# Patient Record
Sex: MALE | Race: WHITE | NOT HISPANIC OR LATINO | Employment: OTHER | URBAN - METROPOLITAN AREA
[De-identification: names, ages, dates, MRNs, and addresses within clinical notes are randomized per-mention and may not be internally consistent; named-entity substitution may affect disease eponyms.]

---

## 2017-01-12 ENCOUNTER — GENERIC CONVERSION - ENCOUNTER (OUTPATIENT)
Dept: OTHER | Facility: OTHER | Age: 82
End: 2017-01-12

## 2017-01-22 ENCOUNTER — GENERIC CONVERSION - ENCOUNTER (OUTPATIENT)
Dept: OTHER | Facility: OTHER | Age: 82
End: 2017-01-22

## 2017-01-24 ENCOUNTER — GENERIC CONVERSION - ENCOUNTER (OUTPATIENT)
Dept: OTHER | Facility: OTHER | Age: 82
End: 2017-01-24

## 2017-03-20 ENCOUNTER — ALLSCRIPTS OFFICE VISIT (OUTPATIENT)
Dept: OTHER | Facility: OTHER | Age: 82
End: 2017-03-20

## 2017-04-03 ENCOUNTER — APPOINTMENT (OUTPATIENT)
Dept: LAB | Facility: CLINIC | Age: 82
End: 2017-04-03
Payer: COMMERCIAL

## 2017-04-03 DIAGNOSIS — E11.9 TYPE 2 DIABETES MELLITUS WITHOUT COMPLICATIONS (HCC): ICD-10-CM

## 2017-04-03 DIAGNOSIS — E78.00 PURE HYPERCHOLESTEROLEMIA: ICD-10-CM

## 2017-04-03 LAB
ALBUMIN SERPL BCP-MCNC: 3.5 G/DL (ref 3.5–5)
ALP SERPL-CCNC: 91 U/L (ref 46–116)
ALT SERPL W P-5'-P-CCNC: 25 U/L (ref 12–78)
ANION GAP SERPL CALCULATED.3IONS-SCNC: 9 MMOL/L (ref 4–13)
AST SERPL W P-5'-P-CCNC: 15 U/L (ref 5–45)
BASOPHILS # BLD AUTO: 0.04 THOUSANDS/ΜL (ref 0–0.1)
BASOPHILS NFR BLD AUTO: 1 % (ref 0–1)
BILIRUB SERPL-MCNC: 0.93 MG/DL (ref 0.2–1)
BUN SERPL-MCNC: 19 MG/DL (ref 5–25)
CALCIUM SERPL-MCNC: 9.3 MG/DL (ref 8.3–10.1)
CHLORIDE SERPL-SCNC: 103 MMOL/L (ref 100–108)
CHOLEST SERPL-MCNC: 83 MG/DL (ref 50–200)
CK SERPL-CCNC: 47 U/L (ref 39–308)
CO2 SERPL-SCNC: 28 MMOL/L (ref 21–32)
CREAT SERPL-MCNC: 1.25 MG/DL (ref 0.6–1.3)
CREAT UR-MCNC: 275 MG/DL
EOSINOPHIL # BLD AUTO: 0.4 THOUSAND/ΜL (ref 0–0.61)
EOSINOPHIL NFR BLD AUTO: 5 % (ref 0–6)
ERYTHROCYTE [DISTWIDTH] IN BLOOD BY AUTOMATED COUNT: 13.7 % (ref 11.6–15.1)
EST. AVERAGE GLUCOSE BLD GHB EST-MCNC: 166 MG/DL
GFR SERPL CREATININE-BSD FRML MDRD: 54.8 ML/MIN/1.73SQ M
GLUCOSE P FAST SERPL-MCNC: 112 MG/DL (ref 65–99)
HBA1C MFR BLD: 7.4 % (ref 4.2–6.3)
HCT VFR BLD AUTO: 37.6 % (ref 36.5–49.3)
HDLC SERPL-MCNC: 37 MG/DL (ref 40–60)
HGB BLD-MCNC: 12.6 G/DL (ref 12–17)
LDLC SERPL CALC-MCNC: 10 MG/DL (ref 0–100)
LYMPHOCYTES # BLD AUTO: 1.94 THOUSANDS/ΜL (ref 0.6–4.47)
LYMPHOCYTES NFR BLD AUTO: 26 % (ref 14–44)
MCH RBC QN AUTO: 31.3 PG (ref 26.8–34.3)
MCHC RBC AUTO-ENTMCNC: 33.5 G/DL (ref 31.4–37.4)
MCV RBC AUTO: 94 FL (ref 82–98)
MICROALBUMIN UR-MCNC: 159 MG/L (ref 0–20)
MICROALBUMIN/CREAT 24H UR: 58 MG/G CREATININE (ref 0–30)
MONOCYTES # BLD AUTO: 0.51 THOUSAND/ΜL (ref 0.17–1.22)
MONOCYTES NFR BLD AUTO: 7 % (ref 4–12)
NEUTROPHILS # BLD AUTO: 4.49 THOUSANDS/ΜL (ref 1.85–7.62)
NEUTS SEG NFR BLD AUTO: 61 % (ref 43–75)
NRBC BLD AUTO-RTO: 0 /100 WBCS
PLATELET # BLD AUTO: 237 THOUSANDS/UL (ref 149–390)
PMV BLD AUTO: 10.8 FL (ref 8.9–12.7)
POTASSIUM SERPL-SCNC: 3.7 MMOL/L (ref 3.5–5.3)
PROT SERPL-MCNC: 6.9 G/DL (ref 6.4–8.2)
RBC # BLD AUTO: 4.02 MILLION/UL (ref 3.88–5.62)
SODIUM SERPL-SCNC: 140 MMOL/L (ref 136–145)
TRIGL SERPL-MCNC: 180 MG/DL
WBC # BLD AUTO: 7.42 THOUSAND/UL (ref 4.31–10.16)

## 2017-04-03 PROCEDURE — 83036 HEMOGLOBIN GLYCOSYLATED A1C: CPT

## 2017-04-03 PROCEDURE — 80053 COMPREHEN METABOLIC PANEL: CPT

## 2017-04-03 PROCEDURE — 36415 COLL VENOUS BLD VENIPUNCTURE: CPT

## 2017-04-03 PROCEDURE — 80061 LIPID PANEL: CPT

## 2017-04-03 PROCEDURE — 82550 ASSAY OF CK (CPK): CPT

## 2017-04-03 PROCEDURE — 82043 UR ALBUMIN QUANTITATIVE: CPT

## 2017-04-03 PROCEDURE — 85025 COMPLETE CBC W/AUTO DIFF WBC: CPT

## 2017-04-03 PROCEDURE — 82570 ASSAY OF URINE CREATININE: CPT

## 2017-04-04 ENCOUNTER — GENERIC CONVERSION - ENCOUNTER (OUTPATIENT)
Dept: OTHER | Facility: OTHER | Age: 82
End: 2017-04-04

## 2017-04-21 ENCOUNTER — GENERIC CONVERSION - ENCOUNTER (OUTPATIENT)
Dept: OTHER | Facility: OTHER | Age: 82
End: 2017-04-21

## 2017-07-10 ENCOUNTER — ALLSCRIPTS OFFICE VISIT (OUTPATIENT)
Dept: OTHER | Facility: OTHER | Age: 82
End: 2017-07-10

## 2017-07-20 ENCOUNTER — GENERIC CONVERSION - ENCOUNTER (OUTPATIENT)
Dept: OTHER | Facility: OTHER | Age: 82
End: 2017-07-20

## 2017-10-26 ENCOUNTER — GENERIC CONVERSION - ENCOUNTER (OUTPATIENT)
Dept: OTHER | Facility: OTHER | Age: 82
End: 2017-10-26

## 2017-10-26 ENCOUNTER — APPOINTMENT (OUTPATIENT)
Dept: LAB | Facility: CLINIC | Age: 82
End: 2017-10-26
Payer: COMMERCIAL

## 2017-10-26 DIAGNOSIS — E11.9 TYPE 2 DIABETES MELLITUS WITHOUT COMPLICATIONS (HCC): ICD-10-CM

## 2017-10-26 LAB
EST. AVERAGE GLUCOSE BLD GHB EST-MCNC: 160 MG/DL
HBA1C MFR BLD: 7.2 % (ref 4.2–6.3)

## 2017-10-26 PROCEDURE — 83036 HEMOGLOBIN GLYCOSYLATED A1C: CPT

## 2017-10-26 PROCEDURE — 36415 COLL VENOUS BLD VENIPUNCTURE: CPT

## 2017-10-29 DIAGNOSIS — E11.9 TYPE 2 DIABETES MELLITUS WITHOUT COMPLICATIONS (HCC): ICD-10-CM

## 2017-11-09 ENCOUNTER — ALLSCRIPTS OFFICE VISIT (OUTPATIENT)
Dept: OTHER | Facility: OTHER | Age: 82
End: 2017-11-09

## 2017-11-10 NOTE — PROGRESS NOTES
Assessment    1  Type 2 diabetes mellitus (250 00) (E11 9)   2  Hypertension (401 9) (I10)   3  Hypercholesterolemia (272 0) (E78 00)   4  Esophageal reflux (530 81) (K21 9)    Plan  Type 2 diabetes mellitus    · *VB - Foot Exam; Status:Complete - Retrospective By Protocol Authorization;   Done: [id number]    Discussion/Summary  Discussion Summary:   Diabetes: continue medications, watch for signs of hypoglycemia which can present as a feeling of weakness, sweating, and shakiness  Avoid sweets  Take care of your feet and check them daily for any calluses or wounds  Get your eyes checked yearly for any developing retinopathy  continue blood pressure medication(s)  continue med(s), avoid saturated fats  Moving towards a more plant-based diet will also improve your cholesterol  Patient doing well off proton pump inhibitor, can continue to watch diet for this  Chief Complaint  Chief Complaint Chronic Condition [facility]: Patient is here today for follow up of chronic conditions described in HPI  History of Present Illness  HPI: Diabetes: patient reports compliance with med(s) and no adverse reactions  No hypoglycemic symptoms  Hemoglobin A1c better this time at 7 2, previous was 7 4   patient reports compliance with med(s) and no adverse side effects  No lightheadedness, no chest pain, no shortness of breath, no dry cough  patient tolerating med and denies any significant myalgias  Patient gets occasional symptoms, he is not on a proton pump inhibitor  He was on proton pump inhibitor in the past       Review of Systems  Complete-Male:  Constitutional: no fever,-- no chills-- and-- not feeling tired  Cardiovascular: the heart rate was not slow,-- no chest pain,-- the heart rate was not fast,-- no palpitations-- and-- no extremity edema  Respiratory: no shortness of breath,-- no cough,-- no wheezing-- and-- no shortness of breath during exertion  Gastrointestinal: no constipation-- and-- no diarrhea  Genitourinary: no dysuria  Psychiatric: no anxiety-- and-- no depression  Preventive Quality 65 Older:  Preventive Quality 65 and Older: Falls Risk: The patient fell 0 times in the past 12 months  Active Problems  1  Arteriosclerotic coronary artery disease (414 00) (I25 10)   2  Benign prostatic hypertrophy (600 00) (N40 0)   3  Carotid artery stenosis (433 10) (I65 29)   4  Cerebral infarction, watershed distribution, bilateral, 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 91) (I63 8)   5  Chronic systolic congestive heart failure (428 22,428 0) (I50 22)   6  Esophageal reflux (530 81) (K21 9)   7  Hypercholesterolemia (272 0) (E78 00)   8  Hypertension (401 9) (I10)   9  Ischemic cardiomyopathy (414 8) (I25 5)   10  Need for influenza vaccination (V04 81) (Z23)   11  Need for vaccination with 13-polyvalent pneumococcal conjugate vaccine (V03 82) (Z23)   12  Postoperative state (V45 89) (Z98 890)   13  Screening for genitourinary condition (V81 6) (Z13 89)   14  Type 2 diabetes mellitus (250 00) (E11 9)    Past Medical History  1  History of Cardiac Cath Procedures Performed   2  History of angina pectoris (V12 59) (Z86 79)   3  History of Occluded coronary artery stent (996 72) (X72 694L)    Surgical History  1  History of Carotid Thromboendarterectomy    Family History  Father    1  Family history of     Social History     · Being A Social Drinker   · Daily caffeine consumption   · Former smoker (V15 82) (U76 904)   · Non drinker / no alcohol use   ·   Social History Reviewed: The social history was reviewed and updated today  Current Meds   1  Accu-Chek Charla STRP; Therapy: 06WFE9644 to (Last Rx:99Vdg4902)  Requested for: 80GSF4554 Ordered   2  Aspirin 81 MG TABS; TAKE 1 TABLET DAILY; Therapy: (Recorded:61Jls0466) to Recorded   3  Atorvastatin Calcium 40 MG Oral Tablet; TAKE 1 TABLET Bedtime; Last Rx:2017 Ordered   4  Bimatoprost 0 01 % SOLN; Therapy: (Recorded:59Xqq7413) to Recorded   5   Esomeprazole Magnesium 40 MG Oral Capsule Delayed Release; TAKE 1 CAPSULE DAILY; Last Rx:51Rcz0374 Ordered   6  Fluticasone Propionate 50 MCG/ACT Nasal Suspension; Therapy: (Recorded:88Gnf8221) to Recorded   7  Furosemide 40 MG Oral Tablet; TAKE 1 TABLET DAILY EXCEPT ONCE A WEEK TAKE 1         ADDITIONAL TABLET; Therapy: 57Toc3094 to (Evaluate:04Jun2018)  Requested for: 40ACQ2276; Last Rx:09Jun2017 Ordered   8  Glimepiride 2 MG Oral Tablet; TAKE 1 TABLET DAILY AS DIRECTED; Last Rx:52Sbs6280 Ordered   9  Guaifenesin 600/ TB12; Therapy: (Recorded:03Mow6676) to Recorded   10  Isosorbide Mononitrate ER 30 MG Oral Tablet Extended Release 24 Hour; TAKE 1 TABLET ONCE  DAILY  Requested for: 53TRI8800; Last IA:92ZWY9208 Ordered   11  Klor-Con 10 10 MEQ Oral Tablet Extended Release; TAKE 1 TABLET DAILY; Last Rx:65Cuq4240  Ordered   12  Losartan Potassium 25 MG Oral Tablet; TAKE 1 TABLET DAILY  Requested for: 59ESS6590; Last  Rx:53Jhr8380 Ordered   13  Lumigan 0 01 % Ophthalmic Solution; Therapy: 18FQL2296 to (Last Rx:67Bst2703)  Requested for: 83Lfh9609 Ordered   14  MetFORMIN HCl - 1000 MG Oral Tablet; TAKE 1 TABLET TWICE DAILY  Requested for: 76XYD2216;  Last Rx:09Jun2017 Ordered   15  Metoprolol Succinate ER 25 MG Oral Tablet Extended Release 24 Hour; TAKE 1 TABLET DAILY   Requested for: 65NUH3341; Last ZU:40GAW8727 Ordered   16  Nitrostat 0 4 MG Sublingual Tablet Sublingual; PLACE 1 TABLET UNDER THE TONGUE EVERY 5  MINUTES UP TO 3 DOSES AS NEEDED FOR CHEST PAIN;  Therapy: 33URU8833 to (Evaluate:12Bpt3565)  Requested for: 86Uxq7731; Last Rx:27Vwk2593  Ordered   17  OneTouch Delica Lancets 39Y Miscellaneous; USE AS DIRECTED  Requested for: 19BHY3094; Last  Rx:07Nov2016 Ordered   18  OneTouch Ultra Blue In Vitro Strip; TEST ONCE DAILY  Requested for: 38HXW5265; Last  Rx:20Mar2017 Ordered   19  Potassium Chloride 10 MEQ CPCR; Therapy: (Recorded:66Bty3366) to Recorded   20   Timolol Maleate 0 5 % Ophthalmic Solution; use daily as directed; Results/Data  *VB - Foot Exam 44OPJ0923 10:17AM Jocelyn Modi     Test Name Result Flag Reference   FOOT EXAM 12LVB6703       Falls Risk Assessment (Dx Z13 89 Screen for Neurologic Disorder) 56VUA1759 10:16AM User, Samantha     Test Name Result Flag Reference   Falls Risk      No falls in the past year         Signatures   Electronically signed by : NATALIE Coleman ; Nov 9 2017 10:26AM EST                       (Author)

## 2018-01-11 NOTE — MISCELLANEOUS
Message   Recorded as Task   Date: 04/21/2017 01:48 PM, Created By: Isabel Akbar   Task Name: Med Renewal Request   Assigned To: Bridgett Storm   Regarding Patient: Elsie Garrett, Status: Active   Comment:    Isabel Akbar - 21 Apr 2017 1:48 PM     TASK CREATED  Caller: Self; Renew Medication; [phone] (Home); [phone] (Work)  Pt said he needs a refill for his Januvia but it is expensive and he is wondering if you could prescribe something else instead  He said he discussed this with you previously  If he could talk to you personally, he would like to do that  Pt uses [employer] order  Pt cbr @ [phone]  Pt to stop Januvia, see how sugars are, if elevated will consider less expensive option like glimepiride        Signatures   Electronically signed by : NATALIE Armando ; Apr 21 2017  3:51PM EST                       (Author)

## 2018-01-13 VITALS
SYSTOLIC BLOOD PRESSURE: 124 MMHG | BODY MASS INDEX: 33.8 KG/M2 | HEIGHT: 68 IN | DIASTOLIC BLOOD PRESSURE: 70 MMHG | WEIGHT: 223.01 LBS | RESPIRATION RATE: 16 BRPM | OXYGEN SATURATION: 96 % | HEART RATE: 102 BPM

## 2018-01-13 VITALS
SYSTOLIC BLOOD PRESSURE: 124 MMHG | TEMPERATURE: 97.5 F | RESPIRATION RATE: 16 BRPM | DIASTOLIC BLOOD PRESSURE: 68 MMHG | OXYGEN SATURATION: 99 % | BODY MASS INDEX: 31.93 KG/M2 | WEIGHT: 210 LBS | HEART RATE: 75 BPM

## 2018-01-13 VITALS
WEIGHT: 217.03 LBS | RESPIRATION RATE: 16 BRPM | SYSTOLIC BLOOD PRESSURE: 118 MMHG | BODY MASS INDEX: 32.89 KG/M2 | OXYGEN SATURATION: 97 % | HEART RATE: 82 BPM | TEMPERATURE: 97.7 F | DIASTOLIC BLOOD PRESSURE: 62 MMHG | HEIGHT: 68 IN

## 2018-01-14 NOTE — RESULT NOTES
Message   I called, A1C a little above goal at 7 4, trigs a little up, otherwise labs are good  Verified Results  (1) COMPREHENSIVE METABOLIC PANEL 47GOT2547 33:82UJ Okro Alfonso Order Number: [id number]     Test Name Result Flag Reference   SODIUM 140 mmol/L  136-145   POTASSIUM 3 7 mmol/L  3 5-5 3   CHLORIDE 103 mmol/L  100-108   CARBON DIOXIDE 28 mmol/L  21-32   ANION GAP (CALC) 9 mmol/L  4-13   BLOOD UREA NITROGEN 19 mg/dL  5-25   CREATININE 1 25 mg/dL  0 60-1 30   Standardized to IDMS reference method   CALCIUM 9 3 mg/dL  8 3-10 1   BILI, TOTAL 0 93 mg/dL  0 20-1 00   ALK PHOSPHATAS 91 U/L     ALT (SGPT) 25 U/L  12-78   AST(SGOT) 15 U/L  5-45   ALBUMIN 3 5 g/dL  3 5-5 0   TOTAL PROTEIN 6 9 g/dL  6 4-8 2   eGFR Non-African American 54 8 ml/min/1 73sq m     - Patient Instructions: This is a fasting blood test  Water,black tea or black  coffee only after 9:00pm the night before test Drink 2 glasses of water the morning of test   National Kidney Disease Education Program recommendations are as follows:  GFR calculation is accurate only with a steady state creatinine  Chronic Kidney disease less than 60 ml/min/1 73 sq  meters  Kidney failure less than 15 ml/min/1 73 sq  meters  GLUCOSE FASTING 112 mg/dL H 65-99     (1) HEMOGLOBIN A1C 03Apr2017 09:49AM Brittni Kash Order Number: [id number]     Test Name Result Flag Reference   HEMOGLOBIN A1C 7 4 % H 4 2-6 3   EST  AVG   GLUCOSE 166 mg/dl       (1) MICROALBUMIN CREATININE RATIO, RANDOM URINE 44ODR6739 09:49AM Brittni Alfonso Order Number: [id number]     Test Name Result Flag Reference   MICROALBUMIN/ CREAT R 58 mg/g creatinine H 0-30   MICROALBUMIN,URINE 159 0 mg/L H 0 0-20 0   CREATININE URINE 275 0 mg/dL       (1) LIPID PANEL, FASTING 03Apr2017 09:49AM Brittni Alfonso Order Number: [id number]     Test Name Result Flag Reference   CHOLESTEROL 83 mg/dL     HDL,DIRECT 37 mg/dL L 40-60 Specimen collection should occur prior to Metamizole administration due to the potential for falsely depressed results  LDL CHOLESTEROL CALCULATED 10 mg/dL  0-100   - Patient Instructions: This is a fasting blood test  Water,black tea or black  coffee only after 9:00pm the night before test   Drink 2 glasses of water the morning of test     - Patient Instructions: This is a fasting blood test  Water,black tea or black  coffee only after 9:00pm the night before test Drink 2 glasses of water the morning of test   Triglyceride:         Normal              <150 mg/dl       Borderline High    150-199 mg/dl       High               200-499 mg/dl       Very High          >499 mg/dl  Cholesterol:         Desirable        <200 mg/dl      Borderline High  200-239 mg/dl      High             >239 mg/dl  HDL Cholesterol:        High    >59 mg/dL      Low     <41 mg/dL  LDL CALCULATED:    This screening LDL is a calculated result  It does not have the accuracy of the Direct Measured LDL in the monitoring of patients with hyperlipidemia and/or statin therapy  Direct Measure LDL (QBZ162) must be ordered separately in these patients  TRIGLYCERIDES 180 mg/dL H <=150   Specimen collection should occur prior to N-Acetylcysteine or Metamizole administration due to the potential for falsely depressed results  (1) CK (CPK) 03Apr2017 09:49AM Wes Fear Order Number: [id number]     Test Name Result Flag Reference   CK (CPK) 47 U/L     - Patient Instructions:  This is a fasting blood test  Water,black tea or black  coffee only after 9:00pm the night before test Drink 2 glasses of water the morning of test      (1) CBC/PLT/DIFF 03Apr2017 09:49AM eWs Fear Order Number: [id number]     Test Name Result Flag Reference   WBC COUNT 7 42 Thousand/uL  4 31-10 16   RBC COUNT 4 02 Million/uL  3 88-5 62   HEMOGLOBIN 12 6 g/dL  12 0-17 0   HEMATOCRIT 37 6 %  36 5-49 3   MCV 94 fL  82-98   MCH 31 3 pg  26 8-34 3   MCHC 33 5 g/dL  31 4-37 4   RDW 13 7 %  11 6-15 1   MPV 10 8 fL  8 9-12 7   PLATELET COUNT 334 Thousands/uL  149-390   nRBC AUTOMATED 0 /100 WBCs     NEUTROPHILS RELATIVE PERCENT 61 %  43-75   LYMPHOCYTES RELATIVE PERCENT 26 %  14-44   MONOCYTES RELATIVE PERCENT 7 %  4-12   EOSINOPHILS RELATIVE PERCENT 5 %  0-6   BASOPHILS RELATIVE PERCENT 1 %  0-1   NEUTROPHILS ABSOLUTE COUNT 4 49 Thousands/? ??L  1 85-7 62   LYMPHOCYTES ABSOLUTE COUNT 1 94 Thousands/? ??L  0 60-4 47   MONOCYTES ABSOLUTE COUNT 0 51 Thousand/? ??L  0 17-1 22   EOSINOPHILS ABSOLUTE COUNT 0 40 Thousand/? ??L  0 00-0 61   BASOPHILS ABSOLUTE COUNT 0 04 Thousands/? ??L  0 00-0 10   - Patient Instructions: This bloodwork is non-fasting  Please drink two glasses of water morning of bloodwork  - Patient Instructions: This bloodwork is non-fasting  Please drink two glasses of water morning of bloodwork

## 2018-01-16 NOTE — RESULT NOTES
Discussion/Summary   Hemoglobin A1c is a little better, will review upcoming appointment  Verified Results  (1) HEMOGLOBIN A1C 26Oct2017 09:44AM Wendy Ortega Order Number: [id number]     Test Name Result Flag Reference   HEMOGLOBIN A1C 7 2 % H 4 2-6 3   EST  AVG   GLUCOSE 160 mg/dl

## 2018-01-26 DIAGNOSIS — E87.6 HYPOKALEMIA: Primary | ICD-10-CM

## 2018-01-26 RX ORDER — POTASSIUM CHLORIDE 750 MG/1
TABLET, FILM COATED, EXTENDED RELEASE ORAL
Qty: 90 TABLET | Refills: 3 | Status: SHIPPED | OUTPATIENT
Start: 2018-01-26 | End: 2018-01-31 | Stop reason: SDUPTHER

## 2018-01-31 ENCOUNTER — TELEPHONE (OUTPATIENT)
Dept: INTERNAL MEDICINE CLINIC | Facility: CLINIC | Age: 83
End: 2018-01-31

## 2018-01-31 DIAGNOSIS — E87.6 HYPOKALEMIA: ICD-10-CM

## 2018-01-31 RX ORDER — POTASSIUM CHLORIDE 750 MG/1
10 TABLET, FILM COATED, EXTENDED RELEASE ORAL DAILY
Qty: 90 TABLET | Refills: 3 | Status: SHIPPED | OUTPATIENT
Start: 2018-01-31 | End: 2019-02-01 | Stop reason: SDUPTHER

## 2018-02-09 DIAGNOSIS — R60.0 EDEMA EXTREMITIES: Primary | ICD-10-CM

## 2018-02-09 RX ORDER — FUROSEMIDE 40 MG/1
40 TABLET ORAL DAILY
Qty: 90 TABLET | Refills: 3 | Status: SHIPPED | OUTPATIENT
Start: 2018-02-09 | End: 2018-07-24 | Stop reason: SDUPTHER

## 2018-02-09 RX ORDER — FUROSEMIDE 40 MG/1
TABLET ORAL
Qty: 103 TABLET | Refills: 3 | Status: SHIPPED | OUTPATIENT
Start: 2018-02-09 | End: 2018-02-21 | Stop reason: SDUPTHER

## 2018-02-21 ENCOUNTER — OFFICE VISIT (OUTPATIENT)
Dept: INTERNAL MEDICINE CLINIC | Facility: CLINIC | Age: 83
End: 2018-02-21
Payer: COMMERCIAL

## 2018-02-21 VITALS
DIASTOLIC BLOOD PRESSURE: 80 MMHG | HEART RATE: 89 BPM | WEIGHT: 217.8 LBS | TEMPERATURE: 98.1 F | OXYGEN SATURATION: 98 % | SYSTOLIC BLOOD PRESSURE: 122 MMHG | BODY MASS INDEX: 33.12 KG/M2 | RESPIRATION RATE: 16 BRPM

## 2018-02-21 DIAGNOSIS — L02.32 BOIL OF BUTTOCK: ICD-10-CM

## 2018-02-21 DIAGNOSIS — K21.9 GASTROESOPHAGEAL REFLUX DISEASE, ESOPHAGITIS PRESENCE NOT SPECIFIED: ICD-10-CM

## 2018-02-21 DIAGNOSIS — I10 HYPERTENSION, UNSPECIFIED TYPE: ICD-10-CM

## 2018-02-21 DIAGNOSIS — E78.00 HYPERCHOLESTEROLEMIA: ICD-10-CM

## 2018-02-21 DIAGNOSIS — E11.9 TYPE 2 DIABETES MELLITUS WITHOUT COMPLICATION, WITHOUT LONG-TERM CURRENT USE OF INSULIN (HCC): Primary | ICD-10-CM

## 2018-02-21 PROCEDURE — 99214 OFFICE O/P EST MOD 30 MIN: CPT | Performed by: INTERNAL MEDICINE

## 2018-02-21 RX ORDER — SULFAMETHOXAZOLE AND TRIMETHOPRIM 800; 160 MG/1; MG/1
1 TABLET ORAL EVERY 12 HOURS SCHEDULED
Qty: 14 TABLET | Refills: 0 | Status: SHIPPED | OUTPATIENT
Start: 2018-02-21 | End: 2018-02-28

## 2018-02-21 NOTE — PROGRESS NOTES
Assessment/Plan:    Type 2 diabetes mellitus (HCC)  Continue current meds, will check A1c before next visit    Hypercholesterolemia   Continue statin and will recheck lipid panel before next visit    Hypertension   Controlled, continue meds    Esophageal reflux   GERD: can do trial off the proton pump inhibitor to see if symptoms of heartburn return  Try behavioral changes to reduce GERD including watching diet and avoiding foods that cause symptoms  Common foods that cause symptoms are coffee, alcohol, chocolate, spicy foods, and fatty foods  Try to titrate down med slowly to avoid possible rebound hyperacidity  Boil of buttock    Patient referred to Colorectal for evaluation, will also treat with antibiotics       Diagnoses and all orders for this visit:    Type 2 diabetes mellitus without complication, without long-term current use of insulin ([facility] )  -     Hemoglobin A1c; Future  -     Microalbumin / creatinine urine ratio    Hypercholesterolemia  -     CBC and differential; Future  -     Comprehensive metabolic panel; Future  -     Lipid Panel with Direct LDL reflex; Future  -     TSH, 3rd generation with T4 reflex; Future    Hypertension, unspecified type    Gastroesophageal reflux disease, esophagitis presence not specified    Boil of buttock  -     sulfamethoxazole-trimethoprim (BACTRIM DS) 800-160 mg per tablet; Take 1 tablet by mouth every 12 (twelve) hours for 7 days  -     Ambulatory referral to Colorectal Surgery; Future          Subjective:      Patient ID: Vicki Tinoco is a 80 y o  male      HTN: pt reports compliance with BP meds    Hyperchol: pt on statin, no sig muscle aches    Diabetes Mellitus: pt not on Januvia due to cost, last A1C was a little above goal         The following portions of the patient's history were reviewed and updated as appropriate: allergies, current medications, past family history, past medical history, past social history, past surgical history and problem list     No family history on file  No past medical history on file  Social History     Social History    Marital status:      Spouse name: N/A    Number of children: N/A    Years of education: N/A     Occupational History    Not on file  Social History Main Topics    Smoking status: Never Smoker    Smokeless tobacco: Not on file    Alcohol use No    Drug use: No    Sexual activity: Not on file     Other Topics Concern    Not on file     Social History Narrative    No narrative on file       Current Outpatient Prescriptions:     aspirin 81 MG tablet, Take 81 mg by mouth daily  , Disp: , Rfl:     atorvastatin (LIPITOR) 40 mg tablet, Take 40 mg by mouth daily Indications: every evening , Disp: , Rfl:     esomeprazole (NexIUM) 40 MG capsule, Take 40 mg by mouth every evening , Disp: , Rfl:     furosemide (LASIX) 40 mg tablet, Take 1 tablet (40 mg total) by mouth daily, Disp: 90 tablet, Rfl: 3    glimepiride (AMARYL) 2 mg tablet, Take 2 mg by mouth every evening , Disp: , Rfl:     guaiFENesin (MUCINEX) 600 mg 12 hr tablet, Take 1,200 mg by mouth 2 (two) times a day , Disp: , Rfl:     isosorbide dinitrate (ISORDIL) 30 mg tablet, Take 30 mg by mouth daily  , Disp: , Rfl:     losartan (COZAAR) 25 mg tablet, Take 25 mg by mouth daily  , Disp: , Rfl:     metFORMIN (GLUCOPHAGE) 1000 MG tablet, Take 1,000 mg by mouth 2 (two) times a day with meals  , Disp: , Rfl:     metoprolol succinate (TOPROL-XL) 25 mg 24 hr tablet, Take 25 mg by mouth daily  , Disp: , Rfl:     potassium chloride (KLOR-CON 10) 10 mEq tablet, Take 1 tablet (10 mEq total) by mouth daily, Disp: 90 tablet, Rfl: 3    acetaminophen (TYLENOL) 325 mg tablet, Take 1 - 2 tabs PO Q4 PRN pain, Disp: 30 tablet, Rfl: 0    dicyclomine (BENTYL) 20 mg tablet, Take 1 tablet (20 mg total) by mouth every 6 (six) hours  , Disp: 20 tablet, Rfl: 0    sitaGLIPtin (JANUVIA) 100 mg tablet, Take 100 mg by mouth daily  , Disp: , Rfl:    sulfamethoxazole-trimethoprim (BACTRIM DS) 800-160 mg per tablet, Take 1 tablet by mouth every 12 (twelve) hours for 7 days, Disp: 14 tablet, Rfl: 0  No Known Allergies    Review of Systems   Constitutional: Negative for chills, fatigue and fever  HENT: Negative for congestion, nosebleeds, postnasal drip and sore throat  Eyes: Negative for pain and visual disturbance  Respiratory: Positive for cough (mild)  Negative for chest tightness, shortness of breath and wheezing  Cardiovascular: Negative for chest pain, palpitations and leg swelling  Gastrointestinal: Negative for abdominal pain, constipation, diarrhea, nausea and vomiting  Endocrine: Negative for polydipsia and polyuria  Genitourinary: Negative for dysuria, flank pain and hematuria  Musculoskeletal: Negative for arthralgias  Skin: Negative for rash  Neurological: Negative for dizziness, tremors and headaches  Hematological: Does not bruise/bleed easily  Psychiatric/Behavioral: Negative for confusion and dysphoric mood  The patient is not nervous/anxious  Objective:      /80   Pulse 89   Temp 98 1 °F (36 7 °C) (Oral)   Resp 16   Wt 98 8 kg (217 lb 12 8 oz)   SpO2 98%   BMI 33 12 kg/m²          Physical Exam   Constitutional: He is oriented to person, place, and time  He appears well-developed and well-nourished  No distress  HENT:   Head: Normocephalic and atraumatic  Right Ear: External ear normal    Left Ear: External ear normal    Eyes: Conjunctivae are normal  No scleral icterus  Neck: Normal range of motion  Neck supple  No tracheal deviation present  No thyromegaly present  Cardiovascular: Normal rate, regular rhythm and normal heart sounds  No murmur heard  Pulmonary/Chest: Effort normal and breath sounds normal  No respiratory distress  He has no wheezes  He has no rales  Abdominal: Soft  Bowel sounds are normal  There is no tenderness  There is no rebound and no guarding     Musculoskeletal: He exhibits no edema  Lymphadenopathy:     He has no cervical adenopathy  Neurological: He is alert and oriented to person, place, and time  Skin:   Flat in boil that looked like it drained right buttock no significant erythema, no purulent drainage   Psychiatric: He has a normal mood and affect  His behavior is normal  Judgment and thought content normal    Vitals reviewed

## 2018-04-09 DIAGNOSIS — E11.8 TYPE 2 DIABETES MELLITUS WITH COMPLICATION, WITH LONG-TERM CURRENT USE OF INSULIN (HCC): Primary | ICD-10-CM

## 2018-04-09 DIAGNOSIS — Z79.4 TYPE 2 DIABETES MELLITUS WITH COMPLICATION, WITH LONG-TERM CURRENT USE OF INSULIN (HCC): Primary | ICD-10-CM

## 2018-04-09 RX ORDER — LANCETS 33 GAUGE
EACH MISCELLANEOUS DAILY
Qty: 100 EACH | Refills: 3 | Status: SHIPPED | OUTPATIENT
Start: 2018-04-09

## 2018-04-10 DIAGNOSIS — E11.9 TYPE 2 DIABETES MELLITUS WITHOUT COMPLICATION, WITHOUT LONG-TERM CURRENT USE OF INSULIN (HCC): Primary | ICD-10-CM

## 2018-04-10 DIAGNOSIS — E11.9 TYPE 2 DIABETES MELLITUS WITHOUT COMPLICATION, WITHOUT LONG-TERM CURRENT USE OF INSULIN (HCC): ICD-10-CM

## 2018-05-25 DIAGNOSIS — E11.9 TYPE 2 DIABETES MELLITUS WITHOUT COMPLICATION, WITHOUT LONG-TERM CURRENT USE OF INSULIN (HCC): ICD-10-CM

## 2018-05-25 DIAGNOSIS — I10 ESSENTIAL HYPERTENSION: Primary | ICD-10-CM

## 2018-05-25 RX ORDER — METOPROLOL SUCCINATE 25 MG/1
25 TABLET, EXTENDED RELEASE ORAL DAILY
Qty: 90 TABLET | Refills: 1 | Status: SHIPPED | OUTPATIENT
Start: 2018-05-25 | End: 2018-05-29 | Stop reason: SDUPTHER

## 2018-05-29 DIAGNOSIS — I10 ESSENTIAL HYPERTENSION: ICD-10-CM

## 2018-05-29 DIAGNOSIS — E11.9 TYPE 2 DIABETES MELLITUS WITHOUT COMPLICATION, WITHOUT LONG-TERM CURRENT USE OF INSULIN (HCC): ICD-10-CM

## 2018-05-29 RX ORDER — METOPROLOL SUCCINATE 25 MG/1
TABLET, EXTENDED RELEASE ORAL
Qty: 90 TABLET | Refills: 3 | Status: SHIPPED | OUTPATIENT
Start: 2018-05-29 | End: 2019-03-20 | Stop reason: HOSPADM

## 2018-06-20 DIAGNOSIS — I10 ESSENTIAL HYPERTENSION: Primary | ICD-10-CM

## 2018-06-20 RX ORDER — ISOSORBIDE MONONITRATE 30 MG/1
TABLET, EXTENDED RELEASE ORAL
Qty: 90 TABLET | Refills: 3 | Status: SHIPPED | OUTPATIENT
Start: 2018-06-20 | End: 2018-07-09 | Stop reason: SDUPTHER

## 2018-07-09 ENCOUNTER — OFFICE VISIT (OUTPATIENT)
Dept: INTERNAL MEDICINE CLINIC | Facility: CLINIC | Age: 83
End: 2018-07-09
Payer: COMMERCIAL

## 2018-07-09 VITALS
OXYGEN SATURATION: 99 % | BODY MASS INDEX: 33.68 KG/M2 | DIASTOLIC BLOOD PRESSURE: 80 MMHG | HEART RATE: 83 BPM | WEIGHT: 209.6 LBS | TEMPERATURE: 98 F | SYSTOLIC BLOOD PRESSURE: 134 MMHG | HEIGHT: 66 IN

## 2018-07-09 DIAGNOSIS — K21.9 GASTROESOPHAGEAL REFLUX DISEASE, ESOPHAGITIS PRESENCE NOT SPECIFIED: ICD-10-CM

## 2018-07-09 DIAGNOSIS — E78.2 MIXED HYPERLIPIDEMIA: ICD-10-CM

## 2018-07-09 DIAGNOSIS — I10 ESSENTIAL HYPERTENSION: ICD-10-CM

## 2018-07-09 DIAGNOSIS — E11.9 TYPE 2 DIABETES MELLITUS WITHOUT COMPLICATION, WITHOUT LONG-TERM CURRENT USE OF INSULIN (HCC): ICD-10-CM

## 2018-07-09 DIAGNOSIS — R05.9 COUGH: Primary | ICD-10-CM

## 2018-07-09 PROBLEM — L02.32 BOIL OF BUTTOCK: Status: RESOLVED | Noted: 2018-02-21 | Resolved: 2018-07-09

## 2018-07-09 PROCEDURE — 99214 OFFICE O/P EST MOD 30 MIN: CPT | Performed by: INTERNAL MEDICINE

## 2018-07-09 PROCEDURE — 1101F PT FALLS ASSESS-DOCD LE1/YR: CPT | Performed by: INTERNAL MEDICINE

## 2018-07-09 RX ORDER — TIMOLOL MALEATE 6.8 MG/ML
SOLUTION/ DROPS OPHTHALMIC DAILY
COMMUNITY
End: 2018-10-09 | Stop reason: SDUPTHER

## 2018-07-09 RX ORDER — FLUTICASONE PROPIONATE 50 MCG
SPRAY, SUSPENSION (ML) NASAL AS NEEDED
COMMUNITY

## 2018-07-09 RX ORDER — GUAIFENESIN 600 MG
600 TABLET, EXTENDED RELEASE 12 HR ORAL 2 TIMES DAILY
Qty: 60 TABLET | Refills: 0
Start: 2018-07-09

## 2018-07-09 RX ORDER — ESOMEPRAZOLE MAGNESIUM 40 MG/1
1 CAPSULE, DELAYED RELEASE ORAL DAILY
COMMUNITY
End: 2018-07-09 | Stop reason: SDUPTHER

## 2018-07-09 RX ORDER — NITROGLYCERIN 0.4 MG/1
1 TABLET SUBLINGUAL
COMMUNITY
Start: 2011-11-16

## 2018-07-09 NOTE — PATIENT INSTRUCTIONS
Problem List Items Addressed This Visit     Essential hypertension       Controlled, continue meds along with healthy diet and regular exercise         Type 2 diabetes mellitus without complication, without long-term current use of insulin (HCC)     Lab Results   Component Value Date    HGBA1C 7 2 (H) 10/26/2017       No results for input(s): POCGLU in the last 72 hours  Blood Sugar Average: Last 72 hrs:    Patient reminded about labs that are in the system that he can get soon at his convenience  Continue current medications  Mixed hyperlipidemia      Continue with healthy diet, regular exercise, and statin         Esophageal reflux      GERD: can do trial off the proton pump inhibitor to see if symptoms of heartburn return  Try behavioral changes to reduce GERD including watching diet and avoiding foods that cause symptoms  Common foods that cause symptoms are coffee, alcohol, chocolate, spicy foods, and fatty foods  Try to titrate down med slowly to avoid possible rebound hyperacidity   Patient start by trying every other day of the Nexium           Other Visit Diagnoses     Cough    -  Primary    Relevant Medications    guaiFENesin (MUCINEX) 600 mg 12 hr tablet

## 2018-07-09 NOTE — ASSESSMENT & PLAN NOTE
Lab Results   Component Value Date    HGBA1C 7 2 (H) 10/26/2017       No results for input(s): POCGLU in the last 72 hours  Blood Sugar Average: Last 72 hrs:    Patient reminded about labs that are in the system that he can get soon at his convenience  Continue current medications

## 2018-07-09 NOTE — ASSESSMENT & PLAN NOTE
GERD: can do trial off the proton pump inhibitor to see if symptoms of heartburn return  Try behavioral changes to reduce GERD including watching diet and avoiding foods that cause symptoms  Common foods that cause symptoms are coffee, alcohol, chocolate, spicy foods, and fatty foods  Try to titrate down med slowly to avoid possible rebound hyperacidity   Patient start by trying every other day of the Nexium

## 2018-07-09 NOTE — PROGRESS NOTES
Assessment/Plan:    Type 2 diabetes mellitus without complication, without long-term current use of insulin (Formerly [facility])  Lab Results   Component Value Date    HGBA1C 7 2 (H) 10/26/2017       No results for input(s): POCGLU in the last 72 hours  Blood Sugar Average: Last 72 hrs:    Patient reminded about labs that are in the system that he can get soon at his convenience  Continue current medications  Essential hypertension    Controlled, continue meds along with healthy diet and regular exercise    Mixed hyperlipidemia   Continue with healthy diet, regular exercise, and statin    Esophageal reflux   GERD: can do trial off the proton pump inhibitor to see if symptoms of heartburn return  Try behavioral changes to reduce GERD including watching diet and avoiding foods that cause symptoms  Common foods that cause symptoms are coffee, alcohol, chocolate, spicy foods, and fatty foods  Try to titrate down med slowly to avoid possible rebound hyperacidity  Patient start by trying every other day of the Nexium       Diagnoses and all orders for this visit:    Cough  -     guaiFENesin (MUCINEX) 600 mg 12 hr tablet; Take 1 tablet (600 mg total) by mouth 2 (two) times a day    Type 2 diabetes mellitus without complication, without long-term current use of insulin ([facility])    Essential hypertension    Mixed hyperlipidemia    Gastroesophageal reflux disease, esophagitis presence not specified    Other orders  -     glucose blood (ACCU-CHEK CHANDRAKANT PLUS) test strip; by In Vitro route  -     bimatoprost (LUMIGAN) 0 01 % ophthalmic drops; Apply to eye  -     Discontinue: esomeprazole (NexIUM) 40 MG capsule;  Take 1 capsule by mouth daily  -     fluticasone (FLONASE) 50 mcg/act nasal spray; into each nostril  -     nitroglycerin (NITROSTAT) 0 4 mg SL tablet; Place 1 tablet under the tongue every 5 (five) minutes as needed  -     Timolol Maleate 0 5 % (DAILY) SOLN; Apply to eye daily          Subjective:      Patient ID: Mahi Ian Vanessa Espinoza is a 80 y o  male  Diabetes mellitus:  Patient in get labs, he reports compliance with diabetic meds  He stopped Januvia due to cost    Hypertension:  Patient reports compliance with blood pressure meds, no cardiopulmonary complaints  Hypercholesterolemia:  Patient tolerating statin without any significant muscle aches  The following portions of the patient's history were reviewed and updated as appropriate: allergies, current medications, past family history, past medical history, past social history, past surgical history and problem list     Review of Systems   Constitutional: Negative for chills, fatigue and fever  HENT: Negative for congestion, nosebleeds, postnasal drip, sore throat and trouble swallowing  Eyes: Negative for pain  Respiratory: Negative for cough, chest tightness, shortness of breath and wheezing  Cardiovascular: Negative for chest pain, palpitations and leg swelling  Gastrointestinal: Negative for abdominal pain, constipation, diarrhea, nausea and vomiting  Endocrine: Negative for polydipsia and polyuria  Genitourinary: Negative for dysuria, flank pain and hematuria  Musculoskeletal: Negative for arthralgias  Skin: Negative for rash  Neurological: Negative for dizziness, tremors and headaches  Hematological: Does not bruise/bleed easily  Psychiatric/Behavioral: Negative for confusion and dysphoric mood  The patient is not nervous/anxious  Objective:      /80   Pulse 83   Temp 98 °F (36 7 °C)   Ht 5' 6" (1 676 m)   Wt 95 1 kg (209 lb 9 6 oz)   SpO2 99%   BMI 33 83 kg/m²          Physical Exam   Constitutional: He is oriented to person, place, and time  He appears well-developed and well-nourished  No distress  HENT:   Head: Normocephalic and atraumatic  Right Ear: External ear normal    Left Ear: External ear normal    Eyes: Conjunctivae are normal  No scleral icterus  Neck: Normal range of motion  Neck supple   No tracheal deviation present  No thyromegaly present  Cardiovascular: Normal rate, regular rhythm and normal heart sounds  Pulses are no weak pulses  No murmur heard  Pulses:       Dorsalis pedis pulses are 2+ on the right side, and 2+ on the left side  Pulmonary/Chest: Effort normal and breath sounds normal  No respiratory distress  He has no wheezes  He has no rales  Abdominal: Soft  Bowel sounds are normal  There is no tenderness  There is no rebound and no guarding  Musculoskeletal: He exhibits no edema  Feet:   Right Foot:   Skin Integrity: Negative for ulcer, skin breakdown, erythema, warmth, callus or dry skin  Left Foot:   Skin Integrity: Negative for ulcer, skin breakdown, erythema, warmth, callus or dry skin  Lymphadenopathy:     He has no cervical adenopathy  Neurological: He is alert and oriented to person, place, and time  Psychiatric: He has a normal mood and affect  His behavior is normal  Judgment and thought content normal    Vitals reviewed  Right Foot/Ankle   Right Foot Inspection  Skin Exam: skin normal and skin intact no dry skin, no warmth, no callus, no erythema, no maceration, no abnormal color, no pre-ulcer, no ulcer and no callus                          Toe Exam: ROM and strength within normal limits  Sensory   Vibration: intact    Monofilament testing: diminished  Vascular    The right DP pulse is 2+  Left Foot/Ankle  Left Foot Inspection  Skin Exam: skin normal and skin intactno dry skin, no warmth, no erythema, no maceration, normal color, no pre-ulcer, no ulcer and no callus                         Toe Exam: ROM and strength within normal limits                   Sensory   Vibration: diminished    Monofilament: diminished  Vascular    The left DP pulse is 2+  Assign Risk Category:  No deformity present; Loss of protective sensation;  No weak pulses       Risk: 0

## 2018-07-13 DIAGNOSIS — E78.5 HYPERLIPIDEMIA, UNSPECIFIED HYPERLIPIDEMIA TYPE: Primary | ICD-10-CM

## 2018-07-13 RX ORDER — ATORVASTATIN CALCIUM 40 MG/1
40 TABLET, FILM COATED ORAL DAILY
Qty: 90 TABLET | Refills: 3 | Status: ON HOLD | OUTPATIENT
Start: 2018-07-13 | End: 2018-10-22

## 2018-07-17 ENCOUNTER — APPOINTMENT (OUTPATIENT)
Dept: LAB | Facility: CLINIC | Age: 83
End: 2018-07-17
Payer: COMMERCIAL

## 2018-07-17 DIAGNOSIS — E78.00 HYPERCHOLESTEROLEMIA: ICD-10-CM

## 2018-07-17 DIAGNOSIS — E11.9 TYPE 2 DIABETES MELLITUS WITHOUT COMPLICATION, WITHOUT LONG-TERM CURRENT USE OF INSULIN (HCC): ICD-10-CM

## 2018-07-17 LAB
ALBUMIN SERPL BCP-MCNC: 3.4 G/DL (ref 3.5–5)
ALP SERPL-CCNC: 84 U/L (ref 46–116)
ALT SERPL W P-5'-P-CCNC: 25 U/L (ref 12–78)
ANION GAP SERPL CALCULATED.3IONS-SCNC: 9 MMOL/L (ref 4–13)
AST SERPL W P-5'-P-CCNC: 18 U/L (ref 5–45)
BASOPHILS # BLD AUTO: 0.03 THOUSANDS/ΜL (ref 0–0.1)
BASOPHILS NFR BLD AUTO: 1 % (ref 0–1)
BILIRUB SERPL-MCNC: 0.63 MG/DL (ref 0.2–1)
BUN SERPL-MCNC: 29 MG/DL (ref 5–25)
CALCIUM SERPL-MCNC: 9.1 MG/DL (ref 8.3–10.1)
CHLORIDE SERPL-SCNC: 106 MMOL/L (ref 100–108)
CHOLEST SERPL-MCNC: 97 MG/DL (ref 50–200)
CO2 SERPL-SCNC: 26 MMOL/L (ref 21–32)
CREAT SERPL-MCNC: 2.22 MG/DL (ref 0.6–1.3)
CREAT UR-MCNC: 73.4 MG/DL
EOSINOPHIL # BLD AUTO: 0.28 THOUSAND/ΜL (ref 0–0.61)
EOSINOPHIL NFR BLD AUTO: 4 % (ref 0–6)
ERYTHROCYTE [DISTWIDTH] IN BLOOD BY AUTOMATED COUNT: 13.1 % (ref 11.6–15.1)
EST. AVERAGE GLUCOSE BLD GHB EST-MCNC: 151 MG/DL
GFR SERPL CREATININE-BSD FRML MDRD: 26 ML/MIN/1.73SQ M
GLUCOSE P FAST SERPL-MCNC: 77 MG/DL (ref 65–99)
HBA1C MFR BLD: 6.9 % (ref 4.2–6.3)
HCT VFR BLD AUTO: 32.5 % (ref 36.5–49.3)
HDLC SERPL-MCNC: 40 MG/DL (ref 40–60)
HGB BLD-MCNC: 10.8 G/DL (ref 12–17)
IMM GRANULOCYTES # BLD AUTO: 0.02 THOUSAND/UL (ref 0–0.2)
IMM GRANULOCYTES NFR BLD AUTO: 0 % (ref 0–2)
LDLC SERPL CALC-MCNC: 17 MG/DL (ref 0–100)
LYMPHOCYTES # BLD AUTO: 2.05 THOUSANDS/ΜL (ref 0.6–4.47)
LYMPHOCYTES NFR BLD AUTO: 31 % (ref 14–44)
MCH RBC QN AUTO: 32.6 PG (ref 26.8–34.3)
MCHC RBC AUTO-ENTMCNC: 33.2 G/DL (ref 31.4–37.4)
MCV RBC AUTO: 98 FL (ref 82–98)
MICROALBUMIN UR-MCNC: 66.6 MG/L (ref 0–20)
MICROALBUMIN/CREAT 24H UR: 91 MG/G CREATININE (ref 0–30)
MONOCYTES # BLD AUTO: 0.29 THOUSAND/ΜL (ref 0.17–1.22)
MONOCYTES NFR BLD AUTO: 4 % (ref 4–12)
NEUTROPHILS # BLD AUTO: 3.98 THOUSANDS/ΜL (ref 1.85–7.62)
NEUTS SEG NFR BLD AUTO: 60 % (ref 43–75)
NRBC BLD AUTO-RTO: 0 /100 WBCS
PLATELET # BLD AUTO: 225 THOUSANDS/UL (ref 149–390)
PMV BLD AUTO: 11.3 FL (ref 8.9–12.7)
POTASSIUM SERPL-SCNC: 4.2 MMOL/L (ref 3.5–5.3)
PROT SERPL-MCNC: 6.7 G/DL (ref 6.4–8.2)
RBC # BLD AUTO: 3.31 MILLION/UL (ref 3.88–5.62)
SODIUM SERPL-SCNC: 141 MMOL/L (ref 136–145)
T4 FREE SERPL-MCNC: 1.01 NG/DL (ref 0.76–1.46)
TRIGL SERPL-MCNC: 202 MG/DL
TSH SERPL DL<=0.05 MIU/L-ACNC: 3.77 UIU/ML (ref 0.36–3.74)
WBC # BLD AUTO: 6.65 THOUSAND/UL (ref 4.31–10.16)

## 2018-07-17 PROCEDURE — 36415 COLL VENOUS BLD VENIPUNCTURE: CPT

## 2018-07-17 PROCEDURE — 82043 UR ALBUMIN QUANTITATIVE: CPT | Performed by: INTERNAL MEDICINE

## 2018-07-17 PROCEDURE — 84439 ASSAY OF FREE THYROXINE: CPT

## 2018-07-17 PROCEDURE — 83036 HEMOGLOBIN GLYCOSYLATED A1C: CPT

## 2018-07-17 PROCEDURE — 85025 COMPLETE CBC W/AUTO DIFF WBC: CPT

## 2018-07-17 PROCEDURE — 80061 LIPID PANEL: CPT

## 2018-07-17 PROCEDURE — 80053 COMPREHEN METABOLIC PANEL: CPT

## 2018-07-17 PROCEDURE — 82570 ASSAY OF URINE CREATININE: CPT | Performed by: INTERNAL MEDICINE

## 2018-07-17 PROCEDURE — 84443 ASSAY THYROID STIM HORMONE: CPT

## 2018-07-23 ENCOUNTER — TELEPHONE (OUTPATIENT)
Dept: INTERNAL MEDICINE CLINIC | Facility: CLINIC | Age: 83
End: 2018-07-23

## 2018-07-23 ENCOUNTER — TELEPHONE (OUTPATIENT)
Dept: CARDIOLOGY CLINIC | Facility: CLINIC | Age: 83
End: 2018-07-23

## 2018-07-23 DIAGNOSIS — N18.4 STAGE 4 CHRONIC KIDNEY DISEASE (HCC): Primary | ICD-10-CM

## 2018-07-23 NOTE — TELEPHONE ENCOUNTER
Patient is asking to set up an appointment with you as soon as possible to discuss his medications  He said there is some confusion with them  You currently do not have any openings      Please advise

## 2018-07-23 NOTE — TELEPHONE ENCOUNTER
Spoke to patients son and advised  Appointment made with Dr Altagracia Piña tomorrow at 3 pm for review

## 2018-07-23 NOTE — TELEPHONE ENCOUNTER
Pt called, reporting occasional dizziness  Needs f/u appt with Dr Albert Guillen  Pt will be seeing PCP tomorrow  I advised him to discuss dizziness with PCP and  will call pt to make appt with Dr Albert Guillen  Pt verbalized understanding

## 2018-07-24 ENCOUNTER — OFFICE VISIT (OUTPATIENT)
Dept: INTERNAL MEDICINE CLINIC | Facility: CLINIC | Age: 83
End: 2018-07-24
Payer: COMMERCIAL

## 2018-07-24 ENCOUNTER — TELEPHONE (OUTPATIENT)
Dept: INTERNAL MEDICINE CLINIC | Facility: CLINIC | Age: 83
End: 2018-07-24

## 2018-07-24 VITALS
TEMPERATURE: 97.8 F | OXYGEN SATURATION: 98 % | BODY MASS INDEX: 30.72 KG/M2 | WEIGHT: 207.4 LBS | DIASTOLIC BLOOD PRESSURE: 84 MMHG | HEIGHT: 69 IN | HEART RATE: 84 BPM | SYSTOLIC BLOOD PRESSURE: 122 MMHG

## 2018-07-24 DIAGNOSIS — R60.0 EDEMA EXTREMITIES: ICD-10-CM

## 2018-07-24 DIAGNOSIS — E78.2 MIXED HYPERLIPIDEMIA: ICD-10-CM

## 2018-07-24 DIAGNOSIS — N18.4 STAGE 4 CHRONIC KIDNEY DISEASE (HCC): Primary | ICD-10-CM

## 2018-07-24 DIAGNOSIS — E11.9 TYPE 2 DIABETES MELLITUS WITHOUT COMPLICATION, WITHOUT LONG-TERM CURRENT USE OF INSULIN (HCC): ICD-10-CM

## 2018-07-24 DIAGNOSIS — I10 ESSENTIAL HYPERTENSION: ICD-10-CM

## 2018-07-24 PROCEDURE — 99214 OFFICE O/P EST MOD 30 MIN: CPT | Performed by: INTERNAL MEDICINE

## 2018-07-24 RX ORDER — FUROSEMIDE 40 MG/1
20 TABLET ORAL DAILY
Qty: 90 TABLET | Refills: 0
Start: 2018-07-24 | End: 2019-02-22 | Stop reason: SDUPTHER

## 2018-07-24 NOTE — ASSESSMENT & PLAN NOTE
Lab Results   Component Value Date    HGBA1C 6 9 (H) 07/17/2018       No results for input(s): POCGLU in the last 72 hours      Blood Sugar Average: Last 72 hrs:  Continue meds except metformin which was recently stopped

## 2018-07-24 NOTE — ASSESSMENT & PLAN NOTE
Unsure acute kidney injury or chronic kidney disease since renal function looked good 1 year ago  Will see if this improves with decreasing his dose of furosemide  He was also told to stop the losartan and the metformin for now    Pt referred to renal for eval

## 2018-07-24 NOTE — PROGRESS NOTES
Assessment/Plan:    Stage 4 chronic kidney disease ([facility] [address] )   Unsure acute kidney injury or chronic kidney disease since renal function looked good 1 year ago  Will see if this improves with decreasing his dose of furosemide  He was also told to stop the losartan and the metformin for now  Pt referred to renal for eval     Essential hypertension  Controlled, continue current meds (except losartan recently stopped)  Type 2 diabetes mellitus without complication, without long-term current use of insulin ([facility])  Lab Results   Component Value Date    HGBA1C 6 9 (H) 07/17/2018       No results for input(s): POCGLU in the last 72 hours  Blood Sugar Average: Last 72 hrs:  Continue meds except metformin which was recently stopped    Mixed hyperlipidemia   Continue with healthy diet, regular exercise and statin       Diagnoses and all orders for this visit:    Stage 4 chronic kidney disease (HCC)    Edema extremities  -     furosemide (LASIX) 40 mg tablet; Take 0 5 tablets (20 mg total) by mouth daily    Essential hypertension    Type 2 diabetes mellitus without complication, without long-term current use of insulin ([facility] )    Mixed hyperlipidemia          Subjective:      Patient ID: Bharti Colon is a 80 y o  male  Kidney dysfunction:  Unsure of the timing of the decreasing kidney function, whether this is acute kidney injury or rapid progression to chronic kidney disease over the past year  Patient denies excessive NSAID use  Patient does   Admit to periods worry feels lightheadedness which could be associated with hypotensive episodes, but no documented hypotension ever seen in the office  Patient has not had CT contrast dye  No infectious complaints, no fevers chills or sweats, no pain with urination  Patient is on furosemide 40 mg daily home with was recently cut in half due to recent kidney function results      Patient denies any problems with urine flow        The following portions of the patient's history were reviewed and updated as appropriate: allergies, current medications, past family history, past medical history, past social history, past surgical history and problem list     Review of Systems   Constitutional: Positive for fatigue  Negative for chills and fever  HENT: Negative for congestion, nosebleeds, postnasal drip, sore throat and trouble swallowing  Eyes: Negative for pain  Respiratory: Negative for cough, chest tightness, shortness of breath and wheezing  Cardiovascular: Negative for chest pain, palpitations and leg swelling  Gastrointestinal: Negative for abdominal pain, constipation, diarrhea, nausea and vomiting  Endocrine: Negative for polydipsia and polyuria  Genitourinary: Negative for dysuria, flank pain and hematuria  Musculoskeletal: Negative for arthralgias  Skin: Negative for rash  Neurological: Positive for light-headedness  Negative for dizziness, tremors and headaches  Hematological: Does not bruise/bleed easily  Psychiatric/Behavioral: Negative for dysphoric mood  The patient is not nervous/anxious  Objective:      /84   Pulse 84   Temp 97 8 °F (36 6 °C)   Ht 5' 9" (1 753 m)   Wt 94 1 kg (207 lb 6 4 oz)   SpO2 98%   BMI 30 63 kg/m²          Physical Exam   Constitutional: He is oriented to person, place, and time  He appears well-developed and well-nourished  No distress  HENT:   Head: Normocephalic and atraumatic  Right Ear: External ear normal    Left Ear: External ear normal    Mouth/Throat: Oropharynx is clear and moist    Eyes: Conjunctivae are normal  No scleral icterus  Neck: Normal range of motion  Neck supple  No tracheal deviation present  No thyromegaly present  Cardiovascular: Normal rate, regular rhythm and normal heart sounds  No murmur heard  Pulmonary/Chest: Effort normal and breath sounds normal  No respiratory distress  He has no wheezes  He has no rales  Abdominal: Soft   Bowel sounds are normal  There is no tenderness  There is no rebound and no guarding  Musculoskeletal: He exhibits no edema  Lymphadenopathy:     He has no cervical adenopathy  Neurological: He is alert and oriented to person, place, and time  Psychiatric: He has a normal mood and affect  His behavior is normal  Judgment and thought content normal    Vitals reviewed

## 2018-07-26 ENCOUNTER — TELEPHONE (OUTPATIENT)
Dept: INTERNAL MEDICINE CLINIC | Facility: CLINIC | Age: 83
End: 2018-07-26

## 2018-07-26 NOTE — TELEPHONE ENCOUNTER
Med list printed with handwritten directions (as per dr gusman)  Patient given instructions and aware list ready for  at   Patient states his nephew, Zian Cowan, will

## 2018-07-26 NOTE — TELEPHONE ENCOUNTER
Please print out a med list per patient, was unable to do this  As far as timing of medications the only ones that matter would be the furosemide in the morning so he is not getting up to urinate at night is much, and the atorvastatin the evening    You can hand right that information on the med list   Find me if there are other questions about the med list

## 2018-07-27 ENCOUNTER — TELEPHONE (OUTPATIENT)
Dept: INTERNAL MEDICINE CLINIC | Facility: CLINIC | Age: 83
End: 2018-07-27

## 2018-08-06 ENCOUNTER — APPOINTMENT (OUTPATIENT)
Dept: LAB | Facility: CLINIC | Age: 83
End: 2018-08-06
Payer: COMMERCIAL

## 2018-08-06 DIAGNOSIS — N18.4 STAGE 4 CHRONIC KIDNEY DISEASE (HCC): ICD-10-CM

## 2018-08-06 LAB
ANION GAP SERPL CALCULATED.3IONS-SCNC: 6 MMOL/L (ref 4–13)
BUN SERPL-MCNC: 25 MG/DL (ref 5–25)
CALCIUM SERPL-MCNC: 9 MG/DL (ref 8.3–10.1)
CHLORIDE SERPL-SCNC: 106 MMOL/L (ref 100–108)
CO2 SERPL-SCNC: 28 MMOL/L (ref 21–32)
CREAT SERPL-MCNC: 2.07 MG/DL (ref 0.6–1.3)
GFR SERPL CREATININE-BSD FRML MDRD: 28 ML/MIN/1.73SQ M
GLUCOSE P FAST SERPL-MCNC: 148 MG/DL (ref 65–99)
POTASSIUM SERPL-SCNC: 4.2 MMOL/L (ref 3.5–5.3)
SODIUM SERPL-SCNC: 140 MMOL/L (ref 136–145)

## 2018-08-06 PROCEDURE — 80048 BASIC METABOLIC PNL TOTAL CA: CPT

## 2018-08-06 PROCEDURE — 36415 COLL VENOUS BLD VENIPUNCTURE: CPT

## 2018-08-13 ENCOUNTER — OFFICE VISIT (OUTPATIENT)
Dept: INTERNAL MEDICINE CLINIC | Facility: CLINIC | Age: 83
End: 2018-08-13
Payer: COMMERCIAL

## 2018-08-13 ENCOUNTER — HOSPITAL ENCOUNTER (OUTPATIENT)
Dept: RADIOLOGY | Facility: HOSPITAL | Age: 83
Discharge: HOME | End: 2018-08-13
Payer: COMMERCIAL

## 2018-08-13 ENCOUNTER — TELEPHONE (OUTPATIENT)
Dept: INTERNAL MEDICINE CLINIC | Facility: CLINIC | Age: 83
End: 2018-08-13

## 2018-08-13 VITALS
OXYGEN SATURATION: 98 % | DIASTOLIC BLOOD PRESSURE: 80 MMHG | BODY MASS INDEX: 33.59 KG/M2 | SYSTOLIC BLOOD PRESSURE: 144 MMHG | HEIGHT: 66 IN | TEMPERATURE: 98.1 F | WEIGHT: 209 LBS | HEART RATE: 95 BPM

## 2018-08-13 DIAGNOSIS — M79.605 LEG PAIN, LEFT: ICD-10-CM

## 2018-08-13 DIAGNOSIS — N18.4 STAGE 4 CHRONIC KIDNEY DISEASE (HCC): Primary | ICD-10-CM

## 2018-08-13 PROCEDURE — 99213 OFFICE O/P EST LOW 20 MIN: CPT | Performed by: INTERNAL MEDICINE

## 2018-08-13 PROCEDURE — 73502 X-RAY EXAM HIP UNI 2-3 VIEWS: CPT

## 2018-08-13 RX ORDER — TIMOLOL MALEATE 5 MG/ML
SOLUTION/ DROPS OPHTHALMIC
COMMUNITY
Start: 2018-07-25

## 2018-08-13 NOTE — PROGRESS NOTES
Assessment/Plan:    Leg pain, left  With weakness in hip flexors, will refer for physical therapy  This is affecting his ability to walk  On exam hip joint looks ok, but will check for any bony abnormality  Stage 4 chronic kidney disease [facility])   Patient has appointment with renal tomorrow       Diagnoses and all orders for this visit:    Stage 4 chronic kidney disease ([facility] [address] )    Leg pain, left  -     Ambulatory referral to Physical Therapy; Future  -     XR hip/pelv 2-3 vws left if performed; Future    Other orders  -     timolol (TIMOPTIC) 0 5 % ophthalmic solution;           Subjective:      Patient ID: Chance Jaffe is a 80 y o  male  Pain in back of upper left thigh started last night  Pt having difficulty walking due to pain  No injury or strain that he remembers  No rash in the area  The following portions of the patient's history were reviewed and updated as appropriate: allergies, current medications, past family history, past medical history, past social history, past surgical history and problem list     Review of Systems   Constitutional: Negative for chills and fever  Gastrointestinal: Positive for diarrhea  Negative for abdominal pain and constipation  Musculoskeletal: Positive for arthralgias, back pain, gait problem and myalgias  Skin: Negative for rash  Objective:      /80   Pulse 95   Temp 98 1 °F (36 7 °C)   Ht 5' 6" (1 676 m)   Wt 94 8 kg (209 lb)   SpO2 98%   BMI 33 73 kg/m²          Physical Exam   Constitutional: He appears well-developed and well-nourished     Musculoskeletal:   Straight leg raise is negative, but patient has weakness with lifting the left leg off the exam table, no pain with rotation of hip in the hip joint, no tenderness over spine

## 2018-08-13 NOTE — TELEPHONE ENCOUNTER
The patient called he began having pain in his left leg between his knee and hip  Last night  He can walk 20 feet then must sit due to pain  There is no redness, heat or swelling  I reviewed with Dr Blair Benítez and he is seeing the pt today

## 2018-08-13 NOTE — ASSESSMENT & PLAN NOTE
With weakness in hip flexors, will refer for physical therapy  This is affecting his ability to walk  On exam hip joint looks ok, but will check for any bony abnormality

## 2018-08-13 NOTE — PATIENT INSTRUCTIONS
Problem List Items Addressed This Visit     Stage 4 chronic kidney disease ([facility] [address] ) - Primary      Patient has appointment with renal tomorrow         Leg pain, left     With weakness in hip flexors, will refer for physical therapy  This is affecting his ability to walk  On exam hip joint looks ok, but will check for any bony abnormality           Relevant Orders    Ambulatory referral to Physical Therapy    XR hip/pelv 2-3 vws left if performed

## 2018-08-13 NOTE — TELEPHONE ENCOUNTER
With his decreased kidney function, I recommend he avoid anti-inflammatories like Advil, ibuprofen, Aleve, Motrin, etc   He can take Tylenol, and if Tylenol does not help enough, we could try something stronger

## 2018-08-14 ENCOUNTER — OFFICE VISIT (OUTPATIENT)
Dept: NEPHROLOGY | Facility: CLINIC | Age: 83
End: 2018-08-14
Payer: COMMERCIAL

## 2018-08-14 ENCOUNTER — APPOINTMENT (OUTPATIENT)
Dept: LAB | Facility: CLINIC | Age: 83
End: 2018-08-14
Payer: COMMERCIAL

## 2018-08-14 ENCOUNTER — TELEPHONE (OUTPATIENT)
Dept: UROLOGY | Facility: AMBULATORY SURGERY CENTER | Age: 83
End: 2018-08-14

## 2018-08-14 VITALS
DIASTOLIC BLOOD PRESSURE: 70 MMHG | WEIGHT: 210 LBS | HEIGHT: 66 IN | SYSTOLIC BLOOD PRESSURE: 120 MMHG | BODY MASS INDEX: 33.75 KG/M2

## 2018-08-14 DIAGNOSIS — N18.4 ANEMIA IN STAGE 4 CHRONIC KIDNEY DISEASE (HCC): ICD-10-CM

## 2018-08-14 DIAGNOSIS — R80.8 OTHER PROTEINURIA: ICD-10-CM

## 2018-08-14 DIAGNOSIS — I10 ESSENTIAL HYPERTENSION: ICD-10-CM

## 2018-08-14 DIAGNOSIS — D63.1 ANEMIA IN STAGE 4 CHRONIC KIDNEY DISEASE (HCC): ICD-10-CM

## 2018-08-14 DIAGNOSIS — N18.4 STAGE 4 CHRONIC KIDNEY DISEASE (HCC): Primary | ICD-10-CM

## 2018-08-14 DIAGNOSIS — N18.4 STAGE 4 CHRONIC KIDNEY DISEASE (HCC): ICD-10-CM

## 2018-08-14 DIAGNOSIS — R31.29 MICROSCOPIC HEMATURIA: ICD-10-CM

## 2018-08-14 LAB
ANION GAP SERPL CALCULATED.3IONS-SCNC: 8 MMOL/L (ref 4–13)
BACTERIA UR QL AUTO: ABNORMAL /HPF
BASOPHILS # BLD AUTO: 0.03 THOUSANDS/ΜL (ref 0–0.1)
BASOPHILS NFR BLD AUTO: 0 % (ref 0–1)
BILIRUB UR QL STRIP: NEGATIVE
BUN SERPL-MCNC: 21 MG/DL (ref 5–25)
CALCIUM SERPL-MCNC: 8.9 MG/DL (ref 8.3–10.1)
CHLORIDE SERPL-SCNC: 102 MMOL/L (ref 100–108)
CLARITY UR: ABNORMAL
CO2 SERPL-SCNC: 26 MMOL/L (ref 21–32)
COLOR UR: YELLOW
CREAT SERPL-MCNC: 2.22 MG/DL (ref 0.6–1.3)
CREAT UR-MCNC: 66.8 MG/DL
EOSINOPHIL # BLD AUTO: 0.13 THOUSAND/ΜL (ref 0–0.61)
EOSINOPHIL NFR BLD AUTO: 2 % (ref 0–6)
ERYTHROCYTE [DISTWIDTH] IN BLOOD BY AUTOMATED COUNT: 14 % (ref 11.6–15.1)
FERRITIN SERPL-MCNC: 42 NG/ML (ref 8–388)
GFR SERPL CREATININE-BSD FRML MDRD: 26 ML/MIN/1.73SQ M
GLUCOSE SERPL-MCNC: 184 MG/DL (ref 65–140)
GLUCOSE UR STRIP-MCNC: ABNORMAL MG/DL
HCT VFR BLD AUTO: 29.3 % (ref 36.5–49.3)
HGB BLD-MCNC: 9.7 G/DL (ref 12–17)
HGB UR QL STRIP.AUTO: ABNORMAL
IMM GRANULOCYTES # BLD AUTO: 0.04 THOUSAND/UL (ref 0–0.2)
IMM GRANULOCYTES NFR BLD AUTO: 1 % (ref 0–2)
IRON SATN MFR SERPL: 9 %
IRON SERPL-MCNC: 27 UG/DL (ref 65–175)
KETONES UR STRIP-MCNC: NEGATIVE MG/DL
LEUKOCYTE ESTERASE UR QL STRIP: ABNORMAL
LYMPHOCYTES # BLD AUTO: 1.38 THOUSANDS/ΜL (ref 0.6–4.47)
LYMPHOCYTES NFR BLD AUTO: 19 % (ref 14–44)
MCH RBC QN AUTO: 32 PG (ref 26.8–34.3)
MCHC RBC AUTO-ENTMCNC: 33.1 G/DL (ref 31.4–37.4)
MCV RBC AUTO: 97 FL (ref 82–98)
MONOCYTES # BLD AUTO: 0.51 THOUSAND/ΜL (ref 0.17–1.22)
MONOCYTES NFR BLD AUTO: 7 % (ref 4–12)
NEUTROPHILS # BLD AUTO: 5.15 THOUSANDS/ΜL (ref 1.85–7.62)
NEUTS SEG NFR BLD AUTO: 71 % (ref 43–75)
NITRITE UR QL STRIP: NEGATIVE
NON-SQ EPI CELLS URNS QL MICRO: ABNORMAL /HPF
NRBC BLD AUTO-RTO: 0 /100 WBCS
PH UR STRIP.AUTO: 5.5 [PH] (ref 4.5–8)
PLATELET # BLD AUTO: 239 THOUSANDS/UL (ref 149–390)
PMV BLD AUTO: 10 FL (ref 8.9–12.7)
POTASSIUM SERPL-SCNC: 3.8 MMOL/L (ref 3.5–5.3)
PROT UR STRIP-MCNC: ABNORMAL MG/DL
PROT UR-MCNC: 49 MG/DL
PROT/CREAT UR: 0.73 MG/G{CREAT} (ref 0–0.1)
RBC # BLD AUTO: 3.03 MILLION/UL (ref 3.88–5.62)
RBC #/AREA URNS AUTO: ABNORMAL /HPF
SL AMB  POCT GLUCOSE, UA: ABNORMAL
SL AMB LEUKOCYTE ESTERASE,UA: ABNORMAL
SL AMB POCT BILIRUBIN,UA: ABNORMAL
SL AMB POCT BLOOD,UA: ABNORMAL
SL AMB POCT CLARITY,UA: ABNORMAL
SL AMB POCT COLOR,UA: YELLOW
SL AMB POCT KETONES,UA: ABNORMAL
SL AMB POCT NITRITE,UA: NEGATIVE
SL AMB POCT PH,UA: 5
SL AMB POCT SPECIFIC GRAVITY,UA: 1.02
SL AMB POCT URINE PROTEIN: 100
SL AMB POCT UROBILINOGEN: ABNORMAL
SODIUM SERPL-SCNC: 136 MMOL/L (ref 136–145)
SP GR UR STRIP.AUTO: 1.01 (ref 1–1.03)
TIBC SERPL-MCNC: 288 UG/DL (ref 250–450)
UROBILINOGEN UR QL STRIP.AUTO: 0.2 E.U./DL
WBC # BLD AUTO: 7.24 THOUSAND/UL (ref 4.31–10.16)
WBC #/AREA URNS AUTO: ABNORMAL /HPF

## 2018-08-14 PROCEDURE — 99204 OFFICE O/P NEW MOD 45 MIN: CPT | Performed by: INTERNAL MEDICINE

## 2018-08-14 PROCEDURE — 83540 ASSAY OF IRON: CPT

## 2018-08-14 PROCEDURE — 87205 SMEAR GRAM STAIN: CPT | Performed by: INTERNAL MEDICINE

## 2018-08-14 PROCEDURE — 80048 BASIC METABOLIC PNL TOTAL CA: CPT

## 2018-08-14 PROCEDURE — 84166 PROTEIN E-PHORESIS/URINE/CSF: CPT

## 2018-08-14 PROCEDURE — 84166 PROTEIN E-PHORESIS/URINE/CSF: CPT | Performed by: PATHOLOGY

## 2018-08-14 PROCEDURE — 84165 PROTEIN E-PHORESIS SERUM: CPT

## 2018-08-14 PROCEDURE — 84165 PROTEIN E-PHORESIS SERUM: CPT | Performed by: PATHOLOGY

## 2018-08-14 PROCEDURE — 36415 COLL VENOUS BLD VENIPUNCTURE: CPT

## 2018-08-14 PROCEDURE — 84156 ASSAY OF PROTEIN URINE: CPT

## 2018-08-14 PROCEDURE — 82728 ASSAY OF FERRITIN: CPT

## 2018-08-14 PROCEDURE — 81001 URINALYSIS AUTO W/SCOPE: CPT

## 2018-08-14 PROCEDURE — 85025 COMPLETE CBC W/AUTO DIFF WBC: CPT

## 2018-08-14 PROCEDURE — 81002 URINALYSIS NONAUTO W/O SCOPE: CPT | Performed by: INTERNAL MEDICINE

## 2018-08-14 PROCEDURE — 82570 ASSAY OF URINE CREATININE: CPT

## 2018-08-14 PROCEDURE — 86335 IMMUNFIX E-PHORSIS/URINE/CSF: CPT

## 2018-08-14 PROCEDURE — 83883 ASSAY NEPHELOMETRY NOT SPEC: CPT

## 2018-08-14 PROCEDURE — 83550 IRON BINDING TEST: CPT

## 2018-08-14 PROCEDURE — 86335 IMMUNFIX E-PHORSIS/URINE/CSF: CPT | Performed by: PATHOLOGY

## 2018-08-14 NOTE — PATIENT INSTRUCTIONS
1  Elevated sCr in setting of [place] vs progressive chronic kidney disease stage 4 d/t presumed diabetic nephropathy as well as systolic heart failure   -lasix dose has been decreased to 20mg daily  -agree with holding metformin and losartan  -b/l sCr as of late in low 2s  Latest sCr 2 07 as of 8/6/18  Acid/base stable  Electrolytes stable   -will refer to [facility] CKD education  -will check BMP, UA with microscopy and urine protein:Cr  -in office UA shows trace WBCs, 100 protein, small blood  I note isomorphic RBCs on urine sample in office  I recommend urology follow up   -will obtain renal u/s with [facility] as none previously on file  -avoid nonsteroidals (ibuprofen, aleve, advil, motrin, naproxen, toradol, indomethacin, celebrex)    2  Hypertension - BP well controlled on isordil 30mg daily, metoprolol 25mg daily     3  Anemia - Hgb 10 8 as per latest labs  Will repeat CBC, check iron panel, SPEP/UPEP/FLC to r/o myeloma    4  Proteinuria in the setting of presumed diabetic nephropathy- microalbumin:cr 91 as of July 17, 2018  5  GERD - on nexium every other day  Prolonged use of PPIs can lead to chronic interstitial nephritis  6  Ischemic cardiomyopathy/systolic CHF - on furosemide 20mg daily, monitor daily weight  Record weights daily and bring to cardiology office    7   DM2, well controlled - on glimepiride 2mg daily, last A1C 6 9 as of 7/17/18

## 2018-08-14 NOTE — LETTER
August 14, 2018     Orquidea Bonner MD  [address] [facility]  [id number]    Patient: Bharti Colon   YOB: 1930   Date of Visit: 8/14/2018       Dear Dr Alec Samuel: Thank you for referring Leonie Wong to me for evaluation  Below are my notes for this consultation  If you have questions, please do not hesitate to call me  I look forward to following your patient along with you  Sincerely,        Wilner Shankar DO        CC: No Recipients  Wilner Shankar DO  8/14/2018 10:57 AM  Sign at close encounter  Prince Weathers 80 y o  male MRN: [id number]  Date: 8/14/2018  Reason for consultation:   Chief Complaint   Patient presents with    Consult       Patient instructions:  Patient Instructions   1  Elevated sCr in setting of ELTON vs progressive chronic kidney disease stage 4 d/t presumed diabetic nephropathy as well as systolic heart failure   -lasix dose has been decreased to 20mg daily  -agree with holding metformin and losartan  -b/l sCr as of late in low 2s  Latest sCr 2 07 as of 8/6/18  Acid/base stable  Electrolytes stable   -will refer to [facility] Smart CKD education  -will check BMP, UA with microscopy and urine protein:Cr  -in office UA shows trace WBCs, 100 protein, small blood  I note isomorphic RBCs on urine sample in office  I recommend urology follow up   -will obtain renal u/s with PVR as none previously on file  -avoid nonsteroidals (ibuprofen, aleve, advil, motrin, naproxen, toradol, indomethacin, celebrex)    2  Hypertension - BP well controlled on isordil 30mg daily, metoprolol 25mg daily     3  Anemia - Hgb 10 8 as per latest labs  Will repeat CBC, check iron panel, SPEP/UPEP/FLC to r/o myeloma    4  Proteinuria in the setting of presumed diabetic nephropathy- microalbumin:cr 91 as of July 17, 2018  5  GERD - on nexium every other day  Prolonged use of PPIs can lead to chronic interstitial nephritis       6  Ischemic cardiomyopathy/systolic CHF - on furosemide 20mg daily, monitor daily weight  Record weights daily and bring to cardiology office    7  DM2, well controlled - on glimepiride 2mg daily, last A1C 6 9 as of 7/17/18          Reuben Moya was seen today for consult  Diagnoses and all orders for this visit:    Stage 4 chronic kidney disease ([facility] [address] )  -     Ambulatory referral to Nephrology  -     Basic metabolic panel; Future  -     Urinalysis with microscopic; Future  -     Protein / creatinine ratio, urine; Future  -     US retroperitoneal complete; Future  -     POCT urine dip    Essential hypertension  -     POCT urine dip    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)  -     CBC and differential; Future  -     Iron Panel; Future  -     Protein electrophoresis, serum; Future  -     Protein electrophoresis, urine; Future  -     Immunoglobulin free LT chains blood; Future  -     POCT urine dip    Other proteinuria  -     Urinalysis with microscopic; Future  -     Protein / creatinine ratio, urine; Future  -     POCT urine dip    Microscopic hematuria  -     Ambulatory referral to Urology; Future        ASSESSMENT and PLAN:  1  Elevated sCr in setting of ELTON vs progressive chronic kidney disease stage 4 d/t presumed diabetic nephropathy as well as systolic heart failure   -lasix dose has been decreased to 20mg daily  -agree with holding metformin and losartan  -b/l sCr as of late in low 2s  Latest sCr 2 07 as of 8/6/18  Acid/base stable  Electrolytes stable   -will refer to [facility]ney Smart CKD education  -will check BMP, UA with microscopy and urine protein:Cr  -in office UA shows trace WBCs, 100 protein, small blood  I note isomorphic RBCs on urine sample in office  I recommend urology follow up   -will obtain renal u/s with PVR as none previously on file  -avoid nonsteroidals (ibuprofen, aleve, advil, motrin, naproxen, toradol, indomethacin, celebrex)    2   Hypertension - BP well controlled on isordil 30mg daily, metoprolol 25mg daily     3  Anemia - Hgb 10 8 as per latest labs  Will repeat CBC, check iron panel, SPEP/UPEP/FLC to r/o myeloma    4  Proteinuria in the setting of presumed diabetic nephropathy- microalbumin:cr 91 as of July 17, 2018  5  GERD - on nexium every other day  Prolonged use of PPIs can lead to chronic interstitial nephritis  6  Ischemic cardiomyopathy/systolic CHF - on furosemide 20mg daily, monitor daily weight  Record weights daily and bring to cardiology office    7  DM2, well controlled - on glimepiride 2mg daily, last A1C 6 9 as of 7/17/18    HISTORY OF PRESENT ILLNESS:  Requesting Physician: Nas Reynolds MD    [facility] is a 80 y o  male with history of HTN, DM2, BPH, CVA, ICM, sCHF, GERD who presents in consultation for CKD stage 4  He has not seen a nephrologist in the past  Renal function has deteriorated in the past year  Did not sleep well last night due to muscle pain in his left leg  Denies history of recent NSAID use, herbal/OTC medications, history of UTIs or nephrolithiasis  Denies history of prior known kidney disease  Has not been on Abx recently  DM2 at least 10 years - has not required insulin  Morning glucose 190s today  Yesterday 132  No longer on metformin due to renal failure  Had pain in his left leg and BP was 144/80s  HTN for unclear amount of time - does not recall uncontrolled HTN in the past       CHF - sees cardiologist  Has lost some weight  Today, he weighed 203 lbs at home without shoes  He was taking lasix 40mg twice daily previously but is now taking lasix 20mg once daily       PAST MEDICAL HISTORY:  Past Medical History:   Diagnosis Date    [facility] [address] )     Chronic kidney disease     Coronary artery disease     [facility]     Glaucoma     Hypertension     Occluded coronary artery stent     Left       PAST SURGICAL HISTORY:  Past Surgical History:   Procedure Laterality Date    CARDIAC CATHETERIZATION 04/05/2004    KNEE SURGERY      THROMBOENDARTERECTOMY Right     Cartoid       ALLERGIES:  No Known Allergies    SOCIAL HISTORY:  History   Alcohol Use No     Comment: being a social drinker     History   Drug Use No     History   Smoking Status    Never Smoker   Smokeless Tobacco    Never Used     Comment: former smoker       FAMILY HISTORY:  Family History   Problem Relation Age of Onset    Heart attack Father     Heart disease Mother     Diabetes Mother     Heart disease Sister     COPD Family        MEDICATIONS:    Current Outpatient Prescriptions:     acetaminophen (TYLENOL) 325 mg tablet, Take 1 - 2 tabs PO Q4 PRN pain, Disp: 30 tablet, Rfl: 0    aspirin 81 MG tablet, Take 81 mg by mouth daily  , Disp: , Rfl:     atorvastatin (LIPITOR) 40 mg tablet, Take 1 tablet (40 mg total) by mouth daily, Disp: 90 tablet, Rfl: 3    bimatoprost (LUMIGAN) 0 01 % ophthalmic drops, Apply to eye, Disp: , Rfl:     dicyclomine (BENTYL) 20 mg tablet, Take 1 tablet (20 mg total) by mouth every 6 (six) hours  , Disp: 20 tablet, Rfl: 0    esomeprazole (NexIUM) 40 MG capsule, Take 40 mg by mouth every evening , Disp: , Rfl:     fluticasone (FLONASE) 50 mcg/act nasal spray, into each nostril, Disp: , Rfl:     furosemide (LASIX) 40 mg tablet, Take 0 5 tablets (20 mg total) by mouth daily, Disp: 90 tablet, Rfl: 0    glimepiride (AMARYL) 2 mg tablet, Take 2 mg by mouth every evening , Disp: , Rfl:     glucose blood (ACCU-CHEK CHANDRAKANT PLUS) test strip, by In Vitro route, Disp: , Rfl:     guaiFENesin (MUCINEX) 600 mg 12 hr tablet, Take 1 tablet (600 mg total) by mouth 2 (two) times a day, Disp: 60 tablet, Rfl: 0    isosorbide dinitrate (ISORDIL) 30 mg tablet, Take 30 mg by mouth daily  , Disp: , Rfl:     metoprolol succinate (TOPROL-XL) 25 mg 24 hr tablet, TAKE 1 TABLET DAILY, Disp: 90 tablet, Rfl: 3    nitroglycerin (NITROSTAT) 0 4 mg SL tablet, Place 1 tablet under the tongue every 5 (five) minutes as needed, Disp: , Rfl:   ONE TOUCH ULTRA TEST test strip, The patient test once daily  , Disp: 100 each, Rfl: 2    ONETOUCH DELICA LANCETS 08A MISC, by Does not apply route daily, Disp: 100 each, Rfl: 3    potassium chloride (KLOR-CON 10) 10 mEq tablet, Take 1 tablet (10 mEq total) by mouth daily, Disp: 90 tablet, Rfl: 3    timolol (TIMOPTIC) 0 5 % ophthalmic solution, , Disp: , Rfl:     Timolol Maleate 0 5 % (DAILY) SOLN, Apply to eye daily, Disp: , Rfl:     REVIEW OF SYSTEMS:  Review of Systems   Constitutional: Negative for chills and fever  HENT: Negative for sore throat  Eyes: Negative for visual disturbance  Respiratory: Positive for cough  Negative for shortness of breath  Clear mucus   Cardiovascular: Negative for chest pain and leg swelling  Gastrointestinal: Positive for abdominal pain (right lower abdominal pain this morning) and diarrhea (2 days ago)  Negative for constipation, nausea and vomiting  Endocrine: Negative for polyuria  Genitourinary: Positive for decreased urine volume and urgency  Negative for difficulty urinating, dysuria and hematuria  Musculoskeletal: Positive for back pain  Negative for myalgias  Left leg pain   Skin: Negative for rash  Neurological: Positive for dizziness (felt dizzy a few weeks ago)  Negative for light-headedness and numbness  Psychiatric/Behavioral: Negative for confusion  PHYSICAL EXAM:   Vitals:    08/14/18 1006   BP: 120/70   BP Location: Left arm   Patient Position: Sitting   Cuff Size: Standard   Weight: 95 3 kg (210 lb)   Height: 5' 6" (1 676 m)     Body mass index is 33 89 kg/m²  Physical Exam   Constitutional: He appears well-developed and well-nourished  No distress  In wheelchair   HENT:   Head: Normocephalic and atraumatic  Mouth/Throat: No oropharyngeal exudate  Eyes: Right eye exhibits no discharge  Left eye exhibits no discharge  No scleral icterus  Neck: Neck supple     Cardiovascular: Normal rate, regular rhythm and normal heart sounds  Distant heart sounds   Pulmonary/Chest: Effort normal and breath sounds normal  He has no wheezes  He has no rales  Abdominal: Soft  Bowel sounds are normal  He exhibits no distension  There is no tenderness  Musculoskeletal: Normal range of motion  He exhibits no edema  Neurological: He is alert  awake   Skin: Skin is warm and dry  No rash noted  He is not diaphoretic  Psychiatric: He has a normal mood and affect  His behavior is normal    Vitals reviewed  Laboratory results:   Lab Results   Component Value Date    GLUCOSE 90 10/05/2015    CALCIUM 9 0 08/06/2018     08/06/2018    K 4 2 08/06/2018    CO2 28 08/06/2018     08/06/2018    BUN 25 08/06/2018    CREATININE 2 07 (H) 08/06/2018        Imaging: none relevant on file    Portions of the record may have been created with voice recognition software   Occasional wrong word or "sound a like" substitutions may have occurred due to the inherent limitations of voice recognition software   Read the chart carefully and recognize, using context, where substitutions have occurred

## 2018-08-14 NOTE — PROGRESS NOTES
NEPHROLOGY OUTPATIENT CONSULTATION   James Cole 80 y o  male MRN: [id number]  Date: 8/14/2018  Reason for consultation:   Chief Complaint   Patient presents with    Consult       Patient instructions:  Patient Instructions   1  Elevated sCr in setting of ELTON vs progressive chronic kidney disease stage 4 d/t presumed diabetic nephropathy as well as systolic heart failure   -lasix dose has been decreased to 20mg daily  -agree with holding metformin and losartan  -b/l sCr as of late in low 2s  Latest sCr 2 07 as of 8/6/18  Acid/base stable  Electrolytes stable   -will refer to Kidney Smart CKD education  -will check BMP, UA with microscopy and urine protein:Cr  -in office UA shows trace WBCs, 100 protein, small blood  I note isomorphic RBCs on urine sample in office  I recommend urology follow up   -will obtain renal u/s with PVR as none previously on file  -avoid nonsteroidals (ibuprofen, aleve, advil, motrin, naproxen, toradol, indomethacin, celebrex)    2  Hypertension - BP well controlled on isordil 30mg daily, metoprolol 25mg daily     3  Anemia - Hgb 10 8 as per latest labs  Will repeat CBC, check iron panel, SPEP/UPEP/FLC to r/o myeloma    4  Proteinuria in the setting of presumed diabetic nephropathy- microalbumin:cr 91 as of July 17, 2018  5  GERD - on nexium every other day  Prolonged use of PPIs can lead to chronic interstitial nephritis  6  Ischemic cardiomyopathy/systolic CHF - on furosemide 20mg daily, monitor daily weight  Record weights daily and bring to cardiology office    7  DM2, well controlled - on glimepiride 2mg daily, last A1C 6 9 as of 7/17/18          Jake Zavaleta was seen today for consult  Diagnoses and all orders for this visit:    Stage 4 chronic kidney disease ([facility] [address] )  -     Ambulatory referral to Nephrology  -     Basic metabolic panel; Future  -     Urinalysis with microscopic; Future  -     Protein / creatinine ratio, urine;  Future  -     US retroperitoneal complete; Future  -     POCT urine dip    Essential hypertension  -     POCT urine dip    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)  -     CBC and differential; Future  -     Iron Panel; Future  -     Protein electrophoresis, serum; Future  -     Protein electrophoresis, urine; Future  -     Immunoglobulin free LT chains blood; Future  -     POCT urine dip    Other proteinuria  -     Urinalysis with microscopic; Future  -     Protein / creatinine ratio, urine; Future  -     POCT urine dip    Microscopic hematuria  -     Ambulatory referral to Urology; Future        ASSESSMENT and PLAN:  1  Elevated sCr in setting of ELTON vs progressive chronic kidney disease stage 4 d/t presumed diabetic nephropathy as well as systolic heart failure   -lasix dose has been decreased to 20mg daily  -agree with holding metformin and losartan  -b/l sCr as of late in low 2s  Latest sCr 2 07 as of 8/6/18  Acid/base stable  Electrolytes stable   -will refer to Kidney Smart CKD education  -will check BMP, UA with microscopy and urine protein:Cr  -in office UA shows trace WBCs, 100 protein, small blood  I note isomorphic RBCs on urine sample in office  I recommend urology follow up   -will obtain renal u/s with PVR as none previously on file  -avoid nonsteroidals (ibuprofen, aleve, advil, motrin, naproxen, toradol, indomethacin, celebrex)    2  Hypertension - BP well controlled on isordil 30mg daily, metoprolol 25mg daily     3  Anemia - Hgb 10 8 as per latest labs  Will repeat CBC, check iron panel, SPEP/UPEP/FLC to r/o myeloma    4  Proteinuria in the setting of presumed diabetic nephropathy- microalbumin:cr 91 as of July 17, 2018  5  GERD - on nexium every other day  Prolonged use of PPIs can lead to chronic interstitial nephritis  6  Ischemic cardiomyopathy/systolic CHF - on furosemide 20mg daily, monitor daily weight  Record weights daily and bring to cardiology office    7   DM2, well controlled - on glimepiride 2mg daily, last A1C 6 9 as of 7/17/18    HISTORY OF PRESENT ILLNESS:  Requesting Physician: Orquidea Bonner MD    Bharti Colon is a 80 y o  male with history of HTN, DM2, BPH, CVA, ICM, sCHF, GERD who presents in consultation for CKD stage 4  He has not seen a nephrologist in the past  Renal function has deteriorated in the past year  Did not sleep well last night due to muscle pain in his left leg  Denies history of recent NSAID use, herbal/OTC medications, history of UTIs or nephrolithiasis  Denies history of prior known kidney disease  Has not been on Abx recently  DM2 at least 10 years - has not required insulin  Morning glucose 190s today  Yesterday 132  No longer on metformin due to renal failure  Had pain in his left leg and BP was 144/80s  HTN for unclear amount of time - does not recall uncontrolled HTN in the past       CHF - sees cardiologist  Has lost some weight  Today, he weighed 203 lbs at home without shoes  He was taking lasix 40mg twice daily previously but is now taking lasix 20mg once daily       PAST MEDICAL HISTORY:  Past Medical History:   Diagnosis Date    [facility]ina pectoris ([facility] [address]     Chronic kidney disease     Coronary artery disease     Diabetes mellitus ([facility] [address]     Glaucoma     Hypertension     Occluded coronary artery stent     Left       PAST SURGICAL HISTORY:  Past Surgical History:   Procedure Laterality Date    CARDIAC CATHETERIZATION  04/05/2004    KNEE SURGERY      THROMBOENDARTERECTOMY Right     Cartoid       ALLERGIES:  No Known Allergies    SOCIAL HISTORY:  History   Alcohol Use No     Comment: being a social drinker     History   Drug Use No     History   Smoking Status    Never Smoker   Smokeless Tobacco    Never Used     Comment: former smoker       FAMILY HISTORY:  Family History   Problem Relation Age of Onset    Heart attack Father     Heart disease Mother     Diabetes Mother     Heart disease Sister     COPD Family        MEDICATIONS:    Current Outpatient Prescriptions:     acetaminophen (TYLENOL) 325 mg tablet, Take 1 - 2 tabs PO Q4 PRN pain, Disp: 30 tablet, Rfl: 0    aspirin 81 MG tablet, Take 81 mg by mouth daily  , Disp: , Rfl:     atorvastatin (LIPITOR) 40 mg tablet, Take 1 tablet (40 mg total) by mouth daily, Disp: 90 tablet, Rfl: 3    bimatoprost (LUMIGAN) 0 01 % ophthalmic drops, Apply to eye, Disp: , Rfl:     dicyclomine (BENTYL) 20 mg tablet, Take 1 tablet (20 mg total) by mouth every 6 (six) hours  , Disp: 20 tablet, Rfl: 0    esomeprazole (NexIUM) 40 MG capsule, Take 40 mg by mouth every evening , Disp: , Rfl:     fluticasone (FLONASE) 50 mcg/act nasal spray, into each nostril, Disp: , Rfl:     furosemide (LASIX) 40 mg tablet, Take 0 5 tablets (20 mg total) by mouth daily, Disp: 90 tablet, Rfl: 0    glimepiride (AMARYL) 2 mg tablet, Take 2 mg by mouth every evening , Disp: , Rfl:     glucose blood (ACCU-CHEK CHANDRAKANT PLUS) test strip, by In Vitro route, Disp: , Rfl:     guaiFENesin (MUCINEX) 600 mg 12 hr tablet, Take 1 tablet (600 mg total) by mouth 2 (two) times a day, Disp: 60 tablet, Rfl: 0    isosorbide dinitrate (ISORDIL) 30 mg tablet, Take 30 mg by mouth daily  , Disp: , Rfl:     metoprolol succinate (TOPROL-XL) 25 mg 24 hr tablet, TAKE 1 TABLET DAILY, Disp: 90 tablet, Rfl: 3    nitroglycerin (NITROSTAT) 0 4 mg SL tablet, Place 1 tablet under the tongue every 5 (five) minutes as needed, Disp: , Rfl:     ONE TOUCH ULTRA TEST test strip, The patient test once daily  , Disp: 100 each, Rfl: 2    ONETOUCH DELICA LANCETS E MISC, by Does not apply route daily, Disp: 100 each, Rfl: 3    potassium chloride (KLOR-CON 10) 10 mEq tablet, Take 1 tablet (10 mEq total) by mouth daily, Disp: 90 tablet, Rfl: 3    timolol (TIMOPTIC) 0 5 % ophthalmic solution, , Disp: , Rfl:     Timolol Maleate 0 5 % (DAILY) SOLN, Apply to eye daily, Disp: , Rfl:     REVIEW OF SYSTEMS:  Review of Systems   Constitutional: Negative for chills and fever     HENT: Negative for sore throat  Eyes: Negative for visual disturbance  Respiratory: Positive for cough  Negative for shortness of breath  Clear mucus   Cardiovascular: Negative for chest pain and leg swelling  Gastrointestinal: Positive for abdominal pain (right lower abdominal pain this morning) and diarrhea (2 days ago)  Negative for constipation, nausea and vomiting  Endocrine: Negative for polyuria  Genitourinary: Positive for decreased urine volume and urgency  Negative for difficulty urinating, dysuria and hematuria  Musculoskeletal: Positive for back pain  Negative for myalgias  Left leg pain   Skin: Negative for rash  Neurological: Positive for dizziness (felt dizzy a few weeks ago)  Negative for light-headedness and numbness  Psychiatric/Behavioral: Negative for confusion  PHYSICAL EXAM:   Vitals:    08/14/18 1006   BP: 120/70   BP Location: Left arm   Patient Position: Sitting   Cuff Size: Standard   Weight: 95 3 kg (210 lb)   Height: 5' 6" (1 676 m)     Body mass index is 33 89 kg/m²  Physical Exam   Constitutional: He appears well-developed and well-nourished  No distress  In wheelchair   HENT:   Head: Normocephalic and atraumatic  Mouth/Throat: No oropharyngeal exudate  Eyes: Right eye exhibits no discharge  Left eye exhibits no discharge  No scleral icterus  Neck: Neck supple  Cardiovascular: Normal rate, regular rhythm and normal heart sounds  Distant heart sounds   Pulmonary/Chest: Effort normal and breath sounds normal  He has no wheezes  He has no rales  Abdominal: Soft  Bowel sounds are normal  He exhibits no distension  There is no tenderness  Musculoskeletal: Normal range of motion  He exhibits no edema  Neurological: He is alert  awake   Skin: Skin is warm and dry  No rash noted  He is not diaphoretic  Psychiatric: He has a normal mood and affect  His behavior is normal    Vitals reviewed          Laboratory results:   Lab Results   Component Value Date    GLUCOSE 90 10/05/2015    CALCIUM 9 0 08/06/2018     08/06/2018    K 4 2 08/06/2018    CO2 28 08/06/2018     08/06/2018    BUN 25 08/06/2018    CREATININE 2 07 (H) 08/06/2018        Imaging: none relevant on file    Portions of the record may have been created with voice recognition software   Occasional wrong word or "sound a like" substitutions may have occurred due to the inherent limitations of voice recognition software   Read the chart carefully and recognize, using context, where substitutions have occurred

## 2018-08-14 NOTE — TELEPHONE ENCOUNTER
Reason for appointment/Complaint/Diagnosis : micro hematuria    Insurance: medicare    History of Cancer? no    Previous urologist?     no                  Records requested/where?  no    Outside testing/where? no    Location Preference for office visit?  [facility]

## 2018-08-15 LAB
EOSINOPHIL NFR URNS MANUAL: 0 %
KAPPA LC FREE SER-MCNC: 307.9 MG/L (ref 3.3–19.4)
KAPPA LC FREE/LAMBDA FREE SER: 14.66 {RATIO} (ref 0.26–1.65)
LAMBDA LC FREE SERPL-MCNC: 21 MG/L (ref 5.7–26.3)

## 2018-08-16 ENCOUNTER — TELEPHONE (OUTPATIENT)
Dept: NEPHROLOGY | Facility: HOSPITAL | Age: 83
End: 2018-08-16

## 2018-08-16 LAB
ALBUMIN SERPL ELPH-MCNC: 3.54 G/DL (ref 3.5–5)
ALBUMIN SERPL ELPH-MCNC: 54.5 % (ref 52–65)
ALPHA1 GLOB SERPL ELPH-MCNC: 0.42 G/DL (ref 0.1–0.4)
ALPHA1 GLOB SERPL ELPH-MCNC: 6.4 % (ref 2.5–5)
ALPHA2 GLOB SERPL ELPH-MCNC: 1.07 G/DL (ref 0.4–1.2)
ALPHA2 GLOB SERPL ELPH-MCNC: 16.4 % (ref 7–13)
BETA GLOB ABNORMAL SERPL ELPH-MCNC: 0.55 G/DL (ref 0.4–0.8)
BETA1 GLOB SERPL ELPH-MCNC: 8.5 % (ref 5–13)
BETA2 GLOB SERPL ELPH-MCNC: 5.5 % (ref 2–8)
BETA2+GAMMA GLOB SERPL ELPH-MCNC: 0.36 G/DL (ref 0.2–0.5)
GAMMA GLOB ABNORMAL SERPL ELPH-MCNC: 0.57 G/DL (ref 0.5–1.6)
GAMMA GLOB SERPL ELPH-MCNC: 8.7 % (ref 12–22)
IGG/ALB SER: 1.2 {RATIO} (ref 1.1–1.8)
PROT PATTERN SERPL ELPH-IMP: ABNORMAL
PROT SERPL-MCNC: 6.5 G/DL (ref 6.4–8.2)

## 2018-08-16 NOTE — TELEPHONE ENCOUNTER
I was unable to reach the patient's son (Easton Osorio) regarding the latest test results  The patient's sCr has risen a bit to 2 22  Hemoglobin down to 9 7  Urine protein:Cr 0 73 with trace protein noted on urinalysis  His elevated serum free light chain ratio is suggestive of possible underlying hematologic cause of renal failure ie possible myeloma  UPEP pending  SPEP shows no monoclonal bands  I would recommend referral to hematology if the patient and patient's son are willing to pursue further work up   I have not yet ordered this as I would want to speak to the son first

## 2018-08-16 NOTE — TELEPHONE ENCOUNTER
"Please reach the son regarding my note as documented  He should be referred to hematology if the patient and son agree  I can place the order if they agree to this  Also, please ensure the patient obtains renal ultrasound soon   Thanks " (Routing comment)

## 2018-08-17 ENCOUNTER — HOSPITAL ENCOUNTER (OUTPATIENT)
Dept: ULTRASOUND IMAGING | Facility: HOSPITAL | Age: 83
Discharge: HOME | End: 2018-08-17
Attending: INTERNAL MEDICINE
Payer: COMMERCIAL

## 2018-08-17 DIAGNOSIS — N18.4 STAGE 4 CHRONIC KIDNEY DISEASE (HCC): ICD-10-CM

## 2018-08-17 LAB
ALBUMIN UR ELPH-MCNC: 29.8 %
ALPHA1 GLOB MFR UR ELPH: 11.8 %
ALPHA2 GLOB MFR UR ELPH: 13.5 %
B-GLOBULIN MFR UR ELPH: 16.1 %
GAMMA GLOB MFR UR ELPH: 28.8 %
INTERPRETATION UR IFE-IMP: NORMAL
M PROTEIN MFR UR ELPH: 9.2 MG/DL
M PROTEIN UR-MCNC: 18.4 %
PROT PATTERN UR ELPH-IMP: ABNORMAL
PROT UR-MCNC: 50 MG/DL

## 2018-08-17 PROCEDURE — 51798 US URINE CAPACITY MEASURE: CPT

## 2018-08-21 ENCOUNTER — TELEPHONE (OUTPATIENT)
Dept: NEPHROLOGY | Facility: CLINIC | Age: 83
End: 2018-08-21

## 2018-08-21 DIAGNOSIS — R76.8 ELEVATED SERUM IMMUNOGLOBULIN FREE LIGHT CHAINS: Primary | ICD-10-CM

## 2018-08-21 DIAGNOSIS — R77.8 ABNORMAL SPEP: ICD-10-CM

## 2018-08-21 NOTE — TELEPHONE ENCOUNTER
All notifications have been made, son, nephew and patient  Patient is scheduled with heme/onc on 9/6/18 at 56  Father talked to son first then wanted me to call patient  I was asking you if you wanted to call or if I should however I called as I had seen he was scheduled with heme/onc and didn't want patient worried or confused  All notifications have been made and he is feeling fine and will move forward one appointment at a time  He asked if he should still see all other specialists I encouraged him to keep all appointments that he has at this time to include urology

## 2018-08-21 NOTE — TELEPHONE ENCOUNTER
Called and spoke to patient son  I did explain the below message as well as going into the importance of follow up with a hematologist in order to get a more accurate diagnosis for the patient  Son was asking multiple questions and I answered them to the best of my ability with strong encouragement to have patient follow up with hematology for more accurate answers to his questions as we do not know exactly what the cause is and that it could be multiple myeloma or another disease process but hematology would be the best place to start  I have placed the referral and was also asked to call the son's cousin Ardelle Peabody who assists in taking Mak Knapp (patient) to all the appointments  Son stated he would like to talk to his father before we call him back  Please advise if you would like to call patient or if you would like me to call  Thank you       ----- Message from Khloe Farrell DO sent at 8/21/2018 11:33 AM EDT -----  Have reviewed results  +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

## 2018-08-21 NOTE — TELEPHONE ENCOUNTER
I called and spoke to patient son Seamus Luis  He is going to talk to his father today 8/21/18 and proceed with the hematology referral     I already placed the referral order and they would like to go to Charli Tanner is the patients nephew who takes him to all of his medical appointments  His number is

## 2018-08-22 ENCOUNTER — EVALUATION (OUTPATIENT)
Dept: PHYSICAL THERAPY | Age: 83
End: 2018-08-22
Payer: COMMERCIAL

## 2018-08-22 DIAGNOSIS — M54.16 LUMBAR RADICULOPATHY: Primary | ICD-10-CM

## 2018-08-22 PROCEDURE — 97162 PT EVAL MOD COMPLEX 30 MIN: CPT | Performed by: PHYSICAL THERAPIST

## 2018-08-22 PROCEDURE — 97110 THERAPEUTIC EXERCISES: CPT | Performed by: PHYSICAL THERAPIST

## 2018-08-22 PROCEDURE — G8990 OTHER PT/OT CURRENT STATUS: HCPCS | Performed by: PHYSICAL THERAPIST

## 2018-08-22 PROCEDURE — G8991 OTHER PT/OT GOAL STATUS: HCPCS | Performed by: PHYSICAL THERAPIST

## 2018-08-23 NOTE — PROGRESS NOTES
PT Evaluation     Today's date: 2018  Patient name: Cameron Islas  : 1930  MRN: [id number]  Referring provider: Samuel Servin MD  Dx:   Encounter Diagnosis     ICD-10-CM    1  Lumbar radiculopathy M54 [id number]                   Assessment    Assessment details: Patient presents with resolved L LE radic - likely secondary to degenerative changes  HEP - DKC, PT, and supine hamstring stretch (90/90)  Understanding of Dx/Px/POC: good   Prognosis: good    Goals  Short Term goals - 4 weeks  1  Patient will be independent HEP  2   Patient will report a 50% decrease in pain complaints  3   Increase strength 1/2 grade  4   Increase ROM 5-10 degrees  Long Term goals - 8 weeks  1  Patient will report elimination of pain complaints  2   Patient will return to all work related activities without restriction  3   Patient will return to all recreational activities without restriction  4   ROM WFL  5   Strength 5/5  Plan  Planned therapy interventions: home exercise program  Frequency: 1x week  Duration in visits: 1        Subjective Evaluation    History of Present Illness  Mechanism of injury: Patient reports onset of L LE pain approximately 3 weeks ago - possible cause being sleeping in different bed  X-rays - arthritic changes  Currently patient has no sx's  Quality of life: good    Pain  Current pain ratin  At best pain ratin  At worst pain ratin    Patient Goals  Patient goal: HEP        Objective     Active Range of Motion     Lumbar   Flexion: 55 degrees   Extension: 30 degrees   Left lateral flexion: 35 degrees   Right lateral flexion: 35 degrees     Strength/Myotome Testing     Left Knee   Flexion: 4+  Extension: 4+    Left Ankle/Foot   Dorsiflexion: 4+  Plantar flexion: 4+    Additional Strength Details  Trunk flexion and extension - 3+/5    Tests       Thoracic   Negative slump  Lumbar   Negative repeated flexion, repeated extension and slump       Left   Negative crossed SLR, femoral stretch and passive SLR         Flowsheet Rows      Most Recent Value   PT/OT G-Codes   Assessment Type  Evaluation   G code set  Other PT/OT Primary   Other PT Primary Current Status ()  CJ   Other PT Primary Goal Status ()  CJ          Precautions: None    Daily Treatment Diary     Manual                                                                                   Exercise Diary                                                                                                                                                                                                                                                                                      Modalities

## 2018-08-24 ENCOUNTER — OFFICE VISIT (OUTPATIENT)
Dept: UROLOGY | Facility: AMBULATORY SURGERY CENTER | Age: 83
End: 2018-08-24
Payer: COMMERCIAL

## 2018-08-24 VITALS
BODY MASS INDEX: 33.59 KG/M2 | HEIGHT: 66 IN | HEART RATE: 103 BPM | WEIGHT: 209 LBS | DIASTOLIC BLOOD PRESSURE: 70 MMHG | SYSTOLIC BLOOD PRESSURE: 120 MMHG

## 2018-08-24 DIAGNOSIS — R31.29 MICROSCOPIC HEMATURIA: Primary | ICD-10-CM

## 2018-08-24 LAB
SL AMB  POCT GLUCOSE, UA: NORMAL
SL AMB LEUKOCYTE ESTERASE,UA: NORMAL
SL AMB POCT BLOOD,UA: NORMAL
SL AMB POCT CLARITY,UA: NORMAL
SL AMB POCT COLOR,UA: YELLOW
SL AMB POCT KETONES,UA: NORMAL
SL AMB POCT NITRITE,UA: NORMAL
SL AMB POCT PH,UA: 5
SL AMB POCT SPECIFIC GRAVITY,UA: 1
SL AMB POCT URINE PROTEIN: NORMAL

## 2018-08-24 PROCEDURE — 99203 OFFICE O/P NEW LOW 30 MIN: CPT | Performed by: NURSE PRACTITIONER

## 2018-08-24 PROCEDURE — 81002 URINALYSIS NONAUTO W/O SCOPE: CPT | Performed by: NURSE PRACTITIONER

## 2018-08-24 NOTE — PROGRESS NOTES
8/24/2018    Chance Jaffe  9/22/1930  [id number]      Assessment  -Microscopic hematuria    Discussion/Plan  Didier Trent is a 80 y o  male being managed by Dr Toby Nolan        -We discussed patient's recent urinalysis which identified 0-1 RBC  Urine dip in office today showed trace amount of blood  I reassured patient that based on AUA guidelines we would not proceed with hematuria workup including upper tract imaging and cystoscopy unless there is a presence of at least 3 RBC in UA  We also discussed patient's lower urinary tract symptoms, but at this time patient does not find bothersome and does not wish to start medication  He will continue to follow with Nephrology   -Patient will follow up in our office in 3 months with repeat urinalysis testing  If patient is noted to have over 3 RBC we will proceed with hematuria workup, otherwise follow up on an as-needed basis  -All questions answered, patient agrees with plan      History of Present Illness  80 y o  male who was seen in consultation for evaluation of microscopic hematuria  Patient had been referred by nephrologist, Dr Ventura Check  Patient has history of chronic kidney disease stage 4 in which he follows with nephrology  His recent urinalysis identified small presence of blood, RBC 0-1  Patient denies any episodes of gross hematuria  He states having lower urinary tract symptoms of a weak urinary stream and occasional hesitancy, but feels he empties bladder to completion  Patient reports being evaluated by a urologist numerous years ago but cannot recall name  He states he had seen urologist for routine prostate cancer screening  Last PSA from 05/05/2015 was stable at 1 1  Patient's recent kidney bladder ultrasound from 08/17/2018 showed no evidence of masses, lesions, calculi, or hydronephrosis  Bilateral ureteral jets were detected and postvoid residual volume of 70 cc    Patient denies any significant family history of prostate or bladder cancer  He reports a distant history of smoking, and quit at the age of 27 years  Review of Systems  Review of Systems   Constitutional: Negative  HENT: Negative  Respiratory: Negative  Cardiovascular: Negative  Gastrointestinal: Negative  Genitourinary: Negative for difficulty urinating, dysuria, flank pain, frequency, hematuria and urgency  Decreased urine volume: Weak urinary stream    Musculoskeletal: Negative  Skin: Negative  Neurological: Negative  Psychiatric/Behavioral: Negative  AUA Score: 5    Past Medical History  Past Medical History:   Diagnosis Date    Angina pectoris (HCC)     Chronic kidney disease     Coronary artery disease     Diabetes mellitus ([facility] [address] [facility]     Glaucoma     Hypertension     Occluded coronary artery stent     Left       Past Social History  Past Surgical History:   Procedure Laterality Date    CARDIAC CATHETERIZATION  04/05/2004    KNEE SURGERY      THROMBOENDARTERECTOMY Right     Cartoid       Past Family History  Family History   Problem Relation Age of Onset    Heart attack Father     Heart disease Mother     Diabetes Mother     Heart disease Sister     COPD Family        Past Social history  Social History     Social History    Marital status:      Spouse name: N/A    Number of children: N/A    Years of education: N/A     Occupational History    Not on file  Social History Main Topics    Smoking status: Never Smoker    Smokeless tobacco: Never Used      Comment: former smoker    Alcohol use No      Comment: being a social drinker    Drug use: No    Sexual activity: Not on file     Other Topics Concern    Not on file     Social History Narrative    Daily caffeine consumption           Current Medications  Current Outpatient Prescriptions   Medication Sig Dispense Refill    acetaminophen (TYLENOL) 325 mg tablet Take 1 - 2 tabs PO Q4 PRN pain 30 tablet 0    aspirin 81 MG tablet Take 81 mg by mouth daily        atorvastatin (LIPITOR) 40 mg tablet Take 1 tablet (40 mg total) by mouth daily 90 tablet 3    bimatoprost (LUMIGAN) 0 01 % ophthalmic drops Apply to eye      dicyclomine (BENTYL) 20 mg tablet Take 1 tablet (20 mg total) by mouth every 6 (six) hours  20 tablet 0    esomeprazole (NexIUM) 40 MG capsule Take 40 mg by mouth every evening   fluticasone (FLONASE) 50 mcg/act nasal spray into each nostril      furosemide (LASIX) 40 mg tablet Take 0 5 tablets (20 mg total) by mouth daily 90 tablet 0    glimepiride (AMARYL) 2 mg tablet Take 2 mg by mouth every evening   glucose blood (ACCU-CHEK CHANDRAKANT PLUS) test strip by In Vitro route      guaiFENesin (MUCINEX) 600 mg 12 hr tablet Take 1 tablet (600 mg total) by mouth 2 (two) times a day 60 tablet 0    isosorbide dinitrate (ISORDIL) 30 mg tablet Take 30 mg by mouth daily   metoprolol succinate (TOPROL-XL) 25 mg 24 hr tablet TAKE 1 TABLET DAILY 90 tablet 3    nitroglycerin (NITROSTAT) 0 4 mg SL tablet Place 1 tablet under the tongue every 5 (five) minutes as needed      ONE TOUCH ULTRA TEST test strip The patient test once daily  100 each 2    ONETOUCH DELICA LANCETS 11B MISC by Does not apply route daily 100 each 3    potassium chloride (KLOR-CON 10) 10 mEq tablet Take 1 tablet (10 mEq total) by mouth daily 90 tablet 3    timolol (TIMOPTIC) 0 5 % ophthalmic solution       Timolol Maleate 0 5 % (DAILY) SOLN Apply to eye daily       No current facility-administered medications for this visit  Allergies  No Known Allergies    Past Medical History, Social History, Family History, medications and allergies were reviewed  Vitals  There were no vitals filed for this visit  Physical Exam  Physical Exam   Constitutional: He is oriented to person, place, and time  He appears well-developed and well-nourished  Hard of hearing   HENT:   Head: Normocephalic  Eyes: Pupils are equal, round, and reactive to light  Neck: Normal range of motion  Cardiovascular: Normal rate and regular rhythm  Pulmonary/Chest: Effort normal    Abdominal: Soft  Normal appearance  There is no CVA tenderness  Musculoskeletal: Normal range of motion  Ambulates with cane   Neurological: He is alert and oriented to person, place, and time  He has normal reflexes  Skin: Skin is warm and dry  Psychiatric: He has a normal mood and affect  His behavior is normal  Judgment and thought content normal        Results    I have personally reviewed all pertinent lab results and reviewed with patient  Lab Results   Component Value Date    PSA 1 1 05/05/2015     Lab Results   Component Value Date    GLUCOSE 184 (H) 08/14/2018    CALCIUM 8 9 08/14/2018     08/14/2018    K 3 8 08/14/2018    CO2 26 08/14/2018     08/14/2018    BUN 21 08/14/2018    CREATININE 2 22 (H) 08/14/2018     Lab Results   Component Value Date    WBC 7 24 08/14/2018    HGB 9 7 (L) 08/14/2018    HCT 29 3 (L) 08/14/2018    MCV 97 08/14/2018     08/14/2018     No results found for this or any previous visit (from the past 1 hour(s))

## 2018-08-24 NOTE — LETTER
August 24, 2018     [place]  [address]    Patient: Vernell Malone   YOB: 1930   Date of Visit: 8/24/2018       Dear Dr Kaylene Hager:    Thank you for referring Kirk Barrett to me for evaluation  Below are my notes for this consultation  If you have questions, please do not hesitate to call me  I look forward to following your patient along with you  Sincerely,        DON Mcdonald        CC: No Recipients  DON Mcdonald  8/24/2018 11:51 AM  Sign at close encounter  8/24/2018    Vernell Malone  9/22/1930  [id number]      Assessment  -Microscopic hematuria    Discussion/Plan  Ilan Ng is a 80 y o  male being managed by Dr Loida Silverman        -We discussed patient's recent urinalysis which identified 0-1 RBC  Urine dip in office today showed trace amount of blood  I reassured patient that based on AUA guidelines we would not proceed with hematuria workup including upper tract imaging and cystoscopy unless there is a presence of at least 3 RBC in UA  We also discussed patient's lower urinary tract symptoms, but at this time patient does not find bothersome and does not wish to start medication  He will continue to follow with Nephrology   -Patient will follow up in our office in 3 months with repeat urinalysis testing  If patient is noted to have over 3 RBC we will proceed with hematuria workup, otherwise follow up on an as-needed basis  -All questions answered, patient agrees with plan      History of Present Illness  80 y o  male who was seen in consultation for evaluation of microscopic hematuria  Patient had been referred by nephrologist, Dr Kaylene Hager  Patient has history of chronic kidney disease stage 4 in which he follows with nephrology  His recent urinalysis identified small presence of blood, RBC 0-1  Patient denies any episodes of gross hematuria    He states having lower urinary tract symptoms of a weak urinary stream and occasional hesitancy, but feels he empties bladder to completion  Patient reports being evaluated by a urologist numerous years ago but cannot recall name  He states he had seen urologist for routine prostate cancer screening  Last PSA from 05/05/2015 was stable at 1 1  Patient's recent kidney bladder ultrasound from 08/17/2018 showed no evidence of masses, lesions, calculi, or hydronephrosis  Bilateral ureteral jets were detected and postvoid residual volume of 70 cc  Patient denies any significant family history of prostate or bladder cancer  He reports a distant history of smoking, and quit at the age of 27 years  Review of Systems  Review of Systems   Constitutional: Negative  HENT: Negative  Respiratory: Negative  Cardiovascular: Negative  Gastrointestinal: Negative  Genitourinary: Negative for difficulty urinating, dysuria, flank pain, frequency, hematuria and urgency  Decreased urine volume: Weak urinary stream    Musculoskeletal: Negative  Skin: Negative  Neurological: Negative  Psychiatric/Behavioral: Negative  AUA Score: 5    Past Medical History  Past Medical History:   Diagnosis Date    Angina pectoris (HCC)     Chronic kidney disease     Coronary artery disease     Diabetes mellitus ([facility] [address]     Glaucoma     Hypertension     Occluded coronary artery stent     Left       Past Social History  Past Surgical History:   Procedure Laterality Date    CARDIAC CATHETERIZATION  04/05/2004    KNEE SURGERY      THROMBOENDARTERECTOMY Right     Cartoid       Past Family History  Family History   Problem Relation Age of Onset    Heart attack Father     Heart disease Mother     Diabetes Mother     Heart disease Sister     COPD Family        Past Social history  Social History     Social History    Marital status:      Spouse name: N/A    Number of children: N/A    Years of education: N/A     Occupational History    Not on file       Social History Main Topics    Smoking status: Never Smoker    Smokeless tobacco: Never Used      Comment: former smoker    Alcohol use No      Comment: being a social drinker    Drug use: No    Sexual activity: Not on file     Other Topics Concern    Not on file     Social History Narrative    Daily caffeine consumption           Current Medications  Current Outpatient Prescriptions   Medication Sig Dispense Refill    acetaminophen (TYLENOL) 325 mg tablet Take 1 - 2 tabs PO Q4 PRN pain 30 tablet 0    aspirin 81 MG tablet Take 81 mg by mouth daily   atorvastatin (LIPITOR) 40 mg tablet Take 1 tablet (40 mg total) by mouth daily 90 tablet 3    bimatoprost (LUMIGAN) 0 01 % ophthalmic drops Apply to eye      dicyclomine (BENTYL) 20 mg tablet Take 1 tablet (20 mg total) by mouth every 6 (six) hours  20 tablet 0    esomeprazole (NexIUM) 40 MG capsule Take 40 mg by mouth every evening   fluticasone (FLONASE) 50 mcg/act nasal spray into each nostril      furosemide (LASIX) 40 mg tablet Take 0 5 tablets (20 mg total) by mouth daily 90 tablet 0    glimepiride (AMARYL) 2 mg tablet Take 2 mg by mouth every evening   glucose blood (ACCU-CHEK CHANDRAKANT PLUS) test strip by In Vitro route      guaiFENesin (MUCINEX) 600 mg 12 hr tablet Take 1 tablet (600 mg total) by mouth 2 (two) times a day 60 tablet 0    isosorbide dinitrate (ISORDIL) 30 mg tablet Take 30 mg by mouth daily   metoprolol succinate (TOPROL-XL) 25 mg 24 hr tablet TAKE 1 TABLET DAILY 90 tablet 3    nitroglycerin (NITROSTAT) 0 4 mg SL tablet Place 1 tablet under the tongue every 5 (five) minutes as needed      ONE TOUCH ULTRA TEST test strip The patient test once daily   100 each 2    ONETOUCH DELICA LANCETS 51M MISC by Does not apply route daily 100 each 3    potassium chloride (KLOR-CON 10) 10 mEq tablet Take 1 tablet (10 mEq total) by mouth daily 90 tablet 3    timolol (TIMOPTIC) 0 5 % ophthalmic solution       Timolol Maleate 0 5 % (DAILY) SOLN Apply to eye daily       No current facility-administered medications for this visit  Allergies  No Known Allergies    Past Medical History, Social History, Family History, medications and allergies were reviewed  Vitals  There were no vitals filed for this visit  Physical Exam  Physical Exam   Constitutional: He is oriented to person, place, and time  He appears well-developed and well-nourished  Hard of hearing   HENT:   Head: Normocephalic  Eyes: Pupils are equal, round, and reactive to light  Neck: Normal range of motion  Cardiovascular: Normal rate and regular rhythm  Pulmonary/Chest: Effort normal    Abdominal: Soft  Normal appearance  There is no CVA tenderness  Musculoskeletal: Normal range of motion  Ambulates with cane   Neurological: He is alert and oriented to person, place, and time  He has normal reflexes  Skin: Skin is warm and dry  Psychiatric: He has a normal mood and affect  His behavior is normal  Judgment and thought content normal        Results    I have personally reviewed all pertinent lab results and reviewed with patient  Lab Results   Component Value Date    PSA 1 1 05/05/2015     Lab Results   Component Value Date    GLUCOSE 184 (H) 08/14/2018    CALCIUM 8 9 08/14/2018     08/14/2018    K 3 8 08/14/2018    CO2 26 08/14/2018     08/14/2018    BUN 21 08/14/2018    CREATININE 2 22 (H) 08/14/2018     Lab Results   Component Value Date    WBC 7 24 08/14/2018    HGB 9 7 (L) 08/14/2018    HCT 29 3 (L) 08/14/2018    MCV 97 08/14/2018     08/14/2018     No results found for this or any previous visit (from the past 1 hour(s))

## 2018-08-28 ENCOUNTER — APPOINTMENT (OUTPATIENT)
Dept: LAB | Facility: CLINIC | Age: 83
End: 2018-08-28
Payer: COMMERCIAL

## 2018-08-28 ENCOUNTER — OFFICE VISIT (OUTPATIENT)
Dept: CARDIOLOGY CLINIC | Facility: CLINIC | Age: 83
End: 2018-08-28
Payer: COMMERCIAL

## 2018-08-28 ENCOUNTER — TRANSCRIBE ORDERS (OUTPATIENT)
Dept: LAB | Facility: CLINIC | Age: 83
End: 2018-08-28

## 2018-08-28 VITALS
BODY MASS INDEX: 33.09 KG/M2 | OXYGEN SATURATION: 98 % | DIASTOLIC BLOOD PRESSURE: 78 MMHG | HEIGHT: 66 IN | SYSTOLIC BLOOD PRESSURE: 132 MMHG | WEIGHT: 205.9 LBS | HEART RATE: 100 BPM

## 2018-08-28 DIAGNOSIS — N18.4 ANEMIA IN STAGE 4 CHRONIC KIDNEY DISEASE (HCC): ICD-10-CM

## 2018-08-28 DIAGNOSIS — I10 ESSENTIAL HYPERTENSION: Primary | ICD-10-CM

## 2018-08-28 DIAGNOSIS — R31.29 MICROSCOPIC HEMATURIA: ICD-10-CM

## 2018-08-28 DIAGNOSIS — R42 DIZZINESS: ICD-10-CM

## 2018-08-28 DIAGNOSIS — I25.5 ISCHEMIC CARDIOMYOPATHY: ICD-10-CM

## 2018-08-28 DIAGNOSIS — D63.1 ANEMIA IN STAGE 4 CHRONIC KIDNEY DISEASE (HCC): ICD-10-CM

## 2018-08-28 LAB
BACTERIA UR QL AUTO: ABNORMAL /HPF
BILIRUB UR QL STRIP: NEGATIVE
CLARITY UR: ABNORMAL
COLOR UR: YELLOW
GLUCOSE UR STRIP-MCNC: ABNORMAL MG/DL
HGB UR QL STRIP.AUTO: ABNORMAL
KETONES UR STRIP-MCNC: NEGATIVE MG/DL
LEUKOCYTE ESTERASE UR QL STRIP: ABNORMAL
NITRITE UR QL STRIP: NEGATIVE
NON-SQ EPI CELLS URNS QL MICRO: ABNORMAL /HPF
PH UR STRIP.AUTO: 5 [PH] (ref 4.5–8)
PROT UR STRIP-MCNC: ABNORMAL MG/DL
RBC #/AREA URNS AUTO: ABNORMAL /HPF
SP GR UR STRIP.AUTO: 1.02 (ref 1–1.03)
UROBILINOGEN UR QL STRIP.AUTO: 0.2 E.U./DL
WBC #/AREA URNS AUTO: ABNORMAL /HPF

## 2018-08-28 PROCEDURE — 99214 OFFICE O/P EST MOD 30 MIN: CPT | Performed by: INTERNAL MEDICINE

## 2018-08-28 PROCEDURE — 93000 ELECTROCARDIOGRAM COMPLETE: CPT | Performed by: INTERNAL MEDICINE

## 2018-08-28 PROCEDURE — 81001 URINALYSIS AUTO W/SCOPE: CPT

## 2018-08-28 NOTE — PROGRESS NOTES
Cardiology Follow Up    [facility] [place]  9/22/1930  [id number]  [facility], [place] [id number]    1  Essential hypertension  POCT ECG   2  Ischemic cardiomyopathy     3  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 ([facility] [address] )     4  Dizziness           Discussion/Summary:His cardiac status appears to be stable  I reviewed his prior cardiac testing  He had no anginal symptoms or signs of symptoms of heart failure  I will d/c his Isordil and see if his lightheadedness improves  He is to call if he gets CP or SOB off of the medications  RTO 3 months  He is planning on moving to [facility] near [place] and the end of this year  Interval History: I have not seen him in the past few year  He has diffuse diabetic CAD with EF of 50% by last ECHO  He has not had any recent cardiac problems including heart failure symptoms, angina  He is being followed closely for his CKD and possible multiple myeloma  He still lives alone  He denies CP, chest pressure, significant SOB  He uses an exercise bike at home  He is getting lightheaded when he stands       Patient Active Problem List   Diagnosis    Essential hypertension    Type 2 diabetes mellitus without complication, without long-term current use of insulin ([facility] [address] )    Mixed hyperlipidemia    Esophageal reflux    Cerebral infarction, watershed distribution, bilateral, acute ([facility] [address] )    Stage 4 chronic kidney disease (HCC)    Benign prostatic hyperplasia    Chronic systolic congestive heart failure (HCC)    Ischemic cardiomyopathy    Leg pain, left    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)    Other proteinuria    Dizziness     Past Medical History:   Diagnosis Date    Angina pectoris (HCC)     Chronic kidney disease     Coronary artery disease     Diabetes mellitus ([address] )     Glaucoma     Hypertension     Occluded coronary artery stent     Left     Social History     Social History    Marital status:      Spouse name: N/A    Number of children: N/A    Years of education: N/A     Occupational History    Not on file  Social History Main Topics    Smoking status: Never Smoker    Smokeless tobacco: Never Used      Comment: former smoker    Alcohol use Yes      Comment: social     Drug use: No    Sexual activity: Not on file     Other Topics Concern    Not on file     Social History Narrative    Daily caffeine consumption          Family History   Problem Relation Age of Onset    Heart attack Father     Heart disease Mother     Diabetes Mother     Heart disease Sister     COPD Family      Past Surgical History:   Procedure Laterality Date    CARDIAC CATHETERIZATION  04/05/2004    KNEE SURGERY      THROMBOENDARTERECTOMY Right     Cartoid       Current Outpatient Prescriptions:     aspirin 81 MG tablet, Take 81 mg by mouth daily  , Disp: , Rfl:     atorvastatin (LIPITOR) 40 mg tablet, Take 1 tablet (40 mg total) by mouth daily, Disp: 90 tablet, Rfl: 3    bimatoprost (LUMIGAN) 0 01 % ophthalmic drops, Apply to eye, Disp: , Rfl:     esomeprazole (NexIUM) 40 MG capsule, Take 40 mg by mouth every evening , Disp: , Rfl:     furosemide (LASIX) 40 mg tablet, Take 0 5 tablets (20 mg total) by mouth daily, Disp: 90 tablet, Rfl: 0    glimepiride (AMARYL) 2 mg tablet, Take 2 mg by mouth every evening , Disp: , Rfl:     glucose blood (ACCU-CHEK CHANDRAKANT PLUS) test strip, by In Vitro route, Disp: , Rfl:     guaiFENesin (MUCINEX) 600 mg 12 hr tablet, Take 1 tablet (600 mg total) by mouth 2 (two) times a day, Disp: 60 tablet, Rfl: 0    isosorbide dinitrate (ISORDIL) 30 mg tablet, Take 30 mg by mouth daily  , Disp: , Rfl:     metoprolol succinate (TOPROL-XL) 25 mg 24 hr tablet, TAKE 1 TABLET DAILY, Disp: 90 tablet, Rfl: 3    nitroglycerin (NITROSTAT) 0 4 mg SL tablet, Place 1 tablet under the tongue every 5 (five) minutes as needed, Disp: , Rfl:     ONE TOUCH ULTRA TEST test strip, The patient test once daily  , Disp: 100 each, Rfl: 2    ONETOUCH DELICA LANCETS 17X MISC, by Does not apply route daily, Disp: 100 each, Rfl: 3    potassium chloride (KLOR-CON 10) 10 mEq tablet, Take 1 tablet (10 mEq total) by mouth daily, Disp: 90 tablet, Rfl: 3    timolol (TIMOPTIC) 0 5 % ophthalmic solution, , Disp: , Rfl:     Timolol Maleate 0 5 % (DAILY) SOLN, Apply to eye daily, Disp: , Rfl:     acetaminophen (TYLENOL) 325 mg tablet, Take 1 - 2 tabs PO Q4 PRN pain (Patient not taking: Reported on 8/28/2018 ), Disp: 30 tablet, Rfl: 0    fluticasone (FLONASE) 50 mcg/act nasal spray, into each nostril, Disp: , Rfl:   No Known Allergies  Vitals:    08/28/18 1244   BP: 132/78   BP Location: Left arm   Patient Position: Sitting   Cuff Size: Adult   Pulse: 100   SpO2: 98%   Weight: 93 4 kg (205 lb 14 4 oz)   Height: 5' 6" (1 676 m)     Weight (last 2 days)     Date/Time   Weight    08/28/18 1244  93 4 (205 9)             Blood pressure 132/78, pulse 100, height 5' 6" (1 676 m), weight 93 4 kg (205 lb 14 4 oz), SpO2 98 %  , Body mass index is 33 23 kg/m²      Labs:  Office Visit on 08/24/2018   Component Date Value    LEUKOCYTE ESTERASE,UA 08/24/2018 ++      NITRITE,UA 08/24/2018 -     SL AMB POCT URINE PROTEIN 08/24/2018 -      PH,UA 08/24/2018 5 0      BLOOD,UA 08/24/2018 trace      SPECIFIC GRAVITY,UA 08/24/2018 1 005      KETONES,UA 08/24/2018 -     GLUCOSE, UA 08/24/2018 -      COLOR,UA 08/24/2018 yellow      CLARITY,UA 08/24/2018 cloudy    Appointment on 08/14/2018   Component Date Value    Sodium 08/14/2018 136     Potassium 08/14/2018 3 8     Chloride 08/14/2018 102     CO2 08/14/2018 26     ANION GAP 08/14/2018 8     BUN 08/14/2018 21     Creatinine 08/14/2018 2 22*    Glucose 08/14/2018 184*    Calcium 08/14/2018 8 9     eGFR 08/14/2018 26     Clarity, UA 08/14/2018 Cloudy     Color, UA 08/14/2018 Yellow     Specific Gravity, UA 08/14/2018 1 015     pH, UA 08/14/2018 5 5     Glucose, UA 08/14/2018 100 (1/10%)*    Ketones, UA 08/14/2018 Negative     Blood, UA 08/14/2018 Small*    Protein, UA 08/14/2018 Trace*    Nitrite, UA 08/14/2018 Negative     Bilirubin, UA 08/14/2018 Negative     Urobilinogen, UA 08/14/2018 0 2     Leukocytes, UA 08/14/2018 Moderate*    WBC, UA 08/14/2018 Innumerable*    RBC, UA 08/14/2018 0-1*    Bacteria, UA 08/14/2018 Moderate*    Epithelial Cells 08/14/2018 None Seen     Creatinine, Ur 08/14/2018 66 8     Protein Urine Random 08/14/2018 49     Prot/Creat Ratio, Ur 08/14/2018 0 73*    WBC 08/14/2018 7 24     RBC 08/14/2018 3 03*    Hemoglobin 08/14/2018 9 7*    Hematocrit 08/14/2018 29 3*    MCV 08/14/2018 97     MCH 08/14/2018 32 0     MCHC 08/14/2018 33 1     RDW 08/14/2018 14 0     MPV 08/14/2018 10 0     Platelets 25/41/7585 239     nRBC 08/14/2018 0     Neutrophils Relative 08/14/2018 71     Immat GRANS % 08/14/2018 1     Lymphocytes Relative 08/14/2018 19     Monocytes Relative 08/14/2018 7     Eosinophils Relative 08/14/2018 2     Basophils Relative 08/14/2018 0     Neutrophils Absolute 08/14/2018 5 15     Immature Grans Absolute 08/14/2018 0 04     Lymphocytes Absolute 08/14/2018 1 38     Monocytes Absolute 08/14/2018 0 51     Eosinophils Absolute 08/14/2018 0 13     Basophils Absolute 08/14/2018 0 03     A/G Ratio 08/14/2018 1 20     Albumin Electrophoresis 08/14/2018 54 5     Albumin CONC 08/14/2018 3 54     Alpha 1 08/14/2018 6 4*    ALPHA 1 CONC 08/14/2018 0 42*    Alpha 2 08/14/2018 16 4*    ALPHA 2 CONC 08/14/2018 1 07     Beta-1 08/14/2018 8 5     BETA 1 CONC 08/14/2018 0 55     Beta-2 08/14/2018 5 5     BETA 2 CONC 08/14/2018 0 36     Gamma Globulin 08/14/2018 8 7*    GAMMA CONC 08/14/2018 0 57     SPEP Interpretation 08/14/2018 The serum total protein and albumin are within normal limits  The serum protein electrophoresis shows an increased alpha-1 region  No monoclonal bands noted   Reviewed by: Acacia Jett MD  ([id number])  **Electronic Signature**     Total Protein 08/14/2018 6 5     Urine Protein 08/14/2018 50 0*    Albumin ELP, Urine 08/14/2018 29 8     Alpha 1, Urine 08/14/2018 11 8     Alpha 2, Urine 08/14/2018 13 5     Beta, Urine 08/14/2018 16 1     Gamma Globulin, Urine 08/14/2018 28 8     M Peak ID 1, Ur 08/14/2018 18 4     M-PEAK 1 PC, URINE 08/14/2018 9 20     UPEP Interp 08/14/2018 The urine total protein is increased  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  Immunofixation to be performed  Reviewed by: Jayce Azar MD ([id number])  **Electronic Signature**     Ig Winnie Free Light Chain 08/14/2018 307 9*    Ig Lambda Free Light Tiff* 08/14/2018 21 0     Kappa/Lambda FluidC Ratio 08/14/2018 14 66*    Iron Saturation 08/14/2018 9     TIBC 08/14/2018 288     Iron 08/14/2018 27*    Ferritin 08/14/2018 42     Immunofixation Interpret* 08/14/2018 Urine immunofixation shows a monoclonal gammopathy identified as free kappa light chains (9 10 mg/dL)   Reviewed by: Jayce Azar MD ([id number])  ** Electronic Signature**    Office Visit on 08/14/2018   Component Date Value    LEUKOCYTE ESTERASE,UA 08/14/2018 trace      NITRITE,UA 08/14/2018 negative     SL AMB POCT UROBILINOGEN 08/14/2018 trace     SL AMB POCT URINE PROTEIN 08/14/2018 100      PH,UA 08/14/2018 5      BLOOD,UA 08/14/2018 small      SPECIFIC GRAVITY,UA 08/14/2018 1 020      KETONES,UA 08/14/2018 neg      BILIRUBIN,UA 08/14/2018 neg     GLUCOSE, UA 08/14/2018 neg      COLOR,UA 08/14/2018 yellow      CLARITY,UA 08/14/2018 hazy     Eosinophil, Urine 08/14/2018 0    Appointment on 08/06/2018   Component Date Value    Sodium 08/06/2018 140     Potassium 08/06/2018 4 2     Chloride 08/06/2018 106     CO2 08/06/2018 28     ANION GAP 08/06/2018 6     BUN 08/06/2018 25     Creatinine 08/06/2018 2 07*    Glucose, Fasting 08/06/2018 148*    Calcium 08/06/2018 9 0     eGFR 08/06/2018 28 Appointment on 07/17/2018   Component Date Value    WBC 07/17/2018 6 65     RBC 07/17/2018 3 31*    Hemoglobin 07/17/2018 10 8*    Hematocrit 07/17/2018 32 5*    MCV 07/17/2018 98     MCH 07/17/2018 32 6     MCHC 07/17/2018 33 2     RDW 07/17/2018 13 1     MPV 07/17/2018 11 3     Platelets 65/88/0343 225     nRBC 07/17/2018 0     Neutrophils Relative 07/17/2018 60     Immat GRANS % 07/17/2018 0     Lymphocytes Relative 07/17/2018 31     Monocytes Relative 07/17/2018 4     Eosinophils Relative 07/17/2018 4     Basophils Relative 07/17/2018 1     Neutrophils Absolute 07/17/2018 3 98     Immature Grans Absolute 07/17/2018 0 02     Lymphocytes Absolute 07/17/2018 2 05     Monocytes Absolute 07/17/2018 0 29     Eosinophils Absolute 07/17/2018 0 28     Basophils Absolute 07/17/2018 0 03     Sodium 07/17/2018 141     Potassium 07/17/2018 4 2     Chloride 07/17/2018 106     CO2 07/17/2018 26     ANION GAP 07/17/2018 9     BUN 07/17/2018 29*    Creatinine 07/17/2018 2 22*    Glucose, Fasting 07/17/2018 77     Calcium 07/17/2018 9 1     AST 07/17/2018 18     ALT 07/17/2018 25     Alkaline Phosphatase 07/17/2018 84     Total Protein 07/17/2018 6 7     Albumin 07/17/2018 3 4*    Total Bilirubin 07/17/2018 0 63     eGFR 07/17/2018 26     Hemoglobin A1C 07/17/2018 6 9*    EAG 07/17/2018 151     Cholesterol 07/17/2018 97     Triglycerides 07/17/2018 202*    HDL, Direct 07/17/2018 40     LDL Calculated 07/17/2018 17     TSH 3RD GENERATON 07/17/2018 3 770*    Free T4 07/17/2018 1 01      Imaging: Xr Hip/pelv 2-3 Vws Left If Performed    Result Date: 8/16/2018  Narrative: LEFT HIP INDICATION: 80-year-old male, left hip pain COMPARISON:  None VIEWS:  XR HIP/PELV 2-3 VWS LEFT  W PELVIS IF PERFORMED Images: 3 FINDINGS: There is no acute fracture or dislocation  Mild degenerative arthritis involves both hips No lytic or blastic osseous lesions  Soft tissues are unremarkable   The visualized lumbar spine is unremarkable  Impression: Mild degenerative arthritis both hips No acute osseous abnormality  Workstation performed: [id number]     Us Kidney And Bladder With Pvr    Result Date: 8/19/2018  Narrative: RENAL ULTRASOUND INDICATION:   N18 4: Chronic kidney disease, stage 4 (severe)  COMPARISON: Retroperitoneal ultrasound dated August 16, 2013  TECHNIQUE:   Ultrasound of the retroperitoneum was performed with a curvilinear transducer utilizing volumetric sweeps and still imaging techniques  FINDINGS: KIDNEYS: Symmetric and normal size  Right kidney:  12 3 x 4 8 cm  Left kidney:  13 1 x 6 1 cm  Right kidney Normal echogenicity and contour  No suspicious masses detected  No hydronephrosis  No shadowing calculi  No perinephric fluid collections  Left kidney Normal echogenicity and contour  No suspicious masses detected  No hydronephrosis  No shadowing calculi  No perinephric fluid collections  URETERS: Nonvisualized  BLADDER: Normally distended  No focal thickening or mass lesions  Bilateral ureteral jets detected  The prevoid urinary bladder volume measured 241 mL  The post void residual urine volume measured 69 6 mL  Impression: No hydronephrosis  Unremarkable bilateral renal echotexture  Post void residual urine volume measuring approximately 70 mL  Workstation performed: [id number]       Review of Systems:  Review of Systems   Constitutional: Negative for diaphoresis, fatigue, fever and unexpected weight change  HENT: Negative  Respiratory: Negative for cough, shortness of breath and wheezing  Cardiovascular: Negative for chest pain, palpitations and leg swelling  Gastrointestinal: Negative for abdominal pain, diarrhea and nausea  Musculoskeletal: Negative for gait problem and myalgias  Skin: Negative for rash  Neurological: Positive for light-headedness  Negative for dizziness and numbness  Psychiatric/Behavioral: Negative          Physical Exam:  Physical Exam Constitutional: He is oriented to person, place, and time  He appears well-developed and well-nourished  HENT:   Head: Normocephalic and atraumatic  Eyes: Pupils are equal, round, and reactive to light  Neck: Normal range of motion  Neck supple  No JVD present  Cardiovascular: Regular rhythm, S1 normal, S2 normal and normal pulses  Pulses:       Carotid pulses are 2+ on the right side, and 2+ on the left side  Pulmonary/Chest: Effort normal and breath sounds normal  He has no wheezes  He has no rales  Abdominal: Soft  Bowel sounds are normal  There is no tenderness  Musculoskeletal: Normal range of motion  He exhibits no edema or tenderness  Neurological: He is alert and oriented to person, place, and time  He has normal reflexes  No cranial nerve deficit  Skin: Skin is warm  Psychiatric: He has a normal mood and affect

## 2018-09-06 ENCOUNTER — HOSPITAL ENCOUNTER (OUTPATIENT)
Dept: RADIOLOGY | Facility: HOSPITAL | Age: 83
Discharge: HOME | End: 2018-09-06
Payer: COMMERCIAL

## 2018-09-06 ENCOUNTER — OFFICE VISIT (OUTPATIENT)
Dept: HEMATOLOGY ONCOLOGY | Facility: CLINIC | Age: 83
End: 2018-09-06
Payer: COMMERCIAL

## 2018-09-06 ENCOUNTER — APPOINTMENT (OUTPATIENT)
Dept: LAB | Facility: HOSPITAL | Age: 83
End: 2018-09-06
Payer: COMMERCIAL

## 2018-09-06 VITALS
WEIGHT: 204.8 LBS | BODY MASS INDEX: 32.92 KG/M2 | HEART RATE: 99 BPM | SYSTOLIC BLOOD PRESSURE: 120 MMHG | OXYGEN SATURATION: 98 % | HEIGHT: 66 IN | DIASTOLIC BLOOD PRESSURE: 80 MMHG | RESPIRATION RATE: 16 BRPM | TEMPERATURE: 96.2 F

## 2018-09-06 DIAGNOSIS — D47.2 MONOCLONAL GAMMOPATHY: ICD-10-CM

## 2018-09-06 DIAGNOSIS — D47.2 MONOCLONAL GAMMOPATHY: Primary | ICD-10-CM

## 2018-09-06 LAB
ALBUMIN SERPL BCP-MCNC: 3.7 G/DL (ref 3.5–5)
ALP SERPL-CCNC: 118 U/L (ref 46–116)
ALT SERPL W P-5'-P-CCNC: 21 U/L (ref 12–78)
ANION GAP SERPL CALCULATED.3IONS-SCNC: 7 MMOL/L (ref 4–13)
AST SERPL W P-5'-P-CCNC: 17 U/L (ref 5–45)
BASOPHILS # BLD AUTO: 0.06 THOUSANDS/ΜL (ref 0–0.1)
BASOPHILS NFR BLD AUTO: 1 % (ref 0–1)
BILIRUB SERPL-MCNC: 0.93 MG/DL (ref 0.2–1)
BUN SERPL-MCNC: 22 MG/DL (ref 5–25)
CALCIUM SERPL-MCNC: 9.5 MG/DL (ref 8.3–10.1)
CHLORIDE SERPL-SCNC: 104 MMOL/L (ref 100–108)
CO2 SERPL-SCNC: 26 MMOL/L (ref 21–32)
CREAT SERPL-MCNC: 1.91 MG/DL (ref 0.6–1.3)
EOSINOPHIL # BLD AUTO: 0.27 THOUSAND/ΜL (ref 0–0.61)
EOSINOPHIL NFR BLD AUTO: 4 % (ref 0–6)
ERYTHROCYTE [DISTWIDTH] IN BLOOD BY AUTOMATED COUNT: 13.6 % (ref 11.6–15.1)
GFR SERPL CREATININE-BSD FRML MDRD: 31 ML/MIN/1.73SQ M
GLUCOSE SERPL-MCNC: 146 MG/DL (ref 65–140)
HCT VFR BLD AUTO: 33.7 % (ref 36.5–49.3)
HGB BLD-MCNC: 10.8 G/DL (ref 12–17)
IGA SERPL-MCNC: 266 MG/DL (ref 70–400)
IGG SERPL-MCNC: 755 MG/DL (ref 700–1600)
IGM SERPL-MCNC: 27 MG/DL (ref 40–230)
IMM GRANULOCYTES # BLD AUTO: 0.02 THOUSAND/UL (ref 0–0.2)
IMM GRANULOCYTES NFR BLD AUTO: 0 % (ref 0–2)
LDH SERPL-CCNC: 190 U/L (ref 81–234)
LYMPHOCYTES # BLD AUTO: 1.84 THOUSANDS/ΜL (ref 0.6–4.47)
LYMPHOCYTES NFR BLD AUTO: 26 % (ref 14–44)
MCH RBC QN AUTO: 31.2 PG (ref 26.8–34.3)
MCHC RBC AUTO-ENTMCNC: 32 G/DL (ref 31.4–37.4)
MCV RBC AUTO: 97 FL (ref 82–98)
MONOCYTES # BLD AUTO: 0.38 THOUSAND/ΜL (ref 0.17–1.22)
MONOCYTES NFR BLD AUTO: 6 % (ref 4–12)
NEUTROPHILS # BLD AUTO: 4.39 THOUSANDS/ΜL (ref 1.85–7.62)
NEUTS SEG NFR BLD AUTO: 63 % (ref 43–75)
NRBC BLD AUTO-RTO: 0 /100 WBCS
PLATELET # BLD AUTO: 231 THOUSANDS/UL (ref 149–390)
PMV BLD AUTO: 10.5 FL (ref 8.9–12.7)
POTASSIUM SERPL-SCNC: 3.9 MMOL/L (ref 3.5–5.3)
PROT SERPL-MCNC: 7.3 G/DL (ref 6.4–8.2)
RBC # BLD AUTO: 3.46 MILLION/UL (ref 3.88–5.62)
SODIUM SERPL-SCNC: 137 MMOL/L (ref 136–145)
URATE SERPL-MCNC: 4 MG/DL (ref 4.2–8)
WBC # BLD AUTO: 6.96 THOUSAND/UL (ref 4.31–10.16)

## 2018-09-06 PROCEDURE — 82784 ASSAY IGA/IGD/IGG/IGM EACH: CPT

## 2018-09-06 PROCEDURE — 36415 COLL VENOUS BLD VENIPUNCTURE: CPT

## 2018-09-06 PROCEDURE — 80053 COMPREHEN METABOLIC PANEL: CPT

## 2018-09-06 PROCEDURE — 83615 LACTATE (LD) (LDH) ENZYME: CPT

## 2018-09-06 PROCEDURE — 77075 RADEX OSSEOUS SURVEY COMPL: CPT

## 2018-09-06 PROCEDURE — 82232 ASSAY OF BETA-2 PROTEIN: CPT

## 2018-09-06 PROCEDURE — 85025 COMPLETE CBC W/AUTO DIFF WBC: CPT

## 2018-09-06 PROCEDURE — 84550 ASSAY OF BLOOD/URIC ACID: CPT

## 2018-09-06 PROCEDURE — 99204 OFFICE O/P NEW MOD 45 MIN: CPT | Performed by: PHYSICIAN ASSISTANT

## 2018-09-06 NOTE — LETTER
September 7, 2018     Daryl Chilel, [address] [facility] [address] [address]  [address] [place] [address]    Patient: Kecia Jett   YOB: 1930   Date of Visit: 9/6/2018       Dear Dr Georgi Randall:    Thank you for referring Antony Rascon to me for evaluation  Below are my notes for this consultation  If you have questions, please do not hesitate to call me  I look forward to following your patient along with you  Sincerely,        Jeronimo Mclean PA-C        CC: MD Jeronimo Johnson PA-C  9/7/2018  2:34 PM  Sign at close encounter  [address] [address] [phone]  Hematology Ambulatory Consult  Kecia Jett, 9/22/1930, [id number]  9/7/2018    Assessment/Plan:    1  Kappa light chain monoclonal gammopathy, 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 1mg/dL  Blood work evaluation completed in August 2018 demonstrates the above immunofixation, hemoglobin of 9 7 grams/deciliter, free light chain ratio of 14 6, calcium = 8 9 mg per dL and serum creatinine of 2 22 mg/dL  It appears from the above values at the patient likely has a plasma cell disorder  We discussed additional workup which would include a bone marrow biopsy, skeletal survey and additional blood work including quantitative immunoglobulins, uric acid and tumor marker evaluation (beta 2 microglobulin and LDH )      I discussed with the patient and the family friend, the presentation multiple myeloma, the projected disease process, the workup, and potential treatments  They understand that at this point further diagnostics are necessary to confirm the diagnosis but understand that the above picture clinically is very suspicious for multiple myeloma  Patient will return after bone marrow biopsy results are available  At that time definitive treatment will be discussed  Patient does not require any adjustments secondary to hypercalcemia    Patient continues to follow with Nephrology regarding serum creatinine elevation  Hemoglobin appears stable  However, additional blood work has been ordered to further evaluate this  We discussed signs and symptoms of progressive anemia  Patient should call the office if he experiences these  Labs and imaging for follow up:  Orders Placed This Encounter   Procedures    XR bone survey complete >12 mos     Standing Status:   Future     Number of Occurrences:   1     Standing Expiration Date:   9/6/2022     Scheduling Instructions:      Bring along any outside films relating to this procedure  Order Specific Question:   Reason for Exam:     Answer:   multiple myeloma, r/o lytic lesion    LD,Blood     Standing Status:   Future     Number of Occurrences:   1     Standing Expiration Date:   9/6/2019    Uric acid     Standing Status:   Future     Number of Occurrences:   1     Standing Expiration Date:   9/6/2019    Beta 2 microglobulin, serum     Standing Status:   Future     Number of Occurrences:   1     Standing Expiration Date:   9/6/2019    IgG, IgA, IgM     Standing Status:   Future     Number of Occurrences:   1     Standing Expiration Date:   9/6/2019    CBC and differential     This is a patient instruction: This test is non-fasting  Please drink two glasses of water morning of bloodwork  Standing Status:   Future     Number of Occurrences:   1     Standing Expiration Date:   9/6/2019    Comprehensive metabolic panel     This is a patient instruction: Non-fasting     Standing Status:   Future     Number of Occurrences:   1     Standing Expiration Date:   9/6/2019       The patient is scheduled for follow-up in approximately 3 weeks  with Dr Marcos Martinez  Patient and his friend voiced agreement and understanding to the above  Patient knows to call the Hematology/Oncology office with any questions and concerns regarding the above      Carefully review your medication list in verify the list is accurate and up-to-date  Please call the hematologic/oncology office if there medications missing from the less, medications on the list that your not currently taking or if there is a dosage or instruction that is different from higher taking medication  I have spent 45 minutes with Patient and family today in which greater than 50% of this time was spent in counseling/coordination of care regarding Diagnostic results, Prognosis, Intructions for management, Patient and family education and Impressions  Barrier(s) to care: hard of hearing  The patient is able to self care     -------------------------------------------------------------------------------------------------------    Chief complaint:   Chief Complaint   Patient presents with    Consult     New patient ref by Dr Dorothea Ford due abnornal kappa light chain  Records and labs in [facility]  Referring provider:  [phone] [facility] / [facility] [id number]    History of present illness: This is an 51-year-old male with past medical history of hypertension, type 2 diabetes, stage IV chronic kidney disorder, anemia of chronic kidney disease, BPH, CHF history of CVA who presents to the Hematology office for evaluation of abnormal kappa free light chain  Patient notes he followed up with his renal specialist who ordered testing  Testing came back with some abnormality  Patient understands that there was protein is involved but does not nor murmur or that  Patient denies recent fracture ink, bone pain, problems with calcium or issues with symptoms of anemia including shortness of breath on exertion, blood loss from the GI or  tract  Patient notes overall feeling well  He lives at home by himself  Patient notes that he used to work for [employer] and worked around [employer] but does not recall all of the agents  Patient denies problems with blood transfusions in his past     Patient lives close by to relatives    He has family that bring some dinner  Patient notes that he is able to do his laundry and some house chores on his own  Patient denies significant disability from CVA  Interval history:  Patient overall says that he feels okay  Patient states that it was not for the blood work abnormality that he would not of known that he had a kidney issue  Patient notes good appetite, performance status and activity level is the same as it has been  Patient denies weight loss, nausea, lower extremity swelling  Patient denies recent hospitalizations  Review of Systems   Constitutional: Negative for activity change, appetite change, fatigue and fever  HENT: Negative for nosebleeds  Respiratory: Negative for cough, choking and shortness of breath  Cardiovascular: Negative for chest pain, palpitations and leg swelling  Gastrointestinal: Negative for abdominal distention, abdominal pain, anal bleeding, blood in stool, constipation, diarrhea, nausea and vomiting  Endocrine: Negative for cold intolerance  Genitourinary: Negative for hematuria  Musculoskeletal: Negative for myalgias  Skin: Negative for color change, pallor and rash  Allergic/Immunologic: Negative for immunocompromised state  Neurological: Negative for headaches  Hematological: Negative for adenopathy  Does not bruise/bleed easily  All other systems reviewed and are negative        Patient Active Problem List   Diagnosis    Essential hypertension    Type 2 diabetes mellitus without complication, without long-term current use of insulin ([address] )    Mixed hyperlipidemia    Esophageal reflux    Cerebral infarction, watershed distribution, bilateral, acute ([address] )    Stage 4 chronic kidney disease (HCC)    Benign prostatic hyperplasia    Chronic systolic congestive heart failure (HCC)    Ischemic cardiomyopathy    Leg pain, left    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)    Other proteinuria    Dizziness     Past Medical History:   Diagnosis Date    [facility]ina p[facility]is [facility]     Chronic kidney disease     Coronary artery disease     Diabetes mellitus ([facility] )     Glaucoma     Hypertension     Occluded coronary artery stent     Left     Past Surgical History:   Procedure Laterality Date    CARDIAC CATHETERIZATION  04/05/2004    KNEE SURGERY      THROMBOENDARTERECTOMY Right     Cartoid     Family History   Problem Relation Age of Onset    Heart attack Father     Heart disease Mother     Diabetes Mother     Heart disease Sister     COPD Family      Social History     Social History    Marital status:      Spouse name: N/A    Number of children: N/A    Years of education: N/A     Social History Main Topics    Smoking status: Never Smoker    Smokeless tobacco: Never Used      Comment: former smoker    Alcohol use Yes      Comment: social     Drug use: No    Sexual activity: Not Asked     Other Topics Concern    None     Social History Narrative    Daily caffeine consumption             Current Outpatient Prescriptions:     acetaminophen (TYLENOL) 325 mg tablet, Take 1 - 2 tabs PO Q4 PRN pain, Disp: 30 tablet, Rfl: 0    aspirin 81 MG tablet, Take 81 mg by mouth daily  , Disp: , Rfl:     atorvastatin (LIPITOR) 40 mg tablet, Take 1 tablet (40 mg total) by mouth daily, Disp: 90 tablet, Rfl: 3    bimatoprost (LUMIGAN) 0 01 % ophthalmic drops, Apply to eye, Disp: , Rfl:     esomeprazole (NexIUM) 40 MG capsule, Take 40 mg by mouth every evening , Disp: , Rfl:     fluticasone (FLONASE) 50 mcg/act nasal spray, into each nostril, Disp: , Rfl:     furosemide (LASIX) 40 mg tablet, Take 0 5 tablets (20 mg total) by mouth daily, Disp: 90 tablet, Rfl: 0    glimepiride (AMARYL) 2 mg tablet, Take 2 mg by mouth every evening , Disp: , Rfl:     glucose blood (ACCU-CHEK CHANDRAKANT PLUS) test strip, by In Vitro route, Disp: , Rfl:     guaiFENesin (MUCINEX) 600 mg 12 hr tablet, Take 1 tablet (600 mg total) by mouth 2 (two) times a day, Disp: 60 tablet, Rfl: 0    isosorbide dinitrate (ISORDIL) 30 mg tablet, Take 30 mg by mouth daily  , Disp: , Rfl:     metoprolol succinate (TOPROL-XL) 25 mg 24 hr tablet, TAKE 1 TABLET DAILY, Disp: 90 tablet, Rfl: 3    nitroglycerin (NITROSTAT) 0 4 mg SL tablet, Place 1 tablet under the tongue every 5 (five) minutes as needed, Disp: , Rfl:     ONE TOUCH ULTRA TEST test strip, The patient test once daily  , Disp: 100 each, Rfl: 2    ONETOUCH DELICA LANCETS 83Y MISC, by Does not apply route daily, Disp: 100 each, Rfl: 3    potassium chloride (KLOR-CON 10) 10 mEq tablet, Take 1 tablet (10 mEq total) by mouth daily, Disp: 90 tablet, Rfl: 3    timolol (TIMOPTIC) 0 5 % ophthalmic solution, , Disp: , Rfl:     Timolol Maleate 0 5 % (DAILY) SOLN, Apply to eye daily, Disp: , Rfl:     No Known Allergies    Objective:  /80 (BP Location: Left arm, Cuff Size: Large)   Pulse 99   Temp (!) 96 2 °F (35 7 °C) (Tympanic)   Resp 16   Ht 5' 6" (1 676 m)   Wt 92 9 kg (204 lb 12 8 oz)   SpO2 98%   BMI 33 06 kg/m²    Physical Exam   Constitutional: He is oriented to person, place, and time  He appears well-developed and well-nourished  No distress  HENT:   Head: Normocephalic and atraumatic  Mouth/Throat: No oropharyngeal exudate  Eyes: Conjunctivae and EOM are normal  Pupils are equal, round, and reactive to light  No scleral icterus  Neck: Normal range of motion  Cardiovascular: Normal rate and regular rhythm  No murmur heard  Pulmonary/Chest: Effort normal and breath sounds normal  No respiratory distress  Abdominal: Soft  Bowel sounds are normal  He exhibits no distension  There is no hepatosplenomegaly  There is no tenderness  Musculoskeletal: He exhibits no edema or tenderness  Lymphadenopathy:     He has no cervical adenopathy  Neurological: He is alert and oriented to person, place, and time  No cranial nerve deficit  Skin: No rash noted  No erythema  No pallor         Result Review  Labs:  Appointment on 08/28/2018   Component Date Value Ref Range Status    Clarity, UA 08/28/2018 Cloudy   Final    Color, UA 08/28/2018 Yellow   Final    Specific Gravity, UA 08/28/2018 1 020  1 003 - 1 030 Final    pH, UA 08/28/2018 5 0  4 5 - 8 0 Final    Glucose, UA 08/28/2018 100 (1/10%)* Negative mg/dl Final    Ketones, UA 08/28/2018 Negative  Negative mg/dl Final    Blood, UA 08/28/2018 Small* Negative Final    Protein, UA 08/28/2018 Trace* Negative mg/dl Final    Nitrite, UA 08/28/2018 Negative  Negative Final    Bilirubin, UA 08/28/2018 Negative  Negative Final    Urobilinogen, UA 08/28/2018 0 2  0 2, 1 0 E U /dl E U /dl Final    Leukocytes, UA 08/28/2018 Large* Negative Final    WBC, UA 08/28/2018 Innumerable* None Seen, 0-5, 5-55, 5-65 /hpf Final    RBC, UA 08/28/2018 0-1* None Seen, 0-5 /hpf Final    Bacteria, UA 08/28/2018 Moderate* None Seen, Occasional /hpf Final    Epithelial Cells 08/28/2018 Occasional  None Seen, Occasional /hpf Final   Office Visit on 08/28/2018   Component Date Value Ref Range Status    INTERPRETATIONS 08/28/2018    Final    NSR  1st degree AVB  IRBBB  IVCD     Office Visit on 08/24/2018   Component Date Value Ref Range Status    LEUKOCYTE ESTERASE,UA 08/24/2018 ++   Final    NITRITE,UA 08/24/2018 -   Final    SL AMB POCT URINE PROTEIN 08/24/2018 -   Final     PH,UA 08/24/2018 5 0   Final     BLOOD,UA 08/24/2018 trace   Final    SPECIFIC GRAVITY,UA 08/24/2018 1 005   Final    KETONES,UA 08/24/2018 -   Final    GLUCOSE, UA 08/24/2018 -   Final     COLOR,UA 08/24/2018 yellow   Final     CLARITY,UA 08/24/2018 cloudy   Final   Appointment on 08/14/2018   Component Date Value Ref Range Status    Sodium 08/14/2018 136  136 - 145 mmol/L Final    Potassium 08/14/2018 3 8  3 5 - 5 3 mmol/L Final    Chloride 08/14/2018 102  100 - 108 mmol/L Final    CO2 08/14/2018 26  21 - 32 mmol/L Final    ANION GAP 08/14/2018 8  4 - 13 mmol/L Final    BUN 08/14/2018 21  5 - 25 mg/dL Final    Creatinine 08/14/2018 2 22* 0 60 - 1 30 mg/dL Final    Standardized to IDMS reference method    Glucose 08/14/2018 184* 65 - 140 mg/dL Final      If the patient is fasting, the ADA then defines impaired fasting glucose as > 100 mg/dL and diabetes as > or equal to 123 mg/dL  Specimen collection should occur prior to Sulfasalazine administration due to the potential for falsely depressed results  Specimen collection should occur prior to Sulfapyridine administration due to the potential for falsely elevated results      Calcium 08/14/2018 8 9  8 3 - 10 1 mg/dL Final    eGFR 08/14/2018 26  ml/min/1 73sq m Final    Clarity, UA 08/14/2018 Cloudy   Final    Color, UA 08/14/2018 Yellow   Final    Specific Gravity, UA 08/14/2018 1 015  1 003 - 1 030 Final    pH, UA 08/14/2018 5 5  4 5 - 8 0 Final    Glucose, UA 08/14/2018 100 (1/10%)* Negative mg/dl Final    Ketones, UA 08/14/2018 Negative  Negative mg/dl Final    Blood, UA 08/14/2018 Small* Negative Final    Protein, UA 08/14/2018 Trace* Negative mg/dl Final    Nitrite, UA 08/14/2018 Negative  Negative Final    Bilirubin, UA 08/14/2018 Negative  Negative Final    Urobilinogen, UA 08/14/2018 0 2  0 2, 1 0 E U /dl E U /dl Final    Leukocytes, UA 08/14/2018 Moderate* Negative Final    WBC, UA 08/14/2018 Innumerable* None Seen, 0-5, 5-55, 5-65 /hpf Final    RBC, UA 08/14/2018 0-1* None Seen, 0-5 /hpf Final    Bacteria, UA 08/14/2018 Moderate* None Seen, Occasional /hpf Final    Epithelial Cells 08/14/2018 None Seen  None Seen, Occasional /hpf Final    Creatinine, Ur 08/14/2018 66 8  mg/dL Final    Protein Urine Random 08/14/2018 49  mg/dL Final    Prot/Creat Ratio, Ur 08/14/2018 0 73* 0 00 - 0 10 Final    WBC 08/14/2018 7 24  4 31 - 10 16 Thousand/uL Final    RBC 08/14/2018 3 03* 3 88 - 5 62 Million/uL Final    Hemoglobin 08/14/2018 9 7* 12 0 - 17 0 g/dL Final    Hematocrit 08/14/2018 29 3* 36 5 - 49 3 % Final    MCV 08/14/2018 97  82 - 98 fL Final  MCH 08/14/2018 32 0  26 8 - 34 3 pg Final    MCHC 08/14/2018 33 1  31 4 - 37 4 g/dL Final    RDW 08/14/2018 14 0  11 6 - 15 1 % Final    MPV 08/14/2018 10 0  8 9 - 12 7 fL Final    Platelets 99/19/3988 239  149 - 390 Thousands/uL Final    nRBC 08/14/2018 0  /100 WBCs Final    Neutrophils Relative 08/14/2018 71  43 - 75 % Final    Immat GRANS % 08/14/2018 1  0 - 2 % Final    Lymphocytes Relative 08/14/2018 19  14 - 44 % Final    Monocytes Relative 08/14/2018 7  4 - 12 % Final    Eosinophils Relative 08/14/2018 2  0 - 6 % Final    Basophils Relative 08/14/2018 0  0 - 1 % Final    Neutrophils Absolute 08/14/2018 5 15  1 85 - 7 62 Thousands/µL Final    Immature Grans Absolute 08/14/2018 0 04  0 00 - 0 20 Thousand/uL Final    Lymphocytes Absolute 08/14/2018 1 38  0 60 - 4 47 Thousands/µL Final    Monocytes Absolute 08/14/2018 0 51  0 17 - 1 22 Thousand/µL Final    Eosinophils Absolute 08/14/2018 0 13  0 00 - 0 61 Thousand/µL Final    Basophils Absolute 08/14/2018 0 03  0 00 - 0 10 Thousands/µL Final    A/G Ratio 08/14/2018 1 20  1 10 - 1 80 Final    Albumin Electrophoresis 08/14/2018 54 5  52 0 - 65 0 % Final    Albumin CONC 08/14/2018 3 54  3 50 - 5 00 g/dl Final    Alpha 1 08/14/2018 6 4* 2 5 - 5 0 % Final    ALPHA 1 CONC 08/14/2018 0 42* 0 10 - 0 40 g/dL Final    Alpha 2 08/14/2018 16 4* 7 0 - 13 0 % Final    ALPHA 2 CONC 08/14/2018 1 07  0 40 - 1 20 g/dL Final    Beta-1 08/14/2018 8 5  5 0 - 13 0 % Final    BETA 1 CONC 08/14/2018 0 55  0 40 - 0 80 g/dL Final    Beta-2 08/14/2018 5 5  2 0 - 8 0 % Final    BETA 2 CONC 08/14/2018 0 36  0 20 - 0 50 g/dL Final    Gamma Globulin 08/14/2018 8 7* 12 0 - 22 0 % Final    GAMMA CONC 08/14/2018 0 57  0 50 - 1 60 g/dL Final    SPEP Interpretation 08/14/2018 The serum total protein and albumin are within normal limits  The serum protein electrophoresis shows an increased alpha-1 region  No monoclonal bands noted   Reviewed by: Yahaira Alicea MD ([id number])  **Electronic Signature**   Final    Total Protein 08/14/2018 6 5  6 4 - 8 2 g/dL Final    Urine Protein 08/14/2018 50 0* 2 0 - 17 5 mg/dL Final    Albumin ELP, Urine 08/14/2018 29 8  % Final    Alpha 1, Urine 08/14/2018 11 8  % Final    Alpha 2, Urine 08/14/2018 13 5  % Final    Beta, Urine 08/14/2018 16 1  % Final    Gamma Globulin, Urine 08/14/2018 28 8  % Final    M Peak ID 1, Ur 08/14/2018 18 4  % Final    M-PEAK 1 PC, URINE 08/14/2018 9 20  mg/dL Final    UPEP Interp 08/14/2018 The urine total protein is increased  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  Immunofixation to be performed  Reviewed by: Jorge Hale MD ([id number])  **Electronic Signature**   Final    Ig Angie Free Light Chain 08/14/2018 307 9* 3 3 - 19 4 mg/L Final    Ig Lambda Free Light Chain 08/14/2018 21 0  5 7 - 26 3 mg/L Final    Kappa/Lambda FluidC Ratio 08/14/2018 14 66* 0 26 - 1 65 Final    Iron Saturation 08/14/2018 9  % Final    TIBC 08/14/2018 288  250 - 450 ug/dL Final    Iron 08/14/2018 27* 65 - 175 ug/dL Final      Patients treated with metal-binding drugs (ie  Deferoxamine) may have depressed iron values   Ferritin 08/14/2018 42  8 - 388 ng/mL Final    Immunofixation Interpretation 08/14/2018 Urine immunofixation shows a monoclonal gammopathy identified as free kappa light chains (9 10 mg/dL)   Reviewed by: Jorge Hale MD ([id number])  ** Electronic Signature**   Final   Office Visit on 08/14/2018   Component Date Value Ref Range Status    LEUKOCYTE ESTERASE,UA 08/14/2018 trace   Final    NITRITE,UA 08/14/2018 negative   Final    SL AMB POCT UROBILINOGEN 08/14/2018 trace   Final    SL AMB POCT URINE PROTEIN 08/14/2018 100   Final     PH,UA 08/14/2018 5   Final     BLOOD,UA 08/14/2018 small   Final    SPECIFIC GRAVITY,UA 08/14/2018 1 020   Final    KETONES,UA 08/14/2018 neg   Final     BILIRUBIN,UA 08/14/2018 neg   Final    GLUCOSE, UA 08/14/2018 neg   Final     Rayma Oppenheim 08/14/2018 yellow   Final     CLARITY,UA 08/14/2018 hazy   Final    Eosinophil, Urine 08/14/2018 0  % Final       Imaging:     Please note: This report has been generated by a voice recognition software system  Therefore there may be syntax, spelling, and/or grammatical errors  Please call if you have any questions

## 2018-09-06 NOTE — PROGRESS NOTES
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 1mg/dL  Blood work evaluation completed in August 2018 demonstrates the above immunofixation, hemoglobin of 9 7 grams/deciliter, free light chain ratio of 14 6, calcium = 8 9 mg per dL and serum creatinine of 2 22 mg/dL  It appears from the above values at the patient likely has a plasma cell disorder  We discussed additional workup which would include a bone marrow biopsy, skeletal survey and additional blood work including quantitative immunoglobulins, uric acid and tumor marker evaluation (beta 2 microglobulin and LDH )      I discussed with the patient and the family friend, the presentation multiple myeloma, the projected disease process, the workup, and potential treatments  They understand that at this point further diagnostics are necessary to confirm the diagnosis but understand that the above picture clinically is very suspicious for multiple myeloma  Patient will return after bone marrow biopsy results are available  At that time definitive treatment will be discussed  Patient does not require any adjustments secondary to hypercalcemia  Patient continues to follow with Nephrology regarding serum creatinine elevation  Hemoglobin appears stable  However, additional blood work has been ordered to further evaluate this  We discussed signs and symptoms of progressive anemia  Patient should call the office if he experiences these      Labs and imaging for follow up:  Orders Placed This Encounter   Procedures    XR bone survey complete >12 mos     Standing Status:   Future     Number of Occurrences:   1     Standing Expiration Date:   9/6/2022     Scheduling Instructions:      Bring along any outside films relating to this procedure  Order Specific Question:   Reason for Exam:     Answer:   multiple myeloma, r/o lytic lesion    LD,Blood     Standing Status:   Future     Number of Occurrences:   1     Standing Expiration Date:   9/6/2019    Uric acid     Standing Status:   Future     Number of Occurrences:   1     Standing Expiration Date:   9/6/2019    Beta 2 microglobulin, serum     Standing Status:   Future     Number of Occurrences:   1     Standing Expiration Date:   9/6/2019    IgG, IgA, IgM     Standing Status:   Future     Number of Occurrences:   1     Standing Expiration Date:   9/6/2019    CBC and differential     This is a patient instruction: This test is non-fasting  Please drink two glasses of water morning of bloodwork  Standing Status:   Future     Number of Occurrences:   1     Standing Expiration Date:   9/6/2019    Comprehensive metabolic panel     This is a patient instruction: Non-fasting     Standing Status:   Future     Number of Occurrences:   1     Standing Expiration Date:   9/6/2019       The patient is scheduled for follow-up in approximately 3 weeks  with Dr Ria Richardson  Patient and his friend voiced agreement and understanding to the above  Patient knows to call the Hematology/Oncology office with any questions and concerns regarding the above  Carefully review your medication list in verify the list is accurate and up-to-date  Please call the hematologic/oncology office if there medications missing from the less, medications on the list that your not currently taking or if there is a dosage or instruction that is different from higher taking medication  I have spent 45 minutes with Patient and family today in which greater than 50% of this time was spent in counseling/coordination of care regarding Diagnostic results, Prognosis, Intructions for management, Patient and family education and Impressions  Barrier(s) to care: hard of hearing    The patient is able to self care     -------------------------------------------------------------------------------------------------------    Chief complaint:   Chief Complaint   Patient presents with    Consult     New patient ref by Dr Eric Snider due abnornal kappa light chain  Records and labs in [facility]  [facility] [id number] [facility] [id number]    History of present illness: This is an 66-year-old male with past medical history of hypertension, type 2 diabetes, stage IV chronic kidney disorder, anemia of chronic kidney disease, BPH, CHF history of CVA who presents to the Hematology office for evaluation of abnormal kappa free light chain  Patient notes he followed up with his renal specialist who ordered testing  Testing came back with some abnormality  Patient understands that there was protein is involved but does not nor murmur or that  Patient denies recent fracture ink, bone pain, problems with calcium or issues with symptoms of anemia including shortness of breath on exertion, blood loss from the GI or  tract  Patient notes overall feeling well  He lives at home by himself  Patient notes that he used to work for [employer] and worked around [employer] but does not recall all of the agents  Patient denies problems with blood transfusions in his past     Patient lives close by to relatives  He has family that bring some dinner  Patient notes that he is able to do his laundry and some house chores on his own  Patient denies significant disability from CVA  Interval history:  Patient overall says that he feels okay  Patient states that it was not for the blood work abnormality that he would not of known that he had a kidney issue  Patient notes good appetite, performance status and activity level is the same as it has been  Patient denies weight loss, nausea, lower extremity swelling  Patient denies recent hospitalizations      Review of Systems Constitutional: Negative for activity change, appetite change, fatigue and fever  HENT: Negative for nosebleeds  Respiratory: Negative for cough, choking and shortness of breath  Cardiovascular: Negative for chest pain, palpitations and leg swelling  Gastrointestinal: Negative for abdominal distention, abdominal pain, anal bleeding, blood in stool, constipation, diarrhea, nausea and vomiting  Endocrine: Negative for cold intolerance  Genitourinary: Negative for hematuria  Musculoskeletal: Negative for myalgias  Skin: Negative for color change, pallor and rash  Allergic/Immunologic: Negative for immunocompromised state  Neurological: Negative for headaches  Hematological: Negative for adenopathy  Does not bruise/bleed easily  All other systems reviewed and are negative        Patient Active Problem List   Diagnosis    Essential hypertension    Type 2 diabetes mellitus without complication, without long-term current use of insulin [facility]    Mixed hyperlipidemia    Esophageal reflux    Cerebral infarction, watershed distribution, bilateral, acute ([facility] )    Stage 4 chronic kidney disease (HCC)    Benign prostatic hyperplasia    Chronic systolic congestive heart failure (HCC)    Ischemic cardiomyopathy    Leg pain, left    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)    Other proteinuria    Dizziness     Past Medical History:   Diagnosis Date    Angina pectoris (HCC)     Chronic kidney disease     Coronary artery disease     Diabetes mellitus ([facility] )     Glaucoma     Hypertension     Occluded coronary artery stent     Left     Past Surgical History:   Procedure Laterality Date    CARDIAC CATHETERIZATION  04/05/2004    KNEE SURGERY      THROMBOENDARTERECTOMY Right     Cartoid     Family History   Problem Relation Age of Onset    Heart attack Father     Heart disease Mother     Diabetes Mother     Heart disease Sister     COPD Family      Social History     Social History    Marital status:      Spouse name: N/A    Number of children: N/A    Years of education: N/A     Social History Main Topics    Smoking status: Never Smoker    Smokeless tobacco: Never Used      Comment: former smoker    Alcohol use Yes      Comment: social     Drug use: No    Sexual activity: Not Asked     Other Topics Concern    None     Social History Narrative    Daily caffeine consumption             Current Outpatient Prescriptions:     acetaminophen (TYLENOL) 325 mg tablet, Take 1 - 2 tabs PO Q4 PRN pain, Disp: 30 tablet, Rfl: 0    aspirin 81 MG tablet, Take 81 mg by mouth daily  , Disp: , Rfl:     atorvastatin (LIPITOR) 40 mg tablet, Take 1 tablet (40 mg total) by mouth daily, Disp: 90 tablet, Rfl: 3    bimatoprost (LUMIGAN) 0 01 % ophthalmic drops, Apply to eye, Disp: , Rfl:     esomeprazole (NexIUM) 40 MG capsule, Take 40 mg by mouth every evening , Disp: , Rfl:     fluticasone (FLONASE) 50 mcg/act nasal spray, into each nostril, Disp: , Rfl:     furosemide (LASIX) 40 mg tablet, Take 0 5 tablets (20 mg total) by mouth daily, Disp: 90 tablet, Rfl: 0    glimepiride (AMARYL) 2 mg tablet, Take 2 mg by mouth every evening , Disp: , Rfl:     glucose blood (ACCU-CHEK CHANDRAKANT PLUS) test strip, by In Vitro route, Disp: , Rfl:     guaiFENesin (MUCINEX) 600 mg 12 hr tablet, Take 1 tablet (600 mg total) by mouth 2 (two) times a day, Disp: 60 tablet, Rfl: 0    isosorbide dinitrate (ISORDIL) 30 mg tablet, Take 30 mg by mouth daily  , Disp: , Rfl:     metoprolol succinate (TOPROL-XL) 25 mg 24 hr tablet, TAKE 1 TABLET DAILY, Disp: 90 tablet, Rfl: 3    nitroglycerin (NITROSTAT) 0 4 mg SL tablet, Place 1 tablet under the tongue every 5 (five) minutes as needed, Disp: , Rfl:     ONE TOUCH ULTRA TEST test strip, The patient test once daily  , Disp: 100 each, Rfl: 2    ONETOUCH DELICA LANCETS 95A MISC, by Does not apply route daily, Disp: 100 each, Rfl: 3    potassium chloride (KLOR-CON 10) 10 mEq tablet, Take 1 tablet (10 mEq total) by mouth daily, Disp: 90 tablet, Rfl: 3    timolol (TIMOPTIC) 0 5 % ophthalmic solution, , Disp: , Rfl:     Timolol Maleate 0 5 % (DAILY) SOLN, Apply to eye daily, Disp: , Rfl:     No Known Allergies    Objective:  /80 (BP Location: Left arm, Cuff Size: Large)   Pulse 99   Temp (!) 96 2 °F (35 7 °C) (Tympanic)   Resp 16   Ht 5' 6" (1 676 m)   Wt 92 9 kg (204 lb 12 8 oz)   SpO2 98%   BMI 33 06 kg/m²   Physical Exam   Constitutional: He is oriented to person, place, and time  He appears well-developed and well-nourished  No distress  HENT:   Head: Normocephalic and atraumatic  Mouth/Throat: No oropharyngeal exudate  Eyes: Conjunctivae and EOM are normal  Pupils are equal, round, and reactive to light  No scleral icterus  Neck: Normal range of motion  Cardiovascular: Normal rate and regular rhythm  No murmur heard  Pulmonary/Chest: Effort normal and breath sounds normal  No respiratory distress  Abdominal: Soft  Bowel sounds are normal  He exhibits no distension  There is no hepatosplenomegaly  There is no tenderness  Musculoskeletal: He exhibits no edema or tenderness  Lymphadenopathy:     He has no cervical adenopathy  Neurological: He is alert and oriented to person, place, and time  No cranial nerve deficit  Skin: No rash noted  No erythema  No pallor         Result Review  Labs:  Appointment on 08/28/2018   Component Date Value Ref Range Status    Clarity,  08/28/2018 Cloudy   Final    Color, UA 08/28/2018 Yellow   Final    Specific Gravity, UA 08/28/2018 1 020  1 003 - 1 030 Final    pH, UA 08/28/2018 5 0  4 5 - 8 0 Final    Glucose, UA 08/28/2018 100 (1/10%)* Negative mg/dl Final    Ketones, UA 08/28/2018 Negative  Negative mg/dl Final    Blood, UA 08/28/2018 Small* Negative Final    Protein, UA 08/28/2018 Trace* Negative mg/dl Final    Nitrite, UA 08/28/2018 Negative  Negative Final    Bilirubin, UA 08/28/2018 Negative Negative Final    Urobilinogen, UA 08/28/2018 0 2  0 2, 1 0 E U /dl E U /dl Final    Leukocytes, UA 08/28/2018 Large* Negative Final    WBC, UA 08/28/2018 Innumerable* None Seen, 0-5, 5-55, 5-65 /hpf Final    RBC, UA 08/28/2018 0-1* None Seen, 0-5 /hpf Final    Bacteria, UA 08/28/2018 Moderate* None Seen, Occasional /hpf Final    Epithelial Cells 08/28/2018 Occasional  None Seen, Occasional /hpf Final   Office Visit on 08/28/2018   Component Date Value Ref Range Status    INTERPRETATIONS 08/28/2018    Final    NSR  1st degree AVB  IRBBB  IVCD  Office Visit on 08/24/2018   Component Date Value Ref Range Status    LEUKOCYTE ESTERASE,UA 08/24/2018 ++   Final    NITRITE,UA 08/24/2018 -   Final    SL AMB POCT URINE PROTEIN 08/24/2018 -   Final     PH,UA 08/24/2018 5 0   Final     BLOOD,UA 08/24/2018 trace   Final    SPECIFIC GRAVITY,UA 08/24/2018 1 005   Final    KETONES,UA 08/24/2018 -   Final    GLUCOSE, UA 08/24/2018 -   Final     COLOR,UA 08/24/2018 yellow   Final     CLARITY,UA 08/24/2018 cloudy   Final   Appointment on 08/14/2018   Component Date Value Ref Range Status    Sodium 08/14/2018 136  136 - 145 mmol/L Final    Potassium 08/14/2018 3 8  3 5 - 5 3 mmol/L Final    Chloride 08/14/2018 102  100 - 108 mmol/L Final    CO2 08/14/2018 26  21 - 32 mmol/L Final    ANION GAP 08/14/2018 8  4 - 13 mmol/L Final    BUN 08/14/2018 21  5 - 25 mg/dL Final    Creatinine 08/14/2018 2 22* 0 60 - 1 30 mg/dL Final    Standardized to IDMS reference method    Glucose 08/14/2018 184* 65 - 140 mg/dL Final      If the patient is fasting, the ADA then defines impaired fasting glucose as > 100 mg/dL and diabetes as > or equal to 123 mg/dL  Specimen collection should occur prior to Sulfasalazine administration due to the potential for falsely depressed results  Specimen collection should occur prior to Sulfapyridine administration due to the potential for falsely elevated results      Calcium 08/14/2018 8 9 8 3 - 10 1 mg/dL Final    eGFR 08/14/2018 26  ml/min/1 73sq m Final    Clarity, UA 08/14/2018 Cloudy   Final    Color, UA 08/14/2018 Yellow   Final    Specific Gravity, UA 08/14/2018 1 015  1 003 - 1 030 Final    pH, UA 08/14/2018 5 5  4 5 - 8 0 Final    Glucose, UA 08/14/2018 100 (1/10%)* Negative mg/dl Final    Ketones, UA 08/14/2018 Negative  Negative mg/dl Final    Blood, UA 08/14/2018 Small* Negative Final    Protein, UA 08/14/2018 Trace* Negative mg/dl Final    Nitrite, UA 08/14/2018 Negative  Negative Final    Bilirubin, UA 08/14/2018 Negative  Negative Final    Urobilinogen, UA 08/14/2018 0 2  0 2, 1 0 E U /dl E U /dl Final    Leukocytes, UA 08/14/2018 Moderate* Negative Final    WBC, UA 08/14/2018 Innumerable* None Seen, 0-5, 5-55, 5-65 /hpf Final    RBC, UA 08/14/2018 0-1* None Seen, 0-5 /hpf Final    Bacteria, UA 08/14/2018 Moderate* None Seen, Occasional /hpf Final    Epithelial Cells 08/14/2018 None Seen  None Seen, Occasional /hpf Final    Creatinine, Ur 08/14/2018 66 8  mg/dL Final    Protein Urine Random 08/14/2018 49  mg/dL Final    Prot/Creat Ratio, Ur 08/14/2018 0 73* 0 00 - 0 10 Final    WBC 08/14/2018 7 24  4 31 - 10 16 Thousand/uL Final    RBC 08/14/2018 3 03* 3 88 - 5 62 Million/uL Final    Hemoglobin 08/14/2018 9 7* 12 0 - 17 0 g/dL Final    Hematocrit 08/14/2018 29 3* 36 5 - 49 3 % Final    MCV 08/14/2018 97  82 - 98 fL Final    MCH 08/14/2018 32 0  26 8 - 34 3 pg Final    MCHC 08/14/2018 33 1  31 4 - 37 4 g/dL Final    RDW 08/14/2018 14 0  11 6 - 15 1 % Final    MPV 08/14/2018 10 0  8 9 - 12 7 fL Final    Platelets 09/32/0737 239  149 - 390 Thousands/uL Final    nRBC 08/14/2018 0  /100 WBCs Final    Neutrophils Relative 08/14/2018 71  43 - 75 % Final    Immat GRANS % 08/14/2018 1  0 - 2 % Final    Lymphocytes Relative 08/14/2018 19  14 - 44 % Final    Monocytes Relative 08/14/2018 7  4 - 12 % Final    Eosinophils Relative 08/14/2018 2  0 - 6 % Final    Basophils Relative 08/14/2018 0  0 - 1 % Final    Neutrophils Absolute 08/14/2018 5 15  1 85 - 7 62 Thousands/µL Final    Immature Grans Absolute 08/14/2018 0 04  0 00 - 0 20 Thousand/uL Final    Lymphocytes Absolute 08/14/2018 1 38  0 60 - 4 47 Thousands/µL Final    Monocytes Absolute 08/14/2018 0 51  0 17 - 1 22 Thousand/µL Final    Eosinophils Absolute 08/14/2018 0 13  0 00 - 0 61 Thousand/µL Final    Basophils Absolute 08/14/2018 0 03  0 00 - 0 10 Thousands/µL Final    A/G Ratio 08/14/2018 1 20  1 10 - 1 80 Final    Albumin Electrophoresis 08/14/2018 54 5  52 0 - 65 0 % Final    Albumin CONC 08/14/2018 3 54  3 50 - 5 00 g/dl Final    Alpha 1 08/14/2018 6 4* 2 5 - 5 0 % Final    ALPHA 1 CONC 08/14/2018 0 42* 0 10 - 0 40 g/dL Final    Alpha 2 08/14/2018 16 4* 7 0 - 13 0 % Final    ALPHA 2 CONC 08/14/2018 1 07  0 40 - 1 20 g/dL Final    Beta-1 08/14/2018 8 5  5 0 - 13 0 % Final    BETA 1 CONC 08/14/2018 0 55  0 40 - 0 80 g/dL Final    Beta-2 08/14/2018 5 5  2 0 - 8 0 % Final    BETA 2 CONC 08/14/2018 0 36  0 20 - 0 50 g/dL Final    Gamma Globulin 08/14/2018 8 7* 12 0 - 22 0 % Final    GAMMA CONC 08/14/2018 0 57  0 50 - 1 60 g/dL Final    SPEP Interpretation 08/14/2018 The serum total protein and albumin are within normal limits  The serum protein electrophoresis shows an increased alpha-1 region  No monoclonal bands noted  Reviewed by: Mia Cobian MD  ([id number])  **Electronic Signature**   Final    Total Protein 08/14/2018 6 5  6 4 - 8 2 g/dL Final    Urine Protein 08/14/2018 50 0* 2 0 - 17 5 mg/dL Final    Albumin ELP, Urine 08/14/2018 29 8  % Final    Alpha 1, Urine 08/14/2018 11 8  % Final    Alpha 2, Urine 08/14/2018 13 5  % Final    Beta, Urine 08/14/2018 16 1  % Final    Gamma Globulin, Urine 08/14/2018 28 8  % Final    M Peak ID 1, Ur 08/14/2018 18 4  % Final    M-PEAK 1 PC, URINE 08/14/2018 9 20  mg/dL Final    UPEP Interp 08/14/2018 The urine total protein is increased   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  Immunofixation to be performed  Reviewed by: Neva Villatoro MD ([id number])  **Electronic Signature**   Final    Ig [place] Free Light Chain 08/14/2018 307 9* 3 3 - 19 4 mg/L Final    Ig Lambda Free Light Chain 08/14/2018 21 0  5 7 - 26 3 mg/L Final    Kappa/Lambda FluidC Ratio 08/14/2018 14 66* 0 26 - 1 65 Final    Iron Saturation 08/14/2018 9  % Final    TIBC 08/14/2018 288  250 - 450 ug/dL Final    Iron 08/14/2018 27* 65 - 175 ug/dL Final      Patients treated with metal-binding drugs (ie  Deferoxamine) may have depressed iron values   Ferritin 08/14/2018 42  8 - 388 ng/mL Final    Immunofixation Interpretation 08/14/2018 Urine immunofixation shows a monoclonal gammopathy identified as free kappa light chains (9 10 mg/dL)  Reviewed by: Neva Villatoro MD ([id number])  ** Electronic Signature**   Final   Office Visit on 08/14/2018   Component Date Value Ref Range Status    LEUKOCYTE ESTERASE,UA 08/14/2018 trace   Final    NITRITE,UA 08/14/2018 negative   Final    SL AMB POCT UROBILINOGEN 08/14/2018 trace   Final    SL AMB POCT URINE PROTEIN 08/14/2018 100   Final     PH,UA 08/14/2018 5   Final     BLOOD,UA 08/14/2018 small   Final    SPECIFIC GRAVITY,UA 08/14/2018 1 020   Final    KETONES,UA 08/14/2018 neg   Final     BILIRUBIN,UA 08/14/2018 neg   Final    GLUCOSE, UA 08/14/2018 neg   Final     COLOR,UA 08/14/2018 yellow   Final     CLARITY,UA 08/14/2018 hazy   Final    Eosinophil, Urine 08/14/2018 0  % Final       Imaging:     Please note: This report has been generated by a voice recognition software system  Therefore there may be syntax, spelling, and/or grammatical errors  Please call if you have any questions

## 2018-09-07 DIAGNOSIS — D47.2 MONOCLONAL GAMMOPATHY: Primary | ICD-10-CM

## 2018-09-07 LAB — B2 MICROGLOB SERPL-MCNC: 4.2 MG/L (ref 0.6–2.4)

## 2018-09-13 ENCOUNTER — TELEPHONE (OUTPATIENT)
Dept: HEMATOLOGY ONCOLOGY | Facility: CLINIC | Age: 83
End: 2018-09-13

## 2018-09-13 NOTE — TELEPHONE ENCOUNTER
Ami Needs from IR at [facility] called  Pt to have BM BX 9/24/18 and will need [employer] form  Please send

## 2018-09-18 ENCOUNTER — TELEPHONE (OUTPATIENT)
Dept: RADIOLOGY | Facility: HOSPITAL | Age: 83
End: 2018-09-18

## 2018-09-18 NOTE — NURSING NOTE
Reviewed procedure instructions with patients nephew Thana Link  NPO Status reinforced and information reviewed  All questions answered and phone number for [facility] IR given if any questions arise

## 2018-09-24 ENCOUNTER — HOSPITAL ENCOUNTER (OUTPATIENT)
Dept: CT IMAGING | Facility: HOSPITAL | Age: 83
Discharge: HOME | End: 2018-09-24
Admitting: RADIOLOGY
Payer: COMMERCIAL

## 2018-09-24 VITALS
DIASTOLIC BLOOD PRESSURE: 58 MMHG | HEART RATE: 85 BPM | WEIGHT: 204 LBS | SYSTOLIC BLOOD PRESSURE: 131 MMHG | TEMPERATURE: 97.2 F | HEIGHT: 66 IN | BODY MASS INDEX: 32.78 KG/M2 | RESPIRATION RATE: 16 BRPM | OXYGEN SATURATION: 98 %

## 2018-09-24 DIAGNOSIS — D47.2 MONOCLONAL GAMMOPATHY: ICD-10-CM

## 2018-09-24 PROCEDURE — 99153 MOD SED SAME PHYS/QHP EA: CPT

## 2018-09-24 PROCEDURE — 88341 IMHCHEM/IMCYTCHM EA ADD ANTB: CPT | Performed by: PATHOLOGY

## 2018-09-24 PROCEDURE — 88333 PATH CONSLTJ SURG CYTO XM 1: CPT | Performed by: PATHOLOGY

## 2018-09-24 PROCEDURE — 88365 INSITU HYBRIDIZATION (FISH): CPT | Performed by: PATHOLOGY

## 2018-09-24 PROCEDURE — 88342 IMHCHEM/IMCYTCHM 1ST ANTB: CPT | Performed by: PATHOLOGY

## 2018-09-24 PROCEDURE — 88367 INSITU HYBRIDIZATION AUTO: CPT | Performed by: PHYSICIAN ASSISTANT

## 2018-09-24 PROCEDURE — 85097 BONE MARROW INTERPRETATION: CPT | Performed by: PATHOLOGY

## 2018-09-24 PROCEDURE — 38222 DX BONE MARROW BX & ASPIR: CPT

## 2018-09-24 PROCEDURE — 88311 DECALCIFY TISSUE: CPT | Performed by: PATHOLOGY

## 2018-09-24 PROCEDURE — 88189 FLOWCYTOMETRY/READ 16 & >: CPT | Performed by: PATHOLOGY

## 2018-09-24 PROCEDURE — 99152 MOD SED SAME PHYS/QHP 5/>YRS: CPT

## 2018-09-24 PROCEDURE — 88185 FLOWCYTOMETRY/TC ADD-ON: CPT

## 2018-09-24 PROCEDURE — 88360 TUMOR IMMUNOHISTOCHEM/MANUAL: CPT | Performed by: PATHOLOGY

## 2018-09-24 PROCEDURE — 88313 SPECIAL STAINS GROUP 2: CPT | Performed by: PATHOLOGY

## 2018-09-24 PROCEDURE — 88374 M/PHMTRC ALYS ISHQUANT/SEMIQ: CPT

## 2018-09-24 PROCEDURE — 77012 CT SCAN FOR NEEDLE BIOPSY: CPT

## 2018-09-24 PROCEDURE — 88305 TISSUE EXAM BY PATHOLOGIST: CPT | Performed by: PATHOLOGY

## 2018-09-24 PROCEDURE — 88184 FLOWCYTOMETRY/ TC 1 MARKER: CPT | Performed by: PHYSICIAN ASSISTANT

## 2018-09-24 PROCEDURE — 88373 M/PHMTRC ALYS ISHQUANT/SEMIQ: CPT

## 2018-09-24 RX ORDER — SODIUM CHLORIDE 9 MG/ML
75 INJECTION, SOLUTION INTRAVENOUS CONTINUOUS
Status: DISCONTINUED | OUTPATIENT
Start: 2018-09-24 | End: 2018-09-25 | Stop reason: HOSPADM

## 2018-09-24 RX ORDER — FENTANYL CITRATE 50 UG/ML
INJECTION, SOLUTION INTRAMUSCULAR; INTRAVENOUS CODE/TRAUMA/SEDATION MEDICATION
Status: COMPLETED | OUTPATIENT
Start: 2018-09-24 | End: 2018-09-24

## 2018-09-24 RX ORDER — MIDAZOLAM HYDROCHLORIDE 1 MG/ML
INJECTION INTRAMUSCULAR; INTRAVENOUS CODE/TRAUMA/SEDATION MEDICATION
Status: COMPLETED | OUTPATIENT
Start: 2018-09-24 | End: 2018-09-24

## 2018-09-24 RX ADMIN — MIDAZOLAM HYDROCHLORIDE 0.5 MG: 1 INJECTION, SOLUTION INTRAMUSCULAR; INTRAVENOUS at 10:01

## 2018-09-24 RX ADMIN — FENTANYL CITRATE 25 MCG: 50 INJECTION INTRAMUSCULAR; INTRAVENOUS at 10:01

## 2018-09-24 RX ADMIN — MIDAZOLAM HYDROCHLORIDE 0.5 MG: 1 INJECTION, SOLUTION INTRAMUSCULAR; INTRAVENOUS at 09:50

## 2018-09-24 RX ADMIN — SODIUM CHLORIDE 75 ML/HR: 0.9 INJECTION, SOLUTION INTRAVENOUS at 08:38

## 2018-09-24 RX ADMIN — FENTANYL CITRATE 25 MCG: 50 INJECTION INTRAMUSCULAR; INTRAVENOUS at 09:51

## 2018-09-24 NOTE — H&P (VIEW-ONLY)
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 1mg/dL  Blood work evaluation completed in August 2018 demonstrates the above immunofixation, hemoglobin of 9 7 grams/deciliter, free light chain ratio of 14 6, calcium = 8 9 mg per dL and serum creatinine of 2 22 mg/dL  It appears from the above values at the patient likely has a plasma cell disorder  We discussed additional workup which would include a bone marrow biopsy, skeletal survey and additional blood work including quantitative immunoglobulins, uric acid and tumor marker evaluation (beta 2 microglobulin and LDH )      I discussed with the patient and the family friend, the presentation multiple myeloma, the projected disease process, the workup, and potential treatments  They understand that at this point further diagnostics are necessary to confirm the diagnosis but understand that the above picture clinically is very suspicious for multiple myeloma  Patient will return after bone marrow biopsy results are available  At that time definitive treatment will be discussed  Patient does not require any adjustments secondary to hypercalcemia  Patient continues to follow with Nephrology regarding serum creatinine elevation  Hemoglobin appears stable  However, additional blood work has been ordered to further evaluate this  We discussed signs and symptoms of progressive anemia  Patient should call the office if he experiences these      Labs and imaging for follow up:  Orders Placed This Encounter   Procedures    XR bone survey complete >12 mos     Standing Status:   Future     Number of Occurrences:   1     Standing Expiration Date:   9/6/2022     Scheduling Instructions:      Bring along any outside films relating to this procedure  Order Specific Question:   Reason for Exam:     Answer:   multiple myeloma, r/o lytic lesion    LD,Blood     Standing Status:   Future     Number of Occurrences:   1     Standing Expiration Date:   9/6/2019    Uric acid     Standing Status:   Future     Number of Occurrences:   1     Standing Expiration Date:   9/6/2019    Beta 2 microglobulin, serum     Standing Status:   Future     Number of Occurrences:   1     Standing Expiration Date:   9/6/2019    IgG, IgA, IgM     Standing Status:   Future     Number of Occurrences:   1     Standing Expiration Date:   9/6/2019    CBC and differential     This is a patient instruction: This test is non-fasting  Please drink two glasses of water morning of bloodwork  Standing Status:   Future     Number of Occurrences:   1     Standing Expiration Date:   9/6/2019    Comprehensive metabolic panel     This is a patient instruction: Non-fasting     Standing Status:   Future     Number of Occurrences:   1     Standing Expiration Date:   9/6/2019       The patient is scheduled for follow-up in approximately 3 weeks  with Dr Corbin Gonzalez  Patient and his friend voiced agreement and understanding to the above  Patient knows to call the Hematology/Oncology office with any questions and concerns regarding the above  Carefully review your medication list in verify the list is accurate and up-to-date  Please call the hematologic/oncology office if there medications missing from the less, medications on the list that your not currently taking or if there is a dosage or instruction that is different from higher taking medication  I have spent 45 minutes with Patient and family today in which greater than 50% of this time was spent in counseling/coordination of care regarding Diagnostic results, Prognosis, Intructions for management, Patient and family education and Impressions  Barrier(s) to care: hard of hearing    The patient is able to self care     -------------------------------------------------------------------------------------------------------    Chief complaint:   Chief Complaint   Patient presents with    Consult     New patient ref by Dr Nuzhat Patel due abnornal kappa light chain  Records and labs in [facility]  Referring provider[facility]  [facility] [phone] [facility] [place] [address]    History of present illness: This is an 49-year-old male with past medical history of hypertension, type 2 diabetes, stage IV chronic kidney disorder, anemia of chronic kidney disease, BPH, CHF history of CVA who presents to the Hematology office for evaluation of abnormal kappa free light chain  Patient notes he followed up with his renal specialist who ordered testing  Testing came back with some abnormality  Patient understands that there was protein is involved but does not nor murmur or that  Patient denies recent fracture ink, bone pain, problems with calcium or issues with symptoms of anemia including shortness of breath on exertion, blood loss from the GI or  tract  Patient notes overall feeling well  He lives at home by himself  Patient notes that he used to work for [employer] and worked around [employer] but does not recall all of the agents  Patient denies problems with blood transfusions in his past     Patient lives close by to relatives  He has family that bring some dinner  Patient notes that he is able to do his laundry and some house chores on his own  Patient denies significant disability from CVA  Interval history:  Patient overall says that he feels okay  Patient states that it was not for the blood work abnormality that he would not of known that he had a kidney issue  Patient notes good appetite, performance status and activity level is the same as it has been  Patient denies weight loss, nausea, lower extremity swelling  Patient denies recent hospitalizations      Review of Systems Constitutional: Negative for activity change, appetite change, fatigue and fever  HENT: Negative for nosebleeds  Respiratory: Negative for cough, choking and shortness of breath  Cardiovascular: Negative for chest pain, palpitations and leg swelling  Gastrointestinal: Negative for abdominal distention, abdominal pain, anal bleeding, blood in stool, constipation, diarrhea, nausea and vomiting  Endocrine: Negative for cold intolerance  Genitourinary: Negative for hematuria  Musculoskeletal: Negative for myalgias  Skin: Negative for color change, pallor and rash  Allergic/Immunologic: Negative for immunocompromised state  Neurological: Negative for headaches  Hematological: Negative for adenopathy  Does not bruise/bleed easily  All other systems reviewed and are negative        Patient Active Problem List   Diagnosis    Essential hypertension    Type 2 diabetes mellitus without complication, without long-term current use of insulin [facility]    Mixed hyperlipidemia    Esophageal reflux    Cerebral infarction, watershed distribution, bilateral, acute ([facility] )    Stage 4 chronic kidney disease (HCC)    Benign prostatic hyperplasia    Chronic systolic congestive heart failure (HCC)    Ischemic cardiomyopathy    Leg pain, left    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)    Other proteinuria    Dizziness     Past Medical History:   Diagnosis Date    Angina pectoris (HCC)     Chronic kidney disease     Coronary artery disease     Diabetes mellitus ([facility]     Glaucoma     Hypertension     Occluded coronary artery stent     Left     Past Surgical History:   Procedure Laterality Date    CARDIAC CATHETERIZATION  04/05/2004    KNEE SURGERY      THROMBOENDARTERECTOMY Right     Cartoid     Family History   Problem Relation Age of Onset    Heart attack Father     Heart disease Mother     Diabetes Mother     Heart disease Sister     COPD Family      Social History     Social History    Marital status:      Spouse name: N/A    Number of children: N/A    Years of education: N/A     Social History Main Topics    Smoking status: Never Smoker    Smokeless tobacco: Never Used      Comment: former smoker    Alcohol use Yes      Comment: social     Drug use: No    Sexual activity: Not Asked     Other Topics Concern    None     Social History Narrative    Daily caffeine consumption             Current Outpatient Prescriptions:     acetaminophen (TYLENOL) 325 mg tablet, Take 1 - 2 tabs PO Q4 PRN pain, Disp: 30 tablet, Rfl: 0    aspirin 81 MG tablet, Take 81 mg by mouth daily  , Disp: , Rfl:     atorvastatin (LIPITOR) 40 mg tablet, Take 1 tablet (40 mg total) by mouth daily, Disp: 90 tablet, Rfl: 3    bimatoprost (LUMIGAN) 0 01 % ophthalmic drops, Apply to eye, Disp: , Rfl:     esomeprazole (NexIUM) 40 MG capsule, Take 40 mg by mouth every evening , Disp: , Rfl:     fluticasone (FLONASE) 50 mcg/act nasal spray, into each nostril, Disp: , Rfl:     furosemide (LASIX) 40 mg tablet, Take 0 5 tablets (20 mg total) by mouth daily, Disp: 90 tablet, Rfl: 0    glimepiride (AMARYL) 2 mg tablet, Take 2 mg by mouth every evening , Disp: , Rfl:     glucose blood (ACCU-CHEK CHANDRAKANT PLUS) test strip, by In Vitro route, Disp: , Rfl:     guaiFENesin (MUCINEX) 600 mg 12 hr tablet, Take 1 tablet (600 mg total) by mouth 2 (two) times a day, Disp: 60 tablet, Rfl: 0    isosorbide dinitrate (ISORDIL) 30 mg tablet, Take 30 mg by mouth daily  , Disp: , Rfl:     metoprolol succinate (TOPROL-XL) 25 mg 24 hr tablet, TAKE 1 TABLET DAILY, Disp: 90 tablet, Rfl: 3    nitroglycerin (NITROSTAT) 0 4 mg SL tablet, Place 1 tablet under the tongue every 5 (five) minutes as needed, Disp: , Rfl:     ONE TOUCH ULTRA TEST test strip, The patient test once daily  , Disp: 100 each, Rfl: 2    ONETOUCH DELICA LANCETS 36P MISC, by Does not apply route daily, Disp: 100 each, Rfl: 3    potassium chloride (KLOR-CON 10) 10 mEq tablet, Take 1 tablet (10 mEq total) by mouth daily, Disp: 90 tablet, Rfl: 3    timolol (TIMOPTIC) 0 5 % ophthalmic solution, , Disp: , Rfl:     Timolol Maleate 0 5 % (DAILY) SOLN, Apply to eye daily, Disp: , Rfl:     No Known Allergies    Objective:  /80 (BP Location: Left arm, Cuff Size: Large)   Pulse 99   Temp (!) 96 2 °F (35 7 °C) (Tympanic)   Resp 16   Ht 5' 6" (1 676 m)   Wt 92 9 kg (204 lb 12 8 oz)   SpO2 98%   BMI 33 06 kg/m²   Physical Exam   Constitutional: He is oriented to person, place, and time  He appears well-developed and well-nourished  No distress  HENT:   Head: Normocephalic and atraumatic  Mouth/Throat: No oropharyngeal exudate  Eyes: Conjunctivae and EOM are normal  Pupils are equal, round, and reactive to light  No scleral icterus  Neck: Normal range of motion  Cardiovascular: Normal rate and regular rhythm  No murmur heard  Pulmonary/Chest: Effort normal and breath sounds normal  No respiratory distress  Abdominal: Soft  Bowel sounds are normal  He exhibits no distension  There is no hepatosplenomegaly  There is no tenderness  Musculoskeletal: He exhibits no edema or tenderness  Lymphadenopathy:     He has no cervical adenopathy  Neurological: He is alert and oriented to person, place, and time  No cranial nerve deficit  Skin: No rash noted  No erythema  No pallor         Result Review  Labs:  Appointment on 08/28/2018   Component Date Value Ref Range Status    Clarity,  08/28/2018 Cloudy   Final    Color, UA 08/28/2018 Yellow   Final    Specific Gravity, UA 08/28/2018 1 020  1 003 - 1 030 Final    pH, UA 08/28/2018 5 0  4 5 - 8 0 Final    Glucose, UA 08/28/2018 100 (1/10%)* Negative mg/dl Final    Ketones, UA 08/28/2018 Negative  Negative mg/dl Final    Blood, UA 08/28/2018 Small* Negative Final    Protein, UA 08/28/2018 Trace* Negative mg/dl Final    Nitrite, UA 08/28/2018 Negative  Negative Final    Bilirubin, UA 08/28/2018 Negative Negative Final    Urobilinogen, UA 08/28/2018 0 2  0 2, 1 0 E U /dl E U /dl Final    Leukocytes, UA 08/28/2018 Large* Negative Final    WBC, UA 08/28/2018 Innumerable* None Seen, 0-5, 5-55, 5-65 /hpf Final    RBC, UA 08/28/2018 0-1* None Seen, 0-5 /hpf Final    Bacteria, UA 08/28/2018 Moderate* None Seen, Occasional /hpf Final    Epithelial Cells 08/28/2018 Occasional  None Seen, Occasional /hpf Final   Office Visit on 08/28/2018   Component Date Value Ref Range Status    INTERPRETATIONS 08/28/2018    Final    NSR  1st degree AVB  IRBBB  IVCD  Office Visit on 08/24/2018   Component Date Value Ref Range Status    LEUKOCYTE ESTERASE,UA 08/24/2018 ++   Final    NITRITE,UA 08/24/2018 -   Final    SL AMB POCT URINE PROTEIN 08/24/2018 -   Final     PH,UA 08/24/2018 5 0   Final     BLOOD,UA 08/24/2018 trace   Final    SPECIFIC GRAVITY,UA 08/24/2018 1 005   Final    KETONES,UA 08/24/2018 -   Final    GLUCOSE, UA 08/24/2018 -   Final     COLOR,UA 08/24/2018 yellow   Final     CLARITY,UA 08/24/2018 cloudy   Final   Appointment on 08/14/2018   Component Date Value Ref Range Status    Sodium 08/14/2018 136  136 - 145 mmol/L Final    Potassium 08/14/2018 3 8  3 5 - 5 3 mmol/L Final    Chloride 08/14/2018 102  100 - 108 mmol/L Final    CO2 08/14/2018 26  21 - 32 mmol/L Final    ANION GAP 08/14/2018 8  4 - 13 mmol/L Final    BUN 08/14/2018 21  5 - 25 mg/dL Final    Creatinine 08/14/2018 2 22* 0 60 - 1 30 mg/dL Final    Standardized to IDMS reference method    Glucose 08/14/2018 184* 65 - 140 mg/dL Final      If the patient is fasting, the ADA then defines impaired fasting glucose as > 100 mg/dL and diabetes as > or equal to 123 mg/dL  Specimen collection should occur prior to Sulfasalazine administration due to the potential for falsely depressed results  Specimen collection should occur prior to Sulfapyridine administration due to the potential for falsely elevated results      Calcium 08/14/2018 8 9 8 3 - 10 1 mg/dL Final    eGFR 08/14/2018 26  ml/min/1 73sq m Final    Clarity, UA 08/14/2018 Cloudy   Final    Color, UA 08/14/2018 Yellow   Final    Specific Gravity, UA 08/14/2018 1 015  1 003 - 1 030 Final    pH, UA 08/14/2018 5 5  4 5 - 8 0 Final    Glucose, UA 08/14/2018 100 (1/10%)* Negative mg/dl Final    Ketones, UA 08/14/2018 Negative  Negative mg/dl Final    Blood, UA 08/14/2018 Small* Negative Final    Protein, UA 08/14/2018 Trace* Negative mg/dl Final    Nitrite, UA 08/14/2018 Negative  Negative Final    Bilirubin, UA 08/14/2018 Negative  Negative Final    Urobilinogen, UA 08/14/2018 0 2  0 2, 1 0 E U /dl E U /dl Final    Leukocytes, UA 08/14/2018 Moderate* Negative Final    WBC, UA 08/14/2018 Innumerable* None Seen, 0-5, 5-55, 5-65 /hpf Final    RBC, UA 08/14/2018 0-1* None Seen, 0-5 /hpf Final    Bacteria, UA 08/14/2018 Moderate* None Seen, Occasional /hpf Final    Epithelial Cells 08/14/2018 None Seen  None Seen, Occasional /hpf Final    Creatinine, Ur 08/14/2018 66 8  mg/dL Final    Protein Urine Random 08/14/2018 49  mg/dL Final    Prot/Creat Ratio, Ur 08/14/2018 0 73* 0 00 - 0 10 Final    WBC 08/14/2018 7 24  4 31 - 10 16 Thousand/uL Final    RBC 08/14/2018 3 03* 3 88 - 5 62 Million/uL Final    Hemoglobin 08/14/2018 9 7* 12 0 - 17 0 g/dL Final    Hematocrit 08/14/2018 29 3* 36 5 - 49 3 % Final    MCV 08/14/2018 97  82 - 98 fL Final    MCH 08/14/2018 32 0  26 8 - 34 3 pg Final    MCHC 08/14/2018 33 1  31 4 - 37 4 g/dL Final    RDW 08/14/2018 14 0  11 6 - 15 1 % Final    MPV 08/14/2018 10 0  8 9 - 12 7 fL Final    Platelets 87/23/1512 239  149 - 390 Thousands/uL Final    nRBC 08/14/2018 0  /100 WBCs Final    Neutrophils Relative 08/14/2018 71  43 - 75 % Final    Immat GRANS % 08/14/2018 1  0 - 2 % Final    Lymphocytes Relative 08/14/2018 19  14 - 44 % Final    Monocytes Relative 08/14/2018 7  4 - 12 % Final    Eosinophils Relative 08/14/2018 2  0 - 6 % Final    Basophils Relative 08/14/2018 0  0 - 1 % Final    Neutrophils Absolute 08/14/2018 5 15  1 85 - 7 62 Thousands/µL Final    Immature Grans Absolute 08/14/2018 0 04  0 00 - 0 20 Thousand/uL Final    Lymphocytes Absolute 08/14/2018 1 38  0 60 - 4 47 Thousands/µL Final    Monocytes Absolute 08/14/2018 0 51  0 17 - 1 22 Thousand/µL Final    Eosinophils Absolute 08/14/2018 0 13  0 00 - 0 61 Thousand/µL Final    Basophils Absolute 08/14/2018 0 03  0 00 - 0 10 Thousands/µL Final    A/G Ratio 08/14/2018 1 20  1 10 - 1 80 Final    Albumin Electrophoresis 08/14/2018 54 5  52 0 - 65 0 % Final    Albumin CONC 08/14/2018 3 54  3 50 - 5 00 g/dl Final    Alpha 1 08/14/2018 6 4* 2 5 - 5 0 % Final    ALPHA 1 CONC 08/14/2018 0 42* 0 10 - 0 40 g/dL Final    Alpha 2 08/14/2018 16 4* 7 0 - 13 0 % Final    ALPHA 2 CONC 08/14/2018 1 07  0 40 - 1 20 g/dL Final    Beta-1 08/14/2018 8 5  5 0 - 13 0 % Final    BETA 1 CONC 08/14/2018 0 55  0 40 - 0 80 g/dL Final    Beta-2 08/14/2018 5 5  2 0 - 8 0 % Final    BETA 2 CONC 08/14/2018 0 36  0 20 - 0 50 g/dL Final    Gamma Globulin 08/14/2018 8 7* 12 0 - 22 0 % Final    GAMMA CONC 08/14/2018 0 57  0 50 - 1 60 g/dL Final    SPEP Interpretation 08/14/2018 The serum total protein and albumin are within normal limits  The serum protein electrophoresis shows an increased alpha-1 region  No monoclonal bands noted  Reviewed by: Devyn Christian MD  ([id number])  **Electronic Signature**   Final    Total Protein 08/14/2018 6 5  6 4 - 8 2 g/dL Final    Urine Protein 08/14/2018 50 0* 2 0 - 17 5 mg/dL Final    Albumin ELP, Urine 08/14/2018 29 8  % Final    Alpha 1, Urine 08/14/2018 11 8  % Final    Alpha 2, Urine 08/14/2018 13 5  % Final    Beta, Urine 08/14/2018 16 1  % Final    Gamma Globulin, Urine 08/14/2018 28 8  % Final    M Peak ID 1, Ur 08/14/2018 18 4  % Final    M-PEAK 1 PC, URINE 08/14/2018 9 20  mg/dL Final    UPEP Interp 08/14/2018 The urine total protein is increased   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  Immunofixation to be performed  Reviewed by: Eros Box MD ([id number])  **Electronic Signature**   Final    Ig Sylvia Free Light Chain 08/14/2018 307 9* 3 3 - 19 4 mg/L Final    Ig Lambda Free Light Chain 08/14/2018 21 0  5 7 - 26 3 mg/L Final    Kappa/Lambda FluidC Ratio 08/14/2018 14 66* 0 26 - 1 65 Final    Iron Saturation 08/14/2018 9  % Final    TIBC 08/14/2018 288  250 - 450 ug/dL Final    Iron 08/14/2018 27* 65 - 175 ug/dL Final      Patients treated with metal-binding drugs (ie  Deferoxamine) may have depressed iron values   Ferritin 08/14/2018 42  8 - 388 ng/mL Final    Immunofixation Interpretation 08/14/2018 Urine immunofixation shows a monoclonal gammopathy identified as free kappa light chains (9 10 mg/dL)  Reviewed by: Eros Box MD ([id number])  ** Electronic Signature**   Final   Office Visit on 08/14/2018   Component Date Value Ref Range Status    LEUKOCYTE ESTERASE,UA 08/14/2018 trace   Final    NITRITE,UA 08/14/2018 negative   Final    SL AMB POCT UROBILINOGEN 08/14/2018 trace   Final    SL AMB POCT URINE PROTEIN 08/14/2018 100   Final     PH,UA 08/14/2018 5   Final     BLOOD,UA 08/14/2018 small   Final    SPECIFIC GRAVITY,UA 08/14/2018 1 020   Final    KETONES,UA 08/14/2018 neg   Final     BILIRUBIN,UA 08/14/2018 neg   Final    GLUCOSE, UA 08/14/2018 neg   Final     COLOR,UA 08/14/2018 yellow   Final     CLARITY,UA 08/14/2018 hazy   Final    Eosinophil, Urine 08/14/2018 0  % Final       Imaging:     Please note: This report has been generated by a voice recognition software system  Therefore there may be syntax, spelling, and/or grammatical errors  Please call if you have any questions

## 2018-09-24 NOTE — BRIEF OP NOTE (RAD/CATH)
CT BONE MARROW BIOPSY AND ASPIRATION  Procedure Note    PATIENT NAME: Angeline Son  : 1930  MRN: [id number]     Pre-op Diagnosis:   1  Monoclonal gammopathy      Post-op Diagnosis:   1   Monoclonal gammopathy        Surgeon:   Fern Ortega MD  Assistants:     No qualified resident was available, Resident is only observing    Estimated Blood Loss: minimal  Findings:  Needle in left iliac bone    Specimens: bone marrow aspirate and core    Complications:  None immediate    Anesthesia: Conscious sedation    Fern Ortega MD     Date: 2018  Time: 10:34 AM

## 2018-09-24 NOTE — INTERVAL H&P NOTE
Update:     Patient presents for bone marrow biopsy to evaluate for possible myeloma  Discussed risks of bleeding, bone damage, and benefit of guiding therapy  He wishes to proceed  He is NPO, MP2, ASA 3, NKDA  Patient re-evaluated   Accept as history and physical     Mace MD Adryan/September 24, 2018/9:47 AM

## 2018-09-24 NOTE — DISCHARGE INSTRUCTIONS
Bone Marrow Biopsy     WHAT YOU NEED TO KNOW:   A bone marrow biopsy is a procedure to remove a small amount of bone marrow from your bone  Bone marrow is the soft tissue inside your bone that helps to make blood cells  The sample is tested for disease or infection  DISCHARGE INSTRUCTIONS:     1  Limit your activities day of biopsy as directed by your doctor  2  Use medication as ordered  3  Return to your normal diet  Small sips of flat soda will help with nausea  4  Remove band-aid or dressing 24 hours after procedure  Contact Interventional Radiology at [phone] Markus PATIENTS: Contact Interventional Radiology at [phone]) Lluvia Parker PATIENTS: Contact Interventional Radiology at [phone]) if:    1  Difficulty breathing, nausea or vomiting  2  Chills or fever above 101 F     3  Pain at biopsy site not relieved by medication  4  Develop any redness, swelling, heat, unusual drainage, heavy bruising or bleeding from biopsy site

## 2018-09-26 LAB — SCAN RESULT: NORMAL

## 2018-09-28 LAB — MISCELLANEOUS LAB TEST RESULT: NORMAL

## 2018-10-08 ENCOUNTER — TELEPHONE (OUTPATIENT)
Dept: HEMATOLOGY ONCOLOGY | Facility: CLINIC | Age: 83
End: 2018-10-08

## 2018-10-08 ENCOUNTER — OFFICE VISIT (OUTPATIENT)
Dept: HEMATOLOGY ONCOLOGY | Facility: CLINIC | Age: 83
End: 2018-10-08
Payer: COMMERCIAL

## 2018-10-08 VITALS
HEART RATE: 95 BPM | TEMPERATURE: 95.8 F | DIASTOLIC BLOOD PRESSURE: 90 MMHG | SYSTOLIC BLOOD PRESSURE: 130 MMHG | OXYGEN SATURATION: 97 % | RESPIRATION RATE: 16 BRPM

## 2018-10-08 DIAGNOSIS — C90.00 KAPPA LIGHT CHAIN MYELOMA (HCC): Primary | ICD-10-CM

## 2018-10-08 PROCEDURE — 99215 OFFICE O/P EST HI 40 MIN: CPT | Performed by: PHYSICIAN ASSISTANT

## 2018-10-08 NOTE — PATIENT INSTRUCTIONS
Multiple Myeloma   WHAT YOU NEED TO KNOW:   What is multiple myeloma? Multiple myeloma is a cancer of plasma cells  Plasma cells are a type of white blood cell  Plasma cells make antibodies to help your body fight infection  You may have high amounts of plasma cells that do not work correctly  Your body may make so many plasma cells or antibodies that they damage your bones and other healthy tissue  What are the signs and symptoms of multiple myeloma? · Bone pain, most commonly in the lower back, pelvis, or ribs    · Frequent infections    · Nausea or vomiting     · Fatigue, weakness, or confusion    · Constipation or difficulty urinating    · Loss of appetite or weight loss    · Shortness of breath  How is multiple myeloma diagnosed? · Blood and urine tests  are used to find or measure antibodies, called M proteins  The tests may also be used to monitor your calcium levels and kidney function  · A bone marrow biopsy  is a sample from your bone to check for myeloma cells  · An x-ray, CT, PET, or MRI  may be done to find cancer in your body  You may be given contrast liquid to help the cancer show up better  Tell the healthcare provider if you have ever had an allergic reaction to contrast liquid  Do not enter the MRI room with anything metal  Metal can cause serious injury  Tell the healthcare provider if you have any metal in or on your body  How is multiple myeloma treated? Treatment will depend on your symptoms and the stage of cancer  The stage will depend on how much your bone and kidneys are involved, and the level of calcium and proteins in your blood  Your healthcare provider may recommend that you have frequent tests and regular follow-up visits to watch for changes  You may also need one or more of the following:  · Medicines  are given to stop myeloid cells from growing and to kill new cancer cells  Medicines may also help strengthen your immune system      · Radiation therapy  uses x-rays or gamma rays to treat cancer  Radiation kills cancer cells and may stop the cancer from spreading  It may be given alone or with chemotherapy  · A transplant  is a procedure to give bone marrow or stem cells through an IV  The stem cells go to your bone marrow and begin to make new, healthy blood cells  What can I do to manage my multiple myeloma? · Prevent infection  Wash your hands often, avoid people who are sick, and clean humidifiers daily  Ask your healthcare provider for more information on preventing infection  · Prevent bleeding and bruising  Be careful with sharp or pointed objects, such as knives and toothpicks  Do not play contact sports, such as football  Use a soft toothbrush  Do not floss your teeth while your platelet count is low  Thu Zavala your nose gently  Your nose may bleed if you pick it  Do not take NSAIDs or aspirin  NSAIDs and aspirin thin your blood and increase your risk for bleeding  · Do not smoke cigarettes or drink alcohol  Alcohol can thin your blood and make it easier to bleed  Nicotine can damage blood vessels and make it more difficult to manage your multiple myeloma  Smoking also increases your risk for new or returning cancer and delays healing after treatment  Do not use e-cigarettes or smokeless tobacco in place of cigarettes or to help you quit  They still contain nicotine  Ask your healthcare provider for information if you currently smoke or drink and need help quitting  · Drink liquids as directed  You may need to drink extra liquids to prevent dehydration, especially if you are vomiting or have diarrhea from cancer treatments  Ask how much liquid you need each day and which liquids are best for you  · Exercise as directed  Multiple myeloma or its treatment may make you feel tired  Exercise can help you have more energy  · Eat healthy foods  Healthy foods may help you feel better and have more energy   If you have trouble swallowing, you may need to have foods that are soft or in liquid form  Ask about any extra nutrition you may need, such as nutrition shakes or vitamins  Tell your healthcare provider if you have problems eating, or if you are nauseated  Call 911 for any of the following:   · You have sudden chest pain, pounding or racing heartbeat, or shortness of breath  When should I seek immediate care? · You had a bad fall and you may have broken a bone  · You feel dizzy or faint  · You cannot think clearly  · You feel weak or numb on one side of your body  When should I contact my healthcare provider? · You are vomiting repeatedly and cannot keep food down  · You have a fever  · You have chills, cough, or feel weak and achy  · Your pain is worse or does not go away after you take pain medicine  · You cannot control your urine or bowel movements  · You have questions or concerns about your condition or care  CARE AGREEMENT:   You have the right to help plan your care  Learn about your health condition and how it may be treated  Discuss treatment options with your caregivers to decide what care you want to receive  You always have the right to refuse treatment  The above information is an  only  It is not intended as medical advice for individual conditions or treatments  Talk to your doctor, nurse or pharmacist before following any medical regimen to see if it is safe and effective for you  © 2017 [employer] Information is for End User's use only and may not be sold, redistributed or otherwise used for commercial purposes  All illustrations and images included in CareNotes® are the copyrighted property of [employer]  or [employer]

## 2018-10-08 NOTE — PROGRESS NOTES
[address] [facility]  [address] [place] [id number] [phone]  Progress Note  Ralph Tang, 9/22/1930, [id number]  10/8/2018    Assessment/Plan:  1  Kappa light chain myeloma (HCC)  RISS: Stage II, M=9 1 mg/dL   Patient presents today to the office with his two sons to review bone marrow biopsy that was consistent with kappa light chain myeloma, flow cytometry demonstrates IgA restricted kappa light chain myeloma  I provided patient and his family with the staging above  Patient understands that multiple myeloma is a malignant condition of the bone marrow that is incurable  We discussed the common sequela of the disease include bone changes, anemia and reduce kidney function  Treatment of this condition would prevent the above sequela and would give the patient a better quality of life  The recommendation after discussing the patient's case with Dr Melinda Wharton was to proceed with doublet therapy  Dose reduced Revlimid and dexamethasone was discussed  Medication would be increased as the patient tolerates and disease process necessitates  Proposed regimen:  Revlimid 5 mg po daily days 1-14  Decadron 20 mg po daily days 1,8,15  Cycle length = 21 days     CBC and CMP will be completed weekly, with plans to check CBC once a cycle after tolerance is assessed and achieved  Uric acid is low, dose of revlimid is reduced therefore there is no need for tumor lysis syndrome prophylaxis  We will recheck myeloma parameters every 3 cycles/2 months:   UPEP, free light chain ratio, beta 2 microglobulin, CBC, CMP, quantitative immunoglobulins  At this time, the patient and his family would like to discuss his condition and potential treatment with the extended family  The patient will call our office in a few days to give us his review results    In the meantime, nursing staff spent time reviewing the above medications and potential side effects, which include but are not limited to cytopenias, fatigue, diarrhea, rash, itching, increased appetite, fluid retention, insomnia muscle weakness  Patient and family also met with Dr Tammie Faith to review the above plan  The patient is scheduled for follow-up in approximately 4 weeks  Patient voiced agreement and understanding to the above  Patient knows to call the Hematology/Oncology office with any questions and concerns regarding the above  Carefully review your medication list in verify the list is accurate and up-to-date  Please call the hematologic/Oncology office if there medications missing from the less, medications on the list that your not currently taking or if there is a dosage or instruction that is different from higher taking medication  I have spent 60 minutes with Patient and family today in which greater than 50% of this time was spent in counseling/coordination of care regarding Diagnostic results, Prognosis, Risks and benefits of tx options, Intructions for management, Patient and family education, Importance of tx compliance, Risk factor reductions and Impressions  The patient's current treatment goals are prolongation of life  Barrier(s) to care identified  None  The patient is able to self care     -------------------------------------------------------------------------------------------------------    Chief complaint:   Chief Complaint   Patient presents with    Follow-up     5-6 weeks follow up  Bmbx results  History of present illness/Cancer History: This is an 80-year-old male with past medical history of hypertension, type 2 diabetes, stage IV chronic kidney disorder, anemia of chronic kidney disease, BPH, CHF history of CVA who presents to the Hematology office for evaluation of abnormal kappa free light chain      Patient notes he followed up with his renal specialist who ordered testing  Testing came back with some abnormality    Patient understands that there was protein is involved but does not nor murmur or that  Patient denies recent fracture ink, bone pain, problems with calcium or issues with symptoms of anemia including shortness of breath on exertion, blood loss from the GI or  tract      Patient notes overall feeling well  He lives at home by himself  Patient notes that he used to work for [employer] and worked around [employer] but does not recall all of the agents  Patient denies problems with blood transfusions in his past      Patient lives close by to relatives  He has family that bring some dinner  Patient notes that he is able to do his laundry and some house chores on his own  Patient denies significant disability from CVA  At the conclusion of consultation, patient was advised to undergo additional workup with skeletal survey and bone marrow biopsy, along with additional blood work  Patient returns for the results  ECO - Symptomatic but completely ambulatory    Interval history:       Patient denies interval changes  No problems with bone marrow biopsy  Patient comments that his physician was very nice  Review of Systems   Constitutional: Negative for activity change, appetite change, fatigue, fever and unexpected weight change  HENT: Negative for nosebleeds  Respiratory: Negative for cough, choking and shortness of breath  Cardiovascular: Negative for chest pain, palpitations and leg swelling  Gastrointestinal: Negative for abdominal distention, abdominal pain, anal bleeding, blood in stool, constipation, diarrhea, nausea and vomiting  Endocrine: Negative for cold intolerance  Genitourinary: Negative for hematuria  Musculoskeletal: Negative for myalgias  Skin: Negative for color change, pallor and rash  Allergic/Immunologic: Negative for immunocompromised state  Neurological: Negative for headaches  Hematological: Negative for adenopathy  Does not bruise/bleed easily  All other systems reviewed and are negative  Current Outpatient Prescriptions:     acetaminophen (TYLENOL) 325 mg tablet, Take 1 - 2 tabs PO Q4 PRN pain, Disp: 30 tablet, Rfl: 0    aspirin 81 MG tablet, Take 81 mg by mouth daily  , Disp: , Rfl:     atorvastatin (LIPITOR) 40 mg tablet, Take 1 tablet (40 mg total) by mouth daily, Disp: 90 tablet, Rfl: 3    bimatoprost (LUMIGAN) 0 01 % ophthalmic drops, Apply to eye, Disp: , Rfl:     esomeprazole (NexIUM) 40 MG capsule, Take 40 mg by mouth every evening , Disp: , Rfl:     fluticasone (FLONASE) 50 mcg/act nasal spray, into each nostril, Disp: , Rfl:     furosemide (LASIX) 40 mg tablet, Take 0 5 tablets (20 mg total) by mouth daily, Disp: 90 tablet, Rfl: 0    glimepiride (AMARYL) 2 mg tablet, Take 2 mg by mouth every evening , Disp: , Rfl:     glucose blood (ACCU-CHEK CHANDRAKANT PLUS) test strip, by In Vitro route, Disp: , Rfl:     guaiFENesin (MUCINEX) 600 mg 12 hr tablet, Take 1 tablet (600 mg total) by mouth 2 (two) times a day, Disp: 60 tablet, Rfl: 0    isosorbide dinitrate (ISORDIL) 30 mg tablet, Take 30 mg by mouth daily  , Disp: , Rfl:     metoprolol succinate (TOPROL-XL) 25 mg 24 hr tablet, TAKE 1 TABLET DAILY, Disp: 90 tablet, Rfl: 3    nitroglycerin (NITROSTAT) 0 4 mg SL tablet, Place 1 tablet under the tongue every 5 (five) minutes as needed, Disp: , Rfl:     ONE TOUCH ULTRA TEST test strip, The patient test once daily  , Disp: 100 each, Rfl: 2    ONETOUCH DELICA LANCETS 23O MISC, by Does not apply route daily, Disp: 100 each, Rfl: 3    potassium chloride (KLOR-CON 10) 10 mEq tablet, Take 1 tablet (10 mEq total) by mouth daily, Disp: 90 tablet, Rfl: 3    timolol (TIMOPTIC) 0 5 % ophthalmic solution, , Disp: , Rfl:     Timolol Maleate 0 5 % (DAILY) SOLN, Apply to eye daily, Disp: , Rfl:       No current facility-administered medications for this visit        No Known Allergies    Objective:   /90 (BP Location: Left arm, Cuff Size: Standard)   Pulse 95   Temp (!) 95 8 °F (35 4 °C) (Tympanic)   Resp 16   SpO2 97%   Wt Readings from Last 3 Encounters:   09/24/18 92 5 kg (204 lb)   09/06/18 92 9 kg (204 lb 12 8 oz)   08/28/18 93 4 kg (205 lb 14 4 oz)     Physical Exam   Constitutional: He is oriented to person, place, and time  He appears well-developed and well-nourished  No distress  HENT:   Head: Normocephalic and atraumatic  Right Ear: External ear normal    Left Ear: External ear normal    Nose: Nose normal    Eyes: Conjunctivae are normal  No scleral icterus  Cardiovascular: Normal rate  Pulmonary/Chest: No respiratory distress  Abdominal: Soft  He exhibits no distension  Musculoskeletal: He exhibits no edema  Neurological: He is alert and oriented to person, place, and time  Skin: No rash (on exposed skin ) noted  Psychiatric: Thought content normal        Pertinent Laboratory Results and Imaging Review:  Hospital Outpatient Visit on 09/24/2018   Component Date Value Ref Range Status    Case Report 09/24/2018    Final                    Value:Surgical Pathology Report                         Case: [id number]                                   Authorizing Provider:  Antonia Valdez PA-C     Collected:           09/24/2018 1001              Ordering Location:     [facility]        Received:            09/24/2018 [facility]                                                            Pathologist:           Mamta Epps MD                                                        Specimens:   A) - Iliac Crest, Left, core                                                                        B) - Iliac Crest, Left, clot                                                                        C) - Iliac Crest, Left, slide x13                                                          Addendum 09/24/2018    Final                    Value: This result contains rich text formatting which cannot be displayed here   Final Diagnosis 09/24/2018    Final                    Value: This result contains rich text formatting which cannot be displayed here   Microscopic Description 09/24/2018    Final                    Value: This result contains rich text formatting which cannot be displayed here   Note 09/24/2018    Final                    Value: This result contains rich text formatting which cannot be displayed here   Additional Information 09/24/2018    Final                    Value: This result contains rich text formatting which cannot be displayed here   Intraoperative Consultation 09/24/2018    Final                    Value: This result contains rich text formatting which cannot be displayed here  Ozhra Oris Description 09/24/2018    Final                    Value: This result contains rich text formatting which cannot be displayed here   Scan Result 09/24/2018 SEE WRITTEN REPORT   Final    Miscellaneous Lab Test Result 09/24/2018 SEE WRITTEN REPORT   Final   Appointment on 09/06/2018   Component Date Value Ref Range Status    LD 09/06/2018 190  81 - 234 U/L Final    Uric Acid 09/06/2018 4 0* 4 2 - 8 0 mg/dL Final    Specimen collection should occur prior to Metamizole administration due to the potential for falsely depressed results      Beta-2 Microglobulin 09/06/2018 4 2* 0 6 - 2 4 mg/L Final    Siemens Immulite 2000 Immunochemiluminometric assay (ICMA)    IGA 09/06/2018 266 0  70 0 - 400 0 mg/dL Final    IGG 09/06/2018 755 0  700 0-1,600 0 mg/dL Final    IGM 09/06/2018 27 0* 40 0 - 230 0 mg/dL Final    WBC 09/06/2018 6 96  4 31 - 10 16 Thousand/uL Final    RBC 09/06/2018 3 46* 3 88 - 5 62 Million/uL Final    Hemoglobin 09/06/2018 10 8* 12 0 - 17 0 g/dL Final    Hematocrit 09/06/2018 33 7* 36 5 - 49 3 % Final    MCV 09/06/2018 97  82 - 98 fL Final    MCH 09/06/2018 31 2  26 8 - 34 3 pg Final    MCHC 09/06/2018 32 0  31 4 - 37 4 g/dL Final    RDW 09/06/2018 13 6  11 6 - 15 1 % Final    MPV 09/06/2018 10 5  8 9 - 12 7 fL Final    Platelets 76/85/6672 231  149 - 390 Thousands/uL Final    nRBC 09/06/2018 0  /100 WBCs Final    Neutrophils Relative 09/06/2018 63  43 - 75 % Final    Immat GRANS % 09/06/2018 0  0 - 2 % Final    Lymphocytes Relative 09/06/2018 26  14 - 44 % Final    Monocytes Relative 09/06/2018 6  4 - 12 % Final    Eosinophils Relative 09/06/2018 4  0 - 6 % Final    Basophils Relative 09/06/2018 1  0 - 1 % Final    Neutrophils Absolute 09/06/2018 4 39  1 85 - 7 62 Thousands/µL Final    Immature Grans Absolute 09/06/2018 0 02  0 00 - 0 20 Thousand/uL Final    Lymphocytes Absolute 09/06/2018 1 84  0 60 - 4 47 Thousands/µL Final    Monocytes Absolute 09/06/2018 0 38  0 17 - 1 22 Thousand/µL Final    Eosinophils Absolute 09/06/2018 0 27  0 00 - 0 61 Thousand/µL Final    Basophils Absolute 09/06/2018 0 06  0 00 - 0 10 Thousands/µL Final    Sodium 09/06/2018 137  136 - 145 mmol/L Final    Potassium 09/06/2018 3 9  3 5 - 5 3 mmol/L Final    Chloride 09/06/2018 104  100 - 108 mmol/L Final    CO2 09/06/2018 26  21 - 32 mmol/L Final    ANION GAP 09/06/2018 7  4 - 13 mmol/L Final    BUN 09/06/2018 22  5 - 25 mg/dL Final    Creatinine 09/06/2018 1 91* 0 60 - 1 30 mg/dL Final    Standardized to IDMS reference method    Glucose 09/06/2018 146* 65 - 140 mg/dL Final      If the patient is fasting, the ADA then defines impaired fasting glucose as > 100 mg/dL and diabetes as > or equal to 123 mg/dL  Specimen collection should occur prior to Sulfasalazine administration due to the potential for falsely depressed results  Specimen collection should occur prior to Sulfapyridine administration due to the potential for falsely elevated results      Calcium 09/06/2018 9 5  8 3 - 10 1 mg/dL Final    AST 09/06/2018 17  5 - 45 U/L Final      Specimen collection should occur prior to Sulfasalazine administration due to the potential for falsely depressed results   ALT 09/06/2018 21  12 - 78 U/L Final      Specimen collection should occur prior to Sulfasalazine and/or Sulfapyridine administration due to the potential for falsely depressed results   Alkaline Phosphatase 09/06/2018 118* 46 - 116 U/L Final    Total Protein 09/06/2018 7 3  6 4 - 8 2 g/dL Final    Albumin 09/06/2018 3 7  3 5 - 5 0 g/dL Final    Total Bilirubin 09/06/2018 0 93  0 20 - 1 00 mg/dL Final    eGFR 09/06/2018 31  ml/min/1 73sq m Final   Appointment on 08/28/2018   Component Date Value Ref Range Status    Clarity, UA 08/28/2018 Cloudy   Final    Color, UA 08/28/2018 Yellow   Final    Specific Gravity, UA 08/28/2018 1 020  1 003 - 1 030 Final    pH, UA 08/28/2018 5 0  4 5 - 8 0 Final    Glucose, UA 08/28/2018 100 (1/10%)* Negative mg/dl Final    Ketones, UA 08/28/2018 Negative  Negative mg/dl Final    Blood, UA 08/28/2018 Small* Negative Final    Protein, UA 08/28/2018 Trace* Negative mg/dl Final    Nitrite, UA 08/28/2018 Negative  Negative Final    Bilirubin, UA 08/28/2018 Negative  Negative Final    Urobilinogen, UA 08/28/2018 0 2  0 2, 1 0 E U /dl E U /dl Final    Leukocytes, UA 08/28/2018 Large* Negative Final    WBC, UA 08/28/2018 Innumerable* None Seen, 0-5, 5-55, 5-65 /hpf Final    RBC, UA 08/28/2018 0-1* None Seen, 0-5 /hpf Final    Bacteria, UA 08/28/2018 Moderate* None Seen, Occasional /hpf Final    Epithelial Cells 08/28/2018 Occasional  None Seen, Occasional /hpf Final   Office Visit on 08/28/2018   Component Date Value Ref Range Status    INTERPRETATIONS 08/28/2018    Final    NSR  1st degree AVB  IRBBB  IVCD     Office Visit on 08/24/2018   Component Date Value Ref Range Status    LEUKOCYTE ESTERASE,UA 08/24/2018 ++   Final    NITRITE,UA 08/24/2018 -   Final    SL AMB POCT URINE PROTEIN 08/24/2018 -   Final     PH,UA 08/24/2018 5 0   Final     BLOOD,UA 08/24/2018 trace   Final    SPECIFIC GRAVITY,UA 08/24/2018 1 005   [facility] Karen Thorpe 08/24/2018 -   Final    GLUCOSE, UA 08/24/2018 -   Final     COLOR,UA 08/24/2018 yellow   Final     CLARITY,UA 08/24/2018 cloudy   Final   Appointment on 08/14/2018   Component Date Value Ref Range Status    Sodium 08/14/2018 136  136 - 145 mmol/L Final    Potassium 08/14/2018 3 8  3 5 - 5 3 mmol/L Final    Chloride 08/14/2018 102  100 - 108 mmol/L Final    CO2 08/14/2018 26  21 - 32 mmol/L Final    ANION GAP 08/14/2018 8  4 - 13 mmol/L Final    BUN 08/14/2018 21  5 - 25 mg/dL Final    Creatinine 08/14/2018 2 22* 0 60 - 1 30 mg/dL Final    Standardized to IDMS reference method    Glucose 08/14/2018 184* 65 - 140 mg/dL Final      If the patient is fasting, the ADA then defines impaired fasting glucose as > 100 mg/dL and diabetes as > or equal to 123 mg/dL  Specimen collection should occur prior to Sulfasalazine administration due to the potential for falsely depressed results  Specimen collection should occur prior to Sulfapyridine administration due to the potential for falsely elevated results      Calcium 08/14/2018 8 9  8 3 - 10 1 mg/dL Final    eGFR 08/14/2018 26  ml/min/1 73sq m Final    Clarity, UA 08/14/2018 Cloudy   Final    Color, UA 08/14/2018 Yellow   Final    Specific Gravity, UA 08/14/2018 1 015  1 003 - 1 030 Final    pH, UA 08/14/2018 5 5  4 5 - 8 0 Final    Glucose, UA 08/14/2018 100 (1/10%)* Negative mg/dl Final    Ketones, UA 08/14/2018 Negative  Negative mg/dl Final    Blood, UA 08/14/2018 Small* Negative Final    Protein, UA 08/14/2018 Trace* Negative mg/dl Final    Nitrite, UA 08/14/2018 Negative  Negative Final    Bilirubin, UA 08/14/2018 Negative  Negative Final    Urobilinogen, UA 08/14/2018 0 2  0 2, 1 0 E U /dl E U /dl Final    Leukocytes, UA 08/14/2018 Moderate* Negative Final    WBC, UA 08/14/2018 Innumerable* None Seen, 0-5, 5-55, 5-65 /hpf Final    RBC, UA 08/14/2018 0-1* None Seen, 0-5 /hpf Final    Bacteria, UA 08/14/2018 Moderate* None Seen, Occasional /hpf Final    Epithelial Cells 08/14/2018 None Seen  None Seen, Occasional /hpf Final    Creatinine, Ur 08/14/2018 66 8  mg/dL Final    Protein Urine Random 08/14/2018 49  mg/dL Final    Prot/Creat Ratio, Ur 08/14/2018 0 73* 0 00 - 0 10 Final    WBC 08/14/2018 7 24  4 31 - 10 16 Thousand/uL Final    RBC 08/14/2018 3 03* 3 88 - 5 62 Million/uL Final    Hemoglobin 08/14/2018 9 7* 12 0 - 17 0 g/dL Final    Hematocrit 08/14/2018 29 3* 36 5 - 49 3 % Final    MCV 08/14/2018 97  82 - 98 fL Final    MCH 08/14/2018 32 0  26 8 - 34 3 pg Final    MCHC 08/14/2018 33 1  31 4 - 37 4 g/dL Final    RDW 08/14/2018 14 0  11 6 - 15 1 % Final    MPV 08/14/2018 10 0  8 9 - 12 7 fL Final    Platelets 66/41/4178 239  149 - 390 Thousands/uL Final    nRBC 08/14/2018 0  /100 WBCs Final    Neutrophils Relative 08/14/2018 71  43 - 75 % Final    Immat GRANS % 08/14/2018 1  0 - 2 % Final    Lymphocytes Relative 08/14/2018 19  14 - 44 % Final    Monocytes Relative 08/14/2018 7  4 - 12 % Final    Eosinophils Relative 08/14/2018 2  0 - 6 % Final    Basophils Relative 08/14/2018 0  0 - 1 % Final    Neutrophils Absolute 08/14/2018 5 15  1 85 - 7 62 Thousands/µL Final    Immature Grans Absolute 08/14/2018 0 04  0 00 - 0 20 Thousand/uL Final    Lymphocytes Absolute 08/14/2018 1 38  0 60 - 4 47 Thousands/µL Final    Monocytes Absolute 08/14/2018 0 51  0 17 - 1 22 Thousand/µL Final    Eosinophils Absolute 08/14/2018 0 13  0 00 - 0 61 Thousand/µL Final    Basophils Absolute 08/14/2018 0 03  0 00 - 0 10 Thousands/µL Final    A/G Ratio 08/14/2018 1 20  1 10 - 1 80 Final    Albumin Electrophoresis 08/14/2018 54 5  52 0 - 65 0 % Final    Albumin CONC 08/14/2018 3 54  3 50 - 5 00 g/dl Final    Alpha 1 08/14/2018 6 4* 2 5 - 5 0 % Final    ALPHA 1 CONC 08/14/2018 0 42* 0 10 - 0 40 g/dL Final    Alpha 2 08/14/2018 16 4* 7 0 - 13 0 % Final    ALPHA 2 CONC 08/14/2018 1 07  0 40 - 1 20 g/dL Final    Beta-1 08/14/2018 8 5  5 0 - 13 0 % Final    BETA 1 CONC 08/14/2018 0 55  0 40 - 0 80 g/dL Final    Beta-2 08/14/2018 5 5  2 0 - 8 0 % Final    BETA 2 CONC 08/14/2018 0 36  0 20 - 0 50 g/dL Final    Gamma Globulin 08/14/2018 8 7* 12 0 - 22 0 % Final    GAMMA CONC 08/14/2018 0 57  0 50 - 1 60 g/dL Final    SPEP Interpretation 08/14/2018 The serum total protein and albumin are within normal limits  The serum protein electrophoresis shows an increased alpha-1 region  No monoclonal bands noted  Reviewed by: Barbi Agudelo MD  ([id number])  **Electronic Signature**   Final    Total Protein 08/14/2018 6 5  6 4 - 8 2 g/dL Final    Urine Protein 08/14/2018 50 0* 2 0 - 17 5 mg/dL Final    Albumin ELP, Urine 08/14/2018 29 8  % Final    Alpha 1, Urine 08/14/2018 11 8  % Final    Alpha 2, Urine 08/14/2018 13 5  % Final    Beta, Urine 08/14/2018 16 1  % Final    Gamma Globulin, Urine 08/14/2018 28 8  % Final    M Peak ID 1, Ur 08/14/2018 18 4  % Final    M-PEAK 1 PC, URINE 08/14/2018 9 20  mg/dL Final    UPEP Interp 08/14/2018 The urine total protein is increased  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  Immunofixation to be performed  Reviewed by: Chas Huynh MD ([id number])  **Electronic Signature**   Final    Ig [place] Free Light Chain 08/14/2018 307 9* 3 3 - 19 4 mg/L Final    Ig Lambda Free Light Chain 08/14/2018 21 0  5 7 - 26 3 mg/L Final    Kappa/Lambda FluidC Ratio 08/14/2018 14 66* 0 26 - 1 65 Final    Iron Saturation 08/14/2018 9  % Final    TIBC 08/14/2018 288  250 - 450 ug/dL Final    Iron 08/14/2018 27* 65 - 175 ug/dL Final      Patients treated with metal-binding drugs (ie  Deferoxamine) may have depressed iron values   Ferritin 08/14/2018 42  8 - 388 ng/mL Final    Immunofixation Interpretation 08/14/2018 Urine immunofixation shows a monoclonal gammopathy identified as free kappa light chains (9 10 mg/dL)   Reviewed by: Chas Huynh MD ([id number])  ** Electronic Signature**   Final   Office Visit on 08/14/2018   Component Date Value Ref Range Status    LEUKOCYTE ESTERASE,UA 08/14/2018 trace   Final    NITRITE,UA 08/14/2018 negative   Final    SL AMB POCT UROBILINOGEN 08/14/2018 trace   Final    SL AMB POCT URINE PROTEIN 08/14/2018 100   Final     PH,UA 08/14/2018 5   Final     BLOOD,UA 08/14/2018 small   Final    SPECIFIC GRAVITY,UA 08/14/2018 1 020   Final    KETONES,UA 08/14/2018 neg   Final     BILIRUBIN,UA 08/14/2018 neg   Final    GLUCOSE, UA 08/14/2018 neg   Final     COLOR,UA 08/14/2018 yellow   Final     CLARITY,UA 08/14/2018 hazy   Final    Eosinophil, Urine 08/14/2018 0  % Final       The following historical data was reviewed  Past Medical History:   Diagnosis Date    [facility] p[facility]is ([facility] )     Chronic kidney disease     Coronary artery disease     Diabetes mellitus ([facility] )     Glaucoma     Hypertension     Occluded coronary artery stent     Left       Past Surgical History:   Procedure Laterality Date    BACK SURGERY      CARDIAC CATHETERIZATION  04/05/2004    CT BONE MARROW BIOPSY AND ASPIRATION  9/24/2018    KNEE SURGERY      THROMBOENDARTERECTOMY Right     Cartoid       Social History     Social History    Marital status:      Spouse name: N/A    Number of children: N/A    Years of education: N/A     Social History Main Topics    Smoking status: Never Smoker    Smokeless tobacco: Never Used      Comment: former smoker    Alcohol use Yes      Comment: social     Drug use: No    Sexual activity: Not Asked     Other Topics Concern    None     Social History Narrative    Daily caffeine consumption           Family History   Problem Relation Age of Onset    Heart attack Father     Heart disease Mother     Diabetes Mother     Heart disease Sister     COPD Family        Please note: This report has been generated by a voice recognition software system  Therefore there may be syntax, spelling, and/or grammatical errors   Please call if you have any questions

## 2018-10-08 NOTE — TELEPHONE ENCOUNTER
LM for pt on Son's phone stating his appt has been changed from 1:00 to 11:40 on 10/8/18 due to Dr Tammie Faith having a meeting

## 2018-10-08 NOTE — TELEPHONE ENCOUNTER
Returned call to patient and family members - questions asked and answered    Patient will get flu shot

## 2018-10-08 NOTE — TELEPHONE ENCOUNTER
Pt's nephew called with some follow up questions regarding chemo medications, flu shot, blood sugar levels, blood clotting factor, etc  Please call back and advise  Thank you

## 2018-10-08 NOTE — LETTER
October 8, 2018     Dilma Husain MD  [address] [facility]  [id number]    Patient: Khloe Linder   YOB: 1930   Date of Visit: 10/8/2018       Dear Dr Daniella Askew: Thank you for referring Manfred Annia to me for evaluation  Below are my notes for this consultation  If you have questions, please do not hesitate to call me  I look forward to following your patient along with you  Sincerely,        Shaunna Sahu PA-C        CC: No Recipients  Shaunna Sahu PA-C  10/8/2018  3:25 PM  Sign at close encounter  [address]  [address] [facility] [address]  [phone]  Progress Note  Khloe Linder, 9/22/1930, [id number]  10/8/2018    Assessment/Plan:  1  Kappa light chain myeloma (HCC)  RISS: Stage II, M=9 1 mg/dL   Patient presents today to the office with his two sons to review bone marrow biopsy that was consistent with kappa light chain myeloma, flow cytometry demonstrates IgA restricted kappa light chain myeloma  I provided patient and his family with the staging above  Patient understands that multiple myeloma is a malignant condition of the bone marrow that is incurable  We discussed the common sequela of the disease include bone changes, anemia and reduce kidney function  Treatment of this condition would prevent the above sequela and would give the patient a better quality of life  The recommendation after discussing the patient's case with Dr Jelena San was to proceed with doublet therapy  Dose reduced Revlimid and dexamethasone was discussed  Medication would be increased as the patient tolerates and disease process necessitates  Proposed regimen:  Revlimid 5 mg po daily days 1-14  Decadron 20 mg po daily days 1,8,15  Cycle length = 21 days     CBC and CMP will be completed weekly, with plans to check CBC once a cycle after tolerance is assessed and achieved    Uric acid is low, dose of revlimid is reduced therefore there is no need for tumor lysis syndrome prophylaxis  We will recheck myeloma parameters every 3 cycles/2 months:   UPEP, free light chain ratio, beta 2 microglobulin, CBC, CMP, quantitative immunoglobulins  At this time, the patient and his family would like to discuss his condition and potential treatment with the extended family  The patient will call our office in a few days to give us his review results  In the meantime, nursing staff spent time reviewing the above medications and potential side effects, which include but are not limited to cytopenias, fatigue, diarrhea, rash, itching, increased appetite, fluid retention, insomnia muscle weakness  Patient and family also met with Dr Giovanny Kendall to review the above plan  The patient is scheduled for follow-up in approximately 4 weeks  Patient voiced agreement and understanding to the above  Patient knows to call the Hematology/Oncology office with any questions and concerns regarding the above  Carefully review your medication list in verify the list is accurate and up-to-date  Please call the hematologic/Oncology office if there medications missing from the less, medications on the list that your not currently taking or if there is a dosage or instruction that is different from higher taking medication  I have spent 60 minutes with Patient and family today in which greater than 50% of this time was spent in counseling/coordination of care regarding Diagnostic results, Prognosis, Risks and benefits of tx options, Intructions for management, Patient and family education, Importance of tx compliance, Risk factor reductions and Impressions  The patient's current treatment goals are prolongation of life  Barrier(s) to care identified    None  The patient is able to self care     -------------------------------------------------------------------------------------------------------    Chief complaint: Chief Complaint   Patient presents with    Follow-up     5-6 weeks follow up  Bmbx results  History of present illness/Cancer History: This is an 80-year-old male with past medical history of hypertension, type 2 diabetes, stage IV chronic kidney disorder, anemia of chronic kidney disease, BPH, CHF history of CVA who presents to the Hematology office for evaluation of abnormal kappa free light chain      Patient notes he followed up with his renal specialist who ordered testing  Testing came back with some abnormality  Patient understands that there was protein is involved but does not nor murmur or that  Patient denies recent fracture ink, bone pain, problems with calcium or issues with symptoms of anemia including shortness of breath on exertion, blood loss from the GI or  tract      Patient notes overall feeling well  He lives at home by himself  Patient notes that he used to work for [employer] and worked around [employer] but does not recall all of the agents  Patient denies problems with blood transfusions in his past      Patient lives close by to relatives  He has family that bring some dinner  Patient notes that he is able to do his laundry and some house chores on his own  Patient denies significant disability from CVA  At the conclusion of consultation, patient was advised to undergo additional workup with skeletal survey and bone marrow biopsy, along with additional blood work  Patient returns for the results  ECO - Symptomatic but completely ambulatory    Interval history:       Patient denies interval changes  No problems with bone marrow biopsy  Patient comments that his physician was very nice  Review of Systems   Constitutional: Negative for activity change, appetite change, fatigue, fever and unexpected weight change  HENT: Negative for nosebleeds  Respiratory: Negative for cough, choking and shortness of breath      Cardiovascular: Negative for chest pain, palpitations and leg swelling  Gastrointestinal: Negative for abdominal distention, abdominal pain, anal bleeding, blood in stool, constipation, diarrhea, nausea and vomiting  Endocrine: Negative for cold intolerance  Genitourinary: Negative for hematuria  Musculoskeletal: Negative for myalgias  Skin: Negative for color change, pallor and rash  Allergic/Immunologic: Negative for immunocompromised state  Neurological: Negative for headaches  Hematological: Negative for adenopathy  Does not bruise/bleed easily  All other systems reviewed and are negative  Current Outpatient Prescriptions:     acetaminophen (TYLENOL) 325 mg tablet, Take 1 - 2 tabs PO Q4 PRN pain, Disp: 30 tablet, Rfl: 0    aspirin 81 MG tablet, Take 81 mg by mouth daily  , Disp: , Rfl:     atorvastatin (LIPITOR) 40 mg tablet, Take 1 tablet (40 mg total) by mouth daily, Disp: 90 tablet, Rfl: 3    bimatoprost (LUMIGAN) 0 01 % ophthalmic drops, Apply to eye, Disp: , Rfl:     esomeprazole (NexIUM) 40 MG capsule, Take 40 mg by mouth every evening , Disp: , Rfl:     fluticasone (FLONASE) 50 mcg/act nasal spray, into each nostril, Disp: , Rfl:     furosemide (LASIX) 40 mg tablet, Take 0 5 tablets (20 mg total) by mouth daily, Disp: 90 tablet, Rfl: 0    glimepiride (AMARYL) 2 mg tablet, Take 2 mg by mouth every evening , Disp: , Rfl:     glucose blood (ACCU-CHEK CHANDRAKANT PLUS) test strip, by In Vitro route, Disp: , Rfl:     guaiFENesin (MUCINEX) 600 mg 12 hr tablet, Take 1 tablet (600 mg total) by mouth 2 (two) times a day, Disp: 60 tablet, Rfl: 0    isosorbide dinitrate (ISORDIL) 30 mg tablet, Take 30 mg by mouth daily  , Disp: , Rfl:     metoprolol succinate (TOPROL-XL) 25 mg 24 hr tablet, TAKE 1 TABLET DAILY, Disp: 90 tablet, Rfl: 3    nitroglycerin (NITROSTAT) 0 4 mg SL tablet, Place 1 tablet under the tongue every 5 (five) minutes as needed, Disp: , Rfl:     ONE TOUCH ULTRA TEST test strip, The patient test once daily , Disp: 100 each, Rfl: 2    ONETOUCH DELICA LANCETS 56B MISC, by Does not apply route daily, Disp: 100 each, Rfl: 3    potassium chloride (KLOR-CON 10) 10 mEq tablet, Take 1 tablet (10 mEq total) by mouth daily, Disp: 90 tablet, Rfl: 3    timolol (TIMOPTIC) 0 5 % ophthalmic solution, , Disp: , Rfl:     Timolol Maleate 0 5 % (DAILY) SOLN, Apply to eye daily, Disp: , Rfl:       No current facility-administered medications for this visit  No Known Allergies    Objective:   /90 (BP Location: Left arm, Cuff Size: Standard)   Pulse 95   Temp (!) 95 8 °F (35 4 °C) (Tympanic)   Resp 16   SpO2 97%   Wt Readings from Last 3 Encounters:   09/24/18 92 5 kg (204 lb)   09/06/18 92 9 kg (204 lb 12 8 oz)   08/28/18 93 4 kg (205 lb 14 4 oz)     Physical Exam   Constitutional: He is oriented to person, place, and time  He appears well-developed and well-nourished  No distress  HENT:   Head: Normocephalic and atraumatic  Right Ear: External ear normal    Left Ear: External ear normal    Nose: Nose normal    Eyes: Conjunctivae are normal  No scleral icterus  Cardiovascular: Normal rate  Pulmonary/Chest: No respiratory distress  Abdominal: Soft  He exhibits no distension  Musculoskeletal: He exhibits no edema  Neurological: He is alert and oriented to person, place, and time  Skin: No rash (on exposed skin ) noted     Psychiatric: Thought content normal        Pertinent Laboratory Results and Imaging Review:  Hospital Outpatient Visit on 09/24/2018   Component Date Value Ref Range Status    Case Report 09/24/2018    Final                    Value:Surgical Pathology Report                         Case: [id number]                                   Authorizing Provider:  Xin Carcamo PA-C     Collected:           09/24/2018 1001              Ordering Location:     [facility]        Received:            09/24/2018 [facility] Pathologist:           Carolina Kelley MD                                                        Specimens:   A) - Iliac Crest, Left, core                                                                        B) - Iliac Crest, Left, clot                                                                        C) - Iliac Crest, Left, slide x13                                                          Addendum 09/24/2018    Final                    Value: This result contains rich text formatting which cannot be displayed here   Final Diagnosis 09/24/2018    Final                    Value: This result contains rich text formatting which cannot be displayed here   Microscopic Description 09/24/2018    Final                    Value: This result contains rich text formatting which cannot be displayed here   Note 09/24/2018    Final                    Value: This result contains rich text formatting which cannot be displayed here   Additional Information 09/24/2018    Final                    Value: This result contains rich text formatting which cannot be displayed here   Intraoperative Consultation 09/24/2018    Final                    Value: This result contains rich text formatting which cannot be displayed here  Nina Bears Description 09/24/2018    Final                    Value: This result contains rich text formatting which cannot be displayed here   Scan Result 09/24/2018 SEE WRITTEN REPORT   Final    Miscellaneous Lab Test Result 09/24/2018 SEE WRITTEN REPORT   Final   Appointment on 09/06/2018   Component Date Value Ref Range Status    LD 09/06/2018 190  81 - 234 U/L Final    Uric Acid 09/06/2018 4 0* 4 2 - 8 0 mg/dL Final    Specimen collection should occur prior to Metamizole administration due to the potential for falsely depressed results      Beta-2 Microglobulin 09/06/2018 4 2* 0 6 - 2 4 mg/L Final    Siemens Immulite 2000 Immunochemiluminometric assay (ICMA)    IGA 09/06/2018 266 0  70 0 - 400 0 mg/dL Final    IGG 09/06/2018 755 0  700 0-1,600 0 mg/dL Final    IGM 09/06/2018 27 0* 40 0 - 230 0 mg/dL Final    WBC 09/06/2018 6 96  4 31 - 10 16 Thousand/uL Final    RBC 09/06/2018 3 46* 3 88 - 5 62 Million/uL Final    Hemoglobin 09/06/2018 10 8* 12 0 - 17 0 g/dL Final    Hematocrit 09/06/2018 33 7* 36 5 - 49 3 % Final    MCV 09/06/2018 97  82 - 98 fL Final    MCH 09/06/2018 31 2  26 8 - 34 3 pg Final    MCHC 09/06/2018 32 0  31 4 - 37 4 g/dL Final    RDW 09/06/2018 13 6  11 6 - 15 1 % Final    MPV 09/06/2018 10 5  8 9 - 12 7 fL Final    Platelets 50/28/8654 231  149 - 390 Thousands/uL Final    nRBC 09/06/2018 0  /100 WBCs Final    Neutrophils Relative 09/06/2018 63  43 - 75 % Final    Immat GRANS % 09/06/2018 0  0 - 2 % Final    Lymphocytes Relative 09/06/2018 26  14 - 44 % Final    Monocytes Relative 09/06/2018 6  4 - 12 % Final    Eosinophils Relative 09/06/2018 4  0 - 6 % Final    Basophils Relative 09/06/2018 1  0 - 1 % Final    Neutrophils Absolute 09/06/2018 4 39  1 85 - 7 62 Thousands/µL Final    Immature Grans Absolute 09/06/2018 0 02  0 00 - 0 20 Thousand/uL Final    Lymphocytes Absolute 09/06/2018 1 84  0 60 - 4 47 Thousands/µL Final    Monocytes Absolute 09/06/2018 0 38  0 17 - 1 22 Thousand/µL Final    Eosinophils Absolute 09/06/2018 0 27  0 00 - 0 61 Thousand/µL Final    Basophils Absolute 09/06/2018 0 06  0 00 - 0 10 Thousands/µL Final    Sodium 09/06/2018 137  136 - 145 mmol/L Final    Potassium 09/06/2018 3 9  3 5 - 5 3 mmol/L Final    Chloride 09/06/2018 104  100 - 108 mmol/L Final    CO2 09/06/2018 26  21 - 32 mmol/L Final    ANION GAP 09/06/2018 7  4 - 13 mmol/L Final    BUN 09/06/2018 22  5 - 25 mg/dL Final    Creatinine 09/06/2018 1 91* 0 60 - 1 30 mg/dL Final    Standardized to IDMS reference method    Glucose 09/06/2018 146* 65 - 140 mg/dL Final      If the patient is fasting, the ADA then defines impaired fasting glucose as > 100 mg/dL and diabetes as > or equal to 123 mg/dL  Specimen collection should occur prior to Sulfasalazine administration due to the potential for falsely depressed results  Specimen collection should occur prior to Sulfapyridine administration due to the potential for falsely elevated results   Calcium 09/06/2018 9 5  8 3 - 10 1 mg/dL Final    AST 09/06/2018 17  5 - 45 U/L Final      Specimen collection should occur prior to Sulfasalazine administration due to the potential for falsely depressed results   ALT 09/06/2018 21  12 - 78 U/L Final      Specimen collection should occur prior to Sulfasalazine and/or Sulfapyridine administration due to the potential for falsely depressed results       Alkaline Phosphatase 09/06/2018 118* 46 - 116 U/L Final    Total Protein 09/06/2018 7 3  6 4 - 8 2 g/dL Final    Albumin 09/06/2018 3 7  3 5 - 5 0 g/dL Final    Total Bilirubin 09/06/2018 0 93  0 20 - 1 00 mg/dL Final    eGFR 09/06/2018 31  ml/min/1 73sq m Final   Appointment on 08/28/2018   Component Date Value Ref Range Status    Clarity, UA 08/28/2018 Cloudy   Final    Color, UA 08/28/2018 Yellow   Final    Specific Gravity, UA 08/28/2018 1 020  1 003 - 1 030 Final    pH, UA 08/28/2018 5 0  4 5 - 8 0 Final    Glucose, UA 08/28/2018 100 (1/10%)* Negative mg/dl Final    Ketones, UA 08/28/2018 Negative  Negative mg/dl Final    Blood, UA 08/28/2018 Small* Negative Final    Protein, UA 08/28/2018 Trace* Negative mg/dl Final    Nitrite, UA 08/28/2018 Negative  Negative Final    Bilirubin, UA 08/28/2018 Negative  Negative Final    Urobilinogen, UA 08/28/2018 0 2  0 2, 1 0 E U /dl E U /dl Final    Leukocytes, UA 08/28/2018 Large* Negative Final    WBC, UA 08/28/2018 Innumerable* None Seen, 0-5, 5-55, 5-65 /hpf Final    RBC, UA 08/28/2018 0-1* None Seen, 0-5 /hpf Final    Bacteria, UA 08/28/2018 Moderate* None Seen, Occasional /hpf Final    Epithelial Cells 08/28/2018 Occasional  None Seen, Occasional /hpf Final   Office Visit on 08/28/2018   Component Date Value Ref Range Status    INTERPRETATIONS 08/28/2018    Final    NSR  1st degree AVB  IRBBB  IVCD  Office Visit on 08/24/2018   Component Date Value Ref Range Status    LEUKOCYTE ESTERASE,UA 08/24/2018 ++   Final    NITRITE,UA 08/24/2018 -   Final    SL AMB POCT URINE PROTEIN 08/24/2018 -   Final     PH,UA 08/24/2018 5 0   Final     BLOOD,UA 08/24/2018 trace   Final    SPECIFIC GRAVITY,UA 08/24/2018 1 005   Final    KETONES,UA 08/24/2018 -   Final    GLUCOSE, UA 08/24/2018 -   Final     COLOR,UA 08/24/2018 yellow   Final     CLARITY,UA 08/24/2018 cloudy   Final   Appointment on 08/14/2018   Component Date Value Ref Range Status    Sodium 08/14/2018 136  136 - 145 mmol/L Final    Potassium 08/14/2018 3 8  3 5 - 5 3 mmol/L Final    Chloride 08/14/2018 102  100 - 108 mmol/L Final    CO2 08/14/2018 26  21 - 32 mmol/L Final    ANION GAP 08/14/2018 8  4 - 13 mmol/L Final    BUN 08/14/2018 21  5 - 25 mg/dL Final    Creatinine 08/14/2018 2 22* 0 60 - 1 30 mg/dL Final    Standardized to IDMS reference method    Glucose 08/14/2018 184* 65 - 140 mg/dL Final      If the patient is fasting, the ADA then defines impaired fasting glucose as > 100 mg/dL and diabetes as > or equal to 123 mg/dL  Specimen collection should occur prior to Sulfasalazine administration due to the potential for falsely depressed results  Specimen collection should occur prior to Sulfapyridine administration due to the potential for falsely elevated results      Calcium 08/14/2018 8 9  8 3 - 10 1 mg/dL Final    eGFR 08/14/2018 26  ml/min/1 73sq m Final    Clarity, UA 08/14/2018 Cloudy   Final    Color, UA 08/14/2018 Yellow   Final    Specific Gravity, UA 08/14/2018 1 015  1 003 - 1 030 Final    pH, UA 08/14/2018 5 5  4 5 - 8 0 Final    Glucose, UA 08/14/2018 100 (1/10%)* Negative mg/dl Final    Ketones, UA 08/14/2018 Negative Negative mg/dl Final    Blood, UA 08/14/2018 Small* Negative Final    Protein, UA 08/14/2018 Trace* Negative mg/dl Final    Nitrite, UA 08/14/2018 Negative  Negative Final    Bilirubin, UA 08/14/2018 Negative  Negative Final    Urobilinogen, UA 08/14/2018 0 2  0 2, 1 0 E U /dl E U /dl Final    Leukocytes, UA 08/14/2018 Moderate* Negative Final    WBC, UA 08/14/2018 Innumerable* None Seen, 0-5, 5-55, 5-65 /hpf Final    RBC, UA 08/14/2018 0-1* None Seen, 0-5 /hpf Final    Bacteria, UA 08/14/2018 Moderate* None Seen, Occasional /hpf Final    Epithelial Cells 08/14/2018 None Seen  None Seen, Occasional /hpf Final    Creatinine, Ur 08/14/2018 66 8  mg/dL Final    Protein Urine Random 08/14/2018 49  mg/dL Final    Prot/Creat Ratio, Ur 08/14/2018 0 73* 0 00 - 0 10 Final    WBC 08/14/2018 7 24  4 31 - 10 16 Thousand/uL Final    RBC 08/14/2018 3 03* 3 88 - 5 62 Million/uL Final    Hemoglobin 08/14/2018 9 7* 12 0 - 17 0 g/dL Final    Hematocrit 08/14/2018 29 3* 36 5 - 49 3 % Final    MCV 08/14/2018 97  82 - 98 fL Final    MCH 08/14/2018 32 0  26 8 - 34 3 pg Final    MCHC 08/14/2018 33 1  31 4 - 37 4 g/dL Final    RDW 08/14/2018 14 0  11 6 - 15 1 % Final    MPV 08/14/2018 10 0  8 9 - 12 7 fL Final    Platelets 41/65/0032 239  149 - 390 Thousands/uL Final    nRBC 08/14/2018 0  /100 WBCs Final    Neutrophils Relative 08/14/2018 71  43 - 75 % Final    Immat GRANS % 08/14/2018 1  0 - 2 % Final    Lymphocytes Relative 08/14/2018 19  14 - 44 % Final    Monocytes Relative 08/14/2018 7  4 - 12 % Final    Eosinophils Relative 08/14/2018 2  0 - 6 % Final    Basophils Relative 08/14/2018 0  0 - 1 % Final    Neutrophils Absolute 08/14/2018 5 15  1 85 - 7 62 Thousands/µL Final    Immature Grans Absolute 08/14/2018 0 04  0 00 - 0 20 Thousand/uL Final    Lymphocytes Absolute 08/14/2018 1 38  0 60 - 4 47 Thousands/µL Final    Monocytes Absolute 08/14/2018 0 51  0 17 - 1 22 Thousand/µL Final    Eosinophils Absolute 08/14/2018 0 13  0 00 - 0 61 Thousand/µL Final    Basophils Absolute 08/14/2018 0 03  0 00 - 0 10 Thousands/µL Final    A/G Ratio 08/14/2018 1 20  1 10 - 1 80 Final    Albumin Electrophoresis 08/14/2018 54 5  52 0 - 65 0 % Final    Albumin CONC 08/14/2018 3 54  3 50 - 5 00 g/dl Final    Alpha 1 08/14/2018 6 4* 2 5 - 5 0 % Final    ALPHA 1 CONC 08/14/2018 0 42* 0 10 - 0 40 g/dL Final    Alpha 2 08/14/2018 16 4* 7 0 - 13 0 % Final    ALPHA 2 CONC 08/14/2018 1 07  0 40 - 1 20 g/dL Final    Beta-1 08/14/2018 8 5  5 0 - 13 0 % Final    BETA 1 CONC 08/14/2018 0 55  0 40 - 0 80 g/dL Final    Beta-2 08/14/2018 5 5  2 0 - 8 0 % Final    BETA 2 CONC 08/14/2018 0 36  0 20 - 0 50 g/dL Final    Gamma Globulin 08/14/2018 8 7* 12 0 - 22 0 % Final    GAMMA CONC 08/14/2018 0 57  0 50 - 1 60 g/dL Final    SPEP Interpretation 08/14/2018 The serum total protein and albumin are within normal limits  The serum protein electrophoresis shows an increased alpha-1 region  No monoclonal bands noted  Reviewed by: Loida Peguero MD  ([id number])  **Electronic Signature**   Final    Total Protein 08/14/2018 6 5  6 4 - 8 2 g/dL Final    Urine Protein 08/14/2018 50 0* 2 0 - 17 5 mg/dL Final    Albumin ELP, Urine 08/14/2018 29 8  % Final    Alpha 1, Urine 08/14/2018 11 8  % Final    Alpha 2, Urine 08/14/2018 13 5  % Final    Beta, Urine 08/14/2018 16 1  % Final    Gamma Globulin, Urine 08/14/2018 28 8  % Final    M Peak ID 1, Ur 08/14/2018 18 4  % Final    M-PEAK 1 PC, URINE 08/14/2018 9 20  mg/dL Final    UPEP Interp 08/14/2018 The urine total protein is increased  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  Immunofixation to be performed   Reviewed by: Abby Bernal MD ([id number])  **Electronic Signature**   Final    Ig [place] Free Light Chain 08/14/2018 307 9* 3 3 - 19 4 mg/L Final    Ig Lambda Free Light Chain 08/14/2018 21 0  5 7 - 26 3 mg/L Final    Kappa/Lambda FluidC Ratio 08/14/2018 14 66* 0 26 - 1 65 Final    Iron Saturation 08/14/2018 9  % Final    TIBC 08/14/2018 288  250 - 450 ug/dL Final    Iron 08/14/2018 27* 65 - 175 ug/dL Final      Patients treated with metal-binding drugs (ie  Deferoxamine) may have depressed iron values   Ferritin 08/14/2018 42  8 - 388 ng/mL Final    Immunofixation Interpretation 08/14/2018 Urine immunofixation shows a monoclonal gammopathy identified as free kappa light chains (9 10 mg/dL)  Reviewed by: Zonia Tadeo MD ([id number])  ** Electronic Signature**   Final   Office Visit on 08/14/2018   Component Date Value Ref Range Status    LEUKOCYTE ESTERASE,UA 08/14/2018 trace   Final    NITRITE,UA 08/14/2018 negative   Final    SL AMB POCT UROBILINOGEN 08/14/2018 trace   Final    SL AMB POCT URINE PROTEIN 08/14/2018 100   Final     PH,UA 08/14/2018 5   Final     BLOOD,UA 08/14/2018 small   Final    SPECIFIC GRAVITY,UA 08/14/2018 1 020   Final    KETONES,UA 08/14/2018 neg   Final     BILIRUBIN,UA 08/14/2018 neg   Final    GLUCOSE, UA 08/14/2018 neg   Final     COLOR,UA 08/14/2018 yellow   Final     CLARITY,UA 08/14/2018 hazy   Final    Eosinophil, Urine 08/14/2018 0  % Final       The following historical data was reviewed  Past Medical History:   Diagnosis Date    Angina pectoris ([email or web address] )     Chronic kidney disease     Coronary artery disease     Diabetes mellitus ([facility] )     Glaucoma     Hypertension     Occluded coronary artery stent     Left       Past Surgical History:   Procedure Laterality Date    BACK SURGERY      CARDIAC CATHETERIZATION  04/05/2004    CT BONE MARROW BIOPSY AND ASPIRATION  9/24/2018    KNEE SURGERY      THROMBOENDARTERECTOMY Right     Cartoid       Social History     Social History    Marital status:       Spouse name: N/A    Number of children: N/A    Years of education: N/A     Social History Main Topics    Smoking status: Never Smoker    Smokeless tobacco: Never Used      Comment: former smoker    Alcohol use Yes      Comment: social     Drug use: No    Sexual activity: Not Asked     Other Topics Concern    None     Social History Narrative    Daily caffeine consumption           Family History   Problem Relation Age of Onset    Heart attack Father     Heart disease Mother     Diabetes Mother     Heart disease Sister     COPD Family        Please note: This report has been generated by a voice recognition software system  Therefore there may be syntax, spelling, and/or grammatical errors  Please call if you have any questions

## 2018-10-09 ENCOUNTER — OFFICE VISIT (OUTPATIENT)
Dept: NEPHROLOGY | Facility: CLINIC | Age: 83
End: 2018-10-09
Payer: COMMERCIAL

## 2018-10-09 VITALS
DIASTOLIC BLOOD PRESSURE: 72 MMHG | HEART RATE: 88 BPM | BODY MASS INDEX: 32.97 KG/M2 | WEIGHT: 205.13 LBS | RESPIRATION RATE: 16 BRPM | HEIGHT: 66 IN | SYSTOLIC BLOOD PRESSURE: 118 MMHG

## 2018-10-09 DIAGNOSIS — D63.1 ANEMIA IN STAGE 4 CHRONIC KIDNEY DISEASE (HCC): ICD-10-CM

## 2018-10-09 DIAGNOSIS — C90.00 KAPPA LIGHT CHAIN MYELOMA (HCC): ICD-10-CM

## 2018-10-09 DIAGNOSIS — C90.00 MULTIPLE MYELOMA NOT HAVING ACHIEVED REMISSION (HCC): Primary | ICD-10-CM

## 2018-10-09 DIAGNOSIS — N18.4 STAGE 4 CHRONIC KIDNEY DISEASE (HCC): Primary | ICD-10-CM

## 2018-10-09 DIAGNOSIS — R80.8 OTHER PROTEINURIA: ICD-10-CM

## 2018-10-09 DIAGNOSIS — I10 ESSENTIAL HYPERTENSION: ICD-10-CM

## 2018-10-09 DIAGNOSIS — N18.4 ANEMIA IN STAGE 4 CHRONIC KIDNEY DISEASE (HCC): ICD-10-CM

## 2018-10-09 PROCEDURE — 99214 OFFICE O/P EST MOD 30 MIN: CPT | Performed by: INTERNAL MEDICINE

## 2018-10-09 RX ORDER — LENALIDOMIDE 5 MG/1
CAPSULE ORAL
Qty: 14 CAPSULE | Refills: 0 | Status: SHIPPED | OUTPATIENT
Start: 2018-10-09 | End: 2018-11-06 | Stop reason: SDUPTHER

## 2018-10-09 NOTE — PROGRESS NOTES
NEPHROLOGY OUTPATIENT PROGRESS NOTE   Florin Self 80 y o  male MRN: [id number]  DATE: 10/9/2018  Reason for visit:   Chief Complaint   Patient presents with    Follow-up    Chronic Kidney Disease        Patient Instructions   1  Elevated sCr in setting of chronic kidney disease stage 4 d/t presumed diabetic nephropathy as well as systolic heart failure + newly diagnosed kappa light chain myeloma  -agree with holding metformin and losartan  -b/l sCr as of late high 1s to low 2s  Latest sCr 1 91 as of 9/6/18  Acid/base stable  Electrolytes stable   -has been referred to [facility] CKD education  -will recheck BMP in end of Nov 2018  -in office UA shows trace WBCs, 100 protein, small blood  I note isomorphic RBCs on urine sample in office  Has seen urology  -normal renal u/s with PVR as of August 2018  -avoid nonsteroidals (ibuprofen, aleve, advil, motrin, naproxen, toradol, indomethacin, celebrex)     2  Hypertension - BP well controlled on metoprolol 25mg daily, furosemide 20mg daily  Off isordil       3  Anemia in setting of kappa light chain myeloma - Hgb 10 8 as per latest labs  Follows with hematology     4  Proteinuria in the setting of presumed diabetic nephropathy- microalbumin:cr 91 as of July 17, 2018  Also with myeloma as above      5  GERD - on nexium every other day  Prolonged use of PPIs can lead to chronic interstitial nephritis       6  Ischemic cardiomyopathy/systolic CHF - on furosemide 20mg daily, monitor daily weight  Record weights daily and bring to cardiology office     7  DM2, well controlled - on glimepiride 2mg daily, last A1C 6 9 as of 7/17/18    8  Kappa light chain myeloma - to receive revlimid, decadron per heme/onc        Summer Dwyer was seen today for follow-up and chronic kidney disease      Diagnoses and all orders for this visit:    Stage 4 chronic kidney disease ([address] )    Essential hypertension    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 ([address] )    Other proteinuria    Kappa light chain myeloma ([facility] [address] )    Other orders  -     Basic metabolic panel; Future  -     Magnesium; Future  -     Phosphorus; Future  -     PTH, intact; Future        Assessment/Plan:  1  Elevated sCr in setting of chronic kidney disease stage 4 d/t presumed diabetic nephropathy as well as systolic heart failure + newly diagnosed kappa light chain myeloma  -agree with holding metformin and losartan  -b/l sCr as of late high 1s to low 2s  Latest sCr 1 91 as of 9/6/18  Acid/base stable  Electrolytes stable   -has been referred to Kidney Smart CKD education  -will recheck BMP in end of Nov 2018  -in office UA shows trace WBCs, 100 protein, small blood  I note isomorphic RBCs on urine sample in office  Has seen urology  -normal renal u/s with PVR as of August 2018  -avoid nonsteroidals (ibuprofen, aleve, advil, motrin, naproxen, toradol, indomethacin, celebrex)     2  Hypertension - BP well controlled on metoprolol 25mg daily, furosemide 20mg daily  Off isordil       3  Anemia in setting of kappa light chain myeloma - Hgb 10 8 as per latest labs  Follows with hematology     4  Proteinuria in the setting of presumed diabetic nephropathy- microalbumin:cr 91 as of July 17, 2018  Also with myeloma as above      5  GERD - on nexium every other day  Prolonged use of PPIs can lead to chronic interstitial nephritis       6  Ischemic cardiomyopathy/systolic CHF - on furosemide 20mg daily, monitor daily weight  Record weights daily and bring to cardiology office     7  DM2, well controlled - on glimepiride 2mg daily, last A1C 6 9 as of 7/17/18    8  Kappa light chain myeloma - to receive revlimid, decadron per heme/onc      SUBJECTIVE / INTERVAL HISTORY:  80 y o  male presents in follow up of CKD  Antonia Muniz denies any recent illness/hospitalizations/medication changes since last office visit  He is s/p bone marrow biopsy performed 9/24/18 which shows kappa light chain myeloma    He had some dizziness but that has since resolved  Denies NSAID use  DM2 - off metformin, his glucose levels are high at times  This morning the sugar was 135  BP well controlled now    Review of Systems   Constitutional: Negative for chills and fever  HENT: Negative for sore throat  Eyes: Negative for visual disturbance  Respiratory: Negative for cough and shortness of breath  Cardiovascular: Negative for chest pain and leg swelling  Gastrointestinal: Positive for constipation  Negative for abdominal pain, diarrhea, nausea and vomiting  Endocrine: Negative for polyuria  Genitourinary: Negative for decreased urine volume, difficulty urinating and dysuria  Musculoskeletal: Negative for back pain and myalgias  Skin: Negative for rash  Neurological: Negative for dizziness, light-headedness and numbness  Psychiatric/Behavioral: Negative for confusion  OBJECTIVE:  /72 (BP Location: Left arm, Patient Position: Sitting, Cuff Size: Standard)   Pulse 88   Resp 16   Ht 5' 6" (1 676 m)   Wt 93 kg (205 lb 2 oz)   BMI 33 11 kg/m²  Body mass index is 33 11 kg/m²  Physical exam:  Physical Exam   Constitutional: He appears well-developed and well-nourished  No distress  HENT:   Head: Normocephalic and atraumatic  Mouth/Throat: No oropharyngeal exudate  Eyes: Right eye exhibits no discharge  Left eye exhibits no discharge  No scleral icterus  Neck: Neck supple  Cardiovascular: Normal rate, regular rhythm and normal heart sounds  Pulmonary/Chest: Effort normal and breath sounds normal  He has no wheezes  He has no rales  Abdominal: Soft  Bowel sounds are normal  He exhibits no distension  There is no tenderness  Musculoskeletal: Normal range of motion  He exhibits no edema  Neurological: He is alert  awake   Skin: Skin is warm and dry  No rash noted  He is not diaphoretic  Psychiatric: He has a normal mood and affect  His behavior is normal    Vitals reviewed        Medications:    Current Outpatient Prescriptions:     acetaminophen (TYLENOL) 325 mg tablet, Take 1 - 2 tabs PO Q4 PRN pain, Disp: 30 tablet, Rfl: 0    aspirin 81 MG tablet, Take 81 mg by mouth daily  , Disp: , Rfl:     atorvastatin (LIPITOR) 40 mg tablet, Take 1 tablet (40 mg total) by mouth daily, Disp: 90 tablet, Rfl: 3    bimatoprost (LUMIGAN) 0 01 % ophthalmic drops, Apply to eye, Disp: , Rfl:     esomeprazole (NexIUM) 40 MG capsule, Take 40 mg by mouth every evening , Disp: , Rfl:     fluticasone (FLONASE) 50 mcg/act nasal spray, into each nostril, Disp: , Rfl:     furosemide (LASIX) 40 mg tablet, Take 0 5 tablets (20 mg total) by mouth daily, Disp: 90 tablet, Rfl: 0    glimepiride (AMARYL) 2 mg tablet, Take 2 mg by mouth every evening , Disp: , Rfl:     glucose blood (ACCU-CHEK CHANDRAKANT PLUS) test strip, by In Vitro route, Disp: , Rfl:     guaiFENesin (MUCINEX) 600 mg 12 hr tablet, Take 1 tablet (600 mg total) by mouth 2 (two) times a day, Disp: 60 tablet, Rfl: 0    isosorbide dinitrate (ISORDIL) 30 mg tablet, Take 30 mg by mouth daily  , Disp: , Rfl:     lenalidomide (REVLIMID) 5 MG CAPS, Take one cap by mouth on days 1-14 every 21 days, Disp: 14 capsule, Rfl: 0    metoprolol succinate (TOPROL-XL) 25 mg 24 hr tablet, TAKE 1 TABLET DAILY, Disp: 90 tablet, Rfl: 3    nitroglycerin (NITROSTAT) 0 4 mg SL tablet, Place 1 tablet under the tongue every 5 (five) minutes as needed, Disp: , Rfl:     ONE TOUCH ULTRA TEST test strip, The patient test once daily  , Disp: 100 each, Rfl: 2    ONETOUCH DELICA LANCETS 07O MISC, by Does not apply route daily, Disp: 100 each, Rfl: 3    potassium chloride (KLOR-CON 10) 10 mEq tablet, Take 1 tablet (10 mEq total) by mouth daily, Disp: 90 tablet, Rfl: 3    timolol (TIMOPTIC) 0 5 % ophthalmic solution, , Disp: , Rfl:     Allergies:   Allergies as of 10/09/2018    (No Known Allergies)       The following portions of the patient's history were reviewed and updated as appropriate: past family history, past surgical history and problem list     Laboratory Results:  Lab Results   Component Value Date    GLUCOSE 90 10/05/2015    CALCIUM 9 5 09/06/2018     09/06/2018    K 3 9 09/06/2018    CO2 26 09/06/2018     09/06/2018    BUN 22 09/06/2018    CREATININE 1 91 (H) 09/06/2018        Lab Results   Component Value Date    CALCIUM 9 5 09/06/2018       Portions of the record may have been created with voice recognition software   Occasional wrong word or "sound a like" substitutions may have occurred due to the inherent limitations of voice recognition software   Read the chart carefully and recognize, using context, where substitutions have occurred

## 2018-10-09 NOTE — PATIENT INSTRUCTIONS
1  Elevated sCr in setting of chronic kidney disease stage 4 d/t presumed diabetic nephropathy as well as systolic heart failure + newly diagnosed kappa light chain myeloma  -agree with holding metformin and losartan  -b/l sCr as of late high 1s to low 2s  Latest sCr 1 91 as of 9/6/18  Acid/base stable  Electrolytes stable   -has been referred to Kidney Smart CKD education  -will recheck BMP in end of Nov 2018  -in office UA shows trace WBCs, 100 protein, small blood  I note isomorphic RBCs on urine sample in office  Has seen urology  -normal renal u/s with PVR as of August 2018  -avoid nonsteroidals (ibuprofen, aleve, advil, motrin, naproxen, toradol, indomethacin, celebrex)     2  Hypertension - BP well controlled on metoprolol 25mg daily, furosemide 20mg daily  Off isordil       3  Anemia in setting of kappa light chain myeloma - Hgb 10 8 as per latest labs  Follows with hematology     4  Proteinuria in the setting of presumed diabetic nephropathy- microalbumin:cr 91 as of July 17, 2018  Also with myeloma as above      5  GERD - on nexium every other day  Prolonged use of PPIs can lead to chronic interstitial nephritis       6  Ischemic cardiomyopathy/systolic CHF - on furosemide 20mg daily, monitor daily weight  Record weights daily and bring to cardiology office     7  DM2, well controlled - on glimepiride 2mg daily, last A1C 6 9 as of 7/17/18    8   Kappa light chain myeloma - to receive revlimid, decadron per heme/onc    RTC in 3 months

## 2018-10-09 NOTE — LETTER
October 9, 2018     Baldev Gabriel MD  [facility] [id number]    Patient: Tiffanie Stovall   YOB: 1930   Date of Visit: 10/9/2018       Dear Dr Michael Kolb: Thank you for referring Frederic Arevalo to me for evaluation  Below are my notes for this consultation  If you have questions, please do not hesitate to call me  I look forward to following your patient along with you  Sincerely,        Marvel Solis DO        CC: Hue Landaverde, DO Marvel Solis DO  10/9/2018  2:06 PM  Sign at close encounter  [facility]   Tiffanie Stovall 80 y o  male MRN: [id number]  DATE: 10/9/2018  Reason for visit:   Chief Complaint   Patient presents with    Follow-up    Chronic Kidney Disease        Patient Instructions   1  Elevated sCr in setting of chronic kidney disease stage 4 d/t presumed diabetic nephropathy as well as systolic heart failure + newly diagnosed kappa light chain myeloma  -agree with holding metformin and losartan  -b/l sCr as of late high 1s to low 2s  Latest sCr 1 91 as of 9/6/18  Acid/base stable  Electrolytes stable   -has been referred to Kidney Smart CKD education  -will recheck BMP in end of Nov 2018  -in office UA shows trace WBCs, 100 protein, small blood  I note isomorphic RBCs on urine sample in office  Has seen urology  -normal renal u/s with PVR as of August 2018  -avoid nonsteroidals (ibuprofen, aleve, advil, motrin, naproxen, toradol, indomethacin, celebrex)     2  Hypertension - BP well controlled on metoprolol 25mg daily, furosemide 20mg daily  Off isordil       3  Anemia in setting of kappa light chain myeloma - Hgb 10 8 as per latest labs  Follows with hematology     4  Proteinuria in the setting of presumed diabetic nephropathy- microalbumin:cr 91 as of July 17, 2018  Also with myeloma as above      5  GERD - on nexium every other day   Prolonged use of PPIs can lead to chronic interstitial nephritis       6  Ischemic cardiomyopathy/systolic CHF - on furosemide 20mg daily, monitor daily weight  Record weights daily and bring to cardiology office     7  DM2, well controlled - on glimepiride 2mg daily, last A1C 6 9 as of 7/17/18    8  Kappa light chain myeloma - to receive revlimid, decadron per heme/onc        Robert Villagran was seen today for follow-up and chronic kidney disease  Diagnoses and all orders for this visit:    Stage 4 chronic kidney disease ([facility] [address] )    Essential hypertension    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 ([facility] [address] )    Other proteinuria    Kappa light chain myeloma ([facility] [address] )    Other orders  -     Basic metabolic panel; Future  -     Magnesium; Future  -     Phosphorus; Future  -     PTH, intact; Future        Assessment/Plan:  1  Elevated sCr in setting of chronic kidney disease stage 4 d/t presumed diabetic nephropathy as well as systolic heart failure + newly diagnosed kappa light chain myeloma  -agree with holding metformin and losartan  -b/l sCr as of late high 1s to low 2s  Latest sCr 1 91 as of 9/6/18  Acid/base stable  Electrolytes stable   -has been referred to Kidney Smart CKD education  -will recheck BMP in end of Nov 2018  -in office UA shows trace WBCs, 100 protein, small blood  I note isomorphic RBCs on urine sample in office  Has seen urology  -normal renal u/s with PVR as of August 2018  -avoid nonsteroidals (ibuprofen, aleve, advil, motrin, naproxen, toradol, indomethacin, celebrex)     2  Hypertension - BP well controlled on metoprolol 25mg daily, furosemide 20mg daily  Off isordil       3  Anemia in setting of kappa light chain myeloma - Hgb 10 8 as per latest labs  Follows with hematology     4  Proteinuria in the setting of presumed diabetic nephropathy- microalbumin:cr 91 as of July 17, 2018  Also with myeloma as above      5  GERD - on nexium every other day   Prolonged use of PPIs can lead to chronic interstitial nephritis       6  Ischemic cardiomyopathy/systolic CHF - on furosemide 20mg daily, monitor daily weight  Record weights daily and bring to cardiology office     7  DM2, well controlled - on glimepiride 2mg daily, last A1C 6 9 as of 7/17/18    8  Kappa light chain myeloma - to receive revlimid, decadron per heme/onc      SUBJECTIVE / INTERVAL HISTORY:  80 y o  male presents in follow up of CKD  Jesi Estimable denies any recent illness/hospitalizations/medication changes since last office visit  He is s/p bone marrow biopsy performed 9/24/18 which shows kappa light chain myeloma  He had some dizziness but that has since resolved  Denies NSAID use  DM2 - off metformin, his glucose levels are high at times  This morning the sugar was 135  BP well controlled now    Review of Systems   Constitutional: Negative for chills and fever  HENT: Negative for sore throat  Eyes: Negative for visual disturbance  Respiratory: Negative for cough and shortness of breath  Cardiovascular: Negative for chest pain and leg swelling  Gastrointestinal: Positive for constipation  Negative for abdominal pain, diarrhea, nausea and vomiting  Endocrine: Negative for polyuria  Genitourinary: Negative for decreased urine volume, difficulty urinating and dysuria  Musculoskeletal: Negative for back pain and myalgias  Skin: Negative for rash  Neurological: Negative for dizziness, light-headedness and numbness  Psychiatric/Behavioral: Negative for confusion  OBJECTIVE:  /72 (BP Location: Left arm, Patient Position: Sitting, Cuff Size: Standard)   Pulse 88   Resp 16   Ht 5' 6" (1 676 m)   Wt 93 kg (205 lb 2 oz)   BMI 33 11 kg/m²   Body mass index is 33 11 kg/m²  Physical exam:  Physical Exam   Constitutional: He appears well-developed and well-nourished  No distress  HENT:   Head: Normocephalic and atraumatic  Mouth/Throat: No oropharyngeal exudate  Eyes: Right eye exhibits no discharge  Left eye exhibits no discharge  No scleral icterus  Neck: Neck supple  Cardiovascular: Normal rate, regular rhythm and normal heart sounds  Pulmonary/Chest: Effort normal and breath sounds normal  He has no wheezes  He has no rales  Abdominal: Soft  Bowel sounds are normal  He exhibits no distension  There is no tenderness  Musculoskeletal: Normal range of motion  He exhibits no edema  Neurological: He is alert  awake   Skin: Skin is warm and dry  No rash noted  He is not diaphoretic  Psychiatric: He has a normal mood and affect  His behavior is normal    Vitals reviewed  Medications:    Current Outpatient Prescriptions:     acetaminophen (TYLENOL) 325 mg tablet, Take 1 - 2 tabs PO Q4 PRN pain, Disp: 30 tablet, Rfl: 0    aspirin 81 MG tablet, Take 81 mg by mouth daily  , Disp: , Rfl:     atorvastatin (LIPITOR) 40 mg tablet, Take 1 tablet (40 mg total) by mouth daily, Disp: 90 tablet, Rfl: 3    bimatoprost (LUMIGAN) 0 01 % ophthalmic drops, Apply to eye, Disp: , Rfl:     esomeprazole (NexIUM) 40 MG capsule, Take 40 mg by mouth every evening , Disp: , Rfl:     fluticasone (FLONASE) 50 mcg/act nasal spray, into each nostril, Disp: , Rfl:     furosemide (LASIX) 40 mg tablet, Take 0 5 tablets (20 mg total) by mouth daily, Disp: 90 tablet, Rfl: 0    glimepiride (AMARYL) 2 mg tablet, Take 2 mg by mouth every evening , Disp: , Rfl:     glucose blood (ACCU-CHEK CHANDRAKANT PLUS) test strip, by In Vitro route, Disp: , Rfl:     guaiFENesin (MUCINEX) 600 mg 12 hr tablet, Take 1 tablet (600 mg total) by mouth 2 (two) times a day, Disp: 60 tablet, Rfl: 0    isosorbide dinitrate (ISORDIL) 30 mg tablet, Take 30 mg by mouth daily  , Disp: , Rfl:     lenalidomide (REVLIMID) 5 MG CAPS, Take one cap by mouth on days 1-14 every 21 days, Disp: 14 capsule, Rfl: 0    metoprolol succinate (TOPROL-XL) 25 mg 24 hr tablet, TAKE 1 TABLET DAILY, Disp: 90 tablet, Rfl: 3    nitroglycerin (NITROSTAT) 0 4 mg SL tablet, Place 1 tablet under the tongue every 5 (five) minutes as needed, Disp: , Rfl:     ONE TOUCH ULTRA TEST test strip, The patient test once daily  , Disp: 100 each, Rfl: 2    ONETOUCH DELICA LANCETS 91V MISC, by Does not apply route daily, Disp: 100 each, Rfl: 3    potassium chloride (KLOR-CON 10) 10 mEq tablet, Take 1 tablet (10 mEq total) by mouth daily, Disp: 90 tablet, Rfl: 3    timolol (TIMOPTIC) 0 5 % ophthalmic solution, , Disp: , Rfl:     Allergies: Allergies as of 10/09/2018    (No Known Allergies)       The following portions of the patient's history were reviewed and updated as appropriate: past family history, past surgical history and problem list     Laboratory Results:  Lab Results   Component Value Date    GLUCOSE 90 10/05/2015    CALCIUM 9 5 09/06/2018     09/06/2018    K 3 9 09/06/2018    CO2 26 09/06/2018     09/06/2018    BUN 22 09/06/2018    CREATININE 1 91 (H) 09/06/2018        Lab Results   Component Value Date    CALCIUM 9 5 09/06/2018       Portions of the record may have been created with voice recognition software   Occasional wrong word or "sound a like" substitutions may have occurred due to the inherent limitations of voice recognition software   Read the chart carefully and recognize, using context, where substitutions have occurred

## 2018-10-10 ENCOUNTER — DOCUMENTATION (OUTPATIENT)
Dept: HEMATOLOGY ONCOLOGY | Facility: CLINIC | Age: 83
End: 2018-10-10

## 2018-10-10 NOTE — PROGRESS NOTES
10/9/2018 received notification from clinical the pt will be starting revlimid 5 mg  Completed auth form, sent it to Dr Tammie Faith for review and signature, received it back, and submitted for auth      10/10/2018 received approval letter from Wilver Lagunas #[id number] is valid from 12/30/2017 through 12/31/2018  Notified clinical, [facility], and financial   Requested [facility] let us know what pharmacy they will be sending the order to since they are not able to provide revlimid at this time  Received notification from [facility] the order is being sent over to [facility]

## 2018-10-11 ENCOUNTER — TELEPHONE (OUTPATIENT)
Dept: HEMATOLOGY ONCOLOGY | Facility: CLINIC | Age: 83
End: 2018-10-11

## 2018-10-11 NOTE — TELEPHONE ENCOUNTER
Pt's nephew "Girard Petar" wants a call back regarding getting help for pt's revlimid  Please address/ advise, thanks

## 2018-10-11 NOTE — TELEPHONE ENCOUNTER
Returned call to Christopher Delvalle - explained that Rx is being processed by the specialty pharmacy  Co-pay payment will be determined and patient will be notified  If he has a high co-pay our financial team will help with co-pay assistance  They are aware that the medication can take at least 2 weeks to get to the patient    They will call with any additional questions or concerns

## 2018-10-17 DIAGNOSIS — C90.00 MULTIPLE MYELOMA NOT HAVING ACHIEVED REMISSION (HCC): Primary | ICD-10-CM

## 2018-10-17 RX ORDER — DEXAMETHASONE 4 MG/1
TABLET ORAL
Qty: 15 TABLET | Refills: 3 | Status: SHIPPED | OUTPATIENT
Start: 2018-10-17 | End: 2018-11-06 | Stop reason: SDUPTHER

## 2018-10-17 NOTE — TELEPHONE ENCOUNTER
Patients naomy Petersen is calling, he got a phone call from the pharmacy where the patients Revlimid comes from  Asking about a steroid prescription that they want to deliver at the same time as the revlimid but they did not received a rx for the steroid? Please call naomy Petersen back at [phone]  Thank you

## 2018-10-22 ENCOUNTER — HOSPITAL ENCOUNTER (OUTPATIENT)
Facility: HOSPITAL | Age: 83
Setting detail: OBSERVATION
Discharge: HOME | End: 2018-10-23
Attending: EMERGENCY MEDICINE | Admitting: INTERNAL MEDICINE
Payer: COMMERCIAL

## 2018-10-22 DIAGNOSIS — N39.0 UTI (URINARY TRACT INFECTION): ICD-10-CM

## 2018-10-22 DIAGNOSIS — I25.5 ISCHEMIC CARDIOMYOPATHY: ICD-10-CM

## 2018-10-22 DIAGNOSIS — R55 SYNCOPE: Primary | ICD-10-CM

## 2018-10-22 PROBLEM — L08.9 PUSTULE: Status: ACTIVE | Noted: 2018-10-22

## 2018-10-22 LAB
ALBUMIN SERPL BCP-MCNC: 3.1 G/DL (ref 3.5–5)
ALP SERPL-CCNC: 126 U/L (ref 46–116)
ALT SERPL W P-5'-P-CCNC: 23 U/L (ref 12–78)
ANION GAP SERPL CALCULATED.3IONS-SCNC: 7 MMOL/L (ref 4–13)
APTT PPP: 25 SECONDS (ref 24–36)
AST SERPL W P-5'-P-CCNC: 20 U/L (ref 5–45)
BACTERIA UR QL AUTO: ABNORMAL /HPF
BASOPHILS # BLD AUTO: 0.03 THOUSANDS/ΜL (ref 0–0.1)
BASOPHILS NFR BLD AUTO: 0 % (ref 0–1)
BILIRUB DIRECT SERPL-MCNC: 0.2 MG/DL (ref 0–0.2)
BILIRUB SERPL-MCNC: 0.8 MG/DL (ref 0.2–1)
BILIRUB UR QL STRIP: NEGATIVE
BUN SERPL-MCNC: 22 MG/DL (ref 5–25)
CALCIUM SERPL-MCNC: 8.8 MG/DL (ref 8.3–10.1)
CHLORIDE SERPL-SCNC: 103 MMOL/L (ref 100–108)
CLARITY UR: CLEAR
CO2 SERPL-SCNC: 25 MMOL/L (ref 21–32)
COLOR UR: ABNORMAL
CREAT SERPL-MCNC: 1.58 MG/DL (ref 0.6–1.3)
EOSINOPHIL # BLD AUTO: 0.19 THOUSAND/ΜL (ref 0–0.61)
EOSINOPHIL NFR BLD AUTO: 2 % (ref 0–6)
ERYTHROCYTE [DISTWIDTH] IN BLOOD BY AUTOMATED COUNT: 13 % (ref 11.6–15.1)
GFR SERPL CREATININE-BSD FRML MDRD: 38 ML/MIN/1.73SQ M
GLUCOSE SERPL-MCNC: 155 MG/DL (ref 65–140)
GLUCOSE SERPL-MCNC: 163 MG/DL (ref 65–140)
GLUCOSE SERPL-MCNC: 164 MG/DL (ref 65–140)
GLUCOSE UR STRIP-MCNC: ABNORMAL MG/DL
HCT VFR BLD AUTO: 31.2 % (ref 36.5–49.3)
HGB BLD-MCNC: 10.2 G/DL (ref 12–17)
HGB UR QL STRIP.AUTO: ABNORMAL
IMM GRANULOCYTES # BLD AUTO: 0.04 THOUSAND/UL (ref 0–0.2)
IMM GRANULOCYTES NFR BLD AUTO: 0 % (ref 0–2)
INR PPP: 1.08 (ref 0.86–1.17)
KETONES UR STRIP-MCNC: NEGATIVE MG/DL
LEUKOCYTE ESTERASE UR QL STRIP: ABNORMAL
LYMPHOCYTES # BLD AUTO: 1.09 THOUSANDS/ΜL (ref 0.6–4.47)
LYMPHOCYTES NFR BLD AUTO: 12 % (ref 14–44)
MCH RBC QN AUTO: 31.5 PG (ref 26.8–34.3)
MCHC RBC AUTO-ENTMCNC: 32.7 G/DL (ref 31.4–37.4)
MCV RBC AUTO: 96 FL (ref 82–98)
MONOCYTES # BLD AUTO: 0.3 THOUSAND/ΜL (ref 0.17–1.22)
MONOCYTES NFR BLD AUTO: 3 % (ref 4–12)
NEUTROPHILS # BLD AUTO: 7.75 THOUSANDS/ΜL (ref 1.85–7.62)
NEUTS SEG NFR BLD AUTO: 83 % (ref 43–75)
NITRITE UR QL STRIP: POSITIVE
NON-SQ EPI CELLS URNS QL MICRO: ABNORMAL /HPF
NRBC BLD AUTO-RTO: 0 /100 WBCS
NT-PROBNP SERPL-MCNC: 2512 PG/ML
PH UR STRIP.AUTO: 6.5 [PH] (ref 4.5–8)
PLATELET # BLD AUTO: 160 THOUSANDS/UL (ref 149–390)
PMV BLD AUTO: 10.6 FL (ref 8.9–12.7)
POTASSIUM SERPL-SCNC: 4 MMOL/L (ref 3.5–5.3)
PROT SERPL-MCNC: 6.5 G/DL (ref 6.4–8.2)
PROT UR STRIP-MCNC: NEGATIVE MG/DL
PROTHROMBIN TIME: 13.7 SECONDS (ref 11.8–14.2)
RBC # BLD AUTO: 3.24 MILLION/UL (ref 3.88–5.62)
RBC #/AREA URNS AUTO: ABNORMAL /HPF
SODIUM SERPL-SCNC: 135 MMOL/L (ref 136–145)
SP GR UR STRIP.AUTO: 1.01 (ref 1–1.03)
TROPONIN I SERPL-MCNC: 0.02 NG/ML
TSH SERPL DL<=0.05 MIU/L-ACNC: 2.01 UIU/ML (ref 0.36–3.74)
UROBILINOGEN UR QL STRIP.AUTO: 0.2 E.U./DL
WBC # BLD AUTO: 9.4 THOUSAND/UL (ref 4.31–10.16)
WBC #/AREA URNS AUTO: ABNORMAL /HPF

## 2018-10-22 PROCEDURE — 85610 PROTHROMBIN TIME: CPT | Performed by: EMERGENCY MEDICINE

## 2018-10-22 PROCEDURE — 83880 ASSAY OF NATRIURETIC PEPTIDE: CPT | Performed by: EMERGENCY MEDICINE

## 2018-10-22 PROCEDURE — 85025 COMPLETE CBC W/AUTO DIFF WBC: CPT | Performed by: EMERGENCY MEDICINE

## 2018-10-22 PROCEDURE — 84443 ASSAY THYROID STIM HORMONE: CPT | Performed by: EMERGENCY MEDICINE

## 2018-10-22 PROCEDURE — 81001 URINALYSIS AUTO W/SCOPE: CPT | Performed by: PHYSICIAN ASSISTANT

## 2018-10-22 PROCEDURE — 82948 REAGENT STRIP/BLOOD GLUCOSE: CPT

## 2018-10-22 PROCEDURE — 36415 COLL VENOUS BLD VENIPUNCTURE: CPT | Performed by: EMERGENCY MEDICINE

## 2018-10-22 PROCEDURE — 99285 EMERGENCY DEPT VISIT HI MDM: CPT

## 2018-10-22 PROCEDURE — 80076 HEPATIC FUNCTION PANEL: CPT | Performed by: EMERGENCY MEDICINE

## 2018-10-22 PROCEDURE — 85730 THROMBOPLASTIN TIME PARTIAL: CPT | Performed by: EMERGENCY MEDICINE

## 2018-10-22 PROCEDURE — 80048 BASIC METABOLIC PNL TOTAL CA: CPT | Performed by: EMERGENCY MEDICINE

## 2018-10-22 PROCEDURE — 96361 HYDRATE IV INFUSION ADD-ON: CPT

## 2018-10-22 PROCEDURE — 99220 PR INITIAL OBSERVATION CARE/DAY 70 MINUTES: CPT | Performed by: INTERNAL MEDICINE

## 2018-10-22 PROCEDURE — 84484 ASSAY OF TROPONIN QUANT: CPT | Performed by: EMERGENCY MEDICINE

## 2018-10-22 PROCEDURE — 93005 ELECTROCARDIOGRAM TRACING: CPT

## 2018-10-22 PROCEDURE — 96360 HYDRATION IV INFUSION INIT: CPT

## 2018-10-22 RX ORDER — METOPROLOL SUCCINATE 25 MG/1
25 TABLET, EXTENDED RELEASE ORAL DAILY
Status: DISCONTINUED | OUTPATIENT
Start: 2018-10-23 | End: 2018-10-23 | Stop reason: HOSPADM

## 2018-10-22 RX ORDER — SODIUM CHLORIDE 9 MG/ML
50 INJECTION, SOLUTION INTRAVENOUS ONCE
Status: COMPLETED | OUTPATIENT
Start: 2018-10-22 | End: 2018-10-22

## 2018-10-22 RX ORDER — ATORVASTATIN CALCIUM 40 MG/1
40 TABLET, FILM COATED ORAL EVERY EVENING
Status: DISCONTINUED | OUTPATIENT
Start: 2018-10-22 | End: 2018-10-23 | Stop reason: HOSPADM

## 2018-10-22 RX ORDER — PANTOPRAZOLE SODIUM 40 MG/1
40 TABLET, DELAYED RELEASE ORAL
Status: DISCONTINUED | OUTPATIENT
Start: 2018-10-23 | End: 2018-10-23 | Stop reason: HOSPADM

## 2018-10-22 RX ORDER — HEPARIN SODIUM 5000 [USP'U]/ML
5000 INJECTION, SOLUTION INTRAVENOUS; SUBCUTANEOUS EVERY 8 HOURS SCHEDULED
Status: DISCONTINUED | OUTPATIENT
Start: 2018-10-22 | End: 2018-10-23 | Stop reason: HOSPADM

## 2018-10-22 RX ORDER — LENALIDOMIDE 5 MG/1
5 CAPSULE ORAL DAILY
Status: DISCONTINUED | OUTPATIENT
Start: 2018-10-23 | End: 2018-10-23 | Stop reason: HOSPADM

## 2018-10-22 RX ORDER — ONDANSETRON 2 MG/ML
4 INJECTION INTRAMUSCULAR; INTRAVENOUS EVERY 6 HOURS PRN
Status: DISCONTINUED | OUTPATIENT
Start: 2018-10-22 | End: 2018-10-23 | Stop reason: HOSPADM

## 2018-10-22 RX ORDER — ACETAMINOPHEN 325 MG/1
650 TABLET ORAL EVERY 6 HOURS PRN
Status: DISCONTINUED | OUTPATIENT
Start: 2018-10-22 | End: 2018-10-23 | Stop reason: HOSPADM

## 2018-10-22 RX ORDER — POTASSIUM CHLORIDE 750 MG/1
10 TABLET, EXTENDED RELEASE ORAL DAILY
Status: DISCONTINUED | OUTPATIENT
Start: 2018-10-23 | End: 2018-10-23 | Stop reason: HOSPADM

## 2018-10-22 RX ORDER — ASPIRIN 81 MG/1
81 TABLET, CHEWABLE ORAL DAILY
Status: DISCONTINUED | OUTPATIENT
Start: 2018-10-23 | End: 2018-10-23 | Stop reason: HOSPADM

## 2018-10-22 RX ADMIN — INSULIN LISPRO 1 UNITS: 100 INJECTION, SOLUTION INTRAVENOUS; SUBCUTANEOUS at 21:25

## 2018-10-22 RX ADMIN — SODIUM CHLORIDE 50 ML/HR: 0.9 INJECTION, SOLUTION INTRAVENOUS at 20:15

## 2018-10-22 RX ADMIN — ACETAMINOPHEN 650 MG: 325 TABLET, FILM COATED ORAL at 22:30

## 2018-10-22 RX ADMIN — SODIUM CHLORIDE 1000 ML: 0.9 INJECTION, SOLUTION INTRAVENOUS at 16:01

## 2018-10-22 RX ADMIN — HEPARIN SODIUM 5000 UNITS: 5000 INJECTION, SOLUTION INTRAVENOUS; SUBCUTANEOUS at 21:22

## 2018-10-22 RX ADMIN — CEFAZOLIN SODIUM 2000 MG: 2 SOLUTION INTRAVENOUS at 20:17

## 2018-10-22 NOTE — ASSESSMENT & PLAN NOTE
Lab Results   Component Value Date    HGBA1C 6 9 (H) 07/17/2018       No results for input(s): POCGLU in the last 72 hours      Blood Sugar Average: Last 72 hrs:  · Slightly hyperglycemic on admission  · Hold Amaryl  · SSI algorithm with meals and bedtime   · QID accuchecks

## 2018-10-22 NOTE — ASSESSMENT & PLAN NOTE
· Present prior to admission, witnessed at family member's nursing home  Was out for "15 minutes", but probably less  Patient felt dizzy prior to occurrence  Blood glucose was 130 at scene  Reports occurring around 1400, last meal was [id number]   Patient feels better at this time  · Admit patient to med/surg under observation status   · Telemetry   · Fluids once   · Check EKG in AM  · Check orthostatics    · Rule out UTI

## 2018-10-22 NOTE — ASSESSMENT & PLAN NOTE
· Noted incidentally on exam  On exam with gentle pressure the pustule rupture  Was then drained manually   Discussed with general surgery in sidebar - recommended antibiotics and monitoring   · Start Ancef - renally dosed   · Monitor serial exam

## 2018-10-22 NOTE — H&P
[facility]  H&P- Lyndsey Genera 9/22/1930, 80 y o  male MRN: [id number]    Unit/Bed#: -01 Encounter: [id number]    Primary Care Provider: Shirley Colon MD   Date and time admitted to hospital: 10/22/2018  3:12 PM        * Syncope   Assessment & Plan    · Present prior to admission, witnessed at family member's nursing home  Was out for "15 minutes", but probably less  Patient felt dizzy prior to occurrence  Blood glucose was 130 at scene  Reports occurring around 1400, last meal was [id number]  Patient feels better at this time  · Admit patient to med/surg under observation status   · Telemetry   · Fluids once   · Check EKG in AM  · Check orthostatics    · Rule out UTI      Type 2 diabetes mellitus without complication, without long-term current use of insulin (HCC)   Assessment & Plan    Lab Results   Component Value Date    HGBA1C 6 9 (H) 07/17/2018       No results for input(s): POCGLU in the last 72 hours  Blood Sugar Average: Last 72 hrs:  · Slightly hyperglycemic on admission  · Hold Amaryl  · SSI algorithm with meals and bedtime   · QID accuchecks      Stage 4 chronic kidney disease ([facility] )   Assessment & Plan    · Creatinine actually below baseline  Typically between 1 9-2 2  Creatinine 1 5 on admission   · Hold Lasix   · Monitor creatinine     Kappa light chain myeloma (HCC)   Assessment & Plan    · Recently started Lenalidomide and Decadron every Friday   · Continue Lenalidomide 5 mg QD     Chronic systolic congestive heart failure ([facility] [address] )   Assessment & Plan    · Appears euvolemic/slightly dry on admission   Has been having frequent urination   · Hold Lasix for now   · Will give one liter of IV fluid overnight   · Check urinalysis     Essential hypertension   Assessment & Plan    · BP acceptable on admission   · Continue home agents - Toprol  · Hold Lasix   · Check orthostatics      Pustule   Assessment & Plan    · Noted incidentally on exam  On exam with gentle pressure the pustule rupture  Was then drained manually  Discussed with general surgery in sidebar - recommended antibiotics and monitoring   · Start Ancef - renally dosed   · Monitor serial exam        VTE Prophylaxis: Heparin  / sequential compression device   Code Status: Full Code  POLST: POLST form is not discussed and not completed at this time  Discussion with family: Discussed with son at bedside     Anticipated Length of Stay:  Patient will be admitted on an Observation basis with an anticipated length of stay of  Less than 2 midnights  Justification for Hospital Stay: Syncope    Total Time for Visit, including Counseling / Coordination of Care: 1 hour  Greater than 50% of this total time spent on direct patient counseling and coordination of care  Chief Complaint:   Syncope    History of Present Illness:    Shorty Bernal is a 80 y o  male with a history of chronic systolic CHF, HTN, Stage 4 CKD, and Multiple Myeloma  who presents with syncope  The syncope was witnessed at patient's family member's nursing home and was transported here  Patient reports prior to the event feeling "fuzzy" in his head and was holding his head to maintain consciousness  According to the cousin that relayed message to son at bedside the patient was out for "15 minutes", but the son doubts this  When the patient regained consciousness he was fully alert and oriented, but was curious why everyone was surrounding him  The cousin reports that the patient did stick his tongue out during the episode but there was no convulsion or voiding of bowel of bladder  The patient denied chest pain or palpitations  Presently he feels back to normal  The last few days the patient reports frequent urination and states that when he urinated just now he felt some burning sensation  He reports not drinking much water over the last few days  He denies fevers, chills, or other urinary complaints       Of note, the patient reported that he started medication for multiple myeloma, but has so far been tolerating them well and is on the 4th day  They also report that they noticed an "outbreak" on the patient's lower left thigh  They describe this as a bump  Review of Systems:    Review of Systems   Constitutional: Negative for appetite change, chills, diaphoresis, fatigue and fever  HENT: Negative for congestion, rhinorrhea and sore throat  Eyes: Negative for visual disturbance  Respiratory: Negative for cough, chest tightness, shortness of breath and wheezing  Cardiovascular: Negative for chest pain, palpitations and leg swelling  Gastrointestinal: Negative for abdominal pain, constipation, diarrhea, nausea and vomiting  Genitourinary: Positive for dysuria and frequency  Musculoskeletal: Negative for arthralgias, gait problem and myalgias  Skin: Positive for rash  Neurological: Positive for dizziness and syncope  Negative for weakness, light-headedness, numbness and headaches  All other systems reviewed and are negative  Past Medical and Surgical History:     Past Medical History:   Diagnosis Date    [facility]     Chronic kidney disease     Coronary artery disease     Diabetes mellitus ([facility]     Glaucoma     Hypertension     Multiple myeloma ([facility]     Occluded coronary artery stent     Left       Past Surgical History:   Procedure Laterality Date    BACK SURGERY      CARDIAC CATHETERIZATION  04/05/2004    CT BONE MARROW BIOPSY AND ASPIRATION  9/24/2018    KNEE SURGERY      THROMBOENDARTERECTOMY Right     Cartoid       Meds/Allergies:    Prior to Admission medications    Medication Sig Start Date End Date Taking? Authorizing Provider   acetaminophen (TYLENOL) 325 mg tablet Take 1 - 2 tabs PO Q4 PRN pain 4/7/16  Yes Jany Loaiza PA-C   aspirin 81 MG tablet Take 81 mg by mouth daily     Yes Historical Provider, MD   atorvastatin (LIPITOR) 40 mg tablet Take 1 tablet (40 mg total) by mouth daily 7/13/18  Yes Omar Pineda MD   bimatoprost (LUMIGAN) 0 01 % ophthalmic drops Apply to eye   Yes Historical Provider, MD   dexamethasone (DECADRON) 4 mg tablet Take 5 tablets by mouth once weekly - total dose 20mg 10/17/18  Yes Grace Lerner DO   esomeprazole (NexIUM) 40 MG capsule Take 40 mg by mouth every evening  Yes Historical Provider, MD   fluticasone (FLONASE) 50 mcg/act nasal spray into each nostril   Yes Historical Provider, MD   furosemide (LASIX) 40 mg tablet Take 0 5 tablets (20 mg total) by mouth daily 7/24/18  Yes Omar Pineda MD   glimepiride (AMARYL) 2 mg tablet Take 2 mg by mouth every evening  Yes Historical Provider, MD   glucose blood (ACCU-CHEK CHANDRAKANT PLUS) test strip by In Vitro route 12/15/11  Yes Historical Provider, MD   guaiFENesin (MUCINEX) 600 mg 12 hr tablet Take 1 tablet (600 mg total) by mouth 2 (two) times a day 7/9/18  Yes Omar Pineda MD   lenalidomide (REVLIMID) 5 MG CAPS Take one cap by mouth on days 1-14 every 21 days 10/9/18  Yes Grace Lerner DO   metoprolol succinate (TOPROL-XL) 25 mg 24 hr tablet TAKE 1 TABLET DAILY 5/29/18  Yes Omar Pineda MD   nitroglycerin (NITROSTAT) 0 4 mg SL tablet Place 1 tablet under the tongue every 5 (five) minutes as needed 11/16/11  Yes Historical Provider, MD   ONE TOUCH ULTRA TEST test strip The patient test once daily  4/10/18  Yes MD No Preston Kent LANCETS 26L MISC by Does not apply route daily 4/9/18  Yes Omar Pineda MD   potassium chloride (KLOR-CON 10) 10 mEq tablet Take 1 tablet (10 mEq total) by mouth daily 1/31/18  Yes Omar Pineda MD   timolol (TIMOPTIC) 0 5 % ophthalmic solution  7/25/18  Yes Historical Provider, MD     I have reviewed home medications with patient family member  Allergies: No Known Allergies    Social History:     Marital Status:     Occupation: None  Patient Pre-hospital Living Situation: Home  Patient Pre-hospital Level of Mobility: Full  Patient Pre-hospital Diet Restrictions: None  Substance Use History:   History   Alcohol Use    Yes     Comment: socially; less than once a month     History   Smoking Status    Never Smoker   Smokeless Tobacco    Never Used     Comment: former smoker     History   Drug Use No       Family History:    Family History   Problem Relation Age of Onset    Heart attack Father     Heart disease Mother     Diabetes Mother     Heart disease Sister     COPD Family        Physical Exam:     Vitals:   Blood Pressure: 148/70 (10/22/18 1825)  Pulse: 82 (10/22/18 1825)  Temperature: 97 8 °F (36 6 °C) (10/22/18 1825)  Temp Source: Oral (10/22/18 1825)  Respirations: 20 (10/22/18 1825)  Height: 5' 8" (172 7 cm) (10/22/18 1825)  Weight - Scale: 92 3 kg (203 lb 7 8 oz) (10/22/18 1825)  SpO2: 99 % (10/22/18 1825)    Physical Exam   Constitutional: He is oriented to person, place, and time  Vital signs are normal  He appears well-developed and well-nourished  Non-toxic appearance  No distress  HENT:   Head: Normocephalic and atraumatic  Mouth/Throat: Mucous membranes are dry  Eyes: Conjunctivae and EOM are normal  No scleral icterus  Right pupil is not round  Right pupil is reactive  Left pupil is not round  Left pupil is reactive  Pupils are equal    Neck: Neck supple  Carotid bruit is not present  Cardiovascular: Normal rate, regular rhythm, S1 normal, S2 normal, normal heart sounds and intact distal pulses  Exam reveals no S3 and no S4  No murmur heard  Pulmonary/Chest: Effort normal and breath sounds normal  No accessory muscle usage  No respiratory distress  He has no decreased breath sounds  He has no wheezes  He has no rhonchi  He has no rales  He exhibits no tenderness  Abdominal: Soft  Bowel sounds are normal  He exhibits no distension and no mass  There is no tenderness  There is no rigidity, no rebound and no guarding  Neurological: He is alert and oriented to person, place, and time  He has normal strength  He is not disoriented   He displays no tremor  No cranial nerve deficit or sensory deficit  He displays no seizure activity  GCS eye subscore is 4  GCS verbal subscore is 5  GCS motor subscore is 6  Skin: Skin is warm and dry  Rash noted  Rash is pustular (singular lesion)  No erythema  Additional Data:     Lab Results: I have personally reviewed pertinent reports  Results from last 7 days  Lab Units 10/22/18  1615   WBC Thousand/uL 9 40   HEMOGLOBIN g/dL 10 2*   HEMATOCRIT % 31 2*   PLATELETS Thousands/uL 160   NEUTROS ABS Thousands/µL 7 75*   NEUTROS PCT % 83*   LYMPHS PCT % 12*   MONOS PCT % 3*   EOS PCT % 2       Results from last 7 days  Lab Units 10/22/18  1615   SODIUM mmol/L 135*   POTASSIUM mmol/L 4 0   CHLORIDE mmol/L 103   CO2 mmol/L 25   BUN mg/dL 22   CREATININE mg/dL 1 58*   ANION GAP mmol/L 7   CALCIUM mg/dL 8 8   ALBUMIN g/dL 3 1*   TOTAL BILIRUBIN mg/dL 0 80   ALK PHOS U/L 126*   ALT U/L 23   AST U/L 20       Results from last 7 days  Lab Units 10/22/18  1615   INR  1 08                   Imaging: I have personally reviewed pertinent reports  No orders to display       EKG, Pathology, and Other Studies Reviewed on Admission:   · EKG: SR with incomplete LBBB, 60 BPM    Allscripts / Epic Records Reviewed: Yes     ** Please Note: This note has been constructed using a voice recognition system   **

## 2018-10-22 NOTE — ASSESSMENT & PLAN NOTE
· Appears euvolemic/slightly dry on admission   Has been having frequent urination   · Hold Lasix for now   · Will give one liter of IV fluid overnight   · Check urinalysis

## 2018-10-22 NOTE — ASSESSMENT & PLAN NOTE
· Creatinine actually below baseline  Typically between 1 9-2 2   Creatinine 1 5 on admission   · Hold Lasix   · Monitor creatinine

## 2018-10-22 NOTE — ED PROVIDER NOTES
History  Chief Complaint   Patient presents with    Syncope     PT visiting sister @ [facility], several syncopal episodes in chair, PT taking oral chemo for multiple myeloma, VSS AOx3    51-year-old male, visiting his sister Sebastian Grant, was there with his nephew patient became unresponsive while sitting in a chair  , multiple family members tried to wake him stated that he was completely unresponsive for  About 15 minutes  Upon wakening patient does not recall what happened  , stating he does feel mildly dizzy otherwise back to baseline  There is no witnessed tonic-clonic like activity  , patient woke up prior to paramedic arrival   Patient's nephew was sitting there states that he was unresponsive and pale, did have a pulse did not lose urinary or fecal continence  No drooling no tongue biting history limited due to patient not fully recalling the event  Patient is currently taking new chemotherapy agents for his multiple myeloma  Prior to all this patient denies any associated chest pain shortness of breath abdominal pain states he woke up this morning in his normal state of health, and fell completely normal while he was at [facility] just prior to this syncopal episode        History provided by:  Patient (Patient's nephew)      Prior to Admission Medications   Prescriptions Last Dose Informant Patient Reported? Taking? ONE TOUCH ULTRA TEST test strip  Family Member No Yes   Sig: The patient test once daily  [place]berg[place] Dwarf LANCETS 78K MISC  Family Member No Yes   Sig: by Does not apply route daily   acetaminophen (TYLENOL) 325 mg tablet  Family Member No Yes   Sig: Take 1 - 2 tabs PO Q4 PRN pain   aspirin 81 MG tablet  Family Member Yes Yes   Sig: Take 81 mg by mouth daily     atorvastatin (LIPITOR) 40 mg tablet  Family Member No Yes   Sig: Take 1 tablet (40 mg total) by mouth daily   bimatoprost (LUMIGAN) 0 01 % ophthalmic drops  Family Member Yes Yes   Sig: Apply to eye   dexamethasone (DECADRON) 4 mg tablet   No Yes   Sig: Take 5 tablets by mouth once weekly - total dose 20mg   esomeprazole (NexIUM) 40 MG capsule  Family Member Yes Yes   Sig: Take 40 mg by mouth every evening  fluticasone (FLONASE) 50 mcg/act nasal spray  Family Member Yes Yes   Sig: into each nostril   furosemide (LASIX) 40 mg tablet  Family Member No Yes   Sig: Take 0 5 tablets (20 mg total) by mouth daily   glimepiride (AMARYL) 2 mg tablet  Family Member Yes Yes   Sig: Take 2 mg by mouth every evening     glucose blood (ACCU-CHEK CHANDRAKANT PLUS) test strip  Family Member Yes Yes   Sig: by In Vitro route   guaiFENesin (MUCINEX) 600 mg 12 hr tablet  Family Member No Yes   Sig: Take 1 tablet (600 mg total) by mouth 2 (two) times a day   lenalidomide (REVLIMID) 5 MG CAPS   No Yes   Sig: Take one cap by mouth on days 1-14 every 21 days   metoprolol succinate (TOPROL-XL) 25 mg 24 hr tablet  Family Member No Yes   Sig: TAKE 1 TABLET DAILY   nitroglycerin (NITROSTAT) 0 4 mg SL tablet  Family Member Yes Yes   Sig: Place 1 tablet under the tongue every 5 (five) minutes as needed   potassium chloride (KLOR-CON 10) 10 mEq tablet  Family Member No Yes   Sig: Take 1 tablet (10 mEq total) by mouth daily   timolol (TIMOPTIC) 0 5 % ophthalmic solution  Family Member Yes Yes      Facility-Administered Medications: None       Past Medical History:   Diagnosis Date    [facility]ina [facility]is [facility] [address] [facility]     Chronic kidney disease     Coronary artery disease     Diabetes mellitus ([facility] [address] [facility]     Glaucoma     Hypertension     Multiple myeloma ([facility] [address] [facility]     Occluded coronary artery stent     Left       Past Surgical History:   Procedure Laterality Date    BACK SURGERY      CARDIAC CATHETERIZATION  04/05/2004    CT BONE MARROW BIOPSY AND ASPIRATION  9/24/2018    KNEE SURGERY      THROMBOENDARTERECTOMY Right     Cartoid       Family History   Problem Relation Age of Onset    Heart attack Father     Heart disease Mother     Diabetes Mother     Heart disease Sister     COPD Family      I have reviewed and agree with the history as documented  Social History   Substance Use Topics    Smoking status: Never Smoker    Smokeless tobacco: Never Used      Comment: former smoker    Alcohol use Yes      Comment: social         Review of Systems   Constitutional: Negative for activity change, chills, diaphoresis and fever  HENT: Negative for congestion, sinus pressure and sore throat  Eyes: Negative for pain and visual disturbance  Respiratory: Negative for cough, chest tightness, shortness of breath, wheezing and stridor  Cardiovascular: Negative for chest pain and palpitations  Gastrointestinal: Negative for abdominal distention, abdominal pain, constipation, diarrhea, nausea and vomiting  Genitourinary: Negative for dysuria and frequency  Musculoskeletal: Negative for neck pain and neck stiffness  Skin: Negative for rash  Neurological: Positive for syncope  Negative for dizziness, speech difficulty, light-headedness, numbness and headaches  Physical Exam  Physical Exam   Constitutional: He is oriented to person, place, and time  He appears well-developed  No distress  HENT:   Head: Normocephalic and atraumatic  Eyes: Pupils are equal, round, and reactive to light  Neck: Normal range of motion  Neck supple  No tracheal deviation present  Cardiovascular: Normal rate, regular rhythm, normal heart sounds and intact distal pulses  No murmur heard  Pulmonary/Chest: Effort normal and breath sounds normal  No stridor  No respiratory distress  Abdominal: Soft  He exhibits no distension  There is no tenderness  There is no rebound and no guarding  Musculoskeletal: Normal range of motion  Neurological: He is alert and oriented to person, place, and time  Nonfocal neurological examination   Skin: Skin is warm and dry  He is not diaphoretic  No erythema  There is pallor  Psychiatric: He has a normal mood and affect  Vitals reviewed        Vital Signs  ED Triage Vitals [10/22/18 1521]   Temperature Pulse Respirations Blood Pressure SpO2   97 7 °F (36 5 °C) 67 18 143/68 100 %      Temp Source Heart Rate Source Patient Position - Orthostatic VS BP Location FiO2 (%)   Oral Monitor Lying Right arm --      Pain Score       No Pain           Vitals:    10/22/18 1521 10/22/18 1530 10/22/18 1600 10/22/18 1700   BP: 143/68 143/68     Pulse: 67 62 68 78   Patient Position - Orthostatic VS: Lying          Visual Acuity      ED Medications  Medications   sodium chloride 0 9 % bolus 1,000 mL (0 mL Intravenous Stopped 10/22/18 1739)       Diagnostic Studies  Results Reviewed     Procedure Component Value Units Date/Time    B-type natriuretic peptide [[id number]]  (Abnormal) Collected:  10/22/18 1615    Lab Status:  Final result Specimen:  Blood from Arm, Left Updated:  10/22/18 1726     NT-proBNP 2,512 (H) pg/mL     Hepatic function panel [[id number]]  (Abnormal) Collected:  10/22/18 1615    Lab Status:  Final result Specimen:  Blood from Arm, Left Updated:  10/22/18 1718     Total Bilirubin 0 80 mg/dL      Bilirubin, Direct 0 20 mg/dL      Alkaline Phosphatase 126 (H) U/L      AST 20 U/L      ALT 23 U/L      Total Protein 6 5 g/dL      Albumin 3 1 (L) g/dL     TSH [[id number]]  (Normal) Collected:  10/22/18 1615    Lab Status:  Final result Specimen:  Blood from Arm, Left Updated:  10/22/18 1652     TSH 3RD GENERATON 2 006 uIU/mL     Narrative:         Patients undergoing fluorescein dye angiography may retain small amounts of fluorescein in the body for 48-72 hours post procedure  Samples containing fluorescein can produce falsely depressed TSH values  If the patient had this procedure,a specimen should be resubmitted post fluorescein clearance      Troponin I [[id number]]  (Normal) Collected:  10/22/18 1615    Lab Status:  Final result Specimen:  Blood from Arm, Left Updated:  10/22/18 1644     Troponin I 0 02 ng/mL     Basic metabolic panel [[id number]]  (Abnormal) Collected: 10/22/18 1615    Lab Status:  Final result Specimen:  Blood from Arm, Left Updated:  10/22/18 1642     Sodium 135 (L) mmol/L      Potassium 4 0 mmol/L      Chloride 103 mmol/L      CO2 25 mmol/L      ANION GAP 7 mmol/L      BUN 22 mg/dL      Creatinine 1 58 (H) mg/dL      Glucose 155 (H) mg/dL      Calcium 8 8 mg/dL      eGFR 38 ml/min/1 73sq m     Narrative:         National Kidney Disease Education Program recommendations are as follows:  GFR calculation is accurate only with a steady state creatinine  Chronic Kidney disease less than 60 ml/min/1 73 sq  meters  Kidney failure less than 15 ml/min/1 73 sq  meters      Protime-INR [[id number]]  (Normal) Collected:  10/22/18 1615    Lab Status:  Final result Specimen:  Blood from Arm, Left Updated:  10/22/18 1633     Protime 13 7 seconds      INR 1 08    APTT [[id number]]  (Normal) Collected:  10/22/18 1615    Lab Status:  Final result Specimen:  Blood from Arm, Left Updated:  10/22/18 1633     PTT 25 seconds     CBC and differential [[id number]]  (Abnormal) Collected:  10/22/18 1615    Lab Status:  Final result Specimen:  Blood from Arm, Left Updated:  10/22/18 1621     WBC 9 40 Thousand/uL      RBC 3 24 (L) Million/uL      Hemoglobin 10 2 (L) g/dL      Hematocrit 31 2 (L) %      MCV 96 fL      MCH 31 5 pg      MCHC 32 7 g/dL      RDW 13 0 %      MPV 10 6 fL      Platelets 108 Thousands/uL      nRBC 0 /100 WBCs      Neutrophils Relative 83 (H) %      Immat GRANS % 0 %      Lymphocytes Relative 12 (L) %      Monocytes Relative 3 (L) %      Eosinophils Relative 2 %      Basophils Relative 0 %      Neutrophils Absolute 7 75 (H) Thousands/µL      Immature Grans Absolute 0 04 Thousand/uL      Lymphocytes Absolute 1 09 Thousands/µL      Monocytes Absolute 0 30 Thousand/µL      Eosinophils Absolute 0 19 Thousand/µL      Basophils Absolute 0 03 Thousands/µL                  No orders to display              Procedures  ECG 12 Lead Documentation  Date/Time: 10/22/2018 3:45 PM  Performed by: Mook Bach  Authorized by: Mook Bach     ECG reviewed by me, the ED Provider: yes    Patient location:  ED  Previous ECG:     Previous ECG:  Compared to current    Comparison ECG info:  4 7 2016    Similarity:  No change  Interpretation:     Interpretation: non-specific    Rate:     ECG rate:  60    ECG rate assessment: normal    Rhythm:     Rhythm: sinus rhythm    Ectopy:     Ectopy: none    QRS:     QRS axis:  Normal    QRS intervals:  Normal  Conduction:     Conduction: normal    ST segments:     ST segments:  Non-specific  T waves:     T waves: non-specific             Phone Contacts  ED Phone Contact    ED Course                               MDM  Number of Diagnoses or Management Options  Syncope: new and requires workup  Diagnosis management comments:       Initial ED assessment:  45-year-old male presents after what sounds to be a prolonged syncopal episode of almost 15 minutes at [facility] why he was there as a visitor    This was witnessed by his nephew who is currently at bedside    Initial DDx includes but is not limited to:   Cardiac etiology of syncope vasovagal anemia, electrolyte abnormality, seizure    Initial ED plan:   Blood work, EKG, patient will warrant admission to hospital for further workup and evaluation            Amount and/or Complexity of Data Reviewed  Clinical lab tests: ordered and reviewed  Decide to obtain previous medical records or to obtain history from someone other than the patient: yes  Obtain history from someone other than the patient: yes  Review and summarize past medical records: yes  Discuss the patient with other providers: yes  Independent visualization of images, tracings, or specimens: yes      CritCare Time    Disposition  Final diagnoses:   Syncope     Time reflects when diagnosis was documented in both MDM as applicable and the Disposition within this note     Time User Action Codes Description Comment    10/22/2018 5:42 PM Evangelina Alex Add [[id number]] Syncope       ED Disposition     ED Disposition Condition Comment    Admit  Case was discussed with Dr Venkat Weiss and the patient's admission status was agreed to be Admission Status:  Observation status to the service of Dr Venkat Weiss   Follow-up Information    None         Patient's Medications   Discharge Prescriptions    No medications on file     No discharge procedures on file      ED Provider  Electronically Signed by           Tracy Perera DO  10/22/18 8026

## 2018-10-23 ENCOUNTER — TRANSITIONAL CARE MANAGEMENT (OUTPATIENT)
Dept: INTERNAL MEDICINE CLINIC | Facility: CLINIC | Age: 83
End: 2018-10-23

## 2018-10-23 ENCOUNTER — TELEPHONE (OUTPATIENT)
Dept: HEMATOLOGY ONCOLOGY | Facility: CLINIC | Age: 83
End: 2018-10-23

## 2018-10-23 VITALS
BODY MASS INDEX: 30.84 KG/M2 | DIASTOLIC BLOOD PRESSURE: 50 MMHG | OXYGEN SATURATION: 100 % | SYSTOLIC BLOOD PRESSURE: 100 MMHG | HEART RATE: 66 BPM | HEIGHT: 68 IN | RESPIRATION RATE: 18 BRPM | WEIGHT: 203.48 LBS | TEMPERATURE: 98.9 F

## 2018-10-23 PROBLEM — N39.0 UTI (URINARY TRACT INFECTION): Status: ACTIVE | Noted: 2018-10-23

## 2018-10-23 PROBLEM — R55 SYNCOPE: Status: RESOLVED | Noted: 2018-10-22 | Resolved: 2018-10-23

## 2018-10-23 PROBLEM — L08.9 PUSTULE: Status: RESOLVED | Noted: 2018-10-22 | Resolved: 2018-10-23

## 2018-10-23 LAB
ANION GAP SERPL CALCULATED.3IONS-SCNC: 12 MMOL/L (ref 4–13)
ATRIAL RATE: 60 BPM
ATRIAL RATE: 62 BPM
ATRIAL RATE: 65 BPM
BASOPHILS # BLD MANUAL: 0 THOUSAND/UL (ref 0–0.1)
BASOPHILS NFR MAR MANUAL: 0 % (ref 0–1)
BUN SERPL-MCNC: 20 MG/DL (ref 5–25)
CALCIUM SERPL-MCNC: 8.5 MG/DL (ref 8.3–10.1)
CHLORIDE SERPL-SCNC: 108 MMOL/L (ref 100–108)
CO2 SERPL-SCNC: 23 MMOL/L (ref 21–32)
CREAT SERPL-MCNC: 1.79 MG/DL (ref 0.6–1.3)
EOSINOPHIL # BLD MANUAL: 0 THOUSAND/UL (ref 0–0.4)
EOSINOPHIL NFR BLD MANUAL: 0 % (ref 0–6)
ERYTHROCYTE [DISTWIDTH] IN BLOOD BY AUTOMATED COUNT: 13.2 % (ref 11.6–15.1)
GFR SERPL CREATININE-BSD FRML MDRD: 33 ML/MIN/1.73SQ M
GLUCOSE SERPL-MCNC: 115 MG/DL (ref 65–140)
GLUCOSE SERPL-MCNC: 115 MG/DL (ref 65–140)
GLUCOSE SERPL-MCNC: 179 MG/DL (ref 65–140)
HCT VFR BLD AUTO: 26.6 % (ref 36.5–49.3)
HGB BLD-MCNC: 8.7 G/DL (ref 12–17)
LYMPHOCYTES # BLD AUTO: 1.37 THOUSAND/UL (ref 0.6–4.47)
LYMPHOCYTES # BLD AUTO: 15 % (ref 14–44)
MCH RBC QN AUTO: 31.5 PG (ref 26.8–34.3)
MCHC RBC AUTO-ENTMCNC: 32.7 G/DL (ref 31.4–37.4)
MCV RBC AUTO: 96 FL (ref 82–98)
MONOCYTES # BLD AUTO: 0.18 THOUSAND/UL (ref 0–1.22)
MONOCYTES NFR BLD: 2 % (ref 4–12)
NEUTROPHILS # BLD MANUAL: 7.58 THOUSAND/UL (ref 1.85–7.62)
NEUTS BAND NFR BLD MANUAL: 18 % (ref 0–8)
NEUTS SEG NFR BLD AUTO: 65 % (ref 43–75)
NRBC BLD AUTO-RTO: 0 /100 WBCS
P AXIS: -42 DEGREES
P AXIS: -5 DEGREES
P AXIS: -8 DEGREES
PLATELET # BLD AUTO: 192 THOUSANDS/UL (ref 149–390)
PLATELET BLD QL SMEAR: ADEQUATE
PMV BLD AUTO: 10.7 FL (ref 8.9–12.7)
POTASSIUM SERPL-SCNC: 3.4 MMOL/L (ref 3.5–5.3)
PR INTERVAL: 292 MS
PR INTERVAL: 302 MS
PR INTERVAL: 320 MS
QRS AXIS: -4 DEGREES
QRS AXIS: 3 DEGREES
QRS AXIS: 3 DEGREES
QRSD INTERVAL: 108 MS
QRSD INTERVAL: 108 MS
QRSD INTERVAL: 116 MS
QT INTERVAL: 432 MS
QT INTERVAL: 434 MS
QT INTERVAL: 462 MS
QTC INTERVAL: 440 MS
QTC INTERVAL: 449 MS
QTC INTERVAL: 462 MS
RBC # BLD AUTO: 2.76 MILLION/UL (ref 3.88–5.62)
SODIUM SERPL-SCNC: 143 MMOL/L (ref 136–145)
T WAVE AXIS: 195 DEGREES
T WAVE AXIS: 203 DEGREES
T WAVE AXIS: 44 DEGREES
TOTAL CELLS COUNTED SPEC: 100
VENTRICULAR RATE: 60 BPM
VENTRICULAR RATE: 62 BPM
VENTRICULAR RATE: 65 BPM
WBC # BLD AUTO: 9.13 THOUSAND/UL (ref 4.31–10.16)

## 2018-10-23 PROCEDURE — 85027 COMPLETE CBC AUTOMATED: CPT | Performed by: PHYSICIAN ASSISTANT

## 2018-10-23 PROCEDURE — 93010 ELECTROCARDIOGRAM REPORT: CPT | Performed by: INTERNAL MEDICINE

## 2018-10-23 PROCEDURE — 99239 HOSP IP/OBS DSCHRG MGMT >30: CPT | Performed by: HOSPITALIST

## 2018-10-23 PROCEDURE — 85007 BL SMEAR W/DIFF WBC COUNT: CPT | Performed by: PHYSICIAN ASSISTANT

## 2018-10-23 PROCEDURE — 82948 REAGENT STRIP/BLOOD GLUCOSE: CPT

## 2018-10-23 PROCEDURE — 93005 ELECTROCARDIOGRAM TRACING: CPT

## 2018-10-23 PROCEDURE — 80048 BASIC METABOLIC PNL TOTAL CA: CPT | Performed by: PHYSICIAN ASSISTANT

## 2018-10-23 RX ORDER — ATORVASTATIN CALCIUM 40 MG/1
40 TABLET, FILM COATED ORAL EVERY EVENING
Refills: 0
Start: 2018-10-23 | End: 2019-01-02 | Stop reason: SDUPTHER

## 2018-10-23 RX ORDER — CEFDINIR 300 MG/1
300 CAPSULE ORAL EVERY 12 HOURS SCHEDULED
Qty: 20 CAPSULE | Refills: 0 | Status: SHIPPED | OUTPATIENT
Start: 2018-10-23 | End: 2018-11-02

## 2018-10-23 RX ADMIN — POTASSIUM CHLORIDE 10 MEQ: 750 TABLET, EXTENDED RELEASE ORAL at 08:26

## 2018-10-23 RX ADMIN — HEPARIN SODIUM 5000 UNITS: 5000 INJECTION, SOLUTION INTRAVENOUS; SUBCUTANEOUS at 05:29

## 2018-10-23 RX ADMIN — INSULIN LISPRO 1 UNITS: 100 INJECTION, SOLUTION INTRAVENOUS; SUBCUTANEOUS at 11:25

## 2018-10-23 RX ADMIN — PANTOPRAZOLE SODIUM 40 MG: 40 TABLET, DELAYED RELEASE ORAL at 05:29

## 2018-10-23 RX ADMIN — CEFAZOLIN SODIUM 2000 MG: 2 SOLUTION INTRAVENOUS at 08:27

## 2018-10-23 RX ADMIN — ASPIRIN 81 MG 81 MG: 81 TABLET ORAL at 08:26

## 2018-10-23 NOTE — TELEPHONE ENCOUNTER
Jayda Dear the son called and wanted to let us know his dad passed out and was taken to the hospital being discharged this afternoon  Diagnosis was a bladder infection  Please call the son back he has questions about his meds and wants to know if the dad should continue  Please call the son at [phone]

## 2018-10-23 NOTE — UTILIZATION REVIEW
Initial Clinical Review    Admission: Date/Time/Statement: 10/22/2018  1742 OBSERVATION    Orders Placed This Encounter   Procedures    Place in Observation (expected length of stay for this patient is less than two midnights)     Standing Status:   Standing     Number of Occurrences:   1     Order Specific Question:   Admitting Physician     Answer:   Melecio [facility]     Order Specific Question:   Level of Care     Answer:   Med Surg [16]         ED: Date/Time/Mode of Arrival:   ED Arrival Information     Expected Arrival Acuity Means of Arrival Escorted By Service Admission Type    - 10/22/2018 15:11 Urgent Ambulance [facility] [place] Urgent    Arrival Complaint    Syncope          Chief Complaint:   Chief Complaint   Patient presents with    Syncope     PT visiting sister @ [place], several syncopal episodes in chair, PT taking oral chemo for multiple myeloma, VSS AOx4       History of Illness: 80 y o  male with a history of chronic systolic CHF, HTN, Stage 4 CKD, and Multiple Myeloma  who presents with syncope  The syncope was witnessed at patient's family member's nursing home and was transported here  Patient reports prior to the event feeling "fuzzy" in his head and was holding his head to maintain consciousness  According to the cousin that relayed message to son at bedside the patient was out for "15 minutes", but the son doubts this  When the patient regained consciousness he was fully alert and oriented, but was curious why everyone was surrounding him  The cousin reports that the patient did stick his tongue out during the episode but there was no convulsion or voiding of bowel of bladder  The last few days the patient reports frequent urination and states that when he urinated just now he felt some burning sensation  He reports not drinking much water over the last few days      ED Vital Signs:   ED Triage Vitals [10/22/18 1521]   Temperature Pulse Respirations Blood Pressure SpO2 97 7 °F (36 5 °C) 67 18 143/68 100 %      Temp Source Heart Rate Source Patient Position - Orthostatic VS BP Location FiO2 (%)   Oral Monitor Lying Right arm --      Pain Score       No Pain        Wt Readings from Last 1 Encounters:   10/22/18 92 3 kg (203 lb 7 8 oz)       Vital Signs (abnormal): maximum temperature 102  10/22/18 2201   102 1 °F (38 9 °C)  84  18  126/58  96 %  None (Room air)  Lying   10/22/18 2100  --  --  --  --  --  None (Room air)  --   10/22/18 2028  --  80  18  153/67  --  --  Lying   10/22/18 2006  --  --  --   172/76  --  --  Standing - Orthostatic VS   10/22/18 2004  --  --  --   180/107  --  --  Sitting - Orthostatic VS   10/22/18 2002  98 5 °F (36 9 °C)  --  --  131/90  --  --  Lying - Orthostatic VS     Exam - small pustular lesion on left thigh  Ruptured with minimal pressure with scant purulence but then mostly bloody drainage  Abnormal Labs/Diagnostic Test Results:  NT-proBNP 2512  Alkaline phosphatase 126  Albumin 3 1  Na 135    Bun 22  Creatinine 1 58  Glucose 155  Wbc 9 40 hgb 10 2, hct 31 2    UA 1/4% glucose  Trace blood  + nitrite  Small leukocytes  10-20 WBC  Innumerable bacteria  2009 glucsoe 163    10/23/2018-  K 3 4  Wbc 9 13, hgb 8 7, hct 26 6  ED Treatment:   Medication Administration from 10/22/2018 1511 to 10/22/2018 1829       Date/Time Order Dose Route Action Comments     10/22/2018 1739 sodium chloride 0 9 % bolus 1,000 mL 0 mL Intravenous Stopped      10/22/2018 1601 sodium chloride 0 9 % bolus 1,000 mL 1,000 mL Intravenous New Bag           Past Medical/Surgical History:   Past Medical History:   Diagnosis Date    [facility]is [facility]     Chronic kidney disease     Coronary artery disease     Diabetes mellitus ([facility]     Glaucoma     Hypertension     [facility]     Occluded coronary artery stent        Admitting Diagnosis: Syncope [R55]    Age/Sex: 80 y o  male    Assessment/Plan:  This is a 80year old male who lives at home with with a history of chronic systolic CHF, HTN, Stage 4 CKD, and Multiple Myeloma  who presents with syncope  Patient was visiting his sister at the nursing home when he felt fuzzy in head and passed out  On arrival to ED, patient admitted to urinary burning and poor fluid intake, he recently started medication for multiple myeloma  In ED, pustule found on exam, ruptured, and will treat with renal dose ancef  Patient is admitted to telemetry under observation with syncope  Plan is telemetry, IVF, check orthostatics, r/o UTI    Admission Orders:  10/22/2018  1742 OBSERVATION   Scheduled Meds:   Current Facility-Administered Medications:  acetaminophen 650 mg Oral Q6H PRN    aspirin 81 mg Oral Daily    atorvastatin 40 mg Oral QPM    cefazolin 2,000 mg Intravenous Q12H Last Rate: 2,000 mg (10/22/18 2017)   heparin (porcine) 5,000 Units Subcutaneous Q8H [address]    insulin lispro 1-6 Units Subcutaneous TID AC    insulin lispro 1-6 Units Subcutaneous HS    lenalidomide 5 mg Oral Daily    metoprolol succinate 25 mg Oral Daily    ondansetron 4 mg Intravenous Q6H PRN    pantoprazole 40 mg Oral Early Morning    potassium chloride 10 mEq Oral Daily      Continuous Infusions:    PRN Meds:   Acetaminophen - used x 1      OTHER ORDERS:  Fingerstick glucose qid  Telemetry   scds      Thank you,  [address]  Utilization Review Department  Phone: [phone]; Fax [phone]  ATTENTION: Please call with any questions or concerns to [phone]  and carefully follow the prompts so that you are directed to the right person  Send all requests for admission clinical reviews, approved or denied determinations and any other requests to fax [phone]   All voicemails are confidential

## 2018-10-23 NOTE — ASSESSMENT & PLAN NOTE
Lab Results   Component Value Date    HGBA1C 6 9 (H) 07/17/2018       Recent Labs      10/22/18   2009  10/22/18   2103  10/23/18   0742  10/23/18   1108   POCGLU  163*  164*  115  179*       Blood Sugar Average: Last 72 hrs:  · (P) 155  25Slightly hyperglycemic on admission  · Hold Amaryl  · SSI algorithm with meals and bedtime   · QID accuchecks

## 2018-10-23 NOTE — SOCIAL WORK
Patient is HRR appropriate  SANDRITA appointment schedule for this Thursday on 10/25/18 at 9:30am  CM will follow patient through discharge  Son will  patient today around 3pm  Cm will follow patient

## 2018-10-23 NOTE — TELEPHONE ENCOUNTER
Returned call to patients son - Per Dr Ti Diego bladder infection and syncapol episode not related to Revlimid  Patient can proceed with scheduled doses    Son will call with any additional questions or concerns

## 2018-10-23 NOTE — DISCHARGE INSTRUCTIONS
You were hospitalized due to an episode of syncope likely related to mild dehydration and UTI  You were cared for on the 4th floor under the service of Courtney Cortés MD with the [facility] who covers for Dilma Husain MD while you were hospitalized  If you have any questions or concerns related to this hospitalization, you may contact us at [phone]  For follow up, we recommend that you follow up with your primary care physician  We also provide the following instructions / recommendations at discharge:     · Please continue taking your antibiotic (omnicef 300mg BID) as directed for the next 10 days  · If you develop fevers, chills, or inability to eat or drink, you should contact your PCP who may direct you to the nearest emergency room

## 2018-10-23 NOTE — PLAN OF CARE
Problem: DISCHARGE PLANNING - CARE MANAGEMENT  Goal: Discharge to post-acute care or home with appropriate resources  INTERVENTIONS:  - Conduct assessment to determine patient/family and health care team treatment goals, and need for post-acute services based on payer coverage, community resources, and patient preferences, and barriers to discharge  - Address psychosocial, clinical, and financial barriers to discharge as identified in assessment in conjunction with the patient/family and health care team  - Arrange appropriate level of post-acute services according to patients   needs and preference and payer coverage in collaboration with the physician and health care team  - Communicate with and update the patient/family, physician, and health care team regarding progress on the discharge plan  - Arrange appropriate transportation to post-acute venues  Outcome: Adequate for Discharge  Patient is HRR appropriate  SANDRITA appointment schedule for this Thursday on 10/25/18 at 9:30am  CM will follow patient through discharge  Son will  patient today around 3pm  Cm will follow patient

## 2018-10-23 NOTE — PLAN OF CARE
CARDIOVASCULAR - ADULT     Maintains optimal cardiac output and hemodynamic stability Progressing        DISCHARGE PLANNING     Discharge to home or other facility with appropriate resources Progressing        NEUROSENSORY - ADULT     Achieves stable or improved neurological status Progressing        Nutrition/Hydration-ADULT     Nutrient/Hydration intake appropriate for improving, restoring or maintaining nutritional needs Progressing        Potential for Falls     Patient will remain free of falls Progressing        Prexisting or High Potential for Compromised Skin Integrity     Skin integrity is maintained or improved Progressing

## 2018-10-25 ENCOUNTER — PATIENT OUTREACH (OUTPATIENT)
Dept: INTERNAL MEDICINE CLINIC | Facility: CLINIC | Age: 83
End: 2018-10-25

## 2018-10-25 ENCOUNTER — OFFICE VISIT (OUTPATIENT)
Dept: INTERNAL MEDICINE CLINIC | Facility: CLINIC | Age: 83
End: 2018-10-25
Payer: COMMERCIAL

## 2018-10-25 VITALS
WEIGHT: 205.8 LBS | HEIGHT: 68 IN | BODY MASS INDEX: 31.19 KG/M2 | OXYGEN SATURATION: 99 % | HEART RATE: 101 BPM | SYSTOLIC BLOOD PRESSURE: 120 MMHG | TEMPERATURE: 97.6 F | DIASTOLIC BLOOD PRESSURE: 73 MMHG

## 2018-10-25 DIAGNOSIS — I10 ESSENTIAL HYPERTENSION: ICD-10-CM

## 2018-10-25 DIAGNOSIS — E11.9 TYPE 2 DIABETES MELLITUS WITHOUT COMPLICATION, WITHOUT LONG-TERM CURRENT USE OF INSULIN (HCC): ICD-10-CM

## 2018-10-25 DIAGNOSIS — D63.1 ANEMIA IN STAGE 4 CHRONIC KIDNEY DISEASE (HCC): ICD-10-CM

## 2018-10-25 DIAGNOSIS — Z23 NEEDS FLU SHOT: ICD-10-CM

## 2018-10-25 DIAGNOSIS — N18.4 ANEMIA IN STAGE 4 CHRONIC KIDNEY DISEASE (HCC): ICD-10-CM

## 2018-10-25 DIAGNOSIS — N39.0 URINARY TRACT INFECTION WITH HEMATURIA, SITE UNSPECIFIED: Primary | ICD-10-CM

## 2018-10-25 DIAGNOSIS — R31.9 URINARY TRACT INFECTION WITH HEMATURIA, SITE UNSPECIFIED: Primary | ICD-10-CM

## 2018-10-25 DIAGNOSIS — Z23 NEED FOR 23-POLYVALENT PNEUMOCOCCAL POLYSACCHARIDE VACCINE: ICD-10-CM

## 2018-10-25 DIAGNOSIS — E78.2 MIXED HYPERLIPIDEMIA: ICD-10-CM

## 2018-10-25 DIAGNOSIS — C90.00 KAPPA LIGHT CHAIN MYELOMA (HCC): ICD-10-CM

## 2018-10-25 PROCEDURE — 1111F DSCHRG MED/CURRENT MED MERGE: CPT | Performed by: INTERNAL MEDICINE

## 2018-10-25 PROCEDURE — G0009 ADMIN PNEUMOCOCCAL VACCINE: HCPCS

## 2018-10-25 PROCEDURE — 99496 TRANSJ CARE MGMT HIGH F2F 7D: CPT | Performed by: INTERNAL MEDICINE

## 2018-10-25 PROCEDURE — 90732 PPSV23 VACC 2 YRS+ SUBQ/IM: CPT

## 2018-10-25 PROCEDURE — G0008 ADMIN INFLUENZA VIRUS VAC: HCPCS

## 2018-10-25 PROCEDURE — 90662 IIV NO PRSV INCREASED AG IM: CPT

## 2018-10-25 NOTE — ASSESSMENT & PLAN NOTE
Lab Results   Component Value Date    HGBA1C 6 9 (H) 07/17/2018       Recent Labs      10/22/18   2009  10/22/18   2103  10/23/18   0742  10/23/18   1108   POCGLU  163*  164*  115  179*       Blood Sugar Average: Last 72 hrs:   continue medication

## 2018-10-25 NOTE — PATIENT INSTRUCTIONS
Problem List Items Addressed This Visit        Endocrine    Type 2 diabetes mellitus without complication, without long-term current use of insulin ([facility] [address] [facility] )     Lab Results   Component Value Date    HGBA1C 6 9 (H) 07/17/2018       Recent Labs      10/22/18   2009  10/22/18   2103  10/23/18   0742  10/23/18   1108   POCGLU  163*  164*  115  179*       Blood Sugar Average: Last 72 hrs:   continue medication  Relevant Orders    Ambulatory referral to Ophthalmology       Cardiovascular and Mediastinum    Essential hypertension     Controlled, continue medication  Genitourinary    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)     Hemoglobin is a little lower than baseline, patient denies any acute blood loss         UTI (urinary tract infection) - Primary     Improved, continue the complete course of antibiotics            Other    Mixed hyperlipidemia     Continue statin  Avoid saturated fats  Moving towards a more plant based diet will also improve your cholesterol  Kappa light chain myeloma (HCC)     Follow up heme onc             Other Visit Diagnoses     Needs flu shot        Relevant Orders    influenza vaccine, 1320-5615, high-dose, PF 0 5 mL, for patients 65 yr+ (FLUZONE HIGH-DOSE)    Need for 23-polyvalent pneumococcal polysaccharide vaccine        Relevant Orders    PNEUMOCOCCAL POLYSACCHARIDE VACCINE 23-VALENT =>3YO SQ IM

## 2018-10-25 NOTE — PROGRESS NOTES
Assessment/Plan:    UTI (urinary tract infection)  Improved, continue the complete course of antibiotics    Essential hypertension  Controlled, continue medication  Type 2 diabetes mellitus without complication, without long-term current use of insulin ([facility])  Lab Results   Component Value Date    HGBA1C 6 9 (H) 07/17/2018       Recent Labs      10/22/18   2009  10/22/18   2103  10/23/18   0742  10/23/18   1108   POCGLU  163*  164*  115  179*       Blood Sugar Average: Last 72 hrs:   continue medication  Mixed hyperlipidemia  Continue statin  Avoid saturated fats  Moving towards a more plant based diet will also improve your cholesterol  Kappa light chain myeloma (HCC)  Follow up heme onc  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)  Hemoglobin is a little lower than baseline, patient denies any acute blood loss       Diagnoses and all orders for this visit:    Urinary tract infection with hematuria, site unspecified    Type 2 diabetes mellitus without complication, without long-term current use of insulin ([facility]  -     Ambulatory referral to Ophthalmology;  Future    Mixed hyperlipidemia    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)    Essential hypertension    Needs flu shot  -     influenza vaccine, 1070-5560, high-dose, PF 0 5 mL, for patients 65 yr+ (FLUZONE HIGH-DOSE)    Need for 23-polyvalent pneumococcal polysaccharide vaccine  -     PNEUMOCOCCAL POLYSACCHARIDE VACCINE 23-VALENT =>3YO SQ IM    Kappa light chain myeloma ([facility] [address] )          Subjective:   Date and time hospital follow up call was made:  10/23/2018  2:17 PM  Hospital care reviewed:  Records reviewed  Patient was hopsitalized at:  [facility]  Date of admission:  10/22/18  Date of discharge:  10/23/18  Diagnosis:  syncope  Disposition:  Home  Were the patients medicaitons reviewed and updated:  No  Scheduled for follow up?:  Yes  I have advised the patient to call PCP with any new or worsening symptoms (please type in name along with any credentials):  Destinee Millan   Counseling:  Caregiver            Patient ID: Orly Trivedi is a 80 y o  male  I reviewed patient's hospitalization for syncope  He also had a fever of 102 and dysuria  He was treated with IV Ancef for presumed UTI, and changed to oral Omnicef  No fevers chills or sweats, no pain with urination  Also of note is hemoglobin and hematocrit was 8 7/26 6, patient denies any significant lightheadedness or weakness  The following portions of the patient's history were reviewed and updated as appropriate: allergies, current medications, past family history, past medical history, past social history, past surgical history and problem list     Review of Systems   Constitutional: Negative for chills, fatigue and fever  HENT: Negative for congestion, nosebleeds, postnasal drip, sore throat and trouble swallowing  Eyes: Negative for pain  Respiratory: Positive for cough  Negative for chest tightness, shortness of breath and wheezing  Cardiovascular: Negative for chest pain, palpitations and leg swelling  Gastrointestinal: Negative for abdominal pain, blood in stool, constipation, diarrhea, nausea and vomiting  Endocrine: Negative for polydipsia and polyuria  Genitourinary: Negative for dysuria, flank pain and hematuria  Musculoskeletal: Negative for arthralgias  Skin: Negative for rash  Neurological: Negative for dizziness, tremors and headaches  Hematological: Does not bruise/bleed easily  Psychiatric/Behavioral: Negative for confusion and dysphoric mood  The patient is not nervous/anxious  Objective:      /73   Pulse 101   Temp 97 6 °F (36 4 °C)   Ht 5' 8" (1 727 m)   Wt 93 4 kg (205 lb 12 8 oz)   SpO2 99%   BMI 31 29 kg/m²          Physical Exam   Constitutional: He is oriented to person, place, and time  He appears well-developed and well-nourished  No distress     HENT:   Head: Normocephalic and atraumatic  Right Ear: External ear normal    Left Ear: External ear normal    Eyes: Conjunctivae are normal  No scleral icterus  Neck: Normal range of motion  Neck supple  No tracheal deviation present  No thyromegaly present  Cardiovascular: Normal rate, regular rhythm and normal heart sounds  No murmur heard  Pulmonary/Chest: Effort normal and breath sounds normal  No respiratory distress  He has no wheezes  He has no rales  Abdominal: Soft  Bowel sounds are normal  There is no tenderness  There is no rebound, no guarding and no CVA tenderness  Musculoskeletal: He exhibits no edema  Lymphadenopathy:     He has no cervical adenopathy  Neurological: He is alert and oriented to person, place, and time  Oriented, but not to exact date   Psychiatric: He has a normal mood and affect  His behavior is normal  Judgment and thought content normal    Vitals reviewed

## 2018-10-25 NOTE — ASSESSMENT & PLAN NOTE
Continue statin  Avoid saturated fats  Moving towards a more plant based diet will also improve your cholesterol

## 2018-10-25 NOTE — PROGRESS NOTES
face to face visit with patient at PCP office  Patient is here for ANIYAH OCONNELL appointment  Patient was OBS status on 10/22 for 23 hours  Patient had a syncopal event while visiting his sister @ [place]  Patient was recently diagnosed with multiple myeloma and has started Relimid for treatment  Patient spiked a fever while at [facility] & was treated for UTI  Patient today denies any symptoms of UTI  Patient continues on his Cefdinir  Patient educated on s/sx of UTI, prevention of UTI  Educated on increased fluids & regular meals  Patient educated on role of Cm, contact information for CM, how/when to report symptoms  Patient lives alone, independent with meds, ADL's, & IADL's  Patient has family who transport, run errands, & remind patient regarding meds  Agrees to additional calls

## 2018-10-26 ENCOUNTER — TRANSCRIBE ORDERS (OUTPATIENT)
Dept: LAB | Facility: CLINIC | Age: 83
End: 2018-10-26

## 2018-10-26 ENCOUNTER — TELEPHONE (OUTPATIENT)
Dept: HEMATOLOGY ONCOLOGY | Facility: CLINIC | Age: 83
End: 2018-10-26

## 2018-10-26 ENCOUNTER — APPOINTMENT (OUTPATIENT)
Dept: LAB | Facility: CLINIC | Age: 83
End: 2018-10-26
Payer: COMMERCIAL

## 2018-10-26 DIAGNOSIS — C90.00 KAPPA LIGHT CHAIN MYELOMA (HCC): Primary | ICD-10-CM

## 2018-10-26 DIAGNOSIS — C90.00 MULTIPLE MYELOMA NOT HAVING ACHIEVED REMISSION (HCC): Primary | ICD-10-CM

## 2018-10-26 LAB
ALBUMIN SERPL BCP-MCNC: 3.2 G/DL (ref 3.5–5)
ALP SERPL-CCNC: 119 U/L (ref 46–116)
ALT SERPL W P-5'-P-CCNC: 16 U/L (ref 12–78)
ANION GAP SERPL CALCULATED.3IONS-SCNC: 7 MMOL/L (ref 4–13)
AST SERPL W P-5'-P-CCNC: 16 U/L (ref 5–45)
BASOPHILS # BLD MANUAL: 0 THOUSAND/UL (ref 0–0.1)
BASOPHILS NFR MAR MANUAL: 0 % (ref 0–1)
BILIRUB SERPL-MCNC: 0.93 MG/DL (ref 0.2–1)
BUN SERPL-MCNC: 18 MG/DL (ref 5–25)
CALCIUM SERPL-MCNC: 8.9 MG/DL (ref 8.3–10.1)
CHLORIDE SERPL-SCNC: 107 MMOL/L (ref 100–108)
CO2 SERPL-SCNC: 23 MMOL/L (ref 21–32)
CREAT SERPL-MCNC: 1.72 MG/DL (ref 0.6–1.3)
EOSINOPHIL # BLD MANUAL: 0.46 THOUSAND/UL (ref 0–0.4)
EOSINOPHIL NFR BLD MANUAL: 7 % (ref 0–6)
ERYTHROCYTE [DISTWIDTH] IN BLOOD BY AUTOMATED COUNT: 13.2 % (ref 11.6–15.1)
GFR SERPL CREATININE-BSD FRML MDRD: 35 ML/MIN/1.73SQ M
GIANT PLATELETS BLD QL SMEAR: PRESENT
GLUCOSE SERPL-MCNC: 115 MG/DL (ref 65–140)
HCT VFR BLD AUTO: 31.3 % (ref 36.5–49.3)
HGB BLD-MCNC: 10.1 G/DL (ref 12–17)
LYMPHOCYTES # BLD AUTO: 1.04 THOUSAND/UL (ref 0.6–4.47)
LYMPHOCYTES # BLD AUTO: 16 % (ref 14–44)
MCH RBC QN AUTO: 31.5 PG (ref 26.8–34.3)
MCHC RBC AUTO-ENTMCNC: 32.3 G/DL (ref 31.4–37.4)
MCV RBC AUTO: 98 FL (ref 82–98)
MONOCYTES # BLD AUTO: 0.2 THOUSAND/UL (ref 0–1.22)
MONOCYTES NFR BLD: 3 % (ref 4–12)
NEUTROPHILS # BLD MANUAL: 4.57 THOUSAND/UL (ref 1.85–7.62)
NEUTS SEG NFR BLD AUTO: 70 % (ref 43–75)
NRBC BLD AUTO-RTO: 0 /100 WBCS
PLATELET # BLD AUTO: 231 THOUSANDS/UL (ref 149–390)
PLATELET BLD QL SMEAR: ADEQUATE
PMV BLD AUTO: 11.3 FL (ref 8.9–12.7)
POLYCHROMASIA BLD QL SMEAR: PRESENT
POTASSIUM SERPL-SCNC: 3.8 MMOL/L (ref 3.5–5.3)
PROT SERPL-MCNC: 6.8 G/DL (ref 6.4–8.2)
RBC # BLD AUTO: 3.21 MILLION/UL (ref 3.88–5.62)
RBC MORPH BLD: PRESENT
SMUDGE CELLS BLD QL SMEAR: PRESENT
SODIUM SERPL-SCNC: 137 MMOL/L (ref 136–145)
TOXIC GRANULES BLD QL SMEAR: PRESENT
VARIANT LYMPHS # BLD AUTO: 4 %
WBC # BLD AUTO: 6.53 THOUSAND/UL (ref 4.31–10.16)

## 2018-10-26 PROCEDURE — 85027 COMPLETE CBC AUTOMATED: CPT

## 2018-10-26 PROCEDURE — 85007 BL SMEAR W/DIFF WBC COUNT: CPT

## 2018-10-26 PROCEDURE — 80053 COMPREHEN METABOLIC PANEL: CPT

## 2018-10-26 PROCEDURE — 36415 COLL VENOUS BLD VENIPUNCTURE: CPT

## 2018-10-26 NOTE — TELEPHONE ENCOUNTER
EDUARDO Singh Call from [facility]  Has pt at [facility] lab but no orders for weekly labs in system  Weekly CBCD entered  Pt on Revlimid  If additional labs needed please enter

## 2018-11-02 ENCOUNTER — TELEPHONE (OUTPATIENT)
Dept: HEMATOLOGY ONCOLOGY | Facility: CLINIC | Age: 83
End: 2018-11-02

## 2018-11-02 ENCOUNTER — APPOINTMENT (OUTPATIENT)
Dept: LAB | Facility: CLINIC | Age: 83
End: 2018-11-02
Payer: COMMERCIAL

## 2018-11-02 NOTE — TELEPHONE ENCOUNTER
Vanda Gerardo called again to question how Revlimid is refilled and informed pharmacy ususally notifies this office to send since such a specialized medication  Regarding Decadron, has 3 refills so pharmacy can send

## 2018-11-02 NOTE — TELEPHONE ENCOUNTER
Pt's nephew Erin Constant called with pt present  Reports pt has lost about 7 lbs since starting Revlimid  Denies N/V/D  Pt eating and drinking normally  Pt had a UTI for which he was hospitalized for 1 night then discharged with an antibiotic for 10 days and today is the last day  No other complaints  Suggested trying Ensure or Boost to supplement diet

## 2018-11-06 ENCOUNTER — OFFICE VISIT (OUTPATIENT)
Dept: INTERNAL MEDICINE CLINIC | Facility: CLINIC | Age: 83
End: 2018-11-06
Payer: COMMERCIAL

## 2018-11-06 ENCOUNTER — OFFICE VISIT (OUTPATIENT)
Dept: HEMATOLOGY ONCOLOGY | Facility: CLINIC | Age: 83
End: 2018-11-06
Payer: COMMERCIAL

## 2018-11-06 ENCOUNTER — HOSPITAL ENCOUNTER (OUTPATIENT)
Dept: RADIOLOGY | Facility: HOSPITAL | Age: 83
Discharge: HOME | End: 2018-11-06
Payer: COMMERCIAL

## 2018-11-06 VITALS
RESPIRATION RATE: 12 BRPM | BODY MASS INDEX: 32.76 KG/M2 | HEIGHT: 65 IN | SYSTOLIC BLOOD PRESSURE: 130 MMHG | DIASTOLIC BLOOD PRESSURE: 80 MMHG | OXYGEN SATURATION: 97 % | WEIGHT: 196.6 LBS | HEART RATE: 82 BPM | TEMPERATURE: 98 F

## 2018-11-06 VITALS
TEMPERATURE: 97.8 F | RESPIRATION RATE: 17 BRPM | HEIGHT: 66 IN | WEIGHT: 195.2 LBS | DIASTOLIC BLOOD PRESSURE: 72 MMHG | SYSTOLIC BLOOD PRESSURE: 128 MMHG | BODY MASS INDEX: 31.37 KG/M2 | OXYGEN SATURATION: 99 % | HEART RATE: 83 BPM

## 2018-11-06 DIAGNOSIS — C90.00 MULTIPLE MYELOMA NOT HAVING ACHIEVED REMISSION (HCC): ICD-10-CM

## 2018-11-06 DIAGNOSIS — C90.00 KAPPA LIGHT CHAIN MYELOMA (HCC): Primary | ICD-10-CM

## 2018-11-06 DIAGNOSIS — R05.9 COUGH: Primary | ICD-10-CM

## 2018-11-06 DIAGNOSIS — R05.9 COUGH: ICD-10-CM

## 2018-11-06 PROCEDURE — 71046 X-RAY EXAM CHEST 2 VIEWS: CPT

## 2018-11-06 PROCEDURE — 99213 OFFICE O/P EST LOW 20 MIN: CPT | Performed by: INTERNAL MEDICINE

## 2018-11-06 PROCEDURE — 99214 OFFICE O/P EST MOD 30 MIN: CPT | Performed by: INTERNAL MEDICINE

## 2018-11-06 RX ORDER — DEXAMETHASONE 4 MG/1
TABLET ORAL
Qty: 15 TABLET | Refills: 5 | Status: SHIPPED | OUTPATIENT
Start: 2018-11-06 | End: 2018-11-20 | Stop reason: SDUPTHER

## 2018-11-06 NOTE — PROGRESS NOTES
Assessment/Plan:    Cough  Most likely secondary to postnasal drip, patient not using the fluticasone currently, I recommend using  The slightly abnormal lung exam on the right, will check chest x-ray  Patient will also continue proton pump inhibitor and use it daily for the short term       Diagnoses and all orders for this visit:    Cough  -     XR chest pa & lateral; Future          Subjective:      Patient ID: Kelechi Ruiz is a 80 y o  male  Cough:  patient has had this off and on for couple weeks, worse over the past few days  He was recently treated with antibiotics for urinary tract infection, Ancef IV then switched to Omnicef oral   He has had some nasal congestion, no discolored mucus, postnasal drip, no pleuritic pain, no heartburn  The following portions of the patient's history were reviewed and updated as appropriate: allergies, current medications, past family history, past medical history, past social history, past surgical history and problem list     Review of Systems   Constitutional: Negative for chills, fatigue and fever  HENT: Positive for congestion, postnasal drip and rhinorrhea  Negative for nosebleeds, sinus pain, sinus pressure, sore throat and trouble swallowing  Eyes: Negative for pain  Respiratory: Positive for cough  Negative for chest tightness, shortness of breath and wheezing  Cardiovascular: Negative for chest pain, palpitations and leg swelling  Gastrointestinal: Negative for abdominal pain, constipation, diarrhea, nausea and vomiting  Endocrine: Negative for polydipsia and polyuria  Genitourinary: Negative for dysuria, flank pain and hematuria  Musculoskeletal: Negative for arthralgias  Skin: Negative for rash  Neurological: Negative for dizziness, tremors and headaches  Hematological: Does not bruise/bleed easily  Psychiatric/Behavioral: Negative for confusion and dysphoric mood  The patient is not nervous/anxious  Objective:      /80 (BP Location: Left arm, Patient Position: Sitting, Cuff Size: Large)   Pulse 82   Temp 98 °F (36 7 °C)   Resp 12   Ht 5' 5" (1 651 m)   Wt 89 2 kg (196 lb 9 6 oz)   SpO2 97%   BMI 32 72 kg/m²          Physical Exam   Constitutional: He is oriented to person, place, and time  He appears well-developed and well-nourished  No distress  HENT:   Head: Normocephalic and atraumatic  Right Ear: External ear normal    Left Ear: External ear normal    Eyes: Conjunctivae are normal  No scleral icterus  Neck: Normal range of motion  Neck supple  No tracheal deviation present  No thyromegaly present  Cardiovascular: Normal rate, regular rhythm and normal heart sounds  Pulmonary/Chest: Effort normal  No respiratory distress  He has no wheezes  He has no rales  Faint rhonchi right base, no E to a egophony   Abdominal: Soft  Bowel sounds are normal  There is no tenderness  There is no rebound and no guarding  Musculoskeletal: He exhibits no edema  Lymphadenopathy:     He has no cervical adenopathy  Neurological: He is alert and oriented to person, place, and time  Psychiatric: He has a normal mood and affect  His behavior is normal  Judgment and thought content normal    Vitals reviewed

## 2018-11-06 NOTE — LETTER
November 6, 2018     Omar Pineda MD  [address] [facility]  [address]    Patient: Katherine New   YOB: 1930   Date of Visit: 11/6/2018       Dear Dr Krys Kent: Thank you for referring Antwon Dowling to me for evaluation  Below are my notes for this consultation  If you have questions, please do not hesitate to call me  I look forward to following your patient along with you  Sincerely,        Grace Lerner DO        CC: No Recipients  Grace Lerner DO  11/6/2018  2:19 PM  Sign at close encounter  [address]  [address]  [facility] [address] [facility] [address]  [phone]  [phone]    [address] [facility], [phone]  11/06/18    Discussion:   In summary, this is an 51-year-old male history of multiple myeloma  He started Revlimid and dexamethasone about 2 weeks ago  He had had a urinary tract infection and was treated with this for the same  Additionally, he has had a cough  He will be seeing his PCP later today in this regard  He had been treated with antibiotics for urinary tract infection but the cough is persistent none the less  I suspect that either reflect a different process such as reflux esophagitis, or an infectious process not susceptible to antibacterial antibiotics  He had had some weight loss probably attributable to intercurrent infection  Hemoglobin is essentially stable  White count and platelets are normal   He had a bump in glucose on the day that he was taking dexamethasone  We reviewed that this is essentially expected and short-lived  CMP is essentially normal with the exception of creatinine of 2 1  This is in his previously established range  I encouraged better hydration  We will be rechecking his myeloma parameters in about 5 weeks  I discussed the above with the patient  The patient     And his son   voiced understanding and agreement   ______________________________________________________________________    Chief Complaint   Patient presents with    Follow-up       HPI:     Kappa light chain myeloma ([facility] [address] )    9/8/2018 - 10/8/2018 Cancer Staged     Cancer Staging  [place] light chain myel[facility] ([facility] )  Staging form: Plasma Cell Myeloma and Disorders, AJCC 8th Edition  - Clinical stage from 10/8/2018: RISS Stage II (Beta-2-microglobulin (mg/L): 4 2, Albumin (g/dL): 3 7, ISS: Stage II, High-risk cytogenetics: Absent, LDH: Normal) - Signed by Samantha Vargas PA-C on 10/8/2018           9/8/2018 Initial Diagnosis     Patient had an increase in creatinine  Patient followed up with Nephrology who completed a comprehensive workup  This demonstrated positive free light chain ratio elevation in addition to a 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             9/24/2018 Biopsy     Final Diagnosis   A -C  Bone marrow,  left iliac crest,  biopsy and aspirate:  -  Kappa light chain restricted plasma cell neoplasm, consistent with plasma cell myeloma (see note)  -  Maturing trilineage hematopoiesis without distinct features of dysplasia or increased myeloblasts  -  Decreased stainable storage iron  -  Mildly increased reticulin fibers without fibrosis  -  Negative for collagen fibrosis, granulomata, vasculitis, necrosis  Flow cytometry ([facility]# [id number], evaluated by NATALIE Culver )       * Interpretation:    1  Plasma cell neoplasm, IgA kappa-restricted  See Comment  2  No evidence of B-cell or T-cell lymphoproliferative disorder  *  Comment:  Due to potential dilutional/lysis effects associated with flow cytometry, the reported plasma cell count (2 4%) is an underestimate  Therefore, correlation with a comprehensive bone marrow examination including morphologic plasma cell enumeration will be necessary for further and definitive characterization  Interpretation FISH Myeloma Panel:  1   No evidence of IGH-MAF [translocation F(06;24)] gene rearrangement  2  No evidence of RB1 monosomy (13q14 deletion)  3  No evidence of CCND1-IGH [translocation t(11;14)] gene rearrangement, and no evidence for trisomy 11 or gain of 11q  4  No evidence of p53 (17p13) deletion or amplification  5  No evidence of FGFR3-IGH [translocation t(4;14)] gene rearrangement  6  Negative for 1q21/CKS1B gain            Interval History:  Has had cough for past few weeks  Scant productive  Had UTI dehydration few weeks ago  Treated with abx, hydration  1 - Symptomatic but completely ambulatory    Review of Systems   Constitutional: Negative for chills and fever  HENT: Negative for nosebleeds  Eyes: Negative for discharge  Respiratory: Negative for cough and shortness of breath  Cardiovascular: Negative for chest pain  Gastrointestinal: Negative for abdominal pain, constipation and diarrhea  Endocrine: Negative for polydipsia  Genitourinary: Negative for hematuria  Musculoskeletal: Negative for arthralgias  Skin: Negative for color change  Allergic/Immunologic: Negative for immunocompromised state  Neurological: Negative for dizziness and headaches  Hematological: Negative for adenopathy  Psychiatric/Behavioral: Negative for agitation         Past Medical History:   Diagnosis Date    [facility]     Chronic kidney disease     Coronary artery disease     Diabetes mellitus ([facility] )     Glaucoma     Hypertension     Multiple myeloma ([facility] )     Occluded coronary artery stent     Left     Patient Active Problem List   Diagnosis    Essential hypertension    Type 2 diabetes mellitus without complication, without long-term current use of insulin ([facility] )    Mixed hyperlipidemia    Esophageal reflux    Cerebral infarction, watershed distribution, bilateral, acute    Stage 4 chronic kidney disease (HCC)    Benign prostatic hyperplasia    Chronic systolic congestive heart failure ([facility] )    Ischemic cardiomyopathy    Leg pain, left    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)    Other proteinuria    Dizziness    Kappa light chain myeloma (HCC)    UTI (urinary tract infection)       Current Outpatient Prescriptions:     acetaminophen (TYLENOL) 325 mg tablet, Take 1 - 2 tabs PO Q4 PRN pain, Disp: 30 tablet, Rfl: 0    aspirin 81 MG tablet, Take 81 mg by mouth daily  , Disp: , Rfl:     atorvastatin (LIPITOR) 40 mg tablet, Take 1 tablet (40 mg total) by mouth every evening, Disp: , Rfl: 0    bimatoprost (LUMIGAN) 0 01 % ophthalmic drops, Apply to eye, Disp: , Rfl:     dexamethasone (DECADRON) 4 mg tablet, Take 5 tablets by mouth once weekly - total dose 20mg, Disp: 15 tablet, Rfl: 3    esomeprazole (NexIUM) 40 MG capsule, Take 40 mg by mouth every evening , Disp: , Rfl:     fluticasone (FLONASE) 50 mcg/act nasal spray, into each nostril as needed  , Disp: , Rfl:     furosemide (LASIX) 40 mg tablet, Take 0 5 tablets (20 mg total) by mouth daily, Disp: 90 tablet, Rfl: 0    glimepiride (AMARYL) 2 mg tablet, Take 2 mg by mouth every evening , Disp: , Rfl:     glucose blood (ACCU-CHEK CHANDRAKANT PLUS) test strip, by In Vitro route, Disp: , Rfl:     guaiFENesin (MUCINEX) 600 mg 12 hr tablet, Take 1 tablet (600 mg total) by mouth 2 (two) times a day, Disp: 60 tablet, Rfl: 0    lenalidomide (REVLIMID) 5 MG CAPS, Take one cap by mouth on days 1-14 every 21 days, Disp: 14 capsule, Rfl: 0    metoprolol succinate (TOPROL-XL) 25 mg 24 hr tablet, TAKE 1 TABLET DAILY, Disp: 90 tablet, Rfl: 3    nitroglycerin (NITROSTAT) 0 4 mg SL tablet, Place 1 tablet under the tongue every 5 (five) minutes as needed, Disp: , Rfl:     ONE TOUCH ULTRA TEST test strip, The patient test once daily  , Disp: 100 each, Rfl: 2    ONETOUCH DELICA LANCETS 36W MISC, by Does not apply route daily, Disp: 100 each, Rfl: 3    potassium chloride (KLOR-CON 10) 10 mEq tablet, Take 1 tablet (10 mEq total) by mouth daily, Disp: 90 tablet, Rfl: 3    timolol (TIMOPTIC) 0 5 % ophthalmic solution, , Disp: , Rfl:   No Known Allergies  Past Surgical History:   Procedure Laterality Date    BACK SURGERY      CARDIAC CATHETERIZATION  04/05/2004    CT BONE MARROW BIOPSY AND ASPIRATION  9/24/2018    KNEE SURGERY      THROMBOENDARTERECTOMY Right     Cartoid     Social History     Objective:  Vitals:    11/06/18 1340   BP: 128/72   BP Location: Right arm   Patient Position: Sitting   Pulse: 83   Resp: 17   Temp: 97 8 °F (36 6 °C)   TempSrc: Tympanic   SpO2: 99%   Weight: 88 5 kg (195 lb 3 2 oz)   Height: 5' 5 8" (1 671 m)     Physical Exam   Constitutional: He is oriented to person, place, and time  He appears well-developed  HENT:   Head: Normocephalic  Eyes: Pupils are equal, round, and reactive to light  Neck: Neck supple  Cardiovascular: Normal rate and regular rhythm  No murmur heard  Pulmonary/Chest: Breath sounds normal  He has no wheezes  He has no rales  Abdominal: Soft  There is no tenderness  Musculoskeletal: Normal range of motion  He exhibits no edema or tenderness  Lymphadenopathy:     He has no cervical adenopathy  Neurological: He is alert and oriented to person, place, and time  He has normal reflexes  No cranial nerve deficit  Skin: No rash noted  No erythema  Psychiatric: He has a normal mood and affect  His behavior is normal          Labs: I personally reviewed the labs and imaging pertinent to this patient care

## 2018-11-06 NOTE — PROGRESS NOTES
[facility] Faby Prost  [address]  [facility] [address] [address] [phone] [address], [phone]  11/06/18    Discussion:   In summary, this is an 51-year-old male history of multiple myeloma  He started Revlimid and dexamethasone about 2 weeks ago  He had had a urinary tract infection and was treated with this for the same  Additionally, he has had a cough  He will be seeing his PCP later today in this regard  He had been treated with antibiotics for urinary tract infection but the cough is persistent none the less  I suspect that either reflect a different process such as reflux esophagitis, or an infectious process not susceptible to antibacterial antibiotics  He had had some weight loss probably attributable to intercurrent infection  Hemoglobin is essentially stable  White count and platelets are normal   He had a bump in glucose on the day that he was taking dexamethasone  We reviewed that this is essentially expected and short-lived  CMP is essentially normal with the exception of creatinine of 2 1  This is in his previously established range  I encouraged better hydration  We will be rechecking his myeloma parameters in about 5 weeks  I discussed the above with the patient  The patient     And his son   voiced understanding and agreement   ______________________________________________________________________    Chief Complaint   Patient presents with    Follow-up       HPI:     Kappa light chain myeloma ([facility] [address] )    9/8/2018 - 10/8/2018 Cancer Staged     Cancer Staging  [place] light chain myeloma ([facility] [address] )  Staging form: Plasma Cell Myeloma and Disorders, AJCC 8th Edition  - Clinical stage from 10/8/2018: RISS Stage II (Beta-2-microglobulin (mg/L): 4 2, Albumin (g/dL): 3 7, ISS: Stage II, High-risk cytogenetics: Absent, LDH: Normal) - Signed by Mariano Koroma PA-C on 10/8/2018           9/8/2018 Initial Diagnosis Patient had an increase in creatinine  Patient followed up with Nephrology who completed a comprehensive workup  This demonstrated positive free light chain ratio elevation in addition to a 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             9/24/2018 Biopsy     Final Diagnosis   A -C  Bone marrow,  left iliac crest,  biopsy and aspirate:  -  Kappa light chain restricted plasma cell neoplasm, consistent with plasma cell myeloma (see note)  -  Maturing trilineage hematopoiesis without distinct features of dysplasia or increased myeloblasts  -  Decreased stainable storage iron  -  Mildly increased reticulin fibers without fibrosis  -  Negative for collagen fibrosis, granulomata, vasculitis, necrosis  Flow cytometry ([facility]# [id number], evaluated by Severo Coach, M D )       * Interpretation:    1  Plasma cell neoplasm, IgA kappa-restricted  See Comment  2  No evidence of B-cell or T-cell lymphoproliferative disorder  *  Comment:  Due to potential dilutional/lysis effects associated with flow cytometry, the reported plasma cell count (2 4%) is an underestimate  Therefore, correlation with a comprehensive bone marrow examination including morphologic plasma cell enumeration will be necessary for further and definitive characterization  Interpretation FISH Myeloma Panel:  1  No evidence of IGH-MAF [translocation t(14;16)] gene rearrangement  2  No evidence of RB1 monosomy (13q14 deletion)  3  No evidence of CCND1-IGH [translocation t(11;14)] gene rearrangement, and no evidence for trisomy 11 or gain of 11q  4  No evidence of p53 (17p13) deletion or amplification  5  No evidence of FGFR3-IGH [translocation t(4;14)] gene rearrangement  6  Negative for 1q21/CKS1B gain            Interval History:  Has had cough for past few weeks  Scant productive  Had UTI dehydration few weeks ago  Treated with abx, hydration       1 - Symptomatic but completely ambulatory    Review of Systems   Constitutional: Negative for chills and fever  HENT: Negative for nosebleeds  Eyes: Negative for discharge  Respiratory: Negative for cough and shortness of breath  Cardiovascular: Negative for chest pain  Gastrointestinal: Negative for abdominal pain, constipation and diarrhea  Endocrine: Negative for polydipsia  Genitourinary: Negative for hematuria  Musculoskeletal: Negative for arthralgias  Skin: Negative for color change  Allergic/Immunologic: Negative for immunocompromised state  Neurological: Negative for dizziness and headaches  Hematological: Negative for adenopathy  Psychiatric/Behavioral: Negative for agitation  Past Medical History:   Diagnosis Date    Angina pectoris ([place] [address] )     Chronic kidney disease     Coronary artery disease     Diabetes mellitus ([place] [address] )     Glaucoma     Hypertension     Multiple myeloma ([place] [address] )     Occluded coronary artery stent     Left     Patient Active Problem List   Diagnosis    Essential hypertension    Type 2 diabetes mellitus without complication, without long-term current use of insulin ([place] [address] )    Mixed hyperlipidemia    Esophageal reflux    Cerebral infarction, watershed distribution, bilateral, acute    Stage 4 chronic kidney disease (HCC)    Benign prostatic hyperplasia    Chronic systolic congestive heart failure (HCC)    Ischemic cardiomyopathy    Leg pain, left    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)    Other proteinuria    Dizziness    Kappa light chain myeloma (HCC)    UTI (urinary tract infection)       Current Outpatient Prescriptions:     acetaminophen (TYLENOL) 325 mg tablet, Take 1 - 2 tabs PO Q4 PRN pain, Disp: 30 tablet, Rfl: 0    aspirin 81 MG tablet, Take 81 mg by mouth daily  , Disp: , Rfl:     atorvastatin (LIPITOR) 40 mg tablet, Take 1 tablet (40 mg total) by mouth every evening, Disp: , Rfl: 0    bimatoprost (LUMIGAN) 0 01 % ophthalmic drops, Apply to eye, Disp: , Rfl:     dexamethasone (DECADRON) 4 mg tablet, Take 5 tablets by mouth once weekly - total dose 20mg, Disp: 15 tablet, Rfl: 3    esomeprazole (NexIUM) 40 MG capsule, Take 40 mg by mouth every evening , Disp: , Rfl:     fluticasone (FLONASE) 50 mcg/act nasal spray, into each nostril as needed  , Disp: , Rfl:     furosemide (LASIX) 40 mg tablet, Take 0 5 tablets (20 mg total) by mouth daily, Disp: 90 tablet, Rfl: 0    glimepiride (AMARYL) 2 mg tablet, Take 2 mg by mouth every evening , Disp: , Rfl:     glucose blood (ACCU-CHEK CHANDRAKANT PLUS) test strip, by In Vitro route, Disp: , Rfl:     guaiFENesin (MUCINEX) 600 mg 12 hr tablet, Take 1 tablet (600 mg total) by mouth 2 (two) times a day, Disp: 60 tablet, Rfl: 0    lenalidomide (REVLIMID) 5 MG CAPS, Take one cap by mouth on days 1-14 every 21 days, Disp: 14 capsule, Rfl: 0    metoprolol succinate (TOPROL-XL) 25 mg 24 hr tablet, TAKE 1 TABLET DAILY, Disp: 90 tablet, Rfl: 3    nitroglycerin (NITROSTAT) 0 4 mg SL tablet, Place 1 tablet under the tongue every 5 (five) minutes as needed, Disp: , Rfl:     ONE TOUCH ULTRA TEST test strip, The patient test once daily  , Disp: 100 each, Rfl: 2    ONETOUCH DELICA LANCETS 17H MISC, by Does not apply route daily, Disp: 100 each, Rfl: 3    potassium chloride (KLOR-CON 10) 10 mEq tablet, Take 1 tablet (10 mEq total) by mouth daily, Disp: 90 tablet, Rfl: 3    timolol (TIMOPTIC) 0 5 % ophthalmic solution, , Disp: , Rfl:   No Known Allergies  Past Surgical History:   Procedure Laterality Date    BACK SURGERY      CARDIAC CATHETERIZATION  04/05/2004    CT BONE MARROW BIOPSY AND ASPIRATION  9/24/2018    KNEE SURGERY      THROMBOENDARTERECTOMY Right     Cartoid     Social History     Objective:  Vitals:    11/06/18 1340   BP: 128/72   BP Location: Right arm   Patient Position: Sitting   Pulse: 83   Resp: 17   Temp: 97 8 °F (36 6 °C)   TempSrc: Tympanic   SpO2: 99%   Weight: 88 5 kg (195 lb 3 2 oz)   Height: 5' 5 8" (1 671 m)     Physical Exam   Constitutional: He is oriented to person, place, and time  He appears well-developed  HENT:   Head: Normocephalic  Eyes: Pupils are equal, round, and reactive to light  Neck: Neck supple  Cardiovascular: Normal rate and regular rhythm  No murmur heard  Pulmonary/Chest: Breath sounds normal  He has no wheezes  He has no rales  Abdominal: Soft  There is no tenderness  Musculoskeletal: Normal range of motion  He exhibits no edema or tenderness  Lymphadenopathy:     He has no cervical adenopathy  Neurological: He is alert and oriented to person, place, and time  He has normal reflexes  No cranial nerve deficit  Skin: No rash noted  No erythema  Psychiatric: He has a normal mood and affect  His behavior is normal          Labs: I personally reviewed the labs and imaging pertinent to this patient care

## 2018-11-06 NOTE — PATIENT INSTRUCTIONS
Problem List Items Addressed This Visit        Other    Cough - Primary     Most likely secondary to postnasal drip, patient not using the fluticasone currently, I recommend using  The slightly abnormal lung exam on the right, will check chest x-ray    Patient will also continue proton pump inhibitor and use it daily for the short term         Relevant Orders    XR chest pa & lateral

## 2018-11-06 NOTE — ASSESSMENT & PLAN NOTE
Most likely secondary to postnasal drip, patient not using the fluticasone currently, I recommend using  The slightly abnormal lung exam on the right, will check chest x-ray    Patient will also continue proton pump inhibitor and use it daily for the short term

## 2018-11-07 ENCOUNTER — TELEPHONE (OUTPATIENT)
Dept: INTERNAL MEDICINE CLINIC | Facility: CLINIC | Age: 83
End: 2018-11-07

## 2018-11-07 DIAGNOSIS — R05.9 COUGH: Primary | ICD-10-CM

## 2018-11-07 RX ORDER — BENZONATATE 100 MG/1
100 CAPSULE ORAL 3 TIMES DAILY PRN
Qty: 60 CAPSULE | Refills: 1 | Status: SHIPPED | OUTPATIENT
Start: 2018-11-07 | End: 2018-12-21 | Stop reason: SDUPTHER

## 2018-11-07 RX ORDER — LENALIDOMIDE 5 MG/1
CAPSULE ORAL
Qty: 14 CAPSULE | Refills: 0 | Status: SHIPPED | OUTPATIENT
Start: 2018-11-07 | End: 2018-11-26 | Stop reason: SDUPTHER

## 2018-11-07 NOTE — TELEPHONE ENCOUNTER
I sent a cough medicine    Proton pump inhibitors are medications like Prilosec, omeprazole, esomeprazole, pantoprazole, etc   It looks like esomeprazole (Nexium) as listed on his med list

## 2018-11-08 ENCOUNTER — PATIENT OUTREACH (OUTPATIENT)
Dept: INTERNAL MEDICINE CLINIC | Facility: CLINIC | Age: 83
End: 2018-11-08

## 2018-11-08 NOTE — PROGRESS NOTES
Outreach Tc to patient for Cm assessment  Patient states that he has occasional dry cough  Patient did complete CXR & this was normal  Patient denies SOB W/ ambulation or ADLs  Patient lives alone but has supportive family that assist with transportation, meals and socialization  Patient is able to verbalize medication schedule including his oral chemo schedule  Reviewed home medications, future appointments, s/sx to report and how/when to report symptoms to provider vs 911  patient verbalized understanding  patient agrees to additional calls  Reviewed role of CM and contact information

## 2018-11-09 ENCOUNTER — APPOINTMENT (OUTPATIENT)
Dept: LAB | Facility: CLINIC | Age: 83
End: 2018-11-09
Payer: COMMERCIAL

## 2018-11-12 ENCOUNTER — TELEPHONE (OUTPATIENT)
Dept: HEMATOLOGY ONCOLOGY | Facility: CLINIC | Age: 83
End: 2018-11-12

## 2018-11-12 NOTE — TELEPHONE ENCOUNTER
PT'S NEPHEW CALLING - SPOKE TO ON CALL DR Okeefe PT TOOK DOUBLE STEROIDS THAT ARE TO BE TAKEN WITH HIS CHEMO  DR Roselia Chamberlain ADVISED HIM THAT THIS WAS OK  HE IS CALLING BECAUSE NOW PT WILL BE 1 DOSE SHORT OF STEROIDS TO GO WITH HIS CHEMO  WILL A NEW SCRIPT BE SENT IN FOR THIS?   PLEASE CALL TO [facility], TXS

## 2018-11-12 NOTE — TELEPHONE ENCOUNTER
Returned call to Marcelo Davey - explained that decadron script has refills on it - when he is out of steroid he can reach out to pharmacy to arrange delivery  Patient did take an extra dose of decadron this past week  Patient is stable - no medical complaints

## 2018-11-15 ENCOUNTER — OFFICE VISIT (OUTPATIENT)
Dept: CARDIOLOGY CLINIC | Facility: CLINIC | Age: 83
End: 2018-11-15
Payer: COMMERCIAL

## 2018-11-15 VITALS
DIASTOLIC BLOOD PRESSURE: 72 MMHG | SYSTOLIC BLOOD PRESSURE: 136 MMHG | BODY MASS INDEX: 32.79 KG/M2 | OXYGEN SATURATION: 98 % | HEART RATE: 80 BPM | HEIGHT: 65 IN | WEIGHT: 196.8 LBS

## 2018-11-15 DIAGNOSIS — I25.5 ISCHEMIC CARDIOMYOPATHY: Primary | ICD-10-CM

## 2018-11-15 DIAGNOSIS — I50.22 CHRONIC SYSTOLIC CONGESTIVE HEART FAILURE (HCC): ICD-10-CM

## 2018-11-15 DIAGNOSIS — I10 ESSENTIAL HYPERTENSION: ICD-10-CM

## 2018-11-15 PROCEDURE — 99214 OFFICE O/P EST MOD 30 MIN: CPT | Performed by: INTERNAL MEDICINE

## 2018-11-15 NOTE — PROGRESS NOTES
Cardiology Follow Up    Khloe Linder  9/22/1930  [id number]  [address] [facility]  [address]  [phone]    1  Ischemic cardiomyopathy     2  Essential hypertension     3  Chronic systolic congestive heart failure (HCC)           Discussion/Summary: All of his assessed cardiac problems are stable  I have reviewed his medications and made no changes  No cardiac testing is ordered  RTO 6 months  Interval History: He has not had any cardiac problems since his last OV  He denies CP, SOB, LE edema  His weight is down about 6-7 LBS  He is on treatment for multiple myeloma  He has CAD with an ICM - EF 50% by last ECHO  Creatinine 1 73 - stable  Patient Active Problem List   Diagnosis    Essential hypertension    Type 2 diabetes mellitus without complication, without long-term current use of insulin ([address] )    Mixed hyperlipidemia    Esophageal reflux    Cerebral infarction, watershed distribution, bilateral, acute    Stage 4 chronic kidney disease (HCC)    Benign prostatic hyperplasia    Chronic systolic congestive heart failure (HCC)    Ischemic cardiomyopathy    Leg pain, left    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)    Other proteinuria    Dizziness    Kappa light chain myeloma (HCC)    UTI (urinary tract infection)    Cough     Past Medical History:   Diagnosis Date    Angina pectoris (HCC)     Chronic kidney disease     Coronary artery disease     Diabetes mellitus ([address] )     Glaucoma     Hypertension     Multiple myeloma ([facility] )     Occluded coronary artery stent     Left     Social History     Social History    Marital status:      Spouse name: N/A    Number of children: N/A    Years of education: N/A     Occupational History    Not on file       Social History Main Topics    Smoking status: Former Smoker     Types: Cigarettes    Smokeless tobacco: Never Used      Comment: former smoker    Alcohol use Yes      Comment: socially; less than once a month    Drug use: No    Sexual activity: Not on file     Other Topics Concern    Not on file     Social History Narrative    Daily caffeine consumption          Family History   Problem Relation Age of Onset    Heart attack Father     Heart disease Mother     Diabetes Mother     Heart disease Sister     COPD Family      Past Surgical History:   Procedure Laterality Date    BACK SURGERY      CARDIAC CATHETERIZATION  04/05/2004    CT BONE MARROW BIOPSY AND ASPIRATION  9/24/2018    KNEE SURGERY      THROMBOENDARTERECTOMY Right     Cartoid       Current Outpatient Prescriptions:     acetaminophen (TYLENOL) 325 mg tablet, Take 1 - 2 tabs PO Q4 PRN pain, Disp: 30 tablet, Rfl: 0    aspirin 81 MG tablet, Take 81 mg by mouth daily  , Disp: , Rfl:     atorvastatin (LIPITOR) 40 mg tablet, Take 1 tablet (40 mg total) by mouth every evening, Disp: , Rfl: 0    benzonatate (TESSALON PERLES) 100 mg capsule, Take 1 capsule (100 mg total) by mouth 3 (three) times a day as needed for cough, Disp: 60 capsule, Rfl: 1    bimatoprost (LUMIGAN) 0 01 % ophthalmic drops, Apply to eye, Disp: , Rfl:     dexamethasone (DECADRON) 4 mg tablet, Take 5 tablets by mouth once weekly - total dose 20mg, Disp: 15 tablet, Rfl: 5    esomeprazole (NexIUM) 40 MG capsule, Take 40 mg by mouth every evening , Disp: , Rfl:     fluticasone (FLONASE) 50 mcg/act nasal spray, into each nostril as needed  , Disp: , Rfl:     furosemide (LASIX) 40 mg tablet, Take 0 5 tablets (20 mg total) by mouth daily, Disp: 90 tablet, Rfl: 0    glimepiride (AMARYL) 2 mg tablet, Take 2 mg by mouth every evening , Disp: , Rfl:     glucose blood (ACCU-CHEK CHANDRAKANT PLUS) test strip, by In Vitro route, Disp: , Rfl:     guaiFENesin (MUCINEX) 600 mg 12 hr tablet, Take 1 tablet (600 mg total) by mouth 2 (two) times a day, Disp: 60 tablet, Rfl: 0    lenalidomide (REVLIMID) 5 MG CAPS, Take one cap by mouth on days 1-14 every 21 days Auth #[id number], Disp: 14 capsule, Rfl: 0    metoprolol succinate (TOPROL-XL) 25 mg 24 hr tablet, TAKE 1 TABLET DAILY, Disp: 90 tablet, Rfl: 3    nitroglycerin (NITROSTAT) 0 4 mg SL tablet, Place 1 tablet under the tongue every 5 (five) minutes as needed, Disp: , Rfl:     ONE TOUCH ULTRA TEST test strip, The patient test once daily  , Disp: 100 each, Rfl: 2    ONETOUCH DELICA LANCETS 16R MISC, by Does not apply route daily, Disp: 100 each, Rfl: 3    potassium chloride (KLOR-CON 10) 10 mEq tablet, Take 1 tablet (10 mEq total) by mouth daily, Disp: 90 tablet, Rfl: 3    timolol (TIMOPTIC) 0 5 % ophthalmic solution, , Disp: , Rfl:   No Known Allergies  Vitals:    11/15/18 1328   BP: 136/72   BP Location: Left arm   Patient Position: Sitting   Cuff Size: Adult   Pulse: 80   SpO2: 98%   Weight: 89 3 kg (196 lb 12 8 oz)   Height: 5' 5" (1 651 m)     Weight (last 2 days)     Date/Time   Weight    11/15/18 1328  89 3 (196 8)             Blood pressure 136/72, pulse 80, height 5' 5" (1 651 m), weight 89 3 kg (196 lb 12 8 oz), SpO2 98 %  , Body mass index is 32 75 kg/m²  Labs:   Ancillary Orders on 11/09/2018   Component Date Value    Sodium 11/09/2018 138     Potassium 11/09/2018 3 8     Chloride 11/09/2018 105     CO2 11/09/2018 27     ANION GAP 11/09/2018 6     BUN 11/09/2018 20     Creatinine 11/09/2018 1 73*    Glucose, Fasting 11/09/2018 159*    Calcium 11/09/2018 8 8     AST 11/09/2018 11     ALT 11/09/2018 18     Alkaline Phosphatase 11/09/2018 139*    Total Protein 11/09/2018 6 8     Albumin 11/09/2018 3 0*    Total Bilirubin 11/09/2018 0 48     eGFR 11/09/2018 34    Ancillary Orders on 11/09/2018   Component Date Value    WBC 11/09/2018 6 41     RBC 11/09/2018 3 39*    Hemoglobin 11/09/2018 10 4*    Hematocrit 11/09/2018 32 4*    MCV 11/09/2018 96     MCH 11/09/2018 30 7     MCHC 11/09/2018 32 1     RDW 11/09/2018 13 1  MPV 11/09/2018 10 2     Platelets 81/46/1801 264     nRBC 11/09/2018 0     Neutrophils Relative 11/09/2018 57     Immat GRANS % 11/09/2018 1     Lymphocytes Relative 11/09/2018 27     Monocytes Relative 11/09/2018 10     Eosinophils Relative 11/09/2018 4     Basophils Relative 11/09/2018 1     Neutrophils Absolute 11/09/2018 3 63     Immature Grans Absolute 11/09/2018 0 05     Lymphocytes Absolute 11/09/2018 1 74     Monocytes Absolute 11/09/2018 0 62     Eosinophils Absolute 11/09/2018 0 28     Basophils Absolute 11/09/2018 0 09    Ancillary Orders on 11/02/2018   Component Date Value    Sodium 11/02/2018 133*    Potassium 11/02/2018 4 0     Chloride 11/02/2018 101     CO2 11/02/2018 25     ANION GAP 11/02/2018 7     BUN 11/02/2018 26*    Creatinine 11/02/2018 2 10*    Glucose, Fasting 11/02/2018 167*    Calcium 11/02/2018 8 9     AST 11/02/2018 10     ALT 11/02/2018 15     Alkaline Phosphatase 11/02/2018 110     Total Protein 11/02/2018 6 8     Albumin 11/02/2018 3 0*    Total Bilirubin 11/02/2018 0 93     eGFR 11/02/2018 27    Ancillary Orders on 11/02/2018   Component Date Value    WBC 11/02/2018 7 17     RBC 11/02/2018 3 31*    Hemoglobin 11/02/2018 10 4*    Hematocrit 11/02/2018 32 2*    MCV 11/02/2018 97     MCH 11/02/2018 31 4     MCHC 11/02/2018 32 3     RDW 11/02/2018 13 1     MPV 11/02/2018 11 2     Platelets 09/81/5542 251     nRBC 11/02/2018 0     Neutrophils Relative 11/02/2018 79*    Immat GRANS % 11/02/2018 1     Lymphocytes Relative 11/02/2018 11*    Monocytes Relative 11/02/2018 4     Eosinophils Relative 11/02/2018 4     Basophils Relative 11/02/2018 1     Neutrophils Absolute 11/02/2018 5 73     Immature Grans Absolute 11/02/2018 0 05     Lymphocytes Absolute 11/02/2018 0 76     Monocytes Absolute 11/02/2018 0 31     Eosinophils Absolute 11/02/2018 0 28     Basophils Absolute 11/02/2018 0 04    Orders Only on 10/26/2018   Component Date Value    Sodium 10/26/2018 137     Potassium 10/26/2018 3 8     Chloride 10/26/2018 107     CO2 10/26/2018 23     ANION GAP 10/26/2018 7     BUN 10/26/2018 18     Creatinine 10/26/2018 1 72*    Glucose 10/26/2018 115     Calcium 10/26/2018 8 9     AST 10/26/2018 16     ALT 10/26/2018 16     Alkaline Phosphatase 10/26/2018 119*    Total Protein 10/26/2018 6 8     Albumin 10/26/2018 3 2*    Total Bilirubin 10/26/2018 0 93     eGFR 10/26/2018 35    Telephone on 10/26/2018   Component Date Value    WBC 10/26/2018 6 53     RBC 10/26/2018 3 21*    Hemoglobin 10/26/2018 10 1*    Hematocrit 10/26/2018 31 3*    MCV 10/26/2018 98     MCH 10/26/2018 31 5     MCHC 10/26/2018 32 3     RDW 10/26/2018 13 2     MPV 10/26/2018 11 3     Platelets 60/55/0173 231     nRBC 10/26/2018 0     Segmented % 10/26/2018 70     Lymphocytes % 10/26/2018 16     Monocytes % 10/26/2018 3*    Eosinophils, % 10/26/2018 7*    Basophils % 10/26/2018 0     Atypical Lymphocytes % 10/26/2018 4*    Absolute Neutrophils 10/26/2018 4 57     Lymphocytes Absolute 10/26/2018 1 04     Monocytes Absolute 10/26/2018 0 20     Eosinophils Absolute 10/26/2018 0 46*    Basophils Absolute 10/26/2018 0 00     Smudge Cells 10/26/2018 Present     Toxic Granulation 10/26/2018 Present     RBC Morphology 10/26/2018 Present     Polychromasia 10/26/2018 Present     Platelet Estimate 11/45/2076 Adequate     Giant PLTs 10/26/2018 Present    Admission on 10/22/2018, Discharged on 10/23/2018   Component Date Value    WBC 10/22/2018 9 40     RBC 10/22/2018 3 24*    Hemoglobin 10/22/2018 10 2*    Hematocrit 10/22/2018 31 2*    MCV 10/22/2018 96     MCH 10/22/2018 31 5     MCHC 10/22/2018 32 7     RDW 10/22/2018 13 0     MPV 10/22/2018 10 6     Platelets 94/32/1269 160     nRBC 10/22/2018 0     Neutrophils Relative 10/22/2018 83*    Immat GRANS % 10/22/2018 0     Lymphocytes Relative 10/22/2018 12*    Monocytes Relative 10/22/2018 3*    Eosinophils Relative 10/22/2018 2     Basophils Relative 10/22/2018 0     Neutrophils Absolute 10/22/2018 7 75*    Immature Grans Absolute 10/22/2018 0 04     Lymphocytes Absolute 10/22/2018 1 09     Monocytes Absolute 10/22/2018 0 30     Eosinophils Absolute 10/22/2018 0 19     Basophils Absolute 10/22/2018 0 03     Protime 10/22/2018 13 7     INR 10/22/2018 1 08     PTT 10/22/2018 25     Sodium 10/22/2018 135*    Potassium 10/22/2018 4 0     Chloride 10/22/2018 103     CO2 10/22/2018 25     ANION GAP 10/22/2018 7     BUN 10/22/2018 22     Creatinine 10/22/2018 1 58*    Glucose 10/22/2018 155*    Calcium 10/22/2018 8 8     eGFR 10/22/2018 38     Total Bilirubin 10/22/2018 0 80     Bilirubin, Direct 10/22/2018 0 20     Alkaline Phosphatase 10/22/2018 126*    AST 10/22/2018 20     ALT 10/22/2018 23     Total Protein 10/22/2018 6 5     Albumin 10/22/2018 3 1*    TSH 3RD GENERATON 10/22/2018 2 006     NT-proBNP 10/22/2018 2512*    Troponin I 10/22/2018 0 02     Clarity, UA 10/22/2018 Clear     Color, UA 10/22/2018 Light Yellow     Specific Gravity, UA 10/22/2018 1 010     pH, UA 10/22/2018 6 5     Glucose, UA 10/22/2018 250 (1/4%)*    Ketones, UA 10/22/2018 Negative     Blood, UA 10/22/2018 Trace-Intact*    Protein, UA 10/22/2018 Negative     Nitrite, UA 10/22/2018 Positive*    Bilirubin, UA 10/22/2018 Negative     Urobilinogen, UA 10/22/2018 0 2     Leukocytes, UA 10/22/2018 Small*    WBC, UA 10/22/2018 10-20*    RBC, UA 10/22/2018 4-10*    Bacteria, UA 10/22/2018 Innumerable*    Epithelial Cells 10/22/2018 Occasional     POC Glucose 10/22/2018 163*    POC Glucose 10/22/2018 164*    Sodium 10/23/2018 143     Potassium 10/23/2018 3 4*    Chloride 10/23/2018 108     CO2 10/23/2018 23     ANION GAP 10/23/2018 12     BUN 10/23/2018 20     Creatinine 10/23/2018 1 79*    Glucose 10/23/2018 115     Calcium 10/23/2018 8 5     eGFR 10/23/2018 33     WBC 10/23/2018 9 13     RBC 10/23/2018 2 76*    Hemoglobin 10/23/2018 8 7*    Hematocrit 10/23/2018 26 6*    MCV 10/23/2018 96     MCH 10/23/2018 31 5     MCHC 10/23/2018 32 7     RDW 10/23/2018 13 2     MPV 10/23/2018 10 7     Platelets 31/87/4124 192     nRBC 10/23/2018 0     Segmented % 10/23/2018 65     Bands % 10/23/2018 18*    Lymphocytes % 10/23/2018 15     Monocytes % 10/23/2018 2*    Eosinophils, % 10/23/2018 0     Basophils % 10/23/2018 0     Absolute Neutrophils 10/23/2018 7 58     Lymphocytes Absolute 10/23/2018 1 37     Monocytes Absolute 10/23/2018 0 18     Eosinophils Absolute 10/23/2018 0 00     Basophils Absolute 10/23/2018 0 00     Total Counted 10/23/2018 100     Platelet Estimate 23/16/4338 Adequate     POC Glucose 10/23/2018 115     Ventricular Rate 10/23/2018 65     Atrial Rate 10/23/2018 65     VT Interval 10/23/2018 292     QRSD Interval 10/23/2018 108     QT Interval 10/23/2018 432     QTC Interval 10/23/2018 449     P Axis 10/23/2018 -5     QRS Axis 10/23/2018 3     T Wave Axis 10/23/2018 195     Ventricular Rate 10/23/2018 62     Atrial Rate 10/23/2018 62     VT Interval 10/23/2018 302     QRSD Interval 10/23/2018 108     QT Interval 10/23/2018 434     QTC Interval 10/23/2018 440     P Axis 10/23/2018 -8     QRS [place] 10/23/2018 3     T Wave Axis 10/23/2018 203     Ventricular Rate 10/22/2018 60     Atrial Rate 10/22/2018 60     VT Interval 10/22/2018 320     QRSD Interval 10/22/2018 116     QT Interval 10/22/2018 462     QTC Interval 10/22/2018 462     P Axis 10/22/2018 -42     QRS Axis 10/22/2018 -4     T Wave Axis 10/22/2018 44     POC Glucose 10/23/2018 [address] Outpatient Visit on 09/24/2018   Component Date Value    Case Report 09/24/2018                      Value:Surgical Pathology Report                         Case: [id number]                                   Authorizing Provider:  Rebekah Zimmerman PA-C     Collected: 09/24/2018 1001              Ordering Location:     [facility]        Received:            09/24/2018 [facility] Scan                                                            Pathologist:           Bev Marmolejo MD                                                        Specimens:   A) - Iliac Crest, Left, core                                                                        B) - Iliac Crest, Left, clot                                                                        C) - Iliac Crest, Left, slide x13                                                          Addendum 09/24/2018                      Value: This result contains rich text formatting which cannot be displayed here   Final Diagnosis 09/24/2018                      Value: This result contains rich text formatting which cannot be displayed here   Microscopic Description 09/24/2018                      Value: This result contains rich text formatting which cannot be displayed here   Note 09/24/2018                      Value: This result contains rich text formatting which cannot be displayed here   Additional Information 09/24/2018                      Value: This result contains rich text formatting which cannot be displayed here   Intraoperative Consultat* 09/24/2018                      Value: This result contains rich text formatting which cannot be displayed here  Lorenzo Tenorio Gross Description 09/24/2018                      Value: This result contains rich text formatting which cannot be displayed here      Scan Result 09/24/2018 SEE WRITTEN REPORT     Miscellaneous Lab Test R* 09/24/2018 SEE WRITTEN REPORT    Appointment on 09/06/2018   Component Date Value    LD 09/06/2018 190     Uric Acid 09/06/2018 4 0*    Beta-2 Microglobulin 09/06/2018 4 2*    IGA 09/06/2018 266 0     IGG 09/06/2018 755 0     IGM 09/06/2018 27 0*    WBC 09/06/2018 6 96     RBC 09/06/2018 3 46*  Hemoglobin 09/06/2018 10 8*    Hematocrit 09/06/2018 33 7*    MCV 09/06/2018 97     MCH 09/06/2018 31 2     MCHC 09/06/2018 32 0     RDW 09/06/2018 13 6     MPV 09/06/2018 10 5     Platelets 19/99/2623 231     nRBC 09/06/2018 0     Neutrophils Relative 09/06/2018 63     Immat GRANS % 09/06/2018 0     Lymphocytes Relative 09/06/2018 26     Monocytes Relative 09/06/2018 6     Eosinophils Relative 09/06/2018 4     Basophils Relative 09/06/2018 1     Neutrophils Absolute 09/06/2018 4 39     Immature Grans Absolute 09/06/2018 0 02     Lymphocytes Absolute 09/06/2018 1 84     Monocytes Absolute 09/06/2018 0 38     Eosinophils Absolute 09/06/2018 0 27     Basophils Absolute 09/06/2018 0 06     Sodium 09/06/2018 137     Potassium 09/06/2018 3 9     Chloride 09/06/2018 104     CO2 09/06/2018 26     ANION GAP 09/06/2018 7     BUN 09/06/2018 22     Creatinine 09/06/2018 1 91*    Glucose 09/06/2018 146*    Calcium 09/06/2018 9 5     AST 09/06/2018 17     ALT 09/06/2018 21     Alkaline Phosphatase 09/06/2018 118*    Total Protein 09/06/2018 7 3     Albumin 09/06/2018 3 7     Total Bilirubin 09/06/2018 0 93     eGFR 09/06/2018 31    Appointment on 08/28/2018   Component Date Value    Clarity, UA 08/28/2018 Cloudy     Color, UA 08/28/2018 Yellow     Specific Gravity, UA 08/28/2018 1 020     pH, UA 08/28/2018 5 0     Glucose, UA 08/28/2018 100 (1/10%)*    Ketones, UA 08/28/2018 Negative     Blood, UA 08/28/2018 Small*    Protein, UA 08/28/2018 Trace*    Nitrite, UA 08/28/2018 Negative     Bilirubin, UA 08/28/2018 Negative     Urobilinogen, UA 08/28/2018 0 2     Leukocytes, UA 08/28/2018 Large*    WBC, UA 08/28/2018 Innumerable*    RBC, UA 08/28/2018 0-1*    Bacteria, UA 08/28/2018 Moderate*    Epithelial Cells 08/28/2018 Occasional    There may be more visits with results that are not included       Imaging: Xr Chest Pa & Lateral    Result Date: 11/7/2018  Narrative: CHEST INDICATION:   R05: Cough  COMPARISON:  One view chest 4/7/2016 EXAM PERFORMED/VIEWS:  XR CHEST PA & LATERAL  The frontal view was performed utilizing dual energy radiographic technique  FINDINGS: Normal cardiac silhouette  Aortic calcification is present  No airspace consolidation, pneumothorax, pulmonary edema, or pleural effusion  Multilevel thoracic spondylosis  Impression: No radiographic evidence of acute intrathoracic process  Workstation performed: [id number]       Review of Systems:  Review of Systems   Constitutional: Negative for diaphoresis, fatigue, fever and unexpected weight change  HENT: Negative  Respiratory: Negative for cough, shortness of breath and wheezing  Cardiovascular: Negative for chest pain, palpitations and leg swelling  Gastrointestinal: Negative for abdominal pain, diarrhea and nausea  Musculoskeletal: Negative for gait problem and myalgias  Skin: Negative for rash  Neurological: Negative for dizziness and numbness  Psychiatric/Behavioral: Negative  Physical Exam:  Physical Exam   Constitutional: He is oriented to person, place, and time  He appears well-developed and well-nourished  HENT:   Head: Normocephalic and atraumatic  Eyes: Pupils are equal, round, and reactive to light  Neck: Normal range of motion  Neck supple  No JVD present  Cardiovascular: Regular rhythm, S1 normal, S2 normal and normal pulses  Pulses:       Carotid pulses are 2+ on the right side, and 2+ on the left side  Pulmonary/Chest: Effort normal and breath sounds normal  He has no wheezes  He has no rales  Abdominal: Soft  Bowel sounds are normal  There is no tenderness  Musculoskeletal: Normal range of motion  He exhibits no edema or tenderness  Neurological: He is alert and oriented to person, place, and time  He has normal reflexes  No cranial nerve deficit  Skin: Skin is warm  Psychiatric: He has a normal mood and affect

## 2018-11-16 ENCOUNTER — APPOINTMENT (OUTPATIENT)
Dept: LAB | Facility: CLINIC | Age: 83
End: 2018-11-16
Payer: COMMERCIAL

## 2018-11-19 ENCOUNTER — OFFICE VISIT (OUTPATIENT)
Dept: INTERNAL MEDICINE CLINIC | Facility: CLINIC | Age: 83
End: 2018-11-19
Payer: COMMERCIAL

## 2018-11-19 ENCOUNTER — TELEPHONE (OUTPATIENT)
Dept: INTERNAL MEDICINE CLINIC | Facility: CLINIC | Age: 83
End: 2018-11-19

## 2018-11-19 VITALS
HEIGHT: 65 IN | TEMPERATURE: 98.7 F | BODY MASS INDEX: 32.89 KG/M2 | WEIGHT: 197.4 LBS | DIASTOLIC BLOOD PRESSURE: 63 MMHG | HEART RATE: 95 BPM | OXYGEN SATURATION: 99 % | SYSTOLIC BLOOD PRESSURE: 142 MMHG

## 2018-11-19 DIAGNOSIS — L03.115 CELLULITIS OF RIGHT LOWER LIMB: Primary | ICD-10-CM

## 2018-11-19 PROCEDURE — 99213 OFFICE O/P EST LOW 20 MIN: CPT | Performed by: INTERNAL MEDICINE

## 2018-11-19 PROCEDURE — 1160F RVW MEDS BY RX/DR IN RCRD: CPT | Performed by: INTERNAL MEDICINE

## 2018-11-19 RX ORDER — SULFAMETHOXAZOLE AND TRIMETHOPRIM 800; 160 MG/1; MG/1
1 TABLET ORAL EVERY 12 HOURS SCHEDULED
Qty: 14 TABLET | Refills: 0 | Status: SHIPPED | OUTPATIENT
Start: 2018-11-19 | End: 2018-11-26

## 2018-11-19 NOTE — PATIENT INSTRUCTIONS
Problem List Items Addressed This Visit        Other    Cellulitis of right lower limb - Primary     Will cover with antibiotics, pt can also take a little benadryl as area looks almost like a sting with a large "wheal and flare" reaction  Call if leg becomes more swollen, or if redness worsening    The area of erythema was marked with a pen so he can monitor for improvement versus worsening         Relevant Medications    sulfamethoxazole-trimethoprim (BACTRIM DS) 800-160 mg per tablet

## 2018-11-19 NOTE — ASSESSMENT & PLAN NOTE
Will cover with antibiotics, pt can also take a little benadryl as area looks almost like a sting with a large "wheal and flare" reaction  Call if leg becomes more swollen, or if redness worsening    The area of erythema was marked with a pen so he can monitor for improvement versus worsening

## 2018-11-19 NOTE — TELEPHONE ENCOUNTER
Patient has pain and swelling in his R leg  It started yesterday  Patient would like to see you      Please advise

## 2018-11-19 NOTE — PROGRESS NOTES
Assessment/Plan:    Cellulitis of right lower limb  Will cover with antibiotics, pt can also take a little benadryl as area looks almost like a sting with a large "wheal and flare" reaction  Call if leg becomes more swollen, or if redness worsening  The area of erythema was marked with a pen so he can monitor for improvement versus worsening       Diagnoses and all orders for this visit:    Cellulitis of right lower limb  -     sulfamethoxazole-trimethoprim (BACTRIM DS) 800-160 mg per tablet; Take 1 tablet by mouth every 12 (twelve) hours for 7 days          Subjective:      Patient ID: Heron Salmeron is a 80 y o  male  The patient noticed some redness and swelling of his right lower leg yesterday thyroid  Patient does remember getting stung or bit by anything  The following portions of the patient's history were reviewed and updated as appropriate: allergies, current medications, past family history, past medical history, past social history, past surgical history and problem list     Review of Systems   Constitutional: Negative for chills and fever  Respiratory: Negative for shortness of breath  Cardiovascular: Negative for chest pain  Skin: Positive for color change           Objective:      /63   Pulse 95   Temp 98 7 °F (37 1 °C)   Ht 5' 5" (1 651 m)   Wt 89 5 kg (197 lb 6 4 oz)   SpO2 99%   BMI 32 85 kg/m²          Physical Exam   Skin:   Right lateral lower leg has area of erythema with some thickening of the skin, like "wheal and flare reaction", no central clearing or target appearance

## 2018-11-20 DIAGNOSIS — C90.00 MULTIPLE MYELOMA NOT HAVING ACHIEVED REMISSION (HCC): ICD-10-CM

## 2018-11-20 RX ORDER — DEXAMETHASONE 4 MG/1
TABLET ORAL
Qty: 15 TABLET | Refills: 0 | Status: SHIPPED | OUTPATIENT
Start: 2018-11-20 | End: 2018-12-14 | Stop reason: SDUPTHER

## 2018-11-20 NOTE — TELEPHONE ENCOUNTER
Stated that he reached out to the pharmacy to arrange delivery  They informed him that they send the steroids with the chemo drug and that's not ready to be sent yet  They req that we call in the 1 dose to the pt's local pharmacy which is the [facility] on file  Req that we call him back in regards to this

## 2018-11-21 ENCOUNTER — TELEPHONE (OUTPATIENT)
Dept: NEPHROLOGY | Facility: CLINIC | Age: 83
End: 2018-11-21

## 2018-11-21 NOTE — TELEPHONE ENCOUNTER
Called and left a voicemail in regards to scheduling a follow up appointment for January  Left our call back number so patient can call and schedule his appointment

## 2018-11-23 ENCOUNTER — APPOINTMENT (OUTPATIENT)
Dept: LAB | Facility: CLINIC | Age: 83
End: 2018-11-23
Payer: COMMERCIAL

## 2018-11-23 ENCOUNTER — TELEPHONE (OUTPATIENT)
Dept: HEMATOLOGY ONCOLOGY | Facility: CLINIC | Age: 83
End: 2018-11-23

## 2018-11-23 DIAGNOSIS — N18.4 STAGE 4 CHRONIC KIDNEY DISEASE (HCC): ICD-10-CM

## 2018-11-23 LAB
MAGNESIUM SERPL-MCNC: 2.3 MG/DL (ref 1.6–2.6)
PHOSPHATE SERPL-MCNC: 3.2 MG/DL (ref 2.3–4.1)
PTH-INTACT SERPL-MCNC: 117.8 PG/ML (ref 18.4–80.1)

## 2018-11-23 PROCEDURE — 84100 ASSAY OF PHOSPHORUS: CPT

## 2018-11-23 PROCEDURE — 83970 ASSAY OF PARATHORMONE: CPT

## 2018-11-23 PROCEDURE — 83735 ASSAY OF MAGNESIUM: CPT

## 2018-11-23 NOTE — TELEPHONE ENCOUNTER
Just checking on the time of appointment and needs some meds refilled   Will talk to [facility] on Monday at his appointment

## 2018-11-26 ENCOUNTER — OFFICE VISIT (OUTPATIENT)
Dept: HEMATOLOGY ONCOLOGY | Facility: CLINIC | Age: 83
End: 2018-11-26
Payer: COMMERCIAL

## 2018-11-26 VITALS
OXYGEN SATURATION: 97 % | HEART RATE: 87 BPM | RESPIRATION RATE: 18 BRPM | DIASTOLIC BLOOD PRESSURE: 80 MMHG | SYSTOLIC BLOOD PRESSURE: 122 MMHG | HEIGHT: 67 IN | TEMPERATURE: 98.4 F | WEIGHT: 192.2 LBS | BODY MASS INDEX: 30.17 KG/M2

## 2018-11-26 DIAGNOSIS — L02.415 ABSCESS OF RIGHT LOWER LEG: ICD-10-CM

## 2018-11-26 DIAGNOSIS — C90.00 MULTIPLE MYELOMA NOT HAVING ACHIEVED REMISSION (HCC): ICD-10-CM

## 2018-11-26 DIAGNOSIS — C90.00 KAPPA LIGHT CHAIN MYELOMA (HCC): Primary | ICD-10-CM

## 2018-11-26 PROCEDURE — 99214 OFFICE O/P EST MOD 30 MIN: CPT | Performed by: PHYSICIAN ASSISTANT

## 2018-11-26 RX ORDER — LENALIDOMIDE 5 MG/1
CAPSULE ORAL
Qty: 14 CAPSULE | Refills: 0 | Status: SHIPPED | OUTPATIENT
Start: 2018-11-26 | End: 2018-12-14 | Stop reason: SDUPTHER

## 2018-11-26 NOTE — PROGRESS NOTES
If patient needs an abscess drained, let patient or family know that I do not do this, that he would need to see a surgeon, somebody like Dr Vicky Jimenez

## 2018-11-26 NOTE — LETTER
November 26, 2018     Lazarus Martin MD  [facility] [address]    Patient: Florin Patel   YOB: 1930   Date of Visit: 11/26/2018       Dear Dr Marian Cox: Thank you for referring Marty Pittman to me for evaluation  Below are my notes for this consultation  If you have questions, please do not hesitate to call me  I look forward to following your patient along with you  Sincerely,        Donnie Hardy PA-C        CC: No Recipients  Donnie Hardy PA-C  11/26/2018  2:58 PM  Incomplete  [address] [facility] [place]  [address] [address] [phone]  Progress Note  Florin Patel, 9/22/1930, [id number]  11/26/2018    Assessment/Plan:  There are no diagnoses linked to this encounter  Labs and imaging for follow up:  No orders of the defined types were placed in this encounter  The patient is scheduled for follow-up in approximately *** with Dr Jinnie Rubinstein Patient*** voiced agreement and understanding to the above  Patient*** knows to call the Hematology/Oncology office with any questions and concerns regarding the above  Carefully review your medication list in verify the list is accurate and up-to-date  Please call the hematologic/Oncology office if there medications missing from the less, medications on the list that your not currently taking or if there is a dosage or instruction that is different from higher taking medication  I have spent *** minutes with {Patient /Family:88949} today in which greater than 50% of this time was spent in counseling/coordination of care regarding {AMB Counseling Topics:5664273011}  The patient's current treatment goals are ***  Barrier(s) to care identified    None***  The patient is *** able to self care     -------------------------------------------------------------------------------------------------------    Chief complaint:   Chief Complaint   Patient presents with    Follow-up       History of present illness/Cancer History:      [place]appa light chain myeloma ([facility] [address] )    9/8/2018 - 10/8/2018 Cancer Staged     Cancer Staging  [place] light chain myeloma ([facility] )  Staging form: Plasma Cell Myeloma and Disorders, AJCC 8th Edition  - Clinical stage from 10/8/2018: RISS Stage II (Beta-2-microglobulin (mg/L): 4 2, Albumin (g/dL): 3 7, ISS: Stage II, High-risk cytogenetics: Absent, LDH: Normal) - Signed by Jefry Limon PA-C on 10/8/2018           9/8/2018 Initial Diagnosis     Patient had an increase in creatinine  Patient followed up with Nephrology who completed a comprehensive workup  This demonstrated positive free light chain ratio elevation in addition to a 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             9/24/2018 Biopsy     Final Diagnosis   A -C  Bone marrow,  left iliac crest,  biopsy and aspirate:  -  Kappa light chain restricted plasma cell neoplasm, consistent with plasma cell myeloma (see note)  -  Maturing trilineage hematopoiesis without distinct features of dysplasia or increased myeloblasts  -  Decreased stainable storage iron  -  Mildly increased reticulin fibers without fibrosis  -  Negative for collagen fibrosis, granulomata, vasculitis, necrosis  Flow cytometry ([facility]# [id number], evaluated by Severo Coach, M D )       * Interpretation:    1  Plasma cell neoplasm, IgA kappa-restricted  See Comment  2  No evidence of B-cell or T-cell lymphoproliferative disorder  *  Comment:  Due to potential dilutional/lysis effects associated with flow cytometry, the reported plasma cell count (2 4%) is an underestimate  Therefore, correlation with a comprehensive bone marrow examination including morphologic plasma cell enumeration will be necessary for further and definitive characterization  Interpretation FISH Myeloma Panel:  1   No evidence of IGH-MAF [translocation t(14;16)] gene rearrangement  2  No evidence of RB1 monosomy (13q14 deletion)  3  No evidence of CCND1-IGH [translocation t(11;14)] gene rearrangement, and no evidence for trisomy 11 or gain of 11q  4  No evidence of p53 (17p13) deletion or amplification  5  No evidence of FGFR3-IGH [translocation t(4;14)] gene rearrangement  6  Negative for 1q21/CKS1B gain             Cancer Staging  [place] light chain myeloma ([facility] [address] )  Staging form: Plasma Cell Myeloma and Disorders, AJCC 8th Edition  - Clinical stage from 10/8/2018: RISS Stage II (Beta-2-microglobulin (mg/L): 4 2, Albumin (g/dL): 3 7, ISS: Stage II, High-risk cytogenetics: Absent, LDH: Normal) - Signed by Silvina Luke PA-C on 10/8/2018       ECOG: {performance status:72222}    Interval history:          Review of Systems      Current Outpatient Prescriptions:     acetaminophen (TYLENOL) 325 mg tablet, Take 1 - 2 tabs PO Q4 PRN pain, Disp: 30 tablet, Rfl: 0    aspirin 81 MG tablet, Take 81 mg by mouth daily  , Disp: , Rfl:     atorvastatin (LIPITOR) 40 mg tablet, Take 1 tablet (40 mg total) by mouth every evening, Disp: , Rfl: 0    benzonatate (TESSALON PERLES) 100 mg capsule, Take 1 capsule (100 mg total) by mouth 3 (three) times a day as needed for cough, Disp: 60 capsule, Rfl: 1    bimatoprost (LUMIGAN) 0 01 % ophthalmic drops, Apply to eye, Disp: , Rfl:     dexamethasone (DECADRON) 4 mg tablet, Take 5 tablets by mouth once weekly - total dose 20mg, Disp: 15 tablet, Rfl: 0    esomeprazole (NexIUM) 40 MG capsule, Take 40 mg by mouth every evening , Disp: , Rfl:     fluticasone (FLONASE) 50 mcg/act nasal spray, into each nostril as needed  , Disp: , Rfl:     furosemide (LASIX) 40 mg tablet, Take 0 5 tablets (20 mg total) by mouth daily, Disp: 90 tablet, Rfl: 0    glimepiride (AMARYL) 2 mg tablet, Take 2 mg by mouth every evening , Disp: , Rfl:     glucose blood (ACCU-CHEK CHANDRAKANT PLUS) test strip, by In Vitro route, Disp: , Rfl:     guaiFENesin (MUCINEX) 600 mg 12 hr tablet, Take 1 tablet (600 mg total) by mouth 2 (two) times a day, Disp: 60 tablet, Rfl: 0    lenalidomide (REVLIMID) 5 MG CAPS, Take one cap by mouth on days 1-14 every 21 days Auth #[id number], Disp: 14 capsule, Rfl: 0    metoprolol succinate (TOPROL-XL) 25 mg 24 hr tablet, TAKE 1 TABLET DAILY, Disp: 90 tablet, Rfl: 3    nitroglycerin (NITROSTAT) 0 4 mg SL tablet, Place 1 tablet under the tongue every 5 (five) minutes as needed, Disp: , Rfl:     ONE TOUCH ULTRA TEST test strip, The patient test once daily  , Disp: 100 each, Rfl: 2    ONETOUCH DELICA LANCETS 92Q MISC, by Does not apply route daily, Disp: 100 each, Rfl: 3    potassium chloride (KLOR-CON 10) 10 mEq tablet, Take 1 tablet (10 mEq total) by mouth daily, Disp: 90 tablet, Rfl: 3    sulfamethoxazole-trimethoprim (BACTRIM DS) 800-160 mg per tablet, Take 1 tablet by mouth every 12 (twelve) hours for 7 days, Disp: 14 tablet, Rfl: 0    timolol (TIMOPTIC) 0 5 % ophthalmic solution, , Disp: , Rfl:       No current facility-administered medications for this visit  No Known Allergies    Objective:   /80 (BP Location: Left arm, Patient Position: Sitting, Cuff Size: Adult)   Pulse 87   Temp 98 4 °F (36 9 °C)   Resp 18   Ht 5' 7" (1 702 m)   Wt 87 2 kg (192 lb 3 2 oz)   SpO2 97%   BMI 30 10 kg/m²    Wt Readings from Last 3 Encounters:   11/26/18 87 2 kg (192 lb 3 2 oz)   11/19/18 89 5 kg (197 lb 6 4 oz)   11/15/18 89 3 kg (196 lb 12 8 oz)     Physical Exam    Pertinent Laboratory Results and Imaging Review: The following historical data was reviewed      Past Medical History:   Diagnosis Date    Angina pectoris ([facility] [address] [facility]     Chronic kidney disease     Coronary artery disease     Diabetes mellitus ([facility] [address] )     Glaucoma     Hypertension     [facility]     Occluded coronary artery stent     Left       Past Surgical History:   Procedure Laterality Date    BACK SURGERY      CARDIAC CATHETERIZATION  04/05/2004    CT BONE MARROW BIOPSY AND ASPIRATION  9/24/2018    KNEE SURGERY      THROMBOENDARTERECTOMY Right     Cartoid       Social History     Social History    Marital status:      Spouse name: N/A    Number of children: N/A    Years of education: N/A     Social History Main Topics    Smoking status: Former Smoker     Types: Cigarettes    Smokeless tobacco: Never Used      Comment: former smoker    Alcohol use Yes      Comment: socially; less than once a month    Drug use: No    Sexual activity: Not Asked     Other Topics Concern    None     Social History Narrative    Daily caffeine consumption           Family History   Problem Relation Age of Onset    Heart attack Father     Heart disease Mother     Diabetes Mother     Heart disease Sister     COPD Family        Please note: This report has been generated by a voice recognition software system  Therefore there may be syntax, spelling, and/or grammatical errors  Please call if you have any questions

## 2018-11-26 NOTE — PROGRESS NOTES
[id number] [facility]  [address] [place] [id number] [phone]  Progress Note  Bella Martinez, 9/22/1930, [id number]  11/26/2018    Assessment/Plan:  1  Kappa light chain myeloma  Patient's renal functions are mildly elevated however they are still fluctuating within his previous normal range  Patient's hemoglobin is stable  Overall patient is doing well on therapy  Cycle 3 is to start on 11/30/18    Regimen:  Dexamethasone 20 mg Days 1, 8, 15  Revlimid 5 mg Days 1-14  Cycle length = 21 days    Blood work regarding disease to be completed after cycle 4- including free light chains, SPEP, CBC, CMP, beta 2 microglobulin  2   Right lower extremity abscess  Today, the abscess was decompressed in the office  Patient will likely need full incision and drainage at his PCP office  I have asked him to follow up with Dr Geovani Faria  The patient is scheduled for follow-up in approximately 3 weeks with Dr Nelly Irving  Patient and his newphew voiced agreement and understanding to the above  Patient knows to call the Hematology/Oncology office with any questions and concerns regarding the above  Carefully review your medication list in verify the list is accurate and up-to-date  Please call the hematologic/Oncology office if there medications missing from the less, medications on the list that your not currently taking or if there is a dosage or instruction that is different from higher taking medication  I have spent 20 minutes with Patient  today in which greater than 50% of this time was spent in counseling/coordination of care regarding Diagnostic results and Impressions  The patient's current treatment goals are prolongation of life  Barrier(s) to care identified    None  The patient is able to self care     -------------------------------------------------------------------------------------------------------    Chief complaint: Chief Complaint   Patient presents with    Follow-up       History of present illness/Cancer History:      Kappa light chain myeloma ([facility] [address] )    9/8/2018 - 10/8/2018 Cancer Staged     Cancer Staging  [place] light chain myeloma ([facility] )  Staging form: Plasma Cell Myeloma and Disorders, AJCC 8th Edition  - Clinical stage from 10/8/2018: RISS Stage II (Beta-2-microglobulin (mg/L): 4 2, Albumin (g/dL): 3 7, ISS: Stage II, High-risk cytogenetics: Absent, LDH: Normal) - Signed by Jose Nguyen PA-C on 10/8/2018           9/8/2018 Initial Diagnosis     Patient had an increase in creatinine  Patient followed up with Nephrology who completed a comprehensive workup  This demonstrated positive free light chain ratio elevation in addition to a 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             9/24/2018 Biopsy     Final Diagnosis   A -C  Bone marrow,  left iliac crest,  biopsy and aspirate:  -  Kappa light chain restricted plasma cell neoplasm, consistent with plasma cell myeloma (see note)  -  Maturing trilineage hematopoiesis without distinct features of dysplasia or increased myeloblasts  -  Decreased stainable storage iron  -  Mildly increased reticulin fibers without fibrosis  -  Negative for collagen fibrosis, granulomata, vasculitis, necrosis  Flow cytometry ([facility]# [id number], evaluated by NATALIE Sanchez )       * Interpretation:    1  Plasma cell neoplasm, IgA kappa-restricted  See Comment  2  No evidence of B-cell or T-cell lymphoproliferative disorder  *  Comment:  Due to potential dilutional/lysis effects associated with flow cytometry, the reported plasma cell count (2 4%) is an underestimate  Therefore, correlation with a comprehensive bone marrow examination including morphologic plasma cell enumeration will be necessary for further and definitive characterization  Interpretation FISH Myeloma Panel:  1   No evidence of IGH-MAF [translocation Y(81;58)] gene rearrangement  2  No evidence of RB1 monosomy (13q14 deletion)  3  No evidence of CCND1-IGH [translocation t(11;14)] gene rearrangement, and no evidence for trisomy 11 or gain of 11q  4  No evidence of p53 (17p13) deletion or amplification  5  No evidence of FGFR3-IGH [translocation t(4;14)] gene rearrangement  6  Negative for 1q21/CKS1B gain         10/19/2018 -  Chemotherapy     Dexamethasone 20 mg Days 1, 8, 15  Revlimid 5 mg Days 1-14  Cycle length = 21 days             Cancer Staging  [place] light chain myeloma ([facility] [address] )  Staging form: Plasma Cell Myeloma and Disorders, AJCC 8th Edition  - Clinical stage from 10/8/2018: RISS Stage II (Beta-2-microglobulin (mg/L): 4 2, Albumin (g/dL): 3 7, ISS: Stage II, High-risk cytogenetics: Absent, LDH: Normal) - Signed by Mariano Koroma PA-C on 10/8/2018     ECO - Symptomatic but completely ambulatory    Interval history:       Patient notes that over the past week or so he has developed boils on his lower extremity  Patient notes that he had the 1 on his left anterior shin I and D during hospitalization  Patient now presents with an additional abscess on the right lower extremity  Patient is on PCP was given antibiotics  Patient has completed antibiotics but is not had a 90 completed this area  Review of Systems   Constitutional: Positive for unexpected weight change  Negative for activity change, appetite change, chills, fatigue and fever  HENT: Negative for hearing loss, mouth sores, nosebleeds, sore throat, trouble swallowing and voice change  Eyes: Negative for visual disturbance  Respiratory: Negative for cough and shortness of breath  Cardiovascular: Negative for chest pain, palpitations and leg swelling  Gastrointestinal: Negative for abdominal pain, blood in stool, constipation, diarrhea, nausea and vomiting  Endocrine: Negative for cold intolerance  Genitourinary: Negative for decreased urine volume, dysuria and hematuria  Musculoskeletal: Positive for gait problem  Negative for arthralgias and myalgias  Skin: Positive for wound  Negative for rash  Neurological: Negative for dizziness, weakness, numbness and headaches  Hematological: Negative for adenopathy  Does not bruise/bleed easily  Psychiatric/Behavioral: Negative for dysphoric mood  Current Outpatient Prescriptions:     acetaminophen (TYLENOL) 325 mg tablet, Take 1 - 2 tabs PO Q4 PRN pain, Disp: 30 tablet, Rfl: 0    aspirin 81 MG tablet, Take 81 mg by mouth daily  , Disp: , Rfl:     atorvastatin (LIPITOR) 40 mg tablet, Take 1 tablet (40 mg total) by mouth every evening, Disp: , Rfl: 0    benzonatate (TESSALON PERLES) 100 mg capsule, Take 1 capsule (100 mg total) by mouth 3 (three) times a day as needed for cough, Disp: 60 capsule, Rfl: 1    bimatoprost (LUMIGAN) 0 01 % ophthalmic drops, Apply to eye, Disp: , Rfl:     dexamethasone (DECADRON) 4 mg tablet, Take 5 tablets by mouth once weekly - total dose 20mg, Disp: 15 tablet, Rfl: 0    esomeprazole (NexIUM) 40 MG capsule, Take 40 mg by mouth every evening , Disp: , Rfl:     fluticasone (FLONASE) 50 mcg/act nasal spray, into each nostril as needed  , Disp: , Rfl:     furosemide (LASIX) 40 mg tablet, Take 0 5 tablets (20 mg total) by mouth daily, Disp: 90 tablet, Rfl: 0    glimepiride (AMARYL) 2 mg tablet, Take 2 mg by mouth every evening , Disp: , Rfl:     glucose blood (ACCU-CHEK CHANDRAKANT PLUS) test strip, by In Vitro route, Disp: , Rfl:     guaiFENesin (MUCINEX) 600 mg 12 hr tablet, Take 1 tablet (600 mg total) by mouth 2 (two) times a day, Disp: 60 tablet, Rfl: 0    lenalidomide (REVLIMID) 5 MG CAPS, Take one cap by mouth on days 1-14 every 21 days Auth #[id number], Disp: 14 capsule, Rfl: 0    metoprolol succinate (TOPROL-XL) 25 mg 24 hr tablet, TAKE 1 TABLET DAILY, Disp: 90 tablet, Rfl: 3    nitroglycerin (NITROSTAT) 0 4 mg SL tablet, Place 1 tablet under the tongue every 5 (five) minutes as needed, Disp: , Rfl:     ONE TOUCH ULTRA TEST test strip, The patient test once daily  , Disp: 100 each, Rfl: 2    ONETOUCH DELICA LANCETS 60M MISC, by Does not apply route daily, Disp: 100 each, Rfl: 3    potassium chloride (KLOR-CON 10) 10 mEq tablet, Take 1 tablet (10 mEq total) by mouth daily, Disp: 90 tablet, Rfl: 3    sulfamethoxazole-trimethoprim (BACTRIM DS) 800-160 mg per tablet, Take 1 tablet by mouth every 12 (twelve) hours for 7 days, Disp: 14 tablet, Rfl: 0    timolol (TIMOPTIC) 0 5 % ophthalmic solution, , Disp: , Rfl:       No current facility-administered medications for this visit  No Known Allergies    Objective:   /80 (BP Location: Left arm, Patient Position: Sitting, Cuff Size: Adult)   Pulse 87   Temp 98 4 °F (36 9 °C)   Resp 18   Ht 5' 7" (1 702 m)   Wt 87 2 kg (192 lb 3 2 oz)   SpO2 97%   BMI 30 10 kg/m²   Wt Readings from Last 3 Encounters:   11/26/18 87 2 kg (192 lb 3 2 oz)   11/19/18 89 5 kg (197 lb 6 4 oz)   11/15/18 89 3 kg (196 lb 12 8 oz)     Physical Exam   Constitutional: He is oriented to person, place, and time  He appears well-developed and well-nourished  No distress  HENT:   Head: Normocephalic and atraumatic  Right Ear: External ear normal    Left Ear: External ear normal    Nose: Nose normal    Eyes: Conjunctivae are normal  No scleral icterus  Cardiovascular: Normal rate  Pulmonary/Chest: No respiratory distress  Abdominal: Soft  He exhibits no distension  Musculoskeletal: He exhibits no edema  Neurological: He is alert and oriented to person, place, and time  Skin: No rash (on exposed skin ) noted  + right lower extermity swelling with pustules  + abscess  + drainage with light compression     Psychiatric: Thought content normal      Pertinent Laboratory Results and Imaging Review:  Appointment on 11/23/2018   Component Date Value Ref Range Status    Magnesium 11/23/2018 2 3  1 6 - 2 6 mg/dL Final    Phosphorus 11/23/2018 3 2  2 3 - 4 1 mg/dL Final    PTH 11/23/2018 117 8* 18 4 - 80 1 pg/mL Final   Ancillary Orders on 11/23/2018   Component Date Value Ref Range Status    Sodium 11/23/2018 132* 136 - 145 mmol/L Final    Potassium 11/23/2018 4 5  3 5 - 5 3 mmol/L Final    Chloride 11/23/2018 102  100 - 108 mmol/L Final    CO2 11/23/2018 21  21 - 32 mmol/L Final    ANION GAP 11/23/2018 9  4 - 13 mmol/L Final    BUN 11/23/2018 25  5 - 25 mg/dL Final    Creatinine 11/23/2018 2 36* 0 60 - 1 30 mg/dL Final    Standardized to IDMS reference method    Glucose 11/23/2018 170* 65 - 140 mg/dL Final      If the patient is fasting, the ADA then defines impaired fasting glucose as > 100 mg/dL and diabetes as > or equal to 123 mg/dL  Specimen collection should occur prior to Sulfasalazine administration due to the potential for falsely depressed results  Specimen collection should occur prior to Sulfapyridine administration due to the potential for falsely elevated results   Calcium 11/23/2018 8 4  8 3 - 10 1 mg/dL Final    AST 11/23/2018 11  5 - 45 U/L Final      Specimen collection should occur prior to Sulfasalazine administration due to the potential for falsely depressed results   ALT 11/23/2018 15  12 - 78 U/L Final      Specimen collection should occur prior to Sulfasalazine and/or Sulfapyridine administration due to the potential for falsely depressed results       Alkaline Phosphatase 11/23/2018 134* 46 - 116 U/L Final    Total Protein 11/23/2018 7 0  6 4 - 8 2 g/dL Final    Albumin 11/23/2018 2 9* 3 5 - 5 0 g/dL Final    Total Bilirubin 11/23/2018 0 64  0 20 - 1 00 mg/dL Final    eGFR 11/23/2018 24  ml/min/1 73sq m Final   Ancillary Orders on 11/23/2018   Component Date Value Ref Range Status    WBC 11/23/2018 6 51  4 31 - 10 16 Thousand/uL Final    RBC 11/23/2018 3 28* 3 88 - 5 62 Million/uL Final    Hemoglobin 11/23/2018 10 0* 12 0 - 17 0 g/dL Final    Hematocrit 11/23/2018 30 6* 36 5 - 49 3 % Final    MCV 11/23/2018 93  82 - 98 fL Final    MCH 11/23/2018 30 5  26 8 - 34 3 pg Final    MCHC 11/23/2018 32 7  31 4 - 37 4 g/dL Final    RDW 11/23/2018 13 7  11 6 - 15 1 % Final    MPV 11/23/2018 11 1  8 9 - 12 7 fL Final    Platelets 59/63/3045 296  149 - 390 Thousands/uL Final    nRBC 11/23/2018 0  /100 WBCs Final    Neutrophils Relative 11/23/2018 71  43 - 75 % Final    Immat GRANS % 11/23/2018 1  0 - 2 % Final    Lymphocytes Relative 11/23/2018 13* 14 - 44 % Final    Monocytes Relative 11/23/2018 6  4 - 12 % Final    Eosinophils Relative 11/23/2018 8* 0 - 6 % Final    Basophils Relative 11/23/2018 1  0 - 1 % Final    Neutrophils Absolute 11/23/2018 4 60  1 85 - 7 62 Thousands/µL Final    Immature Grans Absolute 11/23/2018 0 07  0 00 - 0 20 Thousand/uL Final    Lymphocytes Absolute 11/23/2018 0 85  0 60 - 4 47 Thousands/µL Final    Monocytes Absolute 11/23/2018 0 41  0 17 - 1 22 Thousand/µL Final    Eosinophils Absolute 11/23/2018 0 50  0 00 - 0 61 Thousand/µL Final    Basophils Absolute 11/23/2018 0 08  0 00 - 0 10 Thousands/µL Final   Ancillary Orders on 11/16/2018   Component Date Value Ref Range Status    Sodium 11/16/2018 133* 136 - 145 mmol/L Final    Potassium 11/16/2018 4 2  3 5 - 5 3 mmol/L Final    Chloride 11/16/2018 99* 100 - 108 mmol/L Final    CO2 11/16/2018 26  21 - 32 mmol/L Final    ANION GAP 11/16/2018 8  4 - 13 mmol/L Final    BUN 11/16/2018 27* 5 - 25 mg/dL Final    Creatinine 11/16/2018 1 89* 0 60 - 1 30 mg/dL Final    Standardized to IDMS reference method    Glucose 11/16/2018 168* 65 - 140 mg/dL Final      If the patient is fasting, the ADA then defines impaired fasting glucose as > 100 mg/dL and diabetes as > or equal to 123 mg/dL  Specimen collection should occur prior to Sulfasalazine administration due to the potential for falsely depressed results  Specimen collection should occur prior to Sulfapyridine administration due to the potential for falsely elevated results      Calcium 11/16/2018 8 5  8 3 - 10 1 mg/dL Final    AST 11/16/2018 12  5 - 45 U/L Final      Specimen collection should occur prior to Sulfasalazine administration due to the potential for falsely depressed results   ALT 11/16/2018 16  12 - 78 U/L Final      Specimen collection should occur prior to Sulfasalazine and/or Sulfapyridine administration due to the potential for falsely depressed results       Alkaline Phosphatase 11/16/2018 119* 46 - 116 U/L Final    Total Protein 11/16/2018 6 6  6 4 - 8 2 g/dL Final    Albumin 11/16/2018 2 8* 3 5 - 5 0 g/dL Final    Total Bilirubin 11/16/2018 0 79  0 20 - 1 00 mg/dL Final    eGFR 11/16/2018 31  ml/min/1 73sq m Final   Ancillary Orders on 11/16/2018   Component Date Value Ref Range Status    WBC 11/16/2018 8 17  4 31 - 10 16 Thousand/uL Final    RBC 11/16/2018 3 42* 3 88 - 5 62 Million/uL Final    Hemoglobin 11/16/2018 10 4* 12 0 - 17 0 g/dL Final    Hematocrit 11/16/2018 32 2* 36 5 - 49 3 % Final    MCV 11/16/2018 94  82 - 98 fL Final    MCH 11/16/2018 30 4  26 8 - 34 3 pg Final    MCHC 11/16/2018 32 3  31 4 - 37 4 g/dL Final    RDW 11/16/2018 13 2  11 6 - 15 1 % Final    MPV 11/16/2018 11 2  8 9 - 12 7 fL Final    Platelets 12/42/4844 282  149 - 390 Thousands/uL Final    nRBC 11/16/2018 0  /100 WBCs Final    Neutrophils Relative 11/16/2018 79* 43 - 75 % Final    Immat GRANS % 11/16/2018 2  0 - 2 % Final    Lymphocytes Relative 11/16/2018 10* 14 - 44 % Final    Monocytes Relative 11/16/2018 4  4 - 12 % Final    Eosinophils Relative 11/16/2018 4  0 - 6 % Final    Basophils Relative 11/16/2018 1  0 - 1 % Final    Neutrophils Absolute 11/16/2018 6 59  1 85 - 7 62 Thousands/µL Final    Immature Grans Absolute 11/16/2018 0 15  0 00 - 0 20 Thousand/uL Final    Lymphocytes Absolute 11/16/2018 0 79  0 60 - 4 47 Thousands/µL Final    Monocytes Absolute 11/16/2018 0 30  0 17 - 1 22 Thousand/µL Final    Eosinophils Absolute 11/16/2018 0 29  0 00 - 0 61 Thousand/µL Final    Basophils Absolute 11/16/2018 0 05  0 00 - 0 10 Thousands/µL Final         The following historical data was reviewed  Past Medical History:   Diagnosis Date    Angina pectoris ([facility] )     Chronic kidney disease     Coronary artery disease     Diabetes mellitus ([facility] )     Glaucoma     Hypertension     Multiple myeloma ([place] [address] )     Occluded coronary artery stent     Left       Past Surgical History:   Procedure Laterality Date    BACK SURGERY      CARDIAC CATHETERIZATION  04/05/2004    CT BONE MARROW BIOPSY AND ASPIRATION  9/24/2018    KNEE SURGERY      THROMBOENDARTERECTOMY Right     Cartoid       Social History     Social History    Marital status:      Spouse name: N/A    Number of children: N/A    Years of education: N/A     Social History Main Topics    Smoking status: Former Smoker     Types: Cigarettes    Smokeless tobacco: Never Used      Comment: former smoker    Alcohol use Yes      Comment: socially; less than once a month    Drug use: No    Sexual activity: Not Asked     Other Topics Concern    None     Social History Narrative    Daily caffeine consumption           Family History   Problem Relation Age of Onset    Heart attack Father     Heart disease Mother     Diabetes Mother     Heart disease Sister     COPD Family        Please note: This report has been generated by a voice recognition software system  Therefore there may be syntax, spelling, and/or grammatical errors  Please call if you have any questions

## 2018-11-26 NOTE — LETTER
November 26, 2018     Rafi Dailey MD  [facility] [id number]    Patient: Adithya Olivas   YOB: 1930   Date of Visit: 11/26/2018       Dear Dr Bella Jacobsen: Thank you for referring Curt Strauss to me for evaluation  Below are my notes for this consultation  If you have questions, please do not hesitate to call me  I look forward to following your patient along with you  Sincerely,        Meghan Farias PA-C        CC: No Recipients  Meghan Farias PA-C  11/26/2018  3:15 PM  Sign at close encounter  [address]  [facility] [address] [facility] [address]  [phone]  Progress Note  Adithya Olivas, 9/22/1930, [id number]  11/26/2018    Assessment/Plan:  1  Kappa light chain myeloma  Patient's renal functions are mildly elevated however they are still fluctuating within his previous normal range  Patient's hemoglobin is stable  Overall patient is doing well on therapy  Cycle 3 is to start on 11/30/18    Regimen:  Dexamethasone 20 mg Days 1, 8, 15  Revlimid 5 mg Days 1-14  Cycle length = 21 days    Blood work regarding disease to be completed after cycle 4- including free light chains, SPEP, CBC, CMP, beta 2 microglobulin  2   Right lower extremity abscess  Today, the abscess was decompressed in the office  Patient will likely need full incision and drainage at his PCP office  I have asked him to follow up with Dr Bella Jacobsen  The patient is scheduled for follow-up in approximately 3 weeks with Dr Yvette Baldwin  Patient and his newphew voiced agreement and understanding to the above  Patient knows to call the Hematology/Oncology office with any questions and concerns regarding the above  Carefully review your medication list in verify the list is accurate and up-to-date    Please call the hematologic/Oncology office if there medications missing from the less, medications on the list that your not currently taking or if there is a dosage or instruction that is different from higher taking medication  I have spent 20 minutes with Patient  today in which greater than 50% of this time was spent in counseling/coordination of care regarding Diagnostic results and Impressions  The patient's current treatment goals are prolongation of life  Barrier(s) to care identified  None  The patient is able to self care     -------------------------------------------------------------------------------------------------------    Chief complaint:   Chief Complaint   Patient presents with    Follow-up       History of present illness/Cancer History:      Kappa light chain myeloma ([facility] [address] )    9/8/2018 - 10/8/2018 Cancer Staged     Cancer Staging  [place] light chain myeloma ([facility] [address] )  Staging form: Plasma Cell Myeloma and Disorders, AJCC 8th Edition  - Clinical stage from 10/8/2018: RISS Stage II (Beta-2-microglobulin (mg/L): 4 2, Albumin (g/dL): 3 7, ISS: Stage II, High-risk cytogenetics: Absent, LDH: Normal) - Signed by Barbara Christina PA-C on 10/8/2018           9/8/2018 Initial Diagnosis     Patient had an increase in creatinine  Patient followed up with Nephrology who completed a comprehensive workup  This demonstrated positive free light chain ratio elevation in addition to a 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             9/24/2018 Biopsy     Final Diagnosis   A -C  Bone marrow,  left iliac crest,  biopsy and aspirate:  -  Kappa light chain restricted plasma cell neoplasm, consistent with plasma cell myeloma (see note)  -  Maturing trilineage hematopoiesis without distinct features of dysplasia or increased myeloblasts  -  Decreased stainable storage iron  -  Mildly increased reticulin fibers without fibrosis  -  Negative for collagen fibrosis, granulomata, vasculitis, necrosis       Flow cytometry ([facility]# [id number], evaluated by NATALIE Chang )       * Interpretation:    1  Plasma cell neoplasm, IgA kappa-restricted  See Comment  2  No evidence of B-cell or T-cell lymphoproliferative disorder  *  Comment:  Due to potential dilutional/lysis effects associated with flow cytometry, the reported plasma cell count (2 4%) is an underestimate  Therefore, correlation with a comprehensive bone marrow examination including morphologic plasma cell enumeration will be necessary for further and definitive characterization  Interpretation FISH Myeloma Panel:  1  No evidence of IGH-MAF [translocation t(14;16)] gene rearrangement  2  No evidence of RB1 monosomy (13q14 deletion)  3  No evidence of CCND1-IGH [translocation t(11;14)] gene rearrangement, and no evidence for trisomy 11 or gain of 11q  4  No evidence of p53 (17p13) deletion or amplification  5  No evidence of FGFR3-IGH [translocation t(4;14)] gene rearrangement  6  Negative for 1q21/CKS1B gain         10/19/2018 -  Chemotherapy     Dexamethasone 20 mg Days 1, 8, 15  Revlimid 5 mg Days 1-14  Cycle length = 21 days             Cancer Staging  [place] light chain myeloma ([facility] [address] )  Staging form: Plasma Cell Myeloma and Disorders, AJCC 8th Edition  - Clinical stage from 10/8/2018: RISS Stage II (Beta-2-microglobulin (mg/L): 4 2, Albumin (g/dL): 3 7, ISS: Stage II, High-risk cytogenetics: Absent, LDH: Normal) - Signed by Sruthi Collins PA-C on 10/8/2018     ECO - Symptomatic but completely ambulatory    Interval history:       Patient notes that over the past week or so he has developed boils on his lower extremity  Patient notes that he had the 1 on his left anterior shin I and D during hospitalization  Patient now presents with an additional abscess on the right lower extremity  Patient is on PCP was given antibiotics  Patient has completed antibiotics but is not had a 90 completed this area  Review of Systems   Constitutional: Positive for unexpected weight change   Negative for activity change, appetite change, chills, fatigue and fever  HENT: Negative for hearing loss, mouth sores, nosebleeds, sore throat, trouble swallowing and voice change  Eyes: Negative for visual disturbance  Respiratory: Negative for cough and shortness of breath  Cardiovascular: Negative for chest pain, palpitations and leg swelling  Gastrointestinal: Negative for abdominal pain, blood in stool, constipation, diarrhea, nausea and vomiting  Endocrine: Negative for cold intolerance  Genitourinary: Negative for decreased urine volume, dysuria and hematuria  Musculoskeletal: Positive for gait problem  Negative for arthralgias and myalgias  Skin: Positive for wound  Negative for rash  Neurological: Negative for dizziness, weakness, numbness and headaches  Hematological: Negative for adenopathy  Does not bruise/bleed easily  Psychiatric/Behavioral: Negative for dysphoric mood  Current Outpatient Prescriptions:     acetaminophen (TYLENOL) 325 mg tablet, Take 1 - 2 tabs PO Q4 PRN pain, Disp: 30 tablet, Rfl: 0    aspirin 81 MG tablet, Take 81 mg by mouth daily  , Disp: , Rfl:     atorvastatin (LIPITOR) 40 mg tablet, Take 1 tablet (40 mg total) by mouth every evening, Disp: , Rfl: 0    benzonatate (TESSALON PERLES) 100 mg capsule, Take 1 capsule (100 mg total) by mouth 3 (three) times a day as needed for cough, Disp: 60 capsule, Rfl: 1    bimatoprost (LUMIGAN) 0 01 % ophthalmic drops, Apply to eye, Disp: , Rfl:     dexamethasone (DECADRON) 4 mg tablet, Take 5 tablets by mouth once weekly - total dose 20mg, Disp: 15 tablet, Rfl: 0    esomeprazole (NexIUM) 40 MG capsule, Take 40 mg by mouth every evening , Disp: , Rfl:     fluticasone (FLONASE) 50 mcg/act nasal spray, into each nostril as needed  , Disp: , Rfl:     furosemide (LASIX) 40 mg tablet, Take 0 5 tablets (20 mg total) by mouth daily, Disp: 90 tablet, Rfl: 0    glimepiride (AMARYL) 2 mg tablet, Take 2 mg by mouth every evening , Disp: , Rfl:   glucose blood (ACCU-CHEK CHANDRAKANT PLUS) test strip, by In Vitro route, Disp: , Rfl:     guaiFENesin (MUCINEX) 600 mg 12 hr tablet, Take 1 tablet (600 mg total) by mouth 2 (two) times a day, Disp: 60 tablet, Rfl: 0    lenalidomide (REVLIMID) 5 MG CAPS, Take one cap by mouth on days 1-14 every 21 days Auth #[id number], Disp: 14 capsule, Rfl: 0    metoprolol succinate (TOPROL-XL) 25 mg 24 hr tablet, TAKE 1 TABLET DAILY, Disp: 90 tablet, Rfl: 3    nitroglycerin (NITROSTAT) 0 4 mg SL tablet, Place 1 tablet under the tongue every 5 (five) minutes as needed, Disp: , Rfl:     ONE TOUCH ULTRA TEST test strip, The patient test once daily  , Disp: 100 each, Rfl: 2    ONETOUCH DELICA LANCETS 65A MISC, by Does not apply route daily, Disp: 100 each, Rfl: 3    potassium chloride (KLOR-CON 10) 10 mEq tablet, Take 1 tablet (10 mEq total) by mouth daily, Disp: 90 tablet, Rfl: 3    sulfamethoxazole-trimethoprim (BACTRIM DS) 800-160 mg per tablet, Take 1 tablet by mouth every 12 (twelve) hours for 7 days, Disp: 14 tablet, Rfl: 0    timolol (TIMOPTIC) 0 5 % ophthalmic solution, , Disp: , Rfl:       No current facility-administered medications for this visit  No Known Allergies    Objective:   /80 (BP Location: Left arm, Patient Position: Sitting, Cuff Size: Adult)   Pulse 87   Temp 98 4 °F (36 9 °C)   Resp 18   Ht 5' 7" (1 702 m)   Wt 87 2 kg (192 lb 3 2 oz)   SpO2 97%   BMI 30 10 kg/m²    Wt Readings from Last 3 Encounters:   11/26/18 87 2 kg (192 lb 3 2 oz)   11/19/18 89 5 kg (197 lb 6 4 oz)   11/15/18 89 3 kg (196 lb 12 8 oz)     Physical Exam   Constitutional: He is oriented to person, place, and time  He appears well-developed and well-nourished  No distress  HENT:   Head: Normocephalic and atraumatic  Right Ear: External ear normal    Left Ear: External ear normal    Nose: Nose normal    Eyes: Conjunctivae are normal  No scleral icterus  Cardiovascular: Normal rate      Pulmonary/Chest: No respiratory distress  Abdominal: Soft  He exhibits no distension  Musculoskeletal: He exhibits no edema  Neurological: He is alert and oriented to person, place, and time  Skin: No rash (on exposed skin ) noted  + right lower extermity swelling with pustules  + abscess  + drainage with light compression  Psychiatric: Thought content normal      Pertinent Laboratory Results and Imaging Review:  Appointment on 11/23/2018   Component Date Value Ref Range Status    Magnesium 11/23/2018 2 3  1 6 - 2 6 mg/dL Final    Phosphorus 11/23/2018 3 2  2 3 - 4 1 mg/dL Final    PTH 11/23/2018 117 8* 18 4 - 80 1 pg/mL Final   Ancillary Orders on 11/23/2018   Component Date Value Ref Range Status    Sodium 11/23/2018 132* 136 - 145 mmol/L Final    Potassium 11/23/2018 4 5  3 5 - 5 3 mmol/L Final    Chloride 11/23/2018 102  100 - 108 mmol/L Final    CO2 11/23/2018 21  21 - 32 mmol/L Final    ANION GAP 11/23/2018 9  4 - 13 mmol/L Final    BUN 11/23/2018 25  5 - 25 mg/dL Final    Creatinine 11/23/2018 2 36* 0 60 - 1 30 mg/dL Final    Standardized to IDMS reference method    Glucose 11/23/2018 170* 65 - 140 mg/dL Final      If the patient is fasting, the ADA then defines impaired fasting glucose as > 100 mg/dL and diabetes as > or equal to 123 mg/dL  Specimen collection should occur prior to Sulfasalazine administration due to the potential for falsely depressed results  Specimen collection should occur prior to Sulfapyridine administration due to the potential for falsely elevated results   Calcium 11/23/2018 8 4  8 3 - 10 1 mg/dL Final    AST 11/23/2018 11  5 - 45 U/L Final      Specimen collection should occur prior to Sulfasalazine administration due to the potential for falsely depressed results   ALT 11/23/2018 15  12 - 78 U/L Final      Specimen collection should occur prior to Sulfasalazine and/or Sulfapyridine administration due to the potential for falsely depressed results       Alkaline Phosphatase 11/23/2018 134* 46 - 116 U/L Final    Total Protein 11/23/2018 7 0  6 4 - 8 2 g/dL Final    Albumin 11/23/2018 2 9* 3 5 - 5 0 g/dL Final    Total Bilirubin 11/23/2018 0 64  0 20 - 1 00 mg/dL Final    eGFR 11/23/2018 24  ml/min/1 73sq m Final   Ancillary Orders on 11/23/2018   Component Date Value Ref Range Status    WBC 11/23/2018 6 51  4 31 - 10 16 Thousand/uL Final    RBC 11/23/2018 3 28* 3 88 - 5 62 Million/uL Final    Hemoglobin 11/23/2018 10 0* 12 0 - 17 0 g/dL Final    Hematocrit 11/23/2018 30 6* 36 5 - 49 3 % Final    MCV 11/23/2018 93  82 - 98 fL Final    MCH 11/23/2018 30 5  26 8 - 34 3 pg Final    MCHC 11/23/2018 32 7  31 4 - 37 4 g/dL Final    RDW 11/23/2018 13 7  11 6 - 15 1 % Final    MPV 11/23/2018 11 1  8 9 - 12 7 fL Final    Platelets 99/13/0231 296  149 - 390 Thousands/uL Final    nRBC 11/23/2018 0  /100 WBCs Final    Neutrophils Relative 11/23/2018 71  43 - 75 % Final    Immat GRANS % 11/23/2018 1  0 - 2 % Final    Lymphocytes Relative 11/23/2018 13* 14 - 44 % Final    Monocytes Relative 11/23/2018 6  4 - 12 % Final    Eosinophils Relative 11/23/2018 8* 0 - 6 % Final    Basophils Relative 11/23/2018 1  0 - 1 % Final    Neutrophils Absolute 11/23/2018 4 60  1 85 - 7 62 Thousands/µL Final    Immature Grans Absolute 11/23/2018 0 07  0 00 - 0 20 Thousand/uL Final    Lymphocytes Absolute 11/23/2018 0 85  0 60 - 4 47 Thousands/µL Final    Monocytes Absolute 11/23/2018 0 41  0 17 - 1 22 Thousand/µL Final    Eosinophils Absolute 11/23/2018 0 50  0 00 - 0 61 Thousand/µL Final    Basophils Absolute 11/23/2018 0 08  0 00 - 0 10 Thousands/µL Final   Ancillary Orders on 11/16/2018   Component Date Value Ref Range Status    Sodium 11/16/2018 133* 136 - 145 mmol/L Final    Potassium 11/16/2018 4 2  3 5 - 5 3 mmol/L Final    Chloride 11/16/2018 99* 100 - 108 mmol/L Final    CO2 11/16/2018 26  21 - 32 mmol/L Final    ANION GAP 11/16/2018 8  4 - 13 mmol/L Final    BUN 11/16/2018 27* 5 - 25 mg/dL Final    Creatinine 11/16/2018 1 89* 0 60 - 1 30 mg/dL Final    Standardized to IDMS reference method    Glucose 11/16/2018 168* 65 - 140 mg/dL Final      If the patient is fasting, the ADA then defines impaired fasting glucose as > 100 mg/dL and diabetes as > or equal to 123 mg/dL  Specimen collection should occur prior to Sulfasalazine administration due to the potential for falsely depressed results  Specimen collection should occur prior to Sulfapyridine administration due to the potential for falsely elevated results   Calcium 11/16/2018 8 5  8 3 - 10 1 mg/dL Final    AST 11/16/2018 12  5 - 45 U/L Final      Specimen collection should occur prior to Sulfasalazine administration due to the potential for falsely depressed results   ALT 11/16/2018 16  12 - 78 U/L Final      Specimen collection should occur prior to Sulfasalazine and/or Sulfapyridine administration due to the potential for falsely depressed results       Alkaline Phosphatase 11/16/2018 119* 46 - 116 U/L Final    Total Protein 11/16/2018 6 6  6 4 - 8 2 g/dL Final    Albumin 11/16/2018 2 8* 3 5 - 5 0 g/dL Final    Total Bilirubin 11/16/2018 0 79  0 20 - 1 00 mg/dL Final    eGFR 11/16/2018 31  ml/min/1 73sq m Final   Ancillary Orders on 11/16/2018   Component Date Value Ref Range Status    WBC 11/16/2018 8 17  4 31 - 10 16 Thousand/uL Final    RBC 11/16/2018 3 42* 3 88 - 5 62 Million/uL Final    Hemoglobin 11/16/2018 10 4* 12 0 - 17 0 g/dL Final    Hematocrit 11/16/2018 32 2* 36 5 - 49 3 % Final    MCV 11/16/2018 94  82 - 98 fL Final    MCH 11/16/2018 30 4  26 8 - 34 3 pg Final    MCHC 11/16/2018 32 3  31 4 - 37 4 g/dL Final    RDW 11/16/2018 13 2  11 6 - 15 1 % Final    MPV 11/16/2018 11 2  8 9 - 12 7 fL Final    Platelets 94/54/0913 282  149 - 390 Thousands/uL Final    nRBC 11/16/2018 0  /100 WBCs Final    Neutrophils Relative 11/16/2018 79* 43 - 75 % Final    Immat GRANS % 11/16/2018 2  0 - 2 % Final    Lymphocytes Relative 11/16/2018 10* 14 - 44 % Final    Monocytes Relative 11/16/2018 4  4 - 12 % Final    Eosinophils Relative 11/16/2018 4  0 - 6 % Final    Basophils Relative 11/16/2018 1  0 - 1 % Final    Neutrophils Absolute 11/16/2018 6 59  1 85 - 7 62 Thousands/µL Final    Immature Grans Absolute 11/16/2018 0 15  0 00 - 0 20 Thousand/uL Final    Lymphocytes Absolute 11/16/2018 0 79  0 60 - 4 47 Thousands/µL Final    Monocytes Absolute 11/16/2018 0 30  0 17 - 1 22 Thousand/µL Final    Eosinophils Absolute 11/16/2018 0 29  0 00 - 0 61 Thousand/µL Final    Basophils Absolute 11/16/2018 0 05  0 00 - 0 10 Thousands/µL Final         The following historical data was reviewed  Past Medical History:   Diagnosis Date    Angina pectoris ([facility] )     Chronic kidney disease     Coronary artery disease     Diabetes mellitus ([facility] )     Glaucoma     Hypertension     Multiple myeloma ([facility] )     Occluded coronary artery stent     Left       Past Surgical History:   Procedure Laterality Date    BACK SURGERY      CARDIAC CATHETERIZATION  04/05/2004    CT BONE MARROW BIOPSY AND ASPIRATION  9/24/2018    KNEE SURGERY      THROMBOENDARTERECTOMY Right     Cartoid       Social History     Social History    Marital status:      Spouse name: N/A    Number of children: N/A    Years of education: N/A     Social History Main Topics    Smoking status: Former Smoker     Types: Cigarettes    Smokeless tobacco: Never Used      Comment: former smoker    Alcohol use Yes      Comment: socially; less than once a month    Drug use: No    Sexual activity: Not Asked     Other Topics Concern    None     Social History Narrative    Daily caffeine consumption           Family History   Problem Relation Age of Onset    Heart attack Father     Heart disease Mother     Diabetes Mother     Heart disease Sister     COPD Family        Please note: This report has been generated by a voice recognition software system  Therefore there may be syntax, spelling, and/or grammatical errors  Please call if you have any questions

## 2018-11-27 RX ORDER — LENALIDOMIDE 5 MG/1
CAPSULE ORAL
Qty: 14 CAPSULE | Refills: 0 | Status: SHIPPED | OUTPATIENT
Start: 2018-11-27 | End: 2018-12-21 | Stop reason: SDUPTHER

## 2018-11-29 ENCOUNTER — PATIENT OUTREACH (OUTPATIENT)
Dept: INTERNAL MEDICINE CLINIC | Facility: CLINIC | Age: 83
End: 2018-11-29

## 2018-11-29 ENCOUNTER — TELEPHONE (OUTPATIENT)
Dept: NEPHROLOGY | Facility: CLINIC | Age: 83
End: 2018-11-29

## 2018-11-29 DIAGNOSIS — N18.4 CHRONIC KIDNEY DISEASE, STAGE 4 (SEVERE) (HCC): Primary | ICD-10-CM

## 2018-11-29 DIAGNOSIS — E87.1 HYPONATREMIA: Primary | ICD-10-CM

## 2018-11-29 NOTE — PROGRESS NOTES
Outreach Tc to patient for Cm assessment  Patient  saw oncology on 11/26 & treatment was continued  Patient saw general surgery on 11/27 and had excision of same abscess on his leg  Patient is to wash are with soap/water daily and place DSD daily  Patient is able through teach back to  verbalize process  Educated on s/sx of infection such as redness, swelling, purulent drainage, pain and possible fever  Patient verbalized understanding  patient denies any pain  No cough, fever, or other s/sx of infection  Patient is tolerating Revlimid well  Reviewed meds, future appointments, s/sx to report and how/when to report symptoms to provider vs 911  Agrees to additional calls

## 2018-11-29 NOTE — TELEPHONE ENCOUNTER
Spoke with Joseph Duran the patient's nephew and he is aware of the patient's lab test results and for the patient the to repeat a BMP and to follow a fluid restriction of 1 2 L/day   The lab slip has been mailed out to the patient

## 2018-11-29 NOTE — TELEPHONE ENCOUNTER
----- Message from Racquel Campos DO sent at 11/29/2018  9:50 AM EST -----  sNa a bit low and sCr a bit higher than baseline in setting of myeloma and CKD stage 4  Repeat BMP  Recommend fluid restriction to 1 2L/day as well  Please inform the patient of need for repeat BMP  Thanks

## 2018-11-30 ENCOUNTER — APPOINTMENT (OUTPATIENT)
Dept: LAB | Facility: CLINIC | Age: 83
End: 2018-11-30
Payer: COMMERCIAL

## 2018-11-30 DIAGNOSIS — E11.9 TYPE 2 DIABETES MELLITUS WITHOUT COMPLICATION, WITHOUT LONG-TERM CURRENT USE OF INSULIN (HCC): Primary | ICD-10-CM

## 2018-11-30 DIAGNOSIS — I10 ESSENTIAL HYPERTENSION: ICD-10-CM

## 2018-11-30 DIAGNOSIS — N18.4 CHRONIC KIDNEY DISEASE, STAGE 4 (SEVERE) (HCC): ICD-10-CM

## 2018-11-30 RX ORDER — METOPROLOL SUCCINATE 25 MG/1
TABLET, EXTENDED RELEASE ORAL
Qty: 90 TABLET | Refills: 1 | Status: SHIPPED | OUTPATIENT
Start: 2018-11-30 | End: 2018-12-21 | Stop reason: SDUPTHER

## 2018-11-30 RX ORDER — GLIMEPIRIDE 2 MG/1
TABLET ORAL
Qty: 90 TABLET | Refills: 3 | Status: SHIPPED | OUTPATIENT
Start: 2018-11-30 | End: 2019-11-11 | Stop reason: SDUPTHER

## 2018-12-03 ENCOUNTER — OFFICE VISIT (OUTPATIENT)
Dept: UROLOGY | Facility: AMBULATORY SURGERY CENTER | Age: 83
End: 2018-12-03
Payer: COMMERCIAL

## 2018-12-03 VITALS
DIASTOLIC BLOOD PRESSURE: 70 MMHG | HEIGHT: 66 IN | WEIGHT: 200 LBS | SYSTOLIC BLOOD PRESSURE: 115 MMHG | BODY MASS INDEX: 32.14 KG/M2 | HEART RATE: 72 BPM

## 2018-12-03 DIAGNOSIS — R31.29 MICROHEMATURIA: Primary | ICD-10-CM

## 2018-12-03 LAB
BACTERIA UR QL AUTO: ABNORMAL /HPF
BILIRUB UR QL STRIP: NEGATIVE
CLARITY UR: ABNORMAL
COLOR UR: YELLOW
GLUCOSE UR STRIP-MCNC: ABNORMAL MG/DL
HGB UR QL STRIP.AUTO: ABNORMAL
HYALINE CASTS #/AREA URNS LPF: ABNORMAL /LPF
KETONES UR STRIP-MCNC: NEGATIVE MG/DL
LEUKOCYTE ESTERASE UR QL STRIP: ABNORMAL
NITRITE UR QL STRIP: NEGATIVE
NON-SQ EPI CELLS URNS QL MICRO: ABNORMAL /HPF
PH UR STRIP.AUTO: 6 [PH] (ref 4.5–8)
PROT UR STRIP-MCNC: NEGATIVE MG/DL
RBC #/AREA URNS AUTO: ABNORMAL /HPF
SL AMB  POCT GLUCOSE, UA: 500
SL AMB LEUKOCYTE ESTERASE,UA: ABNORMAL
SL AMB POCT BILIRUBIN,UA: ABNORMAL
SL AMB POCT BLOOD,UA: ABNORMAL
SL AMB POCT CLARITY,UA: ABNORMAL
SL AMB POCT COLOR,UA: YELLOW
SL AMB POCT KETONES,UA: ABNORMAL
SL AMB POCT NITRITE,UA: ABNORMAL
SL AMB POCT PH,UA: 5
SL AMB POCT SPECIFIC GRAVITY,UA: 1.02
SL AMB POCT URINE PROTEIN: ABNORMAL
SL AMB POCT UROBILINOGEN: ABNORMAL
SP GR UR STRIP.AUTO: 1.01 (ref 1–1.03)
UROBILINOGEN UR QL STRIP.AUTO: 0.2 E.U./DL
WBC #/AREA URNS AUTO: ABNORMAL /HPF

## 2018-12-03 PROCEDURE — 81002 URINALYSIS NONAUTO W/O SCOPE: CPT | Performed by: NURSE PRACTITIONER

## 2018-12-03 PROCEDURE — 99213 OFFICE O/P EST LOW 20 MIN: CPT | Performed by: NURSE PRACTITIONER

## 2018-12-03 PROCEDURE — 4040F PNEUMOC VAC/ADMIN/RCVD: CPT | Performed by: NURSE PRACTITIONER

## 2018-12-03 PROCEDURE — 81001 URINALYSIS AUTO W/SCOPE: CPT | Performed by: NURSE PRACTITIONER

## 2018-12-03 NOTE — PROGRESS NOTES
12/3/2018    [facility]  9/22/1930  [id number]      Assessment  -Microscopic hematuria     Discussion/Plan  Jose G Day is a 80 y o  male being managed by Dr Ralf Sandhu        -Unfortunately, patient was unable to go for urine testing prior to today's office visit  Urine dip in office today shows presence of moderate blood  We will send for formal urinalysis with microscopy  Explained to patient and nephew that if he has no evidence of 3 or more RBC in UA no additional workup is needed and he will follow up on as-needed basis, as initial UA showed 0-1 RBC, but recent UA showed 4-10 RBC  However, if he does have presence of > 3 RBC we will proceed with full hematuria workup  This was explained to patient and nephew in great detail including in office flexible cystoscopy for further evaluation  Because patient had a recent renal ultrasound performed, he does not require further imaging  Will call patient's nephewRose, with results of UA  They both verbalized understanding  -All questions answered, patient agrees with plan      History of Present Illness  80 y o  male with a history of microscopic hematuria presents today for follow up  At last office visit, patient was referred by nephrologist, Dr Carola Funk, for finding of 0-1 RBC on UA  Patient presents with nephewRose, as primary historian  He denies any lower urinary tract symptoms, gross hematuria, or dysuria  Patient feels he empties bladder to completion and has a relatively strong urinary stream   His recent renal ultrasound from 08/17/2018 showed no evidence of renal or bladder masses, lesions, calculi, or hydronephrosis  Patient denies any strong family history of prostate, kidney, or bladder cancer  He states he quit smoking over 30 years ago  Review of Systems  Review of Systems   Constitutional: Negative  HENT: Negative  Respiratory: Negative  Cardiovascular: Negative  Gastrointestinal: Negative      Genitourinary: Negative for decreased urine volume, difficulty urinating, dysuria, flank pain, frequency, hematuria and urgency  Musculoskeletal: Negative  Skin: Negative  Neurological: Negative  Psychiatric/Behavioral: Negative  Past Medical History  Past Medical History:   Diagnosis Date    Angina pectoris (HCC)     Chronic kidney disease     Coronary artery disease     [facility] mellitus ([facility] [address] [facility]     Glaucoma     Hypertension     [facility] myeloma ([facility] )     Occluded coronary artery stent     Left       Past Social History  Past Surgical History:   Procedure Laterality Date    BACK SURGERY      CARDIAC CATHETERIZATION  04/05/2004    CT BONE MARROW BIOPSY AND ASPIRATION  9/24/2018    KNEE SURGERY      THROMBOENDARTERECTOMY Right     Cartoid       Past Family History  Family History   Problem Relation Age of Onset    Heart attack Father     Heart disease Mother     Diabetes Mother     Heart disease Sister     COPD Family        Past Social history  Social History     Social History    Marital status:      Spouse name: N/A    Number of children: N/A    Years of education: N/A     Occupational History    Not on file  Social History Main Topics    Smoking status: Former Smoker     Types: Cigarettes    Smokeless tobacco: Never Used      Comment: former smoker    Alcohol use Yes      Comment: socially; less than once a month    Drug use: No    Sexual activity: Not on file     Other Topics Concern    Not on file     Social History Narrative    Daily caffeine consumption           Current Medications  Current Outpatient Prescriptions   Medication Sig Dispense Refill    acetaminophen (TYLENOL) 325 mg tablet Take 1 - 2 tabs PO Q4 PRN pain 30 tablet 0    aspirin 81 MG tablet Take 81 mg by mouth daily        atorvastatin (LIPITOR) 40 mg tablet Take 1 tablet (40 mg total) by mouth every evening  0    benzonatate (TESSALON PERLES) 100 mg capsule Take 1 capsule (100 mg total) by mouth 3 (three) times a day as needed for cough 60 capsule 1    bimatoprost (LUMIGAN) 0 01 % ophthalmic drops Apply to eye      dexamethasone (DECADRON) 4 mg tablet Take 5 tablets by mouth once weekly - total dose 20mg 15 tablet 0    esomeprazole (NexIUM) 40 MG capsule Take 40 mg by mouth every evening   fluticasone (FLONASE) 50 mcg/act nasal spray into each nostril as needed        furosemide (LASIX) 40 mg tablet Take 0 5 tablets (20 mg total) by mouth daily 90 tablet 0    glimepiride (AMARYL) 2 mg tablet TAKE 1 TABLET DAILY AS     DIRECTED 90 tablet 3    glucose blood (ACCU-CHEK CHANDRAKANT PLUS) test strip by In Vitro route      guaiFENesin (MUCINEX) 600 mg 12 hr tablet Take 1 tablet (600 mg total) by mouth 2 (two) times a day 60 tablet 0    lenalidomide (REVLIMID) 5 MG CAPS Take one cap by mouth on days 1-14 every 21 days [facility] #[id number] 14 capsule 0    metoprolol succinate (TOPROL-XL) 25 mg 24 hr tablet TAKE 1 TABLET DAILY 90 tablet 3    metoprolol succinate (TOPROL-XL) 25 mg 24 hr tablet TAKE 1 TABLET DAILY 90 tablet 1    nitroglycerin (NITROSTAT) 0 4 mg SL tablet Place 1 tablet under the tongue every 5 (five) minutes as needed      ONE TOUCH ULTRA TEST test strip The patient test once daily  100 each 2    ONETOUCH DELICA LANCETS 15E MISC by Does not apply route daily 100 each 3    potassium chloride (KLOR-CON 10) 10 mEq tablet Take 1 tablet (10 mEq total) by mouth daily 90 tablet 3    REVLIMID 5 MG CAPS TAKE 1 CAPSULE BY MOUTH DAILY FOR 14 DAYS; THEN 7 DAYS OFF  14 capsule 0    timolol (TIMOPTIC) 0 5 % ophthalmic solution        No current facility-administered medications for this visit  Allergies  No Known Allergies    Past Medical History, Social History, Family History, medications and allergies were reviewed      Vitals  Vitals:    12/03/18 1402   BP: 115/70   BP Location: Left arm   Patient Position: Sitting   Cuff Size: Standard   Pulse: 72   Weight: 90 7 kg (200 lb)   Height: 5' 6" (1 676 m)       Physical Exam  Physical Exam   Constitutional: He is oriented to person, place, and time  He appears well-developed and well-nourished  HENT:   Head: Normocephalic  Eyes: Pupils are equal, round, and reactive to light  Neck: Normal range of motion  Cardiovascular: Normal rate and regular rhythm  Pulmonary/Chest: Effort normal    Abdominal: Soft  Normal appearance  He exhibits no distension  There is no tenderness  There is no CVA tenderness  Genitourinary:   Genitourinary Comments: No suprapubic discomfort or distention   Musculoskeletal: Normal range of motion  Neurological: He is alert and oriented to person, place, and time  Skin: Skin is warm and dry  Psychiatric: He has a normal mood and affect   His behavior is normal  Judgment and thought content normal        Results    I have personally reviewed all pertinent lab results and reviewed with patient  Lab Results   Component Value Date    PSA 1 1 05/05/2015     Lab Results   Component Value Date    GLUCOSE 90 10/05/2015    CALCIUM 8 5 11/30/2018     10/05/2015    K 4 4 11/30/2018    CO2 23 11/30/2018     11/30/2018    BUN 21 11/30/2018    CREATININE 1 74 (H) 11/30/2018     Lab Results   Component Value Date    WBC 7 52 11/30/2018    HGB 9 9 (L) 11/30/2018    HCT 30 7 (L) 11/30/2018    MCV 94 11/30/2018     11/30/2018     Recent Results (from the past 1 hour(s))   POCT urine dip    Collection Time: 12/03/18  2:05 PM   Result Value Ref Range    LEUKOCYTE ESTERASE,UA +     NITRITE,UA -     SL AMB POCT UROBILINOGEN -     POCT URINE PROTEIN -      PH,UA 5 0     BLOOD,UA moderate     SPECIFIC GRAVITY,UA 1 020     KETONES,UA -     BILIRUBIN,UA -     GLUCOSE,       COLOR,UA yellow     CLARITY,UA cloudy

## 2018-12-07 ENCOUNTER — APPOINTMENT (OUTPATIENT)
Dept: LAB | Facility: CLINIC | Age: 83
End: 2018-12-07
Payer: COMMERCIAL

## 2018-12-10 ENCOUNTER — TELEPHONE (OUTPATIENT)
Dept: UROLOGY | Facility: AMBULATORY SURGERY CENTER | Age: 83
End: 2018-12-10

## 2018-12-10 ENCOUNTER — PATIENT OUTREACH (OUTPATIENT)
Dept: INTERNAL MEDICINE CLINIC | Facility: CLINIC | Age: 83
End: 2018-12-10

## 2018-12-10 NOTE — TELEPHONE ENCOUNTER
Called patient's son, Jerrell Sibley, to review results of recent UA which is now 2-4 RBC  Previously 0-1, and 4-10 RBC  After discussing with patient and son, they wish to proceed with full hematuria workup  Reviewed in office flexible cystoscopy  All questions answered  He will be scheduled for non-urgent cystoscopy with Dr Betito Titus  Please call patient's nephew, Debbie Uriarte, at [phone] to schedule

## 2018-12-10 NOTE — TELEPHONE ENCOUNTER
Called patient to inform him that recent UA showed 2-4 RBC  As noted at recent office visit, we discussed proceeding with cystoscopy for further evaluation versus observation  Patient states that his nephew Salas Cannon, will be visiting soon, and will call our office when he arrives, to discuss further with him  Provided patient with office number

## 2018-12-11 NOTE — TELEPHONE ENCOUNTER
PT's nephew called back and we scheduled cysto for next available (in the afternoon per request) with Dr Susanne Bone which was 2/8/19 at 1:30pm  I mailed cysto info and appointment card to Liban's address which is [address], [place]

## 2018-12-14 ENCOUNTER — TELEPHONE (OUTPATIENT)
Dept: HEMATOLOGY ONCOLOGY | Facility: CLINIC | Age: 83
End: 2018-12-14

## 2018-12-14 DIAGNOSIS — C90.00 MULTIPLE MYELOMA NOT HAVING ACHIEVED REMISSION (HCC): ICD-10-CM

## 2018-12-14 RX ORDER — DEXAMETHASONE 4 MG/1
TABLET ORAL
Qty: 15 TABLET | Refills: 0 | Status: SHIPPED | OUTPATIENT
Start: 2018-12-14 | End: 2018-12-21 | Stop reason: ALTCHOICE

## 2018-12-14 RX ORDER — LENALIDOMIDE 5 MG/1
CAPSULE ORAL
Qty: 14 CAPSULE | Refills: 0 | Status: SHIPPED | OUTPATIENT
Start: 2018-12-14 | End: 2018-12-31 | Stop reason: SDUPTHER

## 2018-12-14 NOTE — TELEPHONE ENCOUNTER
Completed new authorization code for René PHELPS # [id number]) and sent RX for Revlimid 5mg PO Days 1 through 14 days and Dex 4mg and sent to [address] [place] [address] per request

## 2018-12-14 NOTE — TELEPHONE ENCOUNTER
Dr Hector Villafuerte Patient: Patient's nephew called for a refill for patient  Patient needs refills on Revlimid 5 mg and decadron 4 mg  Please send script in: Ph: [phone]

## 2018-12-17 ENCOUNTER — APPOINTMENT (OUTPATIENT)
Dept: LAB | Facility: CLINIC | Age: 83
End: 2018-12-17
Payer: COMMERCIAL

## 2018-12-17 DIAGNOSIS — C90.00 KAPPA LIGHT CHAIN MYELOMA (HCC): ICD-10-CM

## 2018-12-17 LAB
BASOPHILS # BLD AUTO: 0.08 THOUSANDS/ΜL (ref 0–0.1)
BASOPHILS NFR BLD AUTO: 1 % (ref 0–1)
EOSINOPHIL # BLD AUTO: 0.31 THOUSAND/ΜL (ref 0–0.61)
EOSINOPHIL NFR BLD AUTO: 4 % (ref 0–6)
ERYTHROCYTE [DISTWIDTH] IN BLOOD BY AUTOMATED COUNT: 14.8 % (ref 11.6–15.1)
HCT VFR BLD AUTO: 28.9 % (ref 36.5–49.3)
HGB BLD-MCNC: 9.2 G/DL (ref 12–17)
IMM GRANULOCYTES # BLD AUTO: 0.07 THOUSAND/UL (ref 0–0.2)
IMM GRANULOCYTES NFR BLD AUTO: 1 % (ref 0–2)
LYMPHOCYTES # BLD AUTO: 1.46 THOUSANDS/ΜL (ref 0.6–4.47)
LYMPHOCYTES NFR BLD AUTO: 17 % (ref 14–44)
MCH RBC QN AUTO: 29.7 PG (ref 26.8–34.3)
MCHC RBC AUTO-ENTMCNC: 31.8 G/DL (ref 31.4–37.4)
MCV RBC AUTO: 93 FL (ref 82–98)
MONOCYTES # BLD AUTO: 0.57 THOUSAND/ΜL (ref 0.17–1.22)
MONOCYTES NFR BLD AUTO: 7 % (ref 4–12)
NEUTROPHILS # BLD AUTO: 6.03 THOUSANDS/ΜL (ref 1.85–7.62)
NEUTS SEG NFR BLD AUTO: 70 % (ref 43–75)
NRBC BLD AUTO-RTO: 0 /100 WBCS
PLATELET # BLD AUTO: 246 THOUSANDS/UL (ref 149–390)
PMV BLD AUTO: 11.4 FL (ref 8.9–12.7)
RBC # BLD AUTO: 3.1 MILLION/UL (ref 3.88–5.62)
WBC # BLD AUTO: 8.52 THOUSAND/UL (ref 4.31–10.16)

## 2018-12-17 PROCEDURE — 87186 SC STD MICRODIL/AGAR DIL: CPT | Performed by: SURGERY

## 2018-12-17 PROCEDURE — 87205 SMEAR GRAM STAIN: CPT | Performed by: SURGERY

## 2018-12-17 PROCEDURE — 87147 CULTURE TYPE IMMUNOLOGIC: CPT | Performed by: SURGERY

## 2018-12-17 PROCEDURE — 36415 COLL VENOUS BLD VENIPUNCTURE: CPT

## 2018-12-17 PROCEDURE — 87070 CULTURE OTHR SPECIMN AEROBIC: CPT | Performed by: SURGERY

## 2018-12-17 PROCEDURE — 85025 COMPLETE CBC W/AUTO DIFF WBC: CPT

## 2018-12-18 ENCOUNTER — LAB REQUISITION (OUTPATIENT)
Dept: LAB | Facility: HOSPITAL | Age: 83
End: 2018-12-18
Payer: COMMERCIAL

## 2018-12-18 DIAGNOSIS — S41.001A: ICD-10-CM

## 2018-12-20 ENCOUNTER — OFFICE VISIT (OUTPATIENT)
Dept: HEMATOLOGY ONCOLOGY | Facility: CLINIC | Age: 83
End: 2018-12-20
Payer: COMMERCIAL

## 2018-12-20 ENCOUNTER — PATIENT OUTREACH (OUTPATIENT)
Dept: INTERNAL MEDICINE CLINIC | Facility: CLINIC | Age: 83
End: 2018-12-20

## 2018-12-20 VITALS
WEIGHT: 195.4 LBS | BODY MASS INDEX: 31.54 KG/M2 | RESPIRATION RATE: 18 BRPM | TEMPERATURE: 98.4 F | OXYGEN SATURATION: 99 % | DIASTOLIC BLOOD PRESSURE: 64 MMHG | HEART RATE: 78 BPM | SYSTOLIC BLOOD PRESSURE: 116 MMHG

## 2018-12-20 DIAGNOSIS — E11.9 TYPE 2 DIABETES MELLITUS WITHOUT COMPLICATION, WITHOUT LONG-TERM CURRENT USE OF INSULIN (HCC): ICD-10-CM

## 2018-12-20 DIAGNOSIS — Z22.322 MRSA (METHICILLIN RESISTANT STAPH AUREUS) CULTURE POSITIVE: ICD-10-CM

## 2018-12-20 DIAGNOSIS — C90.00 KAPPA LIGHT CHAIN MYELOMA (HCC): Primary | ICD-10-CM

## 2018-12-20 LAB
BACTERIA WND AEROBE CULT: ABNORMAL
GRAM STN SPEC: ABNORMAL
GRAM STN SPEC: ABNORMAL

## 2018-12-20 PROCEDURE — 99215 OFFICE O/P EST HI 40 MIN: CPT | Performed by: PHYSICIAN ASSISTANT

## 2018-12-20 NOTE — PATIENT INSTRUCTIONS
Positive wound cultures for MRSA infection  These will respond to the antibiotic that was prescribed by Dr Russ Lovell  I have advised Mr Alan Astudillo to discontinue the steroids secondary for concern for immunosuppression and recurrent cellulitis/folliculitis  This maybe added into his next cycle if needed based upon blood work results  Patient will follow up with blood work on tomorrow 12/21/18 for multiple myeloma restaging  Patient will have a follow-up appointment with Dr Klever Forrester in 3 weeks

## 2018-12-20 NOTE — PROGRESS NOTES
[facility] [place]  [address] [address] [phone]  Progress Note  Katherine New, 9/22/1930, [id number]  12/20/2018    Assessment/Plan:  1  Kappa light chain myeloma ([facility] )  This is an 51-year-old male diagnosed with the above in fall 2018  Patient was started on dexamethasone and Revlimid  Secondary to recurrent folliculitis an abscess with some MRSA, patient was advised to discontinue dexamethasone during his next cycle  Patient will continue on monotherapy with Revlimid for cycle 4  Patient will undergo restaging tomorrow, 12/21/18  Patient was advised to follow up with Dr Lisa Obando in approximately 3 weeks  Regimen:  Revlimid 5 mg Days 1-14  Cycle length = 21 days  Will start today or tomorrow depending on arrival of medication  2  MRSA (methicillin resistant staph aureus) culture positive  I believe the patient's MRSA folliculitis/abscesses are multifactorial in etiology  Patient's white blood cell count is within normal limits however, dexamethasone is an immunosuppressant  At this time we will withhold dexamethasone from his present treatment cycle  Additionally, dexamethasone can also cause complications with diabetes  Recently, the patient was taken off of diabetic medication secondary to concern for renal dysfunction  Patient did not require additional medication at that time  Patient will likely need another medication to help control his diabetes which is also contributing to the development of his MRSA folliculitis  Patient was asked to follow-up with his PCP regarding diabetic management  3  Type 2 diabetes mellitus without complication, without long-term current use of insulin ([facility] )  See above  Again patient was referred to Dr Krys Kent  I am available to answer any questions that he may have regarding the above       Labs and imaging for follow up:  Orders Placed This Encounter   Procedures  CBC and differential     This is a patient instruction: This test is non-fasting  Please drink two glasses of water morning of bloodwork  Standing Status:   Standing     Number of Occurrences:   12     Standing Expiration Date:   6/20/2019    Comprehensive metabolic panel     This is a patient instruction: Non-fasting     Standing Status:   Standing     Number of Occurrences:   12     Standing Expiration Date:   6/20/2019    Beta 2 microglobulin, serum     Standing Status:   Future     Standing Expiration Date:   12/20/2019    IgG, IgA, IgM     Standing Status:   Future     Standing Expiration Date:   12/20/2019    Immunoglobulin free LT chains blood     Standing Status:   Future     Standing Expiration Date:   12/20/2019    LD,Blood     Standing Status:   Future     Standing Expiration Date:   12/20/2019    Protein electrophoresis, serum     Standing Status:   Future     Standing Expiration Date:   12/20/2019     The patient is scheduled for follow-up in approximately 3 weeks with Dr Ana Kovacs  Patient voiced agreement and understanding to the above  Patient knows to call the Hematology/Oncology office with any questions and concerns regarding the above  Carefully review your medication list in verify the list is accurate and up-to-date  Please call the hematologic/Oncology office if there medications missing from the less, medications on the list that your not currently taking or if there is a dosage or instruction that is different from higher taking medication  I have spent 30 minutes with Patient and family today in which greater than 50% of this time was spent in counseling/coordination of care regarding Diagnostic results, Prognosis, Intructions for management, Risk factor reductions and Impressions  The patient's current treatment goals are prolongation of life  Barrier(s) to care identified    None  The patient is able to self care     -------------------------------------------------------------------------------------------------------    Chief complaint:   Chief Complaint   Patient presents with    Follow-up     3 weeks follow up with labs in [place]  History of present illness/Cancer History:      Kappa light chain myeloma ([facility] )    9/8/2018 - 10/8/2018 Cancer Staged     Cancer Staging  [place] light chain myeloma ([facility] )  Staging form: Plasma Cell Myeloma and Disorders, AJCC 8th Edition  - Clinical stage from 10/8/2018: RISS Stage II (Beta-2-microglobulin (mg/L): 4 2, Albumin (g/dL): 3 7, ISS: Stage II, High-risk cytogenetics: Absent, LDH: Normal) - Signed by Jamey Jimenez PA-C on 10/8/2018           9/8/2018 Initial Diagnosis     Patient had an increase in creatinine  Patient followed up with Nephrology who completed a comprehensive workup  This demonstrated positive free light chain ratio elevation in addition to a 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             9/24/2018 Biopsy     Final Diagnosis   A -C  Bone marrow,  left iliac crest,  biopsy and aspirate:  -  Kappa light chain restricted plasma cell neoplasm, consistent with plasma cell myeloma (see note)  -  Maturing trilineage hematopoiesis without distinct features of dysplasia or increased myeloblasts  -  Decreased stainable storage iron  -  Mildly increased reticulin fibers without fibrosis  -  Negative for collagen fibrosis, granulomata, vasculitis, necrosis  Flow cytometry ([employer]# [id number], evaluated by NATALIE Dejesus )       * Interpretation:    1  Plasma cell neoplasm, IgA kappa-restricted  See Comment  2  No evidence of B-cell or T-cell lymphoproliferative disorder  *  Comment:  Due to potential dilutional/lysis effects associated with flow cytometry, the reported plasma cell count (2 4%) is an underestimate    Therefore, correlation with a comprehensive bone marrow examination including morphologic plasma cell enumeration will be necessary for further and definitive characterization  Interpretation FISH Myeloma Panel:  1  No evidence of IGH-MAF [translocation t(14;16)] gene rearrangement  2  No evidence of RB1 monosomy (13q14 deletion)  3  No evidence of CCND1-IGH [translocation t(11;14)] gene rearrangement, and no evidence for trisomy 11 or gain of 11q  4  No evidence of p53 (17p13) deletion or amplification  5  No evidence of FGFR3-IGH [translocation t(4;14)] gene rearrangement  6  Negative for 1q21/CKS1B gain         10/19/2018 - 2018 Chemotherapy     Dexamethasone 20 mg Days 1, 8, 15  Revlimid 5 mg Days 1-14  Cycle length = 21 days    Completed 3 cycles of the above  2018 Adverse Reaction     Multiple folliculitis/abscess development  Uncontrolled diabetes, previously controlled prior to dexamethasone therapy  2018 -  Chemotherapy     Revlimid 5 mg Days 1-14  Cycle length = 21 days           Cancer Staging  [place] light chain myeloma ([facility] )  Staging form: Plasma Cell Myeloma and Disorders, AJCC 8th Edition  - Clinical stage from 10/8/2018: RISS Stage II (Beta-2-microglobulin (mg/L): 4 2, Albumin (g/dL): 3 7, ISS: Stage II, High-risk cytogenetics: Absent, LDH: Normal) - Signed by Ralph Romero PA-C on 10/8/2018     ECO - Symptomatic but completely ambulatory    Interval history:       Patient notes he followed up with General surgery for bird of abscesses  Review of Systems   Constitutional: Negative for activity change, appetite change, chills, fatigue, fever and unexpected weight change  HENT: Negative for hearing loss, mouth sores, nosebleeds, sore throat, trouble swallowing and voice change  Eyes: Negative for visual disturbance  Respiratory: Negative for cough and shortness of breath  Cardiovascular: Negative for chest pain, palpitations and leg swelling     Gastrointestinal: Negative for abdominal pain, blood in stool, constipation, diarrhea, nausea and vomiting  Endocrine: Negative for cold intolerance  Genitourinary: Negative for decreased urine volume, dysuria and hematuria  Musculoskeletal: Negative for arthralgias and myalgias  Skin: Positive for wound (Wounds from prior [place])  Negative for rash  Neurological: Negative for dizziness, weakness, numbness and headaches  Hematological: Negative for adenopathy  Does not bruise/bleed easily  Psychiatric/Behavioral: Negative for dysphoric mood  Current Outpatient Prescriptions:     acetaminophen (TYLENOL) 325 mg tablet, Take 1 - 2 tabs PO Q4 PRN pain, Disp: 30 tablet, Rfl: 0    aspirin 81 MG tablet, Take 81 mg by mouth daily  , Disp: , Rfl:     atorvastatin (LIPITOR) 40 mg tablet, Take 1 tablet (40 mg total) by mouth every evening, Disp: , Rfl: 0    benzonatate (TESSALON PERLES) 100 mg capsule, Take 1 capsule (100 mg total) by mouth 3 (three) times a day as needed for cough, Disp: 60 capsule, Rfl: 1    bimatoprost (LUMIGAN) 0 01 % ophthalmic drops, Apply to eye, Disp: , Rfl:     dexamethasone (DECADRON) 4 mg tablet, Take 5 tablets by mouth once weekly - total dose 20mg, Disp: 15 tablet, Rfl: 0    esomeprazole (NexIUM) 40 MG capsule, Take 40 mg by mouth every evening , Disp: , Rfl:     fluticasone (FLONASE) 50 mcg/act nasal spray, into each nostril as needed  , Disp: , Rfl:     furosemide (LASIX) 40 mg tablet, Take 0 5 tablets (20 mg total) by mouth daily, Disp: 90 tablet, Rfl: 0    glimepiride (AMARYL) 2 mg tablet, TAKE 1 TABLET DAILY AS     DIRECTED, Disp: 90 tablet, Rfl: 3    glucose blood (ACCU-CHEK CHANDRAKANT PLUS) test strip, by In Vitro route, Disp: , Rfl:     guaiFENesin (MUCINEX) 600 mg 12 hr tablet, Take 1 tablet (600 mg total) by mouth 2 (two) times a day, Disp: 60 tablet, Rfl: 0    lenalidomide (REVLIMID) 5 MG CAPS, Take one cap by mouth on days 1-14 every 21 days Auth #[id number] on 12/14/18, Disp: 14 capsule, Rfl: 0    metoprolol succinate (TOPROL-XL) 25 mg 24 hr tablet, TAKE 1 TABLET DAILY, Disp: 90 tablet, Rfl: 3    metoprolol succinate (TOPROL-XL) 25 mg 24 hr tablet, TAKE 1 TABLET DAILY, Disp: 90 tablet, Rfl: 1    nitroglycerin (NITROSTAT) 0 4 mg SL tablet, Place 1 tablet under the tongue every 5 (five) minutes as needed, Disp: , Rfl:     ONE TOUCH ULTRA TEST test strip, The patient test once daily  , Disp: 100 each, Rfl: 2    ONETOUCH DELICA LANCETS 67Y MISC, by Does not apply route daily, Disp: 100 each, Rfl: 3    potassium chloride (KLOR-CON 10) 10 mEq tablet, Take 1 tablet (10 mEq total) by mouth daily, Disp: 90 tablet, Rfl: 3    REVLIMID 5 MG CAPS, TAKE 1 CAPSULE BY MOUTH DAILY FOR 14 DAYS; THEN 7 DAYS OFF , Disp: 14 capsule, Rfl: 0    timolol (TIMOPTIC) 0 5 % ophthalmic solution, , Disp: , Rfl:       No current facility-administered medications for this visit  No Known Allergies    Objective:   /64 (BP Location: Right arm, Cuff Size: Standard)   Pulse 78   Temp 98 4 °F (36 9 °C) (Tympanic)   Resp 18   Wt 88 6 kg (195 lb 6 4 oz)   SpO2 99%   BMI 31 54 kg/m²   Wt Readings from Last 3 Encounters:   12/20/18 88 6 kg (195 lb 6 4 oz)   12/03/18 90 7 kg (200 lb)   11/26/18 87 2 kg (192 lb 3 2 oz)     Physical Exam   Constitutional: He is oriented to person, place, and time  He appears well-developed and well-nourished  No distress  HENT:   Head: Normocephalic and atraumatic  Mouth/Throat: No oropharyngeal exudate  Eyes: Pupils are equal, round, and reactive to light  EOM are normal    Neck: Normal range of motion  Cardiovascular: Normal rate and regular rhythm  No murmur heard  Pulmonary/Chest: Effort normal and breath sounds normal  No respiratory distress  Abdominal: Soft  Bowel sounds are normal  He exhibits no distension  There is no tenderness  Musculoskeletal: He exhibits no edema  Lymphadenopathy:     He has no cervical adenopathy  Neurological: He is alert and oriented to person, place, and time  No cranial nerve deficit  Skin: Skin is warm and dry  Rash (Multiple areas of folliculitis on his abdomen and back  Bilateral lower extremity areas are in various stages of resolution  Positive right shoulder Alvarado ) noted  Pertinent Laboratory Results and Imaging Review:  Lab Requisition on 12/17/2018   Component Date Value Ref Range Status    Wound Culture 12/17/2018 4+ Growth of Methicillin Resistant Staphylococcus aureus*  Final    Please note: This patient requires contact precautions      Gram Stain Result 12/17/2018 Rare Disintegrating polys   Final    Gram Stain Result 12/17/2018 2+ Gram positive cocci in clusters   Final   Appointment on 12/17/2018   Component Date Value Ref Range Status    WBC 12/17/2018 8 52  4 31 - 10 16 Thousand/uL Final    RBC 12/17/2018 3 10* 3 88 - 5 62 Million/uL Final    Hemoglobin 12/17/2018 9 2* 12 0 - 17 0 g/dL Final    Hematocrit 12/17/2018 28 9* 36 5 - 49 3 % Final    MCV 12/17/2018 93  82 - 98 fL Final    MCH 12/17/2018 29 7  26 8 - 34 3 pg Final    MCHC 12/17/2018 31 8  31 4 - 37 4 g/dL Final    RDW 12/17/2018 14 8  11 6 - 15 1 % Final    MPV 12/17/2018 11 4  8 9 - 12 7 fL Final    Platelets 71/05/8620 246  149 - 390 Thousands/uL Final    nRBC 12/17/2018 0  /100 WBCs Final    Neutrophils Relative 12/17/2018 70  43 - 75 % Final    Immat GRANS % 12/17/2018 1  0 - 2 % Final    Lymphocytes Relative 12/17/2018 17  14 - 44 % Final    Monocytes Relative 12/17/2018 7  4 - 12 % Final    Eosinophils Relative 12/17/2018 4  0 - 6 % Final    Basophils Relative 12/17/2018 1  0 - 1 % Final    Neutrophils Absolute 12/17/2018 6 03  1 85 - 7 62 Thousands/µL Final    Immature Grans Absolute 12/17/2018 0 07  0 00 - 0 20 Thousand/uL Final    Lymphocytes Absolute 12/17/2018 1 46  0 60 - 4 47 Thousands/µL Final    Monocytes Absolute 12/17/2018 0 57  0 17 - 1 22 Thousand/µL Final    Eosinophils Absolute 12/17/2018 0 31  0 00 - 0 61 Thousand/µL Final    Basophils Absolute 12/17/2018 0 08 0 00 - 0 10 Thousands/µL Final   Ancillary Orders on 12/07/2018   Component Date Value Ref Range Status    WBC 12/07/2018 8 45  4 31 - 10 16 Thousand/uL Final    RBC 12/07/2018 3 14* 3 88 - 5 62 Million/uL Final    Hemoglobin 12/07/2018 9 3* 12 0 - 17 0 g/dL Final    Hematocrit 12/07/2018 29 8* 36 5 - 49 3 % Final    MCV 12/07/2018 95  82 - 98 fL Final    MCH 12/07/2018 29 6  26 8 - 34 3 pg Final    MCHC 12/07/2018 31 2* 31 4 - 37 4 g/dL Final    RDW 12/07/2018 15 0  11 6 - 15 1 % Final    MPV 12/07/2018 11 0  8 9 - 12 7 fL Final    Platelets 45/99/8164 271  149 - 390 Thousands/uL Final    nRBC 12/07/2018 0  /100 WBCs Final    This is an appended report  These results have been appended to a previously preliminary verified report      Segmented % 12/07/2018 87* 43 - 75 % Final    Bands % 12/07/2018 2  0 - 8 % Final    Lymphocytes % 12/07/2018 5* 14 - 44 % Final    Monocytes % 12/07/2018 3* 4 - 12 % Final    Eosinophils, % 12/07/2018 3  0 - 6 % Final    Basophils % 12/07/2018 0  0 - 1 % Final    Absolute Neutrophils 12/07/2018 7 52  1 85 - 7 62 Thousand/uL Final    Lymphocytes Absolute 12/07/2018 0 42* 0 60 - 4 47 Thousand/uL Final    Monocytes Absolute 12/07/2018 0 25  0 00 - 1 22 Thousand/uL Final    Eosinophils Absolute 12/07/2018 0 25  0 00 - 0 40 Thousand/uL Final    Basophils Absolute 12/07/2018 0 00  0 00 - 0 10 Thousand/uL Final    RBC Morphology 12/07/2018 Present   Final    Anisocytosis 12/07/2018 Present   Final    Poikilocytes 12/07/2018 Present   Final    Platelet Estimate 04/22/3660 Adequate  Adequate Final   Office Visit on 12/03/2018   Component Date Value Ref Range Status    LEUKOCYTE ESTERASE,UA 12/03/2018 +   Final    NITRITE,UA 12/03/2018 -   Final    SL AMB POCT UROBILINOGEN 12/03/2018 -   Final    POCT URINE PROTEIN 12/03/2018 -   Final     PH,UA 12/03/2018 5 0   Final    BLOOD,UA 12/03/2018 moderate   Final    SPECIFIC GRAVITY,UA 12/03/2018 1 020   Final    Andrés Garcia 12/03/2018 -   Final    BILIRUBIN,UA 12/03/2018 -   Final    GLUCOSE, UA 12/03/2018 500   Final     COLOR,UA 12/03/2018 yellow   Final    CLARITY,UA 12/03/2018 cloudy   Final    Clarity, UA 12/03/2018 Cloudy   Final    Color, UA 12/03/2018 Yellow   Final    Specific Gravity, UA 12/03/2018 1 010  1 003 - 1 030 Final    pH, UA 12/03/2018 6 0  4 5 - 8 0 Final    Glucose, UA 12/03/2018 500 (1/2%)* Negative mg/dl Final    Ketones, UA 12/03/2018 Negative  Negative mg/dl Final    Blood, UA 12/03/2018 Small* Negative Final    Protein, UA 12/03/2018 Negative  Negative mg/dl Final    Nitrite, UA 12/03/2018 Negative  Negative Final    Bilirubin, UA 12/03/2018 Negative  Negative Final    Urobilinogen, UA 12/03/2018 0 2  0 2, 1 0 E U /dl E U /dl Final    Leukocytes, UA 12/03/2018 Moderate* Negative Final    WBC, UA 12/03/2018 Innumerable* None Seen, 0-5, 5-55, 5-65 /hpf Final    RBC, UA 12/03/2018 2-4* None Seen, 0-5 /hpf Final    Hyaline Casts, UA 12/03/2018 None Seen  None Seen /lpf Final    Bacteria, UA 12/03/2018 None Seen  None Seen, Occasional /hpf Final    Epithelial Cells 12/03/2018 None Seen  None Seen, Occasional /hpf Final   Ancillary Orders on 11/30/2018   Component Date Value Ref Range Status    Sodium 11/30/2018 135* 136 - 145 mmol/L Final    Potassium 11/30/2018 4 4  3 5 - 5 3 mmol/L Final    Chloride 11/30/2018 104  100 - 108 mmol/L Final    CO2 11/30/2018 23  21 - 32 mmol/L Final    ANION GAP 11/30/2018 8  4 - 13 mmol/L Final    BUN 11/30/2018 21  5 - 25 mg/dL Final    Creatinine 11/30/2018 1 74* 0 60 - 1 30 mg/dL Final    Standardized to IDMS reference method    Glucose 11/30/2018 167* 65 - 140 mg/dL Final      If the patient is fasting, the ADA then defines impaired fasting glucose as > 100 mg/dL and diabetes as > or equal to 123 mg/dL  Specimen collection should occur prior to Sulfasalazine administration due to the potential for falsely depressed results   Specimen collection should occur prior to Sulfapyridine administration due to the potential for falsely elevated results   Calcium 11/30/2018 8 5  8 3 - 10 1 mg/dL Final    AST 11/30/2018 10  5 - 45 U/L Final      Specimen collection should occur prior to Sulfasalazine administration due to the potential for falsely depressed results   ALT 11/30/2018 18  12 - 78 U/L Final      Specimen collection should occur prior to Sulfasalazine and/or Sulfapyridine administration due to the potential for falsely depressed results       Alkaline Phosphatase 11/30/2018 122* 46 - 116 U/L Final    Total Protein 11/30/2018 7 0  6 4 - 8 2 g/dL Final    Albumin 11/30/2018 2 9* 3 5 - 5 0 g/dL Final    Total Bilirubin 11/30/2018 0 51  0 20 - 1 00 mg/dL Final    eGFR 11/30/2018 34  ml/min/1 73sq m Final   Ancillary Orders on 11/30/2018   Component Date Value Ref Range Status    WBC 11/30/2018 7 52  4 31 - 10 16 Thousand/uL Final    RBC 11/30/2018 3 27* 3 88 - 5 62 Million/uL Final    Hemoglobin 11/30/2018 9 9* 12 0 - 17 0 g/dL Final    Hematocrit 11/30/2018 30 7* 36 5 - 49 3 % Final    MCV 11/30/2018 94  82 - 98 fL Final    MCH 11/30/2018 30 3  26 8 - 34 3 pg Final    MCHC 11/30/2018 32 2  31 4 - 37 4 g/dL Final    RDW 11/30/2018 14 2  11 6 - 15 1 % Final    MPV 11/30/2018 10 0  8 9 - 12 7 fL Final    Platelets 62/54/8591 349  149 - 390 Thousands/uL Final    nRBC 11/30/2018 0  /100 WBCs Final    Neutrophils Relative 11/30/2018 71  43 - 75 % Final    Immat GRANS % 11/30/2018 1  0 - 2 % Final    Lymphocytes Relative 11/30/2018 15  14 - 44 % Final    Monocytes Relative 11/30/2018 6  4 - 12 % Final    Eosinophils Relative 11/30/2018 5  0 - 6 % Final    Basophils Relative 11/30/2018 2* 0 - 1 % Final    Neutrophils Absolute 11/30/2018 5 37  1 85 - 7 62 Thousands/µL Final    Immature Grans Absolute 11/30/2018 0 07  0 00 - 0 20 Thousand/uL Final    Lymphocytes Absolute 11/30/2018 1 10  0 60 - 4 47 Thousands/µL Final    Monocytes Absolute 11/30/2018 0 46  0 17 - 1 22 Thousand/µL Final    Eosinophils Absolute 11/30/2018 0 37  0 00 - 0 61 Thousand/µL Final    Basophils Absolute 11/30/2018 0 15* 0 00 - 0 10 Thousands/µL Final         The following historical data was reviewed  Past Medical History:   Diagnosis Date    Angina pectoris ([place] [address] )     Chronic kidney disease     Coronary artery disease     Diabetes mellitus ([place] [address] )     Glaucoma     Hypertension     Multiple myeloma ([place] [address] )     Occluded coronary artery stent     Left       Past Surgical History:   Procedure Laterality Date    BACK SURGERY      CARDIAC CATHETERIZATION  04/05/2004    CT BONE MARROW BIOPSY AND ASPIRATION  9/24/2018    KNEE SURGERY      THROMBOENDARTERECTOMY Right     Cartoid       Social History     Social History    Marital status:      Spouse name: N/A    Number of children: N/A    Years of education: N/A     Social History Main Topics    Smoking status: Former Smoker     Types: Cigarettes    Smokeless tobacco: Never Used      Comment: former smoker    Alcohol use Yes      Comment: socially; less than once a month    Drug use: No    Sexual activity: Not Asked     Other Topics Concern    None     Social History Narrative    Daily caffeine consumption           Family History   Problem Relation Age of Onset    Heart attack Father     Heart disease Mother     Diabetes Mother     Heart disease Sister     COPD Family        Please note: This report has been generated by a voice recognition software system  Therefore there may be syntax, spelling, and/or grammatical errors  Please call if you have any questions

## 2018-12-20 NOTE — PROGRESS NOTES
Outreach TC to patient for CM assessment  No answer to call  Left message on voicemail requesting a return call from patient to Janet Sanders 12, BSN; Outpatient Care Manager @ [phone]

## 2018-12-21 ENCOUNTER — OFFICE VISIT (OUTPATIENT)
Dept: INTERNAL MEDICINE CLINIC | Facility: CLINIC | Age: 83
End: 2018-12-21
Payer: COMMERCIAL

## 2018-12-21 ENCOUNTER — APPOINTMENT (OUTPATIENT)
Dept: LAB | Facility: CLINIC | Age: 83
End: 2018-12-21
Payer: COMMERCIAL

## 2018-12-21 VITALS
SYSTOLIC BLOOD PRESSURE: 120 MMHG | DIASTOLIC BLOOD PRESSURE: 74 MMHG | TEMPERATURE: 97.7 F | WEIGHT: 199.4 LBS | BODY MASS INDEX: 32.05 KG/M2 | HEIGHT: 66 IN | OXYGEN SATURATION: 97 % | HEART RATE: 84 BPM

## 2018-12-21 DIAGNOSIS — C90.00 KAPPA LIGHT CHAIN MYELOMA (HCC): ICD-10-CM

## 2018-12-21 DIAGNOSIS — E11.9 TYPE 2 DIABETES MELLITUS WITHOUT COMPLICATION, WITHOUT LONG-TERM CURRENT USE OF INSULIN (HCC): Primary | ICD-10-CM

## 2018-12-21 DIAGNOSIS — R05.9 COUGH: ICD-10-CM

## 2018-12-21 DIAGNOSIS — I10 ESSENTIAL HYPERTENSION: ICD-10-CM

## 2018-12-21 LAB
ALBUMIN SERPL BCP-MCNC: 2.8 G/DL (ref 3.5–5)
ALP SERPL-CCNC: 118 U/L (ref 46–116)
ALT SERPL W P-5'-P-CCNC: 20 U/L (ref 12–78)
ANION GAP SERPL CALCULATED.3IONS-SCNC: 8 MMOL/L (ref 4–13)
AST SERPL W P-5'-P-CCNC: 10 U/L (ref 5–45)
BILIRUB SERPL-MCNC: 0.32 MG/DL (ref 0.2–1)
BUN SERPL-MCNC: 24 MG/DL (ref 5–25)
CALCIUM SERPL-MCNC: 8.5 MG/DL (ref 8.3–10.1)
CHLORIDE SERPL-SCNC: 104 MMOL/L (ref 100–108)
CO2 SERPL-SCNC: 22 MMOL/L (ref 21–32)
CREAT SERPL-MCNC: 2.08 MG/DL (ref 0.6–1.3)
GFR SERPL CREATININE-BSD FRML MDRD: 28 ML/MIN/1.73SQ M
GLUCOSE SERPL-MCNC: 172 MG/DL (ref 65–140)
IGA SERPL-MCNC: 238 MG/DL (ref 70–400)
IGG SERPL-MCNC: 958 MG/DL (ref 700–1600)
IGM SERPL-MCNC: 34 MG/DL (ref 40–230)
LDH SERPL-CCNC: 181 U/L (ref 81–234)
POTASSIUM SERPL-SCNC: 4.2 MMOL/L (ref 3.5–5.3)
PROT SERPL-MCNC: 6.9 G/DL (ref 6.4–8.2)
SODIUM SERPL-SCNC: 134 MMOL/L (ref 136–145)

## 2018-12-21 PROCEDURE — 84165 PROTEIN E-PHORESIS SERUM: CPT | Performed by: PATHOLOGY

## 2018-12-21 PROCEDURE — 82232 ASSAY OF BETA-2 PROTEIN: CPT

## 2018-12-21 PROCEDURE — 1160F RVW MEDS BY RX/DR IN RCRD: CPT | Performed by: INTERNAL MEDICINE

## 2018-12-21 PROCEDURE — 99214 OFFICE O/P EST MOD 30 MIN: CPT | Performed by: INTERNAL MEDICINE

## 2018-12-21 PROCEDURE — 83615 LACTATE (LD) (LDH) ENZYME: CPT

## 2018-12-21 PROCEDURE — 84165 PROTEIN E-PHORESIS SERUM: CPT

## 2018-12-21 PROCEDURE — 82784 ASSAY IGA/IGD/IGG/IGM EACH: CPT

## 2018-12-21 PROCEDURE — 80053 COMPREHEN METABOLIC PANEL: CPT | Performed by: PHYSICIAN ASSISTANT

## 2018-12-21 PROCEDURE — 83883 ASSAY NEPHELOMETRY NOT SPEC: CPT

## 2018-12-21 RX ORDER — SULFAMETHOXAZOLE AND TRIMETHOPRIM 800; 160 MG/1; MG/1
1 TABLET ORAL 2 TIMES DAILY
Refills: 0 | COMMUNITY
Start: 2018-12-17 | End: 2019-01-07 | Stop reason: ALTCHOICE

## 2018-12-21 RX ORDER — BENZONATATE 100 MG/1
100 CAPSULE ORAL 2 TIMES DAILY PRN
Qty: 60 CAPSULE | Refills: 5 | Status: SHIPPED | OUTPATIENT
Start: 2018-12-21 | End: 2019-12-17 | Stop reason: CLARIF

## 2018-12-21 NOTE — ASSESSMENT & PLAN NOTE
Lab Results   Component Value Date    HGBA1C 6 9 (H) 07/17/2018       No results for input(s): POCGLU in the last 72 hours  Blood Sugar Average: Last 72 hrs:   I recommend increasing the glimepiride from 2 mg daily to 2 mg twice a day for 3-4 days if he were to get more steroids, then go back to 2 mg daily  From Hematology Oncology is note it looks like the dexamethasone is being discontinued  Last hemoglobin A1c over the summer was great at 6 9  Patient will continue to monitor his sugars at home, and will recheck hemoglobin A1c in a couple months  Patient told to call if his sugars are consistently above 150 for adjustment in his meds    The 1st step would be likely to increase his glimepiride from 2 mg 1 tablet daily to 1 and half tablets daily

## 2018-12-21 NOTE — PROGRESS NOTES
Assessment/Plan:    Type 2 diabetes mellitus without complication, without long-term current use of insulin (HCC)  Lab Results   Component Value Date    HGBA1C 6 9 (H) 07/17/2018       No results for input(s): POCGLU in the last 72 hours  Blood Sugar Average: Last 72 hrs:   I recommend increasing the glimepiride from 2 mg daily to 2 mg twice a day for 3-4 days if he were to get more steroids, then go back to 2 mg daily  From Hematology Oncology is note it looks like the dexamethasone is being discontinued  Last hemoglobin A1c over the summer was great at 6 9  Patient will continue to monitor his sugars at home, and will recheck hemoglobin A1c in a couple months  Patient told to call if his sugars are consistently above 150 for adjustment in his meds  The 1st step would be likely to increase his glimepiride from 2 mg 1 tablet daily to 1 and half tablets daily    Essential hypertension  The shoulder, continue meds       Diagnoses and all orders for this visit:    Type 2 diabetes mellitus without complication, without long-term current use of insulin ([facility])    Essential hypertension    Cough  -     benzonatate (TESSALON PERLES) 100 mg capsule; Take 1 capsule (100 mg total) by mouth 2 (two) times a day as needed for cough    Other orders  -     sulfamethoxazole-trimethoprim (BACTRIM DS) 800-160 mg per tablet; Take 1 tablet by mouth 2 (two) times a day          Subjective:      Patient ID: Seth Pack is a 80 y o  male  Diabetes mellitus:  Patient was taken off metformin due to renal function, and sugars were good, but now that he is getting steroids with Hematology Oncology, his sugars have been elevated  The following portions of the patient's history were reviewed and updated as appropriate: allergies, current medications, past family history, past medical history, past social history, past surgical history and problem list     Review of Systems   Constitutional: Positive for fatigue   Negative for chills and fever  HENT: Negative for congestion, nosebleeds, postnasal drip, sore throat and trouble swallowing  Eyes: Negative for pain  Respiratory: Positive for cough  Negative for chest tightness, shortness of breath and wheezing  Cardiovascular: Negative for chest pain, palpitations and leg swelling  Gastrointestinal: Negative for abdominal pain, constipation, diarrhea, nausea and vomiting  Endocrine: Negative for polydipsia and polyuria  Genitourinary: Negative for dysuria, flank pain and hematuria  Musculoskeletal: Negative for arthralgias  Skin: Negative for rash  Neurological: Negative for dizziness, tremors and headaches  Hematological: Does not bruise/bleed easily  Psychiatric/Behavioral: Negative for confusion and dysphoric mood  The patient is not nervous/anxious  Objective:      /74   Pulse 84   Temp 97 7 °F (36 5 °C)   Ht 5' 6" (1 676 m)   Wt 90 4 kg (199 lb 6 4 oz)   SpO2 97%   BMI 32 18 kg/m²          Physical Exam   Constitutional: He is oriented to person, place, and time  He appears well-developed and well-nourished  No distress  HENT:   Head: Normocephalic and atraumatic  Right Ear: External ear normal    Left Ear: External ear normal    Eyes: Conjunctivae are normal  No scleral icterus  Neck: Normal range of motion  Neck supple  No tracheal deviation present  No thyromegaly present  Cardiovascular: Normal rate, regular rhythm and normal heart sounds  Pulmonary/Chest: Effort normal and breath sounds normal  No respiratory distress  He has no wheezes  He has no rales  Abdominal: Soft  Bowel sounds are normal  There is no tenderness  There is no rebound and no guarding  Musculoskeletal: He exhibits no edema  Lymphadenopathy:     He has no cervical adenopathy  Neurological: He is alert and oriented to person, place, and time  Skin:   Scattered follicular inflammation on trunk   Psychiatric: He has a normal mood and affect   His behavior is normal  Judgment and thought content normal    Vitals reviewed

## 2018-12-21 NOTE — PATIENT INSTRUCTIONS
Problem List Items Addressed This Visit        Endocrine    Type 2 diabetes mellitus without complication, without long-term current use of insulin ([facility] ) - Primary     Lab Results   Component Value Date    HGBA1C 6 9 (H) 07/17/2018       No results for input(s): POCGLU in the last 72 hours  Blood Sugar Average: Last 72 hrs:   I recommend increasing the glimepiride from 2 mg daily to 2 mg twice a day for 3-4 days if he were to get more steroids, then go back to 2 mg daily  From Hematology Oncology is note it looks like the dexamethasone is being discontinued  Last hemoglobin A1c over the summer was great at 6 9  Patient will continue to monitor his sugars at home, and will recheck hemoglobin A1c in a couple months  Patient told to call if his sugars are consistently above 150 for adjustment in his meds    The 1st step would be likely to increase his glimepiride from 2 mg 1 tablet daily to 1 and half tablets daily            Cardiovascular and Mediastinum    Essential hypertension     The shoulder, continue meds            Other    Cough    Relevant Medications    benzonatate (TESSALON PERLES) 100 mg capsule

## 2018-12-22 LAB
KAPPA LC FREE SER-MCNC: 152.8 MG/L (ref 3.3–19.4)
KAPPA LC FREE/LAMBDA FREE SER: 3.75 {RATIO} (ref 0.26–1.65)
LAMBDA LC FREE SERPL-MCNC: 40.8 MG/L (ref 5.7–26.3)

## 2018-12-24 LAB — B2 MICROGLOB SERPL-MCNC: 3.8 MG/L (ref 0.6–2.4)

## 2018-12-25 LAB
ALBUMIN SERPL ELPH-MCNC: 3.11 G/DL (ref 3.5–5)
ALBUMIN SERPL ELPH-MCNC: 49.4 % (ref 52–65)
ALPHA1 GLOB SERPL ELPH-MCNC: 0.43 G/DL (ref 0.1–0.4)
ALPHA1 GLOB SERPL ELPH-MCNC: 6.9 % (ref 2.5–5)
ALPHA2 GLOB SERPL ELPH-MCNC: 1.08 G/DL (ref 0.4–1.2)
ALPHA2 GLOB SERPL ELPH-MCNC: 17.2 % (ref 7–13)
BETA GLOB ABNORMAL SERPL ELPH-MCNC: 0.44 G/DL (ref 0.4–0.8)
BETA1 GLOB SERPL ELPH-MCNC: 7 % (ref 5–13)
BETA2 GLOB SERPL ELPH-MCNC: 6.8 % (ref 2–8)
BETA2+GAMMA GLOB SERPL ELPH-MCNC: 0.43 G/DL (ref 0.2–0.5)
GAMMA GLOB ABNORMAL SERPL ELPH-MCNC: 0.8 G/DL (ref 0.5–1.6)
GAMMA GLOB SERPL ELPH-MCNC: 12.7 % (ref 12–22)
IGG/ALB SER: 0.98 {RATIO} (ref 1.1–1.8)
PROT PATTERN SERPL ELPH-IMP: ABNORMAL
PROT SERPL-MCNC: 6.3 G/DL (ref 6.4–8.2)

## 2018-12-28 ENCOUNTER — APPOINTMENT (OUTPATIENT)
Dept: LAB | Facility: CLINIC | Age: 83
End: 2018-12-28
Payer: COMMERCIAL

## 2018-12-28 DIAGNOSIS — C90.00 KAPPA LIGHT CHAIN MYELOMA (HCC): ICD-10-CM

## 2018-12-31 DIAGNOSIS — C90.00 MULTIPLE MYELOMA NOT HAVING ACHIEVED REMISSION (HCC): ICD-10-CM

## 2019-01-02 DIAGNOSIS — I25.5 ISCHEMIC CARDIOMYOPATHY: ICD-10-CM

## 2019-01-02 RX ORDER — ATORVASTATIN CALCIUM 40 MG/1
TABLET, FILM COATED ORAL
Qty: 90 TABLET | Refills: 3 | Status: SHIPPED | OUTPATIENT
Start: 2019-01-02 | End: 2019-12-17 | Stop reason: CLARIF

## 2019-01-02 RX ORDER — LENALIDOMIDE 5 MG/1
CAPSULE ORAL
Qty: 14 CAPSULE | Refills: 0 | Status: SHIPPED | OUTPATIENT
Start: 2019-01-02 | End: 2019-01-03 | Stop reason: SDUPTHER

## 2019-01-03 ENCOUNTER — PATIENT OUTREACH (OUTPATIENT)
Dept: INTERNAL MEDICINE CLINIC | Facility: CLINIC | Age: 84
End: 2019-01-03

## 2019-01-03 DIAGNOSIS — C90.00 MULTIPLE MYELOMA NOT HAVING ACHIEVED REMISSION (HCC): ICD-10-CM

## 2019-01-03 NOTE — PROGRESS NOTES
Outreach Tc to patient for CM assessment  Patient reports that he is feeling well  FBS today 136  Yeterday FBS- 102  Patient states range has been 100-142  Patient denies any s/sx of hyper/hypoglycemia  Patient reports that he saw PCP on 12/21 for dry cough and was given Tessalon Pearles  Patient takes 1 x day with relief of cough  Patient continues to note scattered follicular eruptions  Patient did complete his Bactrim DS antibiotics ordered by Dr Emmanuel Chiang  Patient continues to take Lenalidomide 5 mg po 1 cap daily x 14 days then 7 days off  Patient's last blood work was completed on 12/28 which showed some anemia  Patient is being treated for myeloma  Patient denies any other side effects such as N/V, diarrhea, fever, poor appetite  Patient denies any SOB with his anemia  Patient has a f/u with oncology on 1/7/19  Reviewed medications, future appointments, s/sx of complications and how/when to report any symptoms to provider vs 911  Patient verbalizes understanding  Agrees to continued calls

## 2019-01-04 ENCOUNTER — APPOINTMENT (OUTPATIENT)
Dept: LAB | Facility: CLINIC | Age: 84
End: 2019-01-04
Payer: COMMERCIAL

## 2019-01-04 RX ORDER — LENALIDOMIDE 5 MG/1
CAPSULE ORAL
Qty: 14 CAPSULE | Refills: 0 | Status: SHIPPED | OUTPATIENT
Start: 2019-01-04 | End: 2019-01-23 | Stop reason: SDUPTHER

## 2019-01-07 ENCOUNTER — OFFICE VISIT (OUTPATIENT)
Dept: HEMATOLOGY ONCOLOGY | Facility: CLINIC | Age: 84
End: 2019-01-07
Payer: COMMERCIAL

## 2019-01-07 VITALS
WEIGHT: 200 LBS | BODY MASS INDEX: 32.14 KG/M2 | RESPIRATION RATE: 17 BRPM | OXYGEN SATURATION: 98 % | HEART RATE: 79 BPM | HEIGHT: 66 IN | TEMPERATURE: 98.3 F | DIASTOLIC BLOOD PRESSURE: 72 MMHG | SYSTOLIC BLOOD PRESSURE: 130 MMHG

## 2019-01-07 DIAGNOSIS — C90.00 KAPPA LIGHT CHAIN MYELOMA (HCC): Primary | ICD-10-CM

## 2019-01-07 PROCEDURE — 99215 OFFICE O/P EST HI 40 MIN: CPT | Performed by: INTERNAL MEDICINE

## 2019-01-07 NOTE — PROGRESS NOTES
[facility] HEMATOLOGY ONCOLOGY Francisco Christiansonnacday  [address] [address] [phone]  [address] [place] [address], [phone]  01/07/19    Discussion:   In summary, this is an 26-year-old male history of multiple myeloma  He had been on Revlimid and dexamethasone  Dexamethasone was discontinued due to intercurrent infection problems  These have since resolved  Revlimid was continued  Recent CBC shows a white count of 4 2, hemoglobin 9 8, normal platelet count  CMP is essentially stable, creatinine 2 0, otherwise normal/near normal   I believe his anemia is due to both the presence of multiple myeloma as well as CKD-EPO deficiency  Given his stability in the 10 0 +/-range observation is favored at this time  Free light chain ratio has decreased, Prior 14 6, current 3 7  IgG and IgA are normal   IgM 34, previously 27  The changes in his immunologic parameters are consistent with responding myeloma on Revlimid monotherapy  I reviewed the above findings with the patient and his son  I suggested that he have CBC and chemistry every 2 weeks and will recheck his myeloma parameters just prior to his next visit in 2 months  I discussed the above with the patient  The patient and his sons voiced understanding and agreement   ______________________________________________________________________    Chief Complaint   Patient presents with    Follow-up       HPI:     Kappa light chain myeloma ([facility] [address] )    9/8/2018 - 10/8/2018 Cancer Staged     Cancer Staging  [place] light chain myeloma ([facility] )  Staging form: Plasma Cell Myeloma and Disorders, AJCC 8th Edition  - Clinical stage from 10/8/2018: RISS Stage II (Beta-2-microglobulin (mg/L): 4 2, Albumin (g/dL): 3 7, ISS: Stage II, High-risk cytogenetics: Absent, LDH: Normal) - Signed by Opal Trivedi PA-C on 10/8/2018           9/8/2018 Initial Diagnosis     Patient had an increase in creatinine    Patient followed up with Nephrology who completed a comprehensive workup  This demonstrated positive free light chain ratio elevation in addition to a 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             9/24/2018 Biopsy     Final Diagnosis   A -C  Bone marrow,  left iliac crest,  biopsy and aspirate:  -  Kappa light chain restricted plasma cell neoplasm, consistent with plasma cell myeloma (see note)  -  Maturing trilineage hematopoiesis without distinct features of dysplasia or increased myeloblasts  -  Decreased stainable storage iron  -  Mildly increased reticulin fibers without fibrosis  -  Negative for collagen fibrosis, granulomata, vasculitis, necrosis  Flow cytometry ([facility]# [id number], evaluated by NATALIE Pinto )       * Interpretation:    1  Plasma cell neoplasm, IgA kappa-restricted  See Comment  2  No evidence of B-cell or T-cell lymphoproliferative disorder  *  Comment:  Due to potential dilutional/lysis effects associated with flow cytometry, the reported plasma cell count (2 4%) is an underestimate  Therefore, correlation with a comprehensive bone marrow examination including morphologic plasma cell enumeration will be necessary for further and definitive characterization  Interpretation FISH Myeloma Panel:  1  No evidence of IGH-MAF [translocation t(14;16)] gene rearrangement  2  No evidence of RB1 monosomy (13q14 deletion)  3  No evidence of CCND1-IGH [translocation t(11;14)] gene rearrangement, and no evidence for trisomy 11 or gain of 11q  4  No evidence of p53 (17p13) deletion or amplification  5  No evidence of FGFR3-IGH [translocation t(4;14)] gene rearrangement  6  Negative for 1q21/CKS1B gain         10/19/2018 - 12/20/2018 Chemotherapy     Dexamethasone 20 mg Days 1, 8, 15  Revlimid 5 mg Days 1-14  Cycle length = 21 days    Completed 3 cycles of the above  12/20/2018 Adverse Reaction     Multiple folliculitis/abscess development    Uncontrolled diabetes, previously controlled prior to dexamethasone therapy  12/20/2018 -  Chemotherapy     Revlimid 5 mg Days 1-14  Cycle length = 21 days            Interval History:  Clinically stable  1 - Symptomatic but completely ambulatory    Review of Systems   Constitutional: Negative for chills and fever  HENT: Negative for nosebleeds  Eyes: Negative for discharge  Respiratory: Negative for cough and shortness of breath  Cardiovascular: Negative for chest pain  Gastrointestinal: Negative for abdominal pain, constipation and diarrhea  Endocrine: Negative for polydipsia  Genitourinary: Negative for hematuria  Musculoskeletal: Negative for arthralgias  Skin: Negative for color change  Allergic/Immunologic: Negative for immunocompromised state  Neurological: Negative for dizziness and headaches  Hematological: Negative for adenopathy  Psychiatric/Behavioral: Negative for agitation         Past Medical History:   Diagnosis Date    [facility]ina [facility]ectoris ([email or web address]     Chronic kidney disease     Coronary artery disease     Diabetes mellitus ([email or web address] )     Glaucoma     Hypertension     Multiple myeloma ([email or web address] )     Occluded coronary artery stent     Left     Patient Active Problem List   Diagnosis    Essential hypertension    Type 2 diabetes mellitus without complication, without long-term current use of insulin ([place] [address] )    Mixed hyperlipidemia    Esophageal reflux    Cerebral infarction, watershed distribution, bilateral, acute    Stage 4 chronic kidney disease (HCC)    Benign prostatic hyperplasia    Chronic systolic congestive heart failure (HCC)    Ischemic cardiomyopathy    Leg pain, left    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)    Other proteinuria    Dizziness    Kappa light chain myeloma (HCC)    UTI (urinary tract infection)    Cough    Cellulitis of right lower limb    MRSA (methicillin resistant staph aureus) culture positive       Current Outpatient Prescriptions:   acetaminophen (TYLENOL) 325 mg tablet, Take 1 - 2 tabs PO Q4 PRN pain, Disp: 30 tablet, Rfl: 0    aspirin 81 MG tablet, Take 81 mg by mouth daily  , Disp: , Rfl:     atorvastatin (LIPITOR) 40 mg tablet, TAKE 1 TABLET AT BEDTIME, Disp: 90 tablet, Rfl: 3    benzonatate (TESSALON PERLES) 100 mg capsule, Take 1 capsule (100 mg total) by mouth 2 (two) times a day as needed for cough, Disp: 60 capsule, Rfl: 5    bimatoprost (LUMIGAN) 0 01 % ophthalmic drops, Apply to eye, Disp: , Rfl:     esomeprazole (NexIUM) 40 MG capsule, Take 40 mg by mouth every evening , Disp: , Rfl:     fluticasone (FLONASE) 50 mcg/act nasal spray, into each nostril as needed  , Disp: , Rfl:     furosemide (LASIX) 40 mg tablet, Take 0 5 tablets (20 mg total) by mouth daily, Disp: 90 tablet, Rfl: 0    glimepiride (AMARYL) 2 mg tablet, TAKE 1 TABLET DAILY AS     DIRECTED (Patient taking differently: TAKE 1 5 TABLETS DAILY AS  DIRECTED), Disp: 90 tablet, Rfl: 3    glucose blood (ACCU-CHEK CHANDRAKANT PLUS) test strip, by In Vitro route, Disp: , Rfl:     guaiFENesin (MUCINEX) 600 mg 12 hr tablet, Take 1 tablet (600 mg total) by mouth 2 (two) times a day, Disp: 60 tablet, Rfl: 0    lenalidomide (REVLIMID) 5 MG CAPS, Take one tablet by mouth daily for 14 days on 7 days off [facility]#[id number], Disp: 14 capsule, Rfl: 0    metoprolol succinate (TOPROL-XL) 25 mg 24 hr tablet, TAKE 1 TABLET DAILY, Disp: 90 tablet, Rfl: 3    ONE TOUCH ULTRA TEST test strip, The patient test once daily  , Disp: 100 each, Rfl: 2    ONETOUCH DELICA LANCETS 17F MISC, by Does not apply route daily, Disp: 100 each, Rfl: 3    potassium chloride (KLOR-CON 10) 10 mEq tablet, Take 1 tablet (10 mEq total) by mouth daily, Disp: 90 tablet, Rfl: 3    timolol (TIMOPTIC) 0 5 % ophthalmic solution, , Disp: , Rfl:     nitroglycerin (NITROSTAT) 0 4 mg SL tablet, Place 1 tablet under the tongue every 5 (five) minutes as needed, Disp: , Rfl:   No Known Allergies  Past Surgical History: Procedure Laterality Date    BACK SURGERY      CARDIAC CATHETERIZATION  04/05/2004    CT BONE MARROW BIOPSY AND ASPIRATION  9/24/2018    KNEE SURGERY      THROMBOENDARTERECTOMY Right     Cartoid     Social History     Objective:  Vitals:    01/07/19 1615   BP: 130/72   BP Location: Right arm   Patient Position: Sitting   Pulse: 79   Resp: 17   Temp: 98 3 °F (36 8 °C)   TempSrc: Tympanic   SpO2: 98%   Weight: 90 7 kg (200 lb)   Height: 5' 6" (1 676 m)     Physical Exam   Constitutional: He is oriented to person, place, and time  He appears well-developed  HENT:   Head: Normocephalic  Eyes: Pupils are equal, round, and reactive to light  Neck: Neck supple  Cardiovascular: Normal rate and regular rhythm  No murmur heard  Pulmonary/Chest: Breath sounds normal  He has no wheezes  He has no rales  Abdominal: Soft  There is no tenderness  Musculoskeletal: Normal range of motion  He exhibits no edema or tenderness  Lymphadenopathy:     He has no cervical adenopathy  Neurological: He is alert and oriented to person, place, and time  He has normal reflexes  No cranial nerve deficit  Skin: No rash noted  No erythema  Psychiatric: He has a normal mood and affect  His behavior is normal          Labs: I personally reviewed the labs and imaging pertinent to this patient care

## 2019-01-15 ENCOUNTER — TELEPHONE (OUTPATIENT)
Dept: UROLOGY | Facility: AMBULATORY SURGERY CENTER | Age: 84
End: 2019-01-15

## 2019-01-16 DIAGNOSIS — E11.9 TYPE 2 DIABETES MELLITUS WITHOUT COMPLICATION, WITHOUT LONG-TERM CURRENT USE OF INSULIN (HCC): ICD-10-CM

## 2019-01-16 NOTE — TELEPHONE ENCOUNTER
Spoke w/ pt and made aware of appt date and time change pt agreeable  Stated he needed to check on transportation, but he thinks it should not be an issue  He will call to r/s if it in fact does become an issue

## 2019-01-18 ENCOUNTER — APPOINTMENT (OUTPATIENT)
Dept: LAB | Facility: CLINIC | Age: 84
End: 2019-01-18
Payer: COMMERCIAL

## 2019-01-21 ENCOUNTER — TELEPHONE (OUTPATIENT)
Dept: UROLOGY | Facility: AMBULATORY SURGERY CENTER | Age: 84
End: 2019-01-21

## 2019-01-21 ENCOUNTER — TELEPHONE (OUTPATIENT)
Dept: NEPHROLOGY | Facility: CLINIC | Age: 84
End: 2019-01-21

## 2019-01-21 ENCOUNTER — PATIENT OUTREACH (OUTPATIENT)
Dept: INTERNAL MEDICINE CLINIC | Facility: CLINIC | Age: 84
End: 2019-01-21

## 2019-01-21 NOTE — PROGRESS NOTES
Outreach TC to patient for CM assessment  Patient stated that he felt fine  Patient saw heme/onc on 1/7/19 and was told that his myeloma was responding well to his Revlimid  Patient will continue taking 1 tab daily x 7 days then 7 days off and then repeat  Patient has not had side effets from chemo except for fatigue  Patient remains independent with his ADL's and some IADL's  Patient does receive transportation, medication assist and shopping from his nephew  No acute exacerbations of disease processes  Reviewed medications, future appointments, s//sx to report and how/when to report symptoms to provider vs  911  Patient verbalized understanding Patient states that he is able to self monitor and self manage at this time  Patient is agreeable to case closure

## 2019-01-21 NOTE — TELEPHONE ENCOUNTER
Returned Liban's phone call and explained that the Cysto was for his microscopic blood that was shown at the last appointment  His appointment is for 1 :00 and he has an appt with the nephrologist @ 3:00  Said the Cysto takes about 15 -30 min  He has decided to keep both appt- will call if they change their mind

## 2019-01-21 NOTE — TELEPHONE ENCOUNTER
[place] patient nephew call had some questions for the nurse about his upcoming appointment [phone]

## 2019-01-22 ENCOUNTER — OFFICE VISIT (OUTPATIENT)
Dept: NEPHROLOGY | Facility: CLINIC | Age: 84
End: 2019-01-22
Payer: COMMERCIAL

## 2019-01-22 ENCOUNTER — PROCEDURE VISIT (OUTPATIENT)
Dept: UROLOGY | Facility: AMBULATORY SURGERY CENTER | Age: 84
End: 2019-01-22
Payer: COMMERCIAL

## 2019-01-22 VITALS
SYSTOLIC BLOOD PRESSURE: 132 MMHG | HEART RATE: 65 BPM | BODY MASS INDEX: 33.11 KG/M2 | HEIGHT: 66 IN | WEIGHT: 206 LBS | DIASTOLIC BLOOD PRESSURE: 62 MMHG

## 2019-01-22 VITALS
BODY MASS INDEX: 33.29 KG/M2 | HEART RATE: 76 BPM | RESPIRATION RATE: 16 BRPM | DIASTOLIC BLOOD PRESSURE: 58 MMHG | HEIGHT: 66 IN | SYSTOLIC BLOOD PRESSURE: 112 MMHG | WEIGHT: 207.13 LBS

## 2019-01-22 DIAGNOSIS — R31.29 MICROHEMATURIA: Primary | ICD-10-CM

## 2019-01-22 DIAGNOSIS — N18.4 STAGE 4 CHRONIC KIDNEY DISEASE (HCC): Primary | ICD-10-CM

## 2019-01-22 DIAGNOSIS — I12.9 PARENCHYMAL RENAL HYPERTENSION, STAGE 1 THROUGH STAGE 4 OR UNSPECIFIED CHRONIC KIDNEY DISEASE: ICD-10-CM

## 2019-01-22 DIAGNOSIS — D63.1 ANEMIA IN STAGE 4 CHRONIC KIDNEY DISEASE (HCC): ICD-10-CM

## 2019-01-22 DIAGNOSIS — R80.8 OTHER PROTEINURIA: ICD-10-CM

## 2019-01-22 DIAGNOSIS — N18.4 ANEMIA IN STAGE 4 CHRONIC KIDNEY DISEASE (HCC): ICD-10-CM

## 2019-01-22 LAB
SL AMB  POCT GLUCOSE, UA: NORMAL
SL AMB LEUKOCYTE ESTERASE,UA: NORMAL
SL AMB POCT BILIRUBIN,UA: NORMAL
SL AMB POCT BLOOD,UA: NORMAL
SL AMB POCT CLARITY,UA: CLEAR
SL AMB POCT COLOR,UA: YELLOW
SL AMB POCT KETONES,UA: NORMAL
SL AMB POCT NITRITE,UA: NORMAL
SL AMB POCT PH,UA: 6.5
SL AMB POCT SPECIFIC GRAVITY,UA: 1.01
SL AMB POCT URINE PROTEIN: NORMAL
SL AMB POCT UROBILINOGEN: NORMAL

## 2019-01-22 PROCEDURE — 81002 URINALYSIS NONAUTO W/O SCOPE: CPT | Performed by: UROLOGY

## 2019-01-22 PROCEDURE — 88112 CYTOPATH CELL ENHANCE TECH: CPT | Performed by: PATHOLOGY

## 2019-01-22 PROCEDURE — 52000 CYSTOURETHROSCOPY: CPT | Performed by: UROLOGY

## 2019-01-22 PROCEDURE — 99214 OFFICE O/P EST MOD 30 MIN: CPT | Performed by: INTERNAL MEDICINE

## 2019-01-22 NOTE — PROGRESS NOTES
NEPHROLOGY OUTPATIENT PROGRESS NOTE   Lyndsey Guerrero 80 y o  male MRN: [id number]  DATE: 1/22/2019  Reason for visit:   Chief Complaint   Patient presents with    Chronic Kidney Disease    Follow-up        Patient Instructions   1  Elevated sCr in setting of chronic kidney disease stage 4 d/t presumed diabetic nephropathy as well as systolic heart failure + newly diagnosed kappa light chain myeloma  -agree with holding metformin and losartan  -b/l sCr as of late high 1s to low 2s  Latest sCr 1 77 as of 1/18/19  Acid/base stable  Electrolytes stable   -has been referred to [facility] CKD education  -to have repeat CMP per Dr Brandi Rice  -in office UA shows trace WBCs, 100 protein, small blood  I note isomorphic RBCs on urine sample in office  Has seen urology  -normal renal u/s with PVR as of August 2018  -avoid nonsteroidals (ibuprofen, aleve, advil, motrin, naproxen, toradol, indomethacin, celebrex)     2  Hypertension - BP low normal range on metoprolol 25mg daily, furosemide 20mg daily  Off isordil       3  Anemia in setting of kappa light chain myeloma - Hgb 8 6 as per latest labs  Follows with hematology     4  Proteinuria in the setting of presumed diabetic nephropathy- UpCr 0 73 as of 8/14/18 Also with myeloma as above   -monitor UpCr     5  GERD - on nexium every other day  Prolonged use of PPIs can lead to chronic interstitial nephritis       6  Ischemic cardiomyopathy/systolic CHF - on furosemide 20mg daily, monitor daily weight  Record weights daily and bring to cardiology office     7  DM2, well controlled - on glimepiride 2mg daily, last A1C 6 9 as of 7/17/18     8  Kappa light chain myeloma -- s/p revlimid, decadron per heme/onc but now off decadron due to infections  RTC in 4-5 months  Jayda Lo was seen today for chronic kidney disease and follow-up      Diagnoses and all orders for this visit:    Stage 4 chronic kidney disease ([address] )    Parenchymal renal hypertension, 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 kidney disease    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)    Other proteinuria  -     Protein / creatinine ratio, urine; Future        Assessment/Plan:  1  Elevated sCr in setting of chronic kidney disease stage 4 d/t presumed diabetic nephropathy as well as systolic heart failure + newly diagnosed kappa light chain myeloma  -agree with holding metformin and losartan  -b/l sCr as of late high 1s to low 2s  Latest sCr 1 77 as of 1/18/19  Acid/base stable  Electrolytes stable   -has been referred to [facility] CKD education  -to have repeat CMP per Dr Jerry Yee  -in office UA shows trace WBCs, 100 protein, small blood  I note isomorphic RBCs on urine sample in office  Has seen urology  -normal renal u/s with PVR as of August 2018  -avoid nonsteroidals (ibuprofen, aleve, advil, motrin, naproxen, toradol, indomethacin, celebrex)     2  Hypertension - BP low normal range on metoprolol 25mg daily, furosemide 20mg daily  Off isordil       3  Anemia in setting of kappa light chain myeloma - Hgb 8 6 as per latest labs  Follows with hematology     4  Proteinuria in the setting of presumed diabetic nephropathy- UpCr 0 73 as of 8/14/18 Also with myeloma as above   -monitor UpCr     5  GERD - on nexium every other day  Prolonged use of PPIs can lead to chronic interstitial nephritis       6  Ischemic cardiomyopathy/systolic CHF - on furosemide 20mg daily, monitor daily weight  Record weights daily and bring to cardiology office     7  DM2, well controlled - on glimepiride 2mg daily, last A1C 6 9 as of 7/17/18     8  Kappa light chain myeloma -- s/p revlimid, decadron per heme/onc but now off decadron due to infections  SUBJECTIVE / INTERVAL HISTORY:  80 y o  male presents in follow up of CKD  Laurel Santiago denies any recent illness/hospitalizations/medication changes since last office visit  He started chemo in October 2018   He fainted while visiting his wife at the hospital  He was found to have a UTI  He has been well since then  Denies NSAID use  Weight has improved  Appetite has been good  Does get constipated with muffins but this has resolved  Review of Systems   Constitutional: Negative for chills and fever  HENT: Negative for sore throat  Eyes: Negative for visual disturbance  Respiratory: Negative for cough and shortness of breath  Cardiovascular: Negative for chest pain and leg swelling  Gastrointestinal: Positive for constipation  Negative for abdominal pain, diarrhea, nausea and vomiting  Endocrine: Negative for polyuria  Genitourinary: Positive for difficulty urinating  Negative for decreased urine volume and dysuria  Musculoskeletal: Negative for back pain and myalgias  Skin: Negative for rash  Neurological: Negative for dizziness, light-headedness and numbness  Psychiatric/Behavioral: Negative for confusion  OBJECTIVE:  /58 (BP Location: Left arm, Patient Position: Sitting, Cuff Size: Standard)   Pulse 76   Resp 16   Ht 5' 6" (1 676 m)   Wt 94 kg (207 lb 2 oz)   BMI 33 43 kg/m²  Body mass index is 33 43 kg/m²  Physical exam:  Physical Exam   Constitutional: He appears well-developed and well-nourished  No distress  HENT:   Head: Normocephalic and atraumatic  Mouth/Throat: No oropharyngeal exudate  Eyes: Right eye exhibits no discharge  Left eye exhibits no discharge  No scleral icterus  Neck: Neck supple  Cardiovascular: Normal rate, regular rhythm and normal heart sounds  Pulmonary/Chest: Effort normal and breath sounds normal  He has no wheezes  He has no rales  Abdominal: Soft  Bowel sounds are normal  He exhibits no distension  There is no tenderness  Musculoskeletal: He exhibits edema (left ankle )  Neurological: He is alert  awake   Skin: Skin is warm and dry  No rash noted  He is not diaphoretic  Psychiatric: He has a normal mood and affect   His behavior is normal    Vitals reviewed  Medications:    Current Outpatient Prescriptions:     acetaminophen (TYLENOL) 325 mg tablet, Take 1 - 2 tabs PO Q4 PRN pain, Disp: 30 tablet, Rfl: 0    aspirin 81 MG tablet, Take 81 mg by mouth daily  , Disp: , Rfl:     atorvastatin (LIPITOR) 40 mg tablet, TAKE 1 TABLET AT BEDTIME, Disp: 90 tablet, Rfl: 3    benzonatate (TESSALON PERLES) 100 mg capsule, Take 1 capsule (100 mg total) by mouth 2 (two) times a day as needed for cough, Disp: 60 capsule, Rfl: 5    bimatoprost (LUMIGAN) 0 01 % ophthalmic drops, Apply to eye, Disp: , Rfl:     esomeprazole (NexIUM) 40 MG capsule, Take 40 mg by mouth every evening , Disp: , Rfl:     fluticasone (FLONASE) 50 mcg/act nasal spray, into each nostril as needed  , Disp: , Rfl:     furosemide (LASIX) 40 mg tablet, Take 0 5 tablets (20 mg total) by mouth daily, Disp: 90 tablet, Rfl: 0    glimepiride (AMARYL) 2 mg tablet, TAKE 1 TABLET DAILY AS     DIRECTED (Patient taking differently: TAKE 1 5 TABLETS DAILY AS  DIRECTED), Disp: 90 tablet, Rfl: 3    glucose blood (ACCU-CHEK CHANDRAKANT PLUS) test strip, by In Vitro route, Disp: , Rfl:     guaiFENesin (MUCINEX) 600 mg 12 hr tablet, Take 1 tablet (600 mg total) by mouth 2 (two) times a day, Disp: 60 tablet, Rfl: 0    lenalidomide (REVLIMID) 5 MG CAPS, Take one tablet by mouth daily for 14 days on 7 days off [facility]#[id number], Disp: 14 capsule, Rfl: 0    metoprolol succinate (TOPROL-XL) 25 mg 24 hr tablet, TAKE 1 TABLET DAILY, Disp: 90 tablet, Rfl: 3    nitroglycerin (NITROSTAT) 0 4 mg SL tablet, Place 1 tablet under the tongue every 5 (five) minutes as needed, Disp: , Rfl:     ONE TOUCH ULTRA TEST test strip, The patient test once daily  , Disp: 100 each, Rfl: 2    ONETOUCH DELICA LANCETS 62D MISC, by Does not apply route daily, Disp: 100 each, Rfl: 3    potassium chloride (KLOR-CON 10) 10 mEq tablet, Take 1 tablet (10 mEq total) by mouth daily, Disp: 90 tablet, Rfl: 3    timolol (TIMOPTIC) 0 5 % ophthalmic solution, , Disp: , Rfl:     Allergies: Allergies as of 01/22/2019    (No Known Allergies)       The following portions of the patient's history were reviewed and updated as appropriate: past family history, past surgical history and problem list     Laboratory Results:  Lab Results   Component Value Date    GLUCOSE 90 10/05/2015    CALCIUM 8 5 01/18/2019     10/05/2015    K 3 9 01/18/2019    CO2 27 01/18/2019     01/18/2019    BUN 26 (H) 01/18/2019    CREATININE 1 77 (H) 01/18/2019        Lab Results   Component Value Date     8 (H) 11/23/2018    CALCIUM 8 5 01/18/2019    PHOS 3 2 11/23/2018       Portions of the record may have been created with voice recognition software   Occasional wrong word or "sound a like" substitutions may have occurred due to the inherent limitations of voice recognition software   Read the chart carefully and recognize, using context, where substitutions have occurred

## 2019-01-22 NOTE — PATIENT INSTRUCTIONS
1  Elevated sCr in setting of chronic kidney disease stage 4 d/t presumed diabetic nephropathy as well as systolic heart failure + newly diagnosed kappa light chain myeloma  -agree with holding metformin and losartan  -b/l sCr as of late high 1s to low 2s  Latest sCr 1 77 as of 1/18/19  Acid/base stable  Electrolytes stable   -has been referred to [facility] CKD education  -to have repeat CMP per Dr Nelly Irving  -in office UA shows trace WBCs, 100 protein, small blood  I note isomorphic RBCs on urine sample in office  Has seen urology  -normal renal u/s with PVR as of August 2018  -avoid nonsteroidals (ibuprofen, aleve, advil, motrin, naproxen, toradol, indomethacin, celebrex)     2  Hypertension - BP low normal range on metoprolol 25mg daily, furosemide 20mg daily  Off isordil       3  Anemia in setting of kappa light chain myeloma - Hgb 8 6 as per latest labs  Follows with hematology     4  Proteinuria in the setting of presumed diabetic nephropathy- UpCr 0 73 as of 8/14/18 Also with myeloma as above   -monitor UpCr     5  GERD - on nexium every other day  Prolonged use of PPIs can lead to chronic interstitial nephritis       6  Ischemic cardiomyopathy/systolic CHF - on furosemide 20mg daily, monitor daily weight  Record weights daily and bring to cardiology office     7  DM2, well controlled - on glimepiride 2mg daily, last A1C 6 9 as of 7/17/18     8  Kappa light chain myeloma -- s/p revlimid, decadron per heme/onc but now off decadron due to infections  RTC in 4-5 months

## 2019-01-22 NOTE — PROGRESS NOTES
Cystoscopy  Date/Time: 1/22/2019 1:15 PM  Performed by: Shauna Ontiveros by: Christophe Apley is an 80-year-old male with microscopic hematuria  A urinalysis revealed 4-10 red blood cells per high-powered field  A recent retroperitoneal ultrasound from late summer 2018 shows no evidence of upper tract abnormalities  He presents to the office today to undergo cystoscopy  Male cystoscopy procedure note:    Risk and benefits of flexible cystoscopy were discussed  Informed consent was obtained  The patient was placed in the supine position  His genitalia was prepped and draped in a sterile fashion  Viscous lidocaine jelly was instilled into the urethra and flexible cystoscopy was then performed  The urethra, prostatic urethra, and bladder were all thoroughly inspected there was no evidence of mucosal abnormalities or lesions other than very mild bladder wall erythema along the posterior wall    Both ureteral orifices were visualized with clear efflux of urine  Retroflexion was normal  Overall this was a negative cystoscopy for urothelial carcinoma  My impression is microscopic hematuria, mild bladder wall erythema  I had a discussion with the patient and his nephew Marcelo Davey regarding indications for cystoscopy with a bladder biopsy in the operating room  At 80years of age they are inclined to hold on any aggressive intervention unless his urine cytology today or urine cytology in the next 3-4 months when follow-up were to reveal cells suspicious for urothelial carcinoma  In the interim he was instructed to call sooner if he were to develop gross hematuria

## 2019-01-22 NOTE — LETTER
January 22, 2019     Vijaya Leyva MD  [facility] [id number]    Patient: Bella Martinez   YOB: 1930   Date of Visit: 1/22/2019       Dear Dr Geovani Faria: Thank you for referring Jocelyn Wright to me for evaluation  Below are my notes for this consultation  If you have questions, please do not hesitate to call me  I look forward to following your patient along with you  Sincerely,        Olivia Genao DO        CC: DO Olivia Cerda DO  1/22/2019  3:30 PM  Sign at close encounter  [address] [facility] NOTE   Bella Martinez 80 y o  male MRN: [id number]  DATE: 1/22/2019  Reason for visit:   Chief Complaint   Patient presents with    Chronic Kidney Disease    Follow-up        Patient Instructions   1  Elevated sCr in setting of chronic kidney disease stage 4 d/t presumed diabetic nephropathy as well as systolic heart failure + newly diagnosed kappa light chain myeloma  -agree with holding metformin and losartan  -b/l sCr as of late high 1s to low 2s  Latest sCr 1 77 as of 1/18/19  Acid/base stable  Electrolytes stable   -has been referred to [facility] CKD education  -to have repeat CMP per Dr Nelly Irving  -in office UA shows trace WBCs, 100 protein, small blood  I note isomorphic RBCs on urine sample in office  Has seen urology  -normal renal u/s with PVR as of August 2018  -avoid nonsteroidals (ibuprofen, aleve, advil, motrin, naproxen, toradol, indomethacin, celebrex)     2  Hypertension - BP low normal range on metoprolol 25mg daily, furosemide 20mg daily  Off isordil       3  Anemia in setting of kappa light chain myeloma - Hgb 8 6 as per latest labs  Follows with hematology     4  Proteinuria in the setting of presumed diabetic nephropathy- UpCr 0 73 as of 8/14/18 Also with myeloma as above   -monitor UpCr     5  GERD - on nexium every other day   Prolonged use of PPIs can lead to chronic interstitial nephritis       6  Ischemic cardiomyopathy/systolic CHF - on furosemide 20mg daily, monitor daily weight  Record weights daily and bring to cardiology office     7  DM2, well controlled - on glimepiride 2mg daily, last A1C 6 9 as of 7/17/18     8  Kappa light chain myeloma -- s/p revlimid, decadron per heme/onc but now off decadron due to infections  RTC in 4-5 months  Burke Tellez was seen today for chronic kidney disease and follow-up  Diagnoses and all orders for this visit:    Stage 4 chronic kidney disease ([address] )    Parenchymal renal hypertension, 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 kidney disease    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)    Other proteinuria  -     Protein / creatinine ratio, urine; Future        Assessment/Plan:  1  Elevated sCr in setting of chronic kidney disease stage 4 d/t presumed diabetic nephropathy as well as systolic heart failure + newly diagnosed kappa light chain myeloma  -agree with holding metformin and losartan  -b/l sCr as of late high 1s to low 2s  Latest sCr 1 77 as of 1/18/19  Acid/base stable  Electrolytes stable   -has been referred to [facility] CKD education  -to have repeat CMP per Dr Ana Kovacs  -in office UA shows trace WBCs, 100 protein, small blood  I note isomorphic RBCs on urine sample in office  Has seen urology  -normal renal u/s with PVR as of August 2018  -avoid nonsteroidals (ibuprofen, aleve, advil, motrin, naproxen, toradol, indomethacin, celebrex)     2  Hypertension - BP low normal range on metoprolol 25mg daily, furosemide 20mg daily  Off isordil       3  Anemia in setting of kappa light chain myeloma - Hgb 8 6 as per latest labs  Follows with hematology     4  Proteinuria in the setting of presumed diabetic nephropathy- UpCr 0 73 as of 8/14/18 Also with myeloma as above   -monitor UpCr     5  GERD - on nexium every other day   Prolonged use of PPIs can lead to chronic interstitial nephritis       6  Ischemic cardiomyopathy/systolic CHF - on furosemide 20mg daily, monitor daily weight  Record weights daily and bring to cardiology office     7  DM2, well controlled - on glimepiride 2mg daily, last A1C 6 9 as of 7/17/18     8  Kappa light chain myeloma -- s/p revlimid, decadron per heme/onc but now off decadron due to infections  SUBJECTIVE / INTERVAL HISTORY:  80 y o  male presents in follow up of CKD  Edmund Robb denies any recent illness/hospitalizations/medication changes since last office visit  He started chemo in October 2018  He fainted while visiting his wife at the hospital  He was found to have a UTI  He has been well since then  Denies NSAID use  Weight has improved  Appetite has been good  Does get constipated with muffins but this has resolved  Review of Systems   Constitutional: Negative for chills and fever  HENT: Negative for sore throat  Eyes: Negative for visual disturbance  Respiratory: Negative for cough and shortness of breath  Cardiovascular: Negative for chest pain and leg swelling  Gastrointestinal: Positive for constipation  Negative for abdominal pain, diarrhea, nausea and vomiting  Endocrine: Negative for polyuria  Genitourinary: Positive for difficulty urinating  Negative for decreased urine volume and dysuria  Musculoskeletal: Negative for back pain and myalgias  Skin: Negative for rash  Neurological: Negative for dizziness, light-headedness and numbness  Psychiatric/Behavioral: Negative for confusion  OBJECTIVE:  /58 (BP Location: Left arm, Patient Position: Sitting, Cuff Size: Standard)   Pulse 76   Resp 16   Ht 5' 6" (1 676 m)   Wt 94 kg (207 lb 2 oz)   BMI 33 43 kg/m²   Body mass index is 33 43 kg/m²  Physical exam:  Physical Exam   Constitutional: He appears well-developed and well-nourished  No distress  HENT:   Head: Normocephalic and atraumatic  Mouth/Throat: No oropharyngeal exudate  Eyes: Right eye exhibits no discharge   Left eye exhibits no discharge  No scleral icterus  Neck: Neck supple  Cardiovascular: Normal rate, regular rhythm and normal heart sounds  Pulmonary/Chest: Effort normal and breath sounds normal  He has no wheezes  He has no rales  Abdominal: Soft  Bowel sounds are normal  He exhibits no distension  There is no tenderness  Musculoskeletal: He exhibits edema (left ankle )  Neurological: He is alert  awake   Skin: Skin is warm and dry  No rash noted  He is not diaphoretic  Psychiatric: He has a normal mood and affect  His behavior is normal    Vitals reviewed  Medications:    Current Outpatient Prescriptions:     acetaminophen (TYLENOL) 325 mg tablet, Take 1 - 2 tabs PO Q4 PRN pain, Disp: 30 tablet, Rfl: 0    aspirin 81 MG tablet, Take 81 mg by mouth daily  , Disp: , Rfl:     atorvastatin (LIPITOR) 40 mg tablet, TAKE 1 TABLET AT BEDTIME, Disp: 90 tablet, Rfl: 3    benzonatate (TESSALON PERLES) 100 mg capsule, Take 1 capsule (100 mg total) by mouth 2 (two) times a day as needed for cough, Disp: 60 capsule, Rfl: 5    bimatoprost (LUMIGAN) 0 01 % ophthalmic drops, Apply to eye, Disp: , Rfl:     esomeprazole (NexIUM) 40 MG capsule, Take 40 mg by mouth every evening , Disp: , Rfl:     fluticasone (FLONASE) 50 mcg/act nasal spray, into each nostril as needed  , Disp: , Rfl:     furosemide (LASIX) 40 mg tablet, Take 0 5 tablets (20 mg total) by mouth daily, Disp: 90 tablet, Rfl: 0    glimepiride (AMARYL) 2 mg tablet, TAKE 1 TABLET DAILY AS     DIRECTED (Patient taking differently: TAKE 1 5 TABLETS DAILY AS  DIRECTED), Disp: 90 tablet, Rfl: 3    glucose blood (ACCU-CHEK CHANDRAKANT PLUS) test strip, by In Vitro route, Disp: , Rfl:     guaiFENesin (MUCINEX) 600 mg 12 hr tablet, Take 1 tablet (600 mg total) by mouth 2 (two) times a day, Disp: 60 tablet, Rfl: 0    lenalidomide (REVLIMID) 5 MG CAPS, Take one tablet by mouth daily for 14 days on 7 days off [facility]#[id number], Disp: 14 capsule, Rfl: 0    metoprolol succinate (TOPROL-XL) 25 mg 24 hr tablet, TAKE 1 TABLET DAILY, Disp: 90 tablet, Rfl: 3    nitroglycerin (NITROSTAT) 0 4 mg SL tablet, Place 1 tablet under the tongue every 5 (five) minutes as needed, Disp: , Rfl:     ONE TOUCH ULTRA TEST test strip, The patient test once daily  , Disp: 100 each, Rfl: 2    ONETOUCH DELICA LANCETS 65I MISC, by Does not apply route daily, Disp: 100 each, Rfl: 3    potassium chloride (KLOR-CON 10) 10 mEq tablet, Take 1 tablet (10 mEq total) by mouth daily, Disp: 90 tablet, Rfl: 3    timolol (TIMOPTIC) 0 5 % ophthalmic solution, , Disp: , Rfl:     Allergies: Allergies as of 01/22/2019    (No Known Allergies)       The following portions of the patient's history were reviewed and updated as appropriate: past family history, past surgical history and problem list     Laboratory Results:  Lab Results   Component Value Date    GLUCOSE 90 10/05/2015    CALCIUM 8 5 01/18/2019     10/05/2015    K 3 9 01/18/2019    CO2 27 01/18/2019     01/18/2019    BUN 26 (H) 01/18/2019    CREATININE 1 77 (H) 01/18/2019        Lab Results   Component Value Date     8 (H) 11/23/2018    CALCIUM 8 5 01/18/2019    PHOS 3 2 11/23/2018       Portions of the record may have been created with voice recognition software   Occasional wrong word or "sound a like" substitutions may have occurred due to the inherent limitations of voice recognition software   Read the chart carefully and recognize, using context, where substitutions have occurred

## 2019-01-23 DIAGNOSIS — C90.00 MULTIPLE MYELOMA NOT HAVING ACHIEVED REMISSION (HCC): ICD-10-CM

## 2019-01-24 ENCOUNTER — OFFICE VISIT (OUTPATIENT)
Dept: INTERNAL MEDICINE CLINIC | Facility: CLINIC | Age: 84
End: 2019-01-24
Payer: COMMERCIAL

## 2019-01-24 VITALS
DIASTOLIC BLOOD PRESSURE: 64 MMHG | TEMPERATURE: 98.3 F | SYSTOLIC BLOOD PRESSURE: 122 MMHG | HEIGHT: 66 IN | HEART RATE: 70 BPM | OXYGEN SATURATION: 99 % | BODY MASS INDEX: 33.43 KG/M2

## 2019-01-24 DIAGNOSIS — M79.652 LEFT THIGH PAIN: Primary | ICD-10-CM

## 2019-01-24 DIAGNOSIS — M79.606 PAIN OF LOWER EXTREMITY, UNSPECIFIED LATERALITY: Primary | ICD-10-CM

## 2019-01-24 PROCEDURE — 99213 OFFICE O/P EST LOW 20 MIN: CPT | Performed by: INTERNAL MEDICINE

## 2019-01-24 RX ORDER — LENALIDOMIDE 5 MG/1
CAPSULE ORAL
Qty: 14 CAPSULE | Refills: 0 | Status: SHIPPED | OUTPATIENT
Start: 2019-01-24 | End: 2019-01-25 | Stop reason: SDUPTHER

## 2019-01-24 NOTE — PATIENT INSTRUCTIONS
Problem List Items Addressed This Visit        Other    Left thigh pain - Primary     Will check x-ray of left hip, and make sure no blood clot is present  This could be meralgia paresthetica, patient instructed to keep pressure off his waistline which may be compressing the nerve    Follow up if not improving         Relevant Orders    VAS lower limb venous duplex study, unilateral/limited    XR hip/pelv 2-3 vws left if performed

## 2019-01-24 NOTE — ASSESSMENT & PLAN NOTE
Will check x-ray of left hip, and make sure no blood clot is present  This could be meralgia paresthetica, patient instructed to keep pressure off his waistline which may be compressing the nerve    Follow up if not improving

## 2019-01-24 NOTE — PROGRESS NOTES
Assessment/Plan:    Left thigh pain  Will check x-ray of left hip, and make sure no blood clot is present  This could be meralgia paresthetica, patient instructed to keep pressure off his waistline which may be compressing the nerve  Follow up if not improving       Diagnoses and all orders for this visit:    Left thigh pain  -     VAS lower limb venous duplex study, unilateral/limited; Future  -     XR hip/pelv 2-3 vws left if performed; Future          Subjective:      Patient ID: Shira Barnes is a 80 y o  male  Pt having pain in his left leg for a few days, patient points to right upper lateral thigh  No fall or strain  He has had a little swelling in left leg over past week  No rash in area        The following portions of the patient's history were reviewed and updated as appropriate: allergies, current medications, past family history, past medical history, past social history, past surgical history and problem list     Review of Systems   Constitutional: Negative for chills and fever  Cardiovascular: Positive for leg swelling (left leg)  Musculoskeletal: Positive for myalgias  Negative for arthralgias  Objective:      /64   Pulse 70   Temp 98 3 °F (36 8 °C)   Ht 5' 6" (1 676 m)   SpO2 99%   BMI 33 43 kg/m²          Physical Exam   Musculoskeletal: He exhibits edema (Mild left lower extremity)     No tenderness to palpation over left hip or thigh muscle   Skin:   No rash in area pain, no erythema or warmth

## 2019-01-25 ENCOUNTER — TELEPHONE (OUTPATIENT)
Dept: NEPHROLOGY | Facility: CLINIC | Age: 84
End: 2019-01-25

## 2019-01-25 ENCOUNTER — APPOINTMENT (OUTPATIENT)
Dept: RADIOLOGY | Facility: CLINIC | Age: 84
End: 2019-01-25
Payer: COMMERCIAL

## 2019-01-25 ENCOUNTER — TELEPHONE (OUTPATIENT)
Dept: INTERNAL MEDICINE CLINIC | Facility: CLINIC | Age: 84
End: 2019-01-25

## 2019-01-25 ENCOUNTER — HOSPITAL ENCOUNTER (OUTPATIENT)
Dept: NON INVASIVE DIAGNOSTICS | Facility: CLINIC | Age: 84
Discharge: HOME | End: 2019-01-25
Payer: COMMERCIAL

## 2019-01-25 ENCOUNTER — TELEPHONE (OUTPATIENT)
Dept: HEMATOLOGY ONCOLOGY | Facility: CLINIC | Age: 84
End: 2019-01-25

## 2019-01-25 ENCOUNTER — TRANSCRIBE ORDERS (OUTPATIENT)
Dept: RADIOLOGY | Facility: CLINIC | Age: 84
End: 2019-01-25

## 2019-01-25 DIAGNOSIS — M79.652 LEFT THIGH PAIN: ICD-10-CM

## 2019-01-25 DIAGNOSIS — M79.652 LEFT THIGH PAIN: Primary | ICD-10-CM

## 2019-01-25 DIAGNOSIS — C90.00 MULTIPLE MYELOMA NOT HAVING ACHIEVED REMISSION (HCC): ICD-10-CM

## 2019-01-25 DIAGNOSIS — M79.606 PAIN OF LOWER EXTREMITY, UNSPECIFIED LATERALITY: ICD-10-CM

## 2019-01-25 PROCEDURE — 93971 EXTREMITY STUDY: CPT

## 2019-01-25 PROCEDURE — 93971 EXTREMITY STUDY: CPT | Performed by: SURGERY

## 2019-01-25 PROCEDURE — 73502 X-RAY EXAM HIP UNI 2-3 VIEWS: CPT

## 2019-01-25 RX ORDER — LENALIDOMIDE 5 MG/1
CAPSULE ORAL
Qty: 14 CAPSULE | Refills: 0 | Status: SHIPPED | OUTPATIENT
Start: 2019-01-25 | End: 2019-02-15 | Stop reason: SDUPTHER

## 2019-01-25 RX ORDER — TRAMADOL HYDROCHLORIDE 50 MG/1
50 TABLET ORAL EVERY 6 HOURS PRN
Qty: 30 TABLET | Refills: 0 | Status: SHIPPED | OUTPATIENT
Start: 2019-01-25 | End: 2019-03-13

## 2019-01-25 NOTE — TELEPHONE ENCOUNTER
Shantelle Garcia called stating Noemy Christy needs a refill of his medicaiton:     REVLIMID 5 MG CAPS- No pills left    [facility] [place], [address] [place] [address]  [address], [place], [place]- Verified correct pharmacy     Please contact Shantelle Garcia is additional information is required

## 2019-01-25 NOTE — TELEPHONE ENCOUNTER
The vascular center called to let you know the results of pt test are in, no blood clots found and negative for DVT

## 2019-02-01 ENCOUNTER — OFFICE VISIT (OUTPATIENT)
Dept: OBGYN CLINIC | Facility: CLINIC | Age: 84
End: 2019-02-01
Payer: COMMERCIAL

## 2019-02-01 ENCOUNTER — APPOINTMENT (OUTPATIENT)
Dept: LAB | Facility: CLINIC | Age: 84
End: 2019-02-01
Payer: COMMERCIAL

## 2019-02-01 VITALS
HEART RATE: 74 BPM | BODY MASS INDEX: 33.43 KG/M2 | SYSTOLIC BLOOD PRESSURE: 131 MMHG | DIASTOLIC BLOOD PRESSURE: 68 MMHG | HEIGHT: 66 IN

## 2019-02-01 DIAGNOSIS — M79.652 LEFT THIGH PAIN: ICD-10-CM

## 2019-02-01 DIAGNOSIS — E87.6 HYPOKALEMIA: ICD-10-CM

## 2019-02-01 DIAGNOSIS — C90.00 KAPPA LIGHT CHAIN MYELOMA (HCC): ICD-10-CM

## 2019-02-01 PROCEDURE — 99203 OFFICE O/P NEW LOW 30 MIN: CPT | Performed by: ORTHOPAEDIC SURGERY

## 2019-02-01 RX ORDER — POTASSIUM CHLORIDE 750 MG/1
10 TABLET, FILM COATED, EXTENDED RELEASE ORAL DAILY
Qty: 90 TABLET | Refills: 3 | Status: SHIPPED | OUTPATIENT
Start: 2019-02-01

## 2019-02-01 NOTE — LETTER
February 1, 2019     Shirley Colon MD  [address] [address]  [address] [place] [address]    Patient: Lyndsey Guerrero   YOB: 1930   Date of Visit: 2/1/2019       Dear Dr Lindsay Espinoza: Thank you for referring Elian Villalta to me for evaluation  Below are my notes for this consultation  If you have questions, please do not hesitate to call me  I look forward to following your patient along with you  Sincerely,        Amor Mallory MD        CC: No Recipients  Amor Mallory MD  2/1/2019 10:44 AM  Sign at close encounter  Patient Name:  Lyndsey Guerrero  MRN:  [id number]    Assessment & Plan     Left thigh/lower leg pain, possible side effect of chemotherapy, aseptic loosening of knee replacement less likely  1  Continue Tylenol Extra-Strength for now  2  Offered referral to physical therapy, but patient declined at this time  3  We will obtain left knee x-rays if pain persists or worsens  Otherwise continue observation with potential for improvement after completing chemotherapy  Call for follow-up as indicated  Chief Complaint     Left thigh pain      History of the Present Illness     Lyndsey Guerrero is a 80 y o  male referred by Dr Lindsay Espinoza for orthopedic consultation regarding his left thigh pain  Patient reports left thigh pain for the past 2 weeks  No history of injury  More recently, the pain has been radiating into his lower leg  Symptoms are exacerbated with lying flat for prolonged periods  No back pain or numbness/tingling  No redness or fever/chills  He did see a nephrologist recently who felt that he had some swelling in his ankle  He had an ultrasound to evaluate for DVT which was negative  He is currently receiving chemotherapy for multiple myeloma, which started in October 2018  Past surgical history is notable for back surgery for spinal stenosis and left knee replacement years ago  He takes acetaminophen extra-strength 4 times a day    He was prescribed tramadol but has not been taking it due to concern about side effects  He reports an adverse reaction to dexamethasone in the past       Physical Exam     /68   Pulse 74   Ht 5' 6" (1 676 m)   BMI 33 43 kg/m²      Left lower extremity:  Healed midline incision anteriorly over the left knee  No palpable effusion  No soft tissue swelling and no tenderness in the thigh or lower leg  Full range of motion of the left hip and left knee  Left knee is stable with varus and valgus stress  No gross instability of the left hip  Eyes:  Anicteric sclerae  Neck:  Supple  Lungs:  Unlabored breathing  Cardiovascular:  No lower extremity edema on the left  Skin:  Intact without erythema in the left thigh, knee, or lower leg  Neurologic:  Sensation intact to light touch throughout the left lower extremity  Psychiatric:  Mood and affect are appropriate  Data Review     I have personally reviewed pertinent films in PACS, and my interpretation follows:    X-rays left hip 8/13/18:  No acute bony abnormalities  Mild degenerative changes  Past Medical History:   Diagnosis Date    [facility]     Chronic kidney disease     Coronary artery disease     Diabetes mellitus [facility]     Glaucoma     Hypertension     Multiple myeloma [facility]     Occluded coronary artery stent     Left       Past Surgical History:   Procedure Laterality Date    BACK SURGERY      CARDIAC CATHETERIZATION  04/05/2004    COLONOSCOPY      CT BONE MARROW BIOPSY AND ASPIRATION  9/24/2018    CYSTOSCOPY      KNEE SURGERY      THROMBOENDARTERECTOMY Right     Cartoid       No Known Allergies    Current Outpatient Prescriptions on File Prior to Visit   Medication Sig Dispense Refill    acetaminophen (TYLENOL) 325 mg tablet Take 1 - 2 tabs PO Q4 PRN pain 30 tablet 0    aspirin 81 MG tablet Take 81 mg by mouth daily        atorvastatin (LIPITOR) 40 mg tablet TAKE 1 TABLET AT BEDTIME 90 tablet 3    benzonatate (TESSALON PERLES) 100 mg capsule Take 1 capsule (100 mg total) by mouth 2 (two) times a day as needed for cough 60 capsule 5    bimatoprost (LUMIGAN) 0 01 % ophthalmic drops Apply to eye      esomeprazole (NexIUM) 40 MG capsule Take 40 mg by mouth every evening   fluticasone (FLONASE) 50 mcg/act nasal spray into each nostril as needed        furosemide (LASIX) 40 mg tablet Take 0 5 tablets (20 mg total) by mouth daily 90 tablet 0    glimepiride (AMARYL) 2 mg tablet TAKE 1 TABLET DAILY AS     DIRECTED (Patient taking differently: TAKE 1 5 TABLETS DAILY AS  DIRECTED) 90 tablet 3    glucose blood (ACCU-CHEK CHANDRAKANT PLUS) test strip by In Vitro route      guaiFENesin (MUCINEX) 600 mg 12 hr tablet Take 1 tablet (600 mg total) by mouth 2 (two) times a day 60 tablet 0    lenalidomide (REVLIMID) 5 MG CAPS Take one capsule by mouth for 14 days on then 7 days off [id number] 14 capsule 0    metoprolol succinate (TOPROL-XL) 25 mg 24 hr tablet TAKE 1 TABLET DAILY 90 tablet 3    nitroglycerin (NITROSTAT) 0 4 mg SL tablet Place 1 tablet under the tongue every 5 (five) minutes as needed      ONE TOUCH ULTRA TEST test strip The patient test once daily  100 each 2    ONETOUCH DELICA LANCETS 50F MISC by Does not apply route daily 100 each 3    potassium chloride (KLOR-CON 10) 10 mEq tablet Take 1 tablet (10 mEq total) by mouth daily 90 tablet 3    timolol (TIMOPTIC) 0 5 % ophthalmic solution       traMADol (ULTRAM) 50 mg tablet Take 1 tablet (50 mg total) by mouth every 6 (six) hours as needed for moderate pain 30 tablet 0     No current facility-administered medications on file prior to visit          Social History   Substance Use Topics    Smoking status: Former Smoker     Types: Cigarettes    Smokeless tobacco: Never Used      Comment: former smoker    Alcohol use Yes      Comment: socially; less than once a month       Family History   Problem Relation Age of Onset    Heart attack Father     Heart disease Mother     Diabetes Mother     Heart disease Sister     COPD Family        Review of Systems     As stated in the HPI  All other systems were reviewed and are negative

## 2019-02-01 NOTE — PROGRESS NOTES
Patient Name:  Andrade Aragon  MRN:  [id number]    Assessment & Plan     Left thigh/lower leg pain, possible side effect of chemotherapy, aseptic loosening of knee replacement less likely  1  Continue Tylenol Extra-Strength for now  2  Offered referral to physical therapy, but patient declined at this time  3  We will obtain left knee x-rays if pain persists or worsens  Otherwise continue observation with potential for improvement after completing chemotherapy  Call for follow-up as indicated  Chief Complaint     Left thigh pain      History of the Present Illness     Andrade Aragon is a 80 y o  male referred by Dr Gigi Medeiros for orthopedic consultation regarding his left thigh pain  Patient reports left thigh pain for the past 2 weeks  No history of injury  More recently, the pain has been radiating into his lower leg  Symptoms are exacerbated with lying flat for prolonged periods  No back pain or numbness/tingling  No redness or fever/chills  He did see a nephrologist recently who felt that he had some swelling in his ankle  He had an ultrasound to evaluate for DVT which was negative  He is currently receiving chemotherapy for multiple myeloma, which started in October 2018  Past surgical history is notable for back surgery for spinal stenosis and left knee replacement years ago  He takes acetaminophen extra-strength 4 times a day  He was prescribed tramadol but has not been taking it due to concern about side effects  He reports an adverse reaction to dexamethasone in the past       Physical Exam     /68   Pulse 74   Ht 5' 6" (1 676 m)   BMI 33 43 kg/m²     Left lower extremity:  Healed midline incision anteriorly over the left knee  No palpable effusion  No soft tissue swelling and no tenderness in the thigh or lower leg  Full range of motion of the left hip and left knee  Left knee is stable with varus and valgus stress  No gross instability of the left hip      Eyes: Anicteric sclerae  Neck:  Supple  Lungs:  Unlabored breathing  Cardiovascular:  No lower extremity edema on the left  Skin:  Intact without erythema in the left thigh, knee, or lower leg  Neurologic:  Sensation intact to light touch throughout the left lower extremity  Psychiatric:  Mood and affect are appropriate  Data Review     I have personally reviewed pertinent films in PACS, and my interpretation follows:    X-rays left hip 8/13/18:  No acute bony abnormalities  Mild degenerative changes  Past Medical History:   Diagnosis Date    Angina pectoris ([place] [address] )     Chronic kidney disease     Coronary artery disease     Diabetes mellitus ([place] [address] )     Glaucoma     Hypertension     Multiple myeloma ([place] [address] )     Occluded coronary artery stent     Left       Past Surgical History:   Procedure Laterality Date    BACK SURGERY      CARDIAC CATHETERIZATION  04/05/2004    COLONOSCOPY      CT BONE MARROW BIOPSY AND ASPIRATION  9/24/2018    CYSTOSCOPY      KNEE SURGERY      THROMBOENDARTERECTOMY Right     Cartoid       No Known Allergies    Current Outpatient Prescriptions on File Prior to Visit   Medication Sig Dispense Refill    acetaminophen (TYLENOL) 325 mg tablet Take 1 - 2 tabs PO Q4 PRN pain 30 tablet 0    aspirin 81 MG tablet Take 81 mg by mouth daily   atorvastatin (LIPITOR) 40 mg tablet TAKE 1 TABLET AT BEDTIME 90 tablet 3    benzonatate (TESSALON PERLES) 100 mg capsule Take 1 capsule (100 mg total) by mouth 2 (two) times a day as needed for cough 60 capsule 5    bimatoprost (LUMIGAN) 0 01 % ophthalmic drops Apply to eye      esomeprazole (NexIUM) 40 MG capsule Take 40 mg by mouth every evening        fluticasone (FLONASE) 50 mcg/act nasal spray into each nostril as needed        furosemide (LASIX) 40 mg tablet Take 0 5 tablets (20 mg total) by mouth daily 90 tablet 0    glimepiride (AMARYL) 2 mg tablet TAKE 1 TABLET DAILY AS     DIRECTED (Patient taking differently: TAKE 1 5 TABLETS DAILY AS  DIRECTED) 90 tablet 3    glucose blood (ACCU-CHEK CHANDRAKANT PLUS) test strip by In Vitro route      guaiFENesin (MUCINEX) 600 mg 12 hr tablet Take 1 tablet (600 mg total) by mouth 2 (two) times a day 60 tablet 0    lenalidomide (REVLIMID) 5 MG CAPS Take one capsule by mouth for 14 days on then 7 days off [id number] 14 capsule 0    metoprolol succinate (TOPROL-XL) 25 mg 24 hr tablet TAKE 1 TABLET DAILY 90 tablet 3    nitroglycerin (NITROSTAT) 0 4 mg SL tablet Place 1 tablet under the tongue every 5 (five) minutes as needed      ONE TOUCH ULTRA TEST test strip The patient test once daily  100 each 2    ONETOUCH DELICA LANCETS 63Z MISC by Does not apply route daily 100 each 3    potassium chloride (KLOR-CON 10) 10 mEq tablet Take 1 tablet (10 mEq total) by mouth daily 90 tablet 3    timolol (TIMOPTIC) 0 5 % ophthalmic solution       traMADol (ULTRAM) 50 mg tablet Take 1 tablet (50 mg total) by mouth every 6 (six) hours as needed for moderate pain 30 tablet 0     No current facility-administered medications on file prior to visit  Social History   Substance Use Topics    Smoking status: Former Smoker     Types: Cigarettes    Smokeless tobacco: Never Used      Comment: former smoker    Alcohol use Yes      Comment: socially; less than once a month       Family History   Problem Relation Age of Onset    Heart attack Father     Heart disease Mother     Diabetes Mother     Heart disease Sister     COPD Family        Review of Systems     As stated in the HPI  All other systems were reviewed and are negative

## 2019-02-15 ENCOUNTER — TELEPHONE (OUTPATIENT)
Dept: HEMATOLOGY ONCOLOGY | Facility: CLINIC | Age: 84
End: 2019-02-15

## 2019-02-15 ENCOUNTER — APPOINTMENT (OUTPATIENT)
Dept: LAB | Facility: CLINIC | Age: 84
End: 2019-02-15
Payer: COMMERCIAL

## 2019-02-15 DIAGNOSIS — C90.00 MULTIPLE MYELOMA NOT HAVING ACHIEVED REMISSION (HCC): ICD-10-CM

## 2019-02-15 DIAGNOSIS — C90.00 KAPPA LIGHT CHAIN MYELOMA (HCC): ICD-10-CM

## 2019-02-15 RX ORDER — LENALIDOMIDE 5 MG/1
CAPSULE ORAL
Qty: 14 CAPSULE | Refills: 0 | Status: SHIPPED | OUTPATIENT
Start: 2019-02-15 | End: 2019-03-07 | Stop reason: SDUPTHER

## 2019-02-22 DIAGNOSIS — R60.0 EDEMA EXTREMITIES: ICD-10-CM

## 2019-02-24 RX ORDER — FUROSEMIDE 40 MG/1
20 TABLET ORAL DAILY
Qty: 90 TABLET | Refills: 3 | Status: SHIPPED | OUTPATIENT
Start: 2019-02-24 | End: 2019-03-20 | Stop reason: HOSPADM

## 2019-02-24 RX ORDER — FUROSEMIDE 40 MG/1
TABLET ORAL
Qty: 103 TABLET | Refills: 3 | Status: SHIPPED | OUTPATIENT
Start: 2019-02-24 | End: 2019-03-20 | Stop reason: HOSPADM

## 2019-03-01 ENCOUNTER — APPOINTMENT (OUTPATIENT)
Dept: LAB | Facility: CLINIC | Age: 84
End: 2019-03-01
Payer: COMMERCIAL

## 2019-03-01 DIAGNOSIS — C90.00 KAPPA LIGHT CHAIN MYELOMA (HCC): ICD-10-CM

## 2019-03-01 PROCEDURE — 36415 COLL VENOUS BLD VENIPUNCTURE: CPT

## 2019-03-01 PROCEDURE — 83883 ASSAY NEPHELOMETRY NOT SPEC: CPT

## 2019-03-02 LAB
KAPPA LC FREE SER-MCNC: 185.6 MG/L (ref 3.3–19.4)
KAPPA LC FREE/LAMBDA FREE SER: 2.56 {RATIO} (ref 0.26–1.65)
LAMBDA LC FREE SERPL-MCNC: 72.4 MG/L (ref 5.7–26.3)

## 2019-03-07 ENCOUNTER — OFFICE VISIT (OUTPATIENT)
Dept: HEMATOLOGY ONCOLOGY | Facility: CLINIC | Age: 84
End: 2019-03-07
Payer: COMMERCIAL

## 2019-03-07 VITALS
HEART RATE: 65 BPM | HEIGHT: 66 IN | RESPIRATION RATE: 16 BRPM | SYSTOLIC BLOOD PRESSURE: 124 MMHG | DIASTOLIC BLOOD PRESSURE: 68 MMHG | BODY MASS INDEX: 33.11 KG/M2 | OXYGEN SATURATION: 98 % | TEMPERATURE: 98.1 F | WEIGHT: 206 LBS

## 2019-03-07 DIAGNOSIS — C90.00 MULTIPLE MYELOMA NOT HAVING ACHIEVED REMISSION (HCC): ICD-10-CM

## 2019-03-07 DIAGNOSIS — C90.00 KAPPA LIGHT CHAIN MYELOMA (HCC): Primary | ICD-10-CM

## 2019-03-07 PROCEDURE — 99215 OFFICE O/P EST HI 40 MIN: CPT | Performed by: INTERNAL MEDICINE

## 2019-03-07 RX ORDER — LENALIDOMIDE 5 MG/1
CAPSULE ORAL
Qty: 14 CAPSULE | Refills: 0 | Status: SHIPPED | OUTPATIENT
Start: 2019-03-07 | End: 2019-03-08 | Stop reason: SDUPTHER

## 2019-03-07 NOTE — PROGRESS NOTES
[facility]EMATOLOGY ONCOLOGY Roddyalize Keenan  [address]  [address] [address] [phone]  [address] [place] [address], [phone]  03/07/19    Discussion:   In summary, this is an 80-year-old male history of multiple myeloma  He is currently on Revlimid monotherapy  Clinically he is essentially stable  He has had some fluctuating pain about the left knee  He has an appointment with orthopedics in the near future  Additionally, he has a rash consisting of lesions that come and go on the extremities more than the trunk  They arise with erythema and pruritus, ulcerate, and resolved  He believes that topical steroids help them clear more quickly  CBC shows normal white count and platelets  Hemoglobin stable at about 8 0  CMP shows creatinine of 1 9, stable  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  This is stable and he is minimally symptomatic  Observation is favored  Free light chain ratio has steadily declined, 14 6 in August 2018, now 2 7, normal less than 1 6  I believe his myeloma is responding to therapy he is receiving  Continuation of therapy is recommended  I suggested consideration for dermatology evaluation or biopsy of a skin lesion to determine its origin  These had been present prior to diagnosis of myeloma or institution of treatment with Revlimid  I discussed the above with the patient    The patient and his nephew voiced understanding and agreement   ______________________________________________________________________    Chief Complaint   Patient presents with    Follow-up       HPI:     Kappa light chain myeloma ([facility] )    9/8/2018 - 10/8/2018 Cancer Staged     Cancer Staging  [place] light chain myeloma (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             9/24/2018 Biopsy     Final Diagnosis   A -C  Bone marrow,  left iliac crest,  biopsy and aspirate:  -  Kappa light chain restricted plasma cell neoplasm, consistent with plasma cell myeloma (see note)  -  Maturing trilineage hematopoiesis without distinct features of dysplasia or increased myeloblasts  -  Decreased stainable storage iron  -  Mildly increased reticulin fibers without fibrosis  -  Negative for collagen fibrosis, granulomata, vasculitis, necrosis  Flow cytometry ([facility]# [id number], evaluated by NATALIE Francois )       * Interpretation:    1  Plasma cell neoplasm, IgA kappa-restricted  See Comment  2  No evidence of B-cell or T-cell lymphoproliferative disorder  *  Comment:  Due to potential dilutional/lysis effects associated with flow cytometry, the reported plasma cell count (2 4%) is an underestimate  Therefore, correlation with a comprehensive bone marrow examination including morphologic plasma cell enumeration will be necessary for further and definitive characterization  Interpretation FISH Myeloma Panel:  1  No evidence of IGH-MAF [translocation t(14;16)] gene rearrangement  2  No evidence of RB1 monosomy (13q14 deletion)  3  No evidence of CCND1-IGH [translocation t(11;14)] gene rearrangement, and no evidence for trisomy 11 or gain of 11q  4  No evidence of p53 (17p13) deletion or amplification  5  No evidence of FGFR3-IGH [translocation t(4;14)] gene rearrangement    6  Negative for 1q21/CKS1B gain         10/19/2018 - 12/20/2018 Chemotherapy     Dexamethasone 20 mg Days 1, 8, 15  Revlimid 5 mg Days 1-14  Cycle length = 21 days    Completed 3 cycles of the above  12/20/2018 Adverse Reaction     Multiple folliculitis/abscess development  Uncontrolled diabetes, previously controlled prior to dexamethasone therapy  12/20/2018 -  Chemotherapy     Revlimid 5 mg Days 1-14  Cycle length = 21 days            Interval History:  Clinically stable  1 - Symptomatic but completely ambulatory    Review of Systems   Constitutional: Negative for chills and fever  HENT: Negative for nosebleeds  Eyes: Negative for discharge  Respiratory: Negative for cough and shortness of breath  Cardiovascular: Negative for chest pain  Gastrointestinal: Negative for abdominal pain, constipation and diarrhea  Endocrine: Negative for polydipsia  Genitourinary: Negative for hematuria  Musculoskeletal: Negative for arthralgias  Skin: Negative for color change  Allergic/Immunologic: Negative for immunocompromised state  Neurological: Negative for dizziness and headaches  Hematological: Negative for adenopathy  Psychiatric/Behavioral: Negative for agitation         Past Medical History:   Diagnosis Date    [facility]     Chronic kidney disease     Coronary artery disease     Diabetes mellitus ([facility]     Glaucoma     Hypertension     Multiple myeloma ([facility] )     Occluded coronary artery stent     Left     Patient Active Problem List   Diagnosis    Parenchymal renal hypertension    Type 2 diabetes mellitus without complication, without long-term current use of insulin (HCC)    Mixed hyperlipidemia    Esophageal reflux    Cerebral infarction, watershed distribution, bilateral, acute    Stage 4 chronic kidney disease (HCC)    Benign prostatic hyperplasia    Chronic systolic congestive heart failure (HCC)    Ischemic cardiomyopathy    Left thigh pain    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)    Other proteinuria    Dizziness    Kappa light chain myeloma (HCC)    UTI (urinary tract infection)    Cough    Cellulitis of right lower limb    MRSA (methicillin resistant staph aureus) culture positive       Current Outpatient Medications:     acetaminophen (TYLENOL) 325 mg tablet, Take 1 - 2 tabs PO Q4 PRN pain, Disp: 30 tablet, Rfl: 0    aspirin 81 MG tablet, Take 81 mg by mouth daily  , Disp: , Rfl:     atorvastatin (LIPITOR) 40 mg tablet, TAKE 1 TABLET AT BEDTIME, Disp: 90 tablet, Rfl: 3    benzonatate (TESSALON PERLES) 100 mg capsule, Take 1 capsule (100 mg total) by mouth 2 (two) times a day as needed for cough, Disp: 60 capsule, Rfl: 5    bimatoprost (LUMIGAN) 0 01 % ophthalmic drops, Apply to eye, Disp: , Rfl:     esomeprazole (NexIUM) 40 MG capsule, Take 40 mg by mouth every evening , Disp: , Rfl:     fluticasone (FLONASE) 50 mcg/act nasal spray, into each nostril as needed  , Disp: , Rfl:     furosemide (LASIX) 40 mg tablet, Take 0 5 tablets (20 mg total) by mouth daily, Disp: 90 tablet, Rfl: 3    furosemide (LASIX) 40 mg tablet, TAKE 1 TABLET DAILY ,      EXCEPT ONCE A WEEK TAKE 1  ADDITIONAL TABLET, Disp: 103 tablet, Rfl: 3    glimepiride (AMARYL) 2 mg tablet, TAKE 1 TABLET DAILY AS     DIRECTED (Patient taking differently: TAKE 1 5 TABLETS DAILY AS  DIRECTED), Disp: 90 tablet, Rfl: 3    glucose blood (ACCU-CHEK CHANDRAKANT PLUS) test strip, by In Vitro route, Disp: , Rfl:     guaiFENesin (MUCINEX) 600 mg 12 hr tablet, Take 1 tablet (600 mg total) by mouth 2 (two) times a day, Disp: 60 tablet, Rfl: 0    lenalidomide (REVLIMID) 5 MG CAPS, Take one capsule by mouth for 14 days on then 7 days off [facility]#[id number], Disp: 14 capsule, Rfl: 0    metoprolol succinate (TOPROL-XL) 25 mg 24 hr tablet, TAKE 1 TABLET DAILY, Disp: 90 tablet, Rfl: 3    nitroglycerin (NITROSTAT) 0 4 mg SL tablet, Place 1 tablet under the tongue every 5 (five) minutes as needed, Disp: , Rfl:     ONE TOUCH ULTRA TEST test strip, The patient test once daily  , Disp: 100 each, Rfl: 2    ONETOUCH DELICA LANCETS 31M MISC, by Does not apply route daily, Disp: 100 each, Rfl: 3    potassium chloride (KLOR-CON 10) 10 mEq tablet, Take 1 tablet (10 mEq total) by mouth daily, Disp: 90 tablet, Rfl: 3    timolol (TIMOPTIC) 0 5 % ophthalmic solution, , Disp: , Rfl:     traMADol (ULTRAM) 50 mg tablet, Take 1 tablet (50 mg total) by mouth every 6 (six) hours as needed for moderate pain, Disp: 30 tablet, Rfl: 0  No Known Allergies  Past Surgical History:   Procedure Laterality Date    BACK SURGERY      CARDIAC CATHETERIZATION  04/05/2004    COLONOSCOPY      CT BONE MARROW BIOPSY AND ASPIRATION  9/24/2018    CYSTOSCOPY      KNEE SURGERY      THROMBOENDARTERECTOMY Right     Cartoid     Social History     Objective:  Vitals:    03/07/19 1334   BP: 124/68   BP Location: Left arm   Patient Position: Sitting   Pulse: 65   Resp: 16   Temp: 98 1 °F (36 7 °C)   TempSrc: Tympanic   SpO2: 98%   Weight: 93 4 kg (206 lb)   Height: 5' 6" (1 676 m)     Physical Exam   Constitutional: He is oriented to person, place, and time  He appears well-developed  HENT:   Head: Normocephalic  Eyes: Pupils are equal, round, and reactive to light  Neck: Neck supple  Cardiovascular: Normal rate and regular rhythm  No murmur heard  Pulmonary/Chest: Breath sounds normal  He has no wheezes  He has no rales  Abdominal: Soft  There is no tenderness  Musculoskeletal: Normal range of motion  He exhibits no edema or tenderness  Lymphadenopathy:     He has no cervical adenopathy  Neurological: He is alert and oriented to person, place, and time  He has normal reflexes  No cranial nerve deficit  Skin: No rash noted  No erythema  Psychiatric: He has a normal mood and affect  His behavior is normal          Labs: I personally reviewed the labs and imaging pertinent to this patient care

## 2019-03-08 ENCOUNTER — DOCUMENTATION (OUTPATIENT)
Dept: HEMATOLOGY ONCOLOGY | Facility: CLINIC | Age: 84
End: 2019-03-08

## 2019-03-08 DIAGNOSIS — C90.00 MULTIPLE MYELOMA NOT HAVING ACHIEVED REMISSION (HCC): ICD-10-CM

## 2019-03-08 NOTE — PROGRESS NOTES
3/7/2019   Received notification from Alfredito Alexander at [facility] stating the revlimid needs the [employer] auth      Checked the spread sheet and notice the insurance auth  on 2018  Asked Alfredito Alexander if they received a paid claim  She stated they were able to process the order and it went through without an [place]  Alfredito Alexander stated that if it needs an auth in the future, she will let us know

## 2019-03-11 RX ORDER — LENALIDOMIDE 5 MG/1
CAPSULE ORAL
Qty: 14 CAPSULE | Refills: 0 | Status: SHIPPED | OUTPATIENT
Start: 2019-03-11 | End: 2019-03-20 | Stop reason: HOSPADM

## 2019-03-13 ENCOUNTER — APPOINTMENT (EMERGENCY)
Dept: RADIOLOGY | Facility: HOSPITAL | Age: 84
DRG: 280 | End: 2019-03-13
Payer: COMMERCIAL

## 2019-03-13 ENCOUNTER — APPOINTMENT (EMERGENCY)
Dept: CT IMAGING | Facility: HOSPITAL | Age: 84
DRG: 280 | End: 2019-03-13
Payer: COMMERCIAL

## 2019-03-13 ENCOUNTER — HOSPITAL ENCOUNTER (INPATIENT)
Facility: HOSPITAL | Age: 84
LOS: 7 days | Discharge: NURSING, REHAB OR LONG-TERM CARE | DRG: 280 | End: 2019-03-20
Attending: EMERGENCY MEDICINE | Admitting: INTERNAL MEDICINE
Payer: COMMERCIAL

## 2019-03-13 DIAGNOSIS — N39.0 UTI (URINARY TRACT INFECTION): ICD-10-CM

## 2019-03-13 DIAGNOSIS — N18.4 ANEMIA IN STAGE 4 CHRONIC KIDNEY DISEASE (HCC): ICD-10-CM

## 2019-03-13 DIAGNOSIS — D63.1 ANEMIA IN STAGE 4 CHRONIC KIDNEY DISEASE (HCC): ICD-10-CM

## 2019-03-13 DIAGNOSIS — C90.00 KAPPA LIGHT CHAIN MYELOMA (HCC): ICD-10-CM

## 2019-03-13 DIAGNOSIS — I50.22 CHRONIC SYSTOLIC CONGESTIVE HEART FAILURE (HCC): ICD-10-CM

## 2019-03-13 DIAGNOSIS — R77.8 ELEVATED TROPONIN: Primary | ICD-10-CM

## 2019-03-13 PROBLEM — R53.1 WEAKNESS: Status: ACTIVE | Noted: 2019-03-13

## 2019-03-13 PROBLEM — R06.02 SHORTNESS OF BREATH: Status: ACTIVE | Noted: 2019-03-13

## 2019-03-13 LAB
ALBUMIN SERPL BCP-MCNC: 2.7 G/DL (ref 3.5–5)
ALP SERPL-CCNC: 129 U/L (ref 46–116)
ALT SERPL W P-5'-P-CCNC: 18 U/L (ref 12–78)
AMORPH URATE CRY URNS QL MICRO: ABNORMAL /HPF
ANION GAP SERPL CALCULATED.3IONS-SCNC: 10 MMOL/L (ref 4–13)
AST SERPL W P-5'-P-CCNC: 7 U/L (ref 5–45)
BACTERIA UR QL AUTO: ABNORMAL /HPF
BASOPHILS # BLD AUTO: 0.1 THOUSANDS/ΜL (ref 0–0.1)
BASOPHILS NFR BLD AUTO: 2 % (ref 0–1)
BILIRUB SERPL-MCNC: 0.4 MG/DL (ref 0.2–1)
BILIRUB UR QL STRIP: NEGATIVE
BUN SERPL-MCNC: 23 MG/DL (ref 5–25)
CALCIUM SERPL-MCNC: 8.3 MG/DL (ref 8.3–10.1)
CHLORIDE SERPL-SCNC: 106 MMOL/L (ref 100–108)
CLARITY UR: CLEAR
CO2 SERPL-SCNC: 23 MMOL/L (ref 21–32)
COLOR UR: YELLOW
CREAT SERPL-MCNC: 2.13 MG/DL (ref 0.6–1.3)
EOSINOPHIL # BLD AUTO: 0.22 THOUSAND/ΜL (ref 0–0.61)
EOSINOPHIL NFR BLD AUTO: 4 % (ref 0–6)
ERYTHROCYTE [DISTWIDTH] IN BLOOD BY AUTOMATED COUNT: 17.4 % (ref 11.6–15.1)
GFR SERPL CREATININE-BSD FRML MDRD: 27 ML/MIN/1.73SQ M
GLUCOSE SERPL-MCNC: 164 MG/DL (ref 65–140)
GLUCOSE UR STRIP-MCNC: ABNORMAL MG/DL
HCT VFR BLD AUTO: 25.7 % (ref 36.5–49.3)
HGB BLD-MCNC: 8 G/DL (ref 12–17)
HGB UR QL STRIP.AUTO: ABNORMAL
HOLD SPECIMEN: NORMAL
HYALINE CASTS #/AREA URNS LPF: ABNORMAL /LPF
IMM GRANULOCYTES # BLD AUTO: 0.01 THOUSAND/UL (ref 0–0.2)
IMM GRANULOCYTES NFR BLD AUTO: 0 % (ref 0–2)
KETONES UR STRIP-MCNC: ABNORMAL MG/DL
LEUKOCYTE ESTERASE UR QL STRIP: ABNORMAL
LYMPHOCYTES # BLD AUTO: 1.23 THOUSANDS/ΜL (ref 0.6–4.47)
LYMPHOCYTES NFR BLD AUTO: 24 % (ref 14–44)
MAGNESIUM SERPL-MCNC: 2.2 MG/DL (ref 1.6–2.6)
MCH RBC QN AUTO: 28.5 PG (ref 26.8–34.3)
MCHC RBC AUTO-ENTMCNC: 31.1 G/DL (ref 31.4–37.4)
MCV RBC AUTO: 92 FL (ref 82–98)
MONOCYTES # BLD AUTO: 0.48 THOUSAND/ΜL (ref 0.17–1.22)
MONOCYTES NFR BLD AUTO: 9 % (ref 4–12)
MUCOUS THREADS UR QL AUTO: ABNORMAL
NEUTROPHILS # BLD AUTO: 3.04 THOUSANDS/ΜL (ref 1.85–7.62)
NEUTS SEG NFR BLD AUTO: 61 % (ref 43–75)
NITRITE UR QL STRIP: POSITIVE
NON-SQ EPI CELLS URNS QL MICRO: ABNORMAL /HPF
NRBC BLD AUTO-RTO: 0 /100 WBCS
NT-PROBNP SERPL-MCNC: 7469 PG/ML
OTHER STN SPEC: ABNORMAL
PH UR STRIP.AUTO: 5.5 [PH] (ref 4.5–8)
PLATELET # BLD AUTO: 159 THOUSANDS/UL (ref 149–390)
PLATELET # BLD AUTO: 187 THOUSANDS/UL (ref 149–390)
PMV BLD AUTO: 10.7 FL (ref 8.9–12.7)
PMV BLD AUTO: 9.5 FL (ref 8.9–12.7)
POTASSIUM SERPL-SCNC: 3.4 MMOL/L (ref 3.5–5.3)
PROT SERPL-MCNC: 7 G/DL (ref 6.4–8.2)
PROT UR STRIP-MCNC: ABNORMAL MG/DL
RBC # BLD AUTO: 2.81 MILLION/UL (ref 3.88–5.62)
RBC #/AREA URNS AUTO: ABNORMAL /HPF
SODIUM SERPL-SCNC: 139 MMOL/L (ref 136–145)
SP GR UR STRIP.AUTO: 1.02 (ref 1–1.03)
TROPONIN I SERPL-MCNC: 0.14 NG/ML
UROBILINOGEN UR QL STRIP.AUTO: 0.2 E.U./DL
WBC # BLD AUTO: 5.08 THOUSAND/UL (ref 4.31–10.16)
WBC #/AREA URNS AUTO: ABNORMAL /HPF

## 2019-03-13 PROCEDURE — 87186 SC STD MICRODIL/AGAR DIL: CPT

## 2019-03-13 PROCEDURE — 87147 CULTURE TYPE IMMUNOLOGIC: CPT

## 2019-03-13 PROCEDURE — 85025 COMPLETE CBC W/AUTO DIFF WBC: CPT | Performed by: EMERGENCY MEDICINE

## 2019-03-13 PROCEDURE — 83735 ASSAY OF MAGNESIUM: CPT | Performed by: EMERGENCY MEDICINE

## 2019-03-13 PROCEDURE — 96365 THER/PROPH/DIAG IV INF INIT: CPT

## 2019-03-13 PROCEDURE — 85049 AUTOMATED PLATELET COUNT: CPT | Performed by: PHYSICIAN ASSISTANT

## 2019-03-13 PROCEDURE — 84484 ASSAY OF TROPONIN QUANT: CPT | Performed by: PHYSICIAN ASSISTANT

## 2019-03-13 PROCEDURE — 80053 COMPREHEN METABOLIC PANEL: CPT | Performed by: EMERGENCY MEDICINE

## 2019-03-13 PROCEDURE — 99285 EMERGENCY DEPT VISIT HI MDM: CPT

## 2019-03-13 PROCEDURE — 84484 ASSAY OF TROPONIN QUANT: CPT | Performed by: EMERGENCY MEDICINE

## 2019-03-13 PROCEDURE — 36415 COLL VENOUS BLD VENIPUNCTURE: CPT

## 2019-03-13 PROCEDURE — 81001 URINALYSIS AUTO W/SCOPE: CPT

## 2019-03-13 PROCEDURE — 87086 URINE CULTURE/COLONY COUNT: CPT

## 2019-03-13 PROCEDURE — 82948 REAGENT STRIP/BLOOD GLUCOSE: CPT

## 2019-03-13 PROCEDURE — 83880 ASSAY OF NATRIURETIC PEPTIDE: CPT | Performed by: EMERGENCY MEDICINE

## 2019-03-13 PROCEDURE — 93005 ELECTROCARDIOGRAM TRACING: CPT

## 2019-03-13 PROCEDURE — 71046 X-RAY EXAM CHEST 2 VIEWS: CPT

## 2019-03-13 PROCEDURE — 99223 1ST HOSP IP/OBS HIGH 75: CPT | Performed by: PHYSICIAN ASSISTANT

## 2019-03-13 PROCEDURE — 81003 URINALYSIS AUTO W/O SCOPE: CPT

## 2019-03-13 PROCEDURE — 84145 PROCALCITONIN (PCT): CPT | Performed by: PHYSICIAN ASSISTANT

## 2019-03-13 PROCEDURE — 70450 CT HEAD/BRAIN W/O DYE: CPT

## 2019-03-13 RX ORDER — ATORVASTATIN CALCIUM 40 MG/1
40 TABLET, FILM COATED ORAL
Status: DISCONTINUED | OUTPATIENT
Start: 2019-03-13 | End: 2019-03-20 | Stop reason: HOSPADM

## 2019-03-13 RX ORDER — TIMOLOL MALEATE 5 MG/ML
1 SOLUTION/ DROPS OPHTHALMIC DAILY
Status: DISCONTINUED | OUTPATIENT
Start: 2019-03-14 | End: 2019-03-20 | Stop reason: HOSPADM

## 2019-03-13 RX ORDER — FLUTICASONE PROPIONATE 50 MCG
1 SPRAY, SUSPENSION (ML) NASAL AS NEEDED
Status: DISCONTINUED | OUTPATIENT
Start: 2019-03-13 | End: 2019-03-20 | Stop reason: HOSPADM

## 2019-03-13 RX ORDER — FUROSEMIDE 10 MG/ML
40 INJECTION INTRAMUSCULAR; INTRAVENOUS
Status: DISCONTINUED | OUTPATIENT
Start: 2019-03-14 | End: 2019-03-15

## 2019-03-13 RX ORDER — METOPROLOL SUCCINATE 25 MG/1
25 TABLET, EXTENDED RELEASE ORAL DAILY
Status: DISCONTINUED | OUTPATIENT
Start: 2019-03-14 | End: 2019-03-14

## 2019-03-13 RX ORDER — ASPIRIN 81 MG/1
81 TABLET, CHEWABLE ORAL DAILY
Status: DISCONTINUED | OUTPATIENT
Start: 2019-03-14 | End: 2019-03-20 | Stop reason: HOSPADM

## 2019-03-13 RX ORDER — GUAIFENESIN 600 MG
600 TABLET, EXTENDED RELEASE 12 HR ORAL 2 TIMES DAILY
Status: DISCONTINUED | OUTPATIENT
Start: 2019-03-14 | End: 2019-03-20 | Stop reason: HOSPADM

## 2019-03-13 RX ORDER — POTASSIUM CHLORIDE 750 MG/1
10 TABLET, EXTENDED RELEASE ORAL DAILY
Status: DISCONTINUED | OUTPATIENT
Start: 2019-03-14 | End: 2019-03-20 | Stop reason: HOSPADM

## 2019-03-13 RX ORDER — HEPARIN SODIUM 5000 [USP'U]/ML
5000 INJECTION, SOLUTION INTRAVENOUS; SUBCUTANEOUS EVERY 8 HOURS SCHEDULED
Status: DISCONTINUED | OUTPATIENT
Start: 2019-03-13 | End: 2019-03-20 | Stop reason: HOSPADM

## 2019-03-13 RX ORDER — LENALIDOMIDE 5 MG/1
5 CAPSULE ORAL DAILY
Status: DISCONTINUED | OUTPATIENT
Start: 2019-03-14 | End: 2019-03-20 | Stop reason: HOSPADM

## 2019-03-13 RX ORDER — ACETAMINOPHEN 325 MG/1
650 TABLET ORAL EVERY 6 HOURS PRN
Status: DISCONTINUED | OUTPATIENT
Start: 2019-03-13 | End: 2019-03-15

## 2019-03-13 RX ORDER — BENZONATATE 100 MG/1
100 CAPSULE ORAL 2 TIMES DAILY PRN
Status: DISCONTINUED | OUTPATIENT
Start: 2019-03-13 | End: 2019-03-20 | Stop reason: HOSPADM

## 2019-03-13 RX ORDER — PANTOPRAZOLE SODIUM 40 MG/1
40 TABLET, DELAYED RELEASE ORAL
Status: DISCONTINUED | OUTPATIENT
Start: 2019-03-14 | End: 2019-03-20 | Stop reason: HOSPADM

## 2019-03-13 RX ADMIN — HEPARIN SODIUM 5000 UNITS: 5000 INJECTION INTRAVENOUS; SUBCUTANEOUS at 23:37

## 2019-03-13 RX ADMIN — CEFTRIAXONE SODIUM 1000 MG: 10 INJECTION, POWDER, FOR SOLUTION INTRAVENOUS at 20:39

## 2019-03-13 RX ADMIN — ATORVASTATIN CALCIUM 40 MG: 40 TABLET, FILM COATED ORAL at 23:38

## 2019-03-13 RX ADMIN — BIMATOPROST 1 DROP: 0.1 SOLUTION/ DROPS OPHTHALMIC at 23:38

## 2019-03-13 NOTE — ED NOTES
Family member stepped outside to charge his phone, stated he will be back in [address] [place] [address]  03/13/19 1936

## 2019-03-14 ENCOUNTER — APPOINTMENT (INPATIENT)
Dept: ULTRASOUND IMAGING | Facility: HOSPITAL | Age: 84
DRG: 280 | End: 2019-03-14
Payer: COMMERCIAL

## 2019-03-14 ENCOUNTER — APPOINTMENT (INPATIENT)
Dept: NON INVASIVE DIAGNOSTICS | Facility: HOSPITAL | Age: 84
DRG: 280 | End: 2019-03-14
Payer: COMMERCIAL

## 2019-03-14 PROBLEM — I50.43 ACUTE ON CHRONIC COMBINED SYSTOLIC AND DIASTOLIC HEART FAILURE (HCC): Status: ACTIVE | Noted: 2019-03-13

## 2019-03-14 PROBLEM — R53.1 GENERALIZED WEAKNESS: Status: ACTIVE | Noted: 2019-03-14

## 2019-03-14 LAB
ABO GROUP BLD: NORMAL
ANION GAP SERPL CALCULATED.3IONS-SCNC: 11 MMOL/L (ref 4–13)
ANISOCYTOSIS BLD QL SMEAR: PRESENT
ATRIAL RATE: 77 BPM
BASOPHILS # BLD MANUAL: 0.14 THOUSAND/UL (ref 0–0.1)
BASOPHILS NFR MAR MANUAL: 3 % (ref 0–1)
BLD GP AB SCN SERPL QL: NEGATIVE
BUN SERPL-MCNC: 21 MG/DL (ref 5–25)
CALCIUM SERPL-MCNC: 8.7 MG/DL (ref 8.3–10.1)
CHLORIDE SERPL-SCNC: 110 MMOL/L (ref 100–108)
CO2 SERPL-SCNC: 22 MMOL/L (ref 21–32)
CREAT SERPL-MCNC: 1.93 MG/DL (ref 0.6–1.3)
EOSINOPHIL # BLD MANUAL: 0.28 THOUSAND/UL (ref 0–0.4)
EOSINOPHIL NFR BLD MANUAL: 6 % (ref 0–6)
ERYTHROCYTE [DISTWIDTH] IN BLOOD BY AUTOMATED COUNT: 17.5 % (ref 11.6–15.1)
GFR SERPL CREATININE-BSD FRML MDRD: 30 ML/MIN/1.73SQ M
GLUCOSE SERPL-MCNC: 102 MG/DL (ref 65–140)
GLUCOSE SERPL-MCNC: 116 MG/DL (ref 65–140)
GLUCOSE SERPL-MCNC: 164 MG/DL (ref 65–140)
GLUCOSE SERPL-MCNC: 214 MG/DL (ref 65–140)
GLUCOSE SERPL-MCNC: 76 MG/DL (ref 65–140)
GLUCOSE SERPL-MCNC: 84 MG/DL (ref 65–140)
HCT VFR BLD AUTO: 22.9 % (ref 36.5–49.3)
HGB BLD-MCNC: 7.1 G/DL (ref 12–17)
LYMPHOCYTES # BLD AUTO: 1.44 THOUSAND/UL (ref 0.6–4.47)
LYMPHOCYTES # BLD AUTO: 31 % (ref 14–44)
MCH RBC QN AUTO: 28.1 PG (ref 26.8–34.3)
MCHC RBC AUTO-ENTMCNC: 31 G/DL (ref 31.4–37.4)
MCV RBC AUTO: 91 FL (ref 82–98)
MONOCYTES # BLD AUTO: 0.46 THOUSAND/UL (ref 0–1.22)
MONOCYTES NFR BLD: 10 % (ref 4–12)
NEUTROPHILS # BLD MANUAL: 2.32 THOUSAND/UL (ref 1.85–7.62)
NEUTS BAND NFR BLD MANUAL: 2 % (ref 0–8)
NEUTS SEG NFR BLD AUTO: 48 % (ref 43–75)
NRBC BLD AUTO-RTO: 0 /100 WBCS
OVALOCYTES BLD QL SMEAR: PRESENT
P AXIS: 56 DEGREES
PLATELET # BLD AUTO: 147 THOUSANDS/UL (ref 149–390)
PLATELET BLD QL SMEAR: ADEQUATE
PMV BLD AUTO: 9.8 FL (ref 8.9–12.7)
POIKILOCYTOSIS BLD QL SMEAR: PRESENT
POTASSIUM SERPL-SCNC: 3.2 MMOL/L (ref 3.5–5.3)
PR INTERVAL: 344 MS
PROCALCITONIN SERPL-MCNC: 0.05 NG/ML
QRS AXIS: -23 DEGREES
QRSD INTERVAL: 126 MS
QT INTERVAL: 394 MS
QTC INTERVAL: 445 MS
RBC # BLD AUTO: 2.53 MILLION/UL (ref 3.88–5.62)
RH BLD: NEGATIVE
SODIUM SERPL-SCNC: 143 MMOL/L (ref 136–145)
SPECIMEN EXPIRATION DATE: NORMAL
T WAVE AXIS: 200 DEGREES
TOTAL CELLS COUNTED SPEC: 100
TROPONIN I SERPL-MCNC: 0.14 NG/ML
TROPONIN I SERPL-MCNC: 0.15 NG/ML
TROPONIN I SERPL-MCNC: 0.16 NG/ML
VENTRICULAR RATE: 77 BPM
WBC # BLD AUTO: 4.64 THOUSAND/UL (ref 4.31–10.16)

## 2019-03-14 PROCEDURE — 86850 RBC ANTIBODY SCREEN: CPT | Performed by: INTERNAL MEDICINE

## 2019-03-14 PROCEDURE — 84484 ASSAY OF TROPONIN QUANT: CPT | Performed by: PHYSICIAN ASSISTANT

## 2019-03-14 PROCEDURE — 82948 REAGENT STRIP/BLOOD GLUCOSE: CPT

## 2019-03-14 PROCEDURE — 86923 COMPATIBILITY TEST ELECTRIC: CPT

## 2019-03-14 PROCEDURE — 93010 ELECTROCARDIOGRAM REPORT: CPT | Performed by: INTERNAL MEDICINE

## 2019-03-14 PROCEDURE — 85007 BL SMEAR W/DIFF WBC COUNT: CPT | Performed by: PHYSICIAN ASSISTANT

## 2019-03-14 PROCEDURE — 80048 BASIC METABOLIC PNL TOTAL CA: CPT | Performed by: PHYSICIAN ASSISTANT

## 2019-03-14 PROCEDURE — 93306 TTE W/DOPPLER COMPLETE: CPT | Performed by: INTERNAL MEDICINE

## 2019-03-14 PROCEDURE — 85027 COMPLETE CBC AUTOMATED: CPT | Performed by: PHYSICIAN ASSISTANT

## 2019-03-14 PROCEDURE — 99233 SBSQ HOSP IP/OBS HIGH 50: CPT | Performed by: INTERNAL MEDICINE

## 2019-03-14 PROCEDURE — 93971 EXTREMITY STUDY: CPT

## 2019-03-14 PROCEDURE — 99222 1ST HOSP IP/OBS MODERATE 55: CPT | Performed by: INTERNAL MEDICINE

## 2019-03-14 PROCEDURE — 93306 TTE W/DOPPLER COMPLETE: CPT

## 2019-03-14 PROCEDURE — 30233N1 TRANSFUSION OF NONAUTOLOGOUS RED BLOOD CELLS INTO PERIPHERAL VEIN, PERCUTANEOUS APPROACH: ICD-10-PCS | Performed by: INTERNAL MEDICINE

## 2019-03-14 PROCEDURE — 86900 BLOOD TYPING SEROLOGIC ABO: CPT | Performed by: INTERNAL MEDICINE

## 2019-03-14 PROCEDURE — 86901 BLOOD TYPING SEROLOGIC RH(D): CPT | Performed by: INTERNAL MEDICINE

## 2019-03-14 RX ORDER — CARVEDILOL 6.25 MG/1
6.25 TABLET ORAL 2 TIMES DAILY WITH MEALS
Status: DISCONTINUED | OUTPATIENT
Start: 2019-03-15 | End: 2019-03-15

## 2019-03-14 RX ORDER — POTASSIUM CHLORIDE 20 MEQ/1
40 TABLET, EXTENDED RELEASE ORAL ONCE
Status: COMPLETED | OUTPATIENT
Start: 2019-03-14 | End: 2019-03-14

## 2019-03-14 RX ORDER — VANCOMYCIN HYDROCHLORIDE 1 G/200ML
15 INJECTION, SOLUTION INTRAVENOUS EVERY 12 HOURS
Status: DISCONTINUED | OUTPATIENT
Start: 2019-03-14 | End: 2019-03-16

## 2019-03-14 RX ADMIN — ASPIRIN 81 MG 81 MG: 81 TABLET ORAL at 09:05

## 2019-03-14 RX ADMIN — POTASSIUM CHLORIDE 10 MEQ: 750 TABLET, EXTENDED RELEASE ORAL at 09:05

## 2019-03-14 RX ADMIN — GUAIFENESIN 600 MG: 600 TABLET, EXTENDED RELEASE ORAL at 09:05

## 2019-03-14 RX ADMIN — FUROSEMIDE 40 MG: 10 INJECTION, SOLUTION INTRAMUSCULAR; INTRAVENOUS at 09:05

## 2019-03-14 RX ADMIN — INSULIN LISPRO 1 UNITS: 100 INJECTION, SOLUTION INTRAVENOUS; SUBCUTANEOUS at 22:01

## 2019-03-14 RX ADMIN — BIMATOPROST 1 DROP: 0.1 SOLUTION/ DROPS OPHTHALMIC at 22:00

## 2019-03-14 RX ADMIN — VANCOMYCIN HYDROCHLORIDE 1000 MG: 1 INJECTION, SOLUTION INTRAVENOUS at 20:04

## 2019-03-14 RX ADMIN — FUROSEMIDE 40 MG: 10 INJECTION, SOLUTION INTRAMUSCULAR; INTRAVENOUS at 17:11

## 2019-03-14 RX ADMIN — POTASSIUM CHLORIDE 40 MEQ: 1500 TABLET, EXTENDED RELEASE ORAL at 11:50

## 2019-03-14 RX ADMIN — INSULIN LISPRO 1 UNITS: 100 INJECTION, SOLUTION INTRAVENOUS; SUBCUTANEOUS at 17:35

## 2019-03-14 RX ADMIN — HEPARIN SODIUM 5000 UNITS: 5000 INJECTION INTRAVENOUS; SUBCUTANEOUS at 05:30

## 2019-03-14 RX ADMIN — PANTOPRAZOLE SODIUM 40 MG: 40 TABLET, DELAYED RELEASE ORAL at 05:30

## 2019-03-14 RX ADMIN — TIMOLOL MALEATE 1 DROP: 5 SOLUTION/ DROPS OPHTHALMIC at 09:06

## 2019-03-14 RX ADMIN — ATORVASTATIN CALCIUM 40 MG: 40 TABLET, FILM COATED ORAL at 21:59

## 2019-03-14 RX ADMIN — METOPROLOL SUCCINATE 25 MG: 25 TABLET, EXTENDED RELEASE ORAL at 09:05

## 2019-03-14 RX ADMIN — HEPARIN SODIUM 5000 UNITS: 5000 INJECTION INTRAVENOUS; SUBCUTANEOUS at 13:48

## 2019-03-14 RX ADMIN — GUAIFENESIN 600 MG: 600 TABLET, EXTENDED RELEASE ORAL at 17:11

## 2019-03-14 RX ADMIN — HEPARIN SODIUM 5000 UNITS: 5000 INJECTION INTRAVENOUS; SUBCUTANEOUS at 21:59

## 2019-03-14 NOTE — CONSULTS
Cardiology   Michaelglen Alvarez 80 y o  male MRN: [id number]  Unit/Bed#: -01 Encounter: [id number]      Reason for Consult / Principal Problem: CHF  Chief Complaint   Patient presents with    Fatigue       patient's family with him reports having weakness and fatigue over the last few months "he is slowing down"       Physician Requesting Consult:  Bernie Hernandez MD    Cardiologist: Dr Elio Aragon    PCP: Dr Meet Greeraine of breath, generalized weakness and fatigue  -Likely Multifactorial in the setting of  (combined HF--with probable HF exacerbation, CKD, myeloma on chemo, chronic anemia--current Hgb 7 1)  -Chest x-ray PA and lateral; cardiomegaly, lungs are clear, no pneumothorax; small costophrenic angle pleural effusions  -Consider viral etiology--would rule out flu/RSV--currently without fever or leukocytosis--he has an ongoing cough  -Procalcitonin--0 05    Acute on chronic diastolic and systolic HF; ischemic cardiomyopathy LVEF 50%  Patient appears mildly volume overloaded on exam; his JVP is elevated to lower portion of his neck, he has abdominal distension/bloating, +1 pitting edema of the left lower extremity  Symptomatically his shortness of breath at rest has improved, he complains of mild MARIE, currently on no supplemental oxygen; he denies chest pain  -Minimal troponin elevation--0 14--0 14--0 16--0 15  -ProBNP 7,469 (previously 2,512 in 9/2018)  -2D echo from June 2015; LVEF 50%, mild diffuse hypokinesis, grade 1 diastolic dysfunction, the RV normal size and systolic function, mild AS, trace AI, trace TR, trace MR  -Obtain 2D echocardiogram this admission  -Currently on IV furosemide 40 mg b i d---would continue  -On toprol XL 25 mg daily---would change to Coreg 6 25 mg BID  -NO ACEI/ARB--limited d/t advanced CKD  -24 hour I&O balance; -250 mL; overall -500 mL  -Continue to closely monitor volume status; strict I&O; daily standing weights; 2 g sodium diet 1500 mL FR  -Continue closely monitor electrolytes; replete as needed    2:1 AVB  On telemetry review: one recorded episode of bradycardia early this a m--appears to be a 2:1 AVB---patient was asymptomatic at the time  Would continue beta-blocker     CAD  -Currently denies anginal symptoms  -12 Lead EKG 3/13:  NSR, first-degree AV block, occasional PVC, LBBB (chronic)  -Continue aspirin, statin, and beta-blocker    CKD stage 4 (baseline creat 1 7-2 1)  -Creatinine on admission 2 13; currently 1 93  -Continue closely monitor renal function; trend with daily BMP  -Avoid nephrotoxin agents; maintain normotension    Hypertension  -BP stable; last recorded 126/59  -Continue Toprol XL 25 mg daily    Hyperlipidemia  -Lipid profile July 2018; cholesterol 97, triglycerides 202, HDL 40, LDL direct 17  -Continue atorvastatin 40 mg daily      HPI: Shorty Bernal 80y o  year old male with a past medical history of chronic systolic HF, ischemic cardiomyopathy, LVEF 50%, CAD, essential hypertension, mixed hyperlipidemia, type 2 diabetes mellitus, former smoker, CKD stage 4, kappa light chain myeloma--on Revlimid , anemia of chronic disease, and prior CVA    Patient routinely follows with Dr Warden Pate with [address] [place] [address] Cardiology as an outpatient was last seen in the outpatient office in November of 2018  During that visit, the patient had had any significant cardiac complaints  His weight (89 3 kg---196 lbs) and renal function had remained stable (creat 1 73)  His current outpatient cardiac medications include aspirin 81 mg daily, atorvastatin 40 mg daily, furosemide 20 mg daily with potassium supplementation, and Toprol XL 25 mg daily    The patient presented to the [facility] on 03/13/2019 for complaints of progressively worsening shortness of breath over the past week, generalized fatigue/weakness, and ongoing cough      Further workup in the ED:  Hemodynamics on admission:  Temp 97 6° F, HR 54, RR 18, /58, sat 99% on RA  -Weight 88 9 kg---195 lb  Laboratory data on admission:  NA +139, K +3 4, chloride 106, CO2 23, anion gap 10, BUN 23, creatinine 2 13, glucose 164, calcium 8 3, normal LFTs, albumin 2 7, WBC 5 1, HGB 8 0, platelet count 728  UA:  Trace ketones, small blood, positive nitrites, small leukocytes moderate bacteria  -Troponin x 4 0 14--0 14--0 16--0 15  -ProBNP 7,469 (previously 2,512 in 9/2018)  Imaging  -12 Lead EKG 3/13:  NSR, first-degree AV block, occasional PVC, LBBB (chronic)  -Chest x-ray PA and lateral; cardiomegaly, lungs are clear, no pneumothorax; small costophrenic angle pleural effusions    He was started on IV furosemide 40 mg b i d  by the [facility] service for concern of possible acutely exacerbated HF  Cardiology was consulted for further management/treatment recommendations        Family History:   Family History   Problem Relation Age of Onset    Heart attack Father     Heart disease Mother     Diabetes Mother     Heart disease Sister     COPD Family      Historical Information   Past Medical History:   Diagnosis Date    [facility]     Chronic kidney disease     Coronary artery disease     Diabetes mellitus ([facility]     Glaucoma     Hypertension     [facility]     Occluded coronary artery stent     Left     Past Surgical History:   Procedure Laterality Date    BACK SURGERY      CARDIAC CATHETERIZATION  04/05/2004    COLONOSCOPY      CT BONE MARROW BIOPSY AND ASPIRATION  9/24/2018    CYSTOSCOPY      KNEE SURGERY      THROMBOENDARTERECTOMY Right     Cartoid     Social History   Social History     Substance and Sexual Activity   Alcohol Use Yes    Comment: socially; less than once a month     Social History     Substance and Sexual Activity   Drug Use No     Social History     Tobacco Use   Smoking Status Former Smoker    Types: Cigarettes   Smokeless Tobacco Never Used   Tobacco Comment    former smoker     Family History:   Family History Problem Relation Age of Onset    Heart attack Father     Heart disease Mother     Diabetes Mother     Heart disease Sister     COPD Family        Review of Systems:  Review of Systems   Constitutional: Positive for activity change and fatigue  Negative for appetite change, chills, diaphoresis, fever and unexpected weight change  HENT: Negative for congestion  Eyes: Negative for visual disturbance  Respiratory: Positive for cough  Negative for chest tightness and shortness of breath  Cardiovascular: Positive for leg swelling  Negative for chest pain and palpitations  +mild MARIE, denies orthopnea or PND   Gastrointestinal: Positive for abdominal distention  Negative for abdominal pain, constipation, diarrhea, nausea and vomiting  Genitourinary: Negative for difficulty urinating and dysuria  Musculoskeletal: Negative for arthralgias and myalgias  Neurological: Negative for dizziness, light-headedness and headaches  All other systems reviewed and are negative            Scheduled Meds:  Current Facility-Administered Medications:  acetaminophen 650 mg Oral Q6H PRN Nestora Cogan, PA-C   aspirin 81 mg Oral Daily Nestora Cogan, PA-C   atorvastatin 40 mg Oral HS Nestora Cogan, PA-C   benzonatate 100 mg Oral BID PRN Nestora Cogan, PA-C   bimatoprost 1 drop Both Eyes HS Angela Caal PA-C   fluticasone 1 spray Each Nare PRN Nestora Cogan, PA-CHERRI   furosemide 40 mg Intravenous BID (diuretic) Nestora Cogan, PA-CHERRI   guaiFENesin 600 mg Oral BID Nestora Cogan, PA-C   heparin (porcine) 5,000 Units Subcutaneous Q8H [address] Angela Leal PA-C   insulin lispro 1-5 Units Subcutaneous HS Angela Caal PA-C   insulin lispro 1-6 Units Subcutaneous TID AC Angela Leal PA-C   lenalidomide 5 mg Oral Daily Angela Leal PA-C   metoprolol succinate 25 mg Oral Daily Angela Caal PA-C   pantoprazole 40 mg Oral Early Morning Angela Caal PA-C   potassium chloride 10 mEq Oral Daily Khari Jacob PA-C   potassium chloride 40 mEq Oral Once Dar Mills MD   timolol 1 drop Both Eyes Daily Khari Jacob PA-C     Continuous Infusions:   PRN Meds:   acetaminophen    benzonatate    fluticasone  all current active meds have been reviewed, current meds:   Current Facility-Administered Medications   Medication Dose Route Frequency    acetaminophen (TYLENOL) tablet 650 mg  650 mg Oral Q6H PRN    aspirin chewable tablet 81 mg  81 mg Oral Daily    atorvastatin (LIPITOR) tablet 40 mg  40 mg Oral HS    benzonatate (TESSALON PERLES) capsule 100 mg  100 mg Oral BID PRN    bimatoprost (LUMIGAN) 0 01 % ophthalmic solution 1 drop  1 drop Both Eyes HS    fluticasone (FLONASE) 50 mcg/act nasal spray 1 spray  1 spray Each Nare PRN    furosemide (LASIX) injection 40 mg  40 mg Intravenous BID (diuretic)    guaiFENesin (MUCINEX) 12 hr tablet 600 mg  600 mg Oral BID    heparin (porcine) subcutaneous injection 5,000 Units  5,000 Units Subcutaneous Q8H [facility]    insulin lispro (HumaLOG) 100 units/mL subcutaneous injection 1-5 Units  1-5 Units Subcutaneous HS    insulin lispro (HumaLOG) 100 units/mL subcutaneous injection 1-6 Units  1-6 Units Subcutaneous TID AC    lenalidomide (REVLIMID) capsule 5 mg  5 mg Oral Daily    metoprolol succinate (TOPROL-XL) 24 hr tablet 25 mg  25 mg Oral Daily    pantoprazole (PROTONIX) EC tablet 40 mg  40 mg Oral Early Morning    potassium chloride (K-DUR,KLOR-CON) CR tablet 10 mEq  10 mEq Oral Daily    potassium chloride (K-DUR,KLOR-CON) CR tablet 40 mEq  40 mEq Oral Once    timolol (TIMOPTIC) 0 5 % ophthalmic solution 1 drop  1 drop Both Eyes Daily    and PTA meds:   Prior to Admission Medications   Prescriptions Last Dose Informant Patient Reported? Taking? ONE TOUCH ULTRA TEST test strip  Family Member No Yes   Sig: The patient test once daily     Wilmar Friar LANCETS 24X [address] [facility]  Family Member No Yes   Sig: by Does not apply route daily   UNKNOWN TO PATIENT   Yes Yes   acetaminophen (TYLENOL) 325 mg tablet  Family Member No Yes   Sig: Take 1 - 2 tabs PO Q4 PRN pain   aspirin 81 MG tablet  Family Member Yes Yes   Sig: Take 81 mg by mouth daily  atorvastatin (LIPITOR) 40 mg tablet  Family Member No Yes   Sig: TAKE 1 TABLET AT BEDTIME   benzonatate (TESSALON PERLES) 100 mg capsule  Family Member No Yes   Sig: Take 1 capsule (100 mg total) by mouth 2 (two) times a day as needed for cough   bimatoprost (LUMIGAN) 0 01 % ophthalmic drops  Family Member Yes Yes   Sig: Apply to eye   esomeprazole (NexIUM) 40 MG capsule  Family Member Yes Yes   Sig: Take 40 mg by mouth every evening     fluticasone (FLONASE) 50 mcg/act nasal spray  Family Member Yes Yes   Sig: into each nostril as needed     furosemide (LASIX) 40 mg tablet  Family Member No Yes   Sig: Take 0 5 tablets (20 mg total) by mouth daily   furosemide (LASIX) 40 mg tablet  Family Member No Yes   Sig: TAKE 1 TABLET DAILY ,      EXCEPT ONCE A WEEK TAKE 1  ADDITIONAL TABLET   glimepiride (AMARYL) 2 mg tablet  Family Member No Yes   Sig: TAKE 1 TABLET DAILY AS     DIRECTED   Patient taking differently: TAKE 1 5 TABLETS DAILY AS  DIRECTED total of 3 mg   glucose blood (ACCU-CHEK CHANDRAKANT PLUS) test strip  Family Member Yes Yes   Sig: by In Vitro route   guaiFENesin (MUCINEX) 600 mg 12 hr tablet  Family Member No Yes   Sig: Take 1 tablet (600 mg total) by mouth 2 (two) times a day   lenalidomide (REVLIMID) 5 MG CAPS   No Yes   Sig: Take one capsule by mouth for 14 days on then 7 days off [id number]   metoprolol succinate (TOPROL-XL) 25 mg 24 hr tablet  Family Member No Yes   Sig: TAKE 1 TABLET DAILY   nitroglycerin (NITROSTAT) 0 4 mg SL tablet  Family Member Yes Yes   Sig: Place 1 tablet under the tongue every 5 (five) minutes as needed   potassium chloride (KLOR-CON 10) 10 mEq tablet  Family Member No Yes   Sig: Take 1 tablet (10 mEq total) by mouth daily   timolol (TIMOPTIC) 0 5 % ophthalmic solution  Family Member Yes Yes      Facility-Administered Medications: None       No Known Allergies    Objective   Vitals: Blood pressure 126/59, pulse 75, temperature 97 8 °F (36 6 °C), temperature source Oral, resp  rate 18, height 5' 6" (1 676 m), weight 88 9 kg (195 lb 15 8 oz), SpO2 97 %  , Body mass index is 31 63 kg/m² , Orthostatic Blood Pressures      Most Recent Value   Blood Pressure  126/59 filed at 03/14/2019 0700   Patient Position - Orthostatic VS  Lying filed at 03/14/2019 0700            Intake/Output Summary (Last 24 hours) at 3/14/2019 1032  Last data filed at 3/14/2019 2799  Gross per 24 hour   Intake 50 ml   Output 550 ml   Net -500 ml       Invasive Devices     Peripheral Intravenous Line            Peripheral IV 03/13/19 Right Antecubital less than 1 day                Physical Exam:  Physical Exam   Constitutional: He is oriented to person, place, and time  He appears well-developed and well-nourished  No distress  HENT:   Head: Normocephalic and atraumatic  Mouth/Throat: Oropharynx is clear and moist    Eyes: Pupils are equal, round, and reactive to light  Neck: Normal range of motion  Neck supple  JVD present  Cardiovascular: Normal rate, regular rhythm, normal heart sounds and intact distal pulses  No murmur heard  NSR, first-degree AV block, occasional PVC's,  + 1 pitting edema of the LLE   Pulmonary/Chest: Effort normal and breath sounds normal  He has no wheezes  He has no rales  Abdominal: Soft  Bowel sounds are normal  He exhibits distension  There is no tenderness  Musculoskeletal: Normal range of motion  He exhibits edema  Neurological: He is alert and oriented to person, place, and time  Skin: Skin is warm and dry  Capillary refill takes less than 2 seconds  He is not diaphoretic  Nursing note and vitals reviewed        Lab Results:   Recent Results (from the past 24 hour(s))   Green / Yellow tube on hold    Collection Time: 03/13/19  7:27 PM   Result Value Ref Range    Extra Tube Hold for add-ons  Sandye Pi / Black tube on hold    Collection Time: 03/13/19  7:27 PM   Result Value Ref Range    Extra Tube Hold for add-ons      Lavender Top 3 ml on hold    Collection Time: 03/13/19  7:27 PM   Result Value Ref Range    Extra Tube y    Santana Natalee top tube on hold    Collection Time: 03/13/19  7:27 PM   Result Value Ref Range    Extra Tube y    CBC and differential    Collection Time: 03/13/19  7:27 PM   Result Value Ref Range    WBC 5 08 4 31 - 10 16 Thousand/uL    RBC 2 81 (L) 3 88 - 5 62 Million/uL    Hemoglobin 8 0 (L) 12 0 - 17 0 g/dL    Hematocrit 25 7 (L) 36 5 - 49 3 %    MCV 92 82 - 98 fL    MCH 28 5 26 8 - 34 3 pg    MCHC 31 1 (L) 31 4 - 37 4 g/dL    RDW 17 4 (H) 11 6 - 15 1 %    MPV 10 7 8 9 - 12 7 fL    Platelets 651 779 - 225 Thousands/uL    nRBC 0 /100 WBCs    Neutrophils Relative 61 43 - 75 %    Immat GRANS % 0 0 - 2 %    Lymphocytes Relative 24 14 - 44 %    Monocytes Relative 9 4 - 12 %    Eosinophils Relative 4 0 - 6 %    Basophils Relative 2 (H) 0 - 1 %    Neutrophils Absolute 3 04 1 85 - 7 62 Thousands/µL    Immature Grans Absolute 0 01 0 00 - 0 20 Thousand/uL    Lymphocytes Absolute 1 23 0 60 - 4 47 Thousands/µL    Monocytes Absolute 0 48 0 17 - 1 22 Thousand/µL    Eosinophils Absolute 0 22 0 00 - 0 61 Thousand/µL    Basophils Absolute 0 10 0 00 - 0 10 Thousands/µL   Comprehensive metabolic panel    Collection Time: 03/13/19  7:27 PM   Result Value Ref Range    Sodium 139 136 - 145 mmol/L    Potassium 3 4 (L) 3 5 - 5 3 mmol/L    Chloride 106 100 - 108 mmol/L    CO2 23 21 - 32 mmol/L    ANION GAP 10 4 - 13 mmol/L    BUN 23 5 - 25 mg/dL    Creatinine 2 13 (H) 0 60 - 1 30 mg/dL    Glucose 164 (H) 65 - 140 mg/dL    Calcium 8 3 8 3 - 10 1 mg/dL    AST 7 5 - 45 U/L    ALT 18 12 - 78 U/L    Alkaline Phosphatase 129 (H) 46 - 116 U/L    Total Protein 7 0 6 4 - 8 2 g/dL    Albumin 2 7 (L) 3 5 - 5 0 g/dL    Total Bilirubin 0 40 0 20 - 1 00 mg/dL    eGFR 27 ml/min/1 73sq m B-type natriuretic peptide    Collection Time: 03/13/19  7:27 PM   Result Value Ref Range    NT-proBNP 7,469 (H) <450 pg/mL   Troponin I    Collection Time: 03/13/19  7:27 PM   Result Value Ref Range    Troponin I 0 14 (H) <=0 04 ng/mL   Magnesium    Collection Time: 03/13/19  7:27 PM   Result Value Ref Range    Magnesium 2 2 1 6 - 2 6 mg/dL   ECG 12 lead    Collection Time: 03/13/19  7:56 PM   Result Value Ref Range    Ventricular Rate 77 BPM    Atrial Rate 77 BPM    WY Interval 344 ms    QRSD Interval 126 ms    QT Interval 394 ms    QTC Interval 445 ms    P Axis 56 degrees    QRS Axis -23 degrees    T Wave Axis 200 degrees   ED Urine Macroscopic    Collection Time: 03/13/19  8:12 PM   Result Value Ref Range    Color, UA Yellow     Clarity, UA Clear     pH, UA 5 5 4 5 - 8 0    Leukocytes, UA Small (A) Negative    Nitrite, UA Positive (A) Negative    Protein,  (2+) (A) Negative mg/dl    Glucose,  (1/2%) (A) Negative mg/dl    Ketones, UA Trace (A) Negative mg/dl    Urobilinogen, UA 0 2 0 2, 1 0 E U /dl E U /dl    Bilirubin, UA Negative Negative    Blood, UA Small (A) Negative    Specific Gravity, UA 1 020 1 003 - 1 030   Urine Microscopic    Collection Time: 03/13/19  8:12 PM   Result Value Ref Range    RBC, UA 4-10 (A) None Seen, 0-5 /hpf    WBC, UA 20-30 (A) None Seen, 0-5, 5-55, 5-65 /hpf    Epithelial Cells Occasional None Seen, Occasional /hpf    Bacteria, UA Moderate (A) None Seen, Occasional /hpf    Hyaline Casts, UA 1-2 (A) (none) /lpf    AMORPH URATES Occasional /hpf    OTHER OBSERVATIONS WBCs Clumped     MUCUS THREADS Occasional (A) None Seen   Fingerstick Glucose (POCT)    Collection Time: 03/13/19 11:37 PM   Result Value Ref Range    POC Glucose 102 65 - 140 mg/dl   Procalcitonin    Collection Time: 03/13/19 11:48 PM   Result Value Ref Range    Procalcitonin 0 05 <=0 25 ng/ml   Troponin I    Collection Time: 03/13/19 11:48 PM   Result Value Ref Range    Troponin I 0 14 (H) <=0 04 ng/mL Platelet count    Collection Time: 03/13/19 11:48 PM   Result Value Ref Range    Platelets 354 320 - 478 Thousands/uL    MPV 9 5 8 9 - 12 7 fL   Troponin I    Collection Time: 03/14/19  3:00 AM   Result Value Ref Range    Troponin I 0 16 (H) <=0 04 ng/mL   CBC and differential    Collection Time: 03/14/19  5:39 AM   Result Value Ref Range    WBC 4 64 4 31 - 10 16 Thousand/uL    RBC 2 53 (L) 3 88 - 5 62 Million/uL    Hemoglobin 7 1 (L) 12 0 - 17 0 g/dL    Hematocrit 22 9 (L) 36 5 - 49 3 %    MCV 91 82 - 98 fL    MCH 28 1 26 8 - 34 3 pg    MCHC 31 0 (L) 31 4 - 37 4 g/dL    RDW 17 5 (H) 11 6 - 15 1 %    MPV 9 8 8 9 - 12 7 fL    Platelets 878 (L) 328 - 390 Thousands/uL    nRBC 0 /100 WBCs   Manual Differential(PHLEBS Do Not Order)    Collection Time: 03/14/19  5:39 AM   Result Value Ref Range    Segmented % 48 43 - 75 %    Bands % 2 0 - 8 %    Lymphocytes % 31 14 - 44 %    Monocytes % 10 4 - 12 %    Eosinophils, % 6 0 - 6 %    Basophils % 3 (H) 0 - 1 %    Absolute Neutrophils 2 32 1 85 - 7 62 Thousand/uL    Lymphocytes Absolute 1 44 0 60 - 4 47 Thousand/uL    Monocytes Absolute 0 46 0 00 - 1 22 Thousand/uL    Eosinophils Absolute 0 28 0 00 - 0 40 Thousand/uL    Basophils Absolute 0 14 (H) 0 00 - 0 10 Thousand/uL    Total Counted 100     Anisocytosis Present     Ovalocytes Present     Poikilocytes Present     Platelet Estimate Adequate Adequate   Basic metabolic panel    Collection Time: 03/14/19  5:40 AM   Result Value Ref Range    Sodium 143 136 - 145 mmol/L    Potassium 3 2 (L) 3 5 - 5 3 mmol/L    Chloride 110 (H) 100 - 108 mmol/L    CO2 22 21 - 32 mmol/L    ANION GAP 11 4 - 13 mmol/L    BUN 21 5 - 25 mg/dL    Creatinine 1 93 (H) 0 60 - 1 30 mg/dL    Glucose 84 65 - 140 mg/dL    Calcium 8 7 8 3 - 10 1 mg/dL    eGFR 30 ml/min/1 73sq m   Troponin I    Collection Time: 03/14/19  5:40 AM   Result Value Ref Range    Troponin I 0 15 (H) <=0 04 ng/mL   Fingerstick Glucose (POCT)    Collection Time: 03/14/19  7:28 AM Result Value Ref Range    POC Glucose 76 65 - 140 mg/dl     Imaging: I have personally reviewed pertinent reports     and I have personally reviewed pertinent films in PACS  Code Status: LVL 1 Full Code

## 2019-03-14 NOTE — PROGRESS NOTES
Progress Note - Sydney Franklin 9/22/1930, 80 y o  male MRN: [id number]    Unit/Bed#: -01 Encounter: [id number]    Primary Care Provider: Asia Villalta MD   Date and time admitted to hospital: 3/13/2019  6:51 PM    Generalized weakness  Assessment & Plan  multifactorial decline  Goals of care need to be discussed given age and limited rehab potential    UTI (urinary tract infection)  Assessment & Plan  · UA positive for nitrites, leuks, innumerable bacteria  ·  Started on Rocephin in ED - switch to Vancomycin for now   · F/u urine cx - Staph aureus  · No previous urine Cx on record    Kappa light chain myeloma (HCC)  Assessment & Plan  · On Revlimid monotherapy - ?anemia potential side effect  · Follows with Dr Eliana Bejarano of Heme    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 ([facility] )  Assessment & Plan  · Combination of Age/ chronic disease including CHF and Myeloma and revlimid  · Hemonc opinion  · 1 unit PRBC  · Hemoccult stool - note progressive decline over last few months  · Monitor Hb    Stage 4 chronic kidney disease ([facility] )  Assessment & Plan  · Cr is baseline presently 1 9  · Monitor with diuresis  · Bmp in AM    Type 2 diabetes mellitus without complication, without long-term current use of insulin [facility])  Assessment & Plan  Lab Results   Component Value Date    HGBA1C 6 9 (H) 07/17/2018       Recent Labs     03/13/19  2337 03/14/19  0728 03/14/19  1207 03/14/19  1622   POCGLU 102 76 116 164*       Blood Sugar Average: Last 72 hrs:  ·  maintained on Amaryl  · Start SSI Ac and hs   · Accuchecks  (P) 114 5    * Acute on chronic combined systolic and diastolic heart failure (HCC)  Assessment & Plan  · Elevated BNP and trop with BNP>7000 and trop of 0 14  · CXR appears possibly overloaded compared to prior   · IV diuresis at 40 mg BID  · Echo - EF 30%, severe diffuse hypokinesis  · Note Cardiology input - palliative care consult placed per recommendation      VTE Pharmacologic Prophylaxis: Heparin  Mechanical: Mechanical VTE prophylaxis in place  Patient Centered Rounds: I have performed bedside rounds with nursing staff today  Discussions with Specialists or Other Care Team Provider: cardiology    Education and Discussions with Family / Patient:     Time Spent for Care: 35min  More than 50% of total time spent on counseling and coordination of care as described above  Current Length of Stay: 1 day(s)    Current Patient Status: Inpatient   Certification Statement: The patient will continue to require additional inpatient hospital stay due to as above    Discharge Plan:    Code Status: Level 1 - Full Code      Subjective: no complaints    Objective:     Vitals:   Temp (24hrs), Av 7 °F (36 5 °C), Min:97 4 °F (36 3 °C), Max:97 9 °F (36 6 °C)    Temp:  [97 4 °F (36 3 °C)-97 9 °F (36 6 °C)] 97 4 °F (36 3 °C)  HR:  [54-75] 72  Resp:  [18-19] 18  BP: (112-140)/() 112/61  SpO2:  [97 %-99 %] 99 %  Body mass index is 31 63 kg/m²  Input and Output Summary (last 24 hours): Intake/Output Summary (Last 24 hours) at 3/14/2019 1739  Last data filed at 3/14/2019 1425  Gross per 24 hour   Intake 50 ml   Output 1650 ml   Net -1600 ml       Physical Exam   Constitutional: He is oriented to person, place, and time  HENT:   Head: Normocephalic  Eyes: Pupils are equal, round, and reactive to light  Cardiovascular: Normal rate and normal heart sounds  Pulmonary/Chest: Effort normal and breath sounds normal    Abdominal: Soft  Bowel sounds are normal    Neurological: He is alert and oriented to person, place, and time  Skin: There is pallor           Additional Data:     Labs:    Results from last 7 days   Lab Units 19  0539  19  1927   WBC Thousand/uL 4 64  --  5 08   HEMOGLOBIN g/dL 7 1*  --  8 0*   HEMATOCRIT % 22 9*  --  25 7*   PLATELETS Thousands/uL 147*   < > 187   NEUTROS PCT %  --   --  61   LYMPHS PCT %  --   --  24   LYMPHO PCT % 31  --   --    MONOS PCT %  --   -- 9   MONO PCT % 10  --   --    EOS PCT % 6  --  4    < > = values in this interval not displayed  Results from last 7 days   Lab Units 03/14/19  0540 03/13/19  1927   POTASSIUM mmol/L 3 2* 3 4*   CHLORIDE mmol/L 110* 106   CO2 mmol/L 22 23   BUN mg/dL 21 23   CREATININE mg/dL 1 93* 2 13*   CALCIUM mg/dL 8 7 8 3   ALK PHOS U/L  --  129*   ALT U/L  --  18   AST U/L  --  7           * I Have Reviewed All Lab Data Listed Above      Imaging:  Imaging Personally Reviewed by Myself Includes:     Cultures:   Blood Culture: No results found for: BLOODCX  Urine Culture:   Lab Results   Component Value Date    URINECX >100,000 cfu/ml Staphylococcus aureus (A) 03/13/2019     Sputum Culture: No components found for: SPUTUMCX  Wound Culture:   Lab Results   Component Value Date    WOUNDCULT (A) 12/17/2018     4+ Growth of Methicillin Resistant Staphylococcus aureus       Last 24 Hours Medication List:     Current Facility-Administered Medications:  acetaminophen 650 mg Oral Q6H PRN New Schulz PA-C   aspirin 81 mg Oral Daily New Schulz PA-C   atorvastatin 40 mg Oral HS New Schulz PA-C   benzonatate 100 mg Oral BID PRN New Schulz PA-C   bimatoprost 1 drop Both Eyes HS New Schulz PA-C   [START ON 3/15/2019] carvedilol 6 25 mg Oral BID With Meals [place] Sp[place], CRNP   fluticasone 1 spray Each Nare PRN New Schulz PA-C   furosemide 40 mg Intravenous BID (diuretic) New Schulz PA-C   guaiFENesin 600 mg Oral BID New Schulz PA-C   heparin (porcine) 5,000 Units Subcutaneous Q8H [address] Angela Aparicio PA-C   insulin lispro 1-5 Units Subcutaneous HS Angela Caal PA-C   insulin lispro 1-6 Units Subcutaneous TID AC Angela Aparicio PA-C   lenalidomide 5 mg Oral Daily Angela Caal PA-C   pantoprazole 40 mg Oral Early Morning Angela Caal PA-C   potassium chloride 10 mEq Oral Daily Angela Caal PA-C   timolol 1 drop Both Eyes Daily New Schulz PA-C   vancomycin 15 mg/kg (Adjusted) Intravenous Q12H Bernie Hernandez MD        Today, Patient Was Seen By: Bernie Hernandez MD    ** Please Note: Dragon 360 Dictation voice to text software may have been used in the creation of this document   **

## 2019-03-14 NOTE — PLAN OF CARE
Problem: Potential for Falls  Goal: Patient will remain free of falls  Description  INTERVENTIONS:  - Assess patient frequently for physical needs  -  Identify cognitive and physical deficits and behaviors that affect risk of falls    -  [facility] fall precautions as indicated by assessment   - Educate patient/family on patient safety including physical limitations  - Instruct patient to call for assistance with activity based on assessment  - Modify environment to reduce risk of injury  - Consider OT/PT consult to assist with strengthening/mobility  Outcome: Progressing     Problem: Prexisting or High Potential for Compromised Skin Integrity  Goal: Skin integrity is maintained or improved  Description  INTERVENTIONS:  - Identify patients at risk for skin breakdown  - Assess and monitor skin integrity  - Assess and monitor nutrition and hydration status  - Monitor labs (i e  albumin)  - Assess for incontinence   - Turn and reposition patient  - Assist with mobility/ambulation  - Relieve pressure over bony prominences  - Avoid friction and shearing  - Provide appropriate hygiene as needed including keeping skin clean and dry  - Evaluate need for skin moisturizer/barrier cream  - Collaborate with interdisciplinary team (i e  Nutrition, Rehabilitation, etc )   - Patient/family teaching  Outcome: Progressing

## 2019-03-14 NOTE — ASSESSMENT & PLAN NOTE
Lab Results   Component Value Date    HGBA1C 6 9 (H) 07/17/2018       No results for input(s): POCGLU in the last 72 hours      Blood Sugar Average: Last 72 hrs:  ·  maintained on Amaryl  · Start SSI Ac and hs   · Accuchecks

## 2019-03-14 NOTE — ASSESSMENT & PLAN NOTE
· Elevated BNP and trop with BNP>7000 and trop of 0 14  · CXR appears possibly overloaded compared to prior   · IV diuresis at 40 mg BID  · Echo - EF 30%, severe diffuse hypokinesis  · Note Cardiology input - palliative care consult placed per recommendation

## 2019-03-14 NOTE — ASSESSMENT & PLAN NOTE
Lab Results   Component Value Date    HGBA1C 6 9 (H) 07/17/2018       Recent Labs     03/13/19  2337 03/14/19  0728 03/14/19  1207 03/14/19  1622   POCGLU 102 76 116 164*       Blood Sugar Average: Last 72 hrs:  ·  maintained on Amaryl  · Start SSI Ac and hs   · Accuchecks  (P) 114 5

## 2019-03-14 NOTE — ASSESSMENT & PLAN NOTE
· Per Nephrology, baseline Cr recently has been "high 1s, low 2s"  · Cr today is 2 13, up from 1 99 on 2/15  · Will monitor BMP, if worsening renal function in setting of overload consider Nephrology consult

## 2019-03-14 NOTE — H&P
H&P- [facility] 9/22/1930, 80 y o  male MRN: [id number]    Unit/Bed#: -01 Encounter: [id number]    Primary Care Provider: Nickie Phoenix, MD   Date and time admitted to hospital: 3/13/2019  6:51 PM    * Shortness of breath  Assessment & Plan  · Suspect possible CHF exacerbation  · Elevated BNP and trop with BNP>7000 and trop of 0 14  · Prior BNP elevated around 2000 and prior trops around   02-  04  · CXR appears possibly overloaded compared to prior   · Creatinine continues to worsen at 2 13 today although still within baseline  · ARB was held earlier this year 2/2 worsening renal function  · Will start IV diuresis at 40 mg BID  · Obtain echo  · Trend troponin, monitor on tele   · Appreciate Cardiology input  · Also with rhonchorous cough although pt states this is chronic, obtain procal  · Pt is afebrile but immunosuppressed    UTI (urinary tract infection)  Assessment & Plan  · UA positive for nitrites, leuks, innumerable bacteria  ·  Started on Rocephin in ED, will continue   · F/u urine cx    Chronic systolic congestive heart failure (HCC)  Assessment & Plan  · With ICM, Echo 2015 with mildly reduced function and EF of 50%  · Maintained on 20 mg PO daily   · Follows with Dr Laureen Carrillo  · Daily weights, I/o's    Kappa light chain myeloma ([facility] )  Assessment & Plan  · On Revlimid monotherapy  · Follows with Dr Lorenzo Ribeiro of Heme    Type 2 diabetes mellitus without complication, without long-term current use of insulin ([facility] )  Assessment & Plan  Lab Results   Component Value Date    HGBA1C 6 9 (H) 07/17/2018       No results for input(s): POCGLU in the last 72 hours      Blood Sugar Average: Last 72 hrs:  ·  maintained on Amaryl  · Start SSI Ac and hs   · Accuchecks      Stage 4 chronic kidney disease ([facility] )  Assessment & Plan  · Per Nephrology, baseline Cr recently has been "high 1s, low 2s"  · Cr today is 2 13, up from 1 99 on 2/15  · Will monitor BMP, if worsening renal function in setting of overload consider Nephrology consult     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)  Assessment & Plan  · Hgb 8 0 today appears within baseline around 8  · Monitor CBC closely     Mixed hyperlipidemia  Assessment & Plan  · Cont statin    VTE Prophylaxis: Heparin  / sequential compression device   Code Status: FULL  POLST: POLST form is not discussed and not completed at this time  Discussion with family: none    Anticipated Length of Stay:  Patient will be admitted on an Inpatient basis with an anticipated length of stay of  > 2 midnights  Justification for Hospital Stay: per plan above    Total Time for Visit, including Counseling / Coordination of Care: 30 minutes  Greater than 50% of this total time spent on direct patient counseling and coordination of care  Chief Complaint:   Shortness of breath     History of Present Illness:    Antonia Muniz is a 80 y o  male with PMHx of ICM, CHF, MM, HLD who presents with shortness of breath  Patient states for the last week he has been extremely dyspneic on exertion  He denies any orthopnea but states that with minimal activity he becomes very short of breath which is unusual for him  He has been compliant with his Lasix and notes that he has actually had a 4 lb weight loss over the past week  He does admit that he has been eating out a lot however  Does not notice any increased lower extremity edema  He does report productive cough but says that this is chronic and unchanged  Denies any fevers or chills  Admits to mild headache when he becomes short of breath but denies any syncope or lightheadedness  Denies any chest pain  He says he does not urinate very often but denies any dysuria or hematuria  Denies nausea, vomiting, abdominal pain or diarrhea  Denies calf tenderness or warmth, denies lower extremity redness  Review of Systems:    Review of Systems   Constitutional: Negative  HENT: Negative  Eyes: Negative      Respiratory: Positive for shortness of breath  Cardiovascular: Negative  Gastrointestinal: Negative  Endocrine: Negative  Genitourinary: Negative  Musculoskeletal: Negative  Skin: Negative  Allergic/Immunologic: Negative  Neurological: Positive for headaches  Hematological: Negative  Psychiatric/Behavioral: Negative  Past Medical and Surgical History:     Past Medical History:   Diagnosis Date    Angina pectoris ([facility] )     Chronic kidney disease     Coronary artery disease     Diabetes mellitus ([facility] )     Glaucoma     Hypertension     Multiple myeloma ([place] [id number] )     Occluded coronary artery stent     Left       Past Surgical History:   Procedure Laterality Date    BACK SURGERY      CARDIAC CATHETERIZATION  04/05/2004    COLONOSCOPY      CT BONE MARROW BIOPSY AND ASPIRATION  9/24/2018    CYSTOSCOPY      KNEE SURGERY      THROMBOENDARTERECTOMY Right     Cartoid       Meds/Allergies:    Prior to Admission medications    Medication Sig Start Date End Date Taking? Authorizing Provider   acetaminophen (TYLENOL) 325 mg tablet Take 1 - 2 tabs PO Q4 PRN pain 4/7/16  Yes Maribell Marmolejo PA-C   aspirin 81 MG tablet Take 81 mg by mouth daily  Yes Historical Provider, MD   atorvastatin (LIPITOR) 40 mg tablet TAKE 1 TABLET AT BEDTIME 1/2/19  Yes Breanne Conway MD   benzonatate (TESSALON PERLES) 100 mg capsule Take 1 capsule (100 mg total) by mouth 2 (two) times a day as needed for cough 12/21/18  Yes Breanne Conway MD   bimatoprost (LUMIGAN) 0 01 % ophthalmic drops Apply to eye   Yes Historical Provider, MD   esomeprazole (NexIUM) 40 MG capsule Take 40 mg by mouth every evening     Yes Historical Provider, MD   fluticasone (FLONASE) 50 mcg/act nasal spray into each nostril as needed     Yes Historical Provider, MD   furosemide (LASIX) 40 mg tablet Take 0 5 tablets (20 mg total) by mouth daily 2/24/19  Yes Breanne Conway MD   furosemide (LASIX) 40 mg tablet TAKE 1 TABLET DAILY ,      EXCEPT ONCE A WEEK TAKE 1 ADDITIONAL TABLET 2/24/19  Yes Lucretia Li MD   glimepiride (AMARYL) 2 mg tablet TAKE 1 TABLET DAILY AS     DIRECTED  Patient taking differently: TAKE 1 5 TABLETS DAILY AS  DIRECTED total of 3 mg 11/30/18  Yes Lucretia Li MD   glucose blood (ACCU-CHEK CHANDRAKANT PLUS) test strip by In Vitro route 12/15/11  Yes Historical Provider, MD   guaiFENesin (MUCINEX) 600 mg 12 hr tablet Take 1 tablet (600 mg total) by mouth 2 (two) times a day 7/9/18  Yes Lucretia Li MD   lenalidomide (REVLIMID) 5 MG CAPS Take one capsule by mouth for 14 days on then 7 days off [facility]#[id number] 3/11/19  Yes Shashank Whittington DO   metoprolol succinate (TOPROL-XL) 25 mg 24 hr tablet TAKE 1 TABLET DAILY 5/29/18  Yes Lucretia Li MD   nitroglycerin (NITROSTAT) 0 4 mg SL tablet Place 1 tablet under the tongue every 5 (five) minutes as needed 11/16/11  Yes Historical Provider, MD   ONE TOUCH ULTRA TEST test strip The patient test once daily  1/16/19  Yes Lucretia Li MD   Betito Mulch LANCETS 23M MIS by Does not apply route daily 4/9/18  Yes Lucretia Li MD   potassium chloride (KLOR-CON 10) 10 mEq tablet Take 1 tablet (10 mEq total) by mouth daily 2/1/19  Yes Lucretia Li MD   timolol (TIMOPTIC) 0 5 % ophthalmic solution  7/25/18  Yes Historical Provider, MD   UNKNOWN TO PATIENT    Yes Historical Provider, MD   traMADol (ULTRAM) 50 mg tablet Take 1 tablet (50 mg total) by mouth every 6 (six) hours as needed for moderate pain 1/25/19 3/13/19  Lucretia Li MD     I have reviewed home medications with patient personally  Allergies: No Known Allergies    Social History:     Marital Status:     Occupation: retired  Patient Pre-hospital Living Situation: home alone  Patient Pre-hospital Level of Mobility: not discussed  Patient Pre-hospital Diet Restrictions: none  Substance Use History:   Social History     Substance and Sexual Activity   Alcohol Use Yes    Comment: socially; less than once a month     Social History     Tobacco Use Smoking Status Former Smoker    Types: Cigarettes   Smokeless Tobacco Never Used   Tobacco Comment    former smoker     Social History     Substance and Sexual Activity   Drug Use No       Family History:    non-contributory    Physical Exam:     Vitals:   Blood Pressure: 139/67 (03/13/19 2200)  Pulse: 74 (03/13/19 2200)  Temperature: 97 9 °F (36 6 °C) (03/13/19 2200)  Temp Source: Oral (03/13/19 2200)  Respirations: 19 (03/13/19 2200)  Height: 5' 6" (167 6 cm) (03/13/19 2200)  Weight - Scale: 88 9 kg (195 lb 15 8 oz) (03/13/19 2200)  SpO2: 97 % (03/13/19 2200)    Physical Exam   Constitutional: He appears well-developed and well-nourished  No distress  HENT:   Head: Normocephalic  Cardiovascular: Normal rate, regular rhythm, normal heart sounds and intact distal pulses  Exam reveals no gallop and no friction rub  No murmur heard  Pulmonary/Chest: Effort normal  No stridor  He has no wheezes  He has rales  He exhibits no tenderness  Rhonchi cleared by cough, mild crackles at bases   Abdominal: Soft  Bowel sounds are normal  He exhibits no distension and no mass  There is no tenderness  There is no rebound and no guarding  No hernia  Musculoskeletal: He exhibits edema (2+ pitting edema bilateral lower extremities, non erythematous and nontender)  He exhibits no tenderness  Neurological: He is alert  Skin: Skin is warm and dry  Capillary refill takes less than 2 seconds  No rash noted  He is not diaphoretic  No erythema  No pallor  Psychiatric: He has a normal mood and affect  His behavior is normal    Nursing note and vitals reviewed  Additional Data:     Lab Results: I have personally reviewed pertinent reports        Results from last 7 days   Lab Units 03/13/19 1927   WBC Thousand/uL 5 08   HEMOGLOBIN g/dL 8 0*   HEMATOCRIT % 25 7*   PLATELETS Thousands/uL 187   NEUTROS PCT % 61   LYMPHS PCT % 24   MONOS PCT % 9   EOS PCT % 4     Results from last 7 days   Lab Units 03/13/19 1927   SODIUM mmol/L 139   POTASSIUM mmol/L 3 4*   CHLORIDE mmol/L 106   CO2 mmol/L 23   BUN mg/dL 23   CREATININE mg/dL 2 13*   ANION GAP mmol/L 10   CALCIUM mg/dL 8 3   ALBUMIN g/dL 2 7*   TOTAL BILIRUBIN mg/dL 0 40   ALK PHOS U/L 129*   ALT U/L 18   AST U/L 7   GLUCOSE RANDOM mg/dL 164*                       Imaging: I have personally reviewed pertinent reports  CT head without contrast   Final Result by Breanne Womack MD (03/13 2120)      No acute intracranial abnormality  Workstation performed: [id number]         XR chest 2 views    (Results Pending)     Allscripts / [facility] Records Reviewed: Yes     ** Please Note: This note has been constructed using a voice recognition system   **

## 2019-03-14 NOTE — ASSESSMENT & PLAN NOTE
· Combination of Age/ chronic disease including CHF and Myeloma and revlimid  · Hemonc opinion  · 1 unit PRBC  · Hemoccult stool - note progressive decline over last few months  · Monitor Hb

## 2019-03-14 NOTE — UTILIZATION REVIEW
Initial Clinical Review    Admission: Date/Time/Statement: Inpatient 3/13/19 @ 2134   Orders Placed This Encounter   Procedures    Inpatient Admission     Standing Status:   Standing     Number of Occurrences:   1     Order Specific Question:   Admitting Physician     Answer:   No Cordero     Order Specific Question:   Level of Care     Answer:   Med Surg [16]     Order Specific Question:   Estimated length of stay     Answer:   More than 2 Midnights     Order Specific Question:   Certification     Answer:   I certify that inpatient services are medically necessary for this patient for a duration of greater than two midnights  See H&P and MD Progress Notes for additional information about the patient's course of treatment  ED: Date/Time/Mode of Arrival:   ED Arrival Information     Expected Arrival Acuity Means of Arrival Escorted By Service Admission Type    - 3/13/2019 17:39 Urgent Wheelchair Family Member Hospitalist Urgent    Arrival Complaint    Weakness,Shortness of Breath        Chief Complaint:   Chief Complaint   Patient presents with    Fatigue     patient's family with him reports having weakness and fatigue over the last few months "he is slowing down"  Assessment/Plan: 80 y o  male  presents with shortness of breath  PMHx of ICM, CHF, MM, HLD   Patient states for the last week he has been extremely dyspneic on exertion  He denies any orthopnea but states that with minimal activity he becomes very short of breath which is unusual for him  He has been compliant with his Lasix and notes that he has actually had a 4 lb weight loss over the past week  He does admit that he has been eating out a lot however  Inpatient admission workup reveals elevated BNP, CXR appears possibly overloaded compared to prior films  Will start IV diuresis at 40 mg BID  [address] Cardiology input  Creatinine trend worsening but still within baseline  Up from 1 99 on 2/15   Monitor BMP-if worsening Nephrology consult  Upon provider exam patient has rhonchorous cough, but pt states this is chronic  Patient is afebrile but immunosuppressed  Patient with ischemic cardiomyopathy-ECHO with EF of 50% maintained on Lasix 20 mg daily-follows with Dr Dio Temple  Daily wt, I/O   UTI revealed by UA positive for nitrites, leuko, innumerable bacteria  IV antibiotics follow up urine culture  On immunosuppressant for Kappa light chain myeloma-follows with Dr Gertrudis White of hematology  Sliding scale insulin  ED Vital Signs:   ED Triage Vitals [03/13/19 1804]   Temperature Pulse Respirations Blood Pressure SpO2   97 6 °F (36 4 °C) (!) 54 18 117/58 99 %      Temp Source Heart Rate Source Patient Position - Orthostatic VS BP Location FiO2 (%)   Oral Monitor Sitting Left arm --      Pain Score       No Pain        Wt Readings from Last 1 Encounters:   03/14/19 88 9 kg (195 lb 15 8 oz)     Vital Signs (abnormal): 3/14/2019 temp 97 4, HR 54      Pertinent Labs/Diagnostic Test Results: 3/13/2019  Potassium 3 4Low     Creatinine 2  13High     Glucose 164High     Alkaline Phosphatase 129High     Albumin 2 7Low     Trop 0 14, 0 14, NT-pro BNP 7,469,   RBC 2 81Low     Hemoglobin 8 0Low     Hematocrit 25 7Low       Urinalysis 1/2% glucose ; trace ketones, small blood, positive nitrites, small leukocytes, 4-10 rbc, 20-30 wbc, moderate bacteria;   CXR: Small costophrenic angle effusions  3/14/2019  Trop  Potassium 3 2Low     Chloride 110High     Creatinine 1  93High       RBC 2 53Low     Hemoglobin 7 1Low     Hematocrit 22 9Low     Platelets 567DNH         TROP 0 16, 0 15,     ED Treatment:   Medication Administration from 03/13/2019 1739 to 03/13/2019 2151       Date/Time Order Dose Route Action     03/13/2019 2138 ceftriaxone (ROCEPHIN) 1 g/50 mL in dextrose IVPB 0 mg Intravenous Stopped     03/13/2019 2039 ceftriaxone (ROCEPHIN) 1 g/50 mL in dextrose IVPB 1,000 mg Intravenous New Bag        Past Medical/Surgical History:    Active Ambulatory Problems     Diagnosis Date Noted    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    Type 2 diabetes mellitus without complication, without long-term current use of insulin ([place] [address] ) 10/26/2013    Mixed hyperlipidemia 10/26/2013    Esophageal reflux 10/26/2013    Cerebral infarction, watershed distribution, bilateral, acute 10/19/2015    Stage 4 chronic kidney disease ([place] [address] ) 07/23/2018    Benign prostatic hyperplasia 10/26/2013    Chronic systolic congestive heart failure ([place] [address] ) 05/20/2014    Ischemic cardiomyopathy 06/05/2012    Left thigh pain 08/13/2018    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 ([place] [address] ) 08/14/2018    Other proteinuria 08/14/2018    Dizziness 08/28/2018    Kappa light chain myeloma ([place] [address] ) 10/08/2018    UTI (urinary tract infection) 10/23/2018    Cough 11/06/2018    Cellulitis of right lower limb 11/19/2018    MRSA (methicillin resistant staph aureus) culture positive 12/20/2018       Past Medical History:   Diagnosis Date    Angina pectoris ([place] [address] )     Chronic kidney disease     Coronary artery disease     Diabetes mellitus ([place] [address] )     Glaucoma     Hypertension     Multiple myeloma ([place] [address] )     Occluded coronary artery stent      Admitting Diagnosis: UTI (urinary tract infection) [N39 0]  Weakness [R53 1]  Elevated troponin [R74 8]  Age/Sex: 80 y o  male     Admission Orders:  48 hr telemetry  Peripheral IV  OT/PT eval & treat  VAS lower limb unilateral   Daily wt  I/O  Diet cardiac fluid restrict 1500 ml    Scheduled Meds:   Current Facility-Administered Medications:  acetaminophen 650 mg Oral Q6H PRN Antoine Bowden PA-C   aspirin 81 mg Oral Daily Antoine Bowden PA-C   atorvastatin 40 mg Oral HS Angela Caal PA-C   benzonatate 100 mg Oral BID PRN Antoine Bowden PA-C   bimatoprost 1 drop Both Eyes HS Angela Caal PA-C   [START ON 3/15/2019] carvedilol 6 25 mg Oral BID With Meals DON Horta   fluticasone 1 spray Each Nare PRN Armando WOMACK Curt Whitley, JERAMY   furosemide 40 mg Intravenous BID (diuretic) Sheree Areas, JERAMY   guaiFENesin 600 mg Oral BID Sheree Areas, JERAMY   heparin (porcine) 5,000 Units Subcutaneous [facility] Angela Menjivar, JERAMY   insulin lispro 1-5 Units Subcutaneous HS Sheree Areas, JERAMY   insulin lispro 1-6 Units Subcutaneous TID AC Angela Menjivar, JERAMY   lenalidomide 5 mg Oral Daily Angela Menjivar, JERAMY   pantoprazole 40 mg Oral Early Morning Angela Caal PA-C   potassium chloride 10 mEq Oral Daily Sheree Areas, JERAMY   timolol 1 drop Both Eyes Daily Sheree Areas, JERAMY       Network Utilization Review Department  Phone: [phone]; Fax [phone]  [email or web address]  ATTENTION: Please call with any questions or concerns to [phone]  and carefully listen to the prompts so that you are directed to the right person  Send all requests for admission clinical reviews, approved or denied determinations and any other requests to fax [phone]   All voicemails are confidential

## 2019-03-14 NOTE — ASSESSMENT & PLAN NOTE
· With ICM, Echo 2015 with mildly reduced function and EF of 50%  · Maintained on 20 mg PO daily   · Follows with Dr Ibrahima Coles  · Daily weights, I/o's

## 2019-03-14 NOTE — ASSESSMENT & PLAN NOTE
· Suspect possible CHF exacerbation  · Elevated BNP and trop with BNP>7000 and trop of 0 14  · Prior BNP elevated around 2000 and prior trops around   02-  04  · CXR appears possibly overloaded compared to prior   · Creatinine continues to worsen at 2 13 today although still within baseline  · ARB was held earlier this year 2/2 worsening renal function  · Will start IV diuresis at 40 mg BID  · Obtain echo  · Trend troponin, monitor on tele   · Appreciate Cardiology input  · Also with rhonchorous cough although pt states this is chronic, obtain procal  · Pt is afebrile but immunosuppressed

## 2019-03-14 NOTE — ASSESSMENT & PLAN NOTE
· UA positive for nitrites, leuks, innumerable bacteria  ·  Started on Rocephin in ED, will continue   · F/u urine cx

## 2019-03-14 NOTE — ASSESSMENT & PLAN NOTE
· UA positive for nitrites, leuks, innumerable bacteria  ·  Started on Rocephin in ED - switch to Vancomycin for now   · F/u urine cx - Staph aureus  · No previous urine Cx on record

## 2019-03-15 PROBLEM — I21.4 NSTEMI (NON-ST ELEVATED MYOCARDIAL INFARCTION) (HCC): Status: ACTIVE | Noted: 2019-03-15

## 2019-03-15 PROBLEM — K59.00 CONSTIPATION: Status: ACTIVE | Noted: 2019-03-15

## 2019-03-15 LAB
ANION GAP SERPL CALCULATED.3IONS-SCNC: 13 MMOL/L (ref 4–13)
BACTERIA UR CULT: ABNORMAL
BASOPHILS # BLD AUTO: 0.1 THOUSANDS/ΜL (ref 0–0.1)
BASOPHILS NFR BLD AUTO: 2 % (ref 0–1)
BUN SERPL-MCNC: 25 MG/DL (ref 5–25)
CALCIUM SERPL-MCNC: 8.6 MG/DL (ref 8.3–10.1)
CHLORIDE SERPL-SCNC: 105 MMOL/L (ref 100–108)
CO2 SERPL-SCNC: 23 MMOL/L (ref 21–32)
CREAT SERPL-MCNC: 2.16 MG/DL (ref 0.6–1.3)
EOSINOPHIL # BLD AUTO: 0.3 THOUSAND/ΜL (ref 0–0.61)
EOSINOPHIL NFR BLD AUTO: 6 % (ref 0–6)
ERYTHROCYTE [DISTWIDTH] IN BLOOD BY AUTOMATED COUNT: 17.7 % (ref 11.6–15.1)
FERRITIN SERPL-MCNC: 42 NG/ML (ref 8–388)
FOLATE SERPL-MCNC: 13.3 NG/ML (ref 3.1–17.5)
GFR SERPL CREATININE-BSD FRML MDRD: 26 ML/MIN/1.73SQ M
GLUCOSE SERPL-MCNC: 121 MG/DL (ref 65–140)
GLUCOSE SERPL-MCNC: 150 MG/DL (ref 65–140)
GLUCOSE SERPL-MCNC: 170 MG/DL (ref 65–140)
GLUCOSE SERPL-MCNC: 205 MG/DL (ref 65–140)
GLUCOSE SERPL-MCNC: 247 MG/DL (ref 65–140)
HCT VFR BLD AUTO: 24.8 % (ref 36.5–49.3)
HCT VFR BLD AUTO: 28.2 % (ref 36.5–49.3)
HGB BLD-MCNC: 7.6 G/DL (ref 12–17)
HGB BLD-MCNC: 8.7 G/DL (ref 12–17)
IMM GRANULOCYTES # BLD AUTO: 0.01 THOUSAND/UL (ref 0–0.2)
IMM GRANULOCYTES NFR BLD AUTO: 0 % (ref 0–2)
IRON SATN MFR SERPL: 13 %
IRON SERPL-MCNC: 37 UG/DL (ref 65–175)
LYMPHOCYTES # BLD AUTO: 1.3 THOUSANDS/ΜL (ref 0.6–4.47)
LYMPHOCYTES NFR BLD AUTO: 26 % (ref 14–44)
MCH RBC QN AUTO: 28.1 PG (ref 26.8–34.3)
MCHC RBC AUTO-ENTMCNC: 30.6 G/DL (ref 31.4–37.4)
MCV RBC AUTO: 92 FL (ref 82–98)
MONOCYTES # BLD AUTO: 0.48 THOUSAND/ΜL (ref 0.17–1.22)
MONOCYTES NFR BLD AUTO: 10 % (ref 4–12)
NEUTROPHILS # BLD AUTO: 2.86 THOUSANDS/ΜL (ref 1.85–7.62)
NEUTS SEG NFR BLD AUTO: 56 % (ref 43–75)
NRBC BLD AUTO-RTO: 0 /100 WBCS
PLATELET # BLD AUTO: 158 THOUSANDS/UL (ref 149–390)
PMV BLD AUTO: 9.5 FL (ref 8.9–12.7)
POTASSIUM SERPL-SCNC: 3.5 MMOL/L (ref 3.5–5.3)
RBC # BLD AUTO: 2.7 MILLION/UL (ref 3.88–5.62)
RETICS # AUTO: NORMAL 10*3/UL (ref 14356–105094)
RETICS # CALC: 1.78 % (ref 0.37–1.87)
SODIUM SERPL-SCNC: 141 MMOL/L (ref 136–145)
TIBC SERPL-MCNC: 285 UG/DL (ref 250–450)
TSH SERPL DL<=0.05 MIU/L-ACNC: 1.88 UIU/ML (ref 0.36–3.74)
WBC # BLD AUTO: 5.05 THOUSAND/UL (ref 4.31–10.16)

## 2019-03-15 PROCEDURE — 83550 IRON BINDING TEST: CPT | Performed by: PHYSICIAN ASSISTANT

## 2019-03-15 PROCEDURE — 85045 AUTOMATED RETICULOCYTE COUNT: CPT | Performed by: PHYSICIAN ASSISTANT

## 2019-03-15 PROCEDURE — 82728 ASSAY OF FERRITIN: CPT | Performed by: PHYSICIAN ASSISTANT

## 2019-03-15 PROCEDURE — 82948 REAGENT STRIP/BLOOD GLUCOSE: CPT

## 2019-03-15 PROCEDURE — 85025 COMPLETE CBC W/AUTO DIFF WBC: CPT | Performed by: NURSE PRACTITIONER

## 2019-03-15 PROCEDURE — 97166 OT EVAL MOD COMPLEX 45 MIN: CPT

## 2019-03-15 PROCEDURE — P9016 RBC LEUKOCYTES REDUCED: HCPCS

## 2019-03-15 PROCEDURE — 97163 PT EVAL HIGH COMPLEX 45 MIN: CPT

## 2019-03-15 PROCEDURE — 85014 HEMATOCRIT: CPT | Performed by: NURSE PRACTITIONER

## 2019-03-15 PROCEDURE — 80048 BASIC METABOLIC PNL TOTAL CA: CPT | Performed by: NURSE PRACTITIONER

## 2019-03-15 PROCEDURE — G8979 MOBILITY GOAL STATUS: HCPCS

## 2019-03-15 PROCEDURE — 84443 ASSAY THYROID STIM HORMONE: CPT | Performed by: PHYSICIAN ASSISTANT

## 2019-03-15 PROCEDURE — 83540 ASSAY OF IRON: CPT | Performed by: PHYSICIAN ASSISTANT

## 2019-03-15 PROCEDURE — 82746 ASSAY OF FOLIC ACID SERUM: CPT | Performed by: PHYSICIAN ASSISTANT

## 2019-03-15 PROCEDURE — 85018 HEMOGLOBIN: CPT | Performed by: NURSE PRACTITIONER

## 2019-03-15 PROCEDURE — 99222 1ST HOSP IP/OBS MODERATE 55: CPT | Performed by: NURSE PRACTITIONER

## 2019-03-15 PROCEDURE — 99232 SBSQ HOSP IP/OBS MODERATE 35: CPT | Performed by: INTERNAL MEDICINE

## 2019-03-15 PROCEDURE — G8978 MOBILITY CURRENT STATUS: HCPCS

## 2019-03-15 PROCEDURE — G8988 SELF CARE GOAL STATUS: HCPCS

## 2019-03-15 PROCEDURE — 93971 EXTREMITY STUDY: CPT | Performed by: SURGERY

## 2019-03-15 PROCEDURE — 97535 SELF CARE MNGMENT TRAINING: CPT

## 2019-03-15 PROCEDURE — 99222 1ST HOSP IP/OBS MODERATE 55: CPT | Performed by: PHYSICIAN ASSISTANT

## 2019-03-15 PROCEDURE — G8987 SELF CARE CURRENT STATUS: HCPCS

## 2019-03-15 RX ORDER — SENNOSIDES 8.6 MG
1 TABLET ORAL
Status: DISCONTINUED | OUTPATIENT
Start: 2019-03-15 | End: 2019-03-20 | Stop reason: HOSPADM

## 2019-03-15 RX ORDER — ACETAMINOPHEN 325 MG/1
650 TABLET ORAL EVERY 6 HOURS PRN
Status: DISCONTINUED | OUTPATIENT
Start: 2019-03-15 | End: 2019-03-20 | Stop reason: HOSPADM

## 2019-03-15 RX ORDER — POLYETHYLENE GLYCOL 3350 17 G/17G
17 POWDER, FOR SOLUTION ORAL DAILY
Status: DISCONTINUED | OUTPATIENT
Start: 2019-03-15 | End: 2019-03-20 | Stop reason: HOSPADM

## 2019-03-15 RX ORDER — ACETAMINOPHEN 325 MG/1
650 TABLET ORAL 3 TIMES DAILY
Status: DISCONTINUED | OUTPATIENT
Start: 2019-03-15 | End: 2019-03-15

## 2019-03-15 RX ORDER — HYDRALAZINE HYDROCHLORIDE 10 MG/1
10 TABLET, FILM COATED ORAL EVERY 8 HOURS SCHEDULED
Status: DISCONTINUED | OUTPATIENT
Start: 2019-03-15 | End: 2019-03-20 | Stop reason: HOSPADM

## 2019-03-15 RX ADMIN — TIMOLOL MALEATE 1 DROP: 5 SOLUTION/ DROPS OPHTHALMIC at 08:21

## 2019-03-15 RX ADMIN — CARVEDILOL 6.25 MG: 6.25 TABLET, FILM COATED ORAL at 08:23

## 2019-03-15 RX ADMIN — INSULIN LISPRO 1 UNITS: 100 INJECTION, SOLUTION INTRAVENOUS; SUBCUTANEOUS at 17:45

## 2019-03-15 RX ADMIN — POTASSIUM CHLORIDE 10 MEQ: 750 TABLET, EXTENDED RELEASE ORAL at 08:23

## 2019-03-15 RX ADMIN — VANCOMYCIN HYDROCHLORIDE 1000 MG: 1 INJECTION, SOLUTION INTRAVENOUS at 06:37

## 2019-03-15 RX ADMIN — HEPARIN SODIUM 5000 UNITS: 5000 INJECTION INTRAVENOUS; SUBCUTANEOUS at 06:37

## 2019-03-15 RX ADMIN — HYDRALAZINE HYDROCHLORIDE 10 MG: 10 TABLET, FILM COATED ORAL at 22:05

## 2019-03-15 RX ADMIN — HEPARIN SODIUM 5000 UNITS: 5000 INJECTION INTRAVENOUS; SUBCUTANEOUS at 14:02

## 2019-03-15 RX ADMIN — ATORVASTATIN CALCIUM 40 MG: 40 TABLET, FILM COATED ORAL at 22:05

## 2019-03-15 RX ADMIN — FUROSEMIDE 40 MG: 10 INJECTION, SOLUTION INTRAMUSCULAR; INTRAVENOUS at 08:23

## 2019-03-15 RX ADMIN — GUAIFENESIN 600 MG: 600 TABLET, EXTENDED RELEASE ORAL at 08:23

## 2019-03-15 RX ADMIN — BIMATOPROST 1 DROP: 0.1 SOLUTION/ DROPS OPHTHALMIC at 22:06

## 2019-03-15 RX ADMIN — INSULIN LISPRO 3 UNITS: 100 INJECTION, SOLUTION INTRAVENOUS; SUBCUTANEOUS at 11:54

## 2019-03-15 RX ADMIN — VANCOMYCIN HYDROCHLORIDE 1000 MG: 1 INJECTION, SOLUTION INTRAVENOUS at 18:26

## 2019-03-15 RX ADMIN — PANTOPRAZOLE SODIUM 40 MG: 40 TABLET, DELAYED RELEASE ORAL at 06:37

## 2019-03-15 RX ADMIN — HEPARIN SODIUM 5000 UNITS: 5000 INJECTION INTRAVENOUS; SUBCUTANEOUS at 22:06

## 2019-03-15 RX ADMIN — POLYETHYLENE GLYCOL 3350 17 G: 17 POWDER, FOR SOLUTION ORAL at 10:41

## 2019-03-15 RX ADMIN — ASPIRIN 81 MG 81 MG: 81 TABLET ORAL at 08:23

## 2019-03-15 RX ADMIN — INSULIN LISPRO 1 UNITS: 100 INJECTION, SOLUTION INTRAVENOUS; SUBCUTANEOUS at 22:07

## 2019-03-15 RX ADMIN — GUAIFENESIN 600 MG: 600 TABLET, EXTENDED RELEASE ORAL at 17:45

## 2019-03-15 RX ADMIN — STANDARDIZED SENNA CONCENTRATE 8.6 MG: 8.6 TABLET ORAL at 22:05

## 2019-03-15 NOTE — CONSULTS
Consultation - [address] [facility] 80 y o  male MRN: [id number]  Unit/Bed#: -01 Encounter: [id number]      Assessment/Plan     Assessment:  CHF  SOB  Weakness  Debility   CKD  kappa light chain myeloma    Plan:  Symptom Management  Patient has no concerning symptoms today  Will continue to monitor patient  GOC  Level 1 code status  Son Dionicio Reza is reviewing schedule to see when he can plan to meet with Palliative Medicine and additional family to discuss patient's future GOC and code status  Contact information provided  History of Present Illness   Physician Requesting Consult: Raeann Serrato MD  Reason for Consult / Principal Problem: End stage CHF  Hx and PE limited by: N/A    HPI: Vicki Tinoco is a 80y o  year old male who presents with a PMH of chronic systolic HF, ischemic cardiomyopathy, LVEF 50%, CAD, essential hypertension, mixed hyperlipidemia, type 2 diabetes mellitus, CKD stage 4, kappa light chain myeloma--on Revlimid , anemia of chronic disease, and a prior CVA  Cardiologist - Dr Holley Ser   Oncologist - Dr Blanca Dowling  Patient came into the ED with increased SOB, cough, fatigue and weakness over the past week  Today he feels that he is improving  Denies SOB, chest pain and palpitations  Was able to participate in PT  Walked the halls and went up and down one flight of stairs  He needed to rest at the top of the stairs for a few minutes but then resumed activity  Son lives in [facility]  Patient lives in [place] with many nieces and nephew checking in on him  Son works night shift and will not be able to be here today  Planning to move in with son but says it is difficult to decide when because he will have to find all new providers  Spoke with Savannah Montero via phone  He feels that overall his father has been doing well  Patient has had infrequent hospitalizations so they would like to "see how things go" and continue discussing     Is going to try to schedule a time for a family meeting with cousin Shantelle Garcia who provides the most care to patient  Has been able to eat and drinking  Normal bowel movements  Denies pain  Becomes very tearful when he talks about  wife  Denies depression and states "when I talk about her sometimes I just have to let it out but then I am fine " Denies anxiety  Inpatient consult to Palliative Care  Consult performed by: DON Jarrett  Consult ordered by: Daisy Lovell MD        Review of Systems   Constitutional: Positive for activity change and fatigue  Respiratory: Positive for cough and shortness of breath  All other systems reviewed and are negative  Historical Information   Past Medical History:   Diagnosis Date    Angina pectoris ([facility] )     Chronic kidney disease     Coronary artery disease     Diabetes mellitus ([facility] )     Glaucoma     Hypertension     Multiple myeloma ([facility] )     Occluded coronary artery stent     Left     Past Surgical History:   Procedure Laterality Date    BACK SURGERY      CARDIAC CATHETERIZATION  2004    COLONOSCOPY      CT BONE MARROW BIOPSY AND ASPIRATION  2018    CYSTOSCOPY      KNEE SURGERY      THROMBOENDARTERECTOMY Right     Cartoid     Social History     Socioeconomic History    Marital status:       Spouse name: None    Number of children: None    Years of education: None    Highest education level: None   Occupational History    None   Social Needs    Financial resource strain: None    Food insecurity:     Worry: None     Inability: None    Transportation needs:     Medical: None     Non-medical: None   Tobacco Use    Smoking status: Former Smoker     Types: Cigarettes    Smokeless tobacco: Never Used    Tobacco comment: former smoker   Substance and Sexual Activity    Alcohol use: Yes     Comment: socially; less than once a month    Drug use: No    Sexual activity: None   Lifestyle    Physical activity:     Days per week: None     Minutes per session: None    Stress: None   Relationships    Social connections:     Talks on phone: None     Gets together: None     Attends [employer] service: None     Active member of club or organization: None     Attends meetings of clubs or organizations: None     Relationship status: None    Intimate partner violence:     Fear of current or ex partner: None     Emotionally abused: None     Physically abused: None     Forced sexual activity: None   Other Topics Concern    None   Social History Narrative    Daily caffeine consumption     Family History   Problem Relation Age of Onset    Heart attack Father     Heart disease Mother     Diabetes Mother     Heart disease Sister     COPD Family        Meds/Allergies   all current active meds have been reviewed and current meds:   Current Facility-Administered Medications   Medication Dose Route Frequency    acetaminophen (TYLENOL) tablet 650 mg  650 mg Oral Q6H PRN    aspirin chewable tablet 81 mg  81 mg Oral Daily    atorvastatin (LIPITOR) tablet 40 mg  40 mg Oral HS    benzonatate (TESSALON PERLES) capsule 100 mg  100 mg Oral BID PRN    bimatoprost (LUMIGAN) 0 01 % ophthalmic solution 1 drop  1 drop Both Eyes HS    carvedilol (COREG) tablet 6 25 mg  6 25 mg Oral BID With Meals    fluticasone (FLONASE) 50 mcg/act nasal spray 1 spray  1 spray Each Nare PRN    furosemide (LASIX) injection 40 mg  40 mg Intravenous BID (diuretic)    guaiFENesin (MUCINEX) 12 hr tablet 600 mg  600 mg Oral BID    heparin (porcine) subcutaneous injection 5,000 Units  5,000 Units Subcutaneous Q8H [address]    insulin lispro (HumaLOG) 100 units/mL subcutaneous injection 1-5 Units  1-5 Units Subcutaneous HS    insulin lispro (HumaLOG) 100 units/mL subcutaneous injection 1-6 Units  1-6 Units Subcutaneous TID AC    lenalidomide (REVLIMID) capsule 5 mg  5 mg Oral Daily    pantoprazole (PROTONIX) EC tablet 40 mg  40 mg Oral Early Morning    potassium chloride (K-DUR,KLOR-CON) CR tablet 10 mEq  10 mEq Oral Daily    timolol (TIMOPTIC) 0 5 % ophthalmic solution 1 drop  1 drop Both Eyes Daily    vancomycin (VANCOCIN) IVPB (premix) 1,000 mg  15 mg/kg (Adjusted) Intravenous Q12H       No Known Allergies    Objective   /74 (BP Location: Right arm)   Pulse 70   Temp 97 7 °F (36 5 °C) (Oral)   Resp 17   Ht 5' 6" (1 676 m)   Wt 88 8 kg (195 lb 12 3 oz)   SpO2 100%   BMI 31 60 kg/m²     Physical Exam   Constitutional: He is oriented to person, place, and time  Vital signs are normal  He is cooperative  He appears ill  HENT:   Head: Normocephalic and atraumatic  Eyes: Conjunctivae, EOM and lids are normal    Pulmonary/Chest: Effort normal    No respiratory distress  Speaking full sentences without difficulty   Abdominal: Soft  Normal appearance  Neurological: He is alert and oriented to person, place, and time  Skin: Skin is warm, dry and intact  Psychiatric: He has a normal mood and affect  His speech is normal and behavior is normal  Judgment and thought content normal  Cognition and memory are normal          Code Status: Level 1 - Full Code  Advance Directive and Living Will:      Power of :    POLST:      Counseling / Coordination of Care  Total floor / unit time spent today 50 minutes

## 2019-03-15 NOTE — PLAN OF CARE
Problem: Potential for Falls  Goal: Patient will remain free of falls  Description  INTERVENTIONS:  - Assess patient frequently for physical needs  -  Identify cognitive and physical deficits and behaviors that affect risk of falls    -  [place] fall precautions as indicated by assessment   - Educate patient/family on patient safety including physical limitations  - Instruct patient to call for assistance with activity based on assessment  - Modify environment to reduce risk of injury  - Consider OT/PT consult to assist with strengthening/mobility  Outcome: Progressing

## 2019-03-15 NOTE — PROGRESS NOTES
General Cardiology   Progress Note -  Team One   Seth Pack 80 y o  male MRN: [id number]    Unit/Bed#: -[place] Encounter: [id number]    Assessment/ Plan  Shortness of breath, generalized weakness and fatigue  -Likely Multifactorial in the setting of  (combined HF--with probable HF exacerbation, CKD, myeloma on oral chemo, chronic anemia--Hgb 7 1---POA)  -Chest x-ray PA and lateral; cardiomegaly, lungs are clear, no pneumothorax; small costophrenic angle pleural effusions  -Procalcitonin--0 05     Acute on chronic diastolic and systolic HF; ischemic cardiomyopathy LVEF 50%  -Patient has improved from a volume overload/symptomatic perspective  -Minimal troponin elevation--0 14--0 14--0 16--0 15  -ProBNP 7,469 (previously 2,512 in 9/2018)  -2D echo from June 2015; LVEF 50%, mild diffuse hypokinesis, grade 1 diastolic dysfunction, the RV normal size and systolic function, mild AS, trace AI, trace TR, trace MR  -2D echo 03/14/2019; LVEF reduced to 30%, severe diffuse hypokinesis, wall thickness increased, grade 2 diastolic dysfunction, mild AS, mild AI, mild MR, trace TR  -Currently on IV furosemide 40 mg b i d--would hold off further IV diuresis today (he did receive a dose this am) unless he becomes SOB after blood transfusion, would give him a dose of IV lasix; will likely order oral diuretic regimen in the am  -Switched to Coreg 6 25 mg b i d --yesterday, but in the setting that he continues to have intermittent 2:1 AVB, asymptomatic, would stop his beta-blocker  -NO ACEI/ARB--limited d/t advanced CKD  -Consider initiating hydralazine 10 mg t i d and isordil 10 mg t i d, with hold parameters  -24 hour I&O balance; -1 9 L; overall -2 4 L  -Continue to closely monitor volume status; strict I&O; daily standing weights; 2 g sodium diet 1500 mL FR  -Continue closely monitor electrolytes; replete as needed     2:1 AVB  On telemetry review: x 2 episodes of intermittent bradycardia this am, appears to be a 2:1 AVB---patient was sleeping and asymptomatic at the time; he did have x 2 episodes as well yesterday--again asymptomatic  Given the concern for progression to high grade AVB--would favor discontinuing beta-blocker at this time    CAD  -Currently denies anginal symptoms  -12 Lead EKG 3/13:  NSR, first-degree AV block, occasional PVC, LBBB (chronic)  -Continue aspirin, and statin, beta-blocker--discontinued (see above)     CKD stage 4 (baseline creat 1 7-2 1)  -Creatinine on admission 2 13; 1 96 yesterday-- currently 2 16  -Continue closely monitor renal function; trend with daily BMP  -Avoid nephrotoxin agents; maintain normotension     Hypertension  -avg BP, 1170/70, last recorded at 96/51  -Discontinue Coreg (see above)  -Could start Isordil      Hyperlipidemia  -Lipid profile July 2018; cholesterol 97, triglycerides 202, HDL 40, LDL direct 17  -Continue atorvastatin 40 mg daily      Subjective  Review of Systems   Constitution: Positive for malaise/fatigue  Negative for chills and fever  HENT: Negative for congestion  Eyes: Negative for visual disturbance  Cardiovascular: Negative for chest pain, dyspnea on exertion, irregular heartbeat, leg swelling, near-syncope, orthopnea, palpitations, paroxysmal nocturnal dyspnea and syncope  Respiratory: Negative for cough and shortness of breath  Musculoskeletal: Negative for arthritis and myalgias  Gastrointestinal: Negative for abdominal pain, constipation, diarrhea and heartburn  Genitourinary: Negative for dysuria  Neurological: Negative for dizziness, focal weakness, headaches, light-headedness and weakness  All other systems reviewed and are negative  Objective:   Vitals: Blood pressure 96/51, pulse 65, temperature 97 7 °F (36 5 °C), temperature source Oral, resp  rate 18, height 5' 6" (1 676 m), weight 88 8 kg (195 lb 12 3 oz), SpO2 92 %  ,       Body mass index is 31 6 kg/m²  ,     Systolic (18IMY), SYH:444 , Min:96 , BAQ:245     Diastolic (24hrs), Av, Min:51, Max:98      Intake/Output Summary (Last 24 hours) at 3/15/2019 1110  Last data filed at 3/15/2019 1001  Gross per 24 hour   Intake 120 ml   Output 2050 ml   Net -1930 ml     Weight (last 2 days)     Date/Time   Weight    03/15/19 0600   88 8 (195 77)    19 0540   88 9 (195 99) patient not ambulating at this time    Weight: patient not ambulating at this time at 19 0540    19 2200   88 9 (195 99)    19 1804   88 9 (196)            EKG personally reviewed by DON Ramirez  Physical Exam   Constitutional: He is oriented to person, place, and time  He appears well-developed and well-nourished  No distress  HENT:   Head: Normocephalic and atraumatic  Mouth/Throat: Oropharynx is clear and moist    Eyes: Pupils are equal, round, and reactive to light  No scleral icterus  Neck: Normal range of motion  No JVD present  Cardiovascular: Normal rate, regular rhythm, normal heart sounds and intact distal pulses  No murmur heard  NSR, 1st degree AVB, with intermittent 2:1 AVB   Pulmonary/Chest: Effort normal and breath sounds normal  He has no wheezes  He has no rales  Abdominal: Soft  Bowel sounds are normal  He exhibits distension  Obese   Musculoskeletal: Normal range of motion  He exhibits no edema  Neurological: He is alert and oriented to person, place, and time  Skin: Skin is warm and dry  Capillary refill takes less than 2 seconds  He is not diaphoretic  There is pallor  Psychiatric: He has a normal mood and affect  Nursing note and vitals reviewed        LABORATORY RESULTS  Results from last 7 days   Lab Units 19  0540 19  0300 19  2348   TROPONIN I ng/mL 0 15* 0 16* 0 14*     CBC with diff:   Results from last 7 days   Lab Units 03/15/19  0920 19  0539 19  2348 19  1927   WBC Thousand/uL 5 05 4 64  --  5 08   HEMOGLOBIN g/dL 7 6* 7 1*  --  8 0*   HEMATOCRIT % 24 8* 22 9*  --  25 7*   MCV fL 92 91  -- 92   PLATELETS Thousands/uL 158 147* 159 187   MCH pg 28 1 28 1  --  28 5   MCHC g/dL 30 6* 31 0*  --  31 1*   RDW % 17 7* 17 5*  --  17 4*   MPV fL 9 5 9 8 9 5 10 7   NRBC AUTO /100 WBCs 0 0  --  0       CMP:  Results from last 7 days   Lab Units 03/15/19  0920 19  0540 19  1927   POTASSIUM mmol/L 3 5 3 2* 3 4*   CHLORIDE mmol/L 105 110* 106   CO2 mmol/L 23 22 23   BUN mg/dL 25 21 23   CREATININE mg/dL 2 16* 1 93* 2 13*   CALCIUM mg/dL 8 6 8 7 8 3   AST U/L  --   --  7   ALT U/L  --   --  18   ALK PHOS U/L  --   --  129*   EGFR ml/min/1 73sq m 26 30 27       BMP:  Results from last 7 days   Lab Units 03/15/19  0920 19  0540 19  1927   POTASSIUM mmol/L 3 5 3 2* 3 4*   CHLORIDE mmol/L 105 110* 106   CO2 mmol/L 23 22 23   BUN mg/dL 25 21 23   CREATININE mg/dL 2 16* 1 93* 2 13*   CALCIUM mg/dL 8 6 8 7 8 3       Lab Results   Component Value Date    NTBNP 7,469 (H) 2019    NTBNP 2,512 (H) 10/22/2018             Results from last 7 days   Lab Units 19  1927   MAGNESIUM mg/dL 2 2                           Lipid Profile:   Lab Results   Component Value Date    CHOL 81 10/05/2015    CHOL 109 2015    CHOL 118 2015     Lab Results   Component Value Date    HDL 40 2018    HDL 37 (L) 2017    HDL 48 10/05/2015     Lab Results   Component Value Date    LDLCALC 17 2018    LDLCALC 10 2017    LDLCALC 13 10/05/2015     Lab Results   Component Value Date    TRIG 202 (H) 2018    TRIG 180 (H) 2017    TRIG 100 10/05/2015       Cardiac testing:   Results for orders placed during the hospital encounter of 19   Echo complete with contrast if indicated    Narrative [facility] [address] [facility]  [phone]    Transthoracic Echocardiogram  2D, M-mode, Doppler, and Color Doppler    Study date:  14-Mar-2019    Patient: Gregoria Kearns  MR number: [id number]  Account number: [de-identified]  : 22-Sep-1930  Age: 80 years  Gender: Male  Status: Inpatient  Location: Bedside  Height: 66 in  Weight: 194 7 lb  BP: 139/ 67 mmHg    Indications: Heart failure  Diagnoses: I50 9 - Heart failure, unspecified    Sonographer:  Amparo Fisher RDCS  Primary Physician:  Venson Boeck, MD  Referring Physician:  Kitty Farias PA-C  Group:  [facility]  Interpreting Physician:  Anita Gupta MD    SUMMARY    LEFT VENTRICLE:  The ventricle was mildly dilated  Systolic function was markedly reduced by visual assessment  Ejection fraction was estimated to be 30 %  There was severe diffuse hypokinesis with distinct regional wall motion abnormalities  Wall thickness was mildly increased  Features were consistent with a pseudonormal left ventricular filling pattern, with concomitant abnormal relaxation and increased filling pressure (grade 2 diastolic dysfunction)  LEFT ATRIUM:  The atrium was mildly dilated  MITRAL VALVE:  There was mild regurgitation  AORTIC VALVE:  The valve was trileaflet  Leaflets exhibited normal thickness, moderate calcification, and moderately reduced cuspal separation  Transaortic velocity was increased due to valvular stenosis  There was mild stenosis  There was mild regurgitation  TRICUSPID VALVE:  There was trace regurgitation  HISTORY: PRIOR HISTORY: DM2  Hyperlipidemia  CKD4  Systolic congestive heart failure  Shortness of breath  Hypertension  CAD  PROCEDURE: The procedure was performed at the bedside  This was a routine study  The transthoracic approach was used  The study included complete 2D imaging, M-mode, complete spectral Doppler, and color Doppler  The heart rate was 90 bpm,  at the start of the study  Echocardiographic views were limited due to poor acoustic window availability  This was a technically difficult study  LEFT VENTRICLE: The ventricle was mildly dilated  Systolic function was markedly reduced by visual assessment   Ejection fraction was estimated to be 30 %  There was severe diffuse hypokinesis with distinct regional wall motion  abnormalities  Wall thickness was mildly increased  DOPPLER: The ratio of early ventricular filling to atrial contraction velocities was within the normal range  Features were consistent with a pseudonormal left ventricular filling  pattern, with concomitant abnormal relaxation and increased filling pressure (grade 2 diastolic dysfunction)  RIGHT VENTRICLE: The size was normal  Systolic function was normal  DOPPLER: Systolic pressure was not estimated  LEFT ATRIUM: The atrium was mildly dilated  RIGHT ATRIUM: Size was normal     MITRAL VALVE: Valve structure was normal  There was normal leaflet separation  DOPPLER: The transmitral velocity was within the normal range  There was no evidence for stenosis  There was mild regurgitation  AORTIC VALVE: The valve was trileaflet  Leaflets exhibited normal thickness, moderate calcification, and moderately reduced cuspal separation  DOPPLER: Transaortic velocity was increased due to valvular stenosis  There was mild stenosis  There was mild regurgitation  TRICUSPID VALVE: The valve structure was normal  There was normal leaflet separation  DOPPLER: The transtricuspid velocity was within the normal range  There was no evidence for stenosis  There was trace regurgitation  PULMONIC VALVE: Leaflets exhibited normal thickness, no calcification, and normal cuspal separation  DOPPLER: The transpulmonic velocity was within the normal range  There was no regurgitation  PERICARDIUM: There was no pericardial effusion  AORTA: The root exhibited normal size  SYSTEMIC VEINS: IVC: The inferior vena cava was normal in size      SYSTEM MEASUREMENT TABLES    2D  %FS: 22 13 %  Ao Diam: 3 36 cm  EDV(Teich): 149 54 ml  EF Biplane: 28 47 %  EF(Teich): 44 16 %  ESV(Teich): 83 5 ml  IVSd: 1 16 cm  LA Area: 24 03 cm2  LA Diam: 4 86 cm  LVEDV MOD A2C: 118 86 ml  LVEDV MOD A4C: 130 77 ml  LVEDV MOD BP: 126 24 ml  LVEF MOD A2C: 27 51 %  LVEF MOD A4C: 33 56 %  LVESV MOD A2C: 86 16 ml  LVESV MOD A4C: 86 89 ml  LVESV MOD BP: 90 3 ml  LVIDd: 5 53 cm  LVIDs: 4 31 cm  LVLd A2C: 7 97 cm  LVLd A4C: 8 22 cm  LVLs A2C: 8 23 cm  LVLs A4C: 7 48 cm  LVOT Diam: 2 17 cm  LVPWd: 1 15 cm  RA Area: 15 34 cm2  RVIDd: 3 64 cm  SV MOD A2C: 32 7 ml  SV MOD A4C: 43 89 ml  SV(Teich): 66 05 ml    CF  MR Als  Jason: 0 3 m/s  MR Flow: 92 34 ml/s  MR Rad: 0 69 cm    CW  AV Env  Ti: 309 11 ms  AV MaxP 28 mmHg  AV VTI: 45 84 cm  AV Vmax: 2 08 m/s  AV Vmean: 1 48 m/s  AV meanP 65 mmHg    MM  TAPSE: 2 26 cm    PW  CRISTINE (VTI): 1 22 cm2  CRISTINE Vmax: 1 37 cm2  E': 0 06 m/s  E/E': 19 74  LVOT Env  Ti: 329 87 ms  LVOT VTI: 15 19 cm  LVOT Vmax: 0 77 m/s  LVOT Vmean: 0 46 m/s  LVOT maxP 4 mmHg  LVOT meanP 04 mmHg  LVSI Dopp: 28 32 ml/m2  LVSV Dopp: 56 07 ml  MV A Jason: 0 85 m/s  MV Dec [place]: 7 88 m/s2  MV DecT: 138 56 ms  MV E Jason: 1 09 m/s  MV E/A Ratio: 1 28  MV PHT: 40 18 ms  MVA By PHT: 5 48 cm2    [facility] Accredited Echocardi[facility]    Prepared and electronically signed by    Catia Burgos MD  Signed 14-Mar-2019 12:50:02       No results found for this or any previous visit  No results found for this or any previous visit  No procedure found  No results found for this or any previous visit        Meds/Allergies   all current active meds have been reviewed, current meds:   Current Facility-Administered Medications   Medication Dose Route Frequency    acetaminophen (TYLENOL) tablet 650 mg  650 mg Oral TID    aspirin chewable tablet 81 mg  81 mg Oral Daily    atorvastatin (LIPITOR) tablet 40 mg  40 mg Oral HS    benzonatate (TESSALON PERLES) capsule 100 mg  100 mg Oral BID PRN    bimatoprost (LUMIGAN) 0 01 % ophthalmic solution 1 drop  1 drop Both Eyes HS    carvedilol (COREG) tablet 6 25 mg  6 25 mg Oral BID With Meals    fluticasone (FLONASE) 50 mcg/act nasal spray 1 spray  1 spray Each Nare PRN    guaiFENesin (MUCINEX) 12 hr tablet 600 mg  600 mg Oral BID    heparin (porcine) subcutaneous injection 5,000 Units  5,000 Units Subcutaneous Q8H [address]    insulin lispro (HumaLOG) 100 units/mL subcutaneous injection 1-5 Units  1-5 Units Subcutaneous HS    insulin lispro (HumaLOG) 100 units/mL subcutaneous injection 1-6 Units  1-6 Units Subcutaneous TID AC    lenalidomide (REVLIMID) capsule 5 mg  5 mg Oral Daily    miconazole 2 % cream   Topical BID    pantoprazole (PROTONIX) EC tablet 40 mg  40 mg Oral Early Morning    polyethylene glycol (MIRALAX) packet 17 g  17 g Oral Daily    potassium chloride (K-DUR,KLOR-CON) CR tablet 10 mEq  10 mEq Oral Daily    senna (SENOKOT) tablet 8 6 mg  1 tablet Oral HS    timolol (TIMOPTIC) 0 5 % ophthalmic solution 1 drop  1 drop Both Eyes Daily    vancomycin (VANCOCIN) IVPB (premix) 1,000 mg  15 mg/kg (Adjusted) Intravenous Q12H    and PTA meds:   Prior to Admission Medications   Prescriptions Last Dose Informant Patient Reported? Taking? ONE TOUCH ULTRA TEST test strip  Family Member No Yes   Sig: The patient test once daily  Pippa Selvin LANCETS 46Q MISC  Family Member No Yes   Sig: by Does not apply route daily   UNKNOWN TO PATIENT   Yes Yes   acetaminophen (TYLENOL) 325 mg tablet  Family Member No Yes   Sig: Take 1 - 2 tabs PO Q4 PRN pain   aspirin 81 MG tablet  Family Member Yes Yes   Sig: Take 81 mg by mouth daily  atorvastatin (LIPITOR) 40 mg tablet  Family Member No Yes   Sig: TAKE 1 TABLET AT BEDTIME   benzonatate (TESSALON PERLES) 100 mg capsule  Family Member No Yes   Sig: Take 1 capsule (100 mg total) by mouth 2 (two) times a day as needed for cough   bimatoprost (LUMIGAN) 0 01 % ophthalmic drops  Family Member Yes Yes   Sig: Apply to eye   esomeprazole (NexIUM) 40 MG capsule  Family Member Yes Yes   Sig: Take 40 mg by mouth every evening     fluticasone (FLONASE) 50 mcg/act nasal spray  Family Member Yes Yes   Sig: into each nostril as needed     furosemide (LASIX) 40 mg tablet  Family Member No Yes   Sig: Take 0 5 tablets (20 mg total) by mouth daily   furosemide (LASIX) 40 mg tablet  Family Member No Yes   Sig: TAKE 1 TABLET DAILY ,      EXCEPT ONCE A WEEK TAKE 1  ADDITIONAL TABLET   glimepiride (AMARYL) 2 mg tablet  Family Member No Yes   Sig: TAKE 1 TABLET DAILY AS     DIRECTED   Patient taking differently: TAKE 1 5 TABLETS DAILY AS  DIRECTED total of 3 mg   glucose blood (ACCU-CHEK CHANDRAKANT PLUS) test strip  Family Member Yes Yes   Sig: by In Vitro route   guaiFENesin (MUCINEX) 600 mg 12 hr tablet  Family Member No Yes   Sig: Take 1 tablet (600 mg total) by mouth 2 (two) times a day   lenalidomide (REVLIMID) 5 MG CAPS   No Yes   Sig: Take one capsule by mouth for 14 days on then 7 days off [id number]   metoprolol succinate (TOPROL-XL) 25 mg 24 hr tablet  Family Member No Yes   Sig: TAKE 1 TABLET DAILY   nitroglycerin (NITROSTAT) 0 4 mg SL tablet  Family Member Yes Yes   Sig: Place 1 tablet under the tongue every 5 (five) minutes as needed   potassium chloride (KLOR-CON 10) 10 mEq tablet  Family Member No Yes   Sig: Take 1 tablet (10 mEq total) by mouth daily   timolol (TIMOPTIC) 0 5 % ophthalmic solution  Family Member Yes Yes      Facility-Administered Medications: None     Medications Prior to Admission   Medication    acetaminophen (TYLENOL) 325 mg tablet    aspirin 81 MG tablet    atorvastatin (LIPITOR) 40 mg tablet    benzonatate (TESSALON PERLES) 100 mg capsule    bimatoprost (LUMIGAN) 0 01 % ophthalmic drops    esomeprazole (NexIUM) 40 MG capsule    fluticasone (FLONASE) 50 mcg/act nasal spray    furosemide (LASIX) 40 mg tablet    furosemide (LASIX) 40 mg tablet    glimepiride (AMARYL) 2 mg tablet    glucose blood (ACCU-CHEK CHANDRAKANT PLUS) test strip    guaiFENesin (MUCINEX) 600 mg 12 hr tablet    lenalidomide (REVLIMID) 5 MG CAPS    metoprolol succinate (TOPROL-XL) 25 mg 24 hr tablet    nitroglycerin (NITROSTAT) 0 4 mg SL tablet    ONE TOUCH ULTRA TEST test strip    ONETOUCH DELICA LANCETS 23Z MISC    potassium chloride (KLOR-CON 10) 10 mEq tablet    timolol (TIMOPTIC) 0 5 % ophthalmic solution    UNKNOWN TO PATIENT        Assessment:  Principal Problem:    Acute on chronic combined systolic and diastolic heart failure (HCC)  Active Problems:    Type 2 diabetes mellitus without complication, without long-term current use of insulin (HCC)    Mixed hyperlipidemia    Stage 4 chronic kidney disease (HCC)    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)    Kappa light chain myeloma (HCC)    UTI (urinary tract infection)    Cough    Generalized weakness    NSTEMI (non-ST elevated myocardial infarction) ([facility])    Constipation    Counseling / Coordination of Care  Total floor / unit time spent today 20 minutes  Greater than 50% of total time was spent with the patient and / or family counseling and / or coordination of care  ** Please Note: Dragon 360 Dictation voice to text software may have been used in the creation of this document   **

## 2019-03-15 NOTE — ASSESSMENT & PLAN NOTE
· Mild troponin elevation noted  Likely type 2 in the setting of heart failure exacerbation and anemia    · No complaints of chest pain  · Cardiology following  · Continue beta-blocker and aspirin

## 2019-03-15 NOTE — ASSESSMENT & PLAN NOTE
· Combination of Age/ chronic disease including CHF and Myeloma and revlimid  · 1 unit PRBC pending  Hemoglobin 7 6 today    · Hemoccult stool pending- note progressive decline over last few months  · Monitor hemoglobin

## 2019-03-15 NOTE — ASSESSMENT & PLAN NOTE
· Baseline appears to be 1 8-2 1  Follows with Dr Patricia Sargent as outpatient  · Will need to monitor with IV diuresis  Creatinine did bump today to 2 16, anemia could be contributing     · Bmp in AM

## 2019-03-15 NOTE — ASSESSMENT & PLAN NOTE
· On Revlimid monotherapy - ? anemia potential side effect  · Follows with Dr Lorenzo Ribeiro of Heme  Will hold off on oncology evaluation for now although consider if hemoglobin continues to drop and no evidence of active bleeding

## 2019-03-15 NOTE — SOCIAL WORK
LOS 2 DAYS  PATIENT IS NOT A BUNDLE  PATIENT IS NOT A READMISSION  CM met with patient at bedside  Patient reported to CM that he lives in a 2 story home, use walker, and his home only has 1 step  Patient reported to CM that he has Hx of VNA however, No Hx of Rehab  Patient reported to CM that he wasn't interest in rehab at first however, patient stated to CM that other people have been at [facility] and he would like a referral to be sent there  Patient does not drive, nephew takes him to appointment, and nephew will be able to transport him home  Patient report that [facility] after [facility]  Patient informed CM that his son is POA and reported he was on the phone with him  CM reviewed d/c planning process including the following: identifying help at home, patient preference for d/c planning needs, [facility] Med[facility] to [facility] program, availability of treatment team to discuss questions or concerns patient and/or family may have regarding understanding medications and recognizing signs and symptoms once discharged  CM also encouraged patient to follow up with all recommended appointments after discharge  Patient advised of importance for patient and family to participate in managing patients medical well being  Referral to [facility] as requested by patient, will need auth, and may need transport pending

## 2019-03-15 NOTE — PLAN OF CARE
Problem: DISCHARGE PLANNING - CARE MANAGEMENT  Goal: Discharge to post-acute care or home with appropriate resources  Description  INTERVENTIONS:  - Conduct assessment to determine patient/family and health care team treatment goals, and need for post-acute services based on payer coverage, community resources, and patient preferences, and barriers to discharge  - Address psychosocial, clinical, and financial barriers to discharge as identified in assessment in conjunction with the patient/family and health care team  - Arrange appropriate level of post-acute services according to patient?s   needs and preference and payer coverage in collaboration with the physician and health care team  - Communicate with and update the patient/family, physician, and health care team regarding progress on the discharge plan  - Arrange appropriate transportation to post-acute venues  Note:   LOS 2 DAYS  PATIENT IS NOT A BUNDLE  PATIENT IS NOT A READMISSION  CM met with patient at bedside  Patient reported to CM that he lives in a 2 story home, use walker, and his home only has 1 step  Patient reported to CM that he has Hx of VNA however, No Hx of Rehab  Patient reported to CM that he wasn't interest in rehab at first however, patient stated to CM that other people have been at [facility] and he would like a referral to be sent there  Patient does not drive, nephew takes him to appointment, and nephew will be able to transport him home  [facility] report [facility] [place] [facility]  Patient informed CM that his son is POA and reported he was on the phone with him  CM reviewed d/c planning process including the following: identifying help at home, patient preference for d/c planning needs, [facility] program, availability of treatment team to discuss questions or concerns patient and/or family may have regarding understanding medications and recognizing signs and symptoms once discharged    PRINCE also encouraged patient to follow up with all recommended appointments after discharge  Patient advised of importance for patient and family to participate in managing patient?s medical well being  Referral to [facility] as requested by patient, will need auth, and may need transport pending

## 2019-03-15 NOTE — ASSESSMENT & PLAN NOTE
· Consider viral   CXR without any obvious infiltrates or consolidations  Procalcitonin low  · Continue with supportive care, Mucinex and Tessalon--seems to be improving     · Pulmonary toilet

## 2019-03-15 NOTE — ASSESSMENT & PLAN NOTE
Lab Results   Component Value Date    HGBA1C 6 9 (H) 07/17/2018       Recent Labs     03/14/19  1207 03/14/19  1622 03/14/19  2103 03/15/19  0724   POCGLU 116 164* 214* 121         · On Amaryl at baseline, currently on hold     · Continue with SSI  · Diabetic diet  · Monitor Accuchecks

## 2019-03-15 NOTE — ASSESSMENT & PLAN NOTE
· Patient presents with dyspnea on exertion and abdominal distention  BNP is elevated, chest x-ray appears congested  Patient is symptomatically improved  · Cardiology is following, currently being diuresed however note creatinine bump today  Still within relative baseline however will hold off on PM dose of Lasix today  · Echocardiogram showing ejection fraction of 30% with severe diffuse hypokinesis  · CXR appears possibly overloaded compared to prior   · Continue beta-blocker although this was changed from metoprolol to Coreg 6 25 mg p  O  B i d  No Ace/Arb the given advanced CKD  · Final monitor I&Os, daily weights  Continue with low-sodium diet and fluid restriction

## 2019-03-15 NOTE — PROGRESS NOTES
[facility] Internal Medicine -    Progress Note - Edmund Robb 9/22/1930, 80 y o  male MRN: [id number]    Unit/Bed#: -Nisreen Encounter: [id number]    Primary Care Provider: Edenilson Giordano MD   Date and time admitted to hospital: 3/13/2019  6:51 PM    * Acute on chronic combined systolic and diastolic heart failure ([facility] )  Assessment & Plan  · Patient presents with dyspnea on exertion and abdominal distention  BNP is elevated, chest x-ray appears congested  Patient is symptomatically improved  · Cardiology is following, currently being diuresed however note creatinine bump today  Still within relative baseline however will hold off on PM dose of Lasix today  · Echocardiogram showing ejection fraction of 30% with severe diffuse hypokinesis  · CXR appears possibly overloaded compared to prior   · Continue beta-blocker although this was changed from metoprolol to Coreg 6 25 mg p  O  B i d  No Ace/Arb the given advanced CKD  · Final monitor I&Os, daily weights  Continue with low-sodium diet and fluid restriction  NSTEMI (non-ST elevated myocardial infarction) [facility])  Assessment & Plan  · Mild troponin elevation noted  Likely type 2 in the setting of heart failure exacerbation and anemia  · No complaints of chest pain  · Cardiology following  · Continue beta-blocker and aspirin    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 ([facility] )  Assessment & Plan  · Combination of Age/ chronic disease including CHF and Myeloma and revlimid  · 1 unit PRBC pending  Hemoglobin 7 6 today  · Hemoccult stool pending- note progressive decline over last few months  · Monitor hemoglobin    Generalized weakness  Assessment & Plan  · multifactorial decline given above  · Goals of care need to be discussed given age and limited rehab potential   Palliative medicine consult pending  · PT/OT is recommending rehab however  Patient lives home alone       Stage 4 chronic kidney disease ([facility] )  Assessment & Plan  · Baseline appears to be 1 8-2 1  Follows with Dr Maciej Eng as outpatient  · Will need to monitor with IV diuresis  Creatinine did bump today to 2 16, anemia could be contributing  · Bmp in AM    Kappa light chain myeloma (HCC)  Assessment & Plan  · On Revlimid monotherapy - ? anemia potential side effect  · Follows with Dr Lian Cordero of Heme  Will consult hematology regarding Revlimid  Unclear if this is contributing to anemia/sob  Cough  Assessment & Plan  · Consider viral   CXR without any obvious infiltrates or consolidations  Procalcitonin low  · Continue with supportive care, Mucinex and Tessalon--seems to be improving  · Pulmonary toilet     Type 2 diabetes mellitus without complication, without long-term current use of insulin [facility])  Assessment & Plan  Lab Results   Component Value Date    HGBA1C 6 9 (H) 07/17/2018       Recent Labs     03/14/19  1207 03/14/19  1622 03/14/19  2103 03/15/19  0724   POCGLU 116 164* 214* 121         · On Amaryl at baseline, currently on hold  · Continue with SSI  · Diabetic diet  · Monitor Accuchecks    UTI (urinary tract infection)  Assessment & Plan  · UA positive for nitrites, leuks, innumerable bacteria  · Started on Rocephin in ED - switch to Vancomycin for now   · F/u urine cx - Staph aureus, no prior urine cultures noted  F/U final culture with sensitivities  Constipation  Assessment & Plan  · Patient reports constipation, no bowel movement for 2-3 days  Will add bowel regimen  Mixed hyperlipidemia  Assessment & Plan  · Continue statin    Pharmacologic: Heparin     Mechanical VTE Prophylaxis in Place: Yes    Patient Centered Rounds: I have performed bedside rounds with nursing staff today      Discussions with Specialists or Other Care Team Provider:   nursing, case management, attending Dr Prasad Calix, will plan to discuss with cardiology regarding renal function/diuresis    Education and Discussions with Family / Patient: patient and son Penny Coates over the phone     Time Spent for Care: 30 minutes  More than 50% of total time spent on counseling and coordination of care as described above  Current Length of Stay: 2 day(s)    Current Patient Status: Inpatient   Certification Statement: The patient will continue to require additional inpatient hospital stay due to Blood transfusion, intravenous diuresis, additional Cardiology recommendations, PT/OT, palliative Medicine evaluation/recommendations  Discharge Plan / Estimated Discharge Date:  Not medically stable  Needs blood transfusion, additional diuresis, additional Cardiology recommendations  Palliative medicine consult pending as well  Code Status: Level 1 - Full Code      Subjective:   Patient states that he feels better than he did on admission  Denies any chest pain or shortness of breath  No lower extremity edema  No bloody bowel movements, in fact he is feeling constipated and has not had a bowel movement in 2-3 days  Lives at home alone  Objective:     Vitals:   Temp (24hrs), Av 5 °F (36 4 °C), Min:97 4 °F (36 3 °C), Max:97 5 °F (36 4 °C)    Temp:  [97 4 °F (36 3 °C)-97 5 °F (36 4 °C)] 97 5 °F (36 4 °C)  HR:  [72-80] 80  Resp:  [18] 18  BP: (112-143)/(61-98) 143/98  SpO2:  [92 %-99 %] 92 %  Body mass index is 31 6 kg/m²  Input and Output Summary (last 24 hours): Intake/Output Summary (Last 24 hours) at 3/15/2019 1020  Last data filed at 3/15/2019 1001  Gross per 24 hour   Intake 120 ml   Output 2050 ml   Net -1930 ml       Physical Exam:     Physical Exam   Constitutional: He is oriented to person, place, and time  No distress  HENT:   Head: Normocephalic  Eyes: Conjunctivae are normal    Neck: Normal range of motion  No JVD present  Cardiovascular: Normal rate and regular rhythm  No murmur heard  Pulmonary/Chest: Effort normal and breath sounds normal    Abdominal: Soft  Bowel sounds are normal  He exhibits distension     Musculoskeletal: Normal range of motion  He exhibits no edema  Neurological: He is alert and oriented to person, place, and time  Skin: Skin is warm and dry  Psychiatric: He has a normal mood and affect  Nursing note and vitals reviewed  Additional Data:     Labs:    Results from last 7 days   Lab Units 03/15/19  0920   WBC Thousand/uL 5 05   HEMOGLOBIN g/dL 7 6*   HEMATOCRIT % 24 8*   PLATELETS Thousands/uL 158   NEUTROS PCT % 56   LYMPHS PCT % 26   MONOS PCT % 10   EOS PCT % 6     Results from last 7 days   Lab Units 03/15/19  0920  03/13/19  1927   POTASSIUM mmol/L 3 5   < > 3 4*   CHLORIDE mmol/L 105   < > 106   CO2 mmol/L 23   < > 23   BUN mg/dL 25   < > 23   CREATININE mg/dL 2 16*   < > 2 13*   CALCIUM mg/dL 8 6   < > 8 3   ALK PHOS U/L  --   --  129*   ALT U/L  --   --  18   AST U/L  --   --  7    < > = values in this interval not displayed               Recent Cultures (last 7 days):     Results from last 7 days   Lab Units 03/13/19 2012   URINE CULTURE  >100,000 cfu/ml Staphylococcus aureus*       Last 24 Hours Medication List:     Current Facility-Administered Medications:  acetaminophen 650 mg Oral Q6H PRN James Chung PA-C    aspirin 81 mg Oral Daily James Chung PA-C    atorvastatin 40 mg Oral HS James Chung PA-C    benzonatate 100 mg Oral BID PRN James Chung PA-C    bimatoprost 1 drop Both Eyes HS James Chung PA-C    carvedilol 6 25 mg Oral BID With Meals DON Alcala    fluticasone 1 spray Each Nare PRN James Chung PA-C    guaiFENesin 600 mg Oral BID James Chung PA-C    heparin (porcine) 5,000 Units Subcutaneous Q8H [address] Angela Bright PA-C    insulin lispro 1-5 Units Subcutaneous HS Angela Caal PA-C    insulin lispro 1-6 Units Subcutaneous TID AC Angela Bright PA-C    lenalidomide 5 mg Oral Daily Angela Caal PA-C    pantoprazole 40 mg Oral Early Morning Angela Caal PA-C    polyethylene glycol 17 g Oral Daily DON Jasso potassium chloride 10 mEq Oral Daily Andraecandace Cabrera PA-C    senna 1 tablet Oral HS DON Mccartney    timolol 1 drop Both Eyes Daily Angela Caal PA-C    vancomycin 15 mg/kg (Adjusted) Intravenous Q12H Bernie Hernandez MD Last Rate: 1,000 mg (03/15/19 1651)        Today, Patient Was Seen By: DON Coe    ** Please Note: Dragon 360 Dictation voice to text software may have been used in the creation of this document   **

## 2019-03-15 NOTE — OCCUPATIONAL THERAPY NOTE
[address] [facility] Evaluation     Patient Name: Michael Alvarez  Today's Date: 3/15/2019  Problem List  Patient Active Problem List   Diagnosis    Parenchymal renal hypertension    Type 2 diabetes mellitus without complication, without long-term current use of insulin ([facility] )    Mixed hyperlipidemia    Esophageal reflux    Cerebral infarction, watershed distribution, bilateral, acute    Stage 4 chronic kidney disease (HCC)    Benign prostatic hyperplasia    Ischemic cardiomyopathy    Left thigh pain    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)    Other proteinuria    Dizziness    Kappa light chain myeloma ([facility] )    UTI (urinary tract infection)    Cough    Cellulitis of right lower limb    MRSA (methicillin resistant staph aureus) culture positive    Acute on chronic combined systolic and diastolic heart failure (HCC)    Generalized weakness    NSTEMI (non-ST elevated myocardial infarction) ([facility] )    Constipation     Past Medical History  Past Medical History:   Diagnosis Date    Angina pectoris (HCC)     Chronic kidney disease     Coronary artery disease     Diabetes mellitus ([facility] )     Glaucoma     Hypertension     [facility]     Occluded coronary artery stent     Left     Past Surgical History  Past Surgical History:   Procedure Laterality Date    BACK SURGERY      CARDIAC CATHETERIZATION  04/05/2004    COLONOSCOPY      CT BONE MARROW BIOPSY AND ASPIRATION  9/24/2018    CYSTOSCOPY      KNEE SURGERY      THROMBOENDARTERECTOMY Right     Cartoid        03/15/19 1008   Note Type   Note type Eval/Treat   Restrictions/Precautions   Weight Bearing Precautions Per Order No   Other Precautions Chair Alarm; Bed Alarm; Fall Risk;Telemetry   Pain Assessment   Pain Assessment No/denies pain   Home Living   Type of Home House; Other (Comment)  (+ LUIS)   Home Layout Two level;Bed/bath upstairs;1/2 bath on main level; Laundry in basement   [employer] Bathroom Toilet Standard   Bathroom Equipment Grab bars in shower; Shower chair   Bathroom Accessibility Accessible; Other (Comment)  (primary bathroom up flight of stairs)   Home Equipment Cane;Grab bars   Additional Comments Pt reports living alone in 2 SH  Pt reports having L handrail on steps when ascending and grasp rail w/ B UE   Prior Function   Level of [place] Independent with ADLs and functional mobility   Lives With Alone   Receives Help From Family; Other (Comment)  (supportive son, niece/ nephew)   ADL Assistance Independent   IADLs Needs assistance  (- drive)   Falls in the last 6 months 1 to 4  (pt reports 1 fall on ice)   Vocational Retired   Comments Pt reports no AD for functional mobility at baseline, but uses furniture / walls for support  Pt does not drive, and family assist w/ shopping, meal prep  Pt reports doing laundry in basement   Lifestyle   Autonomy Pt reports I w/ ADL PTA and no AD for functional mobility   Reciprocal Relationships Pt reports living alone  Supportive son, niece / nephew asisst w/ IADL (shopping, meal prep)   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  ADL   Eating Assistance 6  Modified independent   Eating Deficit Setup   Grooming Assistance 6  Modified Independent   Grooming Deficit Setup; Increased time to complete   UB Bathing Assistance Unable to assess  (demonstrated ROM / coordination to complete)   LB Bathing Assistance Unable to assess   UB Dressing Assistance 4  Minimal Assistance   UB Dressing Deficit Pull around back; Fasteners; Increased time to complete;Setup   LB Dressing Assistance 4  Minimal Assistance   LB Dressing Deficit Pull up over hips; Fasteners;Setup   Bed Mobility   Supine to Sit Unable to assess   Sit to Supine Unable to assess   Additional Comments Pt seated OOB in chair upon arrival and post eval w/ needs met, call bell in reach, and chair alarm activated Transfers   Sit to Stand 5  Supervision   Additional items Assist x 1; Armrests; Increased time required   Stand to Sit 5  Supervision   Additional items Assist x 1; Armrests; Increased time required   Functional Mobility   Functional Mobility 4  Minimal assistance   Additional Comments min HHA, reaching for furniture / walls w/ in room   Additional items Hand hold assistance  (no AD)   Balance   Static Sitting Good   Dynamic Sitting Fair   Static Standing Fair -   Ambulatory Poor +   Activity Tolerance   Activity Tolerance Patient limited by fatigue   Medical Staff Made Aware spoke to PTBarbara   Nurse Made Aware spoke to RNRivas   RUE Assessment   RUE Assessment WFL  (observed WFL to complete ADL)   RUE Strength   RUE Overall Strength Within Functional Limits - able to perform ADL tasks with strength   LUE Assessment   LUE Assessment WFL  (observed WFL to complete ADL)   LUE Strength   LUE Overall Strength Within Functional Limits - able to perform ADL tasks with strength   Hand Function   Gross Motor Coordination Functional   Fine Motor Coordination Functional  (able to manage fasteners w/ increased time)   Sensation   Light Touch Not tested   Sharp/Dull Not tested   Vision-Basic Assessment   Current Vision Wears glasses only for reading   Cognition   Overall Cognitive Status [facility]   Arousal/Participation Alert; Cooperative   Attention Attends with cues to redirect   Orientation Level Oriented to person;Oriented to place;Oriented to time;Oriented to situation  (able to report month , year w/ + time)   Memory Decreased recall of recent events  (increaesd time)   Following Commands Follows multistep commands with increased time or repetition   Comments Identified pt by full name and birthdate  Pt able to participate in conversation w/ increased time  Able to provide social history w/ increased time for consistent recall  Alert, cooperative and generally oriented   Pt   Assessment   Limitation Decreased ADL status; Decreased endurance;Decreased Safe judgement during ADL;Decreased self-care trans;Decreased high-level ADLs   Assessment Pt is an 79yo male [facility] on 3/13/2019  Pt presents w/ acute on chronic combined systolic and diastolic heart failure and significant PMH impacting his occupational performance including CHF, glaucoma, HTN, DM, CAD, knee surgery, hx back surgery  Pt reports living alone in [facility] PTA w/ full flight of stairs to primary bathroom, bedroom, and laundry in basement  Pt reports I w/ ADL w/ out use of AD, and supportive family assist w/ IADL (- drive, shop)  Upon eval, pt completed LBD/ UBD w/ min A after set- up  Pt engaged in functional mobility w/ min HHA w/ out use of AD  Pt benefits from cues for tech and hand placement  Pt able to participate in conversation w/ increased time to provide social history and discuss interests  Pt benefits from rest breaks during ADL performance due to fatigue, and increased time to complete  Pt presents w/ generalized weakness / deconditioning, decreased standing tolerance, decreased standing balance, decreased endurance impacting his I w/ dressing, bathing, functional mobility, functional transfers, activity engagement, clothing mgmt, food prep / clean up, and oral hygiene  Pt would benefit from OT while in acute care to address deficits  From an OT perspective, pt would benefit from post acute rehab when medically stable for discharge from acute care to return to baseline level of I  Will continue to follow   Goals   Patient Goals Pt stated that he wants to be able to return home to his house   Plan   Treatment Interventions ADL retraining;Functional transfer training; Endurance training;Patient/family training;Equipment evaluation/education;Continued evaluation; Activityengagement; Energy conservation   Goal Expiration Date 03/22/19   OT Frequency 3-5x/wk   Additional Treatment Session   Start Time 5921   End Time 1008   Treatment Assessment Pt seen for OT tx session day 1 following eval focusing conitnued evaluation of functional mobility using AD and negotiating stairs  Pt seen w/ PT, [facility] Comment due to anticipated physical assistance needed  Pt engaged in functional mobility using RW w/ S and increased time  Pt reports that at home he cautiously drags laundry basket up / down steps one at a time  /54 after ascending stairs, and pt required seated rest break; reported feeling foggy  Continue to recommend post acute rehab when medically stable for discharge from acute care  Will continue to follow   Additional Treatment Day 1   Recommendation   OT Discharge Recommendation Other (Comment)  (to post acute rehab at this time pend progress)   Equipment Recommended Tub seat with back   OT - OK to Discharge   (to post acute rehab at this time)   Barthel Index   Feeding 10   Bathing 0   Grooming Score 5   Dressing Score 5   Bladder Score 10   Bowels Score 10   Toilet Use Score 5   Transfers (Bed/Chair) Score 10   Mobility (Level Surface) Score 10   Stairs Score 5   Barthel Index Score 70   Modified Cedar Scale   Modified Reyna Scale 4   Pt goals to be met in 7 days:  1  Pt will complete functional transfers to bed, chair, and toilet using least restrictive device w/ mod I to max I w/ ADL performance to return to home alone environment  2  Pt will complete UBD/LBD w/ mod I after set- up  3  Pt will demonstrate increased activity and functional standing tolerance for at least 10 minutes while engaged in oral hygiene / grooming to max I w/ IADL performance, functional mobility and stairs to return to PLOF w/ second floor bed/ bath  4  Pt will complete bed mobility supine <> sit w/ mod I to max I w/ ADL performance  5  Pt will demonstrate good attention and verbalize understanding of safety precautions and energy conservation tech to max I w/ ADL performance  6   Pt will demonstrate good attention and participation in continue evaluation to assess for DME needs    Lisa Lynda OTR/L

## 2019-03-15 NOTE — ASSESSMENT & PLAN NOTE
· On Revlimid monotherapy - ? anemia potential side effect  · Hematology following, feel that anemia is directly related to epo deficiency in the setting of chronic kidney disease  Do not feel that anemia is related to multiple myeloma    · Follows with Dr Jerry Yee of Heme as outpatient

## 2019-03-15 NOTE — PLAN OF CARE
Problem: OCCUPATIONAL THERAPY ADULT  Goal: Performs self-care activities at highest level of function for planned discharge setting  See evaluation for individualized goals  Description  Treatment Interventions: ADL retraining, Functional transfer training, Endurance training, Patient/family training, Equipment evaluation/education, Continued evaluation, Activityengagement, Energy conservation  Equipment Recommended: Tub seat with back       See flowsheet documentation for full assessment, interventions and recommendations  Note:   Limitation: Decreased ADL status, Decreased endurance, Decreased Safe judgement during ADL, Decreased self-care trans, Decreased high-level ADLs     Assessment: Pt is an 81yo male admitted [facility] on 3/13/2019  Pt presents w/ acute on chronic combined systolic and diastolic heart failure and significant PMH impacting his occupational performance including CHF, glaucoma, HTN, DM, CAD, knee surgery, hx back surgery  Pt reports living alone in [facility] PTA w/ full flight of stairs to primary bathroom, bedroom, and laundry in basement  Pt reports I w/ ADL w/ out use of AD, and supportive family assist w/ IADL (- drive, shop)  Upon eval, pt completed LBD/ UBD w/ min A after set- up  Pt engaged in functional mobility w/ min HHA w/ out use of AD  Pt benefits from cues for tech and hand placement  Pt able to participate in conversation w/ increased time to provide social history and discuss interests  Pt benefits from rest breaks during ADL performance due to fatigue, and increased time to complete  Pt presents w/ generalized weakness / deconditioning, decreased standing tolerance, decreased standing balance, decreased endurance impacting his I w/ dressing, bathing, functional mobility, functional transfers, activity engagement, clothing mgmt, food prep / clean up, and oral hygiene  Pt would benefit from OT while in acute care to address deficits   From an OT perspective, pt would benefit from post acute rehab when medically stable for discharge from acute care to return to baseline level of I   Will continue to follow     OT Discharge Recommendation: Other (Comment)(to post acute rehab at this time pend progress)  OT - OK to Discharge: (to post acute rehab at this time)

## 2019-03-15 NOTE — ASSESSMENT & PLAN NOTE
· UA positive for nitrites, leuks, innumerable bacteria  · Started on Rocephin in ED - switch to Vancomycin for now   · F/u urine cx - Staph aureus, no prior urine cultures noted  F/U final culture with sensitivities

## 2019-03-15 NOTE — ASSESSMENT & PLAN NOTE
· multifactorial decline given above  · Goals of care need to be discussed given age and limited rehab potential   Palliative medicine consult pending  · PT/OT is recommending rehab however  Patient lives home alone

## 2019-03-15 NOTE — CONSULTS
Medical Oncology/Hematology Consult Note  Jamie Prado, 80 y o , 9/22/1930  /-01, [id number]  03/15/19    Assessment and Plan:    1  Anemia complicating CHF  Because of patient's shortness of breath, blood transfusion was initiated prior to hematology is consultation  I agree with  In patients with cardiovascular disease, having a higher threshold for blood transfusion is necessary in order to preserve cardiac function  Transfusion parameters: I recommend that blood transfusions be considered at 8 0 grams/deciliter or less moving forward  2  Normocytic anemia  Patient previously follows with oncology and hematology  It was felt that patient has underlying anemia was not directly related to multiple myeloma but however related to 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  Patient's creatinine typically runs around 2  This has not changed much with the administration of medications for multiple myeloma  Additionally, patient's myeloma parameters have improved since the onset of treatment however his anemia has not  This suggests that the patient's anemia is unrelated to his multiple myeloma, it is more likely related to ongoing Depo deficiency  I have requested an erythropoietin level to evaluate for this  Additionally, other sources of anemia will be investigated  I have ordered iron studies, TSH folic acid, P05 and reticulocyte count to further evaluate anemia  Patient also was undergoing fecal occult blood testing to rule out GI blood loss  Hematology will continue to follow this patient's case while he is hospitalized, however if there is a question specific to the anemia please contact rounding physician over the weekend  3   Kappa light chain multiple myeloma    Component      Latest Ref Rng & Units 8/14/2018 12/21/2018 3/1/2019   KAPPA/LAMBDA FREE LIGHT CHAIN RATIO      0 26 - 1 65 14 66 (H) 3 75 (H) 2 56 (H)     Patient's free light chain ratio has significantly improved since the onset of treatment  Patient even has demonstrated improvement on single agent therapy with Revlimid 5 mg only  Patient is due to start back on Revlimid today or tomorrow  However secondary to hospitalization and ongoing issues with anemia, I have asked the patient to hold off on starting his next cycle  Oncology will see him in follow-up after discharge  At that time we will reinstitute therapy as necessary  Please do not hesitate to contact me if you have any questions or need additional information  Thank you for this consult   _________________________________________________________________________________________    Reason for Consultation: 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, Light Chain Myeloma    Chief Complaint   Patient presents with    Fatigue     patient's family with him reports having weakness and fatigue over the last few months "he is slowing down"  History of present illness: This is an 24-year-old male who was admitted to the [address] [place] [address] secondary to fatigue and weakness over the last few months  Mr Kwasi Snyder  has a past medical history of Angina pectoris ([facility] ), Chronic kidney disease, Coronary artery disease, Diabetes mellitus ([address] ), Glaucoma, Hypertension, Multiple myeloma ([facility] ), and Occluded coronary artery stent  Patient was initially hospitalized on 3/13/19 secondary to progressive weakness  Patient at that time was found to have a significant anemia in addition to findings of congestive heart failure  Patient is being seen by Cardiology and oncology was consulted secondary to profound anemia         Kappa light chain myeloma ([facility] )    9/8/2018 - 10/8/2018 Cancer Staged     Cancer Staging  [place] light chain myeloma ([facility] )  Staging form: Plasma Cell Myeloma and Disorders, AJCC 8th Edition  - Clinical stage from 10/8/2018: RISS Stage II (Beta-2-microglobulin (mg/L): 4 2, Albumin (g/dL): 3 7, ISS: Stage II, High-risk cytogenetics: Absent, LDH: Normal) - Signed by Mariano Koroma PA-C on 10/8/2018           9/8/2018 Initial Diagnosis     Patient had an increase in creatinine  Patient followed up with Nephrology who completed a comprehensive workup  This demonstrated positive free light chain ratio elevation in addition to a 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             9/24/2018 Biopsy     Final Diagnosis   A -C  Bone marrow,  left iliac crest,  biopsy and aspirate:  -  Kappa light chain restricted plasma cell neoplasm, consistent with plasma cell myeloma (see note)  -  Maturing trilineage hematopoiesis without distinct features of dysplasia or increased myeloblasts  -  Decreased stainable storage iron  -  Mildly increased reticulin fibers without fibrosis  -  Negative for collagen fibrosis, granulomata, vasculitis, necrosis  Flow cytometry ([facility]# [id number], evaluated by NATALIE Pinto )       * Interpretation:    1  Plasma cell neoplasm, IgA kappa-restricted  See Comment  2  No evidence of B-cell or T-cell lymphoproliferative disorder  *  Comment:  Due to potential dilutional/lysis effects associated with flow cytometry, the reported plasma cell count (2 4%) is an underestimate  Therefore, correlation with a comprehensive bone marrow examination including morphologic plasma cell enumeration will be necessary for further and definitive characterization  Interpretation FISH Myeloma Panel:  1  No evidence of IGH-MAF [translocation t(14;16)] gene rearrangement  2  No evidence of RB1 monosomy (13q14 deletion)  3  No evidence of CCND1-IGH [translocation t(11;14)] gene rearrangement, and no evidence for trisomy 11 or gain of 11q  4  No evidence of p53 (17p13) deletion or amplification  5  No evidence of FGFR3-IGH [translocation t(4;14)] gene rearrangement    6  Negative for 1q21/CKS1B gain         10/19/2018 - 12/20/2018 Chemotherapy     Dexamethasone 20 mg Days 1, 8, 15  Revlimid 5 mg Days 1-14  Cycle length = 21 days    Completed 3 cycles of the above  12/20/2018 Adverse Reaction     Multiple folliculitis/abscess development  Uncontrolled diabetes, previously controlled prior to dexamethasone therapy  12/20/2018 -  Chemotherapy     Revlimid 5 mg Days 1-14  Cycle length = 21 days            Interval history:  Patient notes he is feeling okay today  Patient requests that I call his nephew regarding his care as it pertains to Oncology  Patient is presently in his off week of Revlimid  Patient was due to start either today or tomorrow  I advised the patient to hold Revlimid at this time  We will give him a treatment break and will clear him to restart after seeing him in the outpatient setting after discharge  Review of Systems   Constitutional: Positive for activity change and fatigue  Negative for appetite change and fever  HENT: Negative for nosebleeds  Respiratory: Positive for shortness of breath  Negative for cough and choking  Cardiovascular: Negative for chest pain, palpitations and leg swelling  Gastrointestinal: Negative for abdominal distention, abdominal pain, anal bleeding, blood in stool, constipation, diarrhea, nausea and vomiting  Endocrine: Negative for cold intolerance  Genitourinary: Negative for hematuria  Musculoskeletal: Negative for myalgias  Skin: Negative for color change, pallor and rash  Allergic/Immunologic: Negative for immunocompromised state  Neurological: Negative for headaches  Hematological: Negative for adenopathy  Does not bruise/bleed easily  All other systems reviewed and are negative  All other review of systems were negative      Past medical history:   Past Medical History:   Diagnosis Date    Angina pectoris ([facility] )     Chronic kidney disease     Coronary artery disease     Diabetes mellitus ([facility] )     Glaucoma     Hypertension     [facility]     Occluded coronary artery stent     Left       Past surgical history:   Past Surgical History:   Procedure Laterality Date    BACK SURGERY      CARDIAC CATHETERIZATION  04/05/2004    COLONOSCOPY      CT BONE MARROW BIOPSY AND ASPIRATION  9/24/2018    CYSTOSCOPY      KNEE SURGERY      THROMBOENDARTERECTOMY Right     Cartoid       Allergies: No Known Allergies    Home medications:   Medications Prior to Admission   Medication    acetaminophen (TYLENOL) 325 mg tablet    aspirin 81 MG tablet    atorvastatin (LIPITOR) 40 mg tablet    benzonatate (TESSALON PERLES) 100 mg capsule    bimatoprost (LUMIGAN) 0 01 % ophthalmic drops    esomeprazole (NexIUM) 40 MG capsule    fluticasone (FLONASE) 50 mcg/act nasal spray    furosemide (LASIX) 40 mg tablet    furosemide (LASIX) 40 mg tablet    glimepiride (AMARYL) 2 mg tablet    glucose blood (ACCU-CHEK CHANDRAKANT PLUS) test strip    guaiFENesin (MUCINEX) 600 mg 12 hr tablet    lenalidomide (REVLIMID) 5 MG CAPS    metoprolol succinate (TOPROL-XL) 25 mg 24 hr tablet    nitroglycerin (NITROSTAT) 0 4 mg SL tablet    ONE TOUCH ULTRA TEST test strip    ONETOUCH DELICA LANCETS 86H MISC    potassium chloride (KLOR-CON 10) 10 mEq tablet    timolol (TIMOPTIC) 0 5 % ophthalmic solution    UNKNOWN TO PATIENT       Hospital medications:   Current Facility-Administered Medications:     acetaminophen (TYLENOL) tablet 650 mg, 650 mg, Oral, TID, DON Baires    aspirin chewable tablet 81 mg, 81 mg, Oral, Daily, Minus Mediate, PA-C, 81 mg at 03/15/19 6525    atorvastatin (LIPITOR) tablet 40 mg, 40 mg, Oral, HS, Angela Caal PA-C, 40 mg at 03/14/19 2159    benzonatate (TESSALON PERLES) capsule 100 mg, 100 mg, Oral, BID PRN, Dora Caal PA-C    bimatoprost (LUMIGAN) 0 01 % ophthalmic solution 1 drop, 1 drop, Both Eyes, HS, Angela Caal PA-C, 1 drop at 03/14/19 2200    fluticasone (FLONASE) 50 mcg/act nasal spray 1 spray, 1 spray, Each Nare, PRN, Minus Mediate, PA-CHERRI    guaiFENesin (MUCINEX) 12 hr tablet 600 mg, 600 mg, Oral, BID, Angela Caal PA-C, 600 mg at 03/15/19 3604    heparin (porcine) subcutaneous injection 5,000 Units, 5,000 Units, Subcutaneous, Q8[facility], 5,000 Units at 03/15/19 0637 **AND** [COMPLETED] Platelet count, , , Once, Margaret Oms, PA-C    hydrALAZINE (APRESOLINE) tablet 10 mg, 10 mg, Oral, Q8H [facility], Suzette Darci Montero, CRNP    insulin lispro (HumaLOG) 100 units/mL subcutaneous injection 1-5 Units, 1-5 Units, Subcutaneous, HS, Margaret Oms, PA-C, 1 Units at 03/14/19 2201    insulin lispro (HumaLOG) 100 units/mL subcutaneous injection 1-6 Units, 1-6 Units, Subcutaneous, TID AC, 3 Units at 03/15/19 1154 **AND** Fingerstick Glucose (POCT), , , TID AC, Angela Caal PA-C    lenalidomide (REVLIMID) capsule 5 mg, 5 mg, Oral, Daily, Margaret Oms, PA-C    miconazole 2 % cream, , Topical, BID, Ethelda Boehringer, CRNP    pantoprazole (PROTONIX) EC tablet 40 mg, 40 mg, Oral, Early Morning, Angela Caal PA-C, 40 mg at 03/15/19 6731    polyethylene glycol (MIRALAX) packet 17 g, 17 g, Oral, Daily, Caren M DON Bush, 17 g at 03/15/19 1041    potassium chloride (K-DUR,KLOR-CON) CR tablet 10 mEq, 10 mEq, Oral, Daily, Margaret Oms, PA-C, 10 mEq at 03/15/19 1463    senna (SENOKOT) tablet 8 6 mg, 1 tablet, Oral, HS, Caren M Rachelle CRNP    timolol (TIMOPTIC) 0 5 % ophthalmic solution 1 drop, 1 drop, Both Eyes, Daily, Margaret Oms, PA-C, 1 drop at 03/15/19 1354    vancomycin (VANCOCIN) IVPB (premix) 1,000 mg, 15 mg/kg (Adjusted), Intravenous, Q12H, Elsy Braun MD, Last Rate: 200 mL/hr at 03/15/19 0637, 1,000 mg at 03/15/19 3184    Social history:   Social History     Tobacco Use    Smoking status: Former Smoker     Types: Cigarettes    Smokeless tobacco: Never Used    Tobacco comment: former smoker   Substance Use Topics    Alcohol use: Yes     Comment: socially; less than once a month    Drug use:  No Family history:   Family History   Problem Relation Age of Onset    Heart attack Father     Heart disease Mother     Diabetes Mother     Heart disease Sister     COPD Family        Vitals:  Vitals:    03/15/19 1049   BP: 96/51   Pulse: 65   Resp: 18   Temp: 97 7 °F (36 5 °C)   SpO2:      Physical Exam   Constitutional: He is oriented to person, place, and time  He appears well-developed  No distress  HENT:   Head: Normocephalic and atraumatic  Mouth/Throat: No oropharyngeal exudate  Eyes: Pupils are equal, round, and reactive to light  Conjunctivae and EOM are normal    Cardiovascular: Normal rate and regular rhythm  No murmur heard  Pulses:       Radial pulses are 2+ on the right side, and 2+ on the left side  Posterior tibial pulses are 2+ on the right side, and 2+ on the left side  Pulmonary/Chest: Effort normal and breath sounds normal  No respiratory distress  Abdominal: Soft  Bowel sounds are normal  He exhibits no distension  There is no tenderness  Musculoskeletal: He exhibits no edema  Lymphadenopathy:     He has no cervical adenopathy  Neurological: He is alert and oriented to person, place, and time  Skin: Skin is warm and dry  Psychiatric: He has a normal mood and affect   His behavior is normal      Labs and Pertinent Reports Reviewed  Component      Latest Ref Rng & Units 3/1/2019 3/13/2019 3/13/2019 3/14/2019            7:27 PM 11:48 PM    WBC      4 31 - 10 16 Thousand/uL 7 05 5 08  4 64   Red Blood Cell Count      3 88 - 5 62 Million/uL 2 84 (L) 2 81 (L)  2 53 (L)   Hemoglobin      12 0 - 17 0 g/dL 8 1 (L) 8 0 (L)  7 1 (L)   HCT      36 5 - 49 3 % 26 4 (L) 25 7 (L)  22 9 (L)   MCV      82 - 98 fL 93 92  91   MCH      26 8 - 34 3 pg 28 5 28 5  28 1   MCHC      31 4 - 37 4 g/dL 30 7 (L) 31 1 (L)  31 0 (L)   RDW      11 6 - 15 1 % 16 8 (H) 17 4 (H)  17 5 (H)   MPV      8 9 - 12 7 fL 11 2 10 7 9 5 9 8   Platelet Count      332 - 390 Thousands/uL 318 187 159 147 (L)   nRBC      /100 WBCs 0 0  0   Neutrophils %      43 - 75 %  61     Immat GRANS %      0 - 2 %  0     Lymphocytes Relative      14 - 44 %  24     Monocytes Relative      4 - 12 %  9     Eosinophils      0 - 6 % 4 4  6   Basophils Relative      0 - 1 % 1 2 (H)  3 (H)   Absolute Neutrophils      1 85 - 7 62 Thousands/µL  3 04     Immature Grans Absolute      0 00 - 0 20 Thousand/uL  0 01     Lymphocytes Absolute      0 60 - 4 47 Thousands/µL  1 23     Absolute Monocytes      0 17 - 1 22 Thousand/µL  0 48     Absolute Eosinophils      0 00 - 0 61 Thousand/µL 0 28 0 22  0 28   Basophils Absolute      0 00 - 0 10 Thousands/µL 0 07 0 10  0 14 (H)   Segs Relative      43 - 75 % 77 (H)   48   Bands Relative      0 - 8 %    2   Lymphocytes %      14 - 44 % 13 (L)   31   Monocytes      4 - 12 % 5   10   Metamyelocytes%      0 - 1 %       Myelocytes %      0 - 1 %       Atypical Lymphocytes %      <=0 %       Abs Neutrophils      1 85 - 7 62 Thousand/uL 5 43   2 32   LYMPHOCYTES ABSOLUTE      0 60 - 4 47 Thousand/uL 0 92   1 44   Monocytes Absolute      0 00 - 1 22 Thousand/uL 0 35   0 46   RBC Morphology       Present      Jovanna Cells       Present      Poikilocytes       Present   Present   Polychromasia             Platelet Estimate      Adequate Adequate   Adequate   Giant PLTs             Smudge Cells             Toxic Granulation             Anisocytosis       Present   Present   Tear Drop Cells       Present      Total Counted          100   Ovalocytes          Present     Component      Latest Ref Rng & Units 3/15/2019             WBC      4 31 - 10 16 Thousand/uL 5 05   Red Blood Cell Count      3 88 - 5 62 Million/uL 2 70 (L)   Hemoglobin      12 0 - 17 0 g/dL 7 6 (L)   HCT      36 5 - 49 3 % 24 8 (L)   MCV      82 - 98 fL 92   MCH      26 8 - 34 3 pg 28 1   MCHC      31 4 - 37 4 g/dL 30 6 (L)   RDW      11 6 - 15 1 % 17 7 (H)   MPV      8 9 - 12 7 fL 9 5   Platelet Count      229 - 390 Thousands/uL 158   nRBC /100 WBCs 0   Neutrophils %      43 - 75 % 56   Immat GRANS %      0 - 2 % 0   Lymphocytes Relative      14 - 44 % 26   Monocytes Relative      4 - 12 % 10   Eosinophils      0 - 6 % 6   Basophils Relative      0 - 1 % 2 (H)   Absolute Neutrophils      1 85 - 7 62 Thousands/µL 2 86   Immature Grans Absolute      0 00 - 0 20 Thousand/uL 0 01   Lymphocytes Absolute      0 60 - 4 47 Thousands/µL 1 30   Absolute Monocytes      0 17 - 1 22 Thousand/µL 0 48   Absolute Eosinophils      0 00 - 0 61 Thousand/µL 0 30   Basophils Absolute      0 00 - 0 10 Thousands/µL 0 10   Segs Relative      43 - 75 %    Bands Relative      0 - 8 %    Lymphocytes %      14 - 44 %    Monocytes      4 - 12 %    Metamyelocytes%      0 - 1 %    Myelocytes %      0 - 1 %    Atypical Lymphocytes %      <=0 %    Abs Neutrophils      1 85 - 7 62 Thousand/uL    LYMPHOCYTES ABSOLUTE      0 60 - 4 47 Thousand/uL    Monocytes Absolute      0 00 - 1 22 Thousand/uL    RBC Morphology          Jovanna Cells          Poikilocytes          Polychromasia          Platelet Estimate      Adequate    Giant PLTs          Smudge Cells          Toxic Granulation          Anisocytosis          Tear Drop Cells          Total Counted          Ovalocytes            Please note: This report has been generated by voice recognition software system  Therefore, there may be syntax, spelling and/or grammatical errors  Please call if you have any questions

## 2019-03-15 NOTE — PLAN OF CARE
Problem: PHYSICAL THERAPY ADULT  Goal: Performs mobility at highest level of function for planned discharge setting  See evaluation for individualized goals  Description  Treatment/Interventions: Functional transfer training, LE strengthening/ROM, Elevations, Therapeutic exercise, Endurance training, Equipment eval/education, Patient/family training, Bed mobility, Gait training, Spoke to nursing, Spoke to case management, OT  Equipment Recommended: Leanne Dunn       See flowsheet documentation for full assessment, interventions and recommendations  Note:   Prognosis: Good  Problem List: Decreased strength, Decreased range of motion, Decreased endurance, Impaired balance, Decreased mobility  Assessment: Pt  is an 81 yo M who presents with SOB  Pt  reports over the last week has become extremely dyspneic on exertion, reporting that minimal activity makes him extremely SOB  Dx: Acute on Chronic Combined CHF, order placed for PT eval and tx, w/ activity order of up as tolerated  pt presents w/ comorbidities of DM2, HLD, CKD4, Anemia, Kappa light chain myeloma, UTI, generalized weakness, cardiomyopathy, MRSA, cough, and dizziness  and personal factors of advanced age, living in 2 story house, mobilizing w/ assistive device, stair(s) to enter home, inability to navigate community distances, inability to navigate level surfaces w/o external assistance, unable to perform dynamic tasks in community, positive fall history, inability to perform IADLs and inability to live alone  pt presents w/ weakness, decreased ROM, decreased endurance, impaired cognition, impaired balance, gait deviations, decreased safety awareness and fall risk   these impairments are evident in findings from physical examination (weakness and edema of extremities), mobility assessment (need for Min assist w/ all phases of mobility when usually mobilizing independently, tolerance to only 150 feet of ambulation and need for cueing for mobility technique), and Barthel Index: 70/100  pt needed input for task focus and mobility technique  pt is at risk for falls due to physical and safety awareness deficits  pt's clinical presentation is unstable/unpredictable (evident in need for assist w/ all phases of mobility when usually mobilizing independently, tolerance to only 150 feet of ambulation, need for input for mobility technique, need for input for task focus and mobility technique and need for input for mobility technique/safety)  pt needs inpatient PT tx to improve mobility deficits  discharge recommendation is for inpatient rehab to reduce fall risk and maximize level of functional independence  Recommendation: Short-term skilled PT     PT - OK to Discharge: Yes    See flowsheet documentation for full assessment

## 2019-03-16 PROBLEM — R05.9 COUGH: Status: RESOLVED | Noted: 2018-11-06 | Resolved: 2019-03-16

## 2019-03-16 LAB
ABO GROUP BLD BPU: NORMAL
ANION GAP SERPL CALCULATED.3IONS-SCNC: 10 MMOL/L (ref 4–13)
ATRIAL RATE: 71 BPM
ATRIAL RATE: 72 BPM
BASOPHILS # BLD AUTO: 0.08 THOUSANDS/ΜL (ref 0–0.1)
BASOPHILS NFR BLD AUTO: 2 % (ref 0–1)
BPU ID: NORMAL
BUN SERPL-MCNC: 25 MG/DL (ref 5–25)
CALCIUM SERPL-MCNC: 8.7 MG/DL (ref 8.3–10.1)
CHLORIDE SERPL-SCNC: 105 MMOL/L (ref 100–108)
CO2 SERPL-SCNC: 24 MMOL/L (ref 21–32)
CREAT SERPL-MCNC: 2.05 MG/DL (ref 0.6–1.3)
CROSSMATCH: NORMAL
EOSINOPHIL # BLD AUTO: 0.25 THOUSAND/ΜL (ref 0–0.61)
EOSINOPHIL NFR BLD AUTO: 6 % (ref 0–6)
ERYTHROCYTE [DISTWIDTH] IN BLOOD BY AUTOMATED COUNT: 17.8 % (ref 11.6–15.1)
GFR SERPL CREATININE-BSD FRML MDRD: 28 ML/MIN/1.73SQ M
GLUCOSE SERPL-MCNC: 117 MG/DL (ref 65–140)
GLUCOSE SERPL-MCNC: 174 MG/DL (ref 65–140)
GLUCOSE SERPL-MCNC: 175 MG/DL (ref 65–140)
GLUCOSE SERPL-MCNC: 205 MG/DL (ref 65–140)
GLUCOSE SERPL-MCNC: 220 MG/DL (ref 65–140)
HCT VFR BLD AUTO: 29.6 % (ref 36.5–49.3)
HGB BLD-MCNC: 8.6 G/DL (ref 12–17)
IMM GRANULOCYTES # BLD AUTO: 0.02 THOUSAND/UL (ref 0–0.2)
IMM GRANULOCYTES NFR BLD AUTO: 1 % (ref 0–2)
LYMPHOCYTES # BLD AUTO: 1.3 THOUSANDS/ΜL (ref 0.6–4.47)
LYMPHOCYTES NFR BLD AUTO: 30 % (ref 14–44)
MCH RBC QN AUTO: 28.4 PG (ref 26.8–34.3)
MCHC RBC AUTO-ENTMCNC: 29.1 G/DL (ref 31.4–37.4)
MCV RBC AUTO: 98 FL (ref 82–98)
MONOCYTES # BLD AUTO: 0.55 THOUSAND/ΜL (ref 0.17–1.22)
MONOCYTES NFR BLD AUTO: 13 % (ref 4–12)
NEUTROPHILS # BLD AUTO: 2.21 THOUSANDS/ΜL (ref 1.85–7.62)
NEUTS SEG NFR BLD AUTO: 48 % (ref 43–75)
NRBC BLD AUTO-RTO: 0 /100 WBCS
P AXIS: 0 DEGREES
P AXIS: 54 DEGREES
PLATELET # BLD AUTO: 156 THOUSANDS/UL (ref 149–390)
PMV BLD AUTO: 9.7 FL (ref 8.9–12.7)
POTASSIUM SERPL-SCNC: 3.8 MMOL/L (ref 3.5–5.3)
PR INTERVAL: 390 MS
QRS AXIS: -4 DEGREES
QRS AXIS: 5 DEGREES
QRSD INTERVAL: 118 MS
QRSD INTERVAL: 122 MS
QT INTERVAL: 424 MS
QT INTERVAL: 426 MS
QTC INTERVAL: 462 MS
QTC INTERVAL: 464 MS
RBC # BLD AUTO: 3.03 MILLION/UL (ref 3.88–5.62)
SODIUM SERPL-SCNC: 139 MMOL/L (ref 136–145)
T WAVE AXIS: 225 DEGREES
T WAVE AXIS: 226 DEGREES
UNIT DISPENSE STATUS: NORMAL
UNIT PRODUCT CODE: NORMAL
UNIT RH: NORMAL
VENTRICULAR RATE: 71 BPM
VENTRICULAR RATE: 72 BPM
WBC # BLD AUTO: 4.41 THOUSAND/UL (ref 4.31–10.16)

## 2019-03-16 PROCEDURE — 85025 COMPLETE CBC W/AUTO DIFF WBC: CPT | Performed by: NURSE PRACTITIONER

## 2019-03-16 PROCEDURE — 97535 SELF CARE MNGMENT TRAINING: CPT

## 2019-03-16 PROCEDURE — 80048 BASIC METABOLIC PNL TOTAL CA: CPT | Performed by: NURSE PRACTITIONER

## 2019-03-16 PROCEDURE — 93005 ELECTROCARDIOGRAM TRACING: CPT

## 2019-03-16 PROCEDURE — 93010 ELECTROCARDIOGRAM REPORT: CPT | Performed by: INTERNAL MEDICINE

## 2019-03-16 PROCEDURE — 83918 ORGANIC ACIDS TOTAL QUANT: CPT | Performed by: PHYSICIAN ASSISTANT

## 2019-03-16 PROCEDURE — 82948 REAGENT STRIP/BLOOD GLUCOSE: CPT

## 2019-03-16 PROCEDURE — 99232 SBSQ HOSP IP/OBS MODERATE 35: CPT | Performed by: NURSE PRACTITIONER

## 2019-03-16 PROCEDURE — 99232 SBSQ HOSP IP/OBS MODERATE 35: CPT | Performed by: INTERNAL MEDICINE

## 2019-03-16 PROCEDURE — 82668 ASSAY OF ERYTHROPOIETIN: CPT | Performed by: PHYSICIAN ASSISTANT

## 2019-03-16 RX ORDER — FUROSEMIDE 40 MG/1
40 TABLET ORAL DAILY
Status: DISCONTINUED | OUTPATIENT
Start: 2019-03-16 | End: 2019-03-20 | Stop reason: HOSPADM

## 2019-03-16 RX ORDER — DOXYCYCLINE HYCLATE 100 MG/1
100 CAPSULE ORAL EVERY 12 HOURS SCHEDULED
Status: COMPLETED | OUTPATIENT
Start: 2019-03-16 | End: 2019-03-19

## 2019-03-16 RX ADMIN — DOXYCYCLINE HYCLATE 100 MG: 100 CAPSULE ORAL at 22:18

## 2019-03-16 RX ADMIN — STANDARDIZED SENNA CONCENTRATE 8.6 MG: 8.6 TABLET ORAL at 22:19

## 2019-03-16 RX ADMIN — HYDRALAZINE HYDROCHLORIDE 10 MG: 10 TABLET, FILM COATED ORAL at 14:18

## 2019-03-16 RX ADMIN — HEPARIN SODIUM 5000 UNITS: 5000 INJECTION INTRAVENOUS; SUBCUTANEOUS at 06:04

## 2019-03-16 RX ADMIN — INSULIN LISPRO 1 UNITS: 100 INJECTION, SOLUTION INTRAVENOUS; SUBCUTANEOUS at 12:28

## 2019-03-16 RX ADMIN — HYDRALAZINE HYDROCHLORIDE 10 MG: 10 TABLET, FILM COATED ORAL at 22:18

## 2019-03-16 RX ADMIN — TIMOLOL MALEATE 1 DROP: 5 SOLUTION/ DROPS OPHTHALMIC at 08:35

## 2019-03-16 RX ADMIN — FUROSEMIDE 40 MG: 40 TABLET ORAL at 12:26

## 2019-03-16 RX ADMIN — POLYETHYLENE GLYCOL 3350 17 G: 17 POWDER, FOR SOLUTION ORAL at 08:35

## 2019-03-16 RX ADMIN — BIMATOPROST 1 DROP: 0.1 SOLUTION/ DROPS OPHTHALMIC at 22:19

## 2019-03-16 RX ADMIN — GUAIFENESIN 600 MG: 600 TABLET, EXTENDED RELEASE ORAL at 16:48

## 2019-03-16 RX ADMIN — HEPARIN SODIUM 5000 UNITS: 5000 INJECTION INTRAVENOUS; SUBCUTANEOUS at 14:18

## 2019-03-16 RX ADMIN — ATORVASTATIN CALCIUM 40 MG: 40 TABLET, FILM COATED ORAL at 22:18

## 2019-03-16 RX ADMIN — DOXYCYCLINE HYCLATE 100 MG: 100 CAPSULE ORAL at 12:26

## 2019-03-16 RX ADMIN — POTASSIUM CHLORIDE 10 MEQ: 750 TABLET, EXTENDED RELEASE ORAL at 08:35

## 2019-03-16 RX ADMIN — ASPIRIN 81 MG 81 MG: 81 TABLET ORAL at 08:35

## 2019-03-16 RX ADMIN — VANCOMYCIN HYDROCHLORIDE 1000 MG: 1 INJECTION, SOLUTION INTRAVENOUS at 06:02

## 2019-03-16 RX ADMIN — HEPARIN SODIUM 5000 UNITS: 5000 INJECTION INTRAVENOUS; SUBCUTANEOUS at 22:19

## 2019-03-16 RX ADMIN — INSULIN LISPRO 1 UNITS: 100 INJECTION, SOLUTION INTRAVENOUS; SUBCUTANEOUS at 22:19

## 2019-03-16 RX ADMIN — PANTOPRAZOLE SODIUM 40 MG: 40 TABLET, DELAYED RELEASE ORAL at 06:03

## 2019-03-16 RX ADMIN — INSULIN LISPRO 2 UNITS: 100 INJECTION, SOLUTION INTRAVENOUS; SUBCUTANEOUS at 16:49

## 2019-03-16 RX ADMIN — INSULIN LISPRO 1 UNITS: 100 INJECTION, SOLUTION INTRAVENOUS; SUBCUTANEOUS at 08:36

## 2019-03-16 RX ADMIN — HYDRALAZINE HYDROCHLORIDE 10 MG: 10 TABLET, FILM COATED ORAL at 06:03

## 2019-03-16 RX ADMIN — GUAIFENESIN 600 MG: 600 TABLET, EXTENDED RELEASE ORAL at 08:35

## 2019-03-16 NOTE — OCCUPATIONAL THERAPY NOTE
[address] Progress Note     Patient Name: Melisa Hernandez  Today's Date: 3/16/2019  Problem List  Patient Active Problem List   Diagnosis    Parenchymal renal hypertension    Type 2 diabetes mellitus without complication, without long-term current use of insulin ([address] )    Mixed hyperlipidemia    Esophageal reflux    Cerebral infarction, watershed distribution, bilateral, acute    Stage 4 chronic kidney disease (HCC)    Benign prostatic hyperplasia    Ischemic cardiomyopathy    Left thigh pain    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)    Other proteinuria    Dizziness    Kappa light chain myeloma ([address] )    UTI (urinary tract infection)    Cellulitis of right lower limb    MRSA (methicillin resistant staph aureus) culture positive    Acute on chronic combined systolic and diastolic heart failure (HCC)    Generalized weakness    NSTEMI (non-ST elevated myocardial infarction) ([address] )    Constipation           03/16/19 1021   Restrictions/Precautions   Weight Bearing Precautions Per Order No   Other Precautions Chair Alarm; Bed Alarm;Cognitive; Fall Risk;Telemetry   General   Response to Previous Treatment Patient with no complaints from previous session   Family/Caregiver Present Pt reports that his son lives in another city in [place] near [place], too far away to bring in my glasses   Pain Assessment   Pain Assessment 0-10   Pain Score No Pain   ADL   LB Dressing Assistance 4  Minimal Assistance   LB Dressing Deficit Setup;Steadying; Requires assistive device for steadying;Supervision/safety;Verbal cueing   LB Dressing Comments standing at recliner chair, and toilet to manage pants up / down over hips   Boyd Dean Deficit Setup;Supervison/safety; Increased time to complete;Grab bar use;Clothing management down;Clothing management up   Toileting Comments pt urinated seated on toilet w/ min A for sit <> stand and clothing mgmt   Bed Mobility Supine to Sit Unable to assess   Sit to Supine Unable to assess   Additional Comments Pt seated OOB in chair upon arrival and at end of session w/ call bell in reach, chair alarm actvated and needs met  Chair alarm going off upon therapist arrival (pt reported needing to use bathroom)   Transfers   Sit to Stand 5  Supervision  (from bedside recliner chair)   Additional items Assist x 1; Armrests; Increased time required;Verbal cues   Stand to Sit 5  Supervision   Additional items Assist x 1; Armrests; Increased time required;Verbal cues  (to bedside recliner chair)   Toilet transfer 5  Supervision   Additional items Assist x 1; Armrests; Increased time required;Standard toilet; Other  (R grab bar)   Additional Comments pt performend sit <> stand twice during session   Functional Mobility   Functional Mobility 4  Minimal assistance   Additional Comments min A using RW to / from bathroom   Additional items Rolling walker   Toilet Transfers   Toilet Transfer From Rolling walker   Toilet Transfer Type To and from   Toilet Transfer to Standard toilet   Toilet Transfer Technique Ambulating   Toilet Transfers Minimal assistance;Verbal cues   Cognition   Overall Cognitive Status Impaired  (impulsive, chair alarm going off upon arrival)   Arousal/Participation Alert; Cooperative   Attention Attends with cues to redirect   Orientation Level Oriented to person;Oriented to place;Oriented to time;Oriented to situation  (generally oriented to time)   Memory Decreased recall of recent events  (able to recall therapist w/ cues / prompts)   Following Commands Follows multistep commands with increased time or repetition   Comments Identified pt by full name and birthdate  Pt able to follow directions w/ cues  Prompts for hand placement during functional transfers   Educated pt on safety precautions and use of call bell   Activity Tolerance   Activity Tolerance Patient limited by fatigue   Medical Staff Made Aware spoke to RN, [employer] Assessment   Assessment Pt seen for OT tx session day 2 this AM focusing on challenging activity tolerance during ADL performance  Pt required consistent assistance for functional transfers and mobility using RW  Educated pt on hand placement and use of call bell to max I and safety  Pt completed LBD w/ min A (steadying) and w/ tie fasteners on pants  Continue to recommend post acute rehab when medically stable for discharge from acute care  Will continue to follow   Plan   Treatment Interventions ADL retraining;Functional transfer training; Endurance training;Patient/family training; Activityengagement   Goal Expiration Date 03/22/19   Treatment Day 2   OT Frequency 3-5x/wk   Recommendation   OT Discharge Recommendation Other (Comment)  (to post acute rehab)   Equipment Recommended Bedside commode;Tub seat with back   OT - OK to Discharge   (to post acute rehab)   Barthel Index   Feeding 10   Bathing 0   Grooming Score 5   Dressing Score 5   Bladder Score 10   Bowels Score 10   Toilet Use Score 5   Transfers (Bed/Chair) Score 10   Mobility (Level Surface) Score 10   Stairs Score 5   Barthel Index Score 70   Modified Reyna Scale   Modified Reyna Scale 4   Lisa Howell, OTR/L

## 2019-03-16 NOTE — PLAN OF CARE
Problem: Potential for Falls  Goal: Patient will remain free of falls  Description  INTERVENTIONS:  - Assess patient frequently for physical needs  -  Identify cognitive and physical deficits and behaviors that affect risk of falls    -  [place] fall precautions as indicated by assessment   - Educate patient/family on patient safety including physical limitations  - Instruct patient to call for assistance with activity based on assessment  - Modify environment to reduce risk of injury  - Consider OT/PT consult to assist with strengthening/mobility  Outcome: Progressing

## 2019-03-16 NOTE — PROGRESS NOTES
[address] Internal Medicine    Progress Note - Mindy Ojeda 9/22/1930, 80 y o  male MRN: [id number]    Unit/Bed#: -01 Encounter: [id number]    Primary Care Provider: Gregoria Warren MD   Date and time admitted to hospital: 3/13/2019  6:51 PM    * Acute on chronic combined systolic and diastolic heart failure ([address] )  Assessment & Plan  · Patient presents with dyspnea on exertion and abdominal distention  BNP elevated, chest x-ray appears congested  Patient is symptomatically improved  · Cardiology is following, patient receiving intravenous diuresis through 3/15  Plan to transition to PO today, will defer to Cardiology regarding dose  · Echocardiogram showing ejection fraction of 30% with severe diffuse hypokinesis  · CXR appears possibly overloaded compared to prior   · Beta-blocker was discontinued in the setting of intermittent 2-1 av block, will not continue at discharge  Started on low-dose hydralazine  No Ace/Arb the given advanced CKD  · Final monitor I&Os, daily weights  Continue with low-sodium diet and fluid restriction  NSTEMI (non-ST elevated myocardial infarction) [facility])  Assessment & Plan  · Mild troponin elevation noted  Likely type 2 in the setting of heart failure exacerbation and anemia  · No complaints of chest pain  · Cardiology following  · Continue aspirin  No BB given AVB  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)  Assessment & Plan  · Combination of Age/ chronic disease including CHF and Myeloma and revlimid  · 1 unit PRBC 3/15  Hemoglobin 8 6 today  · Hemoccult stool pending- note progressive decline over last few months  · Folate level normal   Iron studies are low  Reticulocyte count normal   TSH normal   B12 pending  · Monitor hemoglobin    Generalized weakness  Assessment & Plan  · multifactorial decline given above    · Goals of care need to be discussed given age and limited rehab potential   Palliative medicine noted, patient remains full code   · PT/OT is recommending rehab, referrals pending as per case management  Stage 4 chronic kidney disease ([facility] )  Assessment & Plan  · Baseline appears to be 1 8-2 1  Follows with Dr Mario Leblanc as outpatient  · Creatinine 2 05 today, improved from yesterday  · Avoid nephrotoxins  · Bmp in AM    Kappa light chain myeloma (HCC)  Assessment & Plan  · On Revlimid monotherapy - ? anemia potential side effect  · Hematology following, feel that anemia is directly related to epo deficiency in the setting of chronic kidney disease  Do not feel that anemia is related to multiple myeloma  · Follows with Dr Joann Arora of Heme as outpatient        Type 2 diabetes mellitus without complication, without long-term current use of insulin [facility])  Assessment & Plan  Lab Results   Component Value Date    HGBA1C 6 9 (H) 07/17/2018       Recent Labs     03/15/19  1115 03/15/19  1551 03/15/19  2043 03/16/19  0716   POCGLU 247* 170* 205* 174*         · On Amaryl at baseline, currently on hold  · Continue with SSI  · Diabetic diet  · Monitor Accuchecks    Coughresolved as of 3/16/2019  Assessment & Plan  · Consider viral   CXR without any obvious infiltrates or consolidations  Procalcitonin low  · Continue with supportive care, Mucinex and Tessalon--seems to have improved  · Pulmonary toilet     UTI (urinary tract infection)  Assessment & Plan  · UA positive for nitrites, leuks, innumerable bacteria  · Started on Rocephin in ED - switch to Vancomycin for now   · Urine culture growing MRSA susceptible to doxycycline, will discontinue vancomycin and change to oral doxycycline to complete 7 day course  Constipation  Assessment & Plan  · Patient reports constipation, no bowel movement for 2-3 days  Continue with current bowel regimen with Miralax and Senna       Mixed hyperlipidemia  Assessment & Plan  · Continue statin    Pharmacologic: Heparin  Mechanical VTE Prophylaxis in Place: Yes    Patient Centered Rounds: I have performed bedside rounds with nursing staff today  Discussions with Specialists or Other Care Team Provider: nursing    Education and Discussions with Family / Patient: patient and son Bry Arora over the phone     Time Spent for Care: 30 minutes  More than 50% of total time spent on counseling and coordination of care as described above  Current Length of Stay: 3 day(s)    Current Patient Status: Inpatient   Certification Statement: The patient will continue to require additional inpatient hospital stay due to Monitor hemoglobin, additional Cardiology recommendations, discharge planning to rehab    Discharge Plan / Estimated Discharge Date:  Likely another 24-48 hours pending cardiology clearance, stable hemoglobin and discharge plan to rehab  Code Status: Level 1 - Full Code      Subjective:   Patient feels well  Denies any chest pain or shortness of breath  Still feels feels constipated  Agreeable to rehab  Objective:     Vitals:   Temp (24hrs), Av 7 °F (36 5 °C), Min:97 7 °F (36 5 °C), Max:97 8 °F (36 6 °C)    Temp:  [97 7 °F (36 5 °C)-97 8 °F (36 6 °C)] 97 8 °F (36 6 °C)  HR:  [61-82] 70  Resp:  [16-18] 18  BP: ()/(51-74) 107/62  SpO2:  [91 %-100 %] 98 %  Body mass index is 30 39 kg/m²  Input and Output Summary (last 24 hours): Intake/Output Summary (Last 24 hours) at 3/16/2019 0942  Last data filed at 3/16/2019 0612  Gross per 24 hour   Intake 590 ml   Output 1250 ml   Net -660 ml       Physical Exam:     Physical Exam   Constitutional: He is oriented to person, place, and time  HENT:   Head: Normocephalic  Eyes: Conjunctivae are normal    Neck: Normal range of motion  Cardiovascular: Normal rate and regular rhythm  No murmur heard  NSR with 1st degree block on monitor    Pulmonary/Chest: Effort normal and breath sounds normal    Abdominal: Soft  Bowel sounds are normal  He exhibits distension  There is no tenderness  Musculoskeletal: Normal range of motion   He exhibits no edema  Neurological: He is alert and oriented to person, place, and time  Skin: Skin is warm and dry  Psychiatric: He has a normal mood and affect  Nursing note and vitals reviewed  Additional Data:     Labs:    Results from last 7 days   Lab Units 03/16/19  0451   WBC Thousand/uL 4 41   HEMOGLOBIN g/dL 8 6*   HEMATOCRIT % 29 6*   PLATELETS Thousands/uL 156   NEUTROS PCT % 48   LYMPHS PCT % 30   MONOS PCT % 13*   EOS PCT % 6     Results from last 7 days   Lab Units 03/16/19  0451  03/13/19  1927   POTASSIUM mmol/L 3 8   < > 3 4*   CHLORIDE mmol/L 105   < > 106   CO2 mmol/L 24   < > 23   BUN mg/dL 25   < > 23   CREATININE mg/dL 2 05*   < > 2 13*   CALCIUM mg/dL 8 7   < > 8 3   ALK PHOS U/L  --   --  129*   ALT U/L  --   --  18   AST U/L  --   --  7    < > = values in this interval not displayed               Recent Cultures (last 7 days):     Results from last 7 days   Lab Units 03/13/19 2012   URINE CULTURE  >100,000 cfu/ml Methicillin Resistant Staphylococcus aureus*       Last 24 Hours Medication List:     Current Facility-Administered Medications:  acetaminophen 650 mg Oral Q6H PRN DON Pratt   aspirin 81 mg Oral Daily Khari Bennington, PA-C   atorvastatin 40 mg Oral HS Khari Bennington, PA-C   benzonatate 100 mg Oral BID PRN Khariernie Jacob PA-C   bimatoprost 1 drop Both Eyes HS Khari Bennington, PA-C   doxycycline hyclate 100 mg Oral Q12H [address] DON Mccartney   fluticasone 1 spray Each Nare PRN Khari Bennington, PA-C   guaiFENesin 600 mg Oral BID Khari Jacob, PA-C   heparin (porcine) 5,000 Units Subcutaneous Q8H [address] Angela Chowdary PA-C   hydrALAZINE 10 mg Oral [facility] DON Farias   insulin lispro 1-5 Units Subcutaneous HS Angela Caal PA-C   insulin lispro 1-6 Units Subcutaneous TID AC Angela Chowdary PA-C   lenalidomide 5 mg Oral Daily Angela Caal PA-C   pantoprazole 40 mg Oral Early Morning Khari Jacob PA-C polyethylene glycol 17 g Oral Daily DON Mccartney   potassium chloride 10 mEq Oral Daily Clarence Shahid PA-C   senna 1 tablet Oral HS DON Mccartney   timolol 1 drop Both Eyes Daily Clarence Shahid PA-C        Today, Patient Was Seen By: DON Nam    ** Please Note: Dragon 360 Dictation voice to text software may have been used in the creation of this document   **

## 2019-03-16 NOTE — ASSESSMENT & PLAN NOTE
· Patient reports constipation, no bowel movement for 2-3 days  Continue with current bowel regimen with Miralax and Senna

## 2019-03-16 NOTE — ASSESSMENT & PLAN NOTE
· Patient presents with dyspnea on exertion and abdominal distention  BNP elevated, chest x-ray appears congested  Patient is symptomatically improved  · Cardiology is following, patient receiving intravenous diuresis through 3/15  Plan to transition to PO today, will defer to Cardiology regarding dose  · Echocardiogram showing ejection fraction of 30% with severe diffuse hypokinesis  · CXR appears possibly overloaded compared to prior   · Beta-blocker was discontinued in the setting of intermittent 2-1 av block, will not continue at discharge  Started on low-dose hydralazine  No Ace/Arb the given advanced CKD  · Final monitor I&Os, daily weights  Continue with low-sodium diet and fluid restriction

## 2019-03-16 NOTE — ASSESSMENT & PLAN NOTE
· Consider viral   CXR without any obvious infiltrates or consolidations  Procalcitonin low  · Continue with supportive care, Mucinex and Tessalon--seems to have improved     · Pulmonary toilet

## 2019-03-16 NOTE — ASSESSMENT & PLAN NOTE
· Combination of Age/ chronic disease including CHF and Myeloma and revlimid  · 1 unit PRBC 3/15  Hemoglobin 8 6 today  · Hemoccult stool pending- note progressive decline over last few months  · Folate level normal   Iron studies are low  Reticulocyte count normal   TSH normal   B12 pending    · Monitor hemoglobin

## 2019-03-16 NOTE — PLAN OF CARE
Problem: Potential for Falls  Goal: Patient will remain free of falls  Description  INTERVENTIONS:  - Assess patient frequently for physical needs  -  Identify cognitive and physical deficits and behaviors that affect risk of falls    -  [place] fall precautions as indicated by assessment   - Educate patient/family on patient safety including physical limitations  - Instruct patient to call for assistance with activity based on assessment  - Modify environment to reduce risk of injury  - Consider OT/PT consult to assist with strengthening/mobility  Outcome: Progressing     Problem: Prexisting or High Potential for Compromised Skin Integrity  Goal: Skin integrity is maintained or improved  Description  INTERVENTIONS:  - Identify patients at risk for skin breakdown  - Assess and monitor skin integrity  - Assess and monitor nutrition and hydration status  - Monitor labs (i e  albumin)  - Assess for incontinence   - Turn and reposition patient  - Assist with mobility/ambulation  - Relieve pressure over bony prominences  - Avoid friction and shearing  - Provide appropriate hygiene as needed including keeping skin clean and dry  - Evaluate need for skin moisturizer/barrier cream  - Collaborate with interdisciplinary team (i e  Nutrition, Rehabilitation, etc )   - Patient/family teaching  Outcome: Progressing     Problem: DISCHARGE PLANNING - CARE MANAGEMENT  Goal: Discharge to post-acute care or home with appropriate resources  Description  INTERVENTIONS:  - Conduct assessment to determine patient/family and health care team treatment goals, and need for post-acute services based on payer coverage, community resources, and patient preferences, and barriers to discharge  - Address psychosocial, clinical, and financial barriers to discharge as identified in assessment in conjunction with the patient/family and health care team  - Arrange appropriate level of post-acute services according to patient?s   needs and preference and payer coverage in collaboration with the physician and health care team  - Communicate with and update the patient/family, physician, and health care team regarding progress on the discharge plan  - Arrange appropriate transportation to post-acute venues  Outcome: Progressing

## 2019-03-16 NOTE — PLAN OF CARE
Problem: OCCUPATIONAL THERAPY ADULT  Goal: Performs self-care activities at highest level of function for planned discharge setting  See evaluation for individualized goals  Description  Treatment Interventions: ADL retraining, Functional transfer training, Endurance training, Patient/family training, Equipment evaluation/education, Continued evaluation, Activityengagement, Energy conservation  Equipment Recommended: Tub seat with back       See flowsheet documentation for full assessment, interventions and recommendations  Outcome: Progressing  Note:   Limitation: Decreased ADL status, Decreased endurance, Decreased Safe judgement during ADL, Decreased self-care trans, Decreased high-level ADLs     Assessment: Pt seen for OT tx session day 2 this AM focusing on challenging activity tolerance during ADL performance  Pt required consistent assistance for functional transfers and mobility using RW  Educated pt on hand placement and use of call bell to max I and safety  Pt completed LBD w/ min A (steadying) and w/ tie fasteners on pants  Continue to recommend post acute rehab when medically stable for discharge from acute care   Will continue to follow     OT Discharge Recommendation: Other (Comment)(to post acute rehab)  OT - OK to Discharge: (to post acute rehab)

## 2019-03-16 NOTE — ASSESSMENT & PLAN NOTE
· Baseline appears to be 1 8-2 1  Follows with Dr Mejia Ledesma as outpatient  · Creatinine 2 05 today, improved from yesterday    · Avoid nephrotoxins  · Bmp in AM

## 2019-03-16 NOTE — ASSESSMENT & PLAN NOTE
· Mild troponin elevation noted  Likely type 2 in the setting of heart failure exacerbation and anemia  · No complaints of chest pain  · Cardiology following  · Continue aspirin  No BB given AVB

## 2019-03-16 NOTE — ASSESSMENT & PLAN NOTE
· UA positive for nitrites, leuks, innumerable bacteria  · Started on Rocephin in ED - switch to Vancomycin for now   · Urine culture growing MRSA susceptible to doxycycline, will discontinue vancomycin and change to oral doxycycline to complete 7 day course

## 2019-03-16 NOTE — PROGRESS NOTES
Telemetry monitor showed 6 beats of Vtach  Pt is asymptomatic, denies chest pain or any distress  Vitals signs and EKG is performed  SLIM paged  No new order given at this time  Safety measures are in place  Will continue to monitor pt

## 2019-03-16 NOTE — ASSESSMENT & PLAN NOTE
Lab Results   Component Value Date    HGBA1C 6 9 (H) 07/17/2018       Recent Labs     03/15/19  1115 03/15/19  1551 03/15/19  2043 03/16/19  0716   POCGLU 247* 170* 205* 174*         · On Amaryl at baseline, currently on hold     · Continue with SSI  · Diabetic diet  · Monitor Accuchecks

## 2019-03-16 NOTE — DISCHARGE INSTRUCTIONS
Take your medications as directed, and keep your follow up appointments  Adhere to a heart healthy lifestyle, maintaining a low sodium diet  Daily weight and record  If your weight increases 2-3 lbs in one day, or 5 lbs in 2 days, you are short of breath or have lower extremity swelling, please call the heart failure team at  [facility] at [phone]  Per Dr Laurie Ortiz team, hold off on further treatment for now until seen by them in outpatient setting

## 2019-03-16 NOTE — PROGRESS NOTES
General Cardiology   Progress Note -  [facility]   Bella Martinez 80 y o  male MRN: [id number]    Unit/Bed#: -01 Encounter: [id number]    Assessment/ Plan    1  Acute on chronic combined diastolic and systolic heart failure  Patient was on furosemide 40 mg IV b i d  but discontinued after yesterday's morning dose Creatinine 2 0 today (baseline) Will restart maintenance oral diuretic today   Strict I&Os -660 mL in 24 hours and -2 9 L since admission  Daily weights:  188 lb today from 195 on admission  Continue 2 gram Na diet and 1500 ml fluid restriction     2  Ischemic cardiomyopathy- EF 30%  No ACE-inhibitor/ARB due to advanced CKD  Beta-blocker discontinued this admission due to #3     3  Intermittent 2:1 AVblock-   Beta-blocker discontinued yesterday  Reviewed telemetry showing 2:1 AV block HR in 40s while patient sleeps  Patient denies dizziness, lightheaded, near syncope or syncope  4  Coronary artery disease- continue aspirin and statin  No beta-blocker due to #3      5  Chronic kidney disease stage 4- baseline creatinine 1 7- 2 1  Creatinine 2 0 today    6  Hypertension- average blood pressure 119/62    7  Hyperlipidemia- continue statin    Follow up with Dr Rhonda Horta  Called office to schedule appointment  Subjective  Patient has no complaint of lightheaded, dizziness, lightheaded, chest pain or shortness of breath  He reports he feels much better from admission date  Review of Systems   Constitution: Negative for chills and fever  Cardiovascular: Negative for chest pain, dyspnea on exertion, leg swelling, orthopnea and palpitations  Respiratory: Negative for shortness of breath  Musculoskeletal: Negative for falls  Gastrointestinal: Negative for nausea and vomiting  Neurological: Negative for dizziness and light-headedness  Psychiatric/Behavioral: Negative for altered mental status         Objective:   Vitals: Blood pressure 107/62, pulse 70, temperature 97 8 °F (36 6 °C), temperature source Oral, resp  rate 18, height 5' 6" (1 676 m), weight 85 4 kg (188 lb 4 4 oz), SpO2 98 %  ,       Body mass index is 30 39 kg/m²  ,     Systolic (11NFU), ZGV:375 , Min:97 , RFL:477     Diastolic (62ZDW), JWL:84, Min:51, Max:74      Intake/Output Summary (Last 24 hours) at 3/16/2019 1100  Last data filed at 3/16/2019 1001  Gross per 24 hour   Intake 590 ml   Output 1150 ml   Net -560 ml     Weight (last 2 days)     Date/Time   Weight    03/16/19 0552   85 4 (188 27)    03/15/19 0600   88 8 (195 77)    03/14/19 0540   88 9 (195 99) patient not ambulating at this time    Weight: patient not ambulating at this time at 03/14/19 0540            Telemetry Review:  Normal sinus rhythm with intermittent 2-1 av block heart rates 40s at that time  Average heart rate 60-70s    Physical Exam   Constitutional: He is oriented to person, place, and time  No distress  Neck: Neck supple  No JVD present  Cardiovascular: Normal rate, regular rhythm, normal heart sounds and intact distal pulses  Pulmonary/Chest: Effort normal  No stridor  No respiratory distress  He has no wheezes  Room air  No crackles   Abdominal: Soft  Bowel sounds are normal    Obese   Musculoskeletal: He exhibits no edema  Neurological: He is alert and oriented to person, place, and time  Skin: Skin is warm and dry  He is not diaphoretic  Psychiatric: He has a normal mood and affect   His behavior is normal        LABORATORY RESULTS  Results from last 7 days   Lab Units 03/14/19  0540 03/14/19  0300 03/13/19  2348   TROPONIN I ng/mL 0 15* 0 16* 0 14*     CBC with diff:   Results from last 7 days   Lab Units 03/16/19  0451 03/15/19  1517 03/15/19  0920 03/14/19  0539 03/13/19  2348 03/13/19  1927   WBC Thousand/uL 4 41  --  5 05 4 64  --  5 08   HEMOGLOBIN g/dL 8 6* 8 7* 7 6* 7 1*  --  8 0*   HEMATOCRIT % 29 6* 28 2* 24 8* 22 9*  --  25 7*   MCV fL 98  --  92 91  --  92   PLATELETS Thousands/uL 156  --  158 147* 159 187   MCH pg 28 4  -- 28 1 28 1  --  28 5   MCHC g/dL 29 1*  --  30 6* 31 0*  --  31 1*   RDW % 17 8*  --  17 7* 17 5*  --  17 4*   MPV fL 9 7  --  9 5 9 8 9 5 10 7   NRBC AUTO /100 WBCs 0  --  0 0  --  0       CMP:  Results from last 7 days   Lab Units 03/16/19  0451 03/15/19  0920 03/14/19  0540 03/13/19  1927   POTASSIUM mmol/L 3 8 3 5 3 2* 3 4*   CHLORIDE mmol/L 105 105 110* 106   CO2 mmol/L 24 23 22 23   BUN mg/dL 25 25 21 23   CREATININE mg/dL 2 05* 2 16* 1 93* 2 13*   CALCIUM mg/dL 8 7 8 6 8 7 8 3   AST U/L  --   --   --  7   ALT U/L  --   --   --  18   ALK PHOS U/L  --   --   --  129*   EGFR ml/min/1 73sq m 28 26 30 27       BMP:  Results from last 7 days   Lab Units 03/16/19  0451 03/15/19  0920 03/14/19  0540 03/13/19  1927   POTASSIUM mmol/L 3 8 3 5 3 2* 3 4*   CHLORIDE mmol/L 105 105 110* 106   CO2 mmol/L 24 23 22 23   BUN mg/dL 25 25 21 23   CREATININE mg/dL 2 05* 2 16* 1 93* 2 13*   CALCIUM mg/dL 8 7 8 6 8 7 8 3       Lab Results   Component Value Date    NTBNP 7,469 (H) 03/13/2019    NTBNP 2,512 (H) 10/22/2018             Results from last 7 days   Lab Units 03/13/19  1927   MAGNESIUM mg/dL 2 2                 Results from last 7 days   Lab Units 03/15/19  0920   TSH 3RD GENERATON uIU/mL 1 878             Lipid Profile:   Lab Results   Component Value Date    CHOL 81 10/05/2015    CHOL 109 06/25/2015    CHOL 118 05/05/2015     Lab Results   Component Value Date    HDL 40 07/17/2018    HDL 37 (L) 04/03/2017    HDL 48 10/05/2015     Lab Results   Component Value Date    LDLCALC 17 07/17/2018    LDLCALC 10 04/03/2017    LDLCALC 13 10/05/2015     Lab Results   Component Value Date    TRIG 202 (H) 07/17/2018    TRIG 180 (H) 04/03/2017    TRIG 100 10/05/2015       Cardiac testing:   Results for orders placed during the hospital encounter of 03/13/19   Echo complete with contrast if indicated    Narrative Matt [address], [address] [facility]  [phone]    Transthoracic Echocardiogram  2D, M-mode, Doppler, and Color Doppler    Study date:  14-Mar-2019    Patient: Ferdinand Mon  MR number: [id number]  Account number: [de-identified]  : 22-Sep-1930  Age: 80 years  Gender: Male  Status: Inpatient  Location: Bedside  Height: 66 in  Weight: 194 7 lb  BP: 139/ 67 mmHg    Indications: Heart failure  Diagnoses: I50 9 - Heart failure, unspecified    Sonographer:  Li Nagy RDCS  Primary Physician:  Clementina Ibanez MD  Referring Physician:  Dl Crain PA-C  Group:  [facility]  Interpreting Physician:  Annalise Harry MD    SUMMARY    LEFT VENTRICLE:  The ventricle was mildly dilated  Systolic function was markedly reduced by visual assessment  Ejection fraction was estimated to be 30 %  There was severe diffuse hypokinesis with distinct regional wall motion abnormalities  Wall thickness was mildly increased  Features were consistent with a pseudonormal left ventricular filling pattern, with concomitant abnormal relaxation and increased filling pressure (grade 2 diastolic dysfunction)  LEFT ATRIUM:  The atrium was mildly dilated  MITRAL VALVE:  There was mild regurgitation  AORTIC VALVE:  The valve was trileaflet  Leaflets exhibited normal thickness, moderate calcification, and moderately reduced cuspal separation  Transaortic velocity was increased due to valvular stenosis  There was mild stenosis  There was mild regurgitation  TRICUSPID VALVE:  There was trace regurgitation  HISTORY: PRIOR HISTORY: DM2  Hyperlipidemia  CKD4  Systolic congestive heart failure  Shortness of breath  Hypertension  CAD  PROCEDURE: The procedure was performed at the bedside  This was a routine study  The transthoracic approach was used  The study included complete 2D imaging, M-mode, complete spectral Doppler, and color Doppler  The heart rate was 90 bpm,  at the start of the study  Echocardiographic views were limited due to poor acoustic window availability   This was a technically difficult study  LEFT VENTRICLE: The ventricle was mildly dilated  Systolic function was markedly reduced by visual assessment  Ejection fraction was estimated to be 30 %  There was severe diffuse hypokinesis with distinct regional wall motion  abnormalities  Wall thickness was mildly increased  DOPPLER: The ratio of early ventricular filling to atrial contraction velocities was within the normal range  Features were consistent with a pseudonormal left ventricular filling  pattern, with concomitant abnormal relaxation and increased filling pressure (grade 2 diastolic dysfunction)  RIGHT VENTRICLE: The size was normal  Systolic function was normal  DOPPLER: Systolic pressure was not estimated  LEFT ATRIUM: The atrium was mildly dilated  RIGHT ATRIUM: Size was normal     MITRAL VALVE: Valve structure was normal  There was normal leaflet separation  DOPPLER: The transmitral velocity was within the normal range  There was no evidence for stenosis  There was mild regurgitation  AORTIC VALVE: The valve was trileaflet  Leaflets exhibited normal thickness, moderate calcification, and moderately reduced cuspal separation  DOPPLER: Transaortic velocity was increased due to valvular stenosis  There was mild stenosis  There was mild regurgitation  TRICUSPID VALVE: The valve structure was normal  There was normal leaflet separation  DOPPLER: The transtricuspid velocity was within the normal range  There was no evidence for stenosis  There was trace regurgitation  PULMONIC VALVE: Leaflets exhibited normal thickness, no calcification, and normal cuspal separation  DOPPLER: The transpulmonic velocity was within the normal range  There was no regurgitation  PERICARDIUM: There was no pericardial effusion  AORTA: The root exhibited normal size  SYSTEMIC VEINS: IVC: The inferior vena cava was normal in size      SYSTEM MEASUREMENT TABLES    2D  %FS: 22 13 %  Ao Diam: 3 36 cm  EDV(Teich): 149 54 ml  EF Biplane: 28 47 %  EF(Teich): 44 16 %  ESV(Teich): 83 5 ml  IVSd: 1 16 cm  LA Area: 24 03 cm2  LA Diam: 4 86 cm  LVEDV MOD A2C: 118 86 ml  LVEDV MOD A4C: 130 77 ml  LVEDV MOD BP: 126 24 ml  LVEF MOD A2C: 27 51 %  LVEF MOD A4C: 33 56 %  LVESV MOD A2C: 86 16 ml  LVESV MOD A4C: 86 89 ml  LVESV MOD BP: 90 3 ml  LVIDd: 5 53 cm  LVIDs: 4 31 cm  LVLd A2C: 7 97 cm  LVLd A4C: 8 22 cm  LVLs A2C: 8 23 cm  LVLs A4C: 7 48 cm  LVOT Diam: 2 17 cm  LVPWd: 1 15 cm  RA Area: 15 34 cm2  RVIDd: 3 64 cm  SV MOD A2C: 32 7 ml  SV MOD A4C: 43 89 ml  SV(Teich): 66 05 ml    CF  MR Als  Jason: 0 3 m/s  MR Flow: 92 34 ml/s  MR Rad: 0 69 cm    CW  AV Env  Ti: 309 11 ms  AV MaxP 28 mmHg  AV VTI: 45 84 cm  AV Vmax: 2 08 m/s  AV Vmean: 1 48 m/s  AV meanP 65 mmHg    MM  TAPSE: 2 26 cm    PW  CRISTINE (VTI): 1 22 cm2  CRISTINE Vmax: 1 37 cm2  E': 0 06 m/s  E/E': 19 74  LVOT Env  Ti: 329 87 ms  LVOT VTI: 15 19 cm  LVOT Vmax: 0 77 m/s  LVOT Vmean: 0 46 m/s  LVOT maxP 4 mmHg  LVOT meanP 04 mmHg  LVSI Dopp: 28 32 ml/m2  LVSV Dopp: 56 07 ml  MV A Jason: 0 85 m/s  MV Dec Refugio: 7 88 m/s2  MV DecT: 138 56 ms  MV E Jason: 1 09 m/s  MV E/A Ratio: 1 28  MV PHT: 40 18 ms  MVA By PHT: 5 48 cm2    [facility] Accredited [facility]chocardiography [facility]    Prepared and electronically signed by    Yesi Bridges MD  Signed 14-Mar-2019 12:50:02       Meds/Allergies   all current active meds have been reviewed and current meds:   Current Facility-Administered Medications   Medication Dose Route Frequency    acetaminophen (TYLENOL) tablet 650 mg  650 mg Oral Q6H PRN    aspirin chewable tablet 81 mg  81 mg Oral Daily    atorvastatin (LIPITOR) tablet 40 mg  40 mg Oral HS    benzonatate (TESSALON PERLES) capsule 100 mg  100 mg Oral BID PRN    bimatoprost (LUMIGAN) 0 01 % ophthalmic solution 1 drop  1 drop Both Eyes HS    doxycycline hyclate (VIBRAMYCIN) capsule 100 mg  100 mg Oral Q12H [facility]    fluticasone (FLONASE) 50 mcg/act nasal spray 1 spray  1 spray Each Nare PRN    guaiFENesin (MUCINEX) 12 hr tablet 600 mg  600 mg Oral BID    heparin (porcine) subcutaneous injection 5,000 Units  5,000 Units Subcutaneous Q8H [address]    hydrALAZINE (APRESOLINE) tablet 10 mg  10 mg Oral Q8H [address]    insulin lispro (HumaLOG) 100 units/mL subcutaneous injection 1-5 Units  1-5 Units Subcutaneous HS    insulin lispro (HumaLOG) 100 units/mL subcutaneous injection 1-6 Units  1-6 Units Subcutaneous TID AC    lenalidomide (REVLIMID) capsule 5 mg  5 mg Oral Daily    pantoprazole (PROTONIX) EC tablet 40 mg  40 mg Oral Early Morning    polyethylene glycol (MIRALAX) packet 17 g  17 g Oral Daily    potassium chloride (K-DUR,KLOR-CON) CR tablet 10 mEq  10 mEq Oral Daily    senna (SENOKOT) tablet 8 6 mg  1 tablet Oral HS    timolol (TIMOPTIC) 0 5 % ophthalmic solution 1 drop  1 drop Both Eyes Daily     Medications Prior to Admission   Medication    acetaminophen (TYLENOL) 325 mg tablet    aspirin 81 MG tablet    atorvastatin (LIPITOR) 40 mg tablet    benzonatate (TESSALON PERLES) 100 mg capsule    bimatoprost (LUMIGAN) 0 01 % ophthalmic drops    esomeprazole (NexIUM) 40 MG capsule    fluticasone (FLONASE) 50 mcg/act nasal spray    furosemide (LASIX) 40 mg tablet    furosemide (LASIX) 40 mg tablet    glimepiride (AMARYL) 2 mg tablet    glucose blood (ACCU-CHEK CHANDRAKANT PLUS) test strip    guaiFENesin (MUCINEX) 600 mg 12 hr tablet    lenalidomide (REVLIMID) 5 MG CAPS    metoprolol succinate (TOPROL-XL) 25 mg 24 hr tablet    nitroglycerin (NITROSTAT) 0 4 mg SL tablet    ONE TOUCH ULTRA TEST test strip    ONETOUCH DELICA LANCETS 84X MISC    potassium chloride (KLOR-CON 10) 10 mEq tablet    timolol (TIMOPTIC) 0 5 % ophthalmic solution    UNKNOWN TO PATIENT     Counseling / Coordination of Care  Total floor / unit time spent today 20 minutes  Greater than 50% of total time was spent with the patient and / or family counseling and / or coordination of care        ** Please Note: Dragon 360 Dictation voice to text software may have been used in the creation of this document   **

## 2019-03-16 NOTE — PROGRESS NOTES
Telemetry monitor alarming for normal sinus bradycardia rhythm HR in low 30's to 40's  Pt is sound asleep in the chair  Woke up the pt to check up on him  Pt denies any chest pain or distress  Pt is asymptomatic  Vitals and EKG performed  SLIM notified  No new orders given at this moment  Pt is awake now about to eat his lunch and HR is back in 70's  Safety measures are in place  Will continue to monitor pt

## 2019-03-16 NOTE — ASSESSMENT & PLAN NOTE
· multifactorial decline given above  · Goals of care need to be discussed given age and limited rehab potential   Palliative medicine noted, patient remains full code  · PT/OT is recommending rehab, referrals pending as per case management

## 2019-03-17 LAB
ANION GAP SERPL CALCULATED.3IONS-SCNC: 10 MMOL/L (ref 4–13)
ATRIAL RATE: 72 BPM
BASOPHILS # BLD AUTO: 0.07 THOUSANDS/ΜL (ref 0–0.1)
BASOPHILS NFR BLD AUTO: 1 % (ref 0–1)
BUN SERPL-MCNC: 26 MG/DL (ref 5–25)
CALCIUM SERPL-MCNC: 8.8 MG/DL (ref 8.3–10.1)
CHLORIDE SERPL-SCNC: 106 MMOL/L (ref 100–108)
CO2 SERPL-SCNC: 25 MMOL/L (ref 21–32)
CREAT SERPL-MCNC: 2 MG/DL (ref 0.6–1.3)
EOSINOPHIL # BLD AUTO: 0.37 THOUSAND/ΜL (ref 0–0.61)
EOSINOPHIL NFR BLD AUTO: 8 % (ref 0–6)
ERYTHROCYTE [DISTWIDTH] IN BLOOD BY AUTOMATED COUNT: 17.5 % (ref 11.6–15.1)
GFR SERPL CREATININE-BSD FRML MDRD: 29 ML/MIN/1.73SQ M
GLUCOSE SERPL-MCNC: 124 MG/DL (ref 65–140)
GLUCOSE SERPL-MCNC: 133 MG/DL (ref 65–140)
GLUCOSE SERPL-MCNC: 152 MG/DL (ref 65–140)
GLUCOSE SERPL-MCNC: 169 MG/DL (ref 65–140)
GLUCOSE SERPL-MCNC: 183 MG/DL (ref 65–140)
GLUCOSE SERPL-MCNC: 246 MG/DL (ref 65–140)
HCT VFR BLD AUTO: 26.4 % (ref 36.5–49.3)
HGB BLD-MCNC: 8.3 G/DL (ref 12–17)
IMM GRANULOCYTES # BLD AUTO: 0.01 THOUSAND/UL (ref 0–0.2)
IMM GRANULOCYTES NFR BLD AUTO: 0 % (ref 0–2)
LYMPHOCYTES # BLD AUTO: 1.68 THOUSANDS/ΜL (ref 0.6–4.47)
LYMPHOCYTES NFR BLD AUTO: 34 % (ref 14–44)
MCH RBC QN AUTO: 28.3 PG (ref 26.8–34.3)
MCHC RBC AUTO-ENTMCNC: 31.4 G/DL (ref 31.4–37.4)
MCV RBC AUTO: 90 FL (ref 82–98)
MONOCYTES # BLD AUTO: 0.47 THOUSAND/ΜL (ref 0.17–1.22)
MONOCYTES NFR BLD AUTO: 10 % (ref 4–12)
NEUTROPHILS # BLD AUTO: 2.28 THOUSANDS/ΜL (ref 1.85–7.62)
NEUTS SEG NFR BLD AUTO: 47 % (ref 43–75)
NRBC BLD AUTO-RTO: 0 /100 WBCS
P AXIS: 47 DEGREES
PLATELET # BLD AUTO: 178 THOUSANDS/UL (ref 149–390)
PMV BLD AUTO: 10 FL (ref 8.9–12.7)
POTASSIUM SERPL-SCNC: 3.7 MMOL/L (ref 3.5–5.3)
QRS AXIS: 51 DEGREES
QRSD INTERVAL: 124 MS
QT INTERVAL: 440 MS
QTC INTERVAL: 461 MS
RBC # BLD AUTO: 2.93 MILLION/UL (ref 3.88–5.62)
SODIUM SERPL-SCNC: 141 MMOL/L (ref 136–145)
T WAVE AXIS: 240 DEGREES
VENTRICULAR RATE: 66 BPM
WBC # BLD AUTO: 4.88 THOUSAND/UL (ref 4.31–10.16)

## 2019-03-17 PROCEDURE — 99232 SBSQ HOSP IP/OBS MODERATE 35: CPT | Performed by: NURSE PRACTITIONER

## 2019-03-17 PROCEDURE — 85025 COMPLETE CBC W/AUTO DIFF WBC: CPT | Performed by: NURSE PRACTITIONER

## 2019-03-17 PROCEDURE — 80048 BASIC METABOLIC PNL TOTAL CA: CPT | Performed by: NURSE PRACTITIONER

## 2019-03-17 PROCEDURE — 82948 REAGENT STRIP/BLOOD GLUCOSE: CPT

## 2019-03-17 PROCEDURE — 93010 ELECTROCARDIOGRAM REPORT: CPT | Performed by: INTERNAL MEDICINE

## 2019-03-17 RX ADMIN — TIMOLOL MALEATE 1 DROP: 5 SOLUTION/ DROPS OPHTHALMIC at 09:25

## 2019-03-17 RX ADMIN — POLYETHYLENE GLYCOL 3350 17 G: 17 POWDER, FOR SOLUTION ORAL at 09:24

## 2019-03-17 RX ADMIN — FUROSEMIDE 40 MG: 40 TABLET ORAL at 09:23

## 2019-03-17 RX ADMIN — POTASSIUM CHLORIDE 10 MEQ: 750 TABLET, EXTENDED RELEASE ORAL at 09:23

## 2019-03-17 RX ADMIN — INSULIN LISPRO 1 UNITS: 100 INJECTION, SOLUTION INTRAVENOUS; SUBCUTANEOUS at 22:12

## 2019-03-17 RX ADMIN — PANTOPRAZOLE SODIUM 40 MG: 40 TABLET, DELAYED RELEASE ORAL at 05:45

## 2019-03-17 RX ADMIN — HEPARIN SODIUM 5000 UNITS: 5000 INJECTION INTRAVENOUS; SUBCUTANEOUS at 14:27

## 2019-03-17 RX ADMIN — GUAIFENESIN 600 MG: 600 TABLET, EXTENDED RELEASE ORAL at 09:23

## 2019-03-17 RX ADMIN — HEPARIN SODIUM 5000 UNITS: 5000 INJECTION INTRAVENOUS; SUBCUTANEOUS at 05:45

## 2019-03-17 RX ADMIN — HYDRALAZINE HYDROCHLORIDE 10 MG: 10 TABLET, FILM COATED ORAL at 22:11

## 2019-03-17 RX ADMIN — HEPARIN SODIUM 5000 UNITS: 5000 INJECTION INTRAVENOUS; SUBCUTANEOUS at 22:11

## 2019-03-17 RX ADMIN — INSULIN LISPRO 3 UNITS: 100 INJECTION, SOLUTION INTRAVENOUS; SUBCUTANEOUS at 12:38

## 2019-03-17 RX ADMIN — ASPIRIN 81 MG 81 MG: 81 TABLET ORAL at 09:24

## 2019-03-17 RX ADMIN — ATORVASTATIN CALCIUM 40 MG: 40 TABLET, FILM COATED ORAL at 22:11

## 2019-03-17 RX ADMIN — INSULIN LISPRO 1 UNITS: 100 INJECTION, SOLUTION INTRAVENOUS; SUBCUTANEOUS at 17:56

## 2019-03-17 RX ADMIN — DOXYCYCLINE HYCLATE 100 MG: 100 CAPSULE ORAL at 09:23

## 2019-03-17 RX ADMIN — HYDRALAZINE HYDROCHLORIDE 10 MG: 10 TABLET, FILM COATED ORAL at 14:27

## 2019-03-17 RX ADMIN — GUAIFENESIN 600 MG: 600 TABLET, EXTENDED RELEASE ORAL at 17:56

## 2019-03-17 RX ADMIN — BIMATOPROST 1 DROP: 0.1 SOLUTION/ DROPS OPHTHALMIC at 22:12

## 2019-03-17 RX ADMIN — STANDARDIZED SENNA CONCENTRATE 8.6 MG: 8.6 TABLET ORAL at 22:11

## 2019-03-17 RX ADMIN — HYDRALAZINE HYDROCHLORIDE 10 MG: 10 TABLET, FILM COATED ORAL at 05:45

## 2019-03-17 RX ADMIN — DOXYCYCLINE HYCLATE 100 MG: 100 CAPSULE ORAL at 22:11

## 2019-03-17 NOTE — PLAN OF CARE
Problem: Potential for Falls  Goal: Patient will remain free of falls  Description  INTERVENTIONS:  - Assess patient frequently for physical needs  -  Identify cognitive and physical deficits and behaviors that affect risk of falls  -  Laura fall precautions as indicated by assessment   - Educate patient/family on patient safety including physical limitations  - Instruct patient to call for assistance with activity based on assessment  - Modify environment to reduce risk of injury  - Consider OT/PT consult to assist with strengthening/mobility  Outcome: Progressing     Problem: CARDIOVASCULAR - ADULT  Goal: Maintains optimal cardiac output and hemodynamic stability  Description  INTERVENTIONS:  - Monitor I/O, vital signs and rhythm  - Monitor for S/S and trends of decreased cardiac output i e  bleeding, hypotension  - Administer and titrate ordered vasoactive medications to optimize hemodynamic stability  - Assess quality of pulses, skin color and temperature  - Assess for signs of decreased coronary artery perfusion - ex   Angina  - Instruct patient to report change in severity of symptoms  Outcome: Progressing  Goal: Absence of cardiac dysrhythmias or at baseline rhythm  Description  INTERVENTIONS:  - Continuous cardiac monitoring, monitor vital signs, obtain 12 lead EKG if indicated  - Administer antiarrhythmic and heart rate control medications as ordered  - Monitor electrolytes and administer replacement therapy as ordered  Outcome: Progressing

## 2019-03-17 NOTE — ASSESSMENT & PLAN NOTE
· Patient presents with dyspnea on exertion and abdominal distention  BNP elevated, chest x-ray appears congested  Patient is symptomatically improved  · Cardiology is following, patient received intravenous diuresis through 3/15 and transitioned to oral maintenance diuretic 3/16, Lasix 40 mg daily  · Echocardiogram showing ejection fraction of 30% with severe diffuse hypokinesis  · CXR appears possibly overloaded compared to prior   · Beta-blocker was discontinued in the setting of intermittent 2-1 av block, will not continue at discharge  Started on low-dose hydralazine  No Ace/Arb the given advanced CKD  · Final monitor I&Os, daily weights  Continue with low-sodium diet and fluid restriction  · Patient did have 6 beat run of Vtach 3/16 while asleep, asymptomatic  · Stable for discharge from a cardiac standpoint on regimen as above, will follow up with Dr Romeo Neff as outpatient

## 2019-03-17 NOTE — ASSESSMENT & PLAN NOTE
Lab Results   Component Value Date    HGBA1C 6 9 (H) 07/17/2018       Recent Labs     03/16/19  1204 03/16/19  1547 03/16/19  2108 03/17/19  0736   POCGLU 175* 205* 220* 133         · On Amaryl at baseline, currently on hold     · Continue with SSI  · Diabetic diet  · Monitor Accuchecks

## 2019-03-17 NOTE — ASSESSMENT & PLAN NOTE
· On Revlimid monotherapy - ? 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

## 2019-03-17 NOTE — ASSESSMENT & PLAN NOTE
· UA positive for nitrites, leuks, innumerable bacteria  · Started on Rocephin in ED - switch to Vancomycin for now   · Urine culture growing MRSA susceptible to doxycycline, vanco d/c and Doxycycline started 3/16 to continue through 3/19

## 2019-03-17 NOTE — ASSESSMENT & PLAN NOTE
· Combination of Age/ chronic disease including CHF and Myeloma and revlimid  · 1 unit PRBC 3/15  Hemoglobin stable at 8 3 today  · Hemoccult stool pending- note progressive decline over last few months  · Folate level normal   Iron studies are low  Reticulocyte count normal   TSH normal   B12 pending

## 2019-03-17 NOTE — PROGRESS NOTES
[facility]    Progress Note - Jesi Estimable 9/22/1930, 80 y o  male MRN: [id number]    Unit/Bed#: -01 Encounter: [id number]    Primary Care Provider: Renan Perez MD   Date and time admitted to hospital: 3/13/2019  6:51 PM    * Acute on chronic combined systolic and diastolic heart failure ([address] )  Assessment & Plan  · Patient presents with dyspnea on exertion and abdominal distention  BNP elevated, chest x-ray appears congested  Patient is symptomatically improved  · Cardiology is following, patient received intravenous diuresis through 3/15 and transitioned to oral maintenance diuretic 3/16, Lasix 40 mg daily  · Echocardiogram showing ejection fraction of 30% with severe diffuse hypokinesis  · CXR appears possibly overloaded compared to prior   · Beta-blocker was discontinued in the setting of intermittent 2-1 av block, will not continue at discharge  Started on low-dose hydralazine  No Ace/Arb the given advanced CKD  · Final monitor I&Os, daily weights  Continue with low-sodium diet and fluid restriction  · Patient did have 6 beat run of Vtach 3/16 while asleep, asymptomatic  · Stable for discharge from a cardiac standpoint on regimen as above, will follow up with Dr Dio Temple as outpatient  NSTEMI (non-ST elevated myocardial infarction) [facility])  Assessment & Plan  · Mild troponin elevation noted  Likely type 2 in the setting of heart failure exacerbation and anemia  · No complaints of chest pain  · Cardiology has signed off  · Continue aspirin  No BB given AVB  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)  Assessment & Plan  · Combination of Age/ chronic disease including CHF and Myeloma and revlimid  · 1 unit PRBC 3/15  Hemoglobin stable at 8 3 today  · Hemoccult stool pending- note progressive decline over last few months  · Folate level normal   Iron studies are low  Reticulocyte count normal   TSH normal   B12 pending      Generalized weakness  Assessment & Plan  · multifactorial decline given above  · Goals of care need to be discussed given age and limited rehab potential   Palliative medicine noted, patient remains full code  · PT/OT is recommending rehab, referrals pending as per case management  Stage 4 chronic kidney disease ([facility] [address] )  Assessment & Plan  · Baseline appears to be 1 8-2 1  Follows with Dr Howard Cárdenas as outpatient  · Creatinine 2 00 today, improved from yesterday  · Avoid nephrotoxins    Kappa light chain myeloma (HCC)  Assessment & Plan  · On Revlimid monotherapy - ? anemia potential side effect  · Hematology following, feel that anemia is directly related to epo deficiency in the setting of chronic kidney disease  Do not feel that anemia is related to multiple myeloma  · Erythropoietin pending  Will need to f/u with Dr Vivien Yeung in office after discharge  Type 2 diabetes mellitus without complication, without long-term current use of insulin [facility])  Assessment & Plan  Lab Results   Component Value Date    HGBA1C 6 9 (H) 07/17/2018       Recent Labs     03/16/19  1204 03/16/19  1547 03/16/19  2108 03/17/19  0736   POCGLU 175* 205* 220* 133         · On Amaryl at baseline, currently on hold  · Continue with SSI  · Diabetic diet  · Monitor Accuchecks    UTI (urinary tract infection)  Assessment & Plan  · UA positive for nitrites, leuks, innumerable bacteria  · Started on Rocephin in ED - switch to Vancomycin for now   · Urine culture growing MRSA susceptible to doxycycline, vanco d/c and Doxycycline started 3/16 to continue through 3/19  Constipation  Assessment & Plan  · Continue with current bowel regimen with Miralax and Senna  Mixed hyperlipidemia  Assessment & Plan  · Continue statin    Pharmacologic: Heparin  Mechanical VTE Prophylaxis in Place: Yes    Patient Centered Rounds: I have performed bedside rounds with nursing staff today      Discussions with Specialists or Other Care Team Provider: nursing, case management     Education and Discussions with Family / Patient: patient and son over the phone     Time Spent for Care: 30 minutes  More than 50% of total time spent on counseling and coordination of care as described above  Current Length of Stay: 4 day(s)    Current Patient Status: Inpatient   Certification Statement: The patient will continue to require additional inpatient hospital stay due to Await discharge plan to rehab    Discharge Plan / Estimated Discharge Date:  Patient is medically stable  He is now on oral diuretics and hemoglobin is stable  He is stable for discharge to rehab however we are awaiting authorization/placement  Code Status: Level 1 - Full Code      Subjective:   Patient offers no complaints  He feels well  Denies any chest pain or shortness of breath  Agreeable to rehab at [facility] only, otherwise would elect to go home with visiting nurses  Objective:     Vitals:   Temp (24hrs), Av 8 °F (36 6 °C), Min:97 6 °F (36 4 °C), Max:98 °F (36 7 °C)    Temp:  [97 6 °F (36 4 °C)-98 °F (36 7 °C)] 97 9 °F (36 6 °C)  HR:  [64-83] 70  Resp:  [18] 18  BP: (113-143)/(56-69) 143/69  SpO2:  [98 %-100 %] 99 %  Body mass index is 31 38 kg/m²  Input and Output Summary (last 24 hours): Intake/Output Summary (Last 24 hours) at 3/17/2019 1046  Last data filed at 3/17/2019 1036  Gross per 24 hour   Intake 600 ml   Output 2075 ml   Net -1475 ml       Physical Exam:     Physical Exam   Constitutional: He is oriented to person, place, and time  No distress  Obese    HENT:   Head: Normocephalic  Eyes: Conjunctivae are normal    Neck: Normal range of motion  Cardiovascular: Normal rate and regular rhythm  No murmur heard  NSR with 1st degree block   Pulmonary/Chest: Effort normal and breath sounds normal  He has no rales  Abdominal: Soft  Bowel sounds are normal    Musculoskeletal: Normal range of motion  He exhibits no edema     Neurological: He is alert and oriented to person, place, and time  Skin: Skin is warm and dry  Psychiatric: He has a normal mood and affect  Nursing note and vitals reviewed  Additional Data:     Labs:    Results from last 7 days   Lab Units 03/17/19  0521   WBC Thousand/uL 4 88   HEMOGLOBIN g/dL 8 3*   HEMATOCRIT % 26 4*   PLATELETS Thousands/uL 178   NEUTROS PCT % 47   LYMPHS PCT % 34   MONOS PCT % 10   EOS PCT % 8*     Results from last 7 days   Lab Units 03/17/19  0521  03/13/19  1927   POTASSIUM mmol/L 3 7   < > 3 4*   CHLORIDE mmol/L 106   < > 106   CO2 mmol/L 25   < > 23   BUN mg/dL 26*   < > 23   CREATININE mg/dL 2 00*   < > 2 13*   CALCIUM mg/dL 8 8   < > 8 3   ALK PHOS U/L  --   --  129*   ALT U/L  --   --  18   AST U/L  --   --  7    < > = values in this interval not displayed               Recent Cultures (last 7 days):     Results from last 7 days   Lab Units 03/13/19 2012   URINE CULTURE  >100,000 cfu/ml Methicillin Resistant Staphylococcus aureus*       Last 24 Hours Medication List:     Current Facility-Administered Medications:  acetaminophen 650 mg Oral Q6H PRN Erika Showers, DON   aspirin 81 mg Oral Daily Boom Lemon, PA-C   atorvastatin 40 mg Oral HS Boom Lemon, PA-C   benzonatate 100 mg Oral BID PRN Boom Lemon, PA-C   bimatoprost 1 drop Both Eyes HS Boom Lemon, PA-C   doxycycline hyclate 100 mg Oral Q12H [address] DON Mccartney   fluticasone 1 spray Each Nare PRN Boom Lemon, PA-C   furosemide 40 mg Oral Daily DON Nye   guaiFENesin 600 mg Oral BID Boom Lemon, PA-C   heparin (porcine) 5,000 Units Subcutaneous Q8H [address] Angela Orozco PA-C   hydrALAZINE 10 mg Oral [facility] DON Olson   insulin lispro 1-5 Units Subcutaneous HS Angela Caal PA-C   insulin lispro 1-6 Units Subcutaneous TID AC Angela Orozco PA-C   lenalidomide 5 mg Oral Daily Angela Caal PA-C   pantoprazole 40 mg Oral Early Morning Boom Grigsby PA-C polyethylene glycol 17 g Oral Daily DON Mccartney   potassium chloride 10 mEq Oral Daily Piper Hein PA-C   senna 1 tablet Oral HS DON Mccartney   timolol 1 drop Both Eyes Daily Piper Hein PA-C        Today, Patient Was Seen By: DON Cardona    ** Please Note: Dragon 360 Dictation voice to text software may have been used in the creation of this document   **

## 2019-03-17 NOTE — ASSESSMENT & PLAN NOTE
· Baseline appears to be 1 8-2 1  Follows with Dr Libra Kelley as outpatient  · Creatinine 2 00 today, improved from yesterday    · Avoid nephrotoxins

## 2019-03-17 NOTE — ASSESSMENT & PLAN NOTE
· Mild troponin elevation noted  Likely type 2 in the setting of heart failure exacerbation and anemia  · No complaints of chest pain  · Cardiology has signed off  · Continue aspirin  No BB given AVB

## 2019-03-18 LAB
GLUCOSE SERPL-MCNC: 130 MG/DL (ref 65–140)
GLUCOSE SERPL-MCNC: 173 MG/DL (ref 65–140)
GLUCOSE SERPL-MCNC: 209 MG/DL (ref 65–140)
GLUCOSE SERPL-MCNC: 214 MG/DL (ref 65–140)
MAGNESIUM SERPL-MCNC: 2.3 MG/DL (ref 1.6–2.6)
POTASSIUM SERPL-SCNC: 3.9 MMOL/L (ref 3.5–5.3)

## 2019-03-18 PROCEDURE — 84132 ASSAY OF SERUM POTASSIUM: CPT | Performed by: PHYSICIAN ASSISTANT

## 2019-03-18 PROCEDURE — 97116 GAIT TRAINING THERAPY: CPT

## 2019-03-18 PROCEDURE — 97535 SELF CARE MNGMENT TRAINING: CPT

## 2019-03-18 PROCEDURE — 82948 REAGENT STRIP/BLOOD GLUCOSE: CPT

## 2019-03-18 PROCEDURE — 97110 THERAPEUTIC EXERCISES: CPT

## 2019-03-18 PROCEDURE — 99232 SBSQ HOSP IP/OBS MODERATE 35: CPT | Performed by: INTERNAL MEDICINE

## 2019-03-18 PROCEDURE — 99232 SBSQ HOSP IP/OBS MODERATE 35: CPT | Performed by: PHYSICIAN ASSISTANT

## 2019-03-18 PROCEDURE — 83735 ASSAY OF MAGNESIUM: CPT | Performed by: PHYSICIAN ASSISTANT

## 2019-03-18 RX ORDER — BISACODYL 10 MG
10 SUPPOSITORY, RECTAL RECTAL DAILY
Status: DISCONTINUED | OUTPATIENT
Start: 2019-03-18 | End: 2019-03-20 | Stop reason: HOSPADM

## 2019-03-18 RX ORDER — LACTULOSE 20 G/30ML
20 SOLUTION ORAL DAILY
Status: DISCONTINUED | OUTPATIENT
Start: 2019-03-18 | End: 2019-03-20 | Stop reason: HOSPADM

## 2019-03-18 RX ADMIN — POLYETHYLENE GLYCOL 3350 17 G: 17 POWDER, FOR SOLUTION ORAL at 09:54

## 2019-03-18 RX ADMIN — TIMOLOL MALEATE 1 DROP: 5 SOLUTION/ DROPS OPHTHALMIC at 09:55

## 2019-03-18 RX ADMIN — IRON SUCROSE 200 MG: 20 INJECTION, SOLUTION INTRAVENOUS at 14:47

## 2019-03-18 RX ADMIN — GUAIFENESIN 600 MG: 600 TABLET, EXTENDED RELEASE ORAL at 17:21

## 2019-03-18 RX ADMIN — POTASSIUM CHLORIDE 10 MEQ: 750 TABLET, EXTENDED RELEASE ORAL at 09:54

## 2019-03-18 RX ADMIN — HEPARIN SODIUM 5000 UNITS: 5000 INJECTION INTRAVENOUS; SUBCUTANEOUS at 13:11

## 2019-03-18 RX ADMIN — ATORVASTATIN CALCIUM 40 MG: 40 TABLET, FILM COATED ORAL at 21:23

## 2019-03-18 RX ADMIN — LACTULOSE 20 G: 10 SOLUTION ORAL at 14:47

## 2019-03-18 RX ADMIN — DOXYCYCLINE HYCLATE 100 MG: 100 CAPSULE ORAL at 21:23

## 2019-03-18 RX ADMIN — HYDRALAZINE HYDROCHLORIDE 10 MG: 10 TABLET, FILM COATED ORAL at 21:23

## 2019-03-18 RX ADMIN — BISACODYL 10 MG: 10 SUPPOSITORY RECTAL at 14:47

## 2019-03-18 RX ADMIN — DOXYCYCLINE HYCLATE 100 MG: 100 CAPSULE ORAL at 09:54

## 2019-03-18 RX ADMIN — HEPARIN SODIUM 5000 UNITS: 5000 INJECTION INTRAVENOUS; SUBCUTANEOUS at 05:36

## 2019-03-18 RX ADMIN — HYDRALAZINE HYDROCHLORIDE 10 MG: 10 TABLET, FILM COATED ORAL at 13:10

## 2019-03-18 RX ADMIN — HEPARIN SODIUM 5000 UNITS: 5000 INJECTION INTRAVENOUS; SUBCUTANEOUS at 21:23

## 2019-03-18 RX ADMIN — INSULIN LISPRO 1 UNITS: 100 INJECTION, SOLUTION INTRAVENOUS; SUBCUTANEOUS at 21:30

## 2019-03-18 RX ADMIN — INSULIN LISPRO 2 UNITS: 100 INJECTION, SOLUTION INTRAVENOUS; SUBCUTANEOUS at 13:11

## 2019-03-18 RX ADMIN — INSULIN LISPRO 2 UNITS: 100 INJECTION, SOLUTION INTRAVENOUS; SUBCUTANEOUS at 17:22

## 2019-03-18 RX ADMIN — PANTOPRAZOLE SODIUM 40 MG: 40 TABLET, DELAYED RELEASE ORAL at 05:36

## 2019-03-18 RX ADMIN — BIMATOPROST 1 DROP: 0.1 SOLUTION/ DROPS OPHTHALMIC at 21:36

## 2019-03-18 RX ADMIN — ASPIRIN 81 MG 81 MG: 81 TABLET ORAL at 09:54

## 2019-03-18 RX ADMIN — FUROSEMIDE 40 MG: 40 TABLET ORAL at 09:59

## 2019-03-18 RX ADMIN — STANDARDIZED SENNA CONCENTRATE 8.6 MG: 8.6 TABLET ORAL at 21:23

## 2019-03-18 RX ADMIN — GUAIFENESIN 600 MG: 600 TABLET, EXTENDED RELEASE ORAL at 09:54

## 2019-03-18 NOTE — PLAN OF CARE
Problem: DISCHARGE PLANNING - CARE MANAGEMENT  Goal: Discharge to post-acute care or home with appropriate resources  Description  INTERVENTIONS:  - Conduct assessment to determine patient/family and health care team treatment goals, and need for post-acute services based on payer coverage, community resources, and patient preferences, and barriers to discharge  - Address psychosocial, clinical, and financial barriers to discharge as identified in assessment in conjunction with the patient/family and health care team  - Arrange appropriate level of post-acute services according to patient?s   needs and preference and payer coverage in collaboration with the physician and health care team  - Communicate with and update the patient/family, physician, and health care team regarding progress on the discharge plan  - Arrange appropriate transportation to post-acute venues  Outcome: Progressing  Note:   CM spoke with Filiberto Weldon from [facility] regarding if Pt can be accepted  Filiberto Weldon will follow up with son and Dr Gabriel Luveano regarding chemo  Pt will need auth and to have a bowel movement prior to discharge  CM dept will follow

## 2019-03-18 NOTE — PLAN OF CARE
Problem: Potential for Falls  Goal: Patient will remain free of falls  Description  INTERVENTIONS:  - Assess patient frequently for physical needs  -  Identify cognitive and physical deficits and behaviors that affect risk of falls    -  [place] fall precautions as indicated by assessment   - Educate patient/family on patient safety including physical limitations  - Instruct patient to call for assistance with activity based on assessment  - Modify environment to reduce risk of injury  - Consider OT/PT consult to assist with strengthening/mobility  Outcome: Progressing     Problem: Prexisting or High Potential for Compromised Skin Integrity  Goal: Skin integrity is maintained or improved  Description  INTERVENTIONS:  - Identify patients at risk for skin breakdown  - Assess and monitor skin integrity  - Assess and monitor nutrition and hydration status  - Monitor labs (i e  albumin)  - Assess for incontinence   - Turn and reposition patient  - Assist with mobility/ambulation  - Relieve pressure over bony prominences  - Avoid friction and shearing  - Provide appropriate hygiene as needed including keeping skin clean and dry  - Evaluate need for skin moisturizer/barrier cream  - Collaborate with interdisciplinary team (i e  Nutrition, Rehabilitation, etc )   - Patient/family teaching  Outcome: Progressing     Problem: DISCHARGE PLANNING - CARE MANAGEMENT  Goal: Discharge to post-acute care or home with appropriate resources  Description  INTERVENTIONS:  - Conduct assessment to determine patient/family and health care team treatment goals, and need for post-acute services based on payer coverage, community resources, and patient preferences, and barriers to discharge  - Address psychosocial, clinical, and financial barriers to discharge as identified in assessment in conjunction with the patient/family and health care team  - Arrange appropriate level of post-acute services according to patient?s   needs and preference and payer coverage in collaboration with the physician and health care team  - Communicate with and update the patient/family, physician, and health care team regarding progress on the discharge plan  - Arrange appropriate transportation to post-acute venues  Outcome: Progressing     Problem: CARDIOVASCULAR - ADULT  Goal: Maintains optimal cardiac output and hemodynamic stability  Description  INTERVENTIONS:  - Monitor I/O, vital signs and rhythm  - Monitor for S/S and trends of decreased cardiac output i e  bleeding, hypotension  - Administer and titrate ordered vasoactive medications to optimize hemodynamic stability  - Assess quality of pulses, skin color and temperature  - Assess for signs of decreased coronary artery perfusion - ex   Angina  - Instruct patient to report change in severity of symptoms  Outcome: Progressing  Goal: Absence of cardiac dysrhythmias or at baseline rhythm  Description  INTERVENTIONS:  - Continuous cardiac monitoring, monitor vital signs, obtain 12 lead EKG if indicated  - Administer antiarrhythmic and heart rate control medications as ordered  - Monitor electrolytes and administer replacement therapy as ordered  Outcome: Progressing

## 2019-03-18 NOTE — ASSESSMENT & PLAN NOTE
· Baseline appears to be 1 8-2 1  Follows with Dr Piedad Adorno as outpatient     · Creatinine 2 00 as of yesterday, improved from prior   · Avoid nephrotoxins

## 2019-03-18 NOTE — PALLIATIVE CARE CONFERENCE
Met with patient to provide psychosocial Palliative Care support  Patient is talkative and reports that he is feeling well and is "happy to be alive "  He expresses a positive attitude about his life and is agreeable to go into a short term rehab program upon his discharge  Patient reports that he is looking forward to an eventual move in with his son  Patient lives alone in his two story home and states that he receives much support from extended family, although he admits that he gets lonely at times  He states that his wife  nine years ago and he has been alone in his home since then  Patient feels he is coping adequately at this time  Supportive counseling provided to patient  Will continue to be available to provide Palliative Care support

## 2019-03-18 NOTE — PLAN OF CARE
Problem: OCCUPATIONAL THERAPY ADULT  Goal: Performs self-care activities at highest level of function for planned discharge setting  See evaluation for individualized goals  Description  Treatment Interventions: ADL retraining, Functional transfer training, Endurance training, Patient/family training, Equipment evaluation/education, Continued evaluation, Activityengagement, Energy conservation  Equipment Recommended: Tub seat with back       See flowsheet documentation for full assessment, interventions and recommendations  Outcome: Progressing  Note:   Limitation: Decreased ADL status, Decreased endurance, Decreased Safe judgement during ADL, Decreased self-care trans, Decreased high-level ADLs     Assessment: Patient participated in Skilled OT session this date with interventions consisting of ADL re training with the use of correct body mechnaics, safety awareness and fall prevention techniques, increase standing tolerance time with unilateral UE support to complete sink level ADLs, increase dynamic sit/ stand balance during functional activity , increase trunk control and increase OOB/ sitting tolerance   Patient agreeable to OT treatment session, upon arrival patient was found supine in bed  Patient requiring verbal cues for safety, verbal cues for correct technique and ocassional safety reminders  Pt completed bed mobility with supervision with VCs to use side rails for increase (I)  Pt completed STS transfers with RW with Min A/supervision requiring VCs to push up from bed surface for safety  Pt completed functional mobiltiy with RW from EOB to bathroom with Min A for steadying  Pt completed toilet transfer with Min A for grab bar use and safe technique to reach back to control descent  Pt completed CM with Min A and hygiene with supervision  Pt required VCs to not abandon RW in bathroom  Pt completed grooming/oral hygiene tasks while standing at sink for 7+minutes with Min A/supervision   Pt stephanie fair+ balance throughout task  Pt required VCs for sequencing  Patient continues to be functioning below baseline level, occupational performance remains limited secondary to factors listed above and increased risk for falls and injury  From OT standpoint, recommendation at time of d/c would be Short Term Rehab  Patient to benefit from continued Occupational Therapy treatment while in the hospital to address deficits as defined above and maximize level of functional independence with ADLs and functional mobility  Pt seated in b/s chair with chair alarm active, needs met, and call bell within reach at end of session  Spoke with JAMIE Salas Ear       OT Discharge Recommendation: Other (Comment)(to post acute rehab)  OT - OK to Discharge: (to post acute rehab once medically stable)

## 2019-03-18 NOTE — SOCIAL WORK
CM spoke with Maria Dolores Lewis from [facility] regarding if Pt can be accepted  Maria Dolores Lewis will follow up with son and Dr Klever Forrester regarding chemo  Pt will need auth and to have a bowel movement prior to discharge  CM dept will follow

## 2019-03-18 NOTE — PROGRESS NOTES
At [address]  patient had a 6 beat run of VTach  Pt is asymptomatic  SLIM notified  Mag and potassium labs ordered  Will continue to monitor

## 2019-03-18 NOTE — PHYSICAL THERAPY NOTE
PHYSICAL THERAPY NOTE    Patient Name: Marilu Veras  AGHNI'H Date: 3/18/2019        03/18/19 1001   Pain Assessment   Pain Assessment 0-10   Pain Score 5   Pain Type Chronic pain   Pain Location Knee   Pain Orientation Left   Restrictions/Precautions   Weight Bearing Precautions Per Order No   Other Precautions Chair Alarm; Bed Alarm; Fall Risk;Cognitive;Telemetry   General   Family/Caregiver Present No   Subjective   Subjective Patient seated OOB in recliner and is agreeable to therapy session  Patient identifers obtained from name &   Bed Mobility   Supine to Sit Unable to assess   Sit to Supine Unable to assess   Additional Comments Patient seated OOB in recliner pre and post session with chair alarm engaged, call bell and belongings in reach   Transfers   Sit to Stand 5  Supervision   Additional items Assist x 1; Armrests; Increased time required;Verbal cues   Stand to Sit 5  Supervision   Additional items Assist x 1; Armrests; Increased time required;Verbal cues   Ambulation/Elevation   Gait pattern Poor UE support;Narrow DUNG; Forward Flexion;Decreased foot clearance;Shuffling; Short stride;Redundant gait at times; Excessively slow   Gait Assistance 4  Minimal assist  (CGA)   Additional items Assist x 1;Verbal cues   Assistive Device Rolling walker   Distance 160' x1, 60' x2   Stair Management Assistance Not tested   Balance   Static Sitting Good   Dynamic Sitting Fair   Static Standing Fair -   Dynamic Standing Poor +   Ambulatory Poor +   Endurance Deficit   Endurance Deficit Yes   Endurance Deficit Description expresses fatigue with ambulation   Activity Tolerance   Activity Tolerance Patient limited by fatigue   Nurse Made Aware Spoke to Barry Temple RN   Exercises   Heelslides Sitting;10 reps;AROM; Bilateral  (in recliner with B LE elevated)   Knee AROM Long Arc Quad Sitting;10 reps;AROM; Bilateral   Ankle Pumps Sitting;10 reps;AROM; Bilateral   Assessment   Prognosis Good   Problem List Decreased strength;Decreased range of motion;Decreased endurance; Impaired balance;Decreased mobility   Assessment Patient demonstrated ability to transfer sit<>stand with consistant supervision  Requires verbal instruction for safe hand placement with poor carry over throughout session  Demonstrated ability to ambulate with roller walker and contact guard assist  Patient impulsive with ambulation with tendency to release UE support to gesture with hands requiring increased assistance for steadying and balance  Patient not appropriate for stair training trial secondary to fatigue  Patient participated in seataed B LE exercise program with fair understanding requiring input for form and pace  Provided patient with HEP to reinforce compliance and technique  Continue to focus on OOB mobility with progression of ambulation and stair training as appropriate  Goals   Patient Goals none stated   STG Expiration Date 03/25/19   Treatment Day 1   Plan   Treatment/Interventions Functional transfer training;LE strengthening/ROM; Elevations; Therapeutic exercise; Endurance training;Equipment eval/education;Patient/family training;Bed mobility;Gait training;Spoke to nursing   PT Frequency 5x/wk   Recommendation   Recommendation Short-term skilled PT   Equipment Recommended Tara Henderson, [employer]

## 2019-03-18 NOTE — ASSESSMENT & PLAN NOTE
· On Revlimid monotherapy - ? anemia potential side effect  · Hematology following, feel that anemia is directly related to epo deficiency in the setting of chronic kidney disease  Do not feel that anemia is related to multiple myeloma  · Erythropoietin pending  Will need to f/u with Dr Lorenzo Ribeiro in office after discharge

## 2019-03-18 NOTE — SOCIAL WORK
CM spoke with Alexia Valentin at [facility], [facility] can accept Pt  CM called Edyta to start prior auth for STR  CM spoke to Nikki at [facility] to open case, Reference # is [id number]  PRINCE then faxed clinicals to Oleg Foster [phone]  CM dept will follow

## 2019-03-18 NOTE — PLAN OF CARE
Problem: PHYSICAL THERAPY ADULT  Goal: Performs mobility at highest level of function for planned discharge setting  See evaluation for individualized goals  Description  Treatment/Interventions: Functional transfer training, LE strengthening/ROM, Elevations, Therapeutic exercise, Endurance training, Equipment eval/education, Patient/family training, Bed mobility, Gait training, Spoke to nursing, Spoke to case management, OT  Equipment Recommended: Homar Gomez       See flowsheet documentation for full assessment, interventions and recommendations     Outcome: Progressing

## 2019-03-18 NOTE — ASSESSMENT & PLAN NOTE
· Combination of Age/ chronic disease including CHF and Myeloma and revlimid  · 1 unit PRBC 3/15  Hemoglobin stable at 8 3 as of yesterday  · Hemoccult stool not able to be sent  · Folate level normal   Iron studies are low  Reticulocyte count normal   TSH normal   B12 pending

## 2019-03-18 NOTE — PROGRESS NOTES
Progress Note - Seth Pack 80 y o  male MRN: [id number]    Unit/Bed#: -01 Encounter: [id number]      Assessment and Plan:  1  Kappa light chain multiple myeloma the patient is currently on single agent therapy with Revlimid 5 mg daily  He was asked to hold off on restarting his treatment until he is seen back by us  All he has tolerated this regimen well  2  Normocytic anemia-could be multifactorial in nature secondary to chronic disease, multiple myeloma, side effects of ongoing therapy, except drug  His erythropoietin level is still pending and was ordered on Friday  His folate level and TSH level are normal   His iron studies are borderline with a ferritin level of 42  I will give him one dose of Venofer 200 mg IV today  The patient will likely go to rehab after his discharge  From our standpoint he is okay to discharge  He will follow up with Dr Rubi Mercado as an outpatient  The patient also notes that he will likely move in with his son who lives down in the [facility]  Subjective:   Overall the patient is doing well  His energy level has been stable  He has a good appetite and has been eating a lot during his hospitalization  He reports that his lower extremity edema has greatly improved as has his shortness of breath  He does note that he is constipated and has not had a bowel movement in the last five days or so  He otherwise denies fevers, chills, chest pain, shortness of breath, nausea, vomiting, diarrhea, all other review of systems are unremarkable  PFSH was reviewed  Objective:     Vitals: Blood pressure 140/61, pulse 71, temperature (!) 97 4 °F (36 3 °C), temperature source Oral, resp  rate 16, height 5' 6" (1 676 m), weight 86 6 kg (191 lb), SpO2 99 %  ,Body mass index is 30 83 kg/m²        Intake/Output Summary (Last 24 hours) at 3/18/2019 1306  Last data filed at 3/18/2019 0586  Gross per 24 hour   Intake 360 ml   Output 1300 ml   Net -940 ml       Physical Exam: General appearance: alert, appears stated age and cooperative  Pale  Head: Normocephalic, without obvious abnormality, atraumatic  Eyes: conjunctivae/corneas clear  PERRL, EOM's intact  Fundi benign  Ears: normal TM's and external ear canals both ears  Nose: Nares normal  Septum midline  Mucosa normal  No drainage or sinus tenderness  Throat: lips, mucosa, and tongue normal; teeth and gums normal  Neck: no adenopathy, no JVD and supple, symmetrical, trachea midline  Lungs: clear to auscultation bilaterally  Chest wall: no tenderness  Heart: regular rate and rhythm, S1, S2 normal, no murmur, click, rub or gallop  Abdomen: soft, non-tender; bowel sounds normal; no masses,  no organomegaly  Obese male  Extremities: extremities normal, atraumatic, no cyanosis or edema  Skin: Skin color, texture, turgor normal  No rashes or lesions  Lymph nodes: Cervical, supraclavicular, and axillary nodes normal      Invasive Devices     Peripheral Intravenous Line            Peripheral IV 03/15/19 Right Wrist 3 days                            Lab, Imaging and other studies: I have personally reviewed pertinent reports  Admission on 03/13/2019   No results displayed because visit has over 200 results

## 2019-03-18 NOTE — ASSESSMENT & PLAN NOTE
· Patient presents with dyspnea on exertion and abdominal distention  BNP elevated, chest x-ray appears congested  Patient is symptomatically improved  · Cardiology is following, patient received intravenous diuresis through 3/15 and transitioned to oral maintenance diuretic 3/16, Lasix 40 mg daily  · Echocardiogram showing ejection fraction of 30% with severe diffuse hypokinesis  · Beta-blocker was discontinued in the setting of intermittent 2-1 av block, will not continue at discharge  Started on low-dose hydralazine  No Ace/Arb the given advanced CKD  · Final monitor I&Os, daily weights  Continue with low-sodium diet and fluid restriction  · Patient did have a few asymptomatic runs of NSVT on telemetry, likely chronic given low EF  Keep lytes stable  F/U OP  · Stable for discharge from a cardiac standpoint on regimen as above, will follow up with Dr Emily Hagen as outpatient

## 2019-03-18 NOTE — ASSESSMENT & PLAN NOTE
Lab Results   Component Value Date    HGBA1C 6 9 (H) 07/17/2018       Recent Labs     03/17/19  1755 03/17/19  2119 03/18/19  0849 03/18/19  1200   POCGLU 183* 152* 130 209*         · On Amaryl at baseline, currently on hold     · Continue with SSI  · Diabetic diet  · Monitor Accuchecks

## 2019-03-18 NOTE — OCCUPATIONAL THERAPY NOTE
[address] Progress Note     Patient Name: Michael Alvarez  Today's Date: 3/18/2019  Problem List  Patient Active Problem List   Diagnosis    Parenchymal renal hypertension    Type 2 diabetes mellitus without complication, without long-term current use of insulin ([facility] [address] )    Mixed hyperlipidemia    Esophageal reflux    Cerebral infarction, watershed distribution, bilateral, acute    Stage 4 chronic kidney disease (HCC)    Benign prostatic hyperplasia    Ischemic cardiomyopathy    Left thigh pain    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)    Other proteinuria    Dizziness    [facility]    UTI (urinary tract infection)    Cellulitis of right lower limb    MRSA (methicillin resistant staph aureus) culture positive    Acute on chronic combined systolic and diastolic heart failure (HCC)    Generalized weakness    NSTEMI (non-ST elevated myocardial infarction) ([facility] )    Constipation        03/18/19 0840   Restrictions/Precautions   Weight Bearing Precautions Per Order No   Other Precautions Chair Alarm; Bed Alarm; Fall Risk;Cognitive;Telemetry   General   Response to Previous Treatment Patient with no complaints from previous session   Pain Assessment   Pain Assessment No/denies pain   Pain Score No Pain   ADL   Where Assessed Standing at sink   Grooming Assistance 4  Minimal Assistance   Grooming Deficit Setup; Increased time to complete   Grooming Comments CGA when standing at sink; supervision when seated   Toileting Assistance  4  Minimal Assistance   Toileting Deficit Grab bar use;Supervison/safety; Increased time to complete   Toileting Comments Min A for transfer and CM   Functional Standing Tolerance   Time 7 mins   Activity Pt completed oral hygiene and grooming while standing at sink  Bed Mobility   Rolling R 5  Supervision   Additional items Assist x 1;HOB elevated; Bedrails; Increased time required;Verbal cues  (VCs to use side rails)   Supine to Sit 5 Supervision   Additional items Assist x 1;Bedrails; Increased time required;Verbal cues  (VCs to use bed rails)   Transfers   Sit to Stand 5  Supervision   Additional items Assist x 1; Armrests; Increased time required;Verbal cues  (VCs to push up from bed surface)   Stand to Sit 5  Supervision   Additional items Assist x 1; Armrests; Verbal cues  (VCs to make complete turn with RW)   Toilet transfer 4  Minimal assistance   Additional items Assist x 1; Armrests; Verbal cues;Standard toilet  (VCs for safe technique and CGA for steadying)   Functional Mobility   Functional Mobility 4  Minimal assistance   Additional Comments Attempted with AD, however pt performs functional mobility with increased balance with RW   Additional items Rolling walker   Toilet Transfers   Toilet Transfer From Rolling walker   Toilet Transfer Type To and from   Toilet Transfer to Standard toilet   Toilet Transfer Technique Ambulating   Toilet Transfers Minimal assistance   Toilet Transfers Comments VCs to reach back to control descent   Cognition   Overall Cognitive Status Impaired   Arousal/Participation Alert; Responsive;Arousable; Cooperative   Attention Attends with cues to redirect   Orientation Level Oriented X4   Memory Decreased recall of recent events   Following Commands Follows multistep commands with increased time or repetition   Comments Pt identified by full name and birthdate   Activity Tolerance   Medical Staff Made Aware spoke with RN   Assessment   Assessment Patient participated in Skilled OT session this date with interventions consisting of ADL re training with the use of correct body mechnaics, safety awareness and fall prevention techniques, increase standing tolerance time with unilateral UE support to complete sink level ADLs, increase dynamic sit/ stand balance during functional activity , increase trunk control and increase OOB/ sitting tolerance    Patient agreeable to OT treatment session, upon arrival patient was found supine in bed  Patient requiring verbal cues for safety, verbal cues for correct technique and ocassional safety reminders  Pt completed bed mobility with supervision with VCs to use side rails for increase (I)  Pt completed STS transfers with RW with Min A/supervision requiring VCs to push up from bed surface for safety  Pt completed functional mobiltiy with RW from EOB to bathroom with Min A for steadying  Pt completed toilet transfer with Min A for grab bar use and safe technique to reach back to control descent  Pt completed CM with Min A and hygiene with supervision  Pt required VCs to not abandon RW in bathroom  Pt completed grooming/oral hygiene tasks while standing at sink for 7+minutes with Min A/supervision  Pt demo fair+ balance throughout task  Pt required VCs for sequencing  Patient continues to be functioning below baseline level, occupational performance remains limited secondary to factors listed above and increased risk for falls and injury  From OT standpoint, recommendation at time of d/c would be Short Term Rehab  Patient to benefit from continued Occupational Therapy treatment while in the hospital to address deficits as defined above and maximize level of functional independence with ADLs and functional mobility  Pt seated in b/s chair with chair alarm active, needs met, and call bell within reach at end of session  Spoke with JAMIE Pedroza  Plan   Treatment Interventions ADL retraining;Functional transfer training; Endurance training;Patient/family training; Activityengagement   Goal Expiration Date 03/22/19   Treatment Day 3   OT Frequency 3-5x/wk   Recommendation   OT Discharge Recommendation Other (Comment)  (to post acute rehab)   OT - OK to Discharge   (to post acute rehab once medically stable)   Barthel Index   Feeding 10   Bathing 0   Grooming Score 5   Dressing Score 5   Bladder Score 10   Bowels Score 10   Toilet Use Score 5   Transfers (Bed/Chair) Score 10   Mobility (Level Surface) Score 10   Stairs Score 5   Barthel Index Score 70   Modified [place] Scale   Modified Reyna Scale 4   Stacey Bryan, OTR/L

## 2019-03-18 NOTE — PROGRESS NOTES
Progress Note - Ava Chung 9/22/1930, 80 y o  male MRN: [id number]    Unit/Bed#: -01 Encounter: [id number]    Primary Care Provider: Pippa Benitez MD   Date and time admitted to hospital: 3/13/2019  6:51 PM    * Acute on chronic combined systolic and diastolic heart failure ([facility] [address] )  Assessment & Plan  · Patient presents with dyspnea on exertion and abdominal distention  BNP elevated, chest x-ray appears congested  Patient is symptomatically improved  · Cardiology is following, patient received intravenous diuresis through 3/15 and transitioned to oral maintenance diuretic 3/16, Lasix 40 mg daily  · Echocardiogram showing ejection fraction of 30% with severe diffuse hypokinesis  · Beta-blocker was discontinued in the setting of intermittent 2-1 av block, will not continue at discharge  Started on low-dose hydralazine  No Ace/Arb the given advanced CKD  · Final monitor I&Os, daily weights  Continue with low-sodium diet and fluid restriction  · Patient did have a few asymptomatic runs of NSVT on telemetry, likely chronic given low EF  Keep lytes stable  F/U OP  · Stable for discharge from a cardiac standpoint on regimen as above, will follow up with Dr Gladis Paz as outpatient  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)  Assessment & Plan  · Combination of Age/ chronic disease including CHF and Myeloma and revlimid  · 1 unit PRBC 3/15  Hemoglobin stable at 8 3 as of yesterday  · Hemoccult stool not able to be sent  · Folate level normal   Iron studies are low  Reticulocyte count normal   TSH normal   B12 pending  Constipation  Assessment & Plan  · Continue with current bowel regimen with Miralax and Senna  NSTEMI (non-ST elevated myocardial infarction) [facility])  Assessment & Plan  · Mild troponin elevation noted  Likely type 2 in the setting of heart failure exacerbation and anemia  · No complaints of chest pain  · Cardiology has signed off  · Continue aspirin   No BB given AVB     Generalized weakness  Assessment & Plan  · multifactorial decline given above  · Goals of care need to be discussed given age and limited rehab potential   Palliative medicine noted, patient remains full code  · PT/OT is recommending rehab, referrals pending as per case management  UTI (urinary tract infection)  Assessment & Plan  · UA positive for nitrites, leuks, innumerable bacteria  · Started on Rocephin in ED - switch to Vancomycin for now   · Urine culture growing MRSA susceptible to doxycycline, vanco d/c and Doxycycline started 3/16 to continue through 3/19  Kappa light chain myeloma (HCC)  Assessment & Plan  · On Revlimid monotherapy - ? anemia potential side effect  · Hematology following, feel that anemia is directly related to epo deficiency in the setting of chronic kidney disease  Do not feel that anemia is related to multiple myeloma  · Erythropoietin pending  Will need to f/u with Dr Vivien Yeung in office after discharge  Stage 4 chronic kidney disease ([facility] [address] )  Assessment & Plan  · Baseline appears to be 1 8-2 1  Follows with Dr Howard Cárdenas as outpatient  · Creatinine 2 00 as of yesterday, improved from prior   · Avoid nephrotoxins    Mixed hyperlipidemia  Assessment & Plan  · Continue statin    Type 2 diabetes mellitus without complication, without long-term current use of insulin [facility])  Assessment & Plan  Lab Results   Component Value Date    HGBA1C 6 9 (H) 07/17/2018       Recent Labs     03/17/19  1755 03/17/19  2119 03/18/19  0849 03/18/19  1200   POCGLU 183* 152* 130 209*         · On Amaryl at baseline, currently on hold  · Continue with SSI  · Diabetic diet  · Monitor Accuchecks      VTE Pharmacologic Prophylaxis:   Pharmacologic: Heparin  Mechanical VTE Prophylaxis in Place: Yes    Patient Centered Rounds: I have performed bedside rounds with nursing staff today  Discussions with Specialists or Other Care Team Provider: case management      Education and Discussions with Family / Patient: patient  Son updated via phone  All questions/concerns addressed  Time Spent for Care: 30 minutes  More than 50% of total time spent on counseling and coordination of care as described above  Current Length of Stay: 5 day(s)    Current Patient Status: Inpatient   Certification Statement: The patient will continue to require additional inpatient hospital stay due to medically stable  awaiting rehab placement  Discharge Plan: to STR when arranged by CM    Code Status: Level 1 - Full Code      Subjective:   Doing well today  Denies any complaints  Was asymptomatic during short 5 beat run of vtach earlier this AM without CP/SOB/dizziness or lightheadedness  Objective:     Vitals:   Temp (24hrs), Av 7 °F (36 5 °C), Min:97 4 °F (36 3 °C), Max:98 °F (36 7 °C)    Temp:  [97 4 °F (36 3 °C)-98 °F (36 7 °C)] 97 4 °F (36 3 °C)  HR:  [71-81] 71  Resp:  [16-18] 16  BP: ()/(42-72) 140/61  SpO2:  [97 %-99 %] 99 %  Body mass index is 30 83 kg/m²  Input and Output Summary (last 24 hours): Intake/Output Summary (Last 24 hours) at 3/18/2019 1300  Last data filed at 3/18/2019 0528  Gross per 24 hour   Intake 360 ml   Output 1300 ml   Net -940 ml       Physical Exam:     Physical Exam   Constitutional: He is oriented to person, place, and time  No distress  On room air, obese    Eyes:   Cataracts noted    Cardiovascular: Normal rate and regular rhythm  Pulmonary/Chest: Effort normal and breath sounds normal  No stridor  No respiratory distress  Abdominal: Soft  Bowel sounds are normal    Musculoskeletal: He exhibits no edema  Neurological: He is alert and oriented to person, place, and time  Nursing note and vitals reviewed        Additional Data:     Labs:    Results from last 7 days   Lab Units 19  0521   WBC Thousand/uL 4 88   HEMOGLOBIN g/dL 8 3*   HEMATOCRIT % 26 4*   PLATELETS Thousands/uL 178   NEUTROS PCT % 47   LYMPHS PCT % 34   MONOS PCT % 10   EOS PCT % 8* Results from last 7 days   Lab Units 03/18/19  0555 03/17/19  0521  03/13/19  1927   POTASSIUM mmol/L 3 9 3 7   < > 3 4*   CHLORIDE mmol/L  --  106   < > 106   CO2 mmol/L  --  25   < > 23   BUN mg/dL  --  26*   < > 23   CREATININE mg/dL  --  2 00*   < > 2 13*   CALCIUM mg/dL  --  8 8   < > 8 3   ALK PHOS U/L  --   --   --  129*   ALT U/L  --   --   --  18   AST U/L  --   --   --  7    < > = values in this interval not displayed  * I Have Reviewed All Lab Data Listed Above  * Additional Pertinent Lab Tests Reviewed:  All Labs Within Last 24 Hours Reviewed    Imaging:    Imaging Reports Reviewed Today Include: all  Imaging Personally Reviewed by Myself Includes:  none    Recent Cultures (last 7 days):     Results from last 7 days   Lab Units 03/13/19 2012   URINE CULTURE  >100,000 cfu/ml Methicillin Resistant Staphylococcus aureus*       Last 24 Hours Medication List:     Current Facility-Administered Medications:  acetaminophen 650 mg Oral Q6H PRN DON Mayer   aspirin 81 mg Oral Daily [place] Kail, PA-CHERRI   atorvastatin 40 mg Oral HS [place] Rivera, PA-CHERRI   benzonatate 100 mg Oral BID PRN [place] CANDACE Rivera-C   bimatoprost 1 drop Both Eyes HS [place] Kail, PA-C   doxycycline hyclate 100 mg Oral Q12H [address] DON Mccartney   fluticasone 1 spray Each Nare PRN [place] Dao-CHERRI   furosemide 40 mg Oral Daily DON Sanchez   guaiFENesin 600 mg Oral BID [place] Nicole, PA-CHERRI   heparin (porcine) 5,000 Units Subcutaneous Q8H [address] Angela Alonzo PA-C   hydrALAZINE 10 mg Oral Q8H [address] DON Howe   insulin lispro 1-5 Units Subcutaneous HS Angela Caal PA-C   insulin lispro 1-6 Units Subcutaneous TID AC Angela Alonzo PA-C   lenalidomide 5 mg Oral Daily Angela Caal PA-C   pantoprazole 40 mg Oral Early Morning Angela Caal PA-C   polyethylene glycol 17 g Oral Daily DON Mccartney   potassium chloride 10 mEq Oral Daily Angela WOMACK Geoff Thomas PA-C   senna 1 tablet Oral HS DON Mccartney   timolol 1 drop Both Eyes Daily Leonora Pichardo PA-C        Today, Patient Was Seen By: Maryellen Pearson PA-C    ** Please Note: Dictation voice to text software may have been used in the creation of this document   **

## 2019-03-18 NOTE — PLAN OF CARE
Problem: DISCHARGE PLANNING - CARE MANAGEMENT  Goal: Discharge to post-acute care or home with appropriate resources  Description  INTERVENTIONS:  - Conduct assessment to determine patient/family and health care team treatment goals, and need for post-acute services based on payer coverage, community resources, and patient preferences, and barriers to discharge  - Address psychosocial, clinical, and financial barriers to discharge as identified in assessment in conjunction with the patient/family and health care team  - Arrange appropriate level of post-acute services according to patient?s   needs and preference and payer coverage in collaboration with the physician and health care team  - Communicate with and update the patient/family, physician, and health care team regarding progress on the discharge plan  - Arrange appropriate transportation to post-acute venues  3/18/2019 1546 by Kelly Overton  Outcome: Progressing  Note:   CM spoke with Jason Bacon at [facility], [facility] can accept Pt  CM called [employer] to start prior auth for STR  CM spoke to Nikki at [facility] to open case, Reference # is [id number]  CM then faxed clinicals to Yeison Perez [phone]  CM dept will follow  3/18/2019 1448 by Kelly Overton  Outcome: Progressing  Note:   CM spoke with Jason Bacon from [facility] regarding if Pt can be accepted  Jason Pac will follow up with son and Dr Brandi Rice regarding chemo  Pt will need auth and to have a bowel movement prior to discharge  CM dept will follow

## 2019-03-19 LAB
EPO SERPL-ACNC: 36.5 MIU/ML (ref 2.6–18.5)
GLUCOSE SERPL-MCNC: 134 MG/DL (ref 65–140)
GLUCOSE SERPL-MCNC: 162 MG/DL (ref 65–140)
GLUCOSE SERPL-MCNC: 169 MG/DL (ref 65–140)
GLUCOSE SERPL-MCNC: 192 MG/DL (ref 65–140)

## 2019-03-19 PROCEDURE — 99232 SBSQ HOSP IP/OBS MODERATE 35: CPT | Performed by: INTERNAL MEDICINE

## 2019-03-19 PROCEDURE — 99231 SBSQ HOSP IP/OBS SF/LOW 25: CPT | Performed by: FAMILY MEDICINE

## 2019-03-19 PROCEDURE — 82948 REAGENT STRIP/BLOOD GLUCOSE: CPT

## 2019-03-19 RX ADMIN — ATORVASTATIN CALCIUM 40 MG: 40 TABLET, FILM COATED ORAL at 21:31

## 2019-03-19 RX ADMIN — POTASSIUM CHLORIDE 10 MEQ: 750 TABLET, EXTENDED RELEASE ORAL at 08:19

## 2019-03-19 RX ADMIN — HEPARIN SODIUM 5000 UNITS: 5000 INJECTION INTRAVENOUS; SUBCUTANEOUS at 21:31

## 2019-03-19 RX ADMIN — HEPARIN SODIUM 5000 UNITS: 5000 INJECTION INTRAVENOUS; SUBCUTANEOUS at 13:41

## 2019-03-19 RX ADMIN — PANTOPRAZOLE SODIUM 40 MG: 40 TABLET, DELAYED RELEASE ORAL at 06:44

## 2019-03-19 RX ADMIN — INSULIN LISPRO 1 UNITS: 100 INJECTION, SOLUTION INTRAVENOUS; SUBCUTANEOUS at 13:37

## 2019-03-19 RX ADMIN — FLUTICASONE PROPIONATE 1 SPRAY: 50 SPRAY, METERED NASAL at 08:20

## 2019-03-19 RX ADMIN — GUAIFENESIN 600 MG: 600 TABLET, EXTENDED RELEASE ORAL at 16:55

## 2019-03-19 RX ADMIN — INSULIN LISPRO 1 UNITS: 100 INJECTION, SOLUTION INTRAVENOUS; SUBCUTANEOUS at 21:33

## 2019-03-19 RX ADMIN — STANDARDIZED SENNA CONCENTRATE 8.6 MG: 8.6 TABLET ORAL at 21:31

## 2019-03-19 RX ADMIN — TIMOLOL MALEATE 1 DROP: 5 SOLUTION/ DROPS OPHTHALMIC at 08:20

## 2019-03-19 RX ADMIN — INSULIN LISPRO 1 UNITS: 100 INJECTION, SOLUTION INTRAVENOUS; SUBCUTANEOUS at 16:56

## 2019-03-19 RX ADMIN — BIMATOPROST 1 DROP: 0.1 SOLUTION/ DROPS OPHTHALMIC at 21:33

## 2019-03-19 RX ADMIN — ACETAMINOPHEN 650 MG: 325 TABLET, FILM COATED ORAL at 15:36

## 2019-03-19 RX ADMIN — POLYETHYLENE GLYCOL 3350 17 G: 17 POWDER, FOR SOLUTION ORAL at 08:20

## 2019-03-19 RX ADMIN — HEPARIN SODIUM 5000 UNITS: 5000 INJECTION INTRAVENOUS; SUBCUTANEOUS at 06:44

## 2019-03-19 RX ADMIN — HYDRALAZINE HYDROCHLORIDE 10 MG: 10 TABLET, FILM COATED ORAL at 21:31

## 2019-03-19 RX ADMIN — HYDRALAZINE HYDROCHLORIDE 10 MG: 10 TABLET, FILM COATED ORAL at 13:41

## 2019-03-19 RX ADMIN — DOXYCYCLINE HYCLATE 100 MG: 100 CAPSULE ORAL at 21:31

## 2019-03-19 RX ADMIN — GUAIFENESIN 600 MG: 600 TABLET, EXTENDED RELEASE ORAL at 08:19

## 2019-03-19 RX ADMIN — LACTULOSE 20 G: 10 SOLUTION ORAL at 08:19

## 2019-03-19 RX ADMIN — HYDRALAZINE HYDROCHLORIDE 10 MG: 10 TABLET, FILM COATED ORAL at 06:44

## 2019-03-19 RX ADMIN — ASPIRIN 81 MG 81 MG: 81 TABLET ORAL at 08:19

## 2019-03-19 RX ADMIN — FUROSEMIDE 40 MG: 40 TABLET ORAL at 08:20

## 2019-03-19 RX ADMIN — DOXYCYCLINE HYCLATE 100 MG: 100 CAPSULE ORAL at 08:19

## 2019-03-19 NOTE — ASSESSMENT & PLAN NOTE
· UA positive for nitrites, leuks, innumerable bacteria  · Started on Rocephin in ED   Was on vanco until susceptibilities were present   · Urine culture growing MRSA susceptible to doxycycline-completed therapy 3/19

## 2019-03-19 NOTE — ASSESSMENT & PLAN NOTE
· Patient presents with dyspnea on exertion and abdominal distention  BNP elevated, chest x-ray appears congested  Patient is symptomatically improved  · Cardiology is following, patient received intravenous diuresis through 3/15 and transitioned to oral maintenance diuretic 3/16, Lasix 40 mg daily  · Echocardiogram showing ejection fraction of 30% with severe diffuse hypokinesis  · Beta-blocker was discontinued in the setting of intermittent 2-1 av block, will not continue at discharge  Started on low-dose hydralazine  No Ace/Arb the given advanced CKD  · Final monitor I&Os, daily weights  Continue with low-sodium diet and fluid restriction  · Patient did have a few asymptomatic runs of NSVT on telemetry, likely chronic given low EF  Keep lytes stable  F/U OP  · Stable for discharge from a cardiac standpoint on regimen as above, will follow up with Dr Rhonda Horta as outpatient

## 2019-03-19 NOTE — ASSESSMENT & PLAN NOTE
· On Revlimid monotherapy - ? anemia potential side effect  · Hematology following, feel that anemia is directly related to epo deficiency in the setting of chronic kidney disease  Do not feel that anemia is related to multiple myeloma  · Erythropoietin pending  Will need to f/u with Dr Rebecca Zhao in office after discharge     · Hold off on restarted Revlimid until seen back by Dr Rebecca Zhao as outpatient

## 2019-03-19 NOTE — ASSESSMENT & PLAN NOTE
· Combination of Age/ chronic disease including CHF and Myeloma and revlimid  · 1 unit PRBC 3/15  Hemoglobin stable at 8 3   · Folate level normal   Iron studies are low  Reticulocyte count normal   TSH normal   B12 pending    · 1x dose of venofer ordered and given by hematology yesterday

## 2019-03-19 NOTE — ASSESSMENT & PLAN NOTE
· Combination of Age/ chronic disease including CHF and Myeloma and revlimid  · 1 unit PRBC 3/15  Hemoglobin stable at 8 3   · Folate level normal   Iron studies are low  Reticulocyte count normal   TSH normal   B12 pending    · 1x dose of venofer ordered and given by hematology 3/18

## 2019-03-19 NOTE — ASSESSMENT & PLAN NOTE
· Baseline appears to be 1 8-2 1  Follows with Dr Brooke Forrest as outpatient     · Creatinine 2 00 as of last lab draw, improved from prior   · Avoid nephrotoxins

## 2019-03-19 NOTE — ASSESSMENT & PLAN NOTE
· Baseline appears to be 1 8-2 1  Follows with Dr Lawrence Dry as outpatient     · Creatinine 2 00 as of yesterday, improved from prior   · Avoid nephrotoxins

## 2019-03-19 NOTE — PLAN OF CARE
Problem: DISCHARGE PLANNING - CARE MANAGEMENT  Goal: Discharge to post-acute care or home with appropriate resources  Description  INTERVENTIONS:  - Conduct assessment to determine patient/family and health care team treatment goals, and need for post-acute services based on payer coverage, community resources, and patient preferences, and barriers to discharge  - Address psychosocial, clinical, and financial barriers to discharge as identified in assessment in conjunction with the patient/family and health care team  - Arrange appropriate level of post-acute services according to patient?s   needs and preference and payer coverage in collaboration with the physician and health care team  - Communicate with and update the patient/family, physician, and health care team regarding progress on the discharge plan  - Arrange appropriate transportation to post-acute venues  Outcome: Progressing  Note:   PRINCE left Message for [facility] at [place] to see status of authorization for STR, awaiting call back  CM dept will follow

## 2019-03-19 NOTE — PLAN OF CARE
Problem: Potential for Falls  Goal: Patient will remain free of falls  Description  INTERVENTIONS:  - Assess patient frequently for physical needs  -  Identify cognitive and physical deficits and behaviors that affect risk of falls    -  [place] fall precautions as indicated by assessment   - Educate patient/family on patient safety including physical limitations  - Instruct patient to call for assistance with activity based on assessment  - Modify environment to reduce risk of injury  - Consider OT/PT consult to assist with strengthening/mobility  Outcome: Progressing     Problem: Prexisting or High Potential for Compromised Skin Integrity  Goal: Skin integrity is maintained or improved  Description  INTERVENTIONS:  - Identify patients at risk for skin breakdown  - Assess and monitor skin integrity  - Assess and monitor nutrition and hydration status  - Monitor labs (i e  albumin)  - Assess for incontinence   - Turn and reposition patient  - Assist with mobility/ambulation  - Relieve pressure over bony prominences  - Avoid friction and shearing  - Provide appropriate hygiene as needed including keeping skin clean and dry  - Evaluate need for skin moisturizer/barrier cream  - Collaborate with interdisciplinary team (i e  Nutrition, Rehabilitation, etc )   - Patient/family teaching  Outcome: Progressing     Problem: DISCHARGE PLANNING - CARE MANAGEMENT  Goal: Discharge to post-acute care or home with appropriate resources  Description  INTERVENTIONS:  - Conduct assessment to determine patient/family and health care team treatment goals, and need for post-acute services based on payer coverage, community resources, and patient preferences, and barriers to discharge  - Address psychosocial, clinical, and financial barriers to discharge as identified in assessment in conjunction with the patient/family and health care team  - Arrange appropriate level of post-acute services according to patient?s   needs and preference and payer coverage in collaboration with the physician and health care team  - Communicate with and update the patient/family, physician, and health care team regarding progress on the discharge plan  - Arrange appropriate transportation to post-acute venues  Outcome: Progressing     Problem: CARDIOVASCULAR - ADULT  Goal: Maintains optimal cardiac output and hemodynamic stability  Description  INTERVENTIONS:  - Monitor I/O, vital signs and rhythm  - Monitor for S/S and trends of decreased cardiac output i e  bleeding, hypotension  - Administer and titrate ordered vasoactive medications to optimize hemodynamic stability  - Assess quality of pulses, skin color and temperature  - Assess for signs of decreased coronary artery perfusion - ex   Angina  - Instruct patient to report change in severity of symptoms  Outcome: Progressing  Goal: Absence of cardiac dysrhythmias or at baseline rhythm  Description  INTERVENTIONS:  - Continuous cardiac monitoring, monitor vital signs, obtain 12 lead EKG if indicated  - Administer antiarrhythmic and heart rate control medications as ordered  - Monitor electrolytes and administer replacement therapy as ordered  Outcome: Progressing

## 2019-03-19 NOTE — SOCIAL WORK
CM left Message for Amira at [facility] to see status of authorization for STR, awaiting call back  CM dept will follow

## 2019-03-19 NOTE — DISCHARGE SUMMARY
Discharge- Mindy Ojeda 9/22/1930, 80 y o  male MRN: [id number]    Unit/Bed#: -01 Encounter: [id number]    Primary Care Provider: Gregoria Warren MD   Date and time admitted to hospital: 3/13/2019  6:51 PM    * Acute on chronic combined systolic and diastolic heart failure ([facility] [address] )  Assessment & Plan  · Patient presents with dyspnea on exertion and abdominal distention  BNP elevated, chest x-ray appears congested  Patient is symptomatically improved  · Cardiology is following, patient received intravenous diuresis through 3/15 and transitioned to oral maintenance diuretic 3/16, Lasix 40 mg daily  · Echocardiogram showing ejection fraction of 30% with severe diffuse hypokinesis  · Beta-blocker was discontinued in the setting of intermittent 2-1 av block, will not continue at discharge  Started on low-dose hydralazine  No Ace/Arb the given advanced CKD  · Final monitor I&Os, daily weights  Continue with low-sodium diet and fluid restriction  · Patient did have a few asymptomatic runs of NSVT on telemetry, likely chronic given low EF  Keep lytes stable  F/U OP  · Stable for discharge from a cardiac standpoint on regimen as above, will follow up with Dr Sofie Nicholas as outpatient  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)  Assessment & Plan  · Combination of Age/ chronic disease including CHF and Myeloma and revlimid  · 1 unit PRBC 3/15  Hemoglobin stable at 8 3   · Folate level normal   Iron studies are low  Reticulocyte count normal   TSH normal   B12 pending  · 1x dose of venofer ordered and given by hematology 3/18    Constipation  Assessment & Plan  · Resolved  Had BM 3/18 evening     NSTEMI (non-ST elevated myocardial infarction) [facility])  Assessment & Plan  · Mild troponin elevation noted  Likely type 2 in the setting of heart failure exacerbation and anemia  · No complaints of chest pain  · Cardiology has signed off  · Continue aspirin  No BB given AVB      Generalized weakness  Assessment & Plan  · multifactorial decline given above  · Goals of care need to be discussed given age and limited rehab potential   Palliative medicine noted, patient remains full code  · PT/OT is recommending rehab at discharge     UTI (urinary tract infection)  Assessment & Plan  · UA positive for nitrites, leuks, innumerable bacteria  · Started on Rocephin in ED  Was on vanco until susceptibilities were present   · Urine culture growing MRSA susceptible to doxycycline-completed therapy 3/19    [place] light chain myeloma (HCC)  Assessment & Plan  · On Revlimid monotherapy - ? anemia potential side effect  · Hematology following, feel that anemia is directly related to epo deficiency in the setting of chronic kidney disease  Do not feel that anemia is related to multiple myeloma  · Erythropoietin pending  Will need to f/u with Dr Hansa Jackson in office after discharge  · Hold off on restarted Revlimid until seen back by Dr Hansa Jackson as outpatient        Stage 4 chronic kidney disease [facility])  Assessment & Plan  · Baseline appears to be 1 8-2 1  Follows with Dr Randolph Mehta as outpatient     · Creatinine 2 00 as of last lab draw, improved from prior   · Avoid nephrotoxins    Mixed hyperlipidemia  Assessment & Plan  · Continue statin    Type 2 diabetes mellitus without complication, without long-term current use of insulin [facility])  Assessment & Plan  Lab Results   Component Value Date    HGBA1C 6 9 (H) 07/17/2018       Recent Labs     03/18/19  1623 03/18/19  2118 03/19/19  0750 03/19/19  1113   POCGLU 214* 173* 134 169*         · On Amaryl at baseline-resume at discharge   · Diabetic diet  · accuchecks     Discharging Physician / Practitioner: Amanda Swift PA-C  PCP: Papito Dias MD  Admission Date:   Admission Orders (From admission, onward)    Ordered        03/13/19 2134  Inpatient Admission  Once     Order ID Start Status   [id number] 03/13/19 2134 Completed              Discharge Date: 3/20/2019  Disposition:      Short Term Rehab or SNF at [address] [facility] to [address] [place] [address] Road:   · [facility]     Reason for Admission: SOB on exertion    Discharge Diagnoses:     Please see assessment and plan section above for further details regarding discharge diagnoses  Resolved Problems  Date Reviewed: 3/20/2019          Resolved    Cough 3/16/2019     Resolved by  Ze Acosta [facility] Stay:  · Cardiology  · Oncology  · Palliative Care    Procedures Performed:   · CT head: no acute intracranial abnormality  · CXR:  Small costophrenic angle effusions   Bilateral hilar prominence, nonspecific but suggestive of possible hilar lymphadenopathy   Unchanged cardiomegaly  · Urine cx: >100,000 cfu/mL MRSA; completed therapy  · Echo: EF 30%, severe diffuse hypokinesis with distinct RWMA  G2DD  See full report for all details    Medication Adjustments and Discharge Medications:  · Summary of Medication Adjustments made as a result of this hospitalization:   · BB stopped   · Medication Dosing Tapers - Please refer to Discharge Medication List for details on any medication dosing tapers (if applicable to patient)  · Medications being temporarily held (include recommended restart time): NA  · Discharge Medication List: See after visit summary for reconciled discharge medications  Wound Care Recommendations:  When applicable, please see wound care section of After Visit Summary  Diet Recommendations at Discharge:  Diet -        Diet Orders   (From admission, onward)            Start     Ordered    03/14/19 1500  Diet Cardiovascular;  Sodium 2 GM; Consistent Carbohydrate Diet Level 2 (5 carb servings/75 grams CHO/meal), Fluid Restriction 1500 ML  Diet effective now     Question Answer Comment   Diet Type Cardiovascular    Cardiac Sodium 2 GM    Other Restriction(s): Consistent Carbohydrate Diet Level 2 (5 carb servings/75 grams CHO/meal) Other Restriction(s): Fluid Restriction 1500 ML    RD to adjust diet per protocol? Yes        03/14/19 1459    03/13/19 2308  Room Service  Once     Question:  Type of Service  Answer:  Room Service - Appropriate with Assistance    03/13/19 2307          Instructions for any Catheters / Lines Present at Discharge (including removal date, if applicable): NA    Significant Findings / Test Results:   · See above    Incidental Findings:   · See above      Test Results Pending at Discharge (will require follow up):   · none     Outpatient Tests Requested:  · F/u with Cardiologist  · F/U hematologist/oncologist  · F/u PCP  · F/u nephrology    Complications:  none    Hospital Course:     Ralph Tang is a 80 y o  male patient with PMHx of chronic systolic HF, ICM, CAD, HTN, HLD, DM2, hx of tobacco abuse, CKD Stage 4, kappa light chain myeloma (on Revlimid), AOCD and hx of CVA who originally presented to the hospital on 3/13/2019 due to shortness of breath and generalized fatigue  He was diuresed  He had further studies as above  Cardiology saw him  Had 2:1 AV block, BB was dc  Isordil/hydralazine used for afterload reduction in setting of reduced EF  Given age and co-morbidities no further ischemic w/u was recommended  Oncology was consulted  Felt was best to hold Revlimid therapy until patient f/u with them in outpatient setting  CKD stable  Treated for UTI  [facility]  Condition at Discharge: stable     Discharge Day Visit / Exam:     Subjective:  Doing well today  Denies any symptoms  Vitals: Blood Pressure: 118/72 (03/20/19 1332)  Pulse: 88 (03/20/19 0700)  Temperature: 97 5 °F (36 4 °C) (03/20/19 0700)  Temp Source: Oral (03/20/19 0700)  Respirations: 18 (03/20/19 0700)  Height: 5' 6" (167 6 cm) (03/13/19 2200)  Weight - Scale: 86 1 kg (189 lb 13 1 oz) (03/20/19 0600)  SpO2: 100 % (03/20/19 0700)  Exam:   Physical Exam   Constitutional: He is oriented to person, place, and time  No distress  On room air    Cardiovascular: Normal rate and regular rhythm  Pulmonary/Chest: Effort normal and breath sounds normal  No stridor  No respiratory distress  Abdominal: Soft  Bowel sounds are normal  He exhibits no distension  There is no tenderness  Musculoskeletal: He exhibits no edema  Neurological: He is alert and oriented to person, place, and time  Skin: He is not diaphoretic  Psychiatric: He has a normal mood and affect  Nursing note and vitals reviewed  Discussion with Family: patient  Son updated  Goals of Care Discussions:  · Code Status at Discharge: Level 1 - Full Code  · Were there any Goals of Care Discussions during Hospitalization?: Yes  · Results of any General Goals of Care Discussions: full code   · POLST Completed: No   · If POLST Completed, Summary of POLST Agreement Provided Here: NA   · OK to Rehospitalize if Needed? Yes    Discharge instructions/Information to patient and family:   See after visit summary section titled Discharge Instructions for information provided to patient and family  Planned Readmission: no      Discharge Statement:  I spent 37 minutes discharging the patient  This time was spent on the day of discharge  I had direct contact with the patient on the day of discharge  Greater than 50% of the total time was spent examining patient, answering all patient questions, arranging and discussing plan of care with patient as well as directly providing post-discharge instructions  Additional time then spent on discharge activities      ** Please Note: This note has been constructed using a voice recognition system **

## 2019-03-19 NOTE — ASSESSMENT & PLAN NOTE
· On Revlimid monotherapy - ? anemia potential side effect  · Hematology following, feel that anemia is directly related to epo deficiency in the setting of chronic kidney disease  Do not feel that anemia is related to multiple myeloma  · Erythropoietin pending  Will need to f/u with Dr Tammie Faith in office after discharge     · Hold off on restarted Revlimid until seen back by Dr Tammie Faith as outpatient

## 2019-03-19 NOTE — PROGRESS NOTES
Progress Note - Ashley Gonzalez 9/22/1930, 80 y o  male MRN: [id number]    Unit/Bed#: -01 Encounter: [id number]    Primary Care Provider: Ericka French MD   Date and time admitted to hospital: 3/13/2019  6:51 PM    * Acute on chronic combined systolic and diastolic heart failure ([facility] [address] )  Assessment & Plan  · Patient presents with dyspnea on exertion and abdominal distention  BNP elevated, chest x-ray appears congested  Patient is symptomatically improved  · Cardiology is following, patient received intravenous diuresis through 3/15 and transitioned to oral maintenance diuretic 3/16, Lasix 40 mg daily  · Echocardiogram showing ejection fraction of 30% with severe diffuse hypokinesis  · Beta-blocker was discontinued in the setting of intermittent 2-1 av block, will not continue at discharge  Started on low-dose hydralazine  No Ace/Arb the given advanced CKD  · Final monitor I&Os, daily weights  Continue with low-sodium diet and fluid restriction  · Patient did have a few asymptomatic runs of NSVT on telemetry, likely chronic given low EF  Keep lytes stable  F/U OP  · Stable for discharge from a cardiac standpoint on regimen as above, will follow up with Dr Juan Carlos Hanna as outpatient  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)  Assessment & Plan  · Combination of Age/ chronic disease including CHF and Myeloma and revlimid  · 1 unit PRBC 3/15  Hemoglobin stable at 8 3   · Folate level normal   Iron studies are low  Reticulocyte count normal   TSH normal   B12 pending  · 1x dose of venofer ordered and given by hematology yesterday     Constipation  Assessment & Plan  · Resolved  Had BM last evening     NSTEMI (non-ST elevated myocardial infarction) [facility])  Assessment & Plan  · Mild troponin elevation noted  Likely type 2 in the setting of heart failure exacerbation and anemia  · No complaints of chest pain  · Cardiology has signed off  · Continue aspirin  No BB given AVB      Generalized weakness  Assessment & Plan  · multifactorial decline given above  · Goals of care need to be discussed given age and limited rehab potential   Palliative medicine noted, patient remains full code  · PT/OT is recommending rehab, referrals pending as per case management  UTI (urinary tract infection)  Assessment & Plan  · UA positive for nitrites, leuks, innumerable bacteria  · Started on Rocephin in ED - switch to Vancomycin for now   · Urine culture growing MRSA susceptible to doxycycline, vanco d/c and Doxycycline started 3/16 to continue through 3/19  [place]appa light chain myeloma (HCC)  Assessment & Plan  · On Revlimid monotherapy - ? anemia potential side effect  · Hematology following, feel that anemia is directly related to epo deficiency in the setting of chronic kidney disease  Do not feel that anemia is related to multiple myeloma  · Erythropoietin pending  Will need to f/u with Dr Eliana Bejarano in office after discharge  · Hold off on restarted Revlimid until seen back by Dr Eliana Bejarano as outpatient        Stage 4 chronic kidney disease [facility])  Assessment & Plan  · Baseline appears to be 1 8-2 1  Follows with Dr Cooper Wadsworth as outpatient  · Creatinine 2 00 as of yesterday, improved from prior   · Avoid nephrotoxins    Mixed hyperlipidemia  Assessment & Plan  · Continue statin    Type 2 diabetes mellitus without complication, without long-term current use of insulin [facility])  Assessment & Plan  Lab Results   Component Value Date    HGBA1C 6 9 (H) 07/17/2018       Recent Labs     03/18/19  1200 03/18/19  1623 03/18/19  2118 03/19/19  0750   POCGLU 209* 214* 173* 134         · On Amaryl at baseline, currently on hold  · Continue with SSI  · Diabetic diet  · Monitor Accuchecks      VTE Pharmacologic Prophylaxis:   Pharmacologic: Heparin  Mechanical VTE Prophylaxis in Place: Yes    Patient Centered Rounds: I have performed bedside rounds with nursing staff today      Discussions with Specialists or Other Care Team Provider: lester ANDERS Case management  Education and Discussions with Family / Patient: patient  Will speak with son later today  Time Spent for Care: 30 minutes  More than 50% of total time spent on counseling and coordination of care as described above  Current Length of Stay: 6 day(s)    Current Patient Status: Inpatient   Certification Statement: The patient will continue to require additional inpatient hospital stay due to medically stable  awaiting rehab placement  Discharge Plan: STR when arranged by CM  Patient is medically stable    Code Status: Level 1 - Full Code      Subjective:   Reports doing well today  Denies any chest pain, shortness of breath, dizziness or lightheadedness  Had a bowel movement last evening  Tolerated breakfast well  Objective:     Vitals:   Temp (24hrs), Av 8 °F (36 6 °C), Min:97 7 °F (36 5 °C), Max:97 9 °F (36 6 °C)    Temp:  [97 7 °F (36 5 °C)-97 9 °F (36 6 °C)] 97 8 °F (36 6 °C)  HR:  [57-85] 74  Resp:  [16-18] 16  BP: (138-157)/(58-90) 138/71  SpO2:  [98 %-99 %] 99 %  Body mass index is 30 6 kg/m²  Input and Output Summary (last 24 hours): Intake/Output Summary (Last 24 hours) at 3/19/2019 0901  Last data filed at 3/19/2019 0645  Gross per 24 hour   Intake 1140 ml   Output 1150 ml   Net -10 ml       Physical Exam:     Physical Exam   Constitutional: He is oriented to person, place, and time  No distress  Eyes:   Cataracts noted bilaterally   Cardiovascular: Normal rate and regular rhythm  Pulmonary/Chest: Effort normal and breath sounds normal    Abdominal: Soft  Bowel sounds are normal  He exhibits no distension  Musculoskeletal: He exhibits no edema  Neurological: He is alert and oriented to person, place, and time  Skin: Skin is warm and dry  He is not diaphoretic  Psychiatric: He has a normal mood and affect  Nursing note and vitals reviewed        Additional Data:     Labs:    Results from last 7 days   Lab Units 03/17/19  0521   WBC Thousand/uL 4 88   HEMOGLOBIN g/dL 8 3*   HEMATOCRIT % 26 4*   PLATELETS Thousands/uL 178   NEUTROS PCT % 47   LYMPHS PCT % 34   MONOS PCT % 10   EOS PCT % 8*     Results from last 7 days   Lab Units 03/18/19  0555 03/17/19  0521  03/13/19  1927   POTASSIUM mmol/L 3 9 3 7   < > 3 4*   CHLORIDE mmol/L  --  106   < > 106   CO2 mmol/L  --  25   < > 23   BUN mg/dL  --  26*   < > 23   CREATININE mg/dL  --  2 00*   < > 2 13*   CALCIUM mg/dL  --  8 8   < > 8 3   ALK PHOS U/L  --   --   --  129*   ALT U/L  --   --   --  18   AST U/L  --   --   --  7    < > = values in this interval not displayed  * I Have Reviewed All Lab Data Listed Above  * Additional Pertinent Lab Tests Reviewed: All Labs Within Last 24 Hours Reviewed    Imaging:    Imaging Reports Reviewed Today Include:   All  Imaging Personally Reviewed by Myself Includes:  None    Recent Cultures (last 7 days):     Results from last 7 days   Lab Units 03/13/19 2012   URINE CULTURE  >100,000 cfu/ml Methicillin Resistant Staphylococcus aureus*       Last 24 Hours Medication List:     Current Facility-Administered Medications:  acetaminophen 650 mg Oral Q6H PRN Erika ShowDON brown   aspirin 81 mg Oral Daily Boom Lemon, PA-C   atorvastatin 40 mg Oral HS Boom Lemon, PA-C   benzonatate 100 mg Oral BID PRN Boom Lemon, PA-C   bimatoprost 1 drop Both Eyes HS Angela Caal PA-C   bisacodyl 10 mg Rectal Daily Eb Israel PA-C   doxycycline hyclate 100 mg Oral Q12H [address] DON Mccartney   fluticasone 1 spray Each Nare PRN Boom Grigsby, PA-C   furosemide 40 mg Oral Daily DON Nye   guaiFENesin 600 mg Oral BID Boom Grigsby, PA-C   heparin (porcine) 5,000 Units Subcutaneous Q8H [address] Angela Orozco PA-C   hydrALAZINE 10 mg Oral [facility] DON Olson   insulin lispro 1-5 Units Subcutaneous HS Angela Caal PA-C   insulin lispro 1-6 Units Subcutaneous TID AC Angela WOMACK Nicko Montalvo PA-C   lactulose 20 g Oral Daily Miguel Patten, JERAMY   lenalidomide 5 mg Oral Daily [employer], JERAMY   pantoprazole 40 mg Oral Early Morning [employer], JERAMY   polyethylene glycol 17 g Oral Daily Caren NATALIE Bush, CRNP   potassium chloride 10 mEq Oral Daily [employer], JERAMY   senna 1 tablet Oral HS Caren M Rachelle, VIVIENNENP   timolol 1 drop Both Eyes Daily [employer], JERAMY        Today, Patient Was Seen By: Miguel Patten, JERAMY    ** Please Note: Dictation voice to text software may have been used in the creation of this document   **

## 2019-03-19 NOTE — PROGRESS NOTES
Progress Note - Palliative & Supportive Care  Jamie Prado  80 y o   male  /-01   MRN: [id number]  Encounter: [id number]     Assessment  CHF  SOB  Weakness  Debility   CKD  kappa light chain myeloma    Plan:  Symptom Management:   No palliative symptom management today  Will continue to monitor patient and treat accordingly      Goals of Care:   Level 1 code status  · Remains full code  Attempted to contact son today to discuss GOCs and code status prior to discharge however I had to leave a message  Will await a return call  · Plan to d/c to STR for strengthening  Patient hopes to move in with son once discharged as this has been a long term plan for him and his son  History  Nursing reports no events overnight  Dixon [place] is doing very well today  OOB in a chair  Offers no medical concerns  Is happy to be in the process of being discharged and is very agreeable to short-term rehab for strengthening  He has discussed it with his son and they are both in agreement that this is the next best step  Today, he denies pain, N/V/D/C, SOB, depression or anxiety and feels things are going well  Feels he did well at therapy and this is a "good sign" for his planned STR  Was able to eat his breakfast without issue and has no further concerns  Review of Systems: Pertinent items are noted in HPI      Medications    Current Facility-Administered Medications:     acetaminophen (TYLENOL) tablet 650 mg, 650 mg, Oral, Q6H PRN, DON De    aspirin chewable tablet 81 mg, 81 mg, Oral, Daily, Phil Quick PA-C, 81 mg at 03/19/19 0819    atorvastatin (LIPITOR) tablet 40 mg, 40 mg, Oral, HS, Angela Caal PA-C, 40 mg at 03/18/19 2123    benzonatate (TESSALON PERLES) capsule 100 mg, 100 mg, Oral, BID PRN, Phil Quick PA-C    bimatoprost (LUMIGAN) 0 01 % ophthalmic solution 1 drop, 1 drop, Both Eyes, HS, Angela Caal PA-C, 1 drop at 03/18/19 2136    bisacodyl (DULCOLAX) rectal suppository 10 mg, 10 mg, Rectal, Daily, Ila Nunez PA-C, 10 mg at 03/18/19 1447    doxycycline hyclate (VIBRAMYCIN) capsule 100 mg, 100 mg, Oral, Q12H [facility], Caren Bush, VIVIENNENP, 100 mg at 03/19/19 0819    fluticasone (FLONASE) 50 mcg/act nasal spray 1 spray, 1 spray, Each Nare, PRN, Piper Hein PA-C, 1 spray at 03/19/19 0820    furosemide (LASIX) tablet 40 mg, 40 mg, Oral, Daily, Maria Zurita, CRNP, 40 mg at 03/19/19 0820    guaiFENesin (MUCINEX) 12 hr tablet 600 mg, 600 mg, Oral, BID, Angela Caal PA-C, 600 mg at 03/19/19 0819    heparin (porcine) subcutaneous injection 5,000 Units, 5,000 Units, Subcutaneous, Q8H [facility], 5,000 Units at 03/19/19 0644 **AND** [COMPLETED] Platelet count, , , Once, Piper Hein PA-C    hydrALAZINE (APRESOLINE) tablet 10 mg, 10 mg, Oral, Q8H [facility], Iliana Montero, CRNP, 10 mg at 03/19/19 0644    insulin lispro (HumaLOG) 100 units/mL subcutaneous injection 1-5 Units, 1-5 Units, Subcutaneous, HS, Piper Hein PA-C, 1 Units at 03/18/19 2130    insulin lispro (HumaLOG) 100 units/mL subcutaneous injection 1-6 Units, 1-6 Units, Subcutaneous, TID AC, 2 Units at 03/18/19 1722 **AND** Fingerstick Glucose (POCT), , , TID AC, Angela Caal PA-C    lactulose 20 g/30 mL oral solution 20 g, 20 g, Oral, Daily, Ila Nunez PA-C, 20 g at 03/19/19 2582    lenalidomide (REVLIMID) capsule 5 mg, 5 mg, Oral, Daily, Piper Hein PA-C    pantoprazole (PROTONIX) EC tablet 40 mg, 40 mg, Oral, Early Morning, Angela Caal PA-C, 40 mg at 03/19/19 0644    polyethylene glycol (MIRALAX) packet 17 g, 17 g, Oral, Daily, Caren DON Flores, 17 g at 03/19/19 0820    potassium chloride (K-DUR,KLOR-CON) CR tablet 10 mEq, 10 mEq, Oral, Daily, Piper Hein PA-C, 10 mEq at 03/19/19 0819    senna (SENOKOT) tablet 8 6 mg, 1 tablet, Oral, HS, Caren M DON Bush, 8 6 mg at 03/18/19 2123    timolol (TIMOPTIC) 0 5 % ophthalmic solution 1 drop, 1 drop, Both Eyes, Daily, Rosendo Horton PA-C, 1 drop at 03/19/19 0820    Objective  /71 (BP Location: Left arm)   Pulse 74   Temp 97 8 °F (36 6 °C) (Oral)   Resp 16   Ht 5' 6" (1 676 m)   Wt 86 kg (189 lb 9 5 oz)   SpO2 99%   BMI 30 60 kg/m²   Physical Exam:   Constitutional: He is oriented to person, place, and time  Vital signs are normal  He is cooperative  He appears ill  HENT:   Head: Normocephalic and atraumatic  Eyes: Conjunctivae, EOM and lids are normal    Pulmonary/Chest: Effort normal    No respiratory distress  Speaking full sentences without difficulty   Abdominal: Soft  Normal appearance  Neurological: He is alert and oriented to person, place, and time  Skin: Skin is warm, dry and intact  Psychiatric: He has a normal mood and affect  His speech is normal and behavior is normal  Judgment and thought content normal  Cognition and memory are normal      Lab Results: I have personally reviewed pertinent labs  , CBC: No results found for: WBC, HGB, HCT, MCV, PLT, ADJUSTEDWBC, MCH, MCHC, RDW, MPV, NRBC, CMP: No results found for: SODIUM, K, CL, CO2, ANIONGAP, BUN, CREATININE, GLUCOSE, CALCIUM, AST, ALT, ALKPHOS, PROT, BILITOT, EGFR    Counseling / Coordination of Care  Total floor / unit time spent today 25 minutes     Payam Maldonado

## 2019-03-19 NOTE — ASSESSMENT & PLAN NOTE
Lab Results   Component Value Date    HGBA1C 6 9 (H) 07/17/2018       Recent Labs     03/18/19  1623 03/18/19  2118 03/19/19  0750 03/19/19  1113   POCGLU 214* 173* 134 169*         · On Amaryl at baseline-resume at discharge   · Diabetic diet  · accuchecks

## 2019-03-19 NOTE — ASSESSMENT & PLAN NOTE
Lab Results   Component Value Date    HGBA1C 6 9 (H) 07/17/2018       Recent Labs     03/18/19  1200 03/18/19  1623 03/18/19  2118 03/19/19  0750   POCGLU 209* 214* 173* 134         · On Amaryl at baseline, currently on hold     · Continue with SSI  · Diabetic diet  · Monitor Accuchecks

## 2019-03-19 NOTE — ASSESSMENT & PLAN NOTE
· Patient presents with dyspnea on exertion and abdominal distention  BNP elevated, chest x-ray appears congested  Patient is symptomatically improved  · Cardiology is following, patient received intravenous diuresis through 3/15 and transitioned to oral maintenance diuretic 3/16, Lasix 40 mg daily  · Echocardiogram showing ejection fraction of 30% with severe diffuse hypokinesis  · Beta-blocker was discontinued in the setting of intermittent 2-1 av block, will not continue at discharge  Started on low-dose hydralazine  No Ace/Arb the given advanced CKD  · Final monitor I&Os, daily weights  Continue with low-sodium diet and fluid restriction  · Patient did have a few asymptomatic runs of NSVT on telemetry, likely chronic given low EF  Keep lytes stable  F/U OP  · Stable for discharge from a cardiac standpoint on regimen as above, will follow up with Dr Gael Loaiza as outpatient

## 2019-03-19 NOTE — ASSESSMENT & PLAN NOTE
· multifactorial decline given above  · Goals of care need to be discussed given age and limited rehab potential   Palliative medicine noted, patient remains full code    · PT/OT is recommending rehab at discharge

## 2019-03-20 VITALS
DIASTOLIC BLOOD PRESSURE: 72 MMHG | HEART RATE: 88 BPM | BODY MASS INDEX: 30.51 KG/M2 | SYSTOLIC BLOOD PRESSURE: 118 MMHG | HEIGHT: 66 IN | WEIGHT: 189.82 LBS | RESPIRATION RATE: 18 BRPM | TEMPERATURE: 97.5 F | OXYGEN SATURATION: 100 %

## 2019-03-20 LAB
GLUCOSE SERPL-MCNC: 113 MG/DL (ref 65–140)
GLUCOSE SERPL-MCNC: 248 MG/DL (ref 65–140)

## 2019-03-20 PROCEDURE — 99239 HOSP IP/OBS DSCHRG MGMT >30: CPT | Performed by: INTERNAL MEDICINE

## 2019-03-20 PROCEDURE — 82948 REAGENT STRIP/BLOOD GLUCOSE: CPT

## 2019-03-20 PROCEDURE — 97530 THERAPEUTIC ACTIVITIES: CPT

## 2019-03-20 PROCEDURE — 97116 GAIT TRAINING THERAPY: CPT

## 2019-03-20 RX ORDER — FUROSEMIDE 40 MG/1
40 TABLET ORAL DAILY
Refills: 0
Start: 2019-03-20

## 2019-03-20 RX ADMIN — LACTULOSE 20 G: 10 SOLUTION ORAL at 08:19

## 2019-03-20 RX ADMIN — HEPARIN SODIUM 5000 UNITS: 5000 INJECTION INTRAVENOUS; SUBCUTANEOUS at 13:32

## 2019-03-20 RX ADMIN — HYDRALAZINE HYDROCHLORIDE 10 MG: 10 TABLET, FILM COATED ORAL at 06:27

## 2019-03-20 RX ADMIN — INSULIN LISPRO 3 UNITS: 100 INJECTION, SOLUTION INTRAVENOUS; SUBCUTANEOUS at 11:59

## 2019-03-20 RX ADMIN — FLUTICASONE PROPIONATE 1 SPRAY: 50 SPRAY, METERED NASAL at 08:18

## 2019-03-20 RX ADMIN — FUROSEMIDE 40 MG: 40 TABLET ORAL at 08:19

## 2019-03-20 RX ADMIN — ACETAMINOPHEN 650 MG: 325 TABLET, FILM COATED ORAL at 06:28

## 2019-03-20 RX ADMIN — ASPIRIN 81 MG 81 MG: 81 TABLET ORAL at 08:19

## 2019-03-20 RX ADMIN — GUAIFENESIN 600 MG: 600 TABLET, EXTENDED RELEASE ORAL at 08:19

## 2019-03-20 RX ADMIN — HEPARIN SODIUM 5000 UNITS: 5000 INJECTION INTRAVENOUS; SUBCUTANEOUS at 06:27

## 2019-03-20 RX ADMIN — HYDRALAZINE HYDROCHLORIDE 10 MG: 10 TABLET, FILM COATED ORAL at 13:32

## 2019-03-20 RX ADMIN — PANTOPRAZOLE SODIUM 40 MG: 40 TABLET, DELAYED RELEASE ORAL at 06:27

## 2019-03-20 RX ADMIN — TIMOLOL MALEATE 1 DROP: 5 SOLUTION/ DROPS OPHTHALMIC at 08:19

## 2019-03-20 RX ADMIN — POTASSIUM CHLORIDE 10 MEQ: 750 TABLET, EXTENDED RELEASE ORAL at 08:19

## 2019-03-20 NOTE — PLAN OF CARE
Problem: DISCHARGE PLANNING - CARE MANAGEMENT  Goal: Discharge to post-acute care or home with appropriate resources  Description  INTERVENTIONS:  - Conduct assessment to determine patient/family and health care team treatment goals, and need for post-acute services based on payer coverage, community resources, and patient preferences, and barriers to discharge  - Address psychosocial, clinical, and financial barriers to discharge as identified in assessment in conjunction with the patient/family and health care team  - Arrange appropriate level of post-acute services according to patient?s   needs and preference and payer coverage in collaboration with the physician and health care team  - Communicate with and update the patient/family, physician, and health care team regarding progress on the discharge plan  - Arrange appropriate transportation to post-acute venues  Outcome: Progressing  Note:   CM left message for Amira at [place] to see status of STR auth, awaiting call back

## 2019-03-20 NOTE — PHYSICAL THERAPY NOTE
PHYSICAL THERAPY NOTE    Patient Name: Ashley Gonzalez  RHLQJ'N Date: 3/20/2019        03/20/19 6727   Pain Assessment   Pain Assessment 0-10   Pain Score 5   Pain Type Chronic pain   Pain Location Leg   Pain Orientation Left   Restrictions/Precautions   Weight Bearing Precautions Per Order No   Other Precautions Chair Alarm; Bed Alarm; Fall Risk;Cognitive   General   Family/Caregiver Present No   Subjective   Subjective Patient supine in bed and is agreeable to therapy session  Patient identifers obtained from name &   Bed Mobility   Supine to Sit 5  Supervision   Additional items Assist x 1;HOB elevated; Bedrails; Increased time required;Verbal cues   Sit to Supine Unable to assess   Additional Comments Patient seated OOB in recliner post session with chair alarm engaged, call bell and belongings in reach   Transfers   Sit to Stand 5  Supervision   Additional items Assist x 1; Armrests; Increased time required   Stand to Sit 5  Supervision   Additional items Assist x 1; Armrests; Increased time required   Ambulation/Elevation   Gait pattern Poor UE support;Narrow DUNG; Forward Flexion;Decreased foot clearance;Shuffling; Short stride;Redundant gait at times   Gait Assistance 4  Minimal assist  (CGA)   Additional items Assist x 1;Verbal cues   Assistive Device Rolling walker   Distance 150' x2   Stair Management Assistance 4  Minimal assist   Additional items Assist x 1;Verbal cues; Increased time required   Stair Management Technique Step to pattern; Foreward; With walker   Number of Stairs 13  (6" step)   Balance   Static Sitting Good   Dynamic Sitting Fair   Static Standing Fair -   Dynamic Standing Poor +   Ambulatory Poor +   Endurance Deficit   Endurance Deficit Yes   Endurance Deficit Description SOB and fatigue   Activity Tolerance   Activity Tolerance Patient limited by fatigue;Patient limited by pain   Nurse Made Aware Spoke to Elijah Fernandez RN    Assessment   Prognosis Good   Problem List Decreased strength;Decreased range of motion;Decreased endurance; Impaired balance;Decreased mobility   Assessment Patient motivated and agreeable to participate in therapy session  Demonstrated ability to transfer supine to sit with supervision and HOB elevated  Multiple sit to stand transfers with consistent supervision and 50% input for hand placement  Remains consistnet with gait distance with roller walker remaining limited by fatigue  Requires verbal instruction for walker advancement and raffi for safety with poor carry over  Patient refused stair training in stairwell secondary to "feeling in head" with last attempt  Patient demonstrated ability to ascend and descend 6" step x13 with B UE support on roller walker and min ax1 with no complaints of same expressed feeling  Continue to focus on OOB mobility with progression of ambulation and stair training as appropriate  Goals   Patient Goals none stated   STG Expiration Date 03/25/19   Treatment Day 2   Plan   Treatment/Interventions Functional transfer training;LE strengthening/ROM; Elevations; Therapeutic exercise; Endurance training;Equipment eval/education;Patient/family training;Bed mobility;Gait training;Spoke to nursing   PT Frequency 5x/wk   Recommendation   Recommendation Short-term skilled PT   Equipment Recommended Robert Mcfadden PTA

## 2019-03-20 NOTE — PLAN OF CARE
Problem: DISCHARGE PLANNING - CARE MANAGEMENT  Goal: Discharge to post-acute care or home with appropriate resources  Description  INTERVENTIONS:  - Conduct assessment to determine patient/family and health care team treatment goals, and need for post-acute services based on payer coverage, community resources, and patient preferences, and barriers to discharge  - Address psychosocial, clinical, and financial barriers to discharge as identified in assessment in conjunction with the patient/family and health care team  - Arrange appropriate level of post-acute services according to patient?s   needs and preference and payer coverage in collaboration with the physician and health care team  - Communicate with and update the patient/family, physician, and health care team regarding progress on the discharge plan  - Arrange appropriate transportation to post-acute venues  3/20/2019 1418 by Xuan Koo  Outcome: Completed  Note:   CM received message from [address] [place] [address] at [place], [address] is approved for STR at [facility], [facility], next review on 3/28 to Ibeth Samano, Phone: [phone] and Fax: [phone]  CM provided auth info to [facility]  Pt's nephew Tyrone will transport Pt to [facility] and Pt agreeable  Tyrone will be here around 3pm       CM made Pt, PA-C Merlinda Blizzard, [facility], nurse Nikki aware of  time  PA-C Merlinda Blizzard spoke with son and made him aware of discharge  3/20/2019 0929 by Xuan Koo  Outcome: Progressing  Note:   CM left message for Amira at [place] to see status of STR auth, awaiting call back

## 2019-03-20 NOTE — PLAN OF CARE
Problem: PHYSICAL THERAPY ADULT  Goal: Performs mobility at highest level of function for planned discharge setting  See evaluation for individualized goals  Description  Treatment/Interventions: Functional transfer training, LE strengthening/ROM, Elevations, Therapeutic exercise, Endurance training, Equipment eval/education, Patient/family training, Bed mobility, Gait training, Spoke to nursing, Spoke to case management, OT  Equipment Recommended: Lex Nix       See flowsheet documentation for full assessment, interventions and recommendations     Outcome: Progressing

## 2019-03-20 NOTE — PLAN OF CARE
Problem: Potential for Falls  Goal: Patient will remain free of falls  Description  INTERVENTIONS:  - Assess patient frequently for physical needs  -  Identify cognitive and physical deficits and behaviors that affect risk of falls    -  [place] fall precautions as indicated by assessment   - Educate patient/family on patient safety including physical limitations  - Instruct patient to call for assistance with activity based on assessment  - Modify environment to reduce risk of injury  - Consider OT/PT consult to assist with strengthening/mobility  Outcome: Progressing     Problem: Prexisting or High Potential for Compromised Skin Integrity  Goal: Skin integrity is maintained or improved  Description  INTERVENTIONS:  - Identify patients at risk for skin breakdown  - Assess and monitor skin integrity  - Assess and monitor nutrition and hydration status  - Monitor labs (i e  albumin)  - Assess for incontinence   - Turn and reposition patient  - Assist with mobility/ambulation  - Relieve pressure over bony prominences  - Avoid friction and shearing  - Provide appropriate hygiene as needed including keeping skin clean and dry  - Evaluate need for skin moisturizer/barrier cream  - Collaborate with interdisciplinary team (i e  Nutrition, Rehabilitation, etc )   - Patient/family teaching  Outcome: Progressing     Problem: DISCHARGE PLANNING - CARE MANAGEMENT  Goal: Discharge to post-acute care or home with appropriate resources  Description  INTERVENTIONS:  - Conduct assessment to determine patient/family and health care team treatment goals, and need for post-acute services based on payer coverage, community resources, and patient preferences, and barriers to discharge  - Address psychosocial, clinical, and financial barriers to discharge as identified in assessment in conjunction with the patient/family and health care team  - Arrange appropriate level of post-acute services according to patient?s   needs and preference and payer coverage in collaboration with the physician and health care team  - Communicate with and update the patient/family, physician, and health care team regarding progress on the discharge plan  - Arrange appropriate transportation to post-acute venues  Outcome: Progressing     Problem: CARDIOVASCULAR - ADULT  Goal: Maintains optimal cardiac output and hemodynamic stability  Description  INTERVENTIONS:  - Monitor I/O, vital signs and rhythm  - Monitor for S/S and trends of decreased cardiac output i e  bleeding, hypotension  - Administer and titrate ordered vasoactive medications to optimize hemodynamic stability  - Assess quality of pulses, skin color and temperature  - Assess for signs of decreased coronary artery perfusion - ex   Angina  - Instruct patient to report change in severity of symptoms  Outcome: Progressing  Goal: Absence of cardiac dysrhythmias or at baseline rhythm  Description  INTERVENTIONS:  - Continuous cardiac monitoring, monitor vital signs, obtain 12 lead EKG if indicated  - Administer antiarrhythmic and heart rate control medications as ordered  - Monitor electrolytes and administer replacement therapy as ordered  Outcome: Progressing

## 2019-03-20 NOTE — SOCIAL WORK
CM received message from [facility],[address] at [place], [address] is approved for STR at [facility], [facility], next review on 3/28 to Mahnazindy Dameon, Phone: [phone] and Fax: [phone]  CM provided auth info to [facility]  Pt's nephervin Keenan will transport Pt to [facility] and Pt agreeable  Siobhan Keenan will be here around 3pm      PRINCE made Pt, PA-C [place], [facility], nurse Nikki aware of  time  JERAMY maloney spoke with son and made him aware of discharge

## 2019-03-20 NOTE — SOCIAL WORK
CM left message for Amira at [facility] to see status of STR auth, awaiting call back

## 2019-03-21 ENCOUNTER — TRANSITIONAL CARE MANAGEMENT (OUTPATIENT)
Dept: INTERNAL MEDICINE CLINIC | Facility: CLINIC | Age: 84
End: 2019-03-21

## 2019-03-21 PROCEDURE — TCMXX

## 2019-03-22 LAB
METHYLMALONATE SERPL-SCNC: 3348 NMOL/L (ref 0–378)
SL AMB DISCLAIMER: ABNORMAL

## 2019-03-26 ENCOUNTER — TELEPHONE (OUTPATIENT)
Dept: HEMATOLOGY ONCOLOGY | Facility: CLINIC | Age: 84
End: 2019-03-26

## 2019-03-26 NOTE — TELEPHONE ENCOUNTER
Re-faxed CBC w/diff & CMP to [facility] attn: Mariella Guevara for patients upcoming appt on 4/5/19  Per [place] no other labs needed at this time  Any Questions they are reachable at [phone]   Thank you

## 2019-03-28 ENCOUNTER — DOCUMENTATION (OUTPATIENT)
Dept: CARDIOLOGY CLINIC | Facility: CLINIC | Age: 84
End: 2019-03-28

## 2019-03-28 DIAGNOSIS — I42.9 CARDIOMYOPATHY, UNSPECIFIED TYPE (HCC): Primary | ICD-10-CM

## 2019-03-29 ENCOUNTER — TELEPHONE (OUTPATIENT)
Dept: HEMATOLOGY ONCOLOGY | Facility: HOSPITAL | Age: 84
End: 2019-03-29

## 2019-03-29 ENCOUNTER — TELEPHONE (OUTPATIENT)
Dept: HEMATOLOGY ONCOLOGY | Facility: CLINIC | Age: 84
End: 2019-03-29

## 2019-03-29 DIAGNOSIS — C90.00 MULTIPLE MYELOMA NOT HAVING ACHIEVED REMISSION (HCC): Primary | ICD-10-CM

## 2019-03-29 RX ORDER — ISOSORBIDE DINITRATE 5 MG/1
5 TABLET ORAL 3 TIMES DAILY
Qty: 90 TABLET | Refills: 1
Start: 2019-03-29

## 2019-03-29 NOTE — PROGRESS NOTES
Heart Failure Outpatient Progress Note - Adithya Olivas 80 y o  male MRN: [id number]     @ Encounter: [id number]        Assessment:          Patient Active Problem List     Diagnosis Date Noted    NSTEMI (non-ST elevated myocardial infarction) ([place] [address] ) 03/15/2019    Constipation 03/15/2019    Generalized weakness 03/14/2019    Acute on chronic combined systolic and diastolic heart failure ([place] [address] ) 03/13/2019    MRSA (methicillin resistant staph aureus) culture positive 12/20/2018    UTI (urinary tract infection) 10/23/2018    Kappa light chain myeloma ([place] [address] ) 10/08/2018    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 ([place] [address] ) 08/14/2018    Other proteinuria 08/14/2018    Stage 4 chronic kidney disease ([place] [address] ) 07/23/2018    Cerebral infarction, watershed distribution, bilateral, acute 10/19/2015    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    Type 2 diabetes mellitus without complication, without long-term current use of insulin ([place] [address] ) 10/26/2013    Mixed hyperlipidemia 10/26/2013    Esophageal reflux 10/26/2013    Benign prostatic hyperplasia 10/26/2013    Ischemic cardiomyopathy 06/05/2012      Assessment/Plan:              1 Chronic combined systolic and diastolic CHF, LVEF 36%, LVIDd  5 5 cm, NYHA Class III, ACC/AHA Stage C  Etiology: Unclear, Multifactorial: Possible ICM-given his history of CAD in the past, NICM- possibly viral given URI symptoms on admit, +/-idiopathic/myocarditis, +/- chemotherapy induced? Currently receiving tx with Revlimid +/- ?infiltrative cardiomyopathy as patients with multiple myeloma carry the risk of developing concurrent cardiac amyloid light chain amyloidosis  Clinically appears WWP, euvolemic  Consider nuclear stress test to rule out ischemia, cMRI to assess for infiltrative myopathy  GDMT as below   Will discuss recommendations with Dr Kelvin Osman as well as possible referral to the Heart Failure clinic    --2g sodium diet  --2000 ml fluid restriction     TTE 3/14/19:  LVEF 30%  LVIDd-5 5 cm  MR mild  RV normal size and systolic function  TR trace  PASP   RVOT  Other: Severe diffuse hypokinesis with distinct RWMA, grade II DD       6/2015:  LVEF 50%  LVIDd 4 8 cm  MR  Trace  RV normal size and systolic function  TR trace  PASP  RVOT  Other  Mild diffuse hypokinesis, grade I DD          Neurohormonal Blockade:  --Beta-Blocker: recently held 2/2 2:1 AV block, bradycardia  --ACEi, ARB or ARNi: precluded by advanced CKD, start Isordil 5 mg TID, hold for SBP <90  --Aldosterone Receptor Blocker:precluded by advanced CKD  --Diuretic:Lasix 40 mg once daily     Sudden Cardiac Death Risk Reduction:  --ICD: LifeVest, was not offered upon discharge  Will contact Kari     Electrical Resynchronization:  --Candidacy for BiV device:  ms     Advanced Therapies:  --Inotrope:  --LVAD/Transplant Candidacy: Age precludes OHT      2  Anemia of CKD  Last hgb 9 1 on 3/27  3  Stage IV CKD  Baseline creatinine 1 8-2 1  Currently 2  Follows with nephrology  4 Type II DM  5  CAD  As documented in medical record  On statin and ASA at present  Cannot rule out ischemic cause of his worsening cardiomyopathy  Consider nuclear stress imaging versus cardiac catheterization  6 2:1 AVB with underlying 1st degree AVB  BB currently on hold  7  HTN  8 HLD   9 History of CVA  10  Kappa light chain multiple myeloma  On Revlimid and Dexamethasone  Luke Vigil with Dr Brandi Rice    Outpatient follow up scheduled for 5/14/19 with Dr Breanne Apple       HPI: 80year old male with PMH as described above being seen at [facility], [place] for post hospital follow up  He presented to the ED at [address] on 3/13/19 with complaints of progressively worsening SOB with generalized fatigue, weakness, and cough x 1 week  He was diagnosed with acute on chronic combined systolic and diastolic CHF with an elevated BNP and pulmonary vascular congestion on chest xray  He was given IV diuresis and transitioned to oral Lasix 40 mg daily   An echocardiogram was done which demonstrated new, markedly reduced systolic function with an LVEF of 30%, down from 50% in 2015  There was severe diffuse hypokinesis with RWMA and grade II diastology, RV appeared normal  LVIDd 5 5 cm  Notably, patient was found to have asymptomatic  2:1 AVB during admission and beta blocker was subsequently stopped  Was not able to be on ACEi, ARB, ARNI given advanced CKD  Was discharged at a weight of 188 lbs and scheduled to follow up with his primary cardiologist, Dr Warden Pate,  as an outpatient       Today, patient reports feeling well since hospital discharge  States shortness of breath has resolved  Denies any chest pain, palpitations, dizziness, lightheadedness, or syncopal events  Denies orthopnea or PND  Lower extremity edema has resolved  Tolerating PT well  Appears warm, well perfused, euvolemic  Stable hemodynamics    His weight has remained stable       Medical History        Past Medical History:   Diagnosis Date    Angina pectoris ([facility] [address] )      Chronic kidney disease      Coronary artery disease      Diabetes mellitus (HCC)      Glaucoma      Hypertension      Multiple myeloma (HCC)      Occluded coronary artery stent       Left            12 point ROS negative other than that stated in HPI     No Known Allergies        Current Outpatient Medications:     acetaminophen (TYLENOL) 325 mg tablet, Take 1 - 2 tabs PO Q4 PRN pain, Disp: 30 tablet, Rfl: 0    aspirin 81 MG tablet, Take 81 mg by mouth daily  , Disp: , Rfl:     atorvastatin (LIPITOR) 40 mg tablet, TAKE 1 TABLET AT BEDTIME, Disp: 90 tablet, Rfl: 3    benzonatate (TESSALON PERLES) 100 mg capsule, Take 1 capsule (100 mg total) by mouth 2 (two) times a day as needed for cough, Disp: 60 capsule, Rfl: 5    bimatoprost (LUMIGAN) 0 01 % ophthalmic drops, Apply to eye, Disp: , Rfl:     esomeprazole (NexIUM) 40 MG capsule, Take 40 mg by mouth every evening , Disp: , Rfl:     fluticasone (FLONASE) 50 mcg/act nasal spray, into each nostril as needed  , Disp: , Rfl:     furosemide (LASIX) 40 mg tablet, Take 1 tablet (40 mg total) by mouth daily, Disp: , Rfl: 0    glimepiride (AMARYL) 2 mg tablet, TAKE 1 TABLET DAILY AS     DIRECTED (Patient taking differently: TAKE 1 5 TABLETS DAILY AS  DIRECTED total of 3 mg), Disp: 90 tablet, Rfl: 3    glucose blood (ACCU-CHEK CHANDRAKANT PLUS) test strip, by In Vitro route, Disp: , Rfl:     guaiFENesin (MUCINEX) 600 mg 12 hr tablet, Take 1 tablet (600 mg total) by mouth 2 (two) times a day, Disp: 60 tablet, Rfl: 0    nitroglycerin (NITROSTAT) 0 4 mg SL tablet, Place 1 tablet under the tongue every 5 (five) minutes as needed, Disp: , Rfl:     ONE TOUCH ULTRA TEST test strip, The patient test once daily  , Disp: 100 each, Rfl: 2    ONETOUCH DELICA LANCETS 93X MISC, by Does not apply route daily, Disp: 100 each, Rfl: 3    potassium chloride (KLOR-CON 10) 10 mEq tablet, Take 1 tablet (10 mEq total) by mouth daily, Disp: 90 tablet, Rfl: 3    timolol (TIMOPTIC) 0 5 % ophthalmic solution, , Disp: , Rfl:     UNKNOWN TO PATIENT, , Disp: , Rfl:      Social History               Socioeconomic History    Marital status:        Spouse name: Not on file    Number of children: Not on file    Years of education: Not on file    Highest education level: Not on file   Occupational History    Not on file   Social Needs    Financial resource strain: Not on file    Food insecurity:       Worry: Not on file       Inability: Not on file    Transportation needs:       Medical: Not on file       Non-medical: Not on file   Tobacco Use    Smoking status: Former Smoker       Types: Cigarettes    Smokeless tobacco: Never Used    Tobacco comment: former smoker   Substance and Sexual Activity    Alcohol use:  Yes       Comment: socially; less than once a month    Drug use: No    Sexual activity: Not on file   Lifestyle    Physical activity:       Days per week: Not on file       Minutes per session: Not on file    Stress: Not on file   Relationships    Social connections:       Talks on phone: Not on file       Gets together: Not on file       Attends Zoroastrian service: Not on file       Active member of club or organization: Not on file       Attends meetings of clubs or organizations: Not on file       Relationship status: Not on file    Intimate partner violence:       Fear of current or ex partner: Not on file       Emotionally abused: Not on file       Physically abused: Not on file       Forced sexual activity: Not on file   Other Topics Concern    Not on file   Social History Narrative     Daily caffeine consumption                  Family History   Problem Relation Age of Onset    Heart attack Father      Heart disease Mother      Diabetes Mother      Heart disease Sister      COPD Family           Physical Exam:     Vitals: There were no vitals taken for this visit  , There is no height or weight on file to calculate BMI ,       Wt Readings from Last 3 Encounters:   03/20/19 86 1 kg (189 lb 13 1 oz)   03/07/19 93 4 kg (206 lb)   01/22/19 94 kg (207 lb 2 oz)            Vital Signs: Temp 96 9, HR 93 bmp, RR 18, -120/60-70, O2 sat 98%     GEN: Khloe Linder appears well, alert and oriented x 3, pleasant and cooperative   HEENT: pupils equal, round, and reactive to light; extraocular muscles intact  NECK: supple, no carotid bruits   HEART: regular rhythm, normal S1 and S2, no murmurs, clicks, gallops or rubs, JVP is  down  LUNGS: clear to auscultation bilaterally; no wheezes, rales, or rhonchi   ABDOMEN: normal bowel sounds, soft, no tenderness, no distention  EXTREMITIES: peripheral pulses normal; no clubbing, cyanosis, or edema  NEURO: no focal findings   SKIN: normal without suspicious lesions on exposed skin     Labs & Results:    3/27/19: BMP - BUN 37, Creat 2, K 4 2, Na 142, H/H 9 1/26 9     3/17/19: BMP-BUN 26, Creatinine 2, Na 141, K 3 7, Cl 106, CO2 25                CBC-Hgb 8 3, Hct 26 4, Plt 178     EKG personally reviewed by DON Lambert  No results found for this visit on 03/28/19       Counseling / Coordination of Care  Total floor / unit time spent today 20 minutes  Greater than 50% of total time was spent with the patient and / or family counseling and / or coordination of care    A description of the counseling / coordination of care: 20        Thank you for the opportunity to participate in the care of this patient      Joel Chen

## 2019-04-01 ENCOUNTER — TELEPHONE (OUTPATIENT)
Dept: HEMATOLOGY ONCOLOGY | Facility: CLINIC | Age: 84
End: 2019-04-01

## 2019-04-03 ENCOUNTER — OFFICE VISIT (OUTPATIENT)
Dept: HEMATOLOGY ONCOLOGY | Facility: CLINIC | Age: 84
End: 2019-04-03
Payer: COMMERCIAL

## 2019-04-03 VITALS
SYSTOLIC BLOOD PRESSURE: 122 MMHG | OXYGEN SATURATION: 97 % | RESPIRATION RATE: 17 BRPM | HEIGHT: 66 IN | DIASTOLIC BLOOD PRESSURE: 74 MMHG | TEMPERATURE: 97.8 F | BODY MASS INDEX: 31.18 KG/M2 | HEART RATE: 99 BPM | WEIGHT: 194 LBS

## 2019-04-03 DIAGNOSIS — C90.00 KAPPA LIGHT CHAIN MYELOMA (HCC): Primary | ICD-10-CM

## 2019-04-03 PROCEDURE — 99215 OFFICE O/P EST HI 40 MIN: CPT | Performed by: INTERNAL MEDICINE

## 2019-04-03 RX ORDER — GABAPENTIN 100 MG/1
100 CAPSULE ORAL DAILY
COMMUNITY
End: 2019-05-07 | Stop reason: CLARIF

## 2019-04-03 RX ORDER — CYANOCOBALAMIN (VITAMIN B-12) 1000 MCG
TABLET ORAL
COMMUNITY

## 2019-04-03 RX ORDER — BISACODYL 10 MG
10 SUPPOSITORY, RECTAL RECTAL DAILY
COMMUNITY

## 2019-04-08 ENCOUNTER — TRANSITIONAL CARE MANAGEMENT (OUTPATIENT)
Dept: INTERNAL MEDICINE CLINIC | Facility: CLINIC | Age: 84
End: 2019-04-08

## 2019-04-09 ENCOUNTER — TELEPHONE (OUTPATIENT)
Dept: NEPHROLOGY | Facility: CLINIC | Age: 84
End: 2019-04-09

## 2019-04-11 DIAGNOSIS — C90.00 MULTIPLE MYELOMA NOT HAVING ACHIEVED REMISSION (HCC): Primary | ICD-10-CM

## 2019-04-11 RX ORDER — LENALIDOMIDE 5 MG/1
CAPSULE ORAL
Qty: 14 CAPSULE | Refills: 0 | Status: SHIPPED | OUTPATIENT
Start: 2019-04-11 | End: 2019-04-16 | Stop reason: SDUPTHER

## 2019-04-16 DIAGNOSIS — C90.00 MULTIPLE MYELOMA NOT HAVING ACHIEVED REMISSION (HCC): ICD-10-CM

## 2019-04-16 RX ORDER — LENALIDOMIDE 5 MG/1
CAPSULE ORAL
Qty: 7 CAPSULE | Refills: 0 | Status: SHIPPED | OUTPATIENT
Start: 2019-04-16 | End: 2019-05-28 | Stop reason: SDUPTHER

## 2019-04-23 ENCOUNTER — TELEPHONE (OUTPATIENT)
Dept: HEMATOLOGY ONCOLOGY | Facility: CLINIC | Age: 84
End: 2019-04-23

## 2019-05-01 LAB
CREAT ?TM UR-SCNC: 56 UMOL/L
EXT MICROALBUMIN URINE RANDOM: 5.2
HBA1C MFR BLD HPLC: 7.1 %
MICROALBUMIN/CREAT UR: 93 MG/G{CREAT}

## 2019-05-07 ENCOUNTER — OFFICE VISIT (OUTPATIENT)
Dept: HEMATOLOGY ONCOLOGY | Facility: CLINIC | Age: 84
End: 2019-05-07
Payer: COMMERCIAL

## 2019-05-07 VITALS
SYSTOLIC BLOOD PRESSURE: 128 MMHG | BODY MASS INDEX: 29.73 KG/M2 | TEMPERATURE: 97.4 F | RESPIRATION RATE: 17 BRPM | WEIGHT: 185 LBS | HEIGHT: 66 IN | HEART RATE: 94 BPM | OXYGEN SATURATION: 98 % | DIASTOLIC BLOOD PRESSURE: 72 MMHG

## 2019-05-07 DIAGNOSIS — C90.00 KAPPA LIGHT CHAIN MYELOMA (HCC): Primary | ICD-10-CM

## 2019-05-07 PROCEDURE — 99214 OFFICE O/P EST MOD 30 MIN: CPT | Performed by: INTERNAL MEDICINE

## 2019-05-28 ENCOUNTER — TELEPHONE (OUTPATIENT)
Dept: HEMATOLOGY ONCOLOGY | Facility: CLINIC | Age: 84
End: 2019-05-28

## 2019-05-28 DIAGNOSIS — C90.00 MULTIPLE MYELOMA NOT HAVING ACHIEVED REMISSION (HCC): ICD-10-CM

## 2019-05-28 RX ORDER — LENALIDOMIDE 5 MG/1
CAPSULE ORAL
Qty: 7 CAPSULE | Refills: 0 | Status: SHIPPED | OUTPATIENT
Start: 2019-05-28 | End: 2019-06-18 | Stop reason: SDUPTHER

## 2019-05-30 ENCOUNTER — TELEPHONE (OUTPATIENT)
Dept: NEPHROLOGY | Facility: CLINIC | Age: 84
End: 2019-05-30

## 2019-06-17 ENCOUNTER — TELEPHONE (OUTPATIENT)
Dept: HEMATOLOGY ONCOLOGY | Facility: CLINIC | Age: 84
End: 2019-06-17

## 2019-06-18 DIAGNOSIS — C90.00 MULTIPLE MYELOMA NOT HAVING ACHIEVED REMISSION (HCC): ICD-10-CM

## 2019-06-18 RX ORDER — LENALIDOMIDE 5 MG/1
CAPSULE ORAL
Qty: 7 CAPSULE | Refills: 0 | Status: SHIPPED | OUTPATIENT
Start: 2019-06-18 | End: 2019-07-09 | Stop reason: SDUPTHER

## 2019-07-02 ENCOUNTER — TELEPHONE (OUTPATIENT)
Dept: HEMATOLOGY ONCOLOGY | Facility: CLINIC | Age: 84
End: 2019-07-02

## 2019-07-02 NOTE — TELEPHONE ENCOUNTER
Called patient and LM stating that his appt on 7/8 has been changed to 7/16 at 1pm with [facility]

## 2019-07-09 DIAGNOSIS — C90.00 MULTIPLE MYELOMA NOT HAVING ACHIEVED REMISSION (HCC): ICD-10-CM

## 2019-07-10 DIAGNOSIS — C90.00 MULTIPLE MYELOMA NOT HAVING ACHIEVED REMISSION (HCC): ICD-10-CM

## 2019-07-11 ENCOUNTER — TELEPHONE (OUTPATIENT)
Dept: HEMATOLOGY ONCOLOGY | Facility: CLINIC | Age: 84
End: 2019-07-11

## 2019-07-11 NOTE — TELEPHONE ENCOUNTER
Call transferred from [facility], [address] [facility]  Patient's son Conrad Vega called requesting refill of Revlimid 5mg caps to the [address] on file   Call back number for Conrad Vega: [phone]

## 2019-07-12 ENCOUNTER — TELEPHONE (OUTPATIENT)
Dept: HEMATOLOGY ONCOLOGY | Facility: CLINIC | Age: 84
End: 2019-07-12

## 2019-07-15 DIAGNOSIS — C90.00 MULTIPLE MYELOMA NOT HAVING ACHIEVED REMISSION (HCC): Primary | ICD-10-CM

## 2019-07-15 RX ORDER — LENALIDOMIDE 5 MG/1
CAPSULE ORAL
Qty: 7 CAPSULE | Refills: 0 | Status: SHIPPED | OUTPATIENT
Start: 2019-07-15 | End: 2019-07-30 | Stop reason: SDUPTHER

## 2019-07-29 ENCOUNTER — TELEPHONE (OUTPATIENT)
Dept: HEMATOLOGY ONCOLOGY | Facility: CLINIC | Age: 84
End: 2019-07-29

## 2019-07-29 NOTE — TELEPHONE ENCOUNTER
Patient's son, Kyleigh Fagan states patient needs refill of Revlimid--verified [facility]  He also states patient's pcp did his labs    Dr Mason Lopez

## 2019-07-30 DIAGNOSIS — C90.00 MULTIPLE MYELOMA NOT HAVING ACHIEVED REMISSION (HCC): ICD-10-CM

## 2019-07-30 RX ORDER — LENALIDOMIDE 5 MG/1
CAPSULE ORAL
Qty: 7 CAPSULE | Refills: 0 | Status: SHIPPED | OUTPATIENT
Start: 2019-07-30 | End: 2019-08-19 | Stop reason: SDUPTHER

## 2019-08-01 ENCOUNTER — OFFICE VISIT (OUTPATIENT)
Dept: HEMATOLOGY ONCOLOGY | Facility: CLINIC | Age: 84
End: 2019-08-01
Payer: COMMERCIAL

## 2019-08-01 VITALS
TEMPERATURE: 98.1 F | SYSTOLIC BLOOD PRESSURE: 148 MMHG | DIASTOLIC BLOOD PRESSURE: 76 MMHG | BODY MASS INDEX: 29.6 KG/M2 | HEIGHT: 66 IN | WEIGHT: 184.2 LBS | RESPIRATION RATE: 18 BRPM | HEART RATE: 94 BPM | OXYGEN SATURATION: 97 %

## 2019-08-01 DIAGNOSIS — C90.00 KAPPA LIGHT CHAIN MYELOMA (HCC): Primary | ICD-10-CM

## 2019-08-01 PROCEDURE — 99215 OFFICE O/P EST HI 40 MIN: CPT | Performed by: PHYSICIAN ASSISTANT

## 2019-08-01 NOTE — PROGRESS NOTES
[address] [facility]  [address] [facility] [address] [place] [address]  [phone]  Progress Note  Negin Beth, 9/22/1930, [id number]  8/1/2019    Assessment/Plan:  1  Kappa light chain myeloma (HCC)  Overall patient is doing well  Patient's blood work demonstrates stability of anemia with hemoglobin in the 9 gram/deciliter range, creatinine stable at to mg/dL and calcium level 9 0 mg/dL  Immunoglobulins and free light chain ratio were missing from evaluation today however, I provided the patient with a script today which includes these things  Patient will have this done prior to follow-up appointment in 2 months  Patient's disease seems to be stable, therefore it is not necessary for the patient to follow up with blood work prior to our next appointment  Regimen:  Lenolidimide 5 mg PO daily days 1-7  Cycle length =  21 days    - IgG, IgA, IgM; Future  - Immunoglobulin free LT chains blood; Future  - CBC and differential; Future  - Comprehensive metabolic panel; Future    2  Disposition/coordination of care  Patient has moved to [facility] where his son lives  Patient has moved in with his son and is doing quite well  The patient is scheduled for follow-up in approximately 2 months  Patient voiced agreement and understanding to the above  Patient knows to call the Hematology/Oncology office with any questions and concerns regarding the above  Carefully review your medication list in verify the list is accurate and up-to-date  Please call the hematologic/Oncology office if there medications missing from the less, medications on the list that your not currently taking or if there is a dosage or instruction that is different from higher taking medication      I have spent 30 minutes with Patient and family today in which greater than 50% of this time was spent in counseling/coordination of care regarding Diagnostic results, Intructions for management and Impressions  Goals and Barriers:    Current Goal:  Prolong Survival from Cancer  Barriers: None  Patient's Capacity to Self Care:  Patient able to self care   -------------------------------------------------------------------------------------------------------    Chief Complaint   Patient presents with    Follow-up       History of present illness/Cancer History:      Kappa light chain myeloma ([facility] )    9/8/2018 - 10/8/2018 Cancer Staged     Cancer Staging  [place] light chain myeloma ([facility] )  Staging form: Plasma Cell Myeloma and Disorders, AJCC 8th Edition  - Clinical stage from 10/8/2018: RISS Stage II (Beta-2-microglobulin (mg/L): 4 2, Albumin (g/dL): 3 7, ISS: Stage II, High-risk cytogenetics: Absent, LDH: Normal) - Signed by Suzie Garvin PA-C on 10/8/2018        9/8/2018 Initial Diagnosis     Patient had an increase in creatinine  Patient followed up with Nephrology who completed a comprehensive workup  This demonstrated positive free light chain ratio elevation in addition to a 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          9/24/2018 Biopsy     Final Diagnosis   A -C  Bone marrow,  left iliac crest,  biopsy and aspirate:  -  Kappa light chain restricted plasma cell neoplasm, consistent with plasma cell myeloma (see note)  -  Maturing trilineage hematopoiesis without distinct features of dysplasia or increased myeloblasts  -  Decreased stainable storage iron  -  Mildly increased reticulin fibers without fibrosis  -  Negative for collagen fibrosis, granulomata, vasculitis, necrosis  Flow cytometry ([facility]# [id number], evaluated by NATALIE Gore )       * Interpretation:    1  Plasma cell neoplasm, IgA kappa-restricted  See Comment  2  No evidence of B-cell or T-cell lymphoproliferative disorder         *  Comment:  Due to potential dilutional/lysis effects associated with flow cytometry, the reported plasma cell count (2 4%) is an underestimate  Therefore, correlation with a comprehensive bone marrow examination including morphologic plasma cell enumeration will be necessary for further and definitive characterization  Interpretation FISH Myeloma Panel:  1  No evidence of IGH-MAF [translocation t(14;16)] gene rearrangement  2  No evidence of RB1 monosomy (13q14 deletion)  3  No evidence of CCND1-IGH [translocation t(11;14)] gene rearrangement, and no evidence for trisomy 11 or gain of 11q  4  No evidence of p53 (17p13) deletion or amplification  5  No evidence of FGFR3-IGH [translocation t(4;14)] gene rearrangement  6  Negative for 1q21/CKS1B gain      10/19/2018 - 2018 Chemotherapy     Dexamethasone 20 mg Days 1, 8, 15  Revlimid 5 mg Days 1-14  Cycle length = 21 days    Completed 3 cycles of the above  2018 Adverse Reaction     Multiple folliculitis/abscess development  Uncontrolled diabetes, previously controlled prior to dexamethasone therapy  2018 - 2019 Chemotherapy     Revlimid 5 mg Days 1-14  Cycle length = 21 days      2019 -  Chemotherapy     Lenolidimide 5 mg PO daily days 1-7  Cycle length =  21 days        Cancer Staging  [place] light chain myeloma ([facility] )  Staging form: Plasma Cell Myeloma and Disorders, AJCC 8th Edition  - Clinical stage from 10/8/2018: RISS Stage II (Beta-2-microglobulin (mg/L): 4 2, Albumin (g/dL): 3 7, ISS: Stage II, High-risk cytogenetics: Absent, LDH: Normal) - Signed by Beronica Shaikh PA-C on 10/8/2018     ECO - Symptomatic but completely ambulatory    Interval history:  Overall, patient is feeling well  Review of Systems   Constitutional: Negative for activity change, appetite change, fatigue and fever  HENT: Negative for nosebleeds  Respiratory: Negative for cough, choking and shortness of breath  Cardiovascular: Negative for chest pain, palpitations and leg swelling     Gastrointestinal: Negative for abdominal distention, abdominal pain, anal bleeding, blood in stool, constipation, diarrhea, nausea and vomiting  Endocrine: Negative for cold intolerance  Genitourinary: Negative for hematuria  Musculoskeletal: Negative for myalgias  Skin: Negative for color change, pallor and rash  Allergic/Immunologic: Negative for immunocompromised state  Neurological: Negative for headaches  Hematological: Negative for adenopathy  Does not bruise/bleed easily  All other systems reviewed and are negative  Current Outpatient Medications:     acetaminophen (TYLENOL) 325 mg tablet, Take 1 - 2 tabs PO Q4 PRN pain, Disp: 30 tablet, Rfl: 0    aspirin 81 MG tablet, Take 81 mg by mouth daily  , Disp: , Rfl:     atorvastatin (LIPITOR) 40 mg tablet, TAKE 1 TABLET AT BEDTIME, Disp: 90 tablet, Rfl: 3    benzonatate (TESSALON PERLES) 100 mg capsule, Take 1 capsule (100 mg total) by mouth 2 (two) times a day as needed for cough, Disp: 60 capsule, Rfl: 5    bimatoprost (LUMIGAN) 0 01 % ophthalmic drops, Apply to eye, Disp: , Rfl:     bisacodyl (DULCOLAX) 10 mg suppository, Insert 10 mg into the rectum daily, Disp: , Rfl:     Cyanocobalamin (B-12) 1000 MCG TABS, Take by mouth, Disp: , Rfl:     esomeprazole (NexIUM) 20 mg capsule, Take 20 mg by mouth every morning before breakfast, Disp: , Rfl:     fluticasone (FLONASE) 50 mcg/act nasal spray, into each nostril as needed  , Disp: , Rfl:     furosemide (LASIX) 40 mg tablet, Take 1 tablet (40 mg total) by mouth daily (Patient taking differently: Take 60 mg by mouth daily ), Disp: , Rfl: 0    glimepiride (AMARYL) 2 mg tablet, TAKE 1 TABLET DAILY AS     DIRECTED (Patient taking differently: TAKE 1 5 TABLETS DAILY AS  DIRECTED total of 3 mg), Disp: 90 tablet, Rfl: 3    glucose blood (ACCU-CHEK CHANDRAKANT PLUS) test strip, by In Vitro route, Disp: , Rfl:     guaiFENesin (MUCINEX) 600 mg 12 hr tablet, Take 1 tablet (600 mg total) by mouth 2 (two) times a day, Disp: 60 tablet, Rfl: 0   isosorbide dinitrate (ISORDIL) 5 mg tablet, Take 1 tablet (5 mg total) by mouth 3 (three) times a day (Patient taking differently: Take 30 mg by mouth daily Half tablet daily), Disp: 90 tablet, Rfl: 1    lenalidomide (REVLIMID) 5 MG CAPS, Take one capsule by mouth for 7 days on and 14 days off [facility]#[id number], Disp: 7 capsule, Rfl: 0    magnesium hydroxide (MILK OF MAGNESIA) 400 mg/5 mL oral suspension, Take by mouth daily as needed for constipation, Disp: , Rfl:     nitroglycerin (NITROSTAT) 0 4 mg SL tablet, Place 1 tablet under the tongue every 5 (five) minutes as needed, Disp: , Rfl:     ONE TOUCH ULTRA TEST test strip, The patient test once daily  , Disp: 100 each, Rfl: 2    ONETOUCH DELICA LANCETS 72U MISC, by Does not apply route daily, Disp: 100 each, Rfl: 3    potassium chloride (KLOR-CON 10) 10 mEq tablet, Take 1 tablet (10 mEq total) by mouth daily, Disp: 90 tablet, Rfl: 3    TAKE ONE CAPSULE BY MOUTH EVERY DAY FOR 7 DAYS THEN TAKE 14 DAYS OFF, Disp: 7 capsule, Rfl: 0    TAKE ONE CAPSULE BY MOUTH EVERY DAY FOR 7 DAYS THEN TAKE 14 DAYS OFF, Disp: 7 capsule, Rfl: 0    timolol (TIMOPTIC) 0 5 % ophthalmic solution, , Disp: , Rfl:     UNKNOWN TO PATIENT, , Disp: , Rfl:     No Known Allergies    Objective:   /76 (BP Location: Left arm, Patient Position: Sitting, Cuff Size: Adult)   Pulse 94   Temp 98 1 °F (36 7 °C)   Resp 18   Ht 5' 5 5" (1 664 m)   Wt 83 6 kg (184 lb 3 2 oz)   SpO2 97%   BMI 30 19 kg/m²   Wt Readings from Last 6 Encounters:   08/01/19 83 6 kg (184 lb 3 2 oz)   05/07/19 83 9 kg (185 lb)   04/03/19 88 kg (194 lb)   03/20/19 86 1 kg (189 lb 13 1 oz)   03/07/19 93 4 kg (206 lb)   01/22/19 94 kg (207 lb 2 oz)     Physical Exam   Constitutional: He is oriented to person, place, and time  He appears well-developed and well-nourished  No distress  HENT:   Head: Normocephalic and atraumatic  Mouth/Throat: No oropharyngeal exudate     Eyes: Pupils are equal, round, and reactive to light  Conjunctivae and EOM are normal  No scleral icterus  Neck: Normal range of motion  Cardiovascular: Normal rate and regular rhythm  No murmur heard  Pulmonary/Chest: Effort normal and breath sounds normal  No respiratory distress  Abdominal: Soft  Bowel sounds are normal  He exhibits no distension  There is no hepatosplenomegaly  There is no tenderness  Musculoskeletal: He exhibits no edema or tenderness  Lymphadenopathy:     He has no cervical adenopathy  Neurological: He is alert and oriented to person, place, and time  No cranial nerve deficit  Skin: No rash noted  No erythema  No pallor  Pertinent Laboratory Results and Imaging Review:  No visits with results within 3 Week(s) from this visit  Latest known visit with results is:   Orders Only on 05/01/2019   Component Date Value Ref Range Status    EXT Creatinine Urine 05/01/2019 56   Final    Microalbum  ,U,Random 05/01/2019 5 2   Final    EXTERNAL Microalb/Creat Ratio 05/01/2019 93   Final         The following historical data was reviewed  Past Medical History:   Diagnosis Date    Angina pectoris ([facility] )     Chronic kidney disease     Coronary artery disease     Diabetes mellitus ([facility] )     Glaucoma     Hypertension     Multiple myeloma ([facility] )     Occluded coronary artery stent     Left       Past Surgical History:   Procedure Laterality Date    BACK SURGERY      CARDIAC CATHETERIZATION  04/05/2004    COLONOSCOPY      CT BONE MARROW BIOPSY AND ASPIRATION  9/24/2018    CYSTOSCOPY      KNEE SURGERY      THROMBOENDARTERECTOMY Right     Cartoid       Social History     Socioeconomic History    Marital status:       Spouse name: None    Number of children: None    Years of education: None    Highest education level: None   Occupational History    None   Social Needs    Financial resource strain: None    Food insecurity:     Worry: None     Inability: None    Transportation needs:     Medical: None Non-medical: None   Tobacco Use    Smoking status: Former Smoker     Types: Cigarettes    Smokeless tobacco: Never Used    Tobacco comment: former smoker   Substance and Sexual Activity    Alcohol use: Yes     Comment: socially; less than once a month    Drug use: No    Sexual activity: None   Lifestyle    Physical activity:     Days per week: None     Minutes per session: None    Stress: None   Relationships    Social connections:     Talks on phone: None     Gets together: None     Attends Jew service: None     Active member of club or organization: None     Attends meetings of clubs or organizations: None     Relationship status: None    Intimate partner violence:     Fear of current or ex partner: None     Emotionally abused: None     Physically abused: None     Forced sexual activity: None   Other Topics Concern    None   Social History Narrative    Daily caffeine consumption       Family History   Problem Relation Age of Onset    Heart attack Father     Heart disease Mother     Diabetes Mother     Heart disease Sister     COPD Family        Please note: This report has been generated by a voice recognition software system  Therefore there may be syntax, spelling, and/or grammatical errors  Please call if you have any questions

## 2019-08-19 DIAGNOSIS — C90.00 MULTIPLE MYELOMA NOT HAVING ACHIEVED REMISSION (HCC): ICD-10-CM

## 2019-08-22 ENCOUNTER — TELEPHONE (OUTPATIENT)
Dept: HEMATOLOGY ONCOLOGY | Facility: CLINIC | Age: 84
End: 2019-08-22

## 2019-08-23 DIAGNOSIS — C90.00 MULTIPLE MYELOMA NOT HAVING ACHIEVED REMISSION (HCC): Primary | ICD-10-CM

## 2019-08-23 RX ORDER — LENALIDOMIDE 5 MG/1
CAPSULE ORAL
Qty: 7 CAPSULE | Refills: 0 | Status: SHIPPED | OUTPATIENT
Start: 2019-08-23 | End: 2019-09-10 | Stop reason: SDUPTHER

## 2019-09-10 DIAGNOSIS — C90.00 MULTIPLE MYELOMA NOT HAVING ACHIEVED REMISSION (HCC): ICD-10-CM

## 2019-09-11 DIAGNOSIS — C90.00 MULTIPLE MYELOMA NOT HAVING ACHIEVED REMISSION (HCC): ICD-10-CM

## 2019-09-11 LAB — HBA1C MFR BLD HPLC: 7 %

## 2019-09-13 ENCOUNTER — TELEPHONE (OUTPATIENT)
Dept: HEMATOLOGY ONCOLOGY | Facility: CLINIC | Age: 84
End: 2019-09-13

## 2019-09-16 DIAGNOSIS — C90.00 MULTIPLE MYELOMA NOT HAVING ACHIEVED REMISSION (HCC): Primary | ICD-10-CM

## 2019-09-17 RX ORDER — LENALIDOMIDE 5 MG/1
CAPSULE ORAL
Qty: 7 CAPSULE | Refills: 0 | Status: SHIPPED | OUTPATIENT
Start: 2019-09-17 | End: 2019-10-03 | Stop reason: SDUPTHER

## 2019-09-20 ENCOUNTER — TELEPHONE (OUTPATIENT)
Dept: HEMATOLOGY ONCOLOGY | Facility: CLINIC | Age: 84
End: 2019-09-20

## 2019-09-20 NOTE — TELEPHONE ENCOUNTER
Stated that the pt was suppose to start Revlimid cycle today but they missed the delivery  If he cant get it today, then he will get it on Monday  He wanted to let us know and to make sure that it was ok

## 2019-09-20 NOTE — TELEPHONE ENCOUNTER
Spoke with son and let him know that when pt gets revlimid he can start it  He voiced understanding

## 2019-10-03 ENCOUNTER — LAB (OUTPATIENT)
Dept: LAB | Age: 84
End: 2019-10-03
Payer: COMMERCIAL

## 2019-10-03 DIAGNOSIS — C90.00 KAPPA LIGHT CHAIN MYELOMA (HCC): ICD-10-CM

## 2019-10-03 DIAGNOSIS — C90.00 MULTIPLE MYELOMA NOT HAVING ACHIEVED REMISSION (HCC): ICD-10-CM

## 2019-10-03 LAB
ALBUMIN SERPL BCP-MCNC: 3.5 G/DL (ref 3.5–5)
ALP SERPL-CCNC: 128 U/L (ref 46–116)
ALT SERPL W P-5'-P-CCNC: 18 U/L (ref 12–78)
ANION GAP SERPL CALCULATED.3IONS-SCNC: 8 MMOL/L (ref 4–13)
AST SERPL W P-5'-P-CCNC: 11 U/L (ref 5–45)
BASOPHILS # BLD AUTO: 0.07 THOUSANDS/ΜL (ref 0–0.1)
BASOPHILS NFR BLD AUTO: 1 % (ref 0–1)
BILIRUB SERPL-MCNC: 0.55 MG/DL (ref 0.2–1)
BUN SERPL-MCNC: 39 MG/DL (ref 5–25)
CALCIUM SERPL-MCNC: 9 MG/DL (ref 8.3–10.1)
CHLORIDE SERPL-SCNC: 103 MMOL/L (ref 100–108)
CO2 SERPL-SCNC: 24 MMOL/L (ref 21–32)
CREAT SERPL-MCNC: 1.98 MG/DL (ref 0.6–1.3)
EOSINOPHIL # BLD AUTO: 0.18 THOUSAND/ΜL (ref 0–0.61)
EOSINOPHIL NFR BLD AUTO: 3 % (ref 0–6)
ERYTHROCYTE [DISTWIDTH] IN BLOOD BY AUTOMATED COUNT: 16.2 % (ref 11.6–15.1)
GFR SERPL CREATININE-BSD FRML MDRD: 29 ML/MIN/1.73SQ M
GLUCOSE SERPL-MCNC: 247 MG/DL (ref 65–140)
HCT VFR BLD AUTO: 28.7 % (ref 36.5–49.3)
HGB BLD-MCNC: 8.5 G/DL (ref 12–17)
IGA SERPL-MCNC: 206 MG/DL (ref 70–400)
IGG SERPL-MCNC: 767 MG/DL (ref 700–1600)
IGM SERPL-MCNC: 38 MG/DL (ref 40–230)
IMM GRANULOCYTES # BLD AUTO: 0.02 THOUSAND/UL (ref 0–0.2)
IMM GRANULOCYTES NFR BLD AUTO: 0 % (ref 0–2)
LYMPHOCYTES # BLD AUTO: 1.64 THOUSANDS/ΜL (ref 0.6–4.47)
LYMPHOCYTES NFR BLD AUTO: 23 % (ref 14–44)
MCH RBC QN AUTO: 26.6 PG (ref 26.8–34.3)
MCHC RBC AUTO-ENTMCNC: 29.6 G/DL (ref 31.4–37.4)
MCV RBC AUTO: 90 FL (ref 82–98)
MONOCYTES # BLD AUTO: 0.79 THOUSAND/ΜL (ref 0.17–1.22)
MONOCYTES NFR BLD AUTO: 11 % (ref 4–12)
NEUTROPHILS # BLD AUTO: 4.5 THOUSANDS/ΜL (ref 1.85–7.62)
NEUTS SEG NFR BLD AUTO: 62 % (ref 43–75)
NRBC BLD AUTO-RTO: 0 /100 WBCS
PLATELET # BLD AUTO: 309 THOUSANDS/UL (ref 149–390)
PMV BLD AUTO: 10.8 FL (ref 8.9–12.7)
POTASSIUM SERPL-SCNC: 3.3 MMOL/L (ref 3.5–5.3)
PROT SERPL-MCNC: 7 G/DL (ref 6.4–8.2)
RBC # BLD AUTO: 3.19 MILLION/UL (ref 3.88–5.62)
SODIUM SERPL-SCNC: 135 MMOL/L (ref 136–145)
WBC # BLD AUTO: 7.2 THOUSAND/UL (ref 4.31–10.16)

## 2019-10-03 PROCEDURE — 80053 COMPREHEN METABOLIC PANEL: CPT

## 2019-10-03 PROCEDURE — 82784 ASSAY IGA/IGD/IGG/IGM EACH: CPT

## 2019-10-03 PROCEDURE — 83883 ASSAY NEPHELOMETRY NOT SPEC: CPT

## 2019-10-03 PROCEDURE — 85025 COMPLETE CBC W/AUTO DIFF WBC: CPT

## 2019-10-05 LAB
KAPPA LC FREE SER-MCNC: 147.2 MG/L (ref 3.3–19.4)
KAPPA LC FREE/LAMBDA FREE SER: 6.48 {RATIO} (ref 0.26–1.65)
LAMBDA LC FREE SERPL-MCNC: 22.7 MG/L (ref 5.7–26.3)

## 2019-10-07 ENCOUNTER — TELEPHONE (OUTPATIENT)
Dept: HEMATOLOGY ONCOLOGY | Facility: CLINIC | Age: 84
End: 2019-10-07

## 2019-10-07 DIAGNOSIS — C90.00 MULTIPLE MYELOMA NOT HAVING ACHIEVED REMISSION (HCC): ICD-10-CM

## 2019-10-08 DIAGNOSIS — C90.00 MULTIPLE MYELOMA NOT HAVING ACHIEVED REMISSION (HCC): Primary | ICD-10-CM

## 2019-10-08 RX ORDER — LENALIDOMIDE 5 MG/1
CAPSULE ORAL
Qty: 7 CAPSULE | Refills: 0 | Status: SHIPPED | OUTPATIENT
Start: 2019-10-08 | End: 2019-10-29 | Stop reason: SDUPTHER

## 2019-10-15 ENCOUNTER — OFFICE VISIT (OUTPATIENT)
Dept: HEMATOLOGY ONCOLOGY | Facility: CLINIC | Age: 84
End: 2019-10-15
Payer: COMMERCIAL

## 2019-10-15 VITALS
DIASTOLIC BLOOD PRESSURE: 68 MMHG | SYSTOLIC BLOOD PRESSURE: 120 MMHG | BODY MASS INDEX: 30.05 KG/M2 | WEIGHT: 187 LBS | RESPIRATION RATE: 16 BRPM | TEMPERATURE: 97.8 F | HEART RATE: 69 BPM | OXYGEN SATURATION: 96 % | HEIGHT: 66 IN

## 2019-10-15 DIAGNOSIS — C90.00 MULTIPLE MYELOMA NOT HAVING ACHIEVED REMISSION (HCC): Primary | ICD-10-CM

## 2019-10-15 PROCEDURE — 99214 OFFICE O/P EST MOD 30 MIN: CPT | Performed by: INTERNAL MEDICINE

## 2019-10-15 RX ORDER — ISOSORBIDE MONONITRATE 30 MG/1
TABLET, EXTENDED RELEASE ORAL
Refills: 0 | COMMUNITY
Start: 2019-09-18

## 2019-10-15 RX ORDER — UREA 10 %
4 LOTION (ML) TOPICAL
COMMUNITY

## 2019-10-15 RX ORDER — BUDESONIDE AND FORMOTEROL FUMARATE DIHYDRATE 160; 4.5 UG/1; UG/1
AEROSOL RESPIRATORY (INHALATION)
Refills: 0 | COMMUNITY
Start: 2019-09-18

## 2019-10-15 NOTE — PROGRESS NOTES
[facility] HEMATOLOGY ONCOLOGY SPECIAL[facility] [place]  [address] [facility] [place]  [facility] [address]  [place] [address] [phone]    [address], [id number]  10/15/19    Discussion:   In summary, this is an 80-year-old male history of multiple myeloma  He is currently on Revlimid as monotherapy  Creatinine is stable at about 2  Hemoglobin stable in the 8 5-9 0 range attributable to both myeloma, therapy, renal dysfunction/EPO deficiency  He is relatively asymptomatic  Recent IgG and IgA levels are normal   IgM has been gradually increasing, approaching normal   Free light chain ratio has become slightly more abnormal, 6+, though this is attributable to a decline in both free kappa light chains, nearing normal, and free lambda light chains having normalized  I believe that he is responding to therapy and would favor continuation for the moment  I reviewed the above with the patient and his sons  They voiced understanding and agreement       ______________________________________________________________________    Chief Complaint   Patient presents with    Follow-up     2 month        HPI:     Kappa light chain myeloma ([facility] )    9/8/2018 - 10/8/2018 Cancer Staged     Cancer Staging  [place] light chain myeloma ([facility] )  Staging form: Plasma Cell Myeloma and Disorders, AJCC 8th Edition  - Clinical stage from 10/8/2018: RISS Stage II (Beta-2-microglobulin (mg/L): 4 2, Albumin (g/dL): 3 7, ISS: Stage II, High-risk cytogenetics: Absent, LDH: Normal) - Signed by Luis F Garza PA-C on 10/8/2018        9/8/2018 Initial Diagnosis     Patient had an increase in creatinine  Patient followed up with Nephrology who completed a comprehensive workup  This demonstrated positive free light chain ratio elevation in addition to a 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          9/24/2018 Biopsy     Final Diagnosis   A -C    Bone marrow,  left iliac crest,  biopsy and aspirate:  -  Kappa light chain restricted plasma cell neoplasm, consistent with plasma cell myeloma (see note)  -  Maturing trilineage hematopoiesis without distinct features of dysplasia or increased myeloblasts  -  Decreased stainable storage iron  -  Mildly increased reticulin fibers without fibrosis  -  Negative for collagen fibrosis, granulomata, vasculitis, necrosis  Flow cytometry ([facility]# [id number], evaluated by Isla Boxer, M D )       * Interpretation:    1  Plasma cell neoplasm, IgA kappa-restricted  See Comment  2  No evidence of B-cell or T-cell lymphoproliferative disorder  *  Comment:  Due to potential dilutional/lysis effects associated with flow cytometry, the reported plasma cell count (2 4%) is an underestimate  Therefore, correlation with a comprehensive bone marrow examination including morphologic plasma cell enumeration will be necessary for further and definitive characterization  Interpretation FISH Myeloma Panel:  1  No evidence of IGH-MAF [translocation t(14;16)] gene rearrangement  2  No evidence of RB1 monosomy (13q14 deletion)  3  No evidence of CCND1-IGH [translocation t(11;14)] gene rearrangement, and no evidence for trisomy 11 or gain of 11q  4  No evidence of p53 (17p13) deletion or amplification  5  No evidence of FGFR3-IGH [translocation t(4;14)] gene rearrangement  6  Negative for 1q21/CKS1B gain      10/19/2018 - 12/20/2018 Chemotherapy     Dexamethasone 20 mg Days 1, 8, 15  Revlimid 5 mg Days 1-14  Cycle length = 21 days    Completed 3 cycles of the above  12/20/2018 Adverse Reaction     Multiple folliculitis/abscess development  Uncontrolled diabetes, previously controlled prior to dexamethasone therapy  12/20/2018 - 4/16/2019 Chemotherapy     Revlimid 5 mg Days 1-14  Cycle length = 21 days      4/16/2019 -  Chemotherapy     Lenolidimide 5 mg PO daily days 1-7  Cycle length =  21 days         Interval History:  Clinically stable    ECOG-  2 - Symptomatic, <50% confined to bed    Review of Systems   Constitutional: Negative for chills and fever  HENT: Negative for nosebleeds  Eyes: Negative for discharge  Respiratory: Negative for cough and shortness of breath  Cardiovascular: Negative for chest pain  Gastrointestinal: Negative for abdominal pain, constipation and diarrhea  Endocrine: Negative for polydipsia  Genitourinary: Negative for hematuria  Musculoskeletal: Negative for arthralgias  Skin: Negative for color change  Allergic/Immunologic: Negative for immunocompromised state  Neurological: Negative for dizziness and headaches  Hematological: Negative for adenopathy  Psychiatric/Behavioral: Negative for agitation  Past Medical History:   Diagnosis Date    Angina pectoris ([place] [address] )     Chronic kidney disease     Coronary artery disease     Diabetes mellitus ([place] [address] )     Glaucoma     Hypertension     Multiple myeloma ([place] [address] )     Occluded coronary artery stent     Left     Patient Active Problem List   Diagnosis    Parenchymal renal hypertension    Type 2 diabetes mellitus without complication, without long-term current use of insulin ([place] [address] )    Mixed hyperlipidemia    Esophageal reflux    Cerebral infarction, watershed distribution, bilateral, acute ([place] [address] )    Stage 4 chronic kidney disease ([facility])    Benign prostatic hyperplasia    Ischemic cardiomyopathy    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 ([facility])    Other proteinuria    Kappa light chain myeloma ([facility])    UTI (urinary tract infection)    MRSA (methicillin resistant staph aureus) culture positive    Acute on chronic combined systolic and diastolic heart failure ([facility])    Generalized weakness    NSTEMI (non-ST elevated myocardial infarction) ([facility])    Constipation       Current Outpatient Medications:     acetaminophen (TYLENOL) 325 mg tablet, Take 1 - 2 tabs PO Q4 PRN pain, Disp: 30 tablet, Rfl: 0    aspirin 81 MG tablet, Take 81 mg by mouth daily  , Disp: , Rfl:     atorvastatin (LIPITOR) 40 mg tablet, TAKE 1 TABLET AT BEDTIME, Disp: 90 tablet, Rfl: 3    benzonatate (TESSALON PERLES) 100 mg capsule, Take 1 capsule (100 mg total) by mouth 2 (two) times a day as needed for cough, Disp: 60 capsule, Rfl: 5    bimatoprost (LUMIGAN) 0 01 % ophthalmic drops, Apply to eye, Disp: , Rfl:     bisacodyl (DULCOLAX) 10 mg suppository, Insert 10 mg into the rectum daily, Disp: , Rfl:     Cyanocobalamin (B-12) 1000 MCG TABS, Take by mouth, Disp: , Rfl:     esomeprazole (NexIUM) 20 mg capsule, Take 20 mg by mouth every morning before breakfast, Disp: , Rfl:     fluticasone (FLONASE) 50 mcg/act nasal spray, into each nostril as needed  , Disp: , Rfl:     furosemide (LASIX) 40 mg tablet, Take 1 tablet (40 mg total) by mouth daily (Patient taking differently: Take 60 mg by mouth daily ), Disp: , Rfl: 0    glimepiride (AMARYL) 2 mg tablet, TAKE 1 TABLET DAILY AS     DIRECTED (Patient taking differently: TAKE 1 5 TABLETS DAILY AS  DIRECTED total of 3 mg), Disp: 90 tablet, Rfl: 3    glucose blood (ACCU-CHEK CHANDRAKANT PLUS) test strip, by In Vitro route, Disp: , Rfl:     guaiFENesin (MUCINEX) 600 mg 12 hr tablet, Take 1 tablet (600 mg total) by mouth 2 (two) times a day, Disp: 60 tablet, Rfl: 0    isosorbide dinitrate (ISORDIL) 5 mg tablet, Take 1 tablet (5 mg total) by mouth 3 (three) times a day (Patient taking differently: Take 30 mg by mouth daily Half tablet daily), Disp: 90 tablet, Rfl: 1    lenalidomide (REVLIMID) 5 MG CAPS, Take on capsule by mouth daily for 7 days on 14 days off AUTH# [id number], Disp: 7 capsule, Rfl: 0    TAKE ONE CAPSULE BY MOUTH EVERY DAY FOR 7 DAYS ON AND 14 OFF, Disp: 7 capsule, Rfl: 0    TAKE ONE CAPSULE BY MOUTH DAILY FOR 7 DAYS ON 14 DAYS OFF, Disp: 7 capsule, Rfl: 0    TAKE ONE CAPSULE BY MOUTH DAILY FOR 7 DAYS ON 14 DAYS OFF, Disp: 7 capsule, Rfl: 0    magnesium hydroxide (MILK OF MAGNESIA) 400 mg/5 mL oral suspension, Take by mouth daily as needed for constipation, Disp: , Rfl:     nitroglycerin (NITROSTAT) 0 4 mg SL tablet, Place 1 tablet under the tongue every 5 (five) minutes as needed, Disp: , Rfl:     ONE TOUCH ULTRA TEST test strip, The patient test once daily  , Disp: 100 each, Rfl: 2    ONETOUCH DELICA LANCETS 72W MISC, by Does not apply route daily, Disp: 100 each, Rfl: 3    potassium chloride (KLOR-CON 10) 10 mEq tablet, Take 1 tablet (10 mEq total) by mouth daily, Disp: 90 tablet, Rfl: 3    TAKE ONE CAPSULE BY MOUTH EVERY DAY FOR 7 DAYS THEN TAKE 14 DAYS OFF, Disp: 7 capsule, Rfl: 0    TAKE ONE CAPSULE BY MOUTH EVERY DAY FOR 7 DAYS THEN TAKE 14 DAYS OFF, Disp: 7 capsule, Rfl: 0    TAKE ONE CAPSULE BY MOUTH FOR 7 DAYS ON 14 DAYS OFF, Disp: 7 capsule, Rfl: 0    TAKE ONE CAPSULE BY MOUTH FOR 7 DAYS ON 14 DAYS OFF, Disp: 7 capsule, Rfl: 0    timolol (TIMOPTIC) 0 5 % ophthalmic solution, , Disp: , Rfl:     UNKNOWN TO PATIENT, , Disp: , Rfl:   No Known Allergies  Past Surgical History:   Procedure Laterality Date    BACK SURGERY      CARDIAC CATHETERIZATION  04/05/2004    COLONOSCOPY      CT BONE MARROW BIOPSY AND ASPIRATION  9/24/2018    CYSTOSCOPY      KNEE SURGERY      THROMBOENDARTERECTOMY Right     Cartoid     Social History     Objective:  Vitals:    10/15/19 1551   Pulse: 69   Resp: 16   Temp: 97 8 °F (36 6 °C)   TempSrc: Tympanic   SpO2: 96%   Weight: 84 8 kg (187 lb)   Height: 5' 6" (1 676 m)     Physical Exam   Constitutional: He is oriented to person, place, and time  He appears well-developed  HENT:   Head: Normocephalic  Eyes: Pupils are equal, round, and reactive to light  Neck: Neck supple  Cardiovascular: Normal rate and regular rhythm  No murmur heard  Pulmonary/Chest: Breath sounds normal  He has no wheezes  He has no rales  Abdominal: Soft  There is no tenderness  Musculoskeletal: Normal range of motion  He exhibits no edema or tenderness  Lymphadenopathy:     He has no cervical adenopathy  Neurological: He is alert and oriented to person, place, and time  He has normal reflexes  No cranial nerve deficit  Skin: No rash noted  No erythema  Psychiatric: He has a normal mood and affect  His behavior is normal          Labs: I personally reviewed the labs and imaging pertinent to this patient care

## 2019-10-29 ENCOUNTER — TELEPHONE (OUTPATIENT)
Dept: HEMATOLOGY ONCOLOGY | Facility: CLINIC | Age: 84
End: 2019-10-29

## 2019-10-29 DIAGNOSIS — C90.00 MULTIPLE MYELOMA NOT HAVING ACHIEVED REMISSION (HCC): ICD-10-CM

## 2019-10-30 DIAGNOSIS — C90.00 MULTIPLE MYELOMA NOT HAVING ACHIEVED REMISSION (HCC): ICD-10-CM

## 2019-10-30 DIAGNOSIS — C90.00 MULTIPLE MYELOMA NOT HAVING ACHIEVED REMISSION (HCC): Primary | ICD-10-CM

## 2019-10-30 RX ORDER — LENALIDOMIDE 5 MG/1
CAPSULE ORAL
Qty: 7 CAPSULE | Refills: 0 | Status: SHIPPED | OUTPATIENT
Start: 2019-10-30 | End: 2019-11-14 | Stop reason: SDUPTHER

## 2019-11-11 ENCOUNTER — TELEPHONE (OUTPATIENT)
Dept: HEMATOLOGY ONCOLOGY | Facility: CLINIC | Age: 84
End: 2019-11-11

## 2019-11-11 DIAGNOSIS — E11.9 TYPE 2 DIABETES MELLITUS WITHOUT COMPLICATION, WITHOUT LONG-TERM CURRENT USE OF INSULIN (HCC): ICD-10-CM

## 2019-11-11 RX ORDER — GLIMEPIRIDE 2 MG/1
TABLET ORAL
Qty: 90 TABLET | Refills: 3 | Status: SHIPPED | OUTPATIENT
Start: 2019-11-11 | End: 2020-02-12 | Stop reason: HOSPADM

## 2019-11-11 NOTE — TELEPHONE ENCOUNTER
Returned call to patients son - he reports Mata's hemoglobin has been dropping  He is now below 8 0  He does get fatigued and SOB with exercise  Blood transfusion offered  Son is going to reach out to to PCP to see if this can be arranged locally where they live    He will call me back if he needs to come to [place] for this treatment

## 2019-11-11 NOTE — TELEPHONE ENCOUNTER
Pt's son called and stated PCP told pt he should f/u with Dr Rajeev Conte due to low hemoglobin 7 7-- pt son wondering what they should do moving forward

## 2019-11-12 DIAGNOSIS — N18.9 ANEMIA DUE TO CHRONIC KIDNEY DISEASE, UNSPECIFIED CKD STAGE: Primary | ICD-10-CM

## 2019-11-12 DIAGNOSIS — D63.1 ANEMIA DUE TO CHRONIC KIDNEY DISEASE, UNSPECIFIED CKD STAGE: Primary | ICD-10-CM

## 2019-11-12 RX ORDER — SODIUM CHLORIDE 9 MG/ML
20 INJECTION, SOLUTION INTRAVENOUS ONCE
Status: CANCELLED | OUTPATIENT
Start: 2019-11-15

## 2019-11-13 ENCOUNTER — APPOINTMENT (OUTPATIENT)
Dept: LAB | Facility: HOSPITAL | Age: 84
End: 2019-11-13
Attending: INTERNAL MEDICINE
Payer: COMMERCIAL

## 2019-11-13 DIAGNOSIS — N18.4 ANEMIA IN STAGE 4 CHRONIC KIDNEY DISEASE (HCC): ICD-10-CM

## 2019-11-13 DIAGNOSIS — D63.1 ANEMIA IN STAGE 4 CHRONIC KIDNEY DISEASE (HCC): ICD-10-CM

## 2019-11-13 LAB
ABO GROUP BLD: NORMAL
BLD GP AB SCN SERPL QL: NEGATIVE
RH BLD: NEGATIVE
SPECIMEN EXPIRATION DATE: NORMAL

## 2019-11-13 PROCEDURE — 86900 BLOOD TYPING SEROLOGIC ABO: CPT

## 2019-11-13 PROCEDURE — 86901 BLOOD TYPING SEROLOGIC RH(D): CPT

## 2019-11-13 PROCEDURE — 36415 COLL VENOUS BLD VENIPUNCTURE: CPT

## 2019-11-13 PROCEDURE — 86920 COMPATIBILITY TEST SPIN: CPT

## 2019-11-13 PROCEDURE — 86850 RBC ANTIBODY SCREEN: CPT

## 2019-11-14 DIAGNOSIS — C90.00 MULTIPLE MYELOMA NOT HAVING ACHIEVED REMISSION (HCC): ICD-10-CM

## 2019-11-14 RX ORDER — SODIUM CHLORIDE 9 MG/ML
20 INJECTION, SOLUTION INTRAVENOUS ONCE
Status: CANCELLED | OUTPATIENT
Start: 2019-11-14

## 2019-11-15 ENCOUNTER — HOSPITAL ENCOUNTER (OUTPATIENT)
Dept: INFUSION CENTER | Facility: HOSPITAL | Age: 84
Discharge: HOME | End: 2019-11-15
Payer: COMMERCIAL

## 2019-11-15 ENCOUNTER — TELEPHONE (OUTPATIENT)
Dept: HEMATOLOGY ONCOLOGY | Facility: CLINIC | Age: 84
End: 2019-11-15

## 2019-11-15 VITALS
SYSTOLIC BLOOD PRESSURE: 128 MMHG | DIASTOLIC BLOOD PRESSURE: 60 MMHG | OXYGEN SATURATION: 99 % | RESPIRATION RATE: 18 BRPM | TEMPERATURE: 98.4 F | HEART RATE: 73 BPM

## 2019-11-15 DIAGNOSIS — D63.1 ANEMIA IN STAGE 4 CHRONIC KIDNEY DISEASE (HCC): Primary | ICD-10-CM

## 2019-11-15 DIAGNOSIS — N18.9 ANEMIA DUE TO CHRONIC KIDNEY DISEASE, UNSPECIFIED CKD STAGE: Primary | ICD-10-CM

## 2019-11-15 DIAGNOSIS — C90.00 MULTIPLE MYELOMA NOT HAVING ACHIEVED REMISSION (HCC): ICD-10-CM

## 2019-11-15 DIAGNOSIS — D63.1 ANEMIA DUE TO CHRONIC KIDNEY DISEASE, UNSPECIFIED CKD STAGE: Primary | ICD-10-CM

## 2019-11-15 DIAGNOSIS — N18.4 ANEMIA IN STAGE 4 CHRONIC KIDNEY DISEASE (HCC): Primary | ICD-10-CM

## 2019-11-15 LAB
ABO GROUP BLD BPU: NORMAL
BPU ID: NORMAL
CROSSMATCH: NORMAL
UNIT DISPENSE STATUS: NORMAL
UNIT PRODUCT CODE: NORMAL
UNIT RH: NORMAL

## 2019-11-15 PROCEDURE — P9016 RBC LEUKOCYTES REDUCED: HCPCS

## 2019-11-15 PROCEDURE — 36430 TRANSFUSION BLD/BLD COMPNT: CPT

## 2019-11-15 RX ORDER — SODIUM CHLORIDE 9 MG/ML
20 INJECTION, SOLUTION INTRAVENOUS ONCE
Status: COMPLETED | OUTPATIENT
Start: 2019-11-15 | End: 2019-11-15

## 2019-11-15 RX ADMIN — SODIUM CHLORIDE 20 ML/HR: 0.9 INJECTION, SOLUTION INTRAVENOUS at 11:05

## 2019-11-15 NOTE — TELEPHONE ENCOUNTER
Spoke with son and let him know that his father can recheck his CBC next week, he voiced understanding

## 2019-11-15 NOTE — PROGRESS NOTES
Pt tolerated 1 unit of PRBC's today with no adverse reactions  AVS given and reviewed  Pt then left unit via w/c with his son

## 2019-11-15 NOTE — TELEPHONE ENCOUNTER
He wants to know when to have more labs done to monitor after the transfusion    Call him at [phone]

## 2019-11-15 NOTE — PLAN OF CARE
Problem: Potential for Falls  Goal: Patient will remain free of falls  Description  INTERVENTIONS:  - Assess patient frequently for physical needs  -  Identify cognitive and physical deficits and behaviors that affect risk of falls    -  [place] fall precautions as indicated by assessment   - Educate patient/family on patient safety including physical limitations  - Instruct patient to call for assistance with activity based on assessment  - Modify environment to reduce risk of injury  - Consider OT/PT consult to assist with strengthening/mobility  Outcome: Progressing

## 2019-11-18 ENCOUNTER — TELEPHONE (OUTPATIENT)
Dept: HEMATOLOGY ONCOLOGY | Facility: CLINIC | Age: 84
End: 2019-11-18

## 2019-11-19 NOTE — TELEPHONE ENCOUNTER
Revlimid was sent to the pharmacy on 11/15/19 - attempted to call patients son back - unable to leave msg

## 2019-11-20 ENCOUNTER — TELEPHONE (OUTPATIENT)
Dept: HEMATOLOGY ONCOLOGY | Facility: CLINIC | Age: 84
End: 2019-11-20

## 2019-11-20 NOTE — TELEPHONE ENCOUNTER
Patient called about father's Revlimid, told him Ebony had called the pharmacy 11/15 and she tried to leave a message for him   He said he will check with pharmacy

## 2019-11-21 ENCOUNTER — APPOINTMENT (OUTPATIENT)
Dept: LAB | Facility: HOSPITAL | Age: 84
End: 2019-11-21
Attending: INTERNAL MEDICINE
Payer: COMMERCIAL

## 2019-11-21 ENCOUNTER — TELEPHONE (OUTPATIENT)
Dept: HEMATOLOGY ONCOLOGY | Facility: CLINIC | Age: 84
End: 2019-11-21

## 2019-11-21 DIAGNOSIS — N18.9 ANEMIA DUE TO CHRONIC KIDNEY DISEASE, UNSPECIFIED CKD STAGE: ICD-10-CM

## 2019-11-21 DIAGNOSIS — D63.1 ANEMIA DUE TO CHRONIC KIDNEY DISEASE, UNSPECIFIED CKD STAGE: ICD-10-CM

## 2019-11-21 LAB
BASOPHILS # BLD AUTO: 0.07 THOUSANDS/ΜL (ref 0–0.1)
BASOPHILS NFR BLD AUTO: 1 % (ref 0–1)
EOSINOPHIL # BLD AUTO: 0.27 THOUSAND/ΜL (ref 0–0.61)
EOSINOPHIL NFR BLD AUTO: 6 % (ref 0–6)
ERYTHROCYTE [DISTWIDTH] IN BLOOD BY AUTOMATED COUNT: 17.5 % (ref 11.6–15.1)
HCT VFR BLD AUTO: 30 % (ref 36.5–49.3)
HGB BLD-MCNC: 9 G/DL (ref 12–17)
IMM GRANULOCYTES # BLD AUTO: 0.01 THOUSAND/UL (ref 0–0.2)
IMM GRANULOCYTES NFR BLD AUTO: 0 % (ref 0–2)
LYMPHOCYTES # BLD AUTO: 0.97 THOUSANDS/ΜL (ref 0.6–4.47)
LYMPHOCYTES NFR BLD AUTO: 20 % (ref 14–44)
MCH RBC QN AUTO: 25.8 PG (ref 26.8–34.3)
MCHC RBC AUTO-ENTMCNC: 30 G/DL (ref 31.4–37.4)
MCV RBC AUTO: 86 FL (ref 82–98)
MONOCYTES # BLD AUTO: 0.57 THOUSAND/ΜL (ref 0.17–1.22)
MONOCYTES NFR BLD AUTO: 12 % (ref 4–12)
NEUTROPHILS # BLD AUTO: 2.98 THOUSANDS/ΜL (ref 1.85–7.62)
NEUTS SEG NFR BLD AUTO: 61 % (ref 43–75)
NRBC BLD AUTO-RTO: 0 /100 WBCS
PLATELET # BLD AUTO: 241 THOUSANDS/UL (ref 149–390)
PMV BLD AUTO: 10.3 FL (ref 8.9–12.7)
RBC # BLD AUTO: 3.49 MILLION/UL (ref 3.88–5.62)
WBC # BLD AUTO: 4.87 THOUSAND/UL (ref 4.31–10.16)

## 2019-11-21 PROCEDURE — 85025 COMPLETE CBC W/AUTO DIFF WBC: CPT

## 2019-11-21 PROCEDURE — 36415 COLL VENOUS BLD VENIPUNCTURE: CPT

## 2019-11-25 ENCOUNTER — TELEPHONE (OUTPATIENT)
Dept: HEMATOLOGY ONCOLOGY | Facility: CLINIC | Age: 84
End: 2019-11-25

## 2019-11-25 DIAGNOSIS — D63.1 ANEMIA DUE TO CHRONIC KIDNEY DISEASE, UNSPECIFIED CKD STAGE: ICD-10-CM

## 2019-11-25 DIAGNOSIS — C90.00 MULTIPLE MYELOMA NOT HAVING ACHIEVED REMISSION (HCC): Primary | ICD-10-CM

## 2019-11-25 DIAGNOSIS — N18.9 ANEMIA DUE TO CHRONIC KIDNEY DISEASE, UNSPECIFIED CKD STAGE: ICD-10-CM

## 2019-11-25 NOTE — TELEPHONE ENCOUNTER
Spoke with son on and let him know that his fathers HGB is 9 0 and Dr Michelle Carlos would like him to get his CBC checked once every 3 weeks  He voiced understanding

## 2019-12-02 ENCOUNTER — TELEPHONE (OUTPATIENT)
Dept: HEMATOLOGY ONCOLOGY | Facility: CLINIC | Age: 84
End: 2019-12-02

## 2019-12-02 NOTE — TELEPHONE ENCOUNTER
Patients son called and asked for refill on patients revlamid 7 days on, 14 days off, as stated by patients son   It can be sent to his [facility] pharmacy

## 2019-12-04 DIAGNOSIS — C90.00 MULTIPLE MYELOMA NOT HAVING ACHIEVED REMISSION (HCC): Primary | ICD-10-CM

## 2019-12-04 RX ORDER — LENALIDOMIDE 5 MG/1
CAPSULE ORAL
Qty: 7 CAPSULE | Refills: 0 | Status: SHIPPED | OUTPATIENT
Start: 2019-12-04 | End: 2019-12-23 | Stop reason: SDUPTHER

## 2019-12-10 ENCOUNTER — LAB (OUTPATIENT)
Dept: LAB | Age: 84
End: 2019-12-10
Payer: COMMERCIAL

## 2019-12-10 DIAGNOSIS — C90.00 MULTIPLE MYELOMA NOT HAVING ACHIEVED REMISSION (HCC): ICD-10-CM

## 2019-12-10 LAB
ALBUMIN SERPL BCP-MCNC: 3.2 G/DL (ref 3.5–5)
ALP SERPL-CCNC: 147 U/L (ref 46–116)
ALT SERPL W P-5'-P-CCNC: 17 U/L (ref 12–78)
ANION GAP SERPL CALCULATED.3IONS-SCNC: 7 MMOL/L (ref 4–13)
AST SERPL W P-5'-P-CCNC: 10 U/L (ref 5–45)
BASOPHILS # BLD AUTO: 0.1 THOUSANDS/ΜL (ref 0–0.1)
BASOPHILS NFR BLD AUTO: 2 % (ref 0–1)
BILIRUB SERPL-MCNC: 0.56 MG/DL (ref 0.2–1)
BUN SERPL-MCNC: 33 MG/DL (ref 5–25)
CALCIUM SERPL-MCNC: 8.9 MG/DL (ref 8.3–10.1)
CHLORIDE SERPL-SCNC: 105 MMOL/L (ref 100–108)
CO2 SERPL-SCNC: 26 MMOL/L (ref 21–32)
CREAT SERPL-MCNC: 1.97 MG/DL (ref 0.6–1.3)
EOSINOPHIL # BLD AUTO: 0.21 THOUSAND/ΜL (ref 0–0.61)
EOSINOPHIL NFR BLD AUTO: 4 % (ref 0–6)
ERYTHROCYTE [DISTWIDTH] IN BLOOD BY AUTOMATED COUNT: 18.2 % (ref 11.6–15.1)
GFR SERPL CREATININE-BSD FRML MDRD: 29 ML/MIN/1.73SQ M
GLUCOSE SERPL-MCNC: 230 MG/DL (ref 65–140)
HCT VFR BLD AUTO: 30.5 % (ref 36.5–49.3)
HGB BLD-MCNC: 9 G/DL (ref 12–17)
IGA SERPL-MCNC: 238 MG/DL (ref 70–400)
IGG SERPL-MCNC: 804 MG/DL (ref 700–1600)
IGM SERPL-MCNC: 45 MG/DL (ref 40–230)
IMM GRANULOCYTES # BLD AUTO: 0.02 THOUSAND/UL (ref 0–0.2)
IMM GRANULOCYTES NFR BLD AUTO: 0 % (ref 0–2)
LYMPHOCYTES # BLD AUTO: 1.28 THOUSANDS/ΜL (ref 0.6–4.47)
LYMPHOCYTES NFR BLD AUTO: 24 % (ref 14–44)
MCH RBC QN AUTO: 25.4 PG (ref 26.8–34.3)
MCHC RBC AUTO-ENTMCNC: 29.5 G/DL (ref 31.4–37.4)
MCV RBC AUTO: 86 FL (ref 82–98)
MONOCYTES # BLD AUTO: 0.59 THOUSAND/ΜL (ref 0.17–1.22)
MONOCYTES NFR BLD AUTO: 11 % (ref 4–12)
NEUTROPHILS # BLD AUTO: 3.16 THOUSANDS/ΜL (ref 1.85–7.62)
NEUTS SEG NFR BLD AUTO: 59 % (ref 43–75)
NRBC BLD AUTO-RTO: 0 /100 WBCS
PLATELET # BLD AUTO: 239 THOUSANDS/UL (ref 149–390)
PMV BLD AUTO: 10.1 FL (ref 8.9–12.7)
POTASSIUM SERPL-SCNC: 3.8 MMOL/L (ref 3.5–5.3)
PROT SERPL-MCNC: 6.9 G/DL (ref 6.4–8.2)
RBC # BLD AUTO: 3.54 MILLION/UL (ref 3.88–5.62)
SODIUM SERPL-SCNC: 138 MMOL/L (ref 136–145)
WBC # BLD AUTO: 5.36 THOUSAND/UL (ref 4.31–10.16)

## 2019-12-10 PROCEDURE — 82784 ASSAY IGA/IGD/IGG/IGM EACH: CPT

## 2019-12-10 PROCEDURE — 84165 PROTEIN E-PHORESIS SERUM: CPT

## 2019-12-10 PROCEDURE — 84165 PROTEIN E-PHORESIS SERUM: CPT | Performed by: PATHOLOGY

## 2019-12-10 PROCEDURE — 86334 IMMUNOFIX E-PHORESIS SERUM: CPT

## 2019-12-10 PROCEDURE — 36415 COLL VENOUS BLD VENIPUNCTURE: CPT

## 2019-12-10 PROCEDURE — 83883 ASSAY NEPHELOMETRY NOT SPEC: CPT

## 2019-12-10 PROCEDURE — 85025 COMPLETE CBC W/AUTO DIFF WBC: CPT

## 2019-12-10 PROCEDURE — 86334 IMMUNOFIX E-PHORESIS SERUM: CPT | Performed by: PATHOLOGY

## 2019-12-10 PROCEDURE — 80053 COMPREHEN METABOLIC PANEL: CPT

## 2019-12-12 LAB
ALBUMIN SERPL ELPH-MCNC: 3.65 G/DL (ref 3.5–5)
ALBUMIN SERPL ELPH-MCNC: 56.1 % (ref 52–65)
ALPHA1 GLOB SERPL ELPH-MCNC: 0.33 G/DL (ref 0.1–0.4)
ALPHA1 GLOB SERPL ELPH-MCNC: 5.1 % (ref 2.5–5)
ALPHA2 GLOB SERPL ELPH-MCNC: 0.9 G/DL (ref 0.4–1.2)
ALPHA2 GLOB SERPL ELPH-MCNC: 13.9 % (ref 7–13)
BETA GLOB ABNORMAL SERPL ELPH-MCNC: 0.5 G/DL (ref 0.4–0.8)
BETA1 GLOB SERPL ELPH-MCNC: 7.7 % (ref 5–13)
BETA2 GLOB SERPL ELPH-MCNC: 5.7 % (ref 2–8)
BETA2+GAMMA GLOB SERPL ELPH-MCNC: 0.37 G/DL (ref 0.2–0.5)
GAMMA GLOB ABNORMAL SERPL ELPH-MCNC: 0.75 G/DL (ref 0.5–1.6)
GAMMA GLOB SERPL ELPH-MCNC: 11.5 % (ref 12–22)
IGG/ALB SER: 1.28 {RATIO} (ref 1.1–1.8)
INTERPRETATION UR IFE-IMP: NORMAL
KAPPA LC FREE SER-MCNC: 180.9 MG/L (ref 3.3–19.4)
KAPPA LC FREE/LAMBDA FREE SER: 6.55 {RATIO} (ref 0.26–1.65)
LAMBDA LC FREE SERPL-MCNC: 27.6 MG/L (ref 5.7–26.3)
M PROTEIN 1 MFR SERPL ELPH: 2.4 %
M PROTEIN 1 SERPL ELPH-MCNC: 0.16 G/DL
PROT PATTERN SERPL ELPH-IMP: ABNORMAL
PROT SERPL-MCNC: 6.5 G/DL (ref 6.4–8.2)

## 2019-12-17 ENCOUNTER — OFFICE VISIT (OUTPATIENT)
Dept: HEMATOLOGY ONCOLOGY | Facility: CLINIC | Age: 84
End: 2019-12-17
Payer: COMMERCIAL

## 2019-12-17 VITALS
HEIGHT: 66 IN | HEART RATE: 98 BPM | SYSTOLIC BLOOD PRESSURE: 128 MMHG | TEMPERATURE: 98.3 F | OXYGEN SATURATION: 98 % | RESPIRATION RATE: 17 BRPM | BODY MASS INDEX: 29.25 KG/M2 | DIASTOLIC BLOOD PRESSURE: 68 MMHG | WEIGHT: 182 LBS

## 2019-12-17 DIAGNOSIS — C90.00 MULTIPLE MYELOMA NOT HAVING ACHIEVED REMISSION (HCC): Primary | ICD-10-CM

## 2019-12-17 PROCEDURE — 99214 OFFICE O/P EST MOD 30 MIN: CPT | Performed by: INTERNAL MEDICINE

## 2019-12-17 RX ORDER — OMEPRAZOLE 40 MG/1
CAPSULE, DELAYED RELEASE ORAL DAILY
Refills: 2 | COMMUNITY
Start: 2019-12-03

## 2019-12-17 NOTE — PROGRESS NOTES
[facility]  [address] [facility] [address] [address]  [address]  [place] [id number] [phone]  [address] [place] [address], [phone]  12/17/19    Discussion:    In summary, this is a 5 5 in summary, this is an 25-year-old male history of multiple myeloma  He is currently on Revlimid monotherapy  Hemoglobin is stable at 9 0  He did have a red cell transfusion about 5 weeks ago for hemoglobin 7 7 with minor associated symptoms  Since then he has felt fairly well  Myeloma parameters are essentially stable  IgM at been slightly depressed  This has normalized  IgA and IgG are both normal   Free kappa is stable  Free lambda has decreased slightly  Ratio is stable at 6 5  We reviewed the above findings and their significance  Clinically he is doing fairly well  I would make any changes in his care at this time  I discussed the above with the patient  The patient  voiced understanding and agreement   ______________________________________________________________________    Chief Complaint   Patient presents with    Follow-up       HPI:     Kappa light chain myeloma ([facility] [address] )    9/8/2018 - 10/8/2018 Cancer Staged     Cancer Staging  [place] light chain myeloma ([facility] )  Staging form: Plasma Cell Myeloma and Disorders, AJCC 8th Edition  - Clinical stage from 10/8/2018: RISS Stage II (Beta-2-microglobulin (mg/L): 4 2, Albumin (g/dL): 3 7, ISS: Stage II, High-risk cytogenetics: Absent, LDH: Normal) - Signed by Libianca Belcher PA-C on 10/8/2018        9/8/2018 Initial Diagnosis     Patient had an increase in creatinine  Patient followed up with Nephrology who completed a comprehensive workup  This demonstrated positive free light chain ratio elevation in addition to a 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          9/24/2018 Biopsy     Final Diagnosis   A -C    Bone marrow,  left iliac crest,  biopsy and aspirate:  -  Kappa light chain restricted plasma cell neoplasm, consistent with plasma cell myeloma (see note)  -  Maturing trilineage hematopoiesis without distinct features of dysplasia or increased myeloblasts  -  Decreased stainable storage iron  -  Mildly increased reticulin fibers without fibrosis  -  Negative for collagen fibrosis, granulomata, vasculitis, necrosis  Flow cytometry ([facility]# [id number], evaluated by NATALIE Doan )       * Interpretation:    1  Plasma cell neoplasm, IgA kappa-restricted  See Comment  2  No evidence of B-cell or T-cell lymphoproliferative disorder  *  Comment:  Due to potential dilutional/lysis effects associated with flow cytometry, the reported plasma cell count (2 4%) is an underestimate  Therefore, correlation with a comprehensive bone marrow examination including morphologic plasma cell enumeration will be necessary for further and definitive characterization  Interpretation FISH Myeloma Panel:  1  No evidence of IGH-MAF [translocation t(14;16)] gene rearrangement  2  No evidence of RB1 monosomy (13q14 deletion)  3  No evidence of CCND1-IGH [translocation t(11;14)] gene rearrangement, and no evidence for trisomy 11 or gain of 11q  4  No evidence of p53 (17p13) deletion or amplification  5  No evidence of FGFR3-IGH [translocation t(4;14)] gene rearrangement  6  Negative for 1q21/CKS1B gain      10/19/2018 - 12/20/2018 Chemotherapy     Dexamethasone 20 mg Days 1, 8, 15  Revlimid 5 mg Days 1-14  Cycle length = 21 days    Completed 3 cycles of the above  12/20/2018 Adverse Reaction     Multiple folliculitis/abscess development  Uncontrolled diabetes, previously controlled prior to dexamethasone therapy  12/20/2018 - 4/16/2019 Chemotherapy     Revlimid 5 mg Days 1-14  Cycle length = 21 days      4/16/2019 -  Chemotherapy     Lenolidimide 5 mg PO daily days 1-7  Cycle length =  21 days         Interval History:  Clinically stable    ECOG-  1 - Symptomatic but completely ambulatory    Review of Systems   Constitutional: Negative for chills and fever  HENT: Negative for nosebleeds  Eyes: Negative for discharge  Respiratory: Negative for cough and shortness of breath  Cardiovascular: Negative for chest pain  Gastrointestinal: Negative for abdominal pain, constipation and diarrhea  Endocrine: Negative for polydipsia  Genitourinary: Negative for hematuria  Musculoskeletal: Negative for arthralgias  Skin: Negative for color change  Allergic/Immunologic: Negative for immunocompromised state  Neurological: Negative for dizziness and headaches  Hematological: Negative for adenopathy  Psychiatric/Behavioral: Negative for agitation  Past Medical History:   Diagnosis Date    Angina pectoris ([place] [address] )     Chronic kidney disease     Coronary artery disease     Diabetes mellitus ([place] [address] )     Glaucoma     Hypertension     Multiple myeloma ([place] [address] )     Occluded coronary artery stent     Left     Patient Active Problem List   Diagnosis    Parenchymal renal hypertension    Type 2 diabetes mellitus without complication, without long-term current use of insulin ([place] [address] )    Mixed hyperlipidemia    Esophageal reflux    Cerebral infarction, watershed distribution, bilateral, acute ([place] [address] )    Stage 4 [facility]    Benign prostatic hyperplasia    Ischemic cardiomyopathy    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 ([facility]    Other proteinuria    Kappa light chain myeloma ([facility]    UTI (urinary tract infection)    MRSA (methicillin resistant staph aureus) culture positive    Acute on chronic combined systolic and diastolic heart failure ([facility])    Generalized weakness    NSTEMI (non-ST elevated myocardial infarction) ([facility])    Constipation       Current Outpatient Medications:     acetaminophen (TYLENOL) 325 mg tablet, Take 1 - 2 tabs PO Q4 PRN pain, Disp: 30 tablet, Rfl: 0    aspirin 81 MG tablet, Take 81 mg by mouth daily  , Disp: , Rfl:    bimatoprost (LUMIGAN) 0 01 % ophthalmic drops, Apply to eye, Disp: , Rfl:     bisacodyl (DULCOLAX) 10 mg suppository, Insert 10 mg into the rectum daily, Disp: , Rfl:     Cyanocobalamin (B-12) 1000 MCG TABS, Take by mouth, Disp: , Rfl:     fluticasone (FLONASE) 50 mcg/act nasal spray, into each nostril as needed  , Disp: , Rfl:     furosemide (LASIX) 40 mg tablet, Take 1 tablet (40 mg total) by mouth daily (Patient taking differently: Take 60 mg by mouth daily ), Disp: , Rfl: 0    glimepiride (AMARYL) 2 mg tablet, TAKE 1 TABLET DAILY AS     DIRECTED, Disp: 90 tablet, Rfl: 3    glucose blood (ACCU-CHEK CHANDRAKANT PLUS) test strip, by In Vitro route, Disp: , Rfl:     guaiFENesin (MUCINEX) 600 mg 12 hr tablet, Take 1 tablet (600 mg total) by mouth 2 (two) times a day (Patient taking differently: Take 1,200 mg by mouth 2 (two) times a day ), Disp: 60 tablet, Rfl: 0    isosorbide dinitrate (ISORDIL) 5 mg tablet, Take 1 tablet (5 mg total) by mouth 3 (three) times a day (Patient taking differently: Take 30 mg by mouth daily Half tablet daily), Disp: 90 tablet, Rfl: 1    isosorbide mononitrate (IMDUR) 30 mg 24 hr tablet, TK 1 T PO QAM, Disp: , Rfl: 0    lenalidomide (REVLIMID) 5 MG CAPS, Take one capsule by mouth daily for 7 days on 14 days off [facility]#[id number], Disp: 7 capsule, Rfl: 0    magnesium hydroxide (MILK OF MAGNESIA) 400 mg/5 mL oral suspension, Take by mouth daily as needed for constipation, Disp: , Rfl:     melatonin 1 mg, Take 4 mg by mouth daily at bedtime, Disp: , Rfl:     nitroglycerin (NITROSTAT) 0 4 mg SL tablet, Place 1 tablet under the tongue every 5 (five) minutes as needed, Disp: , Rfl:     omeprazole (PriLOSEC) 40 MG capsule, daily, Disp: , Rfl: 2    ONE TOUCH ULTRA TEST test strip, The patient test once daily  , Disp: 100 each, Rfl: 2    ONETOUCH DELICA LANCETS 90D MISC, by Does not apply route daily, Disp: 100 each, Rfl: 3    potassium chloride (KLOR-CON 10) 10 mEq tablet, Take 1 tablet (10 mEq total) by mouth daily, Disp: 90 tablet, Rfl: 3    SYMBICORT 160-4 5 MCG/ACT inhaler, INHALE 2 PUFFS PO Q 12 H, Disp: , Rfl: 0    timolol (TIMOPTIC) 0 5 % ophthalmic solution, , Disp: , Rfl:     UNKNOWN TO PATIENT, , Disp: , Rfl:   No Known Allergies  Past Surgical History:   Procedure Laterality Date    BACK SURGERY      CARDIAC CATHETERIZATION  04/05/2004    COLONOSCOPY      CT BONE MARROW BIOPSY AND ASPIRATION  9/24/2018    CYSTOSCOPY      KNEE SURGERY      THROMBOENDARTERECTOMY Right     Cartoid     Social History     Objective:  Vitals:    12/17/19 1550   BP: 128/68   BP Location: Right arm   Patient Position: Sitting   Pulse: 98   Resp: 17   Temp: 98 3 °F (36 8 °C)   TempSrc: Tympanic   SpO2: 98%   Weight: 82 6 kg (182 lb)   Height: 5' 5 5" (1 664 m)     Physical Exam   Constitutional: He is oriented to person, place, and time  He appears well-developed  HENT:   Head: Normocephalic  Eyes: Pupils are equal, round, and reactive to light  Neck: Neck supple  Cardiovascular: Normal rate and regular rhythm  No murmur heard  Pulmonary/Chest: Breath sounds normal  He has no wheezes  He has no rales  Abdominal: Soft  There is no tenderness  Musculoskeletal: Normal range of motion  He exhibits no edema or tenderness  Lymphadenopathy:     He has no cervical adenopathy  Neurological: He is alert and oriented to person, place, and time  He has normal reflexes  No cranial nerve deficit  Skin: No rash noted  No erythema  Psychiatric: He has a normal mood and affect  His behavior is normal          Labs: I personally reviewed the labs and imaging pertinent to this patient care

## 2019-12-23 DIAGNOSIS — C90.00 MULTIPLE MYELOMA NOT HAVING ACHIEVED REMISSION (HCC): ICD-10-CM

## 2019-12-26 ENCOUNTER — TELEPHONE (OUTPATIENT)
Dept: HEMATOLOGY ONCOLOGY | Facility: CLINIC | Age: 84
End: 2019-12-26

## 2019-12-27 DIAGNOSIS — C90.00 MULTIPLE MYELOMA NOT HAVING ACHIEVED REMISSION (HCC): ICD-10-CM

## 2019-12-27 NOTE — TELEPHONE ENCOUNTER
Returned tc spoke with son to clarify when pt will be starting next cycle  He stated a week from today  Pended refill to Dr Michelle Man for signature

## 2019-12-30 RX ORDER — LENALIDOMIDE 5 MG/1
CAPSULE ORAL
Qty: 7 CAPSULE | Refills: 0 | Status: SHIPPED | OUTPATIENT
Start: 2019-12-30 | End: 2020-01-14 | Stop reason: SDUPTHER

## 2020-01-03 ENCOUNTER — LAB (OUTPATIENT)
Dept: LAB | Age: 85
End: 2020-01-03
Payer: COMMERCIAL

## 2020-01-03 PROCEDURE — 36415 COLL VENOUS BLD VENIPUNCTURE: CPT | Performed by: INTERNAL MEDICINE

## 2020-01-03 PROCEDURE — 85025 COMPLETE CBC W/AUTO DIFF WBC: CPT | Performed by: INTERNAL MEDICINE

## 2020-01-03 PROCEDURE — 80053 COMPREHEN METABOLIC PANEL: CPT | Performed by: INTERNAL MEDICINE

## 2020-01-04 LAB
ALBUMIN SERPL BCP-MCNC: 3.2 G/DL (ref 3.5–5)
ALP SERPL-CCNC: 155 U/L (ref 46–116)
ALT SERPL W P-5'-P-CCNC: 15 U/L (ref 12–78)
ANION GAP SERPL CALCULATED.3IONS-SCNC: 5 MMOL/L (ref 4–13)
AST SERPL W P-5'-P-CCNC: 8 U/L (ref 5–45)
BASOPHILS # BLD AUTO: 0.1 THOUSANDS/ΜL (ref 0–0.1)
BASOPHILS NFR BLD AUTO: 2 % (ref 0–1)
BILIRUB SERPL-MCNC: 0.39 MG/DL (ref 0.2–1)
BUN SERPL-MCNC: 36 MG/DL (ref 5–25)
CALCIUM SERPL-MCNC: 8.7 MG/DL (ref 8.3–10.1)
CHLORIDE SERPL-SCNC: 111 MMOL/L (ref 100–108)
CO2 SERPL-SCNC: 26 MMOL/L (ref 21–32)
CREAT SERPL-MCNC: 1.63 MG/DL (ref 0.6–1.3)
EOSINOPHIL # BLD AUTO: 0.26 THOUSAND/ΜL (ref 0–0.61)
EOSINOPHIL NFR BLD AUTO: 4 % (ref 0–6)
ERYTHROCYTE [DISTWIDTH] IN BLOOD BY AUTOMATED COUNT: 19.8 % (ref 11.6–15.1)
GFR SERPL CREATININE-BSD FRML MDRD: 37 ML/MIN/1.73SQ M
GLUCOSE SERPL-MCNC: 154 MG/DL (ref 65–140)
HCT VFR BLD AUTO: 27 % (ref 36.5–49.3)
HGB BLD-MCNC: 8 G/DL (ref 12–17)
IMM GRANULOCYTES # BLD AUTO: 0.02 THOUSAND/UL (ref 0–0.2)
IMM GRANULOCYTES NFR BLD AUTO: 0 % (ref 0–2)
LYMPHOCYTES # BLD AUTO: 1.63 THOUSANDS/ΜL (ref 0.6–4.47)
LYMPHOCYTES NFR BLD AUTO: 26 % (ref 14–44)
MCH RBC QN AUTO: 25.8 PG (ref 26.8–34.3)
MCHC RBC AUTO-ENTMCNC: 29.6 G/DL (ref 31.4–37.4)
MCV RBC AUTO: 87 FL (ref 82–98)
MONOCYTES # BLD AUTO: 0.6 THOUSAND/ΜL (ref 0.17–1.22)
MONOCYTES NFR BLD AUTO: 10 % (ref 4–12)
NEUTROPHILS # BLD AUTO: 3.57 THOUSANDS/ΜL (ref 1.85–7.62)
NEUTS SEG NFR BLD AUTO: 58 % (ref 43–75)
NRBC BLD AUTO-RTO: 0 /100 WBCS
PLATELET # BLD AUTO: 242 THOUSANDS/UL (ref 149–390)
PMV BLD AUTO: 10.6 FL (ref 8.9–12.7)
POTASSIUM SERPL-SCNC: 4 MMOL/L (ref 3.5–5.3)
PROT SERPL-MCNC: 6.6 G/DL (ref 6.4–8.2)
RBC # BLD AUTO: 3.1 MILLION/UL (ref 3.88–5.62)
SODIUM SERPL-SCNC: 142 MMOL/L (ref 136–145)
WBC # BLD AUTO: 6.18 THOUSAND/UL (ref 4.31–10.16)

## 2020-01-06 ENCOUNTER — TELEPHONE (OUTPATIENT)
Dept: HEMATOLOGY ONCOLOGY | Facility: CLINIC | Age: 85
End: 2020-01-06

## 2020-01-06 DIAGNOSIS — N18.4 ANEMIA IN STAGE 4 CHRONIC KIDNEY DISEASE (HCC): Primary | ICD-10-CM

## 2020-01-06 DIAGNOSIS — D63.1 ANEMIA IN STAGE 4 CHRONIC KIDNEY DISEASE (HCC): Primary | ICD-10-CM

## 2020-01-06 RX ORDER — SODIUM CHLORIDE 9 MG/ML
20 INJECTION, SOLUTION INTRAVENOUS ONCE
Status: CANCELLED | OUTPATIENT
Start: 2020-01-08

## 2020-01-06 NOTE — TELEPHONE ENCOUNTER
Patient's son, Penney Severe, called and asked to have someone from Dr Leila Montaño Team calling him back and discuss patient's last labs results  He is concern about patient hemoglobin which is 8 now, he said

## 2020-01-07 ENCOUNTER — TRANSCRIBE ORDERS (OUTPATIENT)
Dept: ADMINISTRATIVE | Facility: HOSPITAL | Age: 85
End: 2020-01-07

## 2020-01-07 ENCOUNTER — APPOINTMENT (OUTPATIENT)
Dept: LAB | Facility: HOSPITAL | Age: 85
End: 2020-01-07
Attending: INTERNAL MEDICINE
Payer: COMMERCIAL

## 2020-01-07 DIAGNOSIS — N18.4 ANEMIA IN STAGE 4 CHRONIC KIDNEY DISEASE (HCC): ICD-10-CM

## 2020-01-07 DIAGNOSIS — D63.1 ANEMIA IN STAGE 4 CHRONIC KIDNEY DISEASE (HCC): ICD-10-CM

## 2020-01-07 DIAGNOSIS — Z92.89 TRANSFUSION HISTORY: ICD-10-CM

## 2020-01-07 DIAGNOSIS — Z92.89 TRANSFUSION HISTORY: Primary | ICD-10-CM

## 2020-01-07 PROCEDURE — 86901 BLOOD TYPING SEROLOGIC RH(D): CPT | Performed by: INTERNAL MEDICINE

## 2020-01-07 PROCEDURE — 86923 COMPATIBILITY TEST ELECTRIC: CPT

## 2020-01-07 PROCEDURE — 86900 BLOOD TYPING SEROLOGIC ABO: CPT | Performed by: INTERNAL MEDICINE

## 2020-01-07 PROCEDURE — 86850 RBC ANTIBODY SCREEN: CPT | Performed by: INTERNAL MEDICINE

## 2020-01-07 RX ORDER — SODIUM CHLORIDE 9 MG/ML
20 INJECTION, SOLUTION INTRAVENOUS ONCE
Status: CANCELLED | OUTPATIENT
Start: 2020-01-07

## 2020-01-08 ENCOUNTER — HOSPITAL ENCOUNTER (OUTPATIENT)
Dept: INFUSION CENTER | Facility: HOSPITAL | Age: 85
Discharge: HOME | End: 2020-01-08
Payer: COMMERCIAL

## 2020-01-08 VITALS
HEART RATE: 77 BPM | OXYGEN SATURATION: 99 % | RESPIRATION RATE: 18 BRPM | SYSTOLIC BLOOD PRESSURE: 105 MMHG | TEMPERATURE: 98.5 F | DIASTOLIC BLOOD PRESSURE: 62 MMHG

## 2020-01-08 DIAGNOSIS — N18.4 ANEMIA IN STAGE 4 CHRONIC KIDNEY DISEASE (HCC): Primary | ICD-10-CM

## 2020-01-08 DIAGNOSIS — D63.1 ANEMIA IN STAGE 4 CHRONIC KIDNEY DISEASE (HCC): Primary | ICD-10-CM

## 2020-01-08 PROCEDURE — P9016 RBC LEUKOCYTES REDUCED: HCPCS

## 2020-01-08 PROCEDURE — 36430 TRANSFUSION BLD/BLD COMPNT: CPT

## 2020-01-08 NOTE — PROGRESS NOTES
1u PRBCs transfused with no adverse reactions, patient's VSS for discharge  Patient left unit ambulatory accompanied by family

## 2020-01-08 NOTE — PLAN OF CARE
Problem: Potential for Falls  Goal: Patient will remain free of falls  Description  INTERVENTIONS:  - Assess patient frequently for physical needs  -  Identify cognitive and physical deficits and behaviors that affect risk of falls    -  [place] fall precautions as indicated by assessment   - Educate patient/family on patient safety including physical limitations  - Instruct patient to call for assistance with activity based on assessment  - Modify environment to reduce risk of injury  - Consider OT/PT consult to assist with strengthening/mobility  Outcome: Progressing

## 2020-01-13 DIAGNOSIS — C90.00 MULTIPLE MYELOMA NOT HAVING ACHIEVED REMISSION (HCC): ICD-10-CM

## 2020-01-14 DIAGNOSIS — C90.00 MULTIPLE MYELOMA NOT HAVING ACHIEVED REMISSION (HCC): ICD-10-CM

## 2020-01-14 RX ORDER — LENALIDOMIDE 5 MG/1
CAPSULE ORAL
Qty: 7 CAPSULE | Refills: 0 | Status: ON HOLD | OUTPATIENT
Start: 2020-01-14 | End: 2020-02-05

## 2020-01-18 DIAGNOSIS — I25.5 ISCHEMIC CARDIOMYOPATHY: ICD-10-CM

## 2020-01-18 RX ORDER — ATORVASTATIN CALCIUM 40 MG/1
TABLET, FILM COATED ORAL
Qty: 90 TABLET | Refills: 3 | Status: SHIPPED | OUTPATIENT
Start: 2020-01-18

## 2020-01-23 ENCOUNTER — APPOINTMENT (EMERGENCY)
Dept: RADIOLOGY | Facility: HOSPITAL | Age: 84
DRG: 202 | End: 2020-01-23
Attending: EMERGENCY MEDICINE
Payer: COMMERCIAL

## 2020-01-23 ENCOUNTER — HOSPITAL ENCOUNTER (INPATIENT)
Facility: HOSPITAL | Age: 84
LOS: 5 days | Discharge: HOME HEALTH | DRG: 202 | End: 2020-01-28
Attending: EMERGENCY MEDICINE | Admitting: HOSPITALIST
Payer: COMMERCIAL

## 2020-01-23 DIAGNOSIS — R06.02 SHORTNESS OF BREATH: Primary | ICD-10-CM

## 2020-01-23 DIAGNOSIS — R79.89 ELEVATED TROPONIN: ICD-10-CM

## 2020-01-23 DIAGNOSIS — J18.9 PNEUMONIA DUE TO INFECTIOUS ORGANISM, UNSPECIFIED LATERALITY, UNSPECIFIED PART OF LUNG: ICD-10-CM

## 2020-01-23 LAB
ANION GAP SERPL CALC-SCNC: 9 MEQ/L (ref 3–15)
BASOPHILS # BLD: 0.06 K/UL (ref 0.01–0.1)
BASOPHILS NFR BLD: 1.2 %
BUN SERPL-MCNC: 32 MG/DL (ref 8–20)
CALCIUM SERPL-MCNC: 8.6 MG/DL (ref 8.9–10.3)
CHLORIDE SERPL-SCNC: 100 MEQ/L (ref 98–109)
CO2 SERPL-SCNC: 22 MEQ/L (ref 22–32)
CREAT SERPL-MCNC: 1.8 MG/DL
DIFFERENTIAL METHOD BLD: ABNORMAL
EOSINOPHIL # BLD: 0.19 K/UL (ref 0.04–0.54)
EOSINOPHIL NFR BLD: 3.7 %
ERYTHROCYTE [DISTWIDTH] IN BLOOD BY AUTOMATED COUNT: 19 % (ref 11.6–14.4)
FLUAV RNA SPEC QL NAA+PROBE: NEGATIVE
FLUBV RNA SPEC QL NAA+PROBE: NEGATIVE
GFR SERPL CREATININE-BSD FRML MDRD: 35.7 ML/MIN/1.73M*2
GLUCOSE SERPL-MCNC: 176 MG/DL (ref 70–99)
HCT VFR BLDCO AUTO: 30 % (ref 40.1–51)
HGB BLD-MCNC: 9.1 G/DL
IMM GRANULOCYTES # BLD AUTO: 0.02 K/UL (ref 0–0.08)
IMM GRANULOCYTES NFR BLD AUTO: 0.4 %
LYMPHOCYTES # BLD: 1.38 K/UL (ref 1.2–3.5)
LYMPHOCYTES NFR BLD: 26.5 %
MCH RBC QN AUTO: 26.1 PG (ref 28–33.2)
MCHC RBC AUTO-ENTMCNC: 30.3 G/DL (ref 32.2–36.5)
MCV RBC AUTO: 86 FL (ref 83–98)
MONOCYTES # BLD: 0.44 K/UL (ref 0.3–1)
MONOCYTES NFR BLD: 8.5 %
NEUTROPHILS # BLD: 3.11 K/UL (ref 1.7–7)
NEUTS SEG NFR BLD: 59.7 %
NRBC BLD-RTO: 0 %
PDW BLD AUTO: 10 FL (ref 9.4–12.4)
PLATELET # BLD AUTO: 277 K/UL
POTASSIUM SERPL-SCNC: 4.1 MEQ/L (ref 3.6–5.1)
RBC # BLD AUTO: 3.49 M/UL (ref 4.5–5.8)
RSV RNA SPEC QL NAA+PROBE: NEGATIVE
SODIUM SERPL-SCNC: 131 MEQ/L (ref 136–144)
TROPONIN I SERPL-MCNC: 0.07 NG/ML
WBC # BLD AUTO: 5.2 K/UL

## 2020-01-23 PROCEDURE — 20600000 HC ROOM AND CARE INTERMEDIATE/TELEMETRY

## 2020-01-23 PROCEDURE — 93005 ELECTROCARDIOGRAM TRACING: CPT | Performed by: EMERGENCY MEDICINE

## 2020-01-23 PROCEDURE — 99285 EMERGENCY DEPT VISIT HI MDM: CPT | Mod: 25

## 2020-01-23 PROCEDURE — 36415 COLL VENOUS BLD VENIPUNCTURE: CPT | Performed by: EMERGENCY MEDICINE

## 2020-01-23 PROCEDURE — 85025 COMPLETE CBC W/AUTO DIFF WBC: CPT | Performed by: EMERGENCY MEDICINE

## 2020-01-23 PROCEDURE — 84484 ASSAY OF TROPONIN QUANT: CPT | Performed by: EMERGENCY MEDICINE

## 2020-01-23 PROCEDURE — 87631 RESP VIRUS 3-5 TARGETS: CPT | Performed by: EMERGENCY MEDICINE

## 2020-01-23 PROCEDURE — 80048 BASIC METABOLIC PNL TOTAL CA: CPT | Performed by: EMERGENCY MEDICINE

## 2020-01-23 PROCEDURE — 71046 X-RAY EXAM CHEST 2 VIEWS: CPT

## 2020-01-23 RX ORDER — BUDESONIDE AND FORMOTEROL FUMARATE DIHYDRATE 160; 4.5 UG/1; UG/1
2 AEROSOL RESPIRATORY (INHALATION) 2 TIMES DAILY
COMMUNITY

## 2020-01-23 RX ORDER — LANOLIN ALCOHOL/MO/W.PET/CERES
1000 CREAM (GRAM) TOPICAL DAILY
COMMUNITY

## 2020-01-23 RX ORDER — OMEPRAZOLE 20 MG/1
CAPSULE, DELAYED RELEASE ORAL
COMMUNITY

## 2020-01-23 RX ORDER — ALBUTEROL SULFATE 90 UG/1
2 INHALANT RESPIRATORY (INHALATION) EVERY 6 HOURS PRN
COMMUNITY

## 2020-01-23 RX ORDER — TIMOLOL MALEATE 5 MG/ML
1 SOLUTION/ DROPS OPHTHALMIC DAILY
COMMUNITY

## 2020-01-23 RX ORDER — CYANOCOBALAMIN (VITAMIN B-12) 500 MCG
4 TABLET ORAL NIGHTLY
COMMUNITY

## 2020-01-23 RX ORDER — NAPROXEN SODIUM 220 MG/1
81 TABLET, FILM COATED ORAL DAILY
COMMUNITY

## 2020-01-23 RX ORDER — GUAIFENESIN 600 MG/1
1200 TABLET, EXTENDED RELEASE ORAL AS NEEDED
Status: ON HOLD | COMMUNITY
End: 2020-01-28 | Stop reason: SDUPTHER

## 2020-01-23 RX ORDER — GLIMEPIRIDE 2 MG/1
2 TABLET ORAL
COMMUNITY

## 2020-01-23 RX ORDER — POTASSIUM CHLORIDE 750 MG/1
10 TABLET, FILM COATED, EXTENDED RELEASE ORAL AS NEEDED
COMMUNITY

## 2020-01-24 PROBLEM — I38 VALVULAR HEART DISEASE: Status: ACTIVE | Noted: 2020-01-24

## 2020-01-24 PROBLEM — R79.89 ELEVATED TROPONIN: Status: ACTIVE | Noted: 2020-01-24

## 2020-01-24 PROBLEM — I25.10 CAD (CORONARY ARTERY DISEASE): Status: ACTIVE | Noted: 2020-01-24

## 2020-01-24 PROBLEM — N18.9 CKD (CHRONIC KIDNEY DISEASE): Status: ACTIVE | Noted: 2020-01-24

## 2020-01-24 PROBLEM — C90.00 MULTIPLE MYELOMA NOT HAVING ACHIEVED REMISSION (CMS/HCC): Status: ACTIVE | Noted: 2020-01-24

## 2020-01-24 PROBLEM — I50.9 CONGESTIVE HEART FAILURE (CHF) (CMS/HCC): Status: ACTIVE | Noted: 2020-01-24

## 2020-01-24 PROBLEM — I50.42 CHRONIC COMBINED SYSTOLIC AND DIASTOLIC CONGESTIVE HEART FAILURE (CMS/HCC): Status: ACTIVE | Noted: 2020-01-24

## 2020-01-24 PROBLEM — Z86.79 HISTORY OF CORONARY ARTERY DISEASE: Status: ACTIVE | Noted: 2020-01-24

## 2020-01-24 PROBLEM — J44.9 COPD (CHRONIC OBSTRUCTIVE PULMONARY DISEASE) (CMS/HCC): Status: ACTIVE | Noted: 2020-01-24

## 2020-01-24 PROBLEM — R05.9 COUGH: Status: ACTIVE | Noted: 2020-01-23

## 2020-01-24 PROBLEM — E11.9 TYPE 2 DIABETES MELLITUS (CMS/HCC): Status: ACTIVE | Noted: 2020-01-24

## 2020-01-24 PROBLEM — D64.9 ANEMIA: Status: ACTIVE | Noted: 2020-01-24

## 2020-01-24 PROBLEM — I48.0 PAROXYSMAL ATRIAL FIBRILLATION (CMS/HCC): Status: ACTIVE | Noted: 2020-01-24

## 2020-01-24 LAB
ANION GAP SERPL CALC-SCNC: 8 MEQ/L (ref 3–15)
ATRIAL RATE: 81
BASOPHILS # BLD: 0.06 K/UL (ref 0.01–0.1)
BASOPHILS NFR BLD: 1.1 %
BUN SERPL-MCNC: 26 MG/DL (ref 8–20)
CALCIUM SERPL-MCNC: 8.5 MG/DL (ref 8.9–10.3)
CHLORIDE SERPL-SCNC: 104 MEQ/L (ref 98–109)
CO2 SERPL-SCNC: 23 MEQ/L (ref 22–32)
CREAT SERPL-MCNC: 1.7 MG/DL
DIFFERENTIAL METHOD BLD: ABNORMAL
EOSINOPHIL # BLD: 0.16 K/UL (ref 0.04–0.54)
EOSINOPHIL NFR BLD: 3 %
ERYTHROCYTE [DISTWIDTH] IN BLOOD BY AUTOMATED COUNT: 19 % (ref 11.6–14.4)
GFR SERPL CREATININE-BSD FRML MDRD: 38.1 ML/MIN/1.73M*2
GLUCOSE BLD-MCNC: 149 MG/DL (ref 70–99)
GLUCOSE BLD-MCNC: 195 MG/DL (ref 70–99)
GLUCOSE BLD-MCNC: 203 MG/DL (ref 70–99)
GLUCOSE BLD-MCNC: 97 MG/DL (ref 70–99)
GLUCOSE SERPL-MCNC: 105 MG/DL (ref 70–99)
HCT VFR BLDCO AUTO: 28.1 % (ref 40.1–51)
HGB BLD-MCNC: 8.5 G/DL
IMM GRANULOCYTES # BLD AUTO: 0.03 K/UL (ref 0–0.08)
IMM GRANULOCYTES NFR BLD AUTO: 0.6 %
L PNEUMO1 AG UR QL: NEGATIVE
LYMPHOCYTES # BLD: 1.39 K/UL (ref 1.2–3.5)
LYMPHOCYTES NFR BLD: 26.3 %
MCH RBC QN AUTO: 25.9 PG (ref 28–33.2)
MCHC RBC AUTO-ENTMCNC: 30.2 G/DL (ref 32.2–36.5)
MCV RBC AUTO: 85.7 FL (ref 83–98)
MONOCYTES # BLD: 0.41 K/UL (ref 0.3–1)
MONOCYTES NFR BLD: 7.8 %
NEUTROPHILS # BLD: 3.23 K/UL (ref 1.7–7)
NEUTS SEG NFR BLD: 61.2 %
NRBC BLD-RTO: 0 %
P AXIS: -10
PDW BLD AUTO: 10.1 FL (ref 9.4–12.4)
PLATELET # BLD AUTO: 235 K/UL
POCT TEST: ABNORMAL
POCT TEST: NORMAL
POTASSIUM SERPL-SCNC: 3.7 MEQ/L (ref 3.6–5.1)
PR INTERVAL: 308
QRS DURATION: 136
QT INTERVAL: 458
QTC CALCULATION(BAZETT): 532
R AXIS: 25
RBC # BLD AUTO: 3.28 M/UL (ref 4.5–5.8)
SODIUM SERPL-SCNC: 135 MEQ/L (ref 136–144)
T WAVE AXIS: 174
TROPONIN I SERPL-MCNC: 0.06 NG/ML
TROPONIN I SERPL-MCNC: 0.07 NG/ML
VENTRICULAR RATE: 81
WBC # BLD AUTO: 5.28 K/UL

## 2020-01-24 PROCEDURE — 99233 SBSQ HOSP IP/OBS HIGH 50: CPT | Performed by: HOSPITALIST

## 2020-01-24 PROCEDURE — 99223 1ST HOSP IP/OBS HIGH 75: CPT | Performed by: HOSPITALIST

## 2020-01-24 PROCEDURE — 87449 NOS EACH ORGANISM AG IA: CPT | Performed by: NURSE PRACTITIONER

## 2020-01-24 PROCEDURE — 84484 ASSAY OF TROPONIN QUANT: CPT | Performed by: NURSE PRACTITIONER

## 2020-01-24 PROCEDURE — 63600000 HC DRUGS/DETAIL CODE: Performed by: NURSE PRACTITIONER

## 2020-01-24 PROCEDURE — 80048 BASIC METABOLIC PNL TOTAL CA: CPT | Performed by: NURSE PRACTITIONER

## 2020-01-24 PROCEDURE — 25000000 HC PHARMACY GENERAL: Performed by: NURSE PRACTITIONER

## 2020-01-24 PROCEDURE — 63700000 HC SELF-ADMINISTRABLE DRUG: Performed by: INTERNAL MEDICINE

## 2020-01-24 PROCEDURE — 63700000 HC SELF-ADMINISTRABLE DRUG: Performed by: NURSE PRACTITIONER

## 2020-01-24 PROCEDURE — 36415 COLL VENOUS BLD VENIPUNCTURE: CPT | Performed by: NURSE PRACTITIONER

## 2020-01-24 PROCEDURE — 87040 BLOOD CULTURE FOR BACTERIA: CPT | Performed by: NURSE PRACTITIONER

## 2020-01-24 PROCEDURE — 63700000 HC SELF-ADMINISTRABLE DRUG: Performed by: HOSPITALIST

## 2020-01-24 PROCEDURE — 20600000 HC ROOM AND CARE INTERMEDIATE/TELEMETRY

## 2020-01-24 PROCEDURE — 85025 COMPLETE CBC W/AUTO DIFF WBC: CPT | Performed by: NURSE PRACTITIONER

## 2020-01-24 PROCEDURE — 92526 ORAL FUNCTION THERAPY: CPT | Mod: GN

## 2020-01-24 RX ORDER — TRAVOPROST OPHTHALMIC SOLUTION 0.04 MG/ML
SOLUTION OPHTHALMIC
COMMUNITY

## 2020-01-24 RX ORDER — IBUPROFEN 200 MG
16-32 TABLET ORAL AS NEEDED
Status: DISCONTINUED | OUTPATIENT
Start: 2020-01-24 | End: 2020-01-28 | Stop reason: HOSPADM

## 2020-01-24 RX ORDER — GUAIFENESIN 600 MG/1
600 TABLET, EXTENDED RELEASE ORAL 2 TIMES DAILY
Status: DISCONTINUED | OUTPATIENT
Start: 2020-01-24 | End: 2020-01-28 | Stop reason: HOSPADM

## 2020-01-24 RX ORDER — ACETAMINOPHEN 325 MG/1
650 TABLET ORAL EVERY 4 HOURS PRN
Status: DISCONTINUED | OUTPATIENT
Start: 2020-01-24 | End: 2020-01-28 | Stop reason: HOSPADM

## 2020-01-24 RX ORDER — CARVEDILOL 3.12 MG/1
3.12 TABLET ORAL 2 TIMES DAILY WITH MEALS
Status: DISCONTINUED | OUTPATIENT
Start: 2020-01-24 | End: 2020-01-28 | Stop reason: HOSPADM

## 2020-01-24 RX ORDER — PANTOPRAZOLE SODIUM 20 MG/1
20 TABLET, DELAYED RELEASE ORAL DAILY
Status: DISCONTINUED | OUTPATIENT
Start: 2020-01-24 | End: 2020-01-28 | Stop reason: HOSPADM

## 2020-01-24 RX ORDER — INSULIN ASPART 100 [IU]/ML
3-5 INJECTION, SOLUTION INTRAVENOUS; SUBCUTANEOUS
Status: DISCONTINUED | OUTPATIENT
Start: 2020-01-24 | End: 2020-01-27

## 2020-01-24 RX ORDER — IPRATROPIUM BROMIDE AND ALBUTEROL SULFATE 2.5; .5 MG/3ML; MG/3ML
3 SOLUTION RESPIRATORY (INHALATION) EVERY 4 HOURS PRN
Status: DISCONTINUED | OUTPATIENT
Start: 2020-01-24 | End: 2020-01-27

## 2020-01-24 RX ORDER — DOCUSATE SODIUM 100 MG/1
100 CAPSULE, LIQUID FILLED ORAL 2 TIMES DAILY PRN
COMMUNITY

## 2020-01-24 RX ORDER — TIMOLOL MALEATE 5 MG/ML
1 SOLUTION/ DROPS OPHTHALMIC DAILY
Status: DISCONTINUED | OUTPATIENT
Start: 2020-01-24 | End: 2020-01-28 | Stop reason: HOSPADM

## 2020-01-24 RX ORDER — DEXTROSE 50 % IN WATER (D50W) INTRAVENOUS SYRINGE
25 AS NEEDED
Status: DISCONTINUED | OUTPATIENT
Start: 2020-01-24 | End: 2020-01-28 | Stop reason: HOSPADM

## 2020-01-24 RX ORDER — DEXTROSE 40 %
15-30 GEL (GRAM) ORAL AS NEEDED
Status: DISCONTINUED | OUTPATIENT
Start: 2020-01-24 | End: 2020-01-28 | Stop reason: HOSPADM

## 2020-01-24 RX ORDER — DOXYCYCLINE HYCLATE 100 MG
100 TABLET ORAL EVERY 12 HOURS
Status: DISCONTINUED | OUTPATIENT
Start: 2020-01-24 | End: 2020-01-24

## 2020-01-24 RX ORDER — BUDESONIDE AND FORMOTEROL FUMARATE DIHYDRATE 160; 4.5 UG/1; UG/1
2 AEROSOL RESPIRATORY (INHALATION) 2 TIMES DAILY
Status: DISCONTINUED | OUTPATIENT
Start: 2020-01-24 | End: 2020-01-28 | Stop reason: HOSPADM

## 2020-01-24 RX ORDER — FUROSEMIDE 20 MG/1
60 TABLET ORAL AS NEEDED
Status: ON HOLD | COMMUNITY
End: 2020-01-28 | Stop reason: SDUPTHER

## 2020-01-24 RX ORDER — CYANOCOBALAMIN (VITAMIN B-12) 500 MCG
4 TABLET ORAL NIGHTLY
Status: DISCONTINUED | OUTPATIENT
Start: 2020-01-24 | End: 2020-01-28 | Stop reason: HOSPADM

## 2020-01-24 RX ORDER — LATANOPROST 50 UG/ML
1 SOLUTION/ DROPS OPHTHALMIC NIGHTLY
Status: DISCONTINUED | OUTPATIENT
Start: 2020-01-24 | End: 2020-01-28 | Stop reason: HOSPADM

## 2020-01-24 RX ORDER — DOCUSATE SODIUM 100 MG/1
100 CAPSULE, LIQUID FILLED ORAL 2 TIMES DAILY PRN
Status: DISCONTINUED | OUTPATIENT
Start: 2020-01-24 | End: 2020-01-25

## 2020-01-24 RX ADMIN — GUAIFENESIN 600 MG: 600 TABLET, EXTENDED RELEASE ORAL at 08:02

## 2020-01-24 RX ADMIN — PANTOPRAZOLE SODIUM 20 MG: 20 TABLET, DELAYED RELEASE ORAL at 08:02

## 2020-01-24 RX ADMIN — DOXYCYCLINE HYCLATE 100 MG: 100 TABLET, FILM COATED ORAL at 08:01

## 2020-01-24 RX ADMIN — APIXABAN 2.5 MG: 2.5 TABLET, FILM COATED ORAL at 20:04

## 2020-01-24 RX ADMIN — Medication 4 MG: at 22:27

## 2020-01-24 RX ADMIN — CEFTRIAXONE SODIUM 1 G: 1 INJECTION, POWDER, FOR SOLUTION INTRAVENOUS at 01:02

## 2020-01-24 RX ADMIN — GUAIFENESIN 600 MG: 600 TABLET, EXTENDED RELEASE ORAL at 20:04

## 2020-01-24 RX ADMIN — INSULIN ASPART 3 UNITS: 100 INJECTION, SOLUTION INTRAVENOUS; SUBCUTANEOUS at 16:26

## 2020-01-24 RX ADMIN — CARVEDILOL 3.12 MG: 3.12 TABLET, FILM COATED ORAL at 16:25

## 2020-01-24 RX ADMIN — LATANOPROST 1 DROP: 50 SOLUTION OPHTHALMIC at 22:27

## 2020-01-24 RX ADMIN — INSULIN ASPART 3 UNITS: 100 INJECTION, SOLUTION INTRAVENOUS; SUBCUTANEOUS at 11:42

## 2020-01-24 RX ADMIN — CARVEDILOL 3.12 MG: 3.12 TABLET, FILM COATED ORAL at 10:11

## 2020-01-24 RX ADMIN — IPRATROPIUM BROMIDE AND ALBUTEROL SULFATE 3 ML: 2.5; .5 SOLUTION RESPIRATORY (INHALATION) at 02:14

## 2020-01-24 RX ADMIN — APIXABAN 2.5 MG: 2.5 TABLET, FILM COATED ORAL at 08:02

## 2020-01-24 RX ADMIN — TIMOLOL MALEATE 1 DROP: 5 SOLUTION/ DROPS OPHTHALMIC at 08:05

## 2020-01-24 ASSESSMENT — ENCOUNTER SYMPTOMS
ARTHRALGIAS: 0
CONSTIPATION: 0
DIFFICULTY URINATING: 0
BLOOD IN STOOL: 0
SORE THROAT: 0
DYSURIA: 0
HEMATURIA: 0
RHINORRHEA: 0
LIGHT-HEADEDNESS: 0
SHORTNESS OF BREATH: 1
FEVER: 0
CONFUSION: 0
EYE PAIN: 0
CHILLS: 0
HEADACHES: 0
NAUSEA: 0
NECK PAIN: 0
DIZZINESS: 0
DIARRHEA: 0
ABDOMINAL PAIN: 0
BACK PAIN: 0
COUGH: 1

## 2020-01-24 ASSESSMENT — COGNITIVE AND FUNCTIONAL STATUS - GENERAL
REMEMBERING TO TAKE MEDICATION: 3 - A LITTLE
REMEMBERING WHERE THINGS ARE: 3 - A LITTLE
WALKING IN HOSPITAL ROOM: 2 - A LOT
STANDING UP FROM CHAIR USING ARMS: 3 - A LITTLE
AFFECT: WFL
DRESSING REGULAR LOWER BODY CLOTHING: 3 - A LITTLE
REMEMBERING 5 ERRANDS WITH NO LIST: 3 - A LITTLE
MOVING TO AND FROM BED TO CHAIR: 3 - A LITTLE
TOILETING: 3 - A LITTLE
UNDERSTANDING 10 TO 15 MIN SPEECH: 3 - A LITTLE
EATING MEALS: 4 - NONE
CLIMB 3 TO 5 STEPS WITH RAILING: 2 - A LOT
DRESSING REGULAR UPPER BODY CLOTHING: 3 - A LITTLE
HELP NEEDED FOR PERSONAL GROOMING: 3 - A LITTLE
FOLLOWS FAMILIAR CONVERSATION: 3 - A LITTLE
HELP NEEDED FOR BATHING: 3 - A LITTLE
TAKING CARE OF COMPLICATED TASKS: 3 - A LITTLE

## 2020-01-24 NOTE — ASSESSMENT & PLAN NOTE
Does not appear to be acutely bronchospastic  Maintaining pulse ox on room air  Cont Symbicort  Dishcarge with nebulizer machine

## 2020-01-24 NOTE — ASSESSMENT & PLAN NOTE
Multiple myeloma dx Oct 2018.  lenalidomide 7 days on/14 days off cycle.  Due to start on 1/25  Associated anemia requiring intermittent blood transfusions   Follows at [facility]

## 2020-01-24 NOTE — PLAN OF CARE
Problem: Adult Inpatient Plan of Care  Goal: Plan of Care Review  Outcome: Progressing  Flowsheets (Taken 1/24/2020 1006)  Progress: no change  Plan of Care Reviewed With: patient  Outcome Summary: Tolerating diet; some complaints of foods sticking     Problem: Adult Inpatient Plan of Care  Goal: Patient-Specific Goal (Individualization)  Outcome: Progressing  Flowsheets (Taken 1/24/2020 1006)  Individualized Care Needs: encouraged to use call bell for all needs     Problem: Swallowing Impairment  Goal: Improved Swallowing Without Aspiration  Outcome: Progressing

## 2020-01-24 NOTE — ED PROVIDER NOTES
HPI     Chief Complaint   Patient presents with   • Shortness of Breath       Patient is an 89-year-old gentleman presents the emergency department with his son with reported cough and shortness of breath.  Reportedly patient had symptoms 7 to 10 days ago.  He put a call to his primary care provider who prescribed him Levaquin over the phone.  Patient took 7 days of the medication.  Son reports that towards the end of the course of medication he started to cough less and feel better but on the very last day his cough returned and seemed to be short of breath.  Patient does have some symptomatic relief when he uses his inhaler, which reportedly he used in route to the hospital and reported being less symptomatic on arrival to the ER than he was at home.  Patient's cough is somewhat productive.  He gets short of breath (slightly) with exertion like walking to the bathroom, etc.  He has had no fever or chest pain.           Patient History     Past Medical History:   Diagnosis Date   • Atrial fibrillation (CMS/HCC)    • CHF (congestive heart failure) (CMS/HCC)    • Chronic kidney disease    • COPD (chronic obstructive pulmonary disease) (CMS/HCC)    • Coronary artery disease    • Multiple myeloma (CMS/HCC)    • Type 2 diabetes mellitus (CMS/HCC)        Past Surgical History:   Procedure Laterality Date   • BACK SURGERY         Family History   Problem Relation Age of Onset   • Pancreatic cancer Biological Mother    • Heart disease Biological Father        Social History     Tobacco Use   • Smoking status: Former Smoker     Packs/day: 1.00     Years: 10.00     Pack years: 10.00     Types: Cigarettes   • Smokeless tobacco: Never Used   • Tobacco comment: smoked less than a pack per day in his 20s.  Quit when he was 30.   Substance Use Topics   • Alcohol use: Not Currently   • Drug use: Never       Systems Reviewed from Nursing Triage:  Allergies  Meds  Problems          Review of Systems     Review of Systems  "  Constitutional: Negative for chills and fever.   HENT: Negative for congestion, rhinorrhea and sore throat.    Eyes: Negative for pain.   Respiratory: Positive for cough and shortness of breath.    Cardiovascular: Negative for chest pain.   Gastrointestinal: Negative for abdominal pain, blood in stool, constipation, diarrhea and nausea.   Genitourinary: Negative for difficulty urinating, dysuria and hematuria.   Musculoskeletal: Negative for arthralgias, back pain, gait problem and neck pain.   Skin: Negative for rash.   Neurological: Negative for dizziness, light-headedness and headaches.   Psychiatric/Behavioral: Negative for confusion.        Physical Exam     ED Triage Vitals   Temp Heart Rate Resp BP SpO2   01/23/20 2027 01/23/20 2027 01/23/20 2027 01/23/20 2027 01/23/20 2027   36.6 °C (97.8 °F) 79 18 116/71 98 %      Temp Source Heart Rate Source Patient Position BP Location FiO2 (%) (Set)   01/23/20 2331 01/23/20 2331 01/23/20 2331 01/23/20 2331 --   Oral Monitor Lying Left upper arm        Pulse Ox %: 96 % (01/24/20 0100)  Pulse Ox Interpretation: Normal (01/24/20 0100)  Heart Rate: 71 (01/24/20 0100)       Patient Vitals for the past 24 hrs:   BP Temp Temp src Pulse Resp SpO2 Height Weight   01/24/20 0411 (!) 112/57 36.5 °C (97.7 °F) Oral (!) 107 18 97 % -- --   01/24/20 0150 -- -- -- 88 -- -- -- --   01/24/20 0139 120/68 36.6 °C (97.8 °F) Oral 87 20 98 % 1.702 m (5' 7\") 81.1 kg (178 lb 12.8 oz)   01/24/20 0118 -- -- -- 72 -- -- -- --   01/24/20 0100 -- -- -- 71 (!) 24 96 % -- --   01/23/20 2331 109/72 36.8 °C (98.2 °F) Oral 84 20 97 % 1.702 m (5' 7\") --   01/23/20 2313 -- -- -- -- -- -- -- 79.2 kg (174 lb 8 oz)   01/23/20 2300 -- -- -- 80 (!) 21 98 % -- --   01/23/20 2100 -- -- -- 74 13 98 % -- --   01/23/20 2027 116/71 36.6 °C (97.8 °F) -- 79 18 98 % -- --                                       Physical Exam   Constitutional: He is oriented to person, place, and time. He appears well-developed and " well-nourished.   HENT:   Head: Normocephalic and atraumatic.   Eyes: Pupils are equal, round, and reactive to light. Conjunctivae and EOM are normal.   Neck: Normal range of motion. Neck supple.   Cardiovascular: Normal rate and regular rhythm.   No murmur heard.  Pulmonary/Chest: Effort normal. No respiratory distress. He has decreased breath sounds. He has wheezes in the left lower field.   Abdominal: Soft. Bowel sounds are normal. There is no tenderness.   Musculoskeletal: Normal range of motion. He exhibits no edema.   Neurological: He is alert and oriented to person, place, and time.   Skin: Skin is warm and dry.   Psychiatric: He has a normal mood and affect.   Nursing note and vitals reviewed.           Procedures    ED Course & MDM     Labs Reviewed   CBC AND DIFF - Abnormal       Result Value    WBC 5.20      RBC 3.49 (*)     Hemoglobin 9.1 (*)     Hematocrit 30.0 (*)     MCV 86.0      MCH 26.1 (*)     MCHC 30.3 (*)     RDW 19.0 (*)     Platelets 277      MPV 10.0      Differential Type Auto      nRBC 0.0      Immature Granulocytes 0.4      Neutrophils 59.7      Lymphocytes 26.5      Monocytes 8.5      Eosinophils 3.7      Basophils 1.2      Immature Granulocytes, Absolute 0.02      Neutrophils, Absolute 3.11      Lymphocytes, Absolute 1.38      Monocytes, Absolute 0.44      Eosinophils, Absolute 0.19      Basophils, Absolute 0.06     BASIC METABOLIC PANEL - Abnormal    Sodium 131 (*)     Potassium 4.1      Chloride 100      CO2 22      BUN 32 (*)     Creatinine 1.8 (*)     Glucose 176 (*)     Calcium 8.6 (*)     eGFR 35.7 (*)     Anion Gap 9     TROPONIN I - Abnormal    Troponin I 0.07 (*)    BASIC METABOLIC PANEL - Abnormal    Sodium 135 (*)     Potassium 3.7      Chloride 104      CO2 23      BUN 26 (*)     Creatinine 1.7 (*)     Glucose 105 (*)     Calcium 8.5 (*)     eGFR 38.1 (*)     Anion Gap 8     CBC AND DIFF - Abnormal    WBC 5.28      RBC 3.28 (*)     Hemoglobin 8.5 (*)     Hematocrit 28.1 (*)      MCV 85.7      MCH 25.9 (*)     MCHC 30.2 (*)     RDW 19.0 (*)     Platelets 235      MPV 10.1      Differential Type Auto      nRBC 0.0      Immature Granulocytes 0.6      Neutrophils 61.2      Lymphocytes 26.3      Monocytes 7.8      Eosinophils 3.0      Basophils 1.1      Immature Granulocytes, Absolute 0.03      Neutrophils, Absolute 3.23      Lymphocytes, Absolute 1.39      Monocytes, Absolute 0.41      Eosinophils, Absolute 0.16      Basophils, Absolute 0.06     TROPONIN I - Abnormal    Troponin I 0.07 (*)    RSV AND INFLUENZA A/B NUCLEIC ACIDS, PCR - Normal    Influenza A Negative      Influenza B Negative      Respiratory Syncytial Virus Negative     BLOOD CULTURE   BLOOD CULTURE   SPUTUM CULTURE / SMEAR    Narrative:     The following orders were created for panel order Sputum culture / smear Expectorated Sputum.  Procedure                               Abnormality         Status                     ---------                               -----------         ------                     Sputum Gram Stain (Lab O...[[id number]]                                                   Please view results for these tests on the individual orders.   SPUTUM GRAM STAIN   LEGIONELLA ANTIGEN, URINE   TROPONIN I   POCT GLUCOSE       ECG 12 lead    (Results Pending)   X-RAY CHEST 2 VIEWS    (Results Pending)               [facility]           ED Course as of Jan 24 0535 Fri Jan 24, 2020   0534 Possible causes of symptoms considered including but not limited to URI, pneumonia, influenza, CHF, ACS, etc.  Plan to check basic labs and studies.Labs and studies reviewed.  Patient's flu was negative, BUN and creatinine slightly elevated, hemoglobin slightly low, troponin slightly elevated at 0.07.  Patient's chest x-ray suggest possible retrocardiac infiltrate as well as well with effusions.  Plan to move patient to the hospital for further evaluation treatment.  Case was discussed with [facility] regarding admission.    [RP]      ED Course  User Index  [RP] Luci Lopez, Luci Patel,   01/24/20 0503

## 2020-01-24 NOTE — ASSESSMENT & PLAN NOTE
Echo 1/20/2020 with LVEF 15-20% and severe mitral insufficiency.    Appears euvolemic at this time. He has not been on beta blocker therapy and will start low dose carvedilol now especially in light of NSVT. Hold off on ACE for now due to reduced renal function. Would discharge on 20mg PO Lasix daily; he had been on it PRN according to his med rec.     Will need follow up in our office and has an appointment scheduled in about 2 weeks.

## 2020-01-24 NOTE — ASSESSMENT & PLAN NOTE
Recently diagnosed at PCP visit and started on eliquis  Also started on Coreg this admission  Currently in NSR  No events on telemetry  Continue eliquis

## 2020-01-24 NOTE — PROGRESS NOTES
Patient: Marty Hinds  Location: [place] [id number]  MRN: [id number]  Today's date: 1/24/2020      SPEECH PATHOLOGY EVALUATION:   SLP Diagnosis: possible Esophageal Dysphagia       Recommendations:  1. Regular, thin liquids  2.  Cyclic ingestion  3. Slow rate  4.  Prophylactic NELY precautions  5.  SLP to follow to monitor diet; train slow rate, cyclic ingestion    Summary/Impressions:Pleasant; sometimes foods stick       Chief Complaint   Patient presents with   • Shortness of Breath     Clinical Course: This 89 y.o. male was admitted 1/23/2020 with Pneumonia [J18.9].  Patient presented for evaluation of URI symptoms. He was treated with Levaquin and had temporary improvement, but symptoms returned at the end of his antibiotic course. He has known sick contacts (multiple family members w/URI symptoms). ER workup revealed slightly increased troponin (0.07) and CXR with findings concerning for infiltrate.       Reason for Consult: Patient with cough, shortness of breath and Speech was consulted to assess swallowing function.    Pertinent Radiology Results: XRAY CHEST 2 VIEWS:  Mild cardiomegaly. Interstitial edema. Bilateral pleural effusions     Session Notes: Seen with puree, solids, thin liquids; adequate access/containment; effective mastication, cohesive transfers, no residuals; no overt sx; complaint of food sticking at times; training on cyclic ingestion and slow rate    Past Medical History:   Diagnosis Date   • Atrial fibrillation (CMS/HCC)    • CHF (congestive heart failure) (CMS/HCC)    • Chronic kidney disease    • COPD (chronic obstructive pulmonary disease) (CMS/HCC)    • Coronary artery disease    • Multiple myeloma (CMS/HCC)    • Type 2 diabetes mellitus (CMS/HCC)      Past Surgical History:   Procedure Laterality Date   • BACK SURGERY         No Known Allergies      Results from last 7 days   Lab Units 01/24/20  0408 01/23/20  2041   WBC K/uL 5.28 5.20   HEMOGLOBIN g/dL 8.5* 9.1*    HEMATOCRIT % 28.1* 30.0*   PLATELETS K/uL 235 277             Current Diet: (See below)       Dietary Orders   (From admission, onward)             Start     Ordered    01/23/20 2343  Adult Diet Regular; Cardiac (Low Sodium/Low Fat); RD/LDN may adjust order  Diet effective now     Question Answer Comment   Diet Texture Regular    Other Restriction(s): Cardiac (Low Sodium/Low Fat)    Delegation of Authority. Diet orders written by PA/CRNPs may not be adjusted by RD/LDNs. RD/LDN may adjust order        01/23/20 2342                      Patient left with call bell in reach and alarms as found.       No notes on file    SLP Pain    Date/Time Pain Type Pref Pain Scale Rating: Rest Who   01/24/20 0734 Pain Assessment word (verbal rating pain scale) 0 - no pain JAB          Prior Living Environment      Most Recent Value   Living Environment Comment  lives with son          Prior Level of Function      Most Recent Value   Communication  understands/communicates without difficulty   Swallowing  swallows foods/liquids without difficulty   Baseline Diet/Method of Nutritional Intake  thin liquids, regular solids   Past History of Dysphagia  increased coughing noted   Equipment Currently Used at Home  walker, front-wheeled, inhaler, shower chair, grab bar          SLP Evaluation and Treatment - 01/24/20 0734        Time Calculation    Start Time  0734     Stop Time  0744     Time Calculation (min)  10 min        Session Details    Document Type  initial evaluation     Mode of Treatment  speech language pathology;individual therapy        General Information    Patient Profile Reviewed?  yes     Onset of Illness/Injury or Date of Surgery  01/23/20     Existing Precautions/Restrictions  fall        Cognition/Psychosocial    Affect/Mental Status (Cognitive)  WFL        Oral Motor    Dentition (Oral Motor)  dental appliance/dentures;significant number of missing teeth     Dental Appliance/Denture (Oral Motor)  upper;dentures,  partial        Facial Symmetry (Oral Motor)    Facial Symmetry (Oral Motor)  WNL        Lip Function (Oral Motor)    Lip Range of Motion (Oral Motor)  WNL     Lip Strength (Oral Motor)  WNL        Tongue Function (Oral Motor)    Tongue ROM (Oral Motor)  WNL        Motor Speech    Speech Intelligibility (Motor Speech)  WNL     Speech Fluency (Motor Speech)  WNL     Vocal Loudness (Motor Speech)  WNL     Breath Support (Motor Speech)  intact     Rate/Prosody (Motor Speech)  WNL     Resonance (Motor Speech)  WNL        Functional Communication Measures    FCM: Swallowing  6-->Level 6        Swallowing Recommendations    Strategies to Enhance Eating/Swallowing  alternate food and liquid swallows;allow adequate time for eating     Diet Consistency Recommendations  thin liquids;regular solids     Mode of Delivery Recommendations  moisten foods;slow rate of intake;small bolus size        AM-PAC (TM) - Cognition (Current Function)    Following/understanding a 10-15 minute speech or presentation?  3 - A little     Understanding familiar people during ordinary conversations?  3 - A little     Remembering to take medications at the appropriate time?  3 - A little     Remembering where things were placed or put away?  3 - A little     Remembering a list of 3 or 4 errands without writing it down?  3 - A little     Taking care of complicated tasks?  3 - A little     AM-PAC (TM) Cognition Score  18        Therapy Assessment/Plan (SLP)    SLP Diagnosis  possible Esophageal Dysphagia     Rehab Potential (SLP Eval)  fair, will monitor progress closely     Criteria for Skilled Therapeutic Interventions Met (SLP Eval)  skilled criteria for speech language intervention met     Therapy Frequency (SLP)  2-3 times/wk     Problem List (SLP)  cough; sometimes foods stick        Daily Progress Summary (SLP)    Daily Outcome Statement (SLP)  Patient tolerating regular, thin liquids; small bites, cyclic ingestion; slow rate; f/u briefly to  train        Therapy Plan Review/Discharge Plan (SLP)    Therapy Plan Review (SLP)  evaluation/treatment results reviewed;patient                  Education provided this session. See the Patient Education summary report for full details.    SLP Goals      Most Recent Value   Oral Nutrition/Hydration Goal 1   Activity  effective/safe/independent, management of texture/viscosity at 01/24/2020 0734   Time Frame  by discharge at 01/24/2020 0734   Progress/Outcome  goal ongoing at 01/24/2020 0734   Swallow Management Recall Goal 1   Activity  independent recall of, compensatory swallow strategies/techniques at 01/24/2020 0734   Time Frame  by discharge at 01/24/2020 0734   Progress/Outcome  goal ongoing at 01/24/2020 0734

## 2020-01-24 NOTE — PATIENT CARE CONFERENCE
Care Progression Rounds Note  Date: 1/24/2020  Time: 11:37 AM     Patient Name: Marty Hinds     Medical Record Number: [id number]   YOB: 1930  Sex: Male      Room/Bed: 4414W    Admitting Diagnosis: Pneumonia [J18.9]   Admit Date/Time: 1/23/2020  8:24 PM    Primary Diagnosis: Pneumonia  Principal Problem: Pneumonia    GMLOS: pending  Anticipated Discharge Date: 1/25/2020    AM-PAC  Mobility Score: 16    Discharge Planning:       Barriers to Discharge:  Barriers to Discharge: Medical issues not resolved    Participants:  , social work/services, nursing, physical therapy

## 2020-01-24 NOTE — PLAN OF CARE
Problem: Adult Inpatient Plan of Care  Goal: Readiness for Transition of Care  Intervention: Mutually Develop Transition Plan  Flowsheets (Taken 1/24/2020 1342)  Readmission Within the Last 30 Days: no previous admission in last 30 days  Concerns to be Addressed: adjustment to diagnosis/illness     Problem: Adult Inpatient Plan of Care  Goal: Readiness for Transition of Care  Intervention: Mutually Develop Transition Plan  1/24/2020 1347 by Genesis Howard, RN  Flowsheets  Taken 1/24/2020 1347 by Genesis Howard RN  Anticipated Discharge Disposition: home with home health services;skilled nursing facility  Equipment Needed After Discharge: none  Discharge Coordination/Progress: See CM Note  Anticipated Changes Related to Illness: inability to care for self  Transportation Concerns: car, none  Readmission Within the Last 30 Days: no previous admission in last 30 days  Patient/Family Anticipated Services at Transition: durable medical equipment  Patient/Family Anticipates Transition to: home with family;inpatient rehabilitation facility  Transportation Anticipated: family or friend will provide  Offered/Gave Vendor List: no   Notes:  CURRENT LIVING SITUATION: met patient in the room, introduced self, verified ID, PCP is Dr Martin, [place] [facility] [place] with his son, in a ranch style home, son does not have family, works and is able to help  PLOF: dependent on walker and has a manual scooter in the room,   DME/OXYGEN: shower chair, walker, grab bars  Home Care Services: denies   VET: NO  Transportation: son  Disposition:  Wants to go to home, will agree to home care  Will attempt to meet his son and obtain further accurate info.  CC team will follow and assist as necessary.

## 2020-01-24 NOTE — PROGRESS NOTES
Hospital Medicine Service -  Daily Progress Note       SUBJECTIVE   Interval History:     Patient reports feeling better  Still with cough but no sputum  No fever or chills overnight  No chest pain or palpitations  No nausea or vomiting  No abdominal pain       OBJECTIVE      Vital signs in last 24 hours:  Temp:  [36.3 °C (97.4 °F)-36.8 °C (98.2 °F)] 36.5 °C (97.7 °F)  Heart Rate:  [] 82  Resp:  [13-24] 18  BP: ()/(51-72) 105/51    Intake/Output Summary (Last 24 hours) at 1/24/2020 1152  Last data filed at 1/24/2020 0210  Gross per 24 hour   Intake --   Output 300 ml   Net -300 ml       PHYSICAL EXAMINATION      Physical Exam   Constitutional: He is oriented to person, place, and time. No distress.   elderly   HENT:   Head: Normocephalic and atraumatic.   Mouth/Throat: Oropharynx is clear and moist. No oropharyngeal exudate.   Eyes: Pupils are equal, round, and reactive to light. EOM are normal. No scleral icterus.   Neck: Normal range of motion. Neck supple. No JVD present.   Cardiovascular: Normal rate and regular rhythm.   + systolic murmur   Pulmonary/Chest: Effort normal. No stridor. No respiratory distress. He has no wheezes. He has no rales. He exhibits no tenderness.   Mildly diminished breath sounds at the bases   Abdominal: Soft. Bowel sounds are normal. He exhibits no distension. There is no tenderness. There is no rebound and no guarding.   Musculoskeletal: Normal range of motion. He exhibits no edema or deformity.   Lymphadenopathy:     He has no cervical adenopathy.   Neurological: He is alert and oriented to person, place, and time. No sensory deficit. He exhibits normal muscle tone.   Skin: Skin is warm.   Psychiatric: He has a normal mood and affect. His behavior is normal.      LINES, CATHETERS, DRAINS, AIRWAYS, AND WOUNDS   Lines, Drains, Airways, Wounds:  Peripheral IV 01/23/20 Right Antecubital (Active)   Number of days: 1         LABS / IMAGING / TELE      Labs  I have reviewed  the patient's pertinent labs.  Significant abnormals are WBC 5.28, Hgb 8.5, Cr 1.7.    Imaging  I have independently reviewed the pertinent imaging from the last 24 hrs.    ECG/Telemetry  I have independently reviewed the telemetry. Significant findings include sinus, NSVT.    ASSESSMENT AND PLAN      * Pneumonia  Assessment & Plan  May have had a bronchitis or post infectious cough  Less likely to be bacterial infection  Afebrile, no leukocytosis, no hypotension. Saturating well on room air.  Flu/RSV negative.    Follow blood cultures  Sputum culture if able  Legionella pending  CXR with mild edema, no clear infiltrate  Given IV rocephin, doxycycline overnight  Stop doxy if legionella negative  Mucinex, duonebs prn  SLP consult    Elevated troponin  Assessment & Plan  Trop 0.07.  Likely related to acute illness  Endorses episode of exertional chest pain a few weeks ago, but denies chest pain recently; SOB more likely due to pneumonia  ECG without ST elevation.  Nonspecific intraventricular block, no baseline ECG available for comparison  Had TTE last week, try to obtain results  Troponin trend flat  Cardiology consult    Chronic combined systolic and diastolic congestive heart failure (CMS/HCC)  Assessment & Plan  Hx CHF, portably systolic based upon family report  Not maintained on daily diuretics secondary to syncope.  Takes 60mg prn lasix for weight gain.    CXR with mild pulm vascular congestion, no evidence of acute fluid overload  Obtain records of recent echo as able  Monitor daily weight, I/O  Diuresis prn    Type 2 diabetes mellitus (CMS/HCC)  Assessment & Plan  - On glimepiride  - monitor accu checks, SSI    Paroxysmal atrial fibrillation (CMS/HCC)  Assessment & Plan  Recently diagnosed at PCP visit and started on eliquis.  Not on rate control meds  Currently in NSR  Monitor on telemetry  Continue eliquis    Chronic anemia  Assessment & Plan  Hgb 9.1 on admission, no labs available for baseline but his  son thinks this is stable for him.  Chronic anemia, secondary to MM, CKD.  has required PRN blood transfusions in the past  Hgb 8.5 this AM, stable  No evidence of active bleeding  Obtain baseline labs from PCP as able  Monitor trends    COPD (chronic obstructive pulmonary disease) (CMS/HCC)  Assessment & Plan  Does not appear to be acutely bronchospastic  Maintaining pulse ox on room air  Cont Symbicort    CKD (chronic kidney disease)  Assessment & Plan  Cr 1.8.  Per son, baseline ~2.0  Obtain labs from PCP  Avoid nephrotoxins  Monitor BMP    Multiple myeloma not having achieved remission (CMS/[facility])  Assessment & Plan  Multiple myeloma dx Oct 2018.  lenalidomide 7 days on/14 days off cycle.  Due to start on 1/25  Associated anemia requiring intermittent blood transfusions     History of coronary artery disease  Assessment & Plan  - Hx CAD dx via cardiac cath.  Denies history of MI, stents  - On asa.  Previously was on isosorbide but discontinued due to syncope     VTE Assessment: [place] VTE Score: 8  VTE Prophylaxis Plan: eliquis  Code Status: Full Code  Estimated Discharge Date: 1/25/2020  Disposition Planning: pending clinical course     Rajan Wu MD  1/24/2020

## 2020-01-24 NOTE — PLAN OF CARE
Problem: Adult Inpatient Plan of Care  Goal: Plan of Care Review  Outcome: Progressing  Flowsheets (Taken 1/24/2020 0358)  Progress: improving  Plan of Care Reviewed With: patient  Outcome Summary: Patient states that his breathing has improved.     Problem: Adult Inpatient Plan of Care  Goal: Patient-Specific Goal (Individualization)  Outcome: Progressing  Flowsheets (Taken 1/24/2020 0358)  Patient-Specific Goals (Include Timeframe): Patient will report less anxiety about going to the bathroom.  Individualized Care Needs: Urinal provided, and patient encouraged to call for assistance.  Anxieties, Fears or Concerns: Worries about when he needs to use the restroom.

## 2020-01-24 NOTE — ASSESSMENT & PLAN NOTE
ECHO 1/20/2020 with moderate to severe mitral regurgitation, moderate to severe aortic stenosis and moderate aortic regurgitation.

## 2020-01-24 NOTE — ASSESSMENT & PLAN NOTE
- Hx CAD dx via cardiac cath.  Denies history of MI, stents  - On asa.  Previously was on isosorbide but discontinued due to syncope

## 2020-01-24 NOTE — ASSESSMENT & PLAN NOTE
Trop 0.07.  Likely related to acute illness  Endorses episode of exertional chest pain a few weeks ago, but denies chest pain recently; SOB more likely due to pneumonia  ECG without ST elevation  Nonspecific intraventricular block, no baseline ECG available for comparison  Had TTE last week, with severe CHF  -  Troponin trend flat%  Appreciate cardiology input, no further work up  Follow up with CCP

## 2020-01-24 NOTE — ASSESSMENT & PLAN NOTE
Hgb 9.1 on admission, Hgb has been labile in the past but seems to run in the 8-9 range mostly  Chronic anemia, secondary to MM, CKD.  has required PRN blood transfusions in the past  HGB trended to 7.4, has been slowly drifting down past few days  No evidence of active bleeding  HGB stable 7.7-8  Iron studies c/w MARQUEZ, will start on oral supplementation  Follow up with his OP Hematologist in 1-2 weeks

## 2020-01-24 NOTE — ASSESSMENT & PLAN NOTE
Hx CHF, portably systolic based upon family report  Not maintained on daily diuretics secondary to syncope  CXR with mild pulm vascular congestion, no evidence of overwhelming fluid overload  Recent echo with mitral and aortic valvulopathy, EF 15-20%, G2DD  D/w Cardiology would discharge on Lasix 20 mg daily PRN for weight gain >3lb - stable for discharge from their standpoint  Monitor daily weight, I/O  Diuresis prn

## 2020-01-24 NOTE — H&P
"   Hospital Medicine Service -  History & Physical        CHIEF COMPLAINT   Cough, shortness of breath worsening x 2 weeks     HISTORY OF PRESENT ILLNESS      Marty Hinds is an 89 y.o. male with a past medical history of Afib on eliquis, CHF, COPD, CKD, multiple myeloma, DM2 who presents from home with complaints of cough and shortness of breath.  His son Marty is at the bedside and assists with history as patient is somewhat forgetful at baseline.  Symptoms began about 2 weeks ago, initially with low grade fever rhinorrhea and cough, and he called his PCP and completed a course of levofloxacin on 1/21.  Multiple family members were also sick around that time after a visit to patient's sister in a nursing home.  Patient initially felt improvement, but over the last few days, cough has returned and associated with more difficulty breathing.  Patient denies chest pain recently, but does mention he had exertional chest pressure a few weeks ago which he cannot really expand on.  He denies headaches, dizziness, or lightheadedness. He denies abdominal pain, nausea, vomiting, or diarrhea.  He reports constipation.  His appetite has been normal.  He denies any gu symptoms.  He denies orthopnea, LE swelling, weight gain.  His son helps him to check his weight every day and they use lasix PRN for weight gain, weight has been stable for the last month.  He had an echocardiogram just last week to monitor CHF and his son states he was told he \"has a very weak heart\" and thinks his EF is around 20%.  He plans to follow up with Dr Montemayor in February, is transitioning from his cardiologist in [facility].  He takes lenalidomide for multiple myeloma for 7 days on/14 days off cycle, is due to start on 1/25.  He has had associated anemia requiring intermittent blood transfusions.  He was hospitalized at [facility] about 1 month ago for syncope.  His daily lasix and isosorbide were discontinued at that time and he has not had " recurrence of syncope.    Labwork in the ED significant for hyopnatremia Na 131, CKD BUN 32/ Cr 1.8/ GFR 35, anemia Hgb 9.1 with normal MCV.  WBC 5.20.  Trop elevated 0.07.  CXR reviewed with ED physician Dr Lopez, suspected retrocardiac infiltrate, small pleural effusions    PAST MEDICAL AND SURGICAL HISTORY      Past Medical History:   Diagnosis Date   • Atrial fibrillation (CMS/[facility])    • CHF (congestive heart failure) (CMS/[facility])    • Chronic kidney disease    • COPD (chronic obstructive pulmonary disease) (CMS/[facility])    • Coronary artery disease    • Multiple myeloma (CMS/[facility])    • Type 2 diabetes mellitus (CMS/[facility])        Past Surgical History:   Procedure Laterality Date   • BACK SURGERY         PCP: Ferny Martin, DO    MEDICATIONS      Prior to Admission medications    Medication Sig Start Date End Date Taking? Authorizing Provider   albuterol HFA (VENTOLIN HFA) 90 mcg/actuation inhaler Inhale 2 puffs every 6 (six) hours as needed for wheezing.   Yes Linsey Henderson MD   apixaban (ELIQUIS) 2.5 mg tablet Take 2.5 mg by mouth 2 (two) times a day.   Yes Linsey Henderson MD   aspirin 81 mg chewable tablet Take 81 mg by mouth daily.   Yes Linsey Henderson MD   budesonide-formoterol (SYMBICORT) 160-4.5 mcg/actuation inhaler Inhale 2 puffs 2 (two) times a day. Rinse mouth with water after use to reduce aftertaste and incidence of candidiasis. Do not swallow.   Yes Linsey Henderson MD   cyanocobalamin (VITAMIN B12) 1,000 mcg tablet Take 1,000 mcg by mouth daily.   Yes Linsey Henderson MD   glimepiride (AMARYL) 2 mg tablet Take 2 mg by mouth daily with breakfast.   Yes Linsey Henderson MD   guaiFENesin (MUCINEX) 600 mg 12 hr tablet Take 1,200 mg by mouth as needed for cough.   Yes Linsey Henderson MD   melatonin tablet Take 4 mg by mouth nightly.   Yes Linsey Henderson MD   omeprazole (PriLOSEC) 20 mg capsule Take by mouth daily before breakfast.   Yes Linsey Henderson  MD   potassium chloride (KLOR-CON) 10 mEq CR tablet Take 10 mEq by mouth daily.   Yes Provider, Historical, MD   timolol (TIMOPTIC) 0.5 % ophthalmic solution Administer 1 drop into both eyes daily.   Yes Provider, Historical, MD       ALLERGIES      Patient has no known allergies.    FAMILY HISTORY      Family History   Problem Relation Age of Onset   • Pancreatic cancer Biological Mother    • Heart disease Biological Father        SOCIAL HISTORY      Social History     Socioeconomic History   • Marital status:      Spouse name: None   • Number of children: None   • Years of education: None   • Highest education level: None   Occupational History   • None   Social Needs   • Financial resource strain: None   • Food insecurity:     Worry: None     Inability: None   • Transportation needs:     Medical: None     Non-medical: None   Tobacco Use   • Smoking status: Former Smoker     Packs/day: 1.00     Years: 10.00     Pack years: 10.00     Types: Cigarettes   • Smokeless tobacco: Never Used   • Tobacco comment: smoked less than a pack per day in his 20s.  Quit when he was 30.   Substance and Sexual Activity   • Alcohol use: Not Currently   • Drug use: Never   • Sexual activity: None   Lifestyle   • Physical activity:     Days per week: None     Minutes per session: None   • Stress: None   Relationships   • Social connections:     Talks on phone: None     Gets together: None     Attends Jewish service: None     Active member of club or organization: None     Attends meetings of clubs or organizations: None     Relationship status: None   • Intimate partner violence:     Fear of current or ex partner: None     Emotionally abused: None     Physically abused: None     Forced sexual activity: None   Other Topics Concern   • None   Social History Narrative    Lives at home with son.  Uses rollator walker.       REVIEW OF SYSTEMS      All other systems reviewed and negative except as noted in HPI    PHYSICAL  EXAMINATION      Temp:  [36.6 °C (97.8 °F)-36.8 °C (98.2 °F)] 36.8 °C (98.2 °F)  Heart Rate:  [79-84] 84  Resp:  [18-20] 20  BP: (109-116)/(71-72) 109/72  Body mass index is 27.33 kg/m².    Physical Exam   Constitutional: He is oriented to person, place, and time. No distress.   HENT:   Head: Atraumatic.   Eyes: No scleral icterus.   Neck: Neck supple.   Cardiovascular: Normal rate and regular rhythm.   No murmur heard.  Pulmonary/Chest: Effort normal and breath sounds normal.   Fine crackles bibasilar  Scattered exp wheeze  Overall good air movement   Abdominal: Soft. Bowel sounds are normal. He exhibits no distension. There is no tenderness. There is no guarding.   Musculoskeletal: He exhibits no edema.   Neurological: He is alert and oriented to person, place, and time.   Forgetful  Moves all extremities without gross focal deficit   Skin: Skin is warm and dry.   Psychiatric: He has a normal mood and affect.       LABS / IMAGING / EKG        Labs  Labs reviewed . See A/P for plan regarding pertinent labs.     Imaging  I have independently reviewed the pertinent imaging from the last 24 hrs.    CXR :suspected retrocardiac infiltrate, small pleural effusions    ECG/Telemetry  I have independently reviewed the ECG. Significant findings include SR with 1st degree AVB. Nonspecific intraventricular block with qtc >500. HR 81.    ASSESSMENT AND PLAN           * Pneumonia  Assessment & Plan  - Admit tele  - CXR with suspected retrocardiac infiltrate, official radiology report pending  - Afebrile, no leukocytosis, Flu/RSV negative.  Saturating well on room air.  - IV rocephin, will avoid azithromycin with prolonged QT  - Follow blood cultures  - Sputum culture if able  - Mucinex  - Duonebs prn  - Urine legionella  - SLP consult    COPD (chronic obstructive pulmonary disease) (CMS/[facility])  Assessment & Plan  - Maintaining pulse ox on room air  - Cont Symbicort  - No indication for steroids presently    CKD (chronic kidney  "disease)  Assessment & Plan  - Cr 1.8.  Per son, baseline ~2.0  - Obtain labs from PCP  - Avoid nephrotoxins  - Monitor BMP    Chronic congestive heart failure (CMS/[facility])  Assessment & Plan  - Hx CHF, likely systolic.  - Family reports he just had an echo last week with CCP, was told he has a \"very weak heart,\" they think his EF is around 20%   Not maintained on daily diuretics secondary to syncope.  Takes 60mg prn lasix for weight gain.    - CXR with mild pulm vascular congestion, no evidence of acute fluid overload  - Obtain records of recent echo  - Monitor daily weight, I/O  - Diuresis prn    Elevated troponin  Assessment & Plan  - Trop 0.07.  Likely related to reduced renal function Cr 1.8.  His son also mentions that his troponin is usually elevated  - Endorses episode of exertional chest pain a few weeks ago, but denies chest pain recently; SOB more likely due to pneumonia  - ECG without ST elevation.  Nonspecific intraventricular block, no baseline ECG available for comparison  - Had TTE last week, will try to get results  - Trend troponin  - Consider cardiology consult    Chronic anemia  Assessment & Plan  - Hgb 9.1, no labs available for baseline but his son thinks this is stable for him.  - Chronic anemia, secondary to MM, CKD.  has required PRN blood transfusions   - No evidence of active bleeding  - Obtain baseline labs  - Monitor CBC    Multiple myeloma not having achieved remission (CMS/HCC)  Assessment & Plan  - Multiple myeloma dx Oct 2018.  lenalidomide 7 days on/14 days off cycle.  Due to start on 1/25  - Associated anemia requiring intermittent blood transfusions     History of coronary artery disease  Assessment & Plan  - Hx CAD dx via cardiac cath.  Denies history of MI, stents  - On asa.  Previously was on isosorbide but discontinued due to syncope    Paroxysmal atrial fibrillation (CMS/HCC)  Assessment & Plan  - Recently diagnosed at PCP visit and started on eliquis.  Not on rate control " meds  - Currently in NSR  - Monitor on telemetry  - Continue eliquis       VTE Assessment: [place] VTE Score: 8  VTE Prophylaxis Plan: Eliquis  Code Status: Full Code. Discussed with patient and son.  He wishes to be a full code.  Estimated Discharge Date: 1/25/2020  Disposition Planning: DC home with son when medically stable.     SOLIS Kingston  1/24/2020

## 2020-01-24 NOTE — ASSESSMENT & PLAN NOTE
Female Adult Wellness Exam      CHIEF COMPLAINT:    Chief Complaint   Patient presents with   • Physical   • Gyn Exam       HISTORY OF PRESENT ILLNESS:  The patient is a 59 year old female who presents for an annual well visit.  Overall, the patient feels well.  Patient has complaints of as below.  Libido reported as normal.  Diet reported as fair.  Does not exercise.  Non smoker.  Last colonoscopy was never.  Last mammogram was years ago.  No history of abnormal mammograms.  No family history of breast, peritoneal, tubal, or ovarian cancer.  Last pap smear 7/2018. Does not take a daily aspirin.  Does not take daily vitamin D supplement.  No illicit drug use.  1 sexual partners.  No history sexually transmitted infections (STI).      Eye exam within last 12 months?  No  Influenza vaccine last year?  No  Tetanus vaccine in last 10 years?  Yes    The patient is a 59 year old female who presents with complaints of hearing loss.  Started months ago.  Onset was gradual.  Symptoms described as above.  Severity reported as moderate.  Symptoms occurring daily. Aggravated by nothing.  Alleviated by nothing.  Associated symptoms include none.       Past Medical History:  Past Medical History:   Diagnosis Date   • Diverticulosis of colon (without mention of hemorrhage) 7/20/15   • NO HX OF     CA, HTN, DM, CAD, CVA, DVT, Asthma       Past Surgical History:  Past Surgical History:   Procedure Laterality Date   • Colonoscopy diagnostic  7/20/15    Dr Olivarez   • Forearm/wrist surgery unlisted  06/2000    broke L wrist in 14 places; attempting to move a box off a shelf       Current Medications:  No current outpatient prescriptions on file.     No current facility-administered medications for this visit.        Allergies:  ALLERGIES:  No Known Allergies    SOCIAL HISTORY:  Social History   Substance Use Topics   • Smoking status: Never Smoker   • Smokeless tobacco: Never Used   • Alcohol use Yes      Comment: beer occ.          May have had a bronchitis or post infectious cough  Less likely to be bacterial infection  Afebrile, no leukocytosis, no hypotension. Saturating well on room air.  Flu/RSV negative.  Legionella negative.  Blood cultures thus far negative  Not enough sputum to send culture  CXR with mild edema, Lasix PRN per Cardiology on discharge  Symptoms have improved on ceftriaxone, finished 5 day course  Passed home O2 eval with no need  Mucinex, duonebs prn  Appreciate speech input  PT/OT eval recommending home health, set up  Nebulizer with Atrovent approved   Drug Use:    No                FAMILY HISTORY:  Family History   Problem Relation Age of Onset   • Cancer Father          of lung cancer   • Heart Mother         has heart disease   • Diabetes Mother        REVIEW OF SYSTEMS:  CONSTITUTIONAL:  No Fevers/Chills/Weight loss/Fatigue/Night sweats  EYES:  No visual changes  HEAD/EARS/NOSE/THROAT/MOUTH:  No Headache/Tinnitus, Positive for Hearing Loss  NECK:  No Swelling/Masses/Neck pain/Neck stiffness  RESPIRATORY:  No Cough/Sputum/Hemoptysis/Shortness of breath/Wheezing/Snoring  CARDIOVASCULAR:  No Chest pain/Dyspnea on exertion/Paroxysmal nocturnal dyspnea/Orthopnea/Edema/Syncope/Palpitations/Diaphoresis/Lightheadedness/Leg pain with ambulation/Leg cramping  GASTROINTESTINAL:  No Abdominal pain/Nausea/Vomiting/Diarrhea/Melena/Bright red blood per rectum/Gastroesophageal reflux disease/Dysphagia/Odynophagia/Change in bowel habits/Jaundice  GENITOURINARY:  No Dysuria/Frequency/Urgency/Hematuria/Incontinence/Urinary retention/Vaginal discharge/Vaginal odors/Dyspareunia/Dysmenorrhea/Menstrual irregularities  INTEGUMENTARY:  No Rashes/Pruritus/Lumps  ENDOCRINE:  No Heat or Cold intolerance/Hair or Nail changes/Polydipsia/Polyuria   HEMATOLOGIC/LYMPHATIC:  No Easy bruising/Easy bleeding/Enlarged lymph nodes  ALLERGY/IMMUNOLOGIC:  No Asthma/Eczema/Rhinitis/Hives  MUSCULOSKELETAL:  No Joint pain/Joint swelling/Joint stiffness/Back pain  NEUROLOGICAL:  No Numbness/Tingling/Weakness/Tremor  PSYCHIATRIC:  No Depression/Anxiety/Panic attacks    PHYSICAL EXAM:  Visit Vitals  /88   Pulse 76   Resp 16   Ht 5' 2.5\" (1.588 m)   Wt 84.2 kg   LMP 11/10/2010   BMI 33.43 kg/m²       CONSTITUTIONAL:  No acute distress, Non-toxic appearing  EYES:  Pupils equal, round, reactive to light and accommodation, Extraocular movements intact, Conjunctivae are clear/pink- no signs of inflammation, Lids are normal, No scleral icterus  HEAD, EARS, NOSE, MOUTH, THROAT:   Normocephalic/Atraumatic, External exam of nose and ears is normal-No Lesions/Masses/Tenderness, External auditory canals normal bilaterally, Tympanic membranes are normal bilaterally with no erythema or bulging and no effusions or discharge- normal light reflex, Hearing normal bilaterally, Nasal mucosa is pink with no sign Erythema/Inflammation and no discharge seen, Inferior/Middle nasal turbinates are not Inflamed/Erythematous, Nasal septum is midline, Oral mucosa, Salivary glands, Hard/Soft palates, Tongue, Tonsils, and Posterior pharynx are normal with no sign of Exudate/Erythema/Petechiae/Post-nasal drip, Moist mucous membranes, Good dentition, Lips/Gums normal with no sign Swelling/Infection/Bleeding  NECK:  Supple, Normal range of motion, Overall normal appearance, No Masses/Swelling, Trachea midline, Thyroid normal size and consistency- No Thyromegaly or Nodules/Lesions appreciated  RESPIRATORY:  Clear to auscultation bilaterally, Good air entry, Symmetrical expansion with respiration.  No Accessory muscle use/Retractions, No Wheezes/Rhonchi.  CARDIOVASCULAR:  Regular rate and rhythm.  Normal S1/S2, No S3/S4, No Murmurs/Rubs/Gallops.  No lower extremity edema or varicosities.  No jugular venous distention.  No bruit heard in bilateral carotid arteries- normal upstroke and amplitude bilaterally. Femoral, Posterior tibial and Dorsalis pedis pulses 2+ bilaterally  GASTROINTESTINAL:  Scaphoid abdomen.  Soft, Non-tender, Non-distended, Bowel sounds present, No Hepatomegaly/Splenomegaly, No masses or hernias, No costovertebral angle tenderness.  No Rigidity/Guarding/Rebound tenderness  GENITOURINARY:  External vulvovaginal region, Labia, Clitoris, Periurethral area, Urethral orifice, and Vaginal canal are clear.  Normal vaginal mucosa.  Cervix is pink with no discharge or lesions seen.  No cervical motion tenderness.  Bimanual exam reveals anterior uterus which is midline, normal size, shape, consistency and  mobility.  Adnexal regions without masses or tenderness.  No discharge seen.  No odors detected.  Pap obtained  LYMPHATIC:  No lymphadenopathy in Neck/Axillae/Groin  MUSCULOSKELETAL:  Gait and station normal, No Cyanosis/Clubbing/Edema, Capillary Refill<2 seconds  SKIN:  Warm, Dry, Inspection normal with no Rashes/Lesions/Ulcers  NEUROLOGIC:  Cranial nerves II-XII grossly intact, Strength 5/5 bilaterally, Sensation grossly intact by touch, Reflexes 2+ bilaterally  PSYCHIATRIC:  The patient is well-nourished and well-groomed.  Alert & Oriented x 3.  Able to articulate well with normal speech/language, rate, volume and coherence.  Recent and remote memory intact.  The patient's mood and affect are described as not anxious or depressed. Associations are intact.  Judgment and Insight are appropriate concerning matters relevant to self.    DATA:  None available    ASSESSMENT AND PLAN:  (Z01.419) Well woman exam with routine gynecological exam  (primary encounter diagnosis)  Comment: Provided preventative care and routine anticipatory guidance including advanced directives, diet, exercise, and screening.  All questions answered. Patient to follow up annually.   Plan: THINPREP PAP TEST WITH HPV REGARDLESS,         COMPREHENSIVE METABOLIC PANEL, LIPID PANEL WITH        REFLEX, GLYCOHEMOGLOBIN            (H91.93) Bilateral hearing loss, unspecified hearing loss type  Comment: Uncontrolled- As ordered. The indication, risks, benefits, potential side effects, and drug interactions of medication(s) were reviewed with the patient.  Alternative treatment options discussed and reviewed with the patient.  Medication instructions and consequences of not taking the medications were discussed with the patient.  Patient expressed understanding and he/she agreed to the plan.   Plan: SERVICE TO AUDIOLOGY, OFFICE/OUTPT VISIT EST         LEVEL III            (Z12.31) Visit for screening mammogram  Plan: MAMMO SCREENING BILATERAL               Juan Landry MD

## 2020-01-24 NOTE — ASSESSMENT & PLAN NOTE
He carries this diagnosis and is on Eliquis at the appropriately reduced dose. He is in regular sinus rhythm now. He reports following with a Cardiologist in [facility]. Would leave him on Eliquis for now. Adding beta blocker.

## 2020-01-24 NOTE — CONSULTS
Cardiology Consult Note  Subjective     Marty Hinds is a 89 y.o. male who was admitted for Pneumonia [J18.9]. Marty Hinds was referred by primary service for elevated troponin. Marty Hinds is an 88 yo male, known remotely to our practice.     He was seen once in consultation at [facility] about 1 month ago but has never been seen as an outpatient.     Mr. Hinds is a limited historian. History was augmented from EMR and prior records.     He has had about 1 week of shortness of breath with productive cough. He has not had chest discomfort, lightheadedness, palpitations, near syncope or syncope. He completed a course of Levaquin with partial relief. He came to the hospital for evaluation.     ECG demonstrated sinus rhythm with a nonspecific intraventricular conduction delay and no acute ischemic changes. Troponin was 0.07 x 2 with creatinine 1.7. He was diagnosed with pneumonia and started on antibiotic therapy. He is resting on room air.     Outside records reviewed..    Past Medical History:   Diagnosis Date   • Atrial fibrillation (CMS/HCC)    • CHF (congestive heart failure) (CMS/HCC)    • Chronic kidney disease    • COPD (chronic obstructive pulmonary disease) (CMS/HCC)    •     • Multiple myeloma (CMS/HCC)    • Type 2 diabetes mellitus (CMS/HCC)        Past Surgical History:   Procedure Laterality Date   • BACK SURGERY         Social History     Social History Narrative    Lives at home with son.  Uses rollator walker.   Former smoker    Family History   Problem Relation Age of Onset   • Pancreatic cancer Biological Mother    • Heart disease Biological Father Unknown       Patient has no known allergies.      Current Facility-Administered Medications:   •  acetaminophen (TYLENOL) tablet 650 mg, 650 mg, oral, q4h PRN, Rose Vazquez CRNP  •  apixaban (ELIQUIS) tablet 2.5 mg, 2.5 mg, oral, BID, Rose Vazquez CRNP, 2.5 mg at 01/24/20 0802  •  cefTRIAXone (ROCEPHIN) IVPB 1 g in 100 mL NSS vial in  bag, 1 g, intravenous, q24h INT, Rose Vazquez CRNP, Stopped at 01/24/20 0210  •  glucose chewable tablet 16-32 g of dextrose, 16-32 g of dextrose, oral, PRN **OR** dextrose 40 % oral gel 15-30 g of dextrose, 15-30 g of dextrose, oral, PRN **OR** glucagon (GLUCAGEN) injection 1 mg, 1 mg, intramuscular, PRN **OR** dextrose in water injection 12.5 g, 25 mL, intravenous, PRN, Rose Vazquez CRNP  •  docusate sodium (COLACE) capsule 100 mg, 100 mg, oral, 2x daily PRN, Rose Vazquez CRNP  •  doxycycline hyclate (VIBRA-TABS) tablet 100 mg, 100 mg, oral, q12h VENKATESH, Rajan Wu MD, 100 mg at 01/24/20 0801  •  guaiFENesin (MUCINEX) 12 hr ER tablet 600 mg, 600 mg, oral, BID, Rose Vazquez CRNP, 600 mg at 01/24/20 0802  •  insulin aspart U-100 (NovoLOG) pen 3-5 Units, 3-5 Units, subcutaneous, With meals & nightly, Rose Vazquez CRNP  •  ipratropium-albuterol (DUO-NEB) 0.5-2.5 mg/3 mL nebulizer solution 3 mL, 3 mL, nebulization, q4h PRN, Rose Vazquez CRNP, 3 mL at 01/24/20 0214  •  latanoprost (XALATAN) 0.005 % ophthalmic solution 1 drop, 1 drop, Both Eyes, Nightly, Rose Vazquez CRNP  •  melatonin tablet 4 mg, 4 mg, oral, Nightly, Rose Vazquez CRNP  •  NON FORMULARY MEDICATION REQUEST, 2 puff, inhalation, BID, Rose Vazquez CRNP  •  pantoprazole (PROTONIX) tablet,delayed release (DR/EC) 20 mg, 20 mg, oral, Daily, Rose Vazquez CRNP, 20 mg at 01/24/20 0802  •  timolol (TIMOPTIC) 0.5 % ophthalmic solution 1 drop, 1 drop, Both Eyes, Daily, Rose Vazquez CRNP, 1 drop at 01/24/20 0805    Review of Systems  Constitutional: positive for fatigue  Eyes: negative for blurred vision and visual disturbance  Ears, nose, mouth, throat, and face: negative for ringing in the ears, nose bleed and bleeding gums  Respiratory: positive for cough and shortness of breath  Cardiovascular: negative for chest pain, chest pressure/discomfort, exertional chest pressure/discomfort, irregular heart  beat, near-syncope, orthopnea, palpitations, paroxysmal nocturnal dyspnea and syncope  Gastrointestinal: negative for nausea, vomiting and diarrhea  Musculoskeletal:negative for myalgias  Neurological: negative for numbness, weakness and tingling  Behavioral/Psych: negative for suicidal ideation and homocidal ideation  Endocrine: negative for cold intolerance and heat intolerance    Objective     Labs   CMP Results       01/24/20 01/23/20 0408 2041           131          K 3.7 4.1          Cl 104 100          CO2 23 22          Glucose 105 176          BUN 26 32          Creatinine 1.7 1.8          Calcium 8.5 8.6          Anion Gap 8 9          EGFR 38.1 35.7          Comment for K at 2041 on 01/23/20:    Results obtained on plasma. Plasma Potassium values may be up to 0.4 mEQ/L less than serum values. The differences may be greater for patients with high platelet or white cell counts.        CBC Results       01/24/20 01/23/20 0408 2041          WBC 5.28 5.20          RBC 3.28 3.49          HGB 8.5 9.1          HCT 28.1 30.0          MCV 85.7 86.0          MCH 25.9 26.1          MCHC 30.2 30.3           277                       Troponin I Results       01/24/20 01/23/20 0408 2041          Troponin I 0.07 0.07          Comment for Troponin I at 0408 on 01/24/20:  Result requires test to be repeated on new specimen 4-6 hours after the original.    Comment for Troponin I at 2041 on 01/23/20:  Result requires test to be repeated on new specimen 4-6 hours after the original.        PT/PTT Results     No lab values to display.        No results found for: BNP, CHOL, TRIG, HDL, LDLCALC, TSH, HGBA1C, DDIMER    Imaging  CXR results pending    ECG   Sinus rhythm. Nonspecific intraventricular conduction delay. No acute ischemic changes.     Telemetry  Sinus rhythm. 8 beat run of NSVT    Physical Exam  Visit Vitals  BP (!) 98/54 (Patient Position:  "Lying)   Pulse 78   Temp 36.3 °C (97.4 °F) (Oral)   Resp 20   Ht 1.702 m (5' 7\")   Wt 81.1 kg (178 lb 12.8 oz)   SpO2 99%   BMI 28.00 kg/m²       General Appearance:  Alert, no distress   Head:  Normocephalic, without obvious abnormality, atraumatic   Eyes:  Conjunctiva/corneas clear, EOM's intact   Endocrine: No thyroid enlargement    Lungs:   Diffusely diminished   Heart:  Regular rhythm, S1 and S2 normal, grade 2/6 apical systolic murmur and grade 3/6 systolic murmur right upper sternal border, no rub or gallop   Abdomen:   Soft, non-tender, no masses, no organomegaly   Vascular: Pulses 2+ and symmetric all extremities, no carotid bruit or jugular vein distention   Musculoskeletal:  Skin: No injury or deformity  Skin color, texture, turgor normal, no rashes or lesions, no cyanosis or edema   Extremities: Extremities normal, atraumatic   Behavior/Emotional: A&Ox2         Assessment   * Pneumonia  Assessment & Plan  Per primary service.     Valvular heart disease  Assessment & Plan  Echo 1/20/2020 with moderate to severe mitral regurgitation, moderate to severe aortic stenosis and moderate aortic regurgitation. Should be discharged on daily Lasix. Will need follow up in our office with probable referral to a valve specialist for further (outpatient) workup.     Paroxysmal atrial fibrillation (CMS/HCC)  Assessment & Plan  He carries this diagnosis and is on Eliquis at the appropriately reduced dose. He is unaware of this diagnosis. He is in regular sinus rhythm now. Apparently he used to follow with a cardiologist in [facility]. Would leave him on Eliquis for now. Adding beta blocker.     Chronic combined systolic and diastolic congestive heart failure (CMS/HCC)  Assessment & Plan  Echo 1/20/2020 with LVEF 15-20%. Grade II diastolic dysfunction.Global hypokinesis. Valvular issues as described elsewhere.     Appears euvolemic at this time. He has not been on beta blocker therapy and will start low dose carvedilol " now especially in light of NSVT. Hold off on ACE for now due to reduced renal function. Would discharge on 20mg PO Lasix daily; he had been on it PRN according to his med rec. Will need follow up in our office and has an appointment scheduled in about 2 weeks.     Elevated troponin  Assessment & Plan  0.07 x 2 with no ischemic ECG changes or anginal discomfort. This is most likely reduced renal clearance. Note his creatinine. No further inpatient workup needed. No need to continue trending troponin.       Stable CV status. Will see this patient as needed and only as needed.         CRISTAL Acuna  1/24/2020

## 2020-01-24 NOTE — PLAN OF CARE
Problem: Adult Inpatient Plan of Care  Goal: Plan of Care Review  Outcome: Progressing  Flowsheets  Taken 1/24/2020 1006 by Chalo Lopez CCC-SLP  Progress: no change  Plan of Care Reviewed With: patient  Taken 1/24/2020 1632 by Alia Bell, RN  Outcome Summary: Pt resting in chair, no complaints. tolerated diet.

## 2020-01-25 LAB
ERYTHROCYTE [DISTWIDTH] IN BLOOD BY AUTOMATED COUNT: 19.1 % (ref 11.6–14.4)
GLUCOSE BLD-MCNC: 171 MG/DL (ref 70–99)
GLUCOSE BLD-MCNC: 188 MG/DL (ref 70–99)
GLUCOSE BLD-MCNC: 230 MG/DL (ref 70–99)
GLUCOSE BLD-MCNC: 246 MG/DL (ref 70–99)
HCT VFR BLDCO AUTO: 26.5 % (ref 40.1–51)
HGB BLD-MCNC: 8.1 G/DL
MCH RBC QN AUTO: 26.6 PG (ref 28–33.2)
MCHC RBC AUTO-ENTMCNC: 30.6 G/DL (ref 32.2–36.5)
MCV RBC AUTO: 86.9 FL (ref 83–98)
PDW BLD AUTO: 10.1 FL (ref 9.4–12.4)
PLATELET # BLD AUTO: 241 K/UL
POCT TEST: ABNORMAL
RBC # BLD AUTO: 3.05 M/UL (ref 4.5–5.8)
WBC # BLD AUTO: 6.99 K/UL

## 2020-01-25 PROCEDURE — 63700000 HC SELF-ADMINISTRABLE DRUG: Performed by: INTERNAL MEDICINE

## 2020-01-25 PROCEDURE — 36415 COLL VENOUS BLD VENIPUNCTURE: CPT | Performed by: HOSPITALIST

## 2020-01-25 PROCEDURE — 63700000 HC SELF-ADMINISTRABLE DRUG: Performed by: HOSPITALIST

## 2020-01-25 PROCEDURE — 99232 SBSQ HOSP IP/OBS MODERATE 35: CPT | Performed by: HOSPITALIST

## 2020-01-25 PROCEDURE — 63700000 HC SELF-ADMINISTRABLE DRUG: Performed by: NURSE PRACTITIONER

## 2020-01-25 PROCEDURE — 63600000 HC DRUGS/DETAIL CODE: Performed by: HOSPITALIST

## 2020-01-25 PROCEDURE — 94761 N-INVAS EAR/PLS OXIMETRY MLT: CPT

## 2020-01-25 PROCEDURE — 20600000 HC ROOM AND CARE INTERMEDIATE/TELEMETRY

## 2020-01-25 PROCEDURE — 85027 COMPLETE CBC AUTOMATED: CPT | Performed by: HOSPITALIST

## 2020-01-25 PROCEDURE — 92526 ORAL FUNCTION THERAPY: CPT | Mod: GN

## 2020-01-25 RX ORDER — AMOXICILLIN 250 MG
1 CAPSULE ORAL 2 TIMES DAILY
Status: DISCONTINUED | OUTPATIENT
Start: 2020-01-25 | End: 2020-01-28 | Stop reason: HOSPADM

## 2020-01-25 RX ORDER — POLYETHYLENE GLYCOL 3350 17 G/17G
17 POWDER, FOR SOLUTION ORAL DAILY
Status: DISCONTINUED | OUTPATIENT
Start: 2020-01-25 | End: 2020-01-28 | Stop reason: HOSPADM

## 2020-01-25 RX ADMIN — GUAIFENESIN 600 MG: 600 TABLET, EXTENDED RELEASE ORAL at 20:14

## 2020-01-25 RX ADMIN — CEFTRIAXONE SODIUM 1 G: 1 INJECTION, POWDER, FOR SOLUTION INTRAVENOUS at 00:52

## 2020-01-25 RX ADMIN — SENNOSIDES AND DOCUSATE SODIUM 1 TABLET: 8.6; 5 TABLET ORAL at 20:14

## 2020-01-25 RX ADMIN — LATANOPROST 1 DROP: 50 SOLUTION OPHTHALMIC at 20:15

## 2020-01-25 RX ADMIN — CARVEDILOL 3.12 MG: 3.12 TABLET, FILM COATED ORAL at 17:30

## 2020-01-25 RX ADMIN — INSULIN ASPART 3 UNITS: 100 INJECTION, SOLUTION INTRAVENOUS; SUBCUTANEOUS at 13:36

## 2020-01-25 RX ADMIN — INSULIN ASPART 3 UNITS: 100 INJECTION, SOLUTION INTRAVENOUS; SUBCUTANEOUS at 17:29

## 2020-01-25 RX ADMIN — APIXABAN 2.5 MG: 2.5 TABLET, FILM COATED ORAL at 20:14

## 2020-01-25 RX ADMIN — TIMOLOL MALEATE 1 DROP: 5 SOLUTION/ DROPS OPHTHALMIC at 09:24

## 2020-01-25 RX ADMIN — APIXABAN 2.5 MG: 2.5 TABLET, FILM COATED ORAL at 09:19

## 2020-01-25 RX ADMIN — POLYETHYLENE GLYCOL 3350 17 G: 17 POWDER, FOR SOLUTION ORAL at 20:14

## 2020-01-25 RX ADMIN — Medication 4 MG: at 22:48

## 2020-01-25 RX ADMIN — INSULIN ASPART 3 UNITS: 100 INJECTION, SOLUTION INTRAVENOUS; SUBCUTANEOUS at 22:50

## 2020-01-25 RX ADMIN — PANTOPRAZOLE SODIUM 20 MG: 20 TABLET, DELAYED RELEASE ORAL at 09:19

## 2020-01-25 RX ADMIN — GUAIFENESIN 600 MG: 600 TABLET, EXTENDED RELEASE ORAL at 09:20

## 2020-01-25 ASSESSMENT — COGNITIVE AND FUNCTIONAL STATUS - GENERAL
UNDERSTANDING 10 TO 15 MIN SPEECH: 3 - A LITTLE
TAKING CARE OF COMPLICATED TASKS: 2 - A LOT
AFFECT: WFL
REMEMBERING 5 ERRANDS WITH NO LIST: 2 - A LOT
REMEMBERING WHERE THINGS ARE: 2 - A LOT
FOLLOWS FAMILIAR CONVERSATION: 3 - A LITTLE
REMEMBERING TO TAKE MEDICATION: 2 - A LOT

## 2020-01-25 NOTE — PLAN OF CARE
Problem: Adult Inpatient Plan of Care  Goal: Plan of Care Review  Outcome: Progressing  Flowsheets (Taken 1/25/2020 1816)  Progress: improving  Plan of Care Reviewed With: patient  Outcome Summary: Pt has no c/o pain or distress at present time . Rhonchi present . Pt has loose nonproductive cough at present time .

## 2020-01-25 NOTE — PROGRESS NOTES
Patient: Marty Hinds  Location: [place] [address]  MRN: [id number]  Today's date: 1/25/2020    Speech Pathology: Therapy session    SLP Diagnosis: possible Esophageal Dysphagia    Recommendations:  1.  Regular, thin liquids  2.  Cyclic ingestion  3.  Slow rate  4.  Prophylactic NELY precautions  5.  SLP to follow to train slow rate, cyclic ingestion    Summary/Handoff:  Daily Outcome Statement (SLP): Patient is tolerating regular, thinliquids; needs cues ot alternate solids with liquids; use slow rate; no overt sx; f/u train compensations as able      Session Notes: Seen sleeping, awakened easily and moved upright for breakfast; Adequate access/containment; effective mastication, cohesive transfers, reminders to drink with meal in between bites; cooperative; no overt sx; Written papers left for patient for cyclic ingestion; NELY precautions; some reviewed but will need to have it reviewed again and ideally not when woken up            Dietary Orders   (From admission, onward)             Start     Ordered    01/23/20 2343  Adult Diet Regular; Cardiac (Low Sodium/Low Fat); RD/LDN may adjust order  Diet effective now     Question Answer Comment   Diet Texture Regular    Other Restriction(s): Cardiac (Low Sodium/Low Fat)    Delegation of Authority. Diet orders written by PA/CRNPs may not be adjusted by RD/LDNs. RD/LDN may adjust order        01/23/20 2342                Results from last 7 days   Lab Units 01/24/20  0408 01/23/20  2041   WBC K/uL 5.28 5.20   HEMOGLOBIN g/dL 8.5* 9.1*   HEMATOCRIT % 28.1* 30.0*   PLATELETS K/uL 235 277          Patient left with call bell in reach and alarms as found.        No notes on file    SLP Pain    Date/Time Pain Type Pref Pain Scale Rating: Rest Who   01/25/20 0913 Pain Assessment word (verbal rating pain scale) 0 - no pain JAB          Prior Living Environment      Most Recent Value   Living Arrangements  house   Living Environment Comment  lives with son           Prior Level of Function      Most Recent Value   Communication  understands/communicates without difficulty   Swallowing  swallows foods/liquids without difficulty   Baseline Diet/Method of Nutritional Intake  thin liquids, regular solids   Past History of Dysphagia  increased coughing noted   Equipment Currently Used at Home  walker, front-wheeled, inhaler, shower chair, grab bar          SLP Evaluation and Treatment - 01/25/20 0913        Time Calculation    Start Time  0913     Stop Time  0922     Time Calculation (min)  9 min        Session Details    Document Type  daily treatment/progress note     Mode of Treatment  speech language pathology;individual therapy        General Information    Patient Profile Reviewed?  yes     Existing Precautions/Restrictions  fall        Cognition/Psychosocial    Affect/Mental Status (Cognitive)  WFL        Motor Speech    Speech Intelligibility (Motor Speech)  WNL     Articulation (Motor Speech)  WNL     Speech Fluency (Motor Speech)  WNL     Vocal Loudness (Motor Speech)  WNL     Breath Support (Motor Speech)  intact     Rate/Prosody (Motor Speech)  WNL     Resonance (Motor Speech)  WNL        Auditory Comprehension    Follows Commands (Auditory Comprehension)  WNL        Functional Communication Measures    FCM: Swallowing  6-->Level 6        Swallowing Recommendations    Strategies to Enhance Eating/Swallowing  allow adequate time for eating;alternate food and liquid swallows     Diet Consistency Recommendations  thin liquids;regular solids     Mode of Delivery Recommendations  moisten foods;slow rate of intake        AM-PAC (TM) - Cognition (Current Function)    Following/understanding a 10-15 minute speech or presentation?  3 - A little     Understanding familiar people during ordinary conversations?  3 - A little     Remembering to take medications at the appropriate time?  2 - A lot     Remembering where things were placed or put away?  2 - A lot     Remembering a  list of 3 or 4 errands without writing it down?  2 - A lot     Taking care of complicated tasks?  2 - A lot     AM-PAC (TM) Cognition Score  14        Therapy Assessment/Plan (SLP)    SLP Diagnosis  possible Esophageal Dysphagia     Rehab Potential (SLP Eval)  fair, will monitor progress closely     Criteria for Skilled Therapeutic Interventions Met (SLP Eval)  skilled criteria for speech language intervention met     Therapy Frequency (SLP)  3-5 times/wk        Daily Progress Summary (SLP)    Daily Outcome Statement (SLP)  Patient is tolerating regular, thinliquids; needs cues ot alternate solids with liquids; use slow rate; no overt sx; f/u train compensations as able        Therapy Plan Review/Discharge Plan (SLP)    Therapy Plan Review (SLP)  patient;care plan/treatment goals reviewed                  Education provided this session. See the Patient Education summary report for full details.    SLP Goals      Most Recent Value   Oral Nutrition/Hydration Goal 1   Activity  effective/safe, management of texture/viscosity at 01/25/2020 0913   Time Frame  by discharge at 01/25/2020 0913   Progress/Outcome  goal met at 01/25/2020 0913   Swallow Management Recall Goal 1   Activity  recall of, compensatory swallow strategies/techniques, with minimal cues (75-90% accuracy) at 01/25/2020 0913   Time Frame  by discharge at 01/25/2020 0913   Progress/Outcome  goal ongoing at 01/25/2020 0913

## 2020-01-25 NOTE — PLAN OF CARE
Problem: Adult Inpatient Plan of Care  Goal: Plan of Care Review  Outcome: Progressing  Flowsheets (Taken 1/25/2020 0738)  Progress: improving  Plan of Care Reviewed With: patient  Outcome Summary: Pt comfortable; offers no complaints. Pt ambulates w/ walker & assist x1. Pt slept well through night.

## 2020-01-25 NOTE — PROGRESS NOTES
CARDIOLOGY DAILY PROGRESS NOTE    Marty Hinds is a 89 y.o. male who was admitted for Pneumonia [J18.9].    INTERVAL HISTORY:  Tired but denies cough, dyspnea, chest pain, palpitations, dizziness.      CURRENT IP MEDS:  • apixaban  2.5 mg oral BID   • carvedilol  3.125 mg oral BID with meals   • cefTRIAXone  1 g intravenous q24h INT   • guaiFENesin  600 mg oral BID   • insulin aspart U-100  3-5 Units subcutaneous With meals & nightly   • latanoprost  1 drop Both Eyes Nightly   • melatonin  4 mg oral Nightly   • NON FORMULARY MEDICATION REQUEST  2 puff inhalation BID   • pantoprazole  20 mg oral Daily   • timolol  1 drop Both Eyes Daily       Objective     Vital signs in last 24 hours:  Temp:  [36.4 °C (97.5 °F)-37.2 °C (99 °F)] 36.4 °C (97.5 °F)  Heart Rate:  [71-92] 92  Resp:  [16-18] 17  BP: ()/(50-81) 118/59  FiO2 (%) (Set):  [21 %] 21 %      Intake/Output Summary (Last 24 hours) at 1/25/2020 1314  Last data filed at 1/24/2020 1559  Gross per 24 hour   Intake --   Output 200 ml   Net -200 ml     WEIGHTS    PHYSICAL EXAM  Physical Exam   Constitutional: He appears well-developed. No distress.   HENT:   Head: Normocephalic and atraumatic.   Eyes: Conjunctivae are normal. No scleral icterus.   Neck: Neck supple. No JVD present. No thyromegaly present.   Cardiovascular: Normal rate, regular rhythm and intact distal pulses. Exam reveals no gallop.   Murmur heard.   Harsh midsystolic murmur is present with a grade of 2/6 at the upper right sternal border radiating to the neck.  Pulmonary/Chest: Effort normal. No respiratory distress.   Abdominal: Soft. Bowel sounds are normal.   Musculoskeletal: He exhibits no edema or tenderness.   Neurological: He is alert.   Skin: Skin is warm and dry.   Psychiatric: He has a normal mood and affect. His behavior is normal.       LABS   CMP Results       01/24/20 01/23/20                       0408 2041           131          K 3.7 4.1          Cl 104 100          CO2  23 22          Glucose 105 176          BUN 26 32          Creatinine 1.7 1.8          Calcium 8.5 8.6          Anion Gap 8 9          EGFR 38.1 35.7          Comment for K at 2041 on 01/23/20:    Results obtained on plasma. Plasma Potassium values may be up to 0.4 mEQ/L less than serum values. The differences may be greater for patients with high platelet or white cell counts.        CBC Results       01/24/20 01/23/20                       0408 2041          WBC 5.28 5.20          RBC 3.28 3.49          HGB 8.5 9.1          HCT 28.1 30.0          MCV 85.7 86.0          MCH 25.9 26.1          MCHC 30.2 30.3           277                       Troponin I Results       01/24/20 01/24/20 01/23/20                    1022 0408 2041         Troponin I 0.06 0.07 0.07         Comment for Troponin I at 1022 on 01/24/20:  Result requires test to be repeated on new specimen 4-6 hours after the original.    Comment for Troponin I at 0408 on 01/24/20:  Result requires test to be repeated on new specimen 4-6 hours after the original.    Comment for Troponin I at 2041 on 01/23/20:  Result requires test to be repeated on new specimen 4-6 hours after the original.        PT/PTT Results     No lab values to display.        No results found for: MAG, BNP, CHOL, TRIG, HDL, LDLCALC, TSH, HGBA1C, DDIMER      Imaging  X-ray Chest 2 Views    Result Date: 1/24/2020  IMPRESSION: Mild cardiomegaly.  Interstitial edema.  Bilateral pleural effusions.       No new imaging study.    ECG   No new ECG.    TELEMETRY  SR     Assessment/Plan   Valvular heart disease  Assessment & Plan  Echo 1/20/2020 with moderate to severe mitral regurgitation, moderate to severe aortic stenosis and moderate aortic regurgitation. Should be discharged on daily Lasix. Will need follow up in our office with probable referral to a valve specialist for further (outpatient) workup.     Paroxysmal atrial fibrillation (CMS/HCC)  Assessment & Plan  He carries  this diagnosis and is on Eliquis at the appropriately reduced dose. He is unaware of this diagnosis. He is in regular sinus rhythm now. Apparently he used to follow with a cardiologist in [facility]. Would leave him on Eliquis for now. Adding beta blocker.     Chronic combined systolic and diastolic congestive heart failure (CMS/HCC)  Assessment & Plan  Echo 1/20/2020 with LVEF 15-20%. Grade II diastolic dysfunction.Global hypokinesis. Valvular issues as described elsewhere.     Appears euvolemic at this time. He has not been on beta blocker therapy and will start low dose carvedilol now especially in light of NSVT. Hold off on ACE for now due to reduced renal function. Would discharge on 20mg PO Lasix daily; he had been on it PRN according to his med rec. Will need follow up in our office and has an appointment scheduled in about 2 weeks.     Elevated troponin  Assessment & Plan  0.07 x 2 with no ischemic ECG changes or anginal discomfort. This is most likely reduced renal clearance. Note his creatinine. No further inpatient workup needed. No need to continue trending troponin.     * Cough  Assessment & Plan  Per primary service.                Kendra Vee,   1/25/2020

## 2020-01-25 NOTE — PROGRESS NOTES
Hospital Medicine Service -  Daily Progress Note       SUBJECTIVE   Interval History:     Patient reports feeling better  Still with nonproductive cough  No fever or chills overnight  No chest pain or palpitations  No nausea or vomiting  No abdominal pain  No other complaints     OBJECTIVE      Vital signs in last 24 hours:  Temp:  [36.4 °C (97.5 °F)-37.2 °C (99 °F)] 36.9 °C (98.5 °F)  Heart Rate:  [71-93] 93  Resp:  [16-18] 18  BP: ()/(50-78) 129/57  FiO2 (%) (Set):  [21 %] 21 %  No intake or output data in the 24 hours ending 01/25/20 1840    PHYSICAL EXAMINATION      Physical Exam   Constitutional: He is oriented to person, place, and time. No distress.   elderly   HENT:   Head: Normocephalic and atraumatic.   Mouth/Throat: Oropharynx is clear and moist. No oropharyngeal exudate.   Eyes: Pupils are equal, round, and reactive to light. EOM are normal. No scleral icterus.   Neck: Normal range of motion. Neck supple. No JVD present.   Cardiovascular: Normal rate and regular rhythm.   + systolic murmur   Pulmonary/Chest: Effort normal. No stridor. No respiratory distress. He has no wheezes. He has no rales. He exhibits no tenderness.   Mildly diminished breath sounds at the bases   Abdominal: Soft. Bowel sounds are normal. He exhibits no distension. There is no tenderness. There is no rebound and no guarding.   Musculoskeletal: Normal range of motion. He exhibits no edema or deformity.   Lymphadenopathy:     He has no cervical adenopathy.   Neurological: He is alert and oriented to person, place, and time. No sensory deficit. He exhibits normal muscle tone.   Skin: Skin is warm.   Psychiatric: He has a normal mood and affect. His behavior is normal.      LINES, CATHETERS, DRAINS, AIRWAYS, AND WOUNDS   Lines, Drains, Airways, Wounds:  Peripheral IV 01/23/20 Right Antecubital (Active)   Number of days: 2        LABS / IMAGING / TELE      Labs  I have reviewed the patient's pertinent labs.  Significant  abnormals are WBC 6.9, hemoglobin 8.1.    Imaging  No new imaging since 1/24    ECG/Telemetry  I have independently reviewed the telemetry. Significant findings include Sinus.    ASSESSMENT AND PLAN      * Cough  Assessment & Plan  May have had a bronchitis or post infectious cough  Less likely to be bacterial infection  Afebrile, no leukocytosis, no hypotension. Saturating well on room air.  Flu/RSV negative.  Legionella negative.  Blood cultures thus far negative  Not enough sputum to send culture  CXR with mild edema  Tums have improved on ceftriaxone  Passed home O2 eval  Mucinex, duonebs prn  Appreciate speech input  PT/OT eval    Elevated troponin  Assessment & Plan  Trop 0.07.  Likely related to acute illness  Endorses episode of exertional chest pain a few weeks ago, but denies chest pain recently; SOB more likely due to pneumonia  ECG without ST elevation.  Nonspecific intraventricular block, no baseline ECG available for comparison  Had TTE last week, try to obtain results  Troponin trend flat  Appreciate cardiology input    Chronic combined systolic and diastolic congestive heart failure (CMS/HCC)  Assessment & Plan  Hx CHF, portably systolic based upon family report  Not maintained on daily diuretics secondary to syncope.  Takes 60mg prn lasix for weight gain.    CXR with mild pulm vascular congestion, no evidence of overwhelming fluid overload  Recent echo with mitral and aortic valvulopathy  Monitor daily weight, I/O  Diuresis prn    Type 2 diabetes mellitus (CMS/HCC)  Assessment & Plan  - On glimepiride  - monitor accu checks, SSI    Paroxysmal atrial fibrillation (CMS/HCC)  Assessment & Plan  Recently diagnosed at PCP visit and started on eliquis.  Not on rate control meds  Currently in NSR  Monitor on telemetry  Continue eliquis    Chronic anemia  Assessment & Plan  Hgb 9.1 on admission, no labs available for baseline but his son thinks this is stable for him.  Chronic anemia, secondary to MM, CKD.   has required PRN blood transfusions in the past  Hgb 8.5->8.1  No evidence of active bleeding  No records received from PCP yet  Monitor trends    COPD (chronic obstructive pulmonary disease) (CMS/HCC)  Assessment & Plan  Does not appear to be acutely bronchospastic  Maintaining pulse ox on room air  Cont Symbicort    CKD (chronic kidney disease)  Assessment & Plan  Cr 1.8.  Per son, baseline ~2.0  Obtain labs from PCP  Avoid nephrotoxins  Monitor BMP    Multiple myeloma not having achieved remission (CMS/[facility])  Assessment & Plan  Multiple myeloma dx Oct 2018.  lenalidomide 7 days on/14 days off cycle.  Due to start on 1/25  Associated anemia requiring intermittent blood transfusions     History of coronary artery disease  Assessment & Plan  - Hx CAD dx via cardiac cath.  Denies history of MI, stents  - On asa.  Previously was on isosorbide but discontinued due to syncope       VTE Assessment: Padua VTE Score: 8  VTE Prophylaxis Plan: Eliquis  Code Status: Full Code  Estimated Discharge Date: 1/26/2020  Disposition Planning: Ending clinical course     Rajan Wu MD  1/25/2020

## 2020-01-25 NOTE — PLAN OF CARE
Problem: Adult Inpatient Plan of Care  Goal: Plan of Care Review  Outcome: Progressing  Flowsheets (Taken 1/25/2020 1239)  Progress: no change  Plan of Care Reviewed With: patient  Outcome Summary: tolerating diet; reminders to alternate solids wiht liquids     Problem: Adult Inpatient Plan of Care  Goal: Patient-Specific Goal (Individualization)  Outcome: Progressing  Flowsheets (Taken 1/24/2020 1006)  Individualized Care Needs: encouraged to use call bell for all needs     Problem: Swallowing Impairment  Goal: Improved Swallowing Without Aspiration  Outcome: Progressing

## 2020-01-25 NOTE — NURSING NOTE
Home Oxygen Qualification Protocol    Findings:  Room Air Spo2 at rest: 99 %  Room Air SpO2 % with ambulation: 98 %       Conclusion:  Meets criteria for home oxygen: No  Does not meet criteria for home oxygen: Yes    Oxygen Recommendation:             The information above was collected from the most recent home oxygen qualification protocol performed on the patient.

## 2020-01-26 LAB
ABO + RH BLD: NORMAL
ANION GAP SERPL CALC-SCNC: 11 MEQ/L (ref 3–15)
BASOPHILS # BLD: 0.06 K/UL (ref 0.01–0.1)
BASOPHILS NFR BLD: 0.8 %
BLD GP AB SCN SERPL QL: NEGATIVE
BUN SERPL-MCNC: 34 MG/DL (ref 8–20)
CALCIUM SERPL-MCNC: 8.1 MG/DL (ref 8.9–10.3)
CHLORIDE SERPL-SCNC: 106 MEQ/L (ref 98–109)
CO2 SERPL-SCNC: 19 MEQ/L (ref 22–32)
CREAT SERPL-MCNC: 1.7 MG/DL
D AG BLD QL: NEGATIVE
DIFFERENTIAL METHOD BLD: ABNORMAL
EOSINOPHIL # BLD: 0.07 K/UL (ref 0.04–0.54)
EOSINOPHIL NFR BLD: 1 %
ERYTHROCYTE [DISTWIDTH] IN BLOOD BY AUTOMATED COUNT: 19.1 % (ref 11.6–14.4)
FERRITIN SERPL-MCNC: 46 NG/ML (ref 24–250)
GFR SERPL CREATININE-BSD FRML MDRD: 38.1 ML/MIN/1.73M*2
GLUCOSE BLD-MCNC: 140 MG/DL (ref 70–99)
GLUCOSE BLD-MCNC: 237 MG/DL (ref 70–99)
GLUCOSE BLD-MCNC: 243 MG/DL (ref 70–99)
GLUCOSE BLD-MCNC: 252 MG/DL (ref 70–99)
GLUCOSE SERPL-MCNC: 146 MG/DL (ref 70–99)
HCT VFR BLDCO AUTO: 25.2 % (ref 40.1–51)
HCT VFR BLDCO AUTO: 25.5 % (ref 40.1–51)
HCT VFR BLDCO AUTO: 27.4 % (ref 40.1–51)
HGB BLD-MCNC: 7.4 G/DL
HGB BLD-MCNC: 7.8 G/DL
HGB BLD-MCNC: 8.4 G/DL
IMM GRANULOCYTES # BLD AUTO: 0.05 K/UL (ref 0–0.08)
IMM GRANULOCYTES NFR BLD AUTO: 0.7 %
IRON SATN MFR SERPL: 6 % (ref 15–45)
IRON SERPL-MCNC: 18 UG/DL (ref 35–150)
LABORATORY COMMENT REPORT: NORMAL
LYMPHOCYTES # BLD: 1.44 K/UL (ref 1.2–3.5)
LYMPHOCYTES NFR BLD: 20.4 %
MCH RBC QN AUTO: 26.1 PG (ref 28–33.2)
MCHC RBC AUTO-ENTMCNC: 29 G/DL (ref 32.2–36.5)
MCV RBC AUTO: 90.1 FL (ref 83–98)
MONOCYTES # BLD: 0.72 K/UL (ref 0.3–1)
MONOCYTES NFR BLD: 10.2 %
NEUTROPHILS # BLD: 4.72 K/UL (ref 1.7–7)
NEUTS SEG NFR BLD: 66.9 %
NRBC BLD-RTO: 0 %
PDW BLD AUTO: 10.6 FL (ref 9.4–12.4)
PLATELET # BLD AUTO: 228 K/UL
POCT TEST: ABNORMAL
POTASSIUM SERPL-SCNC: 4.2 MEQ/L (ref 3.6–5.1)
RBC # BLD AUTO: 2.83 M/UL (ref 4.5–5.8)
SODIUM SERPL-SCNC: 136 MEQ/L (ref 136–144)
TIBC SERPL-MCNC: 297 UG/DL (ref 270–460)
UIBC SERPL-MCNC: 279 UG/DL (ref 180–360)
WBC # BLD AUTO: 7.06 K/UL

## 2020-01-26 PROCEDURE — 85018 HEMOGLOBIN: CPT | Performed by: HOSPITALIST

## 2020-01-26 PROCEDURE — 82728 ASSAY OF FERRITIN: CPT | Performed by: HOSPITALIST

## 2020-01-26 PROCEDURE — 86901 BLOOD TYPING SEROLOGIC RH(D): CPT

## 2020-01-26 PROCEDURE — 85025 COMPLETE CBC W/AUTO DIFF WBC: CPT | Performed by: HOSPITALIST

## 2020-01-26 PROCEDURE — 80048 BASIC METABOLIC PNL TOTAL CA: CPT | Performed by: HOSPITALIST

## 2020-01-26 PROCEDURE — 63600000 HC DRUGS/DETAIL CODE: Performed by: HOSPITALIST

## 2020-01-26 PROCEDURE — 20600000 HC ROOM AND CARE INTERMEDIATE/TELEMETRY

## 2020-01-26 PROCEDURE — 97162 PT EVAL MOD COMPLEX 30 MIN: CPT | Mod: GP | Performed by: PHYSICAL THERAPIST

## 2020-01-26 PROCEDURE — 36415 COLL VENOUS BLD VENIPUNCTURE: CPT | Performed by: HOSPITALIST

## 2020-01-26 PROCEDURE — 97116 GAIT TRAINING THERAPY: CPT | Mod: GP | Performed by: PHYSICAL THERAPIST

## 2020-01-26 PROCEDURE — 63700000 HC SELF-ADMINISTRABLE DRUG: Performed by: HOSPITALIST

## 2020-01-26 PROCEDURE — 63700000 HC SELF-ADMINISTRABLE DRUG: Performed by: NURSE PRACTITIONER

## 2020-01-26 PROCEDURE — 83550 IRON BINDING TEST: CPT | Performed by: HOSPITALIST

## 2020-01-26 PROCEDURE — 99232 SBSQ HOSP IP/OBS MODERATE 35: CPT | Performed by: HOSPITALIST

## 2020-01-26 RX ADMIN — POLYETHYLENE GLYCOL 3350 17 G: 17 POWDER, FOR SOLUTION ORAL at 08:43

## 2020-01-26 RX ADMIN — GUAIFENESIN 600 MG: 600 TABLET, EXTENDED RELEASE ORAL at 21:22

## 2020-01-26 RX ADMIN — GUAIFENESIN 600 MG: 600 TABLET, EXTENDED RELEASE ORAL at 08:43

## 2020-01-26 RX ADMIN — CEFTRIAXONE SODIUM 1 G: 1 INJECTION, POWDER, FOR SOLUTION INTRAVENOUS at 01:58

## 2020-01-26 RX ADMIN — INSULIN ASPART 3 UNITS: 100 INJECTION, SOLUTION INTRAVENOUS; SUBCUTANEOUS at 21:28

## 2020-01-26 RX ADMIN — PANTOPRAZOLE SODIUM 20 MG: 20 TABLET, DELAYED RELEASE ORAL at 08:43

## 2020-01-26 RX ADMIN — INSULIN ASPART 3 UNITS: 100 INJECTION, SOLUTION INTRAVENOUS; SUBCUTANEOUS at 13:34

## 2020-01-26 RX ADMIN — APIXABAN 2.5 MG: 2.5 TABLET, FILM COATED ORAL at 21:23

## 2020-01-26 RX ADMIN — APIXABAN 2.5 MG: 2.5 TABLET, FILM COATED ORAL at 13:34

## 2020-01-26 RX ADMIN — SENNOSIDES AND DOCUSATE SODIUM 1 TABLET: 8.6; 5 TABLET ORAL at 21:23

## 2020-01-26 RX ADMIN — Medication 4 MG: at 21:22

## 2020-01-26 RX ADMIN — INSULIN ASPART 3 UNITS: 100 INJECTION, SOLUTION INTRAVENOUS; SUBCUTANEOUS at 17:28

## 2020-01-26 RX ADMIN — LATANOPROST 1 DROP: 50 SOLUTION OPHTHALMIC at 21:46

## 2020-01-26 RX ADMIN — TIMOLOL MALEATE 1 DROP: 5 SOLUTION/ DROPS OPHTHALMIC at 08:51

## 2020-01-26 RX ADMIN — SENNOSIDES AND DOCUSATE SODIUM 1 TABLET: 8.6; 5 TABLET ORAL at 08:43

## 2020-01-26 ASSESSMENT — COGNITIVE AND FUNCTIONAL STATUS - GENERAL
AFFECT: WFL
WALKING IN HOSPITAL ROOM: 3 - A LITTLE
STANDING UP FROM CHAIR USING ARMS: 3 - A LITTLE
MOVING TO AND FROM BED TO CHAIR: 3 - A LITTLE
CLIMB 3 TO 5 STEPS WITH RAILING: 3 - A LITTLE

## 2020-01-26 NOTE — PROGRESS NOTES
CARDIOLOGY DAILY PROGRESS NOTE    Marty Hinds is a 89 y.o. male who was admitted for Pneumonia [J18.9].    INTERVAL HISTORY:  No events overnight.  Sitting up in reclining chair.  Denies SOB, CP, palpitations.  Mild cough.  No chills.    CURRENT IP MEDS:  • apixaban  2.5 mg oral BID   • carvedilol  3.125 mg oral BID with meals   • cefTRIAXone  1 g intravenous q24h INT   • guaiFENesin  600 mg oral BID   • insulin aspart U-100  3-5 Units subcutaneous With meals & nightly   • latanoprost  1 drop Both Eyes Nightly   • melatonin  4 mg oral Nightly   • NON FORMULARY MEDICATION REQUEST  2 puff inhalation BID   • pantoprazole  20 mg oral Daily   • polyethylene glycol  17 g oral Daily   • sennosides-docusate sodium  1 tablet oral BID   • timolol  1 drop Both Eyes Daily       Objective     Vital signs in last 24 hours:  Temp:  [36.2 °C (97.2 °F)-37 °C (98.6 °F)] 36.2 °C (97.2 °F)  Heart Rate:  [72-93] 78  Resp:  [17-18] 18  BP: ()/(41-76) 104/76      Intake/Output Summary (Last 24 hours) at 1/26/2020 1141  Last data filed at 1/26/2020 0600  Gross per 24 hour   Intake --   Output 300 ml   Net -300 ml     WEIGHTS    PHYSICAL EXAM  Physical Exam  Constitutional: He appears well-developed. No distress.   HENT:   Head: Normocephalic and atraumatic.   Eyes: Conjunctivae are normal. No scleral icterus.   Neck: Neck supple. No JVD present. No thyromegaly present.   Cardiovascular: Normal rate, regular rhythm and intact distal pulses. Exam reveals no gallop.   Murmur heard.   Harsh midsystolic murmur is present with a grade of 2/6 at the upper right sternal border radiating to the neck.  Pulmonary/Chest: Effort normal. No respiratory distress.   Abdominal: Soft. Bowel sounds are normal.   Musculoskeletal: He exhibits no edema or tenderness.   Neurological: He is alert.   Skin: Skin is warm and dry.   Psychiatric: He has a normal mood and affect. His behavior is normal.         LABS   CMP Results       01/26/20 01/24/20  01/23/20                    0408 0408 2041          135 131         K 4.2 3.7 4.1         Cl 106 104 100         CO2 19 23 22         Glucose 146 105 176         BUN 34 26 32         Creatinine 1.7 1.7 1.8         Calcium 8.1 8.5 8.6         Anion Gap 11 8 9         EGFR 38.1 38.1 35.7         Comment for K at 0408 on 01/26/20:    SLIGHT HEMOLYSIS, RESULT MAY BE INCREASED.    Comment for K at 2041 on 01/23/20:    Results obtained on plasma. Plasma Potassium values may be up to 0.4 mEQ/L less than serum values. The differences may be greater for patients with high platelet or white cell counts.        CBC Results       01/26/20 01/26/20 01/25/20                    0856 0408 1423         WBC -- 7.06 6.99         RBC -- 2.83 3.05         HGB 7.8 7.4 8.1         HCT 25.2 25.5 26.5         MCV -- 90.1 86.9         MCH -- 26.1 26.6         MCHC -- 29.0 30.6         PLT -- 228 241                     Troponin I Results       01/24/20 01/24/20 01/23/20                    1022 0408 2041         Troponin I 0.06 0.07 0.07         Comment for Troponin I at 1022 on 01/24/20:  Result requires test to be repeated on new specimen 4-6 hours after the original.    Comment for Troponin I at 0408 on 01/24/20:  Result requires test to be repeated on new specimen 4-6 hours after the original.    Comment for Troponin I at 2041 on 01/23/20:  Result requires test to be repeated on new specimen 4-6 hours after the original.        PT/PTT Results     No lab values to display.        No results found for: MAG, BNP, CHOL, TRIG, HDL, LDLCALC, TSH, HGBA1C, DDIMER      Imaging  X-ray Chest 2 Views    Result Date: 1/24/2020  IMPRESSION: Mild cardiomegaly.  Interstitial edema.  Bilateral pleural effusions.       No new imaging study.    ECG   No new ECG.    TELEMETRY  SR, PVCs, 65-80    Assessment/Plan   Valvular heart disease  Assessment & Plan  Echo 1/20/2020 with moderate to severe mitral regurgitation, moderate to severe aortic stenosis  and moderate aortic regurgitation. Should be discharged on daily Lasix. Will need follow up in our office with probable referral to a valve specialist for further (outpatient) workup.     Paroxysmal atrial fibrillation (CMS/HCC)  Assessment & Plan  He carries this diagnosis and is on Eliquis at the appropriately reduced dose. He is unaware of this diagnosis. He is in regular sinus rhythm now. Apparently he used to follow with a cardiologist in [facility]. Would leave him on Eliquis for now. Adding beta blocker.     Chronic combined systolic and diastolic congestive heart failure (CMS/HCC)  Assessment & Plan  Echo 1/20/2020 with LVEF 15-20%. Grade II diastolic dysfunction.Global hypokinesis. Valvular issues as described elsewhere.     Appears euvolemic at this time. He has not been on beta blocker therapy and will start low dose carvedilol now especially in light of NSVT. Hold off on ACE for now due to reduced renal function. Would discharge on 20mg PO Lasix daily; he had been on it PRN according to his med rec. Will need follow up in our office and has an appointment scheduled in about 2 weeks.     Elevated troponin  Assessment & Plan  0.07 x 2 with no ischemic ECG changes or anginal discomfort. This is most likely reduced renal clearance. Note his creatinine. No further inpatient workup needed. No need to continue trending troponin.     * Cough  Assessment & Plan  Per primary service.                Kendra Vee DO  1/26/2020

## 2020-01-26 NOTE — PLAN OF CARE
Problem: Adult Inpatient Plan of Care  Goal: Plan of Care Review  Outcome: Progressing  Flowsheets (Taken 1/26/2020 1243)  Plan of Care Reviewed With: patient  Outcome Summary: close S for fxn mobility, will benefit from home care PT to address deficits in bed mobility, transfers and gait to ensure safety at home  Goal: Patient-Specific Goal (Individualization)  Outcome: Progressing  Flowsheets (Taken 1/26/2020 1243)  Patient-Specific Goals (Include Timeframe): to go home     Problem: Mobility Impairment  Goal: Optimal Mobility  Outcome: Progressing

## 2020-01-26 NOTE — PROGRESS NOTES
Hospital Medicine Service -  Daily Progress Note       SUBJECTIVE   Interval History:     Cough persistent, though improving  No fever or chills overnight  No chest pain or palpitations  No nausea or vomiting  No abdominal pain  No BM since 1/22  No other complaints     OBJECTIVE      Vital signs in last 24 hours:  Temp:  [36.4 °C (97.5 °F)-37 °C (98.6 °F)] 36.7 °C (98.1 °F)  Heart Rate:  [72-93] 80  Resp:  [17-18] 18  BP: ()/(41-59) 96/54  FiO2 (%) (Set):  [21 %] 21 %    Intake/Output Summary (Last 24 hours) at 1/26/2020 0928  Last data filed at 1/26/2020 0600  Gross per 24 hour   Intake --   Output 300 ml   Net -300 ml       PHYSICAL EXAMINATION      Physical Exam   Constitutional: He is oriented to person, place, and time. No distress.   elderly   HENT:   Head: Normocephalic and atraumatic.   Mouth/Throat: Oropharynx is clear and moist. No oropharyngeal exudate.   Eyes: Pupils are equal, round, and reactive to light. EOM are normal. No scleral icterus.   Neck: Normal range of motion. Neck supple. No JVD present.   Cardiovascular: Normal rate and regular rhythm.   + systolic murmur   Pulmonary/Chest: Effort normal. No stridor. No respiratory distress. He has no wheezes. He has no rales. He exhibits no tenderness.   Mildly diminished breath sounds at the bases   Abdominal: Soft. Bowel sounds are normal. He exhibits no distension. There is no tenderness. There is no rebound and no guarding.   Musculoskeletal: Normal range of motion. He exhibits no edema or deformity.   Lymphadenopathy:     He has no cervical adenopathy.   Neurological: He is alert and oriented to person, place, and time. No sensory deficit. He exhibits normal muscle tone.   Skin: Skin is warm.   Psychiatric: He has a normal mood and affect. His behavior is normal.   LINES, CATHETERS, DRAINS, AIRWAYS, AND WOUNDS   Lines, Drains, Airways, Wounds:  Peripheral IV 01/23/20 Right Antecubital (Active)   Number of days: 3      LABS / IMAGING / TELE       Labs  I have reviewed the patient's pertinent labs.  Significant abnormals are Hgb 7.4, WBC 7.06, Cr 1.7.    Imaging  Not applicable    ECG/Telemetry  I have independently reviewed the telemetry. Significant findings include sinus.    ASSESSMENT AND PLAN      * Cough  Assessment & Plan  May have had a bronchitis or post infectious cough  Less likely to be bacterial infection  Afebrile, no leukocytosis, no hypotension. Saturating well on room air.  Flu/RSV negative.  Legionella negative.  Blood cultures thus far negative  Not enough sputum to send culture  CXR with mild edema  Tums have improved on ceftriaxone  Passed home O2 eval  Mucinex, duonebs prn  Appreciate speech input  PT/OT eval    Elevated troponin  Assessment & Plan  Trop 0.07.  Likely related to acute illness  Endorses episode of exertional chest pain a few weeks ago, but denies chest pain recently; SOB more likely due to pneumonia  ECG without ST elevation.  Nonspecific intraventricular block, no baseline ECG available for comparison  Had TTE last week, try to obtain results  Troponin trend flat  Appreciate cardiology input    Chronic combined systolic and diastolic congestive heart failure (CMS/HCC)  Assessment & Plan  Hx CHF, portably systolic based upon family report  Not maintained on daily diuretics secondary to syncope.  Takes 60mg prn lasix for weight gain.    CXR with mild pulm vascular congestion, no evidence of overwhelming fluid overload  Recent echo with mitral and aortic valvulopathy  Monitor daily weight, I/O  Diuresis prn    Type 2 diabetes mellitus (CMS/HCC)  Assessment & Plan  - On glimepiride  - monitor accu checks, SSI    Paroxysmal atrial fibrillation (CMS/[facility])  Assessment & Plan  Recently diagnosed at PCP visit and started on eliquis.  Not on rate control meds  Currently in NSR  Monitor on telemetry  Continue eliquis    Chronic anemia  Assessment & Plan  Hgb 9.1 on admission, Hgb has been labile in the past but seems to run in  the 8-9 range mostly  Chronic anemia, secondary to MM, CKD.  has required PRN blood transfusions in the past  Hgb 7.4 this AM, has been slowly drifting down past few days  No evidence of active bleeding  Recheck to ensure accuracy  Check occult, iron studies  Transfusion as needed  Monitor trends    COPD (chronic obstructive pulmonary disease) (CMS/HCC)  Assessment & Plan  Does not appear to be acutely bronchospastic  Maintaining pulse ox on room air  Cont Symbicort    CKD (chronic kidney disease)  Assessment & Plan  Cr 1.8.  Per son, baseline ~2.0  Obtain labs from PCP  Avoid nephrotoxins  Monitor BMP    Multiple myeloma not having achieved remission (CMS/[facility])  Assessment & Plan  Multiple myeloma dx Oct 2018.  lenalidomide 7 days on/14 days off cycle.  Due to start on 1/25  Associated anemia requiring intermittent blood transfusions     History of coronary artery disease  Assessment & Plan  - Hx CAD dx via cardiac cath.  Denies history of MI, stents  - On asa.  Previously was on isosorbide but discontinued due to syncope     VTE Assessment: Padua VTE Score: 8  VTE Prophylaxis Plan: eliquis  Code Status: Full Code  Estimated Discharge Date: 1/27/2020  Disposition Planning: pending clinical course     Rajan Wu MD  1/26/2020

## 2020-01-26 NOTE — HOSPITAL COURSE
Marty is a 89 y.o. male admitted on 1/23/2020 with Pneumonia. Principal problem is Cough.    Past Medical History  Marty has a past medical history of Atrial fibrillation (CMS/[facility]), CHF (congestive heart failure) (CMS/[facility]), Chronic kidney disease, COPD (chronic obstructive pulmonary disease) (CMS/[facility]), Coronary artery disease, Multiple myeloma (CMS/[facility]), and Type 2 diabetes mellitus (CMS/[facility]).    History of Present Illness   adm with pna and decreased fxn mobility secondary to immobility at home

## 2020-01-26 NOTE — PROGRESS NOTES
Patient: Marty Hinds  Location: [address]  MRN: [id number]  Today's date: 1/26/2020  Pt seated in bedside recliner with call bell alarm on Rn notified  Marty is a 89 y.o. male admitted on 1/23/2020 with Pneumonia. Principal problem is Cough.    Past Medical History  Marty has a past medical history of Atrial fibrillation (CMS/[facility]), CHF (congestive heart failure) (CMS/[facility]), Chronic kidney disease, COPD (chronic obstructive pulmonary disease) (CMS/[facility]), Coronary artery disease, Multiple myeloma (CMS/[facility]), and Type 2 diabetes mellitus (CMS/[facility]).    History of Present Illness   adm with pna and decreased fxn mobility secondary to immobility at home    PT Vitals    Date/Time Pulse Resp SpO2 O2 Therapy BP [facility]   01/26/20 1115 78 -- 94 % -- 105/77 EMP   01/26/20 1121 78 18 100 % None (Room air) 104/76 NR      PT Pain    Date/Time Pain Type Pref Pain Scale Rating: Rest Rating: Activity [facility]   01/26/20 1115 Pain Assessment number (Numeric Rating Pain Scale) 0 0 EMP          Prior Living Environment      Most Recent Value   Living Arrangements  house   Living Environment Comment  1SH with 2STE with son          Prior Level of Function      Most Recent Value   Ambulation  assistive equipment   Transferring  independent   Toileting  independent   Bathing  independent   Dressing  independent   Eating  independent   Communication  understands/communicates without difficulty   Swallowing  swallows foods/liquids without difficulty   Baseline Diet/Method of Nutritional Intake  thin liquids, regular solids   Past History of Dysphagia  increased coughing noted   Prior Level of Function Comment  indep with rw for mobility, retired does not drive   Equipment Currently Used at Home  walker, front-wheeled          PT Evaluation and Treatment - 01/26/20 1115        Time Calculation    Start Time  1115     Stop Time  1140     Time Calculation (min)  25 min        Session Details    Document Type  initial evaluation      Mode of Treatment  individual therapy;physical therapy        General Information    Patient Profile Reviewed?  yes     Referring Physician  Wu     General Observations of Patient  received pt in bed with call bell      Existing Precautions/Restrictions  fall        Cognition/Psychosocial    Affect/Mental Status (Cognitive)  WFL     Follows Commands (Cognition)  WFL        Range of Motion (ROM)    Range of Motion  ROM is WFL        Strength (Manual Muscle Testing)    Strength (Manual Muscle Testing)  strength is WFL        Bed Mobility    Bed Mobility  bed mobility (all) activities     [place], All Bed Mobility Activities  close supervision     Comment (Bed Mobility)  vc's for hand placement        Sit to Stand Transfer    [place], Sit to Stand Transfer  close supervision     Assistive Device  walker, front-wheeled     Comment  vc's for hand placement        Stand to Sit Transfer    [place], Stand to Sit Transfer  close supervision     Assistive Device  walker, front-wheeled     Comment  vc's for hand placement        Gait Training    [place], Gait  close supervision     Assistive Device  walker, front-wheeled     Distance in Feet  10 feet     Gait Pattern Utilized  step-to     Deviations/Abnormal Patterns (Gait)  antalgic;festinating/shuffling        AM-PAC (TM) - Mobility (Current Function)    Turning from your back to your side while in a flat bed without using bedrails?  3 - A Little     Moving from lying on your back to sitting on the side of a flat bed without using bedrails?  3 - A Little     Moving to and from a bed to a chair?  3 - A Little     Standing up from a chair using your arms?  3 - A Little     To walk in a hospital room?  3 - A Little     Climbing 3-5 steps with a railing?  3 - A Little     AM-PAC (TM) Mobility Score  18        Therapy Assessment/Plan (PT)    Patient/Family Therapy Goals Statement (PT)  to go home     Functional Level at Time of Evaluation (PT)  close s       PT Diagnosis  cough, weakness     Rehab Potential (PT)  good, to achieve stated therapy goals     Criteria for Skilled Interventions Met (PT)  meets criteria;skilled treatment is necessary     Therapy Frequency (PT)  3-5 times/wk     Planned Therapy Interventions (PT)  balance training;bed mobility training;gait training;transfer training;stair training     Problem List  balance;mobility     Activity Limitations Related to Problem List  unable to ambulate safely;unable to transfer safely        Therapy Plan Review/Discharge Plan (PT)    Therapy Plan Review (PT)  evaluation/treatment results reviewed;patient     Anticipated Equipment Needs at Discharge (PT Eval)  none     PT Recommended Discharge Disposition  home with home health        Plan of Care Review    Plan of Care Reviewed With  patient                       Education provided this session. See the Patient Education summary report for full details.    PT Goals      Most Recent Value   Bed Mobility Goal 1   Activity/Assistive Device  bed mobility activities, all at 01/26/2020 1115   [place]  modified independence at 01/26/2020 1115   Time Frame  by discharge at 01/26/2020 1115   Progress/Outcome  goal ongoing at 01/26/2020 1115   Transfer Goal 1   Activity/Assistive Device  all transfers, walker, front-wheeled at 01/26/2020 1115   [place]  modified independence at 01/26/2020 1115   Time Frame  by discharge at 01/26/2020 1115   Progress/Outcome  goal ongoing at 01/26/2020 1115   Gait Training Goal 1   Activity/Assistive Device  gait (walking locomotion), walker, rolling at 01/26/2020 1115   [place]  modified independence at 01/26/2020 1115   Distance  80 at 01/26/2020 1115   Time Frame  by discharge at 01/26/2020 1115   Progress/Outcome  goal ongoing at 01/26/2020 1115   Stairs Goal 1   Activity/Assistive Device  stairs, all skills, cane, straight at 01/26/2020 1115   [place]  modified independence at 01/26/2020 1115   Number of Stairs   4 at 01/26/2020 1115   Time Frame  by discharge at 01/26/2020 1115   Progress/Outcome  goal ongoing at 01/26/2020 1115

## 2020-01-26 NOTE — PLAN OF CARE
Problem: Adult Inpatient Plan of Care  Goal: Plan of Care Review  Outcome: Progressing  Flowsheets (Taken 1/26/2020 033)  Progress: no change  Plan of Care Reviewed With: patient  Outcome Summary: pt has no complaints. rhonchi present, infrequent loose cough. pt bathed. repositioned throughout the night.

## 2020-01-26 NOTE — PLAN OF CARE
Problem: Adult Inpatient Plan of Care  Goal: Plan of Care Review  Outcome: Progressing  Flowsheets (Taken 1/26/2020 1816)  Plan of Care Reviewed With: patient  Outcome Summary: Pt ha sno c/o pain or distress at present time . PT in room to see pt  . Pt oob to chair . Eliquis held per MD with hgb results . Recheck obtained as ordered .

## 2020-01-27 ENCOUNTER — TELEPHONE (OUTPATIENT)
Dept: HEMATOLOGY ONCOLOGY | Facility: CLINIC | Age: 85
End: 2020-01-27

## 2020-01-27 LAB
ANION GAP SERPL CALC-SCNC: 11 MEQ/L (ref 3–15)
BUN SERPL-MCNC: 34 MG/DL (ref 8–20)
CALCIUM SERPL-MCNC: 8.3 MG/DL (ref 8.9–10.3)
CHLORIDE SERPL-SCNC: 103 MEQ/L (ref 98–109)
CO2 SERPL-SCNC: 21 MEQ/L (ref 22–32)
CREAT SERPL-MCNC: 1.7 MG/DL
ERYTHROCYTE [DISTWIDTH] IN BLOOD BY AUTOMATED COUNT: 18.9 % (ref 11.6–14.4)
GFR SERPL CREATININE-BSD FRML MDRD: 38.1 ML/MIN/1.73M*2
GLUCOSE BLD-MCNC: 158 MG/DL (ref 70–99)
GLUCOSE BLD-MCNC: 170 MG/DL (ref 70–99)
GLUCOSE BLD-MCNC: 237 MG/DL (ref 70–99)
GLUCOSE BLD-MCNC: 326 MG/DL (ref 70–99)
GLUCOSE SERPL-MCNC: 178 MG/DL (ref 70–99)
HCT VFR BLDCO AUTO: 25.3 % (ref 40.1–51)
HCT VFR BLDCO AUTO: 26.2 % (ref 40.1–51)
HGB BLD-MCNC: 7.7 G/DL
HGB BLD-MCNC: 8 G/DL
MCH RBC QN AUTO: 26.5 PG (ref 28–33.2)
MCHC RBC AUTO-ENTMCNC: 30.5 G/DL (ref 32.2–36.5)
MCV RBC AUTO: 86.8 FL (ref 83–98)
PDW BLD AUTO: 10.5 FL (ref 9.4–12.4)
PLATELET # BLD AUTO: 282 K/UL
POCT TEST: ABNORMAL
POTASSIUM SERPL-SCNC: 3.7 MEQ/L (ref 3.6–5.1)
RBC # BLD AUTO: 3.02 M/UL (ref 4.5–5.8)
SODIUM SERPL-SCNC: 135 MEQ/L (ref 136–144)
WBC # BLD AUTO: 7 K/UL

## 2020-01-27 PROCEDURE — 63700000 HC SELF-ADMINISTRABLE DRUG: Performed by: PHYSICIAN ASSISTANT

## 2020-01-27 PROCEDURE — 80048 BASIC METABOLIC PNL TOTAL CA: CPT | Performed by: PHYSICIAN ASSISTANT

## 2020-01-27 PROCEDURE — 63700000 HC SELF-ADMINISTRABLE DRUG: Performed by: INTERNAL MEDICINE

## 2020-01-27 PROCEDURE — 20600000 HC ROOM AND CARE INTERMEDIATE/TELEMETRY

## 2020-01-27 PROCEDURE — 85027 COMPLETE CBC AUTOMATED: CPT | Performed by: PHYSICIAN ASSISTANT

## 2020-01-27 PROCEDURE — 25000000 HC PHARMACY GENERAL: Performed by: NURSE PRACTITIONER

## 2020-01-27 PROCEDURE — 63700000 HC SELF-ADMINISTRABLE DRUG: Performed by: HOSPITALIST

## 2020-01-27 PROCEDURE — 63700000 HC SELF-ADMINISTRABLE DRUG: Performed by: NURSE PRACTITIONER

## 2020-01-27 PROCEDURE — 25000000 HC PHARMACY GENERAL: Performed by: PHYSICIAN ASSISTANT

## 2020-01-27 PROCEDURE — 99233 SBSQ HOSP IP/OBS HIGH 50: CPT | Performed by: INTERNAL MEDICINE

## 2020-01-27 PROCEDURE — 63600000 HC DRUGS/DETAIL CODE: Performed by: HOSPITALIST

## 2020-01-27 PROCEDURE — 97116 GAIT TRAINING THERAPY: CPT | Mod: GP,CQ

## 2020-01-27 PROCEDURE — 36415 COLL VENOUS BLD VENIPUNCTURE: CPT | Performed by: PHYSICIAN ASSISTANT

## 2020-01-27 PROCEDURE — 85014 HEMATOCRIT: CPT | Performed by: PHYSICIAN ASSISTANT

## 2020-01-27 RX ORDER — IPRATROPIUM BROMIDE 0.5 MG/2.5ML
0.5 SOLUTION RESPIRATORY (INHALATION) EVERY 6 HOURS
Status: DISCONTINUED | OUTPATIENT
Start: 2020-01-27 | End: 2020-01-28 | Stop reason: HOSPADM

## 2020-01-27 RX ORDER — INSULIN ASPART 100 [IU]/ML
4-6 INJECTION, SOLUTION INTRAVENOUS; SUBCUTANEOUS
Status: DISCONTINUED | OUTPATIENT
Start: 2020-01-27 | End: 2020-01-28 | Stop reason: HOSPADM

## 2020-01-27 RX ORDER — FERROUS SULFATE 325(65) MG
325 TABLET ORAL
Status: DISCONTINUED | OUTPATIENT
Start: 2020-01-27 | End: 2020-01-28 | Stop reason: HOSPADM

## 2020-01-27 RX ADMIN — INSULIN ASPART 5 UNITS: 100 INJECTION, SOLUTION INTRAVENOUS; SUBCUTANEOUS at 11:38

## 2020-01-27 RX ADMIN — IPRATROPIUM BROMIDE 0.5 MG: 0.5 SOLUTION RESPIRATORY (INHALATION) at 16:56

## 2020-01-27 RX ADMIN — LATANOPROST 1 DROP: 50 SOLUTION OPHTHALMIC at 21:54

## 2020-01-27 RX ADMIN — POLYETHYLENE GLYCOL 3350 17 G: 17 POWDER, FOR SOLUTION ORAL at 08:09

## 2020-01-27 RX ADMIN — IPRATROPIUM BROMIDE 0.5 MG: 0.5 SOLUTION RESPIRATORY (INHALATION) at 23:14

## 2020-01-27 RX ADMIN — IPRATROPIUM BROMIDE AND ALBUTEROL SULFATE 3 ML: 2.5; .5 SOLUTION RESPIRATORY (INHALATION) at 01:01

## 2020-01-27 RX ADMIN — APIXABAN 2.5 MG: 2.5 TABLET, FILM COATED ORAL at 20:22

## 2020-01-27 RX ADMIN — SENNOSIDES AND DOCUSATE SODIUM 1 TABLET: 8.6; 5 TABLET ORAL at 08:12

## 2020-01-27 RX ADMIN — Medication 4 MG: at 23:14

## 2020-01-27 RX ADMIN — APIXABAN 2.5 MG: 2.5 TABLET, FILM COATED ORAL at 08:12

## 2020-01-27 RX ADMIN — BUDESONIDE AND FORMOTEROL FUMARATE DIHYDRATE 2 PUFF: 160; 4.5 AEROSOL RESPIRATORY (INHALATION) at 20:22

## 2020-01-27 RX ADMIN — GUAIFENESIN 600 MG: 600 TABLET, EXTENDED RELEASE ORAL at 20:21

## 2020-01-27 RX ADMIN — INSULIN ASPART 4 UNITS: 100 INJECTION, SOLUTION INTRAVENOUS; SUBCUTANEOUS at 21:54

## 2020-01-27 RX ADMIN — GUAIFENESIN 600 MG: 600 TABLET, EXTENDED RELEASE ORAL at 08:12

## 2020-01-27 RX ADMIN — TIMOLOL MALEATE 1 DROP: 5 SOLUTION/ DROPS OPHTHALMIC at 08:09

## 2020-01-27 RX ADMIN — PANTOPRAZOLE SODIUM 20 MG: 20 TABLET, DELAYED RELEASE ORAL at 08:12

## 2020-01-27 RX ADMIN — FERROUS SULFATE TAB 325 MG (65 MG ELEMENTAL FE) 325 MG: 325 (65 FE) TAB at 11:38

## 2020-01-27 RX ADMIN — SENNOSIDES AND DOCUSATE SODIUM 1 TABLET: 8.6; 5 TABLET ORAL at 20:22

## 2020-01-27 RX ADMIN — CEFTRIAXONE SODIUM 1 G: 1 INJECTION, POWDER, FOR SOLUTION INTRAVENOUS at 00:56

## 2020-01-27 RX ADMIN — CARVEDILOL 3.12 MG: 3.12 TABLET, FILM COATED ORAL at 16:56

## 2020-01-27 ASSESSMENT — COGNITIVE AND FUNCTIONAL STATUS - GENERAL
MOVING TO AND FROM BED TO CHAIR: 3 - A LITTLE
WALKING IN HOSPITAL ROOM: 3 - A LITTLE
CLIMB 3 TO 5 STEPS WITH RAILING: 3 - A LITTLE
STANDING UP FROM CHAIR USING ARMS: 3 - A LITTLE

## 2020-01-27 NOTE — PROGRESS NOTES
Cardiology Daily Progress Note    Subjective   Marty Hinds is a 89 y.o. male who was admitted for Pneumonia [J18.9].    Interval History:   Fixated on his blood sugar and very upset that it is running high. Apparently symptomatic hypotension. Frequent PVCs on telemetry though no palpitations. No shortness of breath.     Allergies  Patient has no known allergies.      Current Facility-Administered Medications:   •  acetaminophen (TYLENOL) tablet 650 mg, 650 mg, oral, q4h PRN, Rose Vazquez, AZLUNP  •  apixaban (ELIQUIS) tablet 2.5 mg, 2.5 mg, oral, BID, Rose Vazquez, AZULNP, 2.5 mg at 01/27/20 0812  •  carvedilol (COREG) tablet 3.125 mg, 3.125 mg, oral, BID with meals, Cameron Guadalupe PA C, 3.125 mg at 01/25/20 1730  •  cefTRIAXone (ROCEPHIN) IVPB 1 g in 100 mL NSS vial in bag, 1 g, intravenous, q24h INT, Rajan Wu MD, Stopped at 01/27/20 0126  •  glucose chewable tablet 16-32 g of dextrose, 16-32 g of dextrose, oral, PRN **OR** dextrose 40 % oral gel 15-30 g of dextrose, 15-30 g of dextrose, oral, PRN **OR** glucagon (GLUCAGEN) injection 1 mg, 1 mg, intramuscular, PRN **OR** dextrose in water injection 12.5 g, 25 mL, intravenous, PRN, Rose Vazqeuz, AZULNP  •  ferrous sulfate tablet 325 mg, 325 mg, oral, Daily with lunch, Luke Dumont PA C, 325 mg at 01/27/20 1138  •  guaiFENesin (MUCINEX) 12 hr ER tablet 600 mg, 600 mg, oral, BID, Rose Vazquez, CRNP, 600 mg at 01/27/20 0812  •  insulin aspart U-100 (NovoLOG) pen 3-5 Units, 3-5 Units, subcutaneous, With meals & nightly, Rose Vazquez CRNP, 5 Units at 01/27/20 1138  •  ipratropium-albuterol (DUO-NEB) 0.5-2.5 mg/3 mL nebulizer solution 3 mL, 3 mL, nebulization, q4h PRN, Rose Vazquez CRNP, 3 mL at 01/27/20 0101  •  latanoprost (XALATAN) 0.005 % ophthalmic solution 1 drop, 1 drop, Both Eyes, Nightly, Rose Vazquez CRNP, 1 drop at 01/26/20 2146  •  melatonin tablet 4 mg, 4 mg, oral, Nightly, Rose Vazquez CRNP, 4 mg at  "01/26/20 2122  •  NON FORMULARY MEDICATION REQUEST, 2 puff, inhalation, BID, Rose Vazquez CRNP  •  pantoprazole (PROTONIX) tablet,delayed release (DR/EC) 20 mg, 20 mg, oral, Daily, Rose Vazquez CRNP, 20 mg at 01/27/20 0812  •  polyethylene glycol (MIRALAX) 17 gram packet 17 g, 17 g, oral, Daily, Rajan Wu MD, 17 g at 01/27/20 0809  •  sennosides-docusate sodium (SENOKOT-S) 8.6-50 mg per tablet 1 tablet, 1 tablet, oral, BID, Rajan Wu MD, 1 tablet at 01/27/20 0812  •  timolol (TIMOPTIC) 0.5 % ophthalmic solution 1 drop, 1 drop, Both Eyes, Daily, Rose Vazquez CRNP, 1 drop at 01/27/20 0809      Objective     Vital signs in last 24 hours:  Temp:  [36.4 °C (97.6 °F)-36.6 °C (97.9 °F)] 36.5 °C (97.7 °F)  Heart Rate:  [64-83] 78  Resp:  [17-18] 18  BP: ()/(48-72) 100/65    No intake or output data in the 24 hours ending 01/27/20 1344  Intake/Output this shift:  No intake/output data recorded.    Labs  K 3.7    Imaging  No new studies    ECG   No new ECGs    Telemetry  Sinus rhythm, frequent PVCs. 3 beat run of NSVT      Physical Exam:  Visit Vitals  /65   Pulse 78   Temp 36.5 °C (97.7 °F) (Oral)   Resp 18   Ht 1.702 m (5' 7\")   Wt 82.9 kg (182 lb 11.2 oz)   SpO2 100%   BMI 28.61 kg/m²       General Appearance:  Alert, no distress   Head:  Normocephalic, without obvious abnormality, atraumatic   Eyes:  Conjunctiva/corneas clear, EOM's intact   Endocrine: No thyroid enlargement    Lungs:   Clear to auscultation bilaterally, respirations unlabored, no rales, no wheezing   Heart:  Regular rhythm, S1 and S2 normal, grade 2/6 systolic murmur right upper sternal border, no rub or gallop   Abdomen:   Soft, non-tender, no masses, no organomegaly   Vascular: Pulses 2+ and symmetric all extremities, no carotid bruit or jugular vein distention   Musculoskeletal:  Skin: No injury or deformity  Skin color, texture, turgor normal, no rashes or lesions, no cyanosis or edema   Extremities: Extremities " normal, atraumatic   Behavior/Emotional: Appropriate, cooperative        Subjective/Objective:  No new subjective & objective note has been filed under this hospital service since the last note was generated.      Assessment/Plan       Valvular heart disease  Assessment & Plan  Echo 1/20/2020 with moderate to severe mitral regurgitation, moderate to severe aortic stenosis and moderate aortic regurgitation. Should be discharged on daily Lasix. Will need follow up in our office with probable referral to a valve specialist for further (outpatient) workup.     Paroxysmal atrial fibrillation (CMS/HCC)  Assessment & Plan  He carries this diagnosis and is on Eliquis at the appropriately reduced dose. He is unaware of this diagnosis. He is in regular sinus rhythm now. Apparently he used to follow with a cardiologist in [facility]. Would leave him on Eliquis for now. On beta blocker.     Chronic combined systolic and diastolic congestive heart failure (CMS/HCC)  Assessment & Plan  Echo 1/20/2020 with LVEF 15-20%. Grade II diastolic dysfunction.Global hypokinesis. Valvular issues as described elsewhere.     Not tolerating beta blocker well with hypotension. This is a poor prognostic indicator in the setting of CHF. In light of his PVCs and very low EF I think we should continue it with hold parameters. He will have home health on discharge according to case management and they can help manage meds.     Similarly, I would discharge him on PRN Lasix. I doubt he will tolerate daily diuretic dosing with his low BP. He can and should take a dose with > 3 lb weight gain.     Elevated troponin  Assessment & Plan  0.07 x 2 with no ischemic ECG changes or anginal discomfort. This is most likely reduced renal clearance.           Patient is stable from a cardiovascular standpoint. No further recommendations at this time. Will sign off. Please do not hesitate to call with additional cardiac concerns. Thank you for allowing us to  participate in this patient's care.         CRISTAL Acuna  1/27/2020

## 2020-01-27 NOTE — PLAN OF CARE
Problem: Adult Inpatient Plan of Care  Goal: Plan of Care Review  Outcome: Not progressing  Flowsheets  Taken 1/27/2020 1500 by Alia Bell RN  Progress: no change  Outcome Summary: pt down to PT, no complaints at this time.  Taken 1/27/2020 1143 by Irais Garza PTA  Plan of Care Reviewed With: patient

## 2020-01-27 NOTE — PROGRESS NOTES
Patient: Marty Hinds  Location: [address]  MRN: [id number]  Today's date: 1/27/2020    Handoff to therapy volunteer to return patient back to room.     Marty is a 89 y.o. male admitted on 1/23/2020 with Pneumonia. Principal problem is Cough.    Past Medical History  Marty has a past medical history of Atrial fibrillation (CMS/[facility]), CHF (congestive heart failure) (CMS/[facility]), Chronic kidney disease, COPD (chronic obstructive pulmonary disease) (CMS/[facility]), Coronary artery disease, Multiple myeloma (CMS/[facility]), and Type 2 diabetes mellitus (CMS/[facility]).    History of Present Illness   adm with pna and decreased fxn mobility secondary to immobility at home    PT Vitals    Date/Time Pulse SpO2 Pt Activity O2 Therapy BP BP Location BP Method Pt Position [facility]   01/27/20 0931 79 95 % At rest None (Room air) 104/53 Left upper arm Automatic Sitting TER   01/27/20 0940 -- 98 % Other (Comment) after ambulation None (Room air) -- -- -- -- TER      PT Pain    Date/Time Pain Type Pref Pain Scale Rating: Rest Rating: Activity [facility]   01/27/20 0931 Pain Assessment word (verbal rating pain scale) 0 - no pain 0 - no pain TER          Prior Living Environment      Most Recent Value   Living Arrangements  house   Living Environment Comment  1SH with 2STE with son          Prior Level of Function      Most Recent Value   Ambulation  assistive equipment   Transferring  independent   Toileting  independent   Bathing  independent   Dressing  independent   Eating  independent   Communication  understands/communicates without difficulty   Swallowing  swallows foods/liquids without difficulty   Baseline Diet/Method of Nutritional Intake  thin liquids, regular solids   Past History of Dysphagia  increased coughing noted   Prior Level of Function Comment  indep with rw for mobility, retired does not drive   Equipment Currently Used at Home  walker, front-wheeled          PT Evaluation and Treatment - 01/27/20 0931        Time  Calculation    Start Time  0931     Stop Time  0949     Time Calculation (min)  18 min        Session Details    Document Type  daily treatment/progress note     Mode of Treatment  physical therapy        General Information    Patient Profile Reviewed?  yes     General Observations of Patient  Pt seen in PT gym     Existing Precautions/Restrictions  fall     Limitations/Impairments  safety/cognitive        Transfers    Transfers  car transfer        Sit to Stand Transfer    [place], Sit to Stand Transfer  close supervision;verbal cues     Verbal Cues  hand placement;safety;technique     Assistive Device  walker, front-wheeled        Stand to Sit Transfer    [place], Stand to Sit Transfer  close supervision;verbal cues     Verbal Cues  hand placement;safety;technique     Assistive Device  walker, front-wheeled        Car Transfer    Transfer Technique  sit-stand;stand-sit     [place], Car Transfer  close supervision;verbal cues     Verbal Cues  hand placement;proper use of assistive device;safety;technique;preparatory posture     Assistive Device  walker, front-wheeled     Comment  Pt attempts to pull up from RW, frequent cueing for hand placement and safety        Gait Training    [place], Gait  close supervision;verbal cues     Assistive Device  walker, front-wheeled     Distance in Feet  60 feet    60ftx2    Gait Pattern Utilized  step-through     Deviations/Abnormal Patterns (Gait)  gait speed decreased;step length decreased;stride length decreased;festinating/shuffling     Comment  Cueing for upright posture, keeping RW w/in BLAIR, and increasing step length. URIBE. Cueing for breathing tech. O2 stable on RA. 1 minute recovery for SOB        AM-PAC (TM) - Mobility (Current Function)    Turning from your back to your side while in a flat bed without using bedrails?  3 - A Little     Moving from lying on your back to sitting on the side of a flat bed without using bedrails?  3 - A Little      Moving to and from a bed to a chair?  3 - A Little     Standing up from a chair using your arms?  3 - A Little     To walk in a hospital room?  3 - A Little     Climbing 3-5 steps with a railing?  3 - A Little     AM-PAC (TM) Mobility Score  18        Therapy Assessment/Plan (PT)    Rehab Potential (PT)  good, to achieve stated therapy goals     Therapy Frequency (PT)  3-5 times/wk        Progress Summary (PT)    Daily Outcome Statement (PT)  1/27 Pt requires close supervision for functional mobility and transfers. Fwd flexed posture, and decreased safety awareness noted; attempting to pull up from walker and prematurely sit with transfers. Rec increased assistance at time of discharge and pt would benefit from continued skilled PT services.        Therapy Plan Review/Discharge Plan (PT)    Anticipated Equipment Needs at Discharge (PT Eval)  none     PT Recommended Discharge Disposition  home with home health;home with assist                       Education provided this session. See the Patient Education summary report for full details.    PT Goals      Most Recent Value   Bed Mobility Goal 1   Activity/Assistive Device  bed mobility activities, all at 01/26/2020 1115   [place]  modified independence at 01/26/2020 1115   Time Frame  by discharge at 01/26/2020 1115   Progress/Outcome  goal ongoing at 01/26/2020 1115   Transfer Goal 1   Activity/Assistive Device  all transfers, walker, front-wheeled at 01/26/2020 1115   [place]  modified independence at 01/26/2020 1115   Time Frame  by discharge at 01/26/2020 1115   Progress/Outcome  goal ongoing at 01/26/2020 1115   Gait Training Goal 1   Activity/Assistive Device  gait (walking locomotion), walker, rolling at 01/26/2020 1115   [place]  modified independence at 01/26/2020 1115   Distance  80 at 01/26/2020 1115   Time Frame  by discharge at 01/26/2020 1115   Progress/Outcome  goal ongoing at 01/26/2020 1115   Stairs Goal 1   Activity/Assistive Device   stairs, all skills, cane, straight at 01/26/2020 1115   [place]  modified independence at 01/26/2020 1115   Number of Stairs  4 at 01/26/2020 1115   Time Frame  by discharge at 01/26/2020 1115   Progress/Outcome  goal ongoing at 01/26/2020 1115

## 2020-01-27 NOTE — PLAN OF CARE
Problem: Adult Inpatient Plan of Care  Goal: Plan of Care Review  Flowsheets (Taken 1/27/2020 1117)  Progress: improving  Plan of Care Reviewed With: patient; son  Outcome Summary: discussed the plan of care during care progression rounds.   Met patient afterwards to discussed the need for HC services, he stated that he gets in home therapy, does not recall the agency, called his son who stated that pt is current with [facility], called [facility] and verified with Sandra that the patient is current, with RN and PT, was discharged from SP and OT.  They will resume care after discharge, initial clinicals faxed to [phone].  While in the room, presented the IMM letter to the patient, explained and signed. Copy given.

## 2020-01-27 NOTE — PATIENT CARE CONFERENCE
Care Progression Rounds Note  Date: 1/27/2020  Time: 12:58 PM     Patient Name: Marty Hinds     Medical Record Number: [id number]   YOB: 1930  Sex: Male      Room/Bed: 4414W    Admitting Diagnosis: Pneumonia [J18.9]   Admit Date/Time: 1/23/2020  8:24 PM    Primary Diagnosis: Cough  Principal Problem: Cough    GMLOS: pending  Anticipated Discharge Date: 1/28/2020    AM-PAC  Mobility Score: 18    Discharge Planning:  Living Arrangements: house  Anticipated Discharge Disposition: home with home health services, skilled nursing facility    Barriers to Discharge:  Barriers to Discharge: Medical issues not resolved  Comment: hypotensive in PT    Participants:  advanced practice provider, , nursing, physical therapy, social work/services

## 2020-01-27 NOTE — TELEPHONE ENCOUNTER
Patient's son Meera Ramos would like a call back to discuss patient's condition Meera Range can be reached [phone]

## 2020-01-27 NOTE — PROGRESS NOTES
Hospital Medicine Service -  Daily Progress Note       SUBJECTIVE   Patient seen and examined, resting in chair after PT. Patient reports cough improved. Notes some dizziness while walking with PT. Patient denies fevers, chills, chest pain, shortness of breath, edema, nausea, or vomiting.     Tolerating diet. No urinary or bowel complaints.    Plan discussed, questions answered.      OBJECTIVE      Vital signs in last 24 hours:  Temp:  [36.4 °C (97.6 °F)-36.6 °C (97.9 °F)] 36.5 °C (97.7 °F)  Heart Rate:  [64-83] 78  Resp:  [17-18] 18  BP: ()/(48-72) 100/65    Intake/Output Summary (Last 24 hours) at 1/27/2020 1530  Last data filed at 1/27/2020 1500  Gross per 24 hour   Intake 720 ml   Output 600 ml   Net 120 ml       PHYSICAL EXAMINATION      Physical Exam   Constitutional: He is oriented to person, place, and time. No distress.   HENT:   Head: Normocephalic and atraumatic.   Eyes: Pupils are equal, round, and reactive to light. EOM are normal.   Neck: Normal range of motion. Neck supple.   Cardiovascular: Normal rate, regular rhythm and intact distal pulses.   Murmur heard.  Pulmonary/Chest: Effort normal. No respiratory distress. He has no rales.   Decreased througout   Abdominal: Soft. He exhibits no distension. There is no tenderness. There is no guarding.   Musculoskeletal: Normal range of motion. He exhibits no edema or tenderness.   Neurological: He is alert and oriented to person, place, and time. A sensory deficit (hard of hearing) is present.   Skin: Skin is warm and dry. He is not diaphoretic.   Psychiatric: He has a normal mood and affect. His behavior is normal. Judgment and thought content normal.   Nursing note and vitals reviewed.         LINES, CATHETERS, DRAINS, AIRWAYS, AND WOUNDS   Lines, Drains, Airways, Wounds:  Peripheral IV 01/23/20 Right Antecubital (Active)   Number of days: 4        LABS / IMAGING / TELE      Labs  Inpatient labs over the last 24 hours independently reviewed by  me:    Results from last 7 days   Lab Units 01/27/20  0841   SODIUM mEQ/L 135*   POTASSIUM mEQ/L 3.7   CHLORIDE mEQ/L 103   CO2 mEQ/L 21*   BUN mg/dL 34*   CREATININE mg/dL 1.7*   GLUCOSE mg/dL 178*   CALCIUM mg/dL 8.3*             Results from last 7 days   Lab Units 01/27/20  0841   WBC K/uL 7.00   HEMOGLOBIN g/dL 8.0*   HEMATOCRIT % 26.2*   PLATELETS K/uL 282       Imaging  I have independently reviewed the pertinent imaging from the last 24 hrs. and Significant findings include: no new imaging.    ECG/Telemetry  I have independently reviewed the telemetry. Significant findings include asymtpomatic SVT intermittently.    ASSESSMENT AND PLAN      Chronic anemia  Assessment & Plan  Hgb 9.1 on admission, Hgb has been labile in the past but seems to run in the 8-9 range mostly  Chronic anemia, secondary to MM, CKD.  has required PRN blood transfusions in the past  HGB trended to 7.4, has been slowly drifting down past few days  No evidence of active bleeding  Holding at 8.0, since borderline will repeat H/H now if <8 will transfuse 1U  Iron studies c/w MARQUEZ, will start on oral supplementation  Transfusion as needed  Monitor trends    * Cough  Assessment & Plan  May have had a bronchitis or post infectious cough  Less likely to be bacterial infection  Afebrile, no leukocytosis, no hypotension. Saturating well on room air.  Flu/RSV negative.  Legionella negative.  Blood cultures thus far negative  Not enough sputum to send culture  CXR with mild edema, Lasix PRN per Crdiology  Symptoms have improved on ceftriaxone  Passed home O2 eval with no need  Mucinex, duonebs prn  Appreciate speech input  PT/OT eval recommending home health    CKD (chronic kidney disease)  Assessment & Plan  Cr 1.7.  Per son, baseline ~2.0  Avoid nephrotoxins  Monitor BMP    Chronic combined systolic and diastolic congestive heart failure (CMS/HCC)  Assessment & Plan  Hx CHF, portably systolic based upon family report  Not maintained on daily  diuretics secondary to syncope  CXR with mild pulm vascular congestion, no evidence of overwhelming fluid overload  Recent echo with mitral and aortic valvulopathy, EF 15-20%, G2DD  D/w Cardiology would discharge on Lasix 20 mg daily PRN for weight gain >3lb - stable for discharge from their standpoint  Monitor daily weight, I/O  Diuresis prn    Elevated troponin  Assessment & Plan  Trop 0.07.  Likely related to acute illness  Endorses episode of exertional chest pain a few weeks ago, but denies chest pain recently; SOB more likely due to pneumonia  ECG without ST elevation.  Nonspecific intraventricular block, no baseline ECG available for comparison  Had TTE last week, with severe CHF  Troponin trend flat  Appreciate cardiology input, no further work up    COPD (chronic obstructive pulmonary disease) (CMS/[facility])  Assessment & Plan  Does not appear to be acutely bronchospastic  Maintaining pulse ox on room air  Cont Symbicort    Type 2 diabetes mellitus (CMS/HCC)  Assessment & Plan  - On glimepiride  - monitor accu checks, SSI    Paroxysmal atrial fibrillation (CMS/[facility])  Assessment & Plan  Recently diagnosed at PCP visit and started on eliquis.  Not on rate control meds  Currently in NSR  Monitor on telemetry  Continue eliquis    Multiple myeloma not having achieved remission (CMS/[facility])  Assessment & Plan  Multiple myeloma dx Oct 2018.  lenalidomide 7 days on/14 days off cycle.  Due to start on 1/25  Associated anemia requiring intermittent blood transfusions   Follows at [facility]    History of coronary artery disease  Assessment & Plan  - Hx CAD dx via cardiac cath.  Denies history of MI, stents  - On asa.  Previously was on isosorbide but discontinued due to syncope         VTE Assessment: [place] VTE Score: 8  VTE Prophylaxis Plan: Eliquis  Medication reconciliation: Done  Code Status: Full Code  Estimated Discharge Date: 1/28/2020  Disposition Planning: pending H/H stability, final Cardiology recs for safe  discharge planning to home with home heatlh    *Recommendations are not final until co-sign by attending*     CRISTAL Bacon  [facility] [place]  1/27/2020

## 2020-01-27 NOTE — PLAN OF CARE
Problem: Adult Inpatient Plan of Care  Goal: Plan of Care Review  Outcome: Progressing  Flowsheets (Taken 1/27/2020 0437)  Progress: no change  Plan of Care Reviewed With: patient  Outcome Summary: pt c/o SOB once throughout the night. PRN neb was effective. continues to have loose nonproductive cough.

## 2020-01-27 NOTE — TELEPHONE ENCOUNTER
Spoke with pts son, pt may have pneumonia  I let him know that we will get records from [facility] when he is discharged  Pt has not taken is Revlimid as scheduled to start on 1/25/20, I asked son to call the office when pt is discharged and we will decide based on that if and when he is to start the Revlimid  Pts son voiced understanding

## 2020-01-27 NOTE — PLAN OF CARE
Problem: Adult Inpatient Plan of Care  Goal: Plan of Care Review  Outcome: Progressing  Flowsheets (Taken 1/27/2020 1143)  Progress: improving  Plan of Care Reviewed With: patient  Outcome Summary: Pt is able to progress with ambulation during today's session but would require increased assist if returning home. Decreased safety awareness and fwd posture increase risk for falls.     Problem: Mobility Impairment  Goal: Optimal Mobility  Outcome: Progressing

## 2020-01-28 VITALS
TEMPERATURE: 97.9 F | HEIGHT: 67 IN | DIASTOLIC BLOOD PRESSURE: 68 MMHG | BODY MASS INDEX: 28.48 KG/M2 | OXYGEN SATURATION: 98 % | RESPIRATION RATE: 18 BRPM | WEIGHT: 181.44 LBS | HEART RATE: 98 BPM | SYSTOLIC BLOOD PRESSURE: 110 MMHG

## 2020-01-28 LAB
ANION GAP SERPL CALC-SCNC: 8 MEQ/L (ref 3–15)
BUN SERPL-MCNC: 33 MG/DL (ref 8–20)
CALCIUM SERPL-MCNC: 8.2 MG/DL (ref 8.9–10.3)
CHLORIDE SERPL-SCNC: 105 MEQ/L (ref 98–109)
CO2 SERPL-SCNC: 21 MEQ/L (ref 22–32)
CREAT SERPL-MCNC: 1.5 MG/DL
ERYTHROCYTE [DISTWIDTH] IN BLOOD BY AUTOMATED COUNT: 18.6 % (ref 11.6–14.4)
GFR SERPL CREATININE-BSD FRML MDRD: 44.1 ML/MIN/1.73M*2
GLUCOSE BLD-MCNC: 166 MG/DL (ref 70–99)
GLUCOSE BLD-MCNC: 178 MG/DL (ref 70–99)
GLUCOSE SERPL-MCNC: 163 MG/DL (ref 70–99)
HCT VFR BLDCO AUTO: 24.6 % (ref 40.1–51)
HGB BLD-MCNC: 7.7 G/DL
MAGNESIUM SERPL-MCNC: 2.4 MG/DL (ref 1.8–2.5)
MCH RBC QN AUTO: 26.7 PG (ref 28–33.2)
MCHC RBC AUTO-ENTMCNC: 31.3 G/DL (ref 32.2–36.5)
MCV RBC AUTO: 85.4 FL (ref 83–98)
PDW BLD AUTO: 10.7 FL (ref 9.4–12.4)
PLATELET # BLD AUTO: 310 K/UL
POCT TEST: ABNORMAL
POCT TEST: ABNORMAL
POTASSIUM SERPL-SCNC: 4 MEQ/L (ref 3.6–5.1)
RBC # BLD AUTO: 2.88 M/UL (ref 4.5–5.8)
SODIUM SERPL-SCNC: 134 MEQ/L (ref 136–144)
WBC # BLD AUTO: 6.06 K/UL

## 2020-01-28 PROCEDURE — 80048 BASIC METABOLIC PNL TOTAL CA: CPT | Performed by: PHYSICIAN ASSISTANT

## 2020-01-28 PROCEDURE — 63700000 HC SELF-ADMINISTRABLE DRUG: Performed by: NURSE PRACTITIONER

## 2020-01-28 PROCEDURE — 36415 COLL VENOUS BLD VENIPUNCTURE: CPT | Performed by: PHYSICIAN ASSISTANT

## 2020-01-28 PROCEDURE — 63700000 HC SELF-ADMINISTRABLE DRUG: Performed by: PHYSICIAN ASSISTANT

## 2020-01-28 PROCEDURE — 92526 ORAL FUNCTION THERAPY: CPT | Mod: GN

## 2020-01-28 PROCEDURE — 83735 ASSAY OF MAGNESIUM: CPT | Performed by: PHYSICIAN ASSISTANT

## 2020-01-28 PROCEDURE — 99239 HOSP IP/OBS DSCHRG MGMT >30: CPT | Performed by: INTERNAL MEDICINE

## 2020-01-28 PROCEDURE — 94667 MNPJ CHEST WALL 1ST: CPT

## 2020-01-28 PROCEDURE — 63600000 HC DRUGS/DETAIL CODE: Performed by: HOSPITALIST

## 2020-01-28 PROCEDURE — 85027 COMPLETE CBC AUTOMATED: CPT | Performed by: PHYSICIAN ASSISTANT

## 2020-01-28 PROCEDURE — 97116 GAIT TRAINING THERAPY: CPT | Mod: GP | Performed by: PHYSICAL THERAPIST

## 2020-01-28 PROCEDURE — 97530 THERAPEUTIC ACTIVITIES: CPT | Mod: GP | Performed by: PHYSICAL THERAPIST

## 2020-01-28 PROCEDURE — 25000000 HC PHARMACY GENERAL: Performed by: PHYSICIAN ASSISTANT

## 2020-01-28 PROCEDURE — 63700000 HC SELF-ADMINISTRABLE DRUG: Performed by: HOSPITALIST

## 2020-01-28 RX ORDER — FUROSEMIDE 20 MG/1
20 TABLET ORAL AS NEEDED
Qty: 30 TABLET | Refills: 0 | Status: SHIPPED | OUTPATIENT
Start: 2020-01-28 | End: 2020-02-27

## 2020-01-28 RX ORDER — FERROUS SULFATE 325(65) MG
325 TABLET ORAL
Qty: 30 TABLET | Refills: 0 | Status: SHIPPED | OUTPATIENT
Start: 2020-01-29 | End: 2020-02-28

## 2020-01-28 RX ORDER — IPRATROPIUM BROMIDE 0.5 MG/2.5ML
0.5 SOLUTION RESPIRATORY (INHALATION) EVERY 6 HOURS
Qty: 300 ML | Refills: 0 | Status: SHIPPED | OUTPATIENT
Start: 2020-01-28 | End: 2020-02-27

## 2020-01-28 RX ORDER — GUAIFENESIN 600 MG/1
1200 TABLET, EXTENDED RELEASE ORAL 2 TIMES DAILY
Qty: 28 TABLET | Refills: 0 | Status: SHIPPED | OUTPATIENT
Start: 2020-01-28 | End: 2020-02-04

## 2020-01-28 RX ORDER — CARVEDILOL 3.12 MG/1
3.12 TABLET ORAL 2 TIMES DAILY WITH MEALS
Qty: 60 TABLET | Refills: 0 | Status: SHIPPED | OUTPATIENT
Start: 2020-01-28 | End: 2020-02-27

## 2020-01-28 RX ADMIN — APIXABAN 2.5 MG: 2.5 TABLET, FILM COATED ORAL at 08:23

## 2020-01-28 RX ADMIN — INSULIN ASPART 4 UNITS: 100 INJECTION, SOLUTION INTRAVENOUS; SUBCUTANEOUS at 12:18

## 2020-01-28 RX ADMIN — IPRATROPIUM BROMIDE 0.5 MG: 0.5 SOLUTION RESPIRATORY (INHALATION) at 05:33

## 2020-01-28 RX ADMIN — BUDESONIDE AND FORMOTEROL FUMARATE DIHYDRATE 2 PUFF: 160; 4.5 AEROSOL RESPIRATORY (INHALATION) at 08:22

## 2020-01-28 RX ADMIN — GUAIFENESIN 600 MG: 600 TABLET, EXTENDED RELEASE ORAL at 08:23

## 2020-01-28 RX ADMIN — CEFTRIAXONE SODIUM 1 G: 1 INJECTION, POWDER, FOR SOLUTION INTRAVENOUS at 02:55

## 2020-01-28 RX ADMIN — SENNOSIDES AND DOCUSATE SODIUM 1 TABLET: 8.6; 5 TABLET ORAL at 08:23

## 2020-01-28 RX ADMIN — IPRATROPIUM BROMIDE 0.5 MG: 0.5 SOLUTION RESPIRATORY (INHALATION) at 12:22

## 2020-01-28 RX ADMIN — POLYETHYLENE GLYCOL 3350 17 G: 17 POWDER, FOR SOLUTION ORAL at 08:22

## 2020-01-28 RX ADMIN — TIMOLOL MALEATE 1 DROP: 5 SOLUTION/ DROPS OPHTHALMIC at 08:22

## 2020-01-28 RX ADMIN — FERROUS SULFATE TAB 325 MG (65 MG ELEMENTAL FE) 325 MG: 325 (65 FE) TAB at 12:17

## 2020-01-28 RX ADMIN — PANTOPRAZOLE SODIUM 20 MG: 20 TABLET, DELAYED RELEASE ORAL at 08:23

## 2020-01-28 ASSESSMENT — COGNITIVE AND FUNCTIONAL STATUS - GENERAL
REMEMBERING TO TAKE MEDICATION: 2 - A LOT
UNDERSTANDING 10 TO 15 MIN SPEECH: 3 - A LITTLE
REMEMBERING 5 ERRANDS WITH NO LIST: 2 - A LOT
REMEMBERING WHERE THINGS ARE: 2 - A LOT
STANDING UP FROM CHAIR USING ARMS: 3 - A LITTLE
CLIMB 3 TO 5 STEPS WITH RAILING: 3 - A LITTLE
FOLLOWS FAMILIAR CONVERSATION: 3 - A LITTLE
MOVING TO AND FROM BED TO CHAIR: 3 - A LITTLE
TAKING CARE OF COMPLICATED TASKS: 2 - A LOT
WALKING IN HOSPITAL ROOM: 3 - A LITTLE

## 2020-01-28 NOTE — NURSING NOTE
Pt discharged to home. IV d/c'd. D/c instructions given, pt had no questions at time of discharge.

## 2020-01-28 NOTE — PLAN OF CARE
Problem: Adult Inpatient Plan of Care  Goal: Plan of Care Review  Outcome: Progressing  Flowsheets (Taken 1/28/2020 0516)  Progress: improving  Plan of Care Reviewed With: patient  Outcome Summary: patient slept throughout night

## 2020-01-28 NOTE — DISCHARGE INSTRUCTIONS
You were admitted for probable pneumonia with chronic history of COPD and severe congestive heart failure which likely contributed to your cough. Cardiology was consulted to assist in your management. You were treated with IV antibiotics and finished a full course. You also were treated with nebulizer treatments.     During your admission your blood pressure was found to be low.    Note the following changes in your medications:  - You can take Mucinex 1200 mg every 12 hours starting tonight for cough  - You can use Atrovent nebulizers every 6 hours for cough, shortness of breath or wheezing  - START iron daily for anemia, take with food or it can cause nausea/upset  - START blood pressure medication Coreg 3.125 mg every 12 hours starting tonight  - CHANGE diuretic Lasix to 20 mg daily as needed for weight gain of 3lb overnight or 5lb in one week  - CONTINUED all other medications as prescribed     Make an appointment with Cardiology Dr. Zac Ward for CHF, atrial fibrillation and blood pressure. Please call for an appointment in 2-3 weeks.    Call your Hematologist at [facility] for an appointment in about 2 weeks for multiple myeloma.    Please call to make an appointment with your PCP provider Ferny Martin DO in 7 days to update them of your admission and changes to your medications. You should have a repeat CBC and BMP labs. Please call for an appointment.    Please use the contact information of your follow up providers as listed in the follow up section of this packet.    PLEASE BRING THESE PAPERS TO YOUR FOLLOW UP APPOINTMENTS

## 2020-01-28 NOTE — DISCHARGE SUMMARY
Hospital Medicine Service -  Inpatient Discharge Summary        BRIEF OVERVIEW   Admitting Provider: Daysi Villalpando MD  Attending Provider: Venkata Maria DO Attending phys phone: [phone]  Physician Assistant: Luke Dumont PA-C    PCP: Ferny Martin [phone]    Admission Date: 1/23/2020  Discharge Date: 1/28/2020       DISCHARGE DIAGNOSES/SUMMARY OF HOSPITALIZATION      Primary Discharge Diagnosis  Cough    Secondary Discharge Diagnoses  Active Hospital Problems    Diagnosis Date Noted   • Chronic anemia 01/24/2020     Priority: High   • Cough 01/23/2020     Priority: High   • Elevated troponin 01/24/2020     Priority: Medium   • Chronic combined systolic and diastolic congestive heart failure (CMS/HCC) 01/24/2020     Priority: Medium   • CKD (chronic kidney disease) 01/24/2020     Priority: Medium   • COPD (chronic obstructive pulmonary disease) (CMS/[facility]) 01/24/2020     Priority: Low   • History of coronary artery disease 01/24/2020   • Multiple myeloma not having achieved remission (CMS/[facility]) 01/24/2020   • Paroxysmal atrial fibrillation (CMS/[facility]) 01/24/2020   • Type 2 diabetes mellitus (CMS/[facility]) 01/24/2020   • Valvular heart disease 01/24/2020      Resolved Hospital Problems   No resolved problems to display.         Presenting Problem/History of Present Illness  Pneumonia    This is a 89 y.o. year-old male admitted with Pneumonia [J18.9].    For more details please see H&P by Daysi Villalpando MD dated 1/23/2020:    Marty Hinds is an 89 y.o. male with a past medical history of Afib on eliquis, CHF, COPD, CKD, multiple myeloma, DM2 who presents from home with complaints of cough and shortness of breath.  His son Marty is at the bedside and assists with history as patient is somewhat forgetful at baseline.  Symptoms began about 2 weeks ago, initially with low grade fever rhinorrhea and cough, and he called his PCP and completed a course of levofloxacin on 1/21.  Multiple family  "members were also sick around that time after a visit to patient's sister in a nursing home.  Patient initially felt improvement, but over the last few days, cough has returned and associated with more difficulty breathing.  Patient denies chest pain recently, but does mention he had exertional chest pressure a few weeks ago which he cannot really expand on.  He denies headaches, dizziness, or lightheadedness. He denies abdominal pain, nausea, vomiting, or diarrhea.  He reports constipation.  His appetite has been normal.  He denies any gu symptoms.  He denies orthopnea, LE swelling, weight gain.  His son helps him to check his weight every day and they use lasix PRN for weight gain, weight has been stable for the last month.  He had an echocardiogram just last week to monitor CHF and his son states he was told he \"has a very weak heart\" and thinks his EF is around 20%.  He plans to follow up with Dr Montemayor in February, is transitioning from his cardiologist in [facility].  He takes lenalidomide for multiple myeloma for 7 days on/14 days off cycle, is due to start on 1/25.  He has had associated anemia requiring intermittent blood transfusions.  He was hospitalized at [facility] about 1 month ago for syncope.  His daily lasix and isosorbide were discontinued at that time and he has not had recurrence of syncope.     Labwork in the ED significant for hyopnatremia Na 131, CKD BUN 32/ Cr 1.8/ GFR 35, anemia Hgb 9.1 with normal MCV.  WBC 5.20.  Trop elevated 0.07.  CXR reviewed with ED physician Dr Lopez, suspected retrocardiac infiltrate, small pleural effusions    Hospital Course  Patient admitted for cough and worsening shortness of breath over the course of 2 weeks.  Likely in the setting of bronchitis or a postinfectious cough however improved with ceftriaxone for which he finished a total 5-day course.  During his admission cardiology was consulted with severe CHF with ejection fraction of 15 to 20% he was started " on Coreg low-dose and his Lasix were changed to as needed for weight gain given his relative hypotension.  He will need to establish with cardiology at [facility] [place].  He should follow-up with his hematologist at [facility] in about 2weeks for ongoing monitoring of his anemia..  During this admission iron panel consistent with iron deficient anemia and he was started on daily supplementation.  He was set up with home health and a home nebulizer machine..    Please see the problem list below for further details regarding specific treatment plans and recommendations per condition.    Problem List on Day of Discharge  Chronic anemia  Assessment & Plan  Hgb 9.1 on admission, Hgb has been labile in the past but seems to run in the 8-9 range mostly  Chronic anemia, secondary to MM, CKD.  has required PRN blood transfusions in the past  HGB trended to 7.4, has been slowly drifting down past few days  No evidence of active bleeding  HGB stable 7.7-8  Iron studies c/w MARQUEZ, will start on oral supplementation  Follow up with his OP Hematologist in 1-2 weeks    * Cough  Assessment & Plan  May have had a bronchitis or post infectious cough  Less likely to be bacterial infection  Afebrile, no leukocytosis, no hypotension. Saturating well on room air.  Flu/RSV negative.  Legionella negative.  Blood cultures thus far negative  Not enough sputum to send culture  CXR with mild edema, Lasix PRN per Cardiology on discharge  Symptoms have improved on ceftriaxone, finished 5 day course  Passed home O2 eval with no need  Mucinex, duonebs prn  Appreciate speech input  PT/OT eval recommending home health, set up  Nebulizer with Atrovent approved    CKD (chronic kidney disease)  Assessment & Plan  Cr 1.7 -->1.5    Per son, baseline ~2.0  Avoid nephrotoxins  Monitor BMP    Chronic combined systolic and diastolic congestive heart failure (CMS/HCC)  Assessment & Plan  Hx CHF, portably systolic based upon family  report  Not maintained on daily diuretics secondary to syncope  CXR with mild pulm vascular congestion, no evidence of overwhelming fluid overload  Recent echo with mitral and aortic valvulopathy, EF 15-20%, G2DD  D/w Cardiology would discharge on Lasix 20 mg daily PRN for weight gain >3lb - stable for discharge from their standpoint  Monitor daily weight, I/O  Diuresis prn    Elevated troponin  Assessment & Plan  Trop 0.07.  Likely related to acute illness  Endorses episode of exertional chest pain a few weeks ago, but denies chest pain recently; SOB more likely due to pneumonia  ECG without ST elevation  Nonspecific intraventricular block, no baseline ECG available for comparison  Had TTE last week, with severe CHF  -  Troponin trend flat%  Appreciate cardiology input, no further work up  Follow up with CCP    COPD (chronic obstructive pulmonary disease) (CMS/HCC)  Assessment & Plan  Does not appear to be acutely bronchospastic  Maintaining pulse ox on room air  Cont Symbicort  Dishcarge with nebulizer machine    Type 2 diabetes mellitus (CMS/HCC)  Assessment & Plan  - On glimepiride  - monitor accu checks, SSI    Paroxysmal atrial fibrillation (CMS/HCC)  Assessment & Plan  Recently diagnosed at PCP visit and started on eliquis  Also started on Coreg this admission  Currently in NSR  No events on telemetry  Continue eliquis    Multiple myeloma not having achieved remission (CMS/HCC)  Assessment & Plan  Multiple myeloma dx Oct 2018.  lenalidomide 7 days on/14 days off cycle.  Due to start on 1/25  Associated anemia requiring intermittent blood transfusions   Follows at [facility]    History of coronary artery disease  Assessment & Plan  - Hx CAD dx via cardiac cath.  Denies history of MI, stents  - On asa.  Previously was on isosorbide but discontinued due to syncope        Subsequently the patient was deemed stable for further care in the outpatient setting.    Consults During Admission  IP CONSULT TO  CARDIOLOGY    Exam on Day of Discharge  Patient seen and examined, physical exam stable with no new abnormalities.       DISCHARGE MEDICATIONS        Medication List      START taking these medications    carvedilol 3.125 mg tablet  Commonly known as:  COREG  Take 1 tablet (3.125 mg total) by mouth 2 (two) times a day with meals.  Dose:  3.125 mg     ferrous sulfate 325 mg (65 mg iron) tablet  Start taking on:  January 29, 2020  Take 1 tablet (325 mg total) by mouth daily with lunch.  Dose:  325 mg     ipratropium 0.02 % nebulizer solution  Commonly known as:  ATROVENT  Take 2.5 mL (0.5 mg total) by nebulization every 6 (six) hours.  Dose:  0.5 mg        CHANGE how you take these medications    furosemide 20 mg tablet  Commonly known as:  LASIX  Take 1 tablet (20 mg total) by mouth as needed (weight gain >3 lb overnight or >5lb in a week).  Dose:  20 mg  What changed:    · how much to take  · reasons to take this     guaiFENesin 600 mg 12 hr tablet  Commonly known as:  MUCINEX  Take 2 tablets (1,200 mg total) by mouth 2 (two) times a day for 7 days.  Dose:  1,200 mg  What changed:    · when to take this  · reasons to take this        CONTINUE taking these medications    albuterol HFA 90 mcg/actuation inhaler  Commonly known as:  VENTOLIN HFA  Inhale 2 puffs every 6 (six) hours as needed for wheezing.  Dose:  2 puff     apixaban 2.5 mg tablet  Commonly known as:  ELIQUIS  Take 2.5 mg by mouth 2 (two) times a day.  Dose:  2.5 mg     aspirin 81 mg chewable tablet  Take 81 mg by mouth daily.  Dose:  81 mg     budesonide-formoterol 160-4.5 mcg/actuation inhaler  Commonly known as:  SYMBICORT  Inhale 2 puffs 2 (two) times a day. Rinse mouth with water after use to reduce aftertaste and incidence of candidiasis. Do not swallow.  Dose:  2 puff     cyanocobalamin 1,000 mcg tablet  Commonly known as:  VITAMIN B12  Take 1,000 mcg by mouth daily.  Dose:  1,000 mcg     docusate sodium 100 mg capsule  Commonly known as:   COLACE  Take 100 mg by mouth 2 (two) times a day as needed for constipation.  Dose:  100 mg     glimepiride 2 mg tablet  Commonly known as:  AMARYL  Take 2 mg by mouth daily with breakfast.  Dose:  2 mg     melatonin tablet  Take 4 mg by mouth nightly.  Dose:  4 mg     omeprazole 20 mg capsule  Commonly known as:  PriLOSEC  Take by mouth daily before breakfast.     potassium chloride 10 mEq CR tablet  Commonly known as:  KLOR-CON  Take 10 mEq by mouth as needed (as needed when taking lasix).  Dose:  10 mEq     timolol 0.5 % ophthalmic solution  Commonly known as:  TIMOPTIC  Administer 1 drop into both eyes daily.  Dose:  1 drop     TRAVATAN Z 0.004 % drops  Administer into affected eye(s).  Generic drug:  travoprost            Instructions for after discharge     Discharge diet      Diet Type / Texture:   Regular  Diabetic (Low Carb/Low Fat)  Diabetic, No Concentrated Sweets       Fluid restriction dietary / 24h:  1800 mL Fluid    Follow-up with provider      Follow up with your PCP Ferny Rodriguez DO within one week for hospital follow up.    Ferny Martin DO   [phone]    [address] [facility]  [address]  [place] [address]       Follow-up with provider      Follow up with Cardiology Dr. Zac Ward or colleague for atrial fibrillation, congestive heart failure and blood pressure in 2-3 weeks.    Zac Ward DO   [phone]    [facility] [address]  [address]  [place] [address]       Other Follow-up      Follow up with your hematologist at [facility] in about two weeks for multiple myeloma.    Post-Discharge Activity: Normal activity as tolerated.      Normal activity as tolerated.             PROCEDURES / LABS / IMAGING      Operative Procedures  none    Other Procedures  none    Pertinent Labs  Results from last 7 days   Lab Units 01/28/20  0552 01/27/20  1630 01/27/20  0841  01/26/20  0408   WBC K/uL 6.06  --  7.00  --  7.06   HEMOGLOBIN  g/dL 7.7* 7.7* 8.0*   < > 7.4*   HEMATOCRIT % 24.6* 25.3* 26.2*   < > 25.5*   PLATELETS K/uL 310  --  282  --  228    < > = values in this interval not displayed.         Results from last 7 days   Lab Units 01/28/20  0552 01/27/20  0841 01/26/20  0408   SODIUM mEQ/L 134* 135* 136   POTASSIUM mEQ/L 4.0 3.7 4.2   CHLORIDE mEQ/L 105 103 106   CO2 mEQ/L 21* 21* 19*   BUN mg/dL 33* 34* 34*   CREATININE mg/dL 1.5* 1.7* 1.7*   GLUCOSE mg/dL 163* 178* 146*   CALCIUM mg/dL 8.2* 8.3* 8.1*         Results from last 7 days   Lab Units 01/28/20  0552   MAGNESIUM mg/dL 2.4       Microbiology Results     Procedure Component Value Units Date/Time    Blood Culture Blood, Venous [[id number]] Collected:  01/24/20 0100    Specimen:  Blood, Venous Updated:  01/28/20 0700     Culture No growth at 96 hours    Blood Culture Blood, Venous [[id number]] Collected:  01/24/20 0100    Specimen:  Blood, Venous Updated:  01/28/20 0700     Culture No growth at 96 hours    RSV and Influenza Nucleic Acids, PCR Nasopharynx [[id number]]  (Normal) Collected:  01/23/20 2128    Specimen:  Nasopharyngeal Swab from Nasopharynx Updated:  01/23/20 2235     Influenza A Negative     Influenza B Negative     Respiratory Syncytial Virus Negative          Troponin I Results       01/24/20 01/24/20 01/23/20                    1022 0408 2041         Troponin I 0.06 0.07 0.07         Comment for Troponin I at 1022 on 01/24/20:  Result requires test to be repeated on new specimen 4-6 hours after the original.    Comment for Troponin I at 0408 on 01/24/20:  Result requires test to be repeated on new specimen 4-6 hours after the original.    Comment for Troponin I at 2041 on 01/23/20:  Result requires test to be repeated on new specimen 4-6 hours after the original.          Pertinent Imaging  X-ray Chest 2 Views    Result Date: 1/24/2020  IMPRESSION: Mild cardiomegaly.  Interstitial edema.  Bilateral pleural effusions.      OUTPATIENT  FOLLOW-UP / REFERRALS / PENDING  TESTS        Outpatient Follow-Up Appointments  Encounter Information     You do not currently have any appointments scheduled.          Referrals  No orders of the defined types were placed in this encounter.      Important Issues to Address in Follow-Up    Appointment follow up needs:  - PCP Ferny Martin, DO in one week for hospital follow up, labs and further medical management of chronic conditions  - Hematology [facility] in 2 weeks  - Cardiology CCP Dr. Ward in 2-3 weeks    DISCHARGE DISPOSITION      Disposition: Home Health    Code Status At Discharge: Full Code    Physician Order for Life-Sustaining Treatment Document Status      No documents found                 Time spent 37 minutes coordinating care with patient, family, specialists and case management/social work.     Luke Dumont PA-C  [facility], [facility]

## 2020-01-28 NOTE — PROGRESS NOTES
Patient: Marty Hinds  Location: [address]  MRN: [id number]  Today's date: 1/28/2020      Speech Pathology: Therapy session    SLP Diagnosis: Mild pharyngeal dysphagia and suspect esophageal dysphagia.     Recommendations:  1. Regular solids and thin liquids.  2. Aspiration Precautions including small single sips, small bites, and slow rate of ingestion. No straws.  3. GERD precautions including upright position during and after meals.   4. Cyclic ingestion (alternating food and liquids 1:1)   5. Ongoing assessment needed to ensure diet tolerance and train on strategies.         Summary/Handoff:  Daily Outcome Statement (SLP): Pt was seen for a follow up session. Pt alert and awake sitting in chair. Pt reports ongoing productive cough with expectoration of thick mucus. Pt was trained on small single sips, small bites, slow rate, and cyclic ingestion requiring min cues. Mild pharyngeal dysphagia characterized by decreased HLE and coughing with straw presentation. Suspect esophageal dysphagia. No s/s of aspiration were observed with single cup sips of thin liquids. Recommend regular solids and thin liquids via small single cup sip. Aspiration/GERD precautions. Ongoing assessment needed.                Dietary Orders   (From admission, onward)             Start     Ordered    01/27/20 1234  Adult Diet Regular; Consistent Carbohydrate 1800; Cardiac (Low Sodium/Low Fat), No Concentrated Sweets; RD/LDN may adjust order  Diet effective now     Question Answer Comment   Diet Texture Regular    Diabetic Restrictions: Consistent Carbohydrate 1800    Other Restriction(s): Cardiac (Low Sodium/Low Fat)    Other Restriction(s): No Concentrated Sweets    Delegation of Authority. Diet orders written by PA/Jan may not be adjusted by RD/LDNs. RD/LDN may adjust order        01/27/20 1234                Results from last 7 days   Lab Units 01/28/20  0552 01/27/20  1630 01/27/20  0841  01/26/20  0408   WBC  K/uL 6.06  --  7.00  --  7.06   HEMOGLOBIN g/dL 7.7* 7.7* 8.0*   < > 7.4*   HEMATOCRIT % 24.6* 25.3* 26.2*   < > 25.5*   PLATELETS K/uL 310  --  282  --  228    < > = values in this interval not displayed.          Patient left with call bell in reach and alarms as found.    Marty is a 89 y.o. male admitted on 1/23/2020 with Pneumonia. Principal problem is Cough.    Past Medical History  Marty has a past medical history of Atrial fibrillation (CMS/[facility]), CHF (congestive heart failure) (CMS/[facility]), Chronic kidney disease, COPD (chronic obstructive pulmonary disease) (CMS/[facility]), Coronary artery disease, Multiple myeloma (CMS/[facility]), and Type 2 diabetes mellitus (CMS/[facility]).    History of Present Illness   adm with pna and decreased fxn mobility secondary to immobility at home    SLP Vitals    Date/Time Pulse Resp SpO2 Pt Activity O2 Therapy BP Pt Position [facility]   01/28/20 1100 69 18 97 % At rest None (Room air) 123/74 Lying DM          Prior Living Environment      Most Recent Value   Living Arrangements  house   Living Environment Comment  1SH with 2STE with son          Prior Level of Function      Most Recent Value   Ambulation  assistive equipment   Transferring  independent   Toileting  independent   Bathing  independent   Dressing  independent   Eating  independent   Communication  understands/communicates without difficulty   Swallowing  swallows foods/liquids without difficulty   Baseline Diet/Method of Nutritional Intake  thin liquids, regular solids   Past History of Dysphagia  increased coughing noted   Prior Level of Function Comment  indep with rw for mobility, retired does not drive   Equipment Currently Used at Home  walker, front-wheeled          SLP Evaluation and Treatment - 01/28/20 1208        Time Calculation    Start Time  1057     Stop Time  1109     Time Calculation (min)  12 min        Session Details    Document Type  daily treatment/progress note     Mode of Treatment  individual therapy;speech  language pathology        General Information    Patient Profile Reviewed?  yes     Patient/Family/Caregiver Comments/Observations  Pt was seen at bedside alert and awake.     Existing Precautions/Restrictions  aspiration;fall     Limitations/Impairments  swallowing;safety/cognitive        Functional Communication Measures    FCM: Swallowing  6-->Level 6        General Swallowing Observations    Current Diet/Method of Nutritional Intake (General Swallowing Observations, NIS)  thin liquids;regular solids     Signs/Symptoms of Aspiration (Current Diet)  cough        Food and Liquid Trials (NIS)    Patient Positioning  upright in chair     Oral Intake/Feeding Performance  independent/appropriate self-feeding skills     Food Consistencies Evaluated  regular solids     Regular Solids  intact     Comment, Regular Solids  Pt independently taking small bites.     Liquid Consistencies Evaluated  thin liquids     Thin Liquids  intact     Oral Preparatory Phase of Swallow  WFL     Oral Phase of Swallow  WFL     Pharyngeal Phase of Swallow  decreased laryngeal elevation     Comment, Pharyngeal Phase  Coughing noted with straw presentation. No s/s of aspiration with single cup sips.      Esophageal Phase of Swallow  frequent belching     Comment  Pt was trained on small single sips, small bites, slow rate, and cyclic ingestion.        Swallowing Recommendations    Strategies to Enhance Eating/Swallowing  allow adequate time for eating     Diet Consistency Recommendations  thin liquids;regular solids     Recommended Feeding/Eating Techniques  alternate between small bites and sips of food/liquid;small sips/bites;maintain upright posture during/after eating for 30 mins     Mode of Delivery Recommendations  small bolus size;slow rate of intake;other (see comments)        AM-PAC (TM) - Cognition (Current Function)    Following/understanding a 10-15 minute speech or presentation?  3 - A little     Understanding familiar people during  ordinary conversations?  3 - A little     Remembering to take medications at the appropriate time?  2 - A lot     Remembering where things were placed or put away?  2 - A lot     Remembering a list of 3 or 4 errands without writing it down?  2 - A lot     Taking care of complicated tasks?  2 - A lot     AM-PAC (TM) Cognition Score  14        Therapy Assessment/Plan (SLP)    SLP Diagnosis  Mild pharyngeal dysphagia and suspect esophageal dysphagia.      Rehab Potential (SLP Eval)  fair, will monitor progress closely     Therapy Frequency (SLP)  3-5 times/wk        Daily Progress Summary (SLP)    Daily Outcome Statement (SLP)  Pt was seen for a follow up session. Pt alert and awake sitting in chair. Pt reports ongoing productive cough with expectoration of thick mucus. Pt was trained on small single sips, small bites, slow rate, and cyclic ingestion requiring min cues. Mild pharyngeal dysphagia characterized by decreased HLE and coughing with straw presentation. Suspect esophageal dysphagia. No s/s of aspiration were observed with single cup sips of thin liquids. Recommend regular solids and thin liquids via small single cup sip. Aspiration/GERD precautions. Ongoing assessment needed.                   Education provided this session. See the Patient Education summary report for full details.    SLP Goals      Most Recent Value   Oral Nutrition/Hydration Goal 1   Activity  effective/safe, management of texture/viscosity at 01/25/2020 0913   Time Frame  by discharge at 01/25/2020 0913   Progress/Outcome  goal met at 01/25/2020 0913   Swallow Management Recall Goal 1   Activity  recall of, compensatory swallow strategies/techniques, with minimal cues (75-90% accuracy) at 01/25/2020 0913   Time Frame  by discharge at 01/25/2020 0913   Progress/Outcome  goal ongoing at 01/25/2020 0913

## 2020-01-28 NOTE — PLAN OF CARE
Problem: Adult Inpatient Plan of Care  Goal: Plan of Care Review  Flowsheets (Taken 1/28/2020 1257)  Progress: improving  Plan of Care Reviewed With: patient  Outcome Summary: discussed during care progression rounds, plan is to home later today, [facility] provdied a script for nebulizer. pt did not have any preference for vendor, script faxed to [facility] [phone].. It was approved and a machine provided to the patient.   patient was in PT today, waiting to see progress when note entered.  Patient is current with [facility], will resume care after discharge.    [id number] - faxed discharge summary and instructions and face sheet to [facility] [phone] and notified them of patient discharge.

## 2020-01-28 NOTE — TELEPHONE ENCOUNTER
Patient son called to advise that the patient is being discharged today  He stated that he would like Dr Michelle Carlos to look over his hemoglobin as he is concerned  Any question Jayashree Reeves can be reached at [phone]

## 2020-01-28 NOTE — PLAN OF CARE
Problem: Adult Inpatient Plan of Care  Goal: Plan of Care Review  Outcome: Progressing  Flowsheets (Taken 1/28/2020 1220)  Progress: improving  Plan of Care Reviewed With: patient  Outcome Summary: mild pharyngeal dysphagia and suspect esophageal dysphagia.

## 2020-01-28 NOTE — PATIENT CARE CONFERENCE
Care Progression Rounds Note  Date: 1/28/2020  Time: 11:37 AM     Patient Name: Marty Hinds     Medical Record Number: [id number]   YOB: 1930  Sex: Male      Room/Bed: 4414W    Admitting Diagnosis: Pneumonia [J18.9]   Admit Date/Time: 1/23/2020  8:24 PM    Primary Diagnosis: Cough  Principal Problem: Cough    GMLOS: pending  Anticipated Discharge Date: 1/28/2020    AM-PAC  Mobility Score: 18    Discharge Planning:  Living Arrangements: house  Anticipated Discharge Disposition: home with home health services, skilled nursing facility    Barriers to Discharge:  Barriers to Discharge: Medical issues not resolved  Comment: home with [facility] care and on CHF program    Participants:  advanced practice provider, , nursing, physical therapy, social work/services

## 2020-01-28 NOTE — PROGRESS NOTES
Patient: Marty Hinds  Location: [address]  MRN: [id number]  Today's date: 1/28/2020  Pt returned to room by transport  Marty is a 89 y.o. male admitted on 1/23/2020 with Pneumonia. Principal problem is Cough.    Past Medical History  Marty has a past medical history of Atrial fibrillation (CMS/[facility]), CHF (congestive heart failure) (CMS/[facility]), Chronic kidney disease, COPD (chronic obstructive pulmonary disease) (CMS/[facility]), Coronary artery disease, Multiple myeloma (CMS/[facility]), and Type 2 diabetes mellitus (CMS/[facility]).    History of Present Illness   adm with pna and decreased fxn mobility secondary to immobility at home    PT Vitals    Date/Time Pulse SpO2 Pt Activity O2 Therapy BP [facility]   01/28/20 0925 88 96 % At rest None (Room air) 122/70 EMP      PT Pain    Date/Time Pain Type Pref Pain Scale Rating: Rest Rating: Activity [facility]   01/28/20 0925 Pain Assessment number (Numeric Rating Pain Scale) 0 0 EMP          Prior Living Environment      Most Recent Value   Living Arrangements  house   Living Environment Comment  1SH with 2STE with son          Prior Level of Function      Most Recent Value   Ambulation  assistive equipment   Transferring  independent   Toileting  independent   Bathing  independent   Dressing  independent   Eating  independent   Communication  understands/communicates without difficulty   Swallowing  swallows foods/liquids without difficulty   Baseline Diet/Method of Nutritional Intake  thin liquids, regular solids   Past History of Dysphagia  increased coughing noted   Prior Level of Function Comment  indep with rw for mobility, retired does not drive   Equipment Currently Used at Home  walker, front-wheeled          PT Evaluation and Treatment - 01/28/20 0925        Time Calculation    Start Time  0925     Stop Time  0950     Time Calculation (min)  25 min        Session Details    Document Type  daily treatment/progress note     Mode of Treatment  individual therapy;physical  therapy        General Information    Patient Profile Reviewed?  yes     Referring Physician  Crow     General Observations of Patient  pt received in gym in recliner     Existing Precautions/Restrictions  aspiration;fall     Limitations/Impairments  safety/cognitive        Bed Mobility    [place], All Bed Mobility Activities  close supervision     Comment (Bed Mobility)  vc's for hand placement        Sit to Stand Transfer    [place], Sit to Stand Transfer  close supervision     Verbal Cues  hand placement     Assistive Device  walker, front-wheeled        Stand to Sit Transfer    [place], Stand to Sit Transfer  close supervision     Verbal Cues  hand placement     Assistive Device  walker, front-wheeled        Gait Training    [place], Gait  close supervision     Assistive Device  walker, front-wheeled     Distance in Feet  100 feet     Gait Pattern Utilized  step-through     Deviations/Abnormal Patterns (Gait)  antalgic        AM-PAC (TM) - Mobility (Current Function)    Turning from your back to your side while in a flat bed without using bedrails?  3 - A Little     Moving from lying on your back to sitting on the side of a flat bed without using bedrails?  3 - A Little     Moving to and from a bed to a chair?  3 - A Little     Standing up from a chair using your arms?  3 - A Little     To walk in a hospital room?  3 - A Little     Climbing 3-5 steps with a railing?  3 - A Little     AM-PAC (TM) Mobility Score  18        Therapy Assessment/Plan (PT)    Patient/Family Therapy Goals Statement (PT)  to go home     PT Diagnosis  weakness     Rehab Potential (PT)  good, to achieve stated therapy goals     Criteria for Skilled Interventions Met (PT)  meets criteria;skilled treatment is necessary     Therapy Frequency (PT)  3-5 times/wk     Planned Therapy Interventions (PT)  balance training;bed mobility training;gait training;transfer training;stair training     Problem List  balance;mobility      Activity Limitations Related to Problem List  unable to ambulate safely;unable to transfer safely        Progress Summary (PT)    Symptoms Noted During/After Treatment  none     Progress Toward Functional Goals (PT)  progressing toward functional goals as expected     Daily Outcome Statement (PT)  close S for fxn mobility        Therapy Plan Review/Discharge Plan (PT)    Therapy Plan Review (PT)  care plan/treatment goals reviewed;patient     Anticipated Equipment Needs at Discharge (PT Eval)  none     PT Recommended Discharge Disposition  home with home health        Plan of Care Review    Plan of Care Reviewed With  patient                       Education provided this session. See the Patient Education summary report for full details.    PT Goals      Most Recent Value   Bed Mobility Goal 1   Activity/Assistive Device  bed mobility activities, all at 01/26/2020 1115   [place]  modified independence at 01/26/2020 1115   Time Frame  by discharge at 01/26/2020 1115   Progress/Outcome  goal ongoing at 01/28/2020 0925   Transfer Goal 1   Activity/Assistive Device  all transfers, walker, front-wheeled at 01/26/2020 1115   [place]  modified independence at 01/26/2020 1115   Time Frame  by discharge at 01/26/2020 1115   Progress/Outcome  goal ongoing at 01/28/2020 0925   Gait Training Goal 1   Activity/Assistive Device  gait (walking locomotion), walker, rolling at 01/26/2020 1115   [place]  modified independence at 01/26/2020 1115   Distance  80 at 01/26/2020 1115   Time Frame  by discharge at 01/26/2020 1115   Progress/Outcome  goal ongoing at 01/28/2020 0925   Stairs Goal 1   Activity/Assistive Device  stairs, all skills, cane, straight at 01/26/2020 1115   [place]  modified independence at 01/26/2020 1115   Number of Stairs  4 at 01/26/2020 1115   Time Frame  by discharge at 01/26/2020 1115   Progress/Outcome  goal ongoing at 01/28/2020 0925

## 2020-01-28 NOTE — PLAN OF CARE
Problem: Adult Inpatient Plan of Care  Goal: Plan of Care Review  Outcome: Progressing  Flowsheets (Taken 1/28/2020 6027)  Plan of Care Reviewed With: patient  Outcome Summary: close S for fxn mobility will benefit from HC PT to address deficits to max fxn indep     Problem: Mobility Impairment  Goal: Optimal Mobility  Outcome: Progressing

## 2020-01-28 NOTE — TREATMENT PLAN
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    CRISTAL Bacon  [facility]

## 2020-01-29 LAB
BACTERIA BLD CULT: NORMAL
BACTERIA BLD CULT: NORMAL

## 2020-01-30 ENCOUNTER — HOSPITAL ENCOUNTER (INPATIENT)
Facility: HOSPITAL | Age: 85
LOS: 12 days | Discharge: NURSING, REHAB OR LONG-TERM CARE | DRG: 280 | End: 2020-02-12
Attending: EMERGENCY MEDICINE | Admitting: FAMILY MEDICINE
Payer: COMMERCIAL

## 2020-01-30 ENCOUNTER — APPOINTMENT (EMERGENCY)
Dept: RADIOLOGY | Facility: HOSPITAL | Age: 85
DRG: 280 | End: 2020-01-30
Payer: COMMERCIAL

## 2020-01-30 ENCOUNTER — APPOINTMENT (EMERGENCY)
Dept: CT IMAGING | Facility: HOSPITAL | Age: 85
DRG: 280 | End: 2020-01-30
Payer: COMMERCIAL

## 2020-01-30 DIAGNOSIS — N18.4 STAGE 4 CHRONIC KIDNEY DISEASE (HCC): ICD-10-CM

## 2020-01-30 DIAGNOSIS — I50.9 ACUTE EXACERBATION OF CHF (CONGESTIVE HEART FAILURE) (HCC): Primary | ICD-10-CM

## 2020-01-30 DIAGNOSIS — I95.9 HYPOTENSION: ICD-10-CM

## 2020-01-30 DIAGNOSIS — D64.9 ANEMIA: ICD-10-CM

## 2020-01-30 DIAGNOSIS — I50.43 ACUTE ON CHRONIC COMBINED SYSTOLIC AND DIASTOLIC CONGESTIVE HEART FAILURE (HCC): ICD-10-CM

## 2020-01-30 DIAGNOSIS — J18.9 PNEUMONIA: ICD-10-CM

## 2020-01-30 DIAGNOSIS — E11.9 TYPE 2 DIABETES MELLITUS WITHOUT COMPLICATION, WITHOUT LONG-TERM CURRENT USE OF INSULIN (HCC): Chronic | ICD-10-CM

## 2020-01-30 DIAGNOSIS — R77.8 ELEVATED TROPONIN: ICD-10-CM

## 2020-01-30 DIAGNOSIS — C90.00 MULTIPLE MYELOMA (HCC): ICD-10-CM

## 2020-01-30 LAB
ALBUMIN SERPL BCP-MCNC: 2.9 G/DL (ref 3.5–5)
ALP SERPL-CCNC: 135 U/L (ref 46–116)
ALT SERPL W P-5'-P-CCNC: 14 U/L (ref 12–78)
ANION GAP SERPL CALCULATED.3IONS-SCNC: 10 MMOL/L (ref 4–13)
AST SERPL W P-5'-P-CCNC: 12 U/L (ref 5–45)
BASOPHILS # BLD AUTO: 0.09 THOUSANDS/ΜL (ref 0–0.1)
BASOPHILS NFR BLD AUTO: 1 % (ref 0–1)
BILIRUB SERPL-MCNC: 0.5 MG/DL (ref 0.2–1)
BUN SERPL-MCNC: 31 MG/DL (ref 5–25)
CALCIUM SERPL-MCNC: 8.7 MG/DL (ref 8.3–10.1)
CHLORIDE SERPL-SCNC: 103 MMOL/L (ref 100–108)
CLARITY, POC: CLEAR
CO2 SERPL-SCNC: 24 MMOL/L (ref 21–32)
COLOR, POC: YELLOW
CREAT SERPL-MCNC: 1.83 MG/DL (ref 0.6–1.3)
EOSINOPHIL # BLD AUTO: 0.1 THOUSAND/ΜL (ref 0–0.61)
EOSINOPHIL NFR BLD AUTO: 1 % (ref 0–6)
ERYTHROCYTE [DISTWIDTH] IN BLOOD BY AUTOMATED COUNT: 18.8 % (ref 11.6–15.1)
EXT BILIRUBIN, UA: ABNORMAL
EXT BLOOD URINE: ABNORMAL
EXT GLUCOSE, UA: ABNORMAL
EXT KETONES: ABNORMAL
EXT NITRITE, UA: ABNORMAL
EXT PH, UA: 5
EXT PROTEIN, UA: ABNORMAL
EXT SPECIFIC GRAVITY, UA: 1.02
EXT UROBILINOGEN: 0.2
GFR SERPL CREATININE-BSD FRML MDRD: 32 ML/MIN/1.73SQ M
GLUCOSE SERPL-MCNC: 182 MG/DL (ref 65–140)
HCT VFR BLD AUTO: 28.1 % (ref 36.5–49.3)
HGB BLD-MCNC: 8.5 G/DL (ref 12–17)
IMM GRANULOCYTES # BLD AUTO: 0.04 THOUSAND/UL (ref 0–0.2)
IMM GRANULOCYTES NFR BLD AUTO: 1 % (ref 0–2)
LIPASE SERPL-CCNC: 190 U/L (ref 73–393)
LYMPHOCYTES # BLD AUTO: 1.09 THOUSANDS/ΜL (ref 0.6–4.47)
LYMPHOCYTES NFR BLD AUTO: 15 % (ref 14–44)
MCH RBC QN AUTO: 26.2 PG (ref 26.8–34.3)
MCHC RBC AUTO-ENTMCNC: 30.2 G/DL (ref 31.4–37.4)
MCV RBC AUTO: 87 FL (ref 82–98)
MONOCYTES # BLD AUTO: 0.44 THOUSAND/ΜL (ref 0.17–1.22)
MONOCYTES NFR BLD AUTO: 6 % (ref 4–12)
NEUTROPHILS # BLD AUTO: 5.71 THOUSANDS/ΜL (ref 1.85–7.62)
NEUTS SEG NFR BLD AUTO: 76 % (ref 43–75)
NRBC BLD AUTO-RTO: 0 /100 WBCS
NT-PROBNP SERPL-MCNC: ABNORMAL PG/ML
PLATELET # BLD AUTO: 402 THOUSANDS/UL (ref 149–390)
PMV BLD AUTO: 10.2 FL (ref 8.9–12.7)
POTASSIUM SERPL-SCNC: 4.5 MMOL/L (ref 3.5–5.3)
PROT SERPL-MCNC: 6.9 G/DL (ref 6.4–8.2)
RBC # BLD AUTO: 3.24 MILLION/UL (ref 3.88–5.62)
SODIUM SERPL-SCNC: 137 MMOL/L (ref 136–145)
TROPONIN I SERPL-MCNC: 0.08 NG/ML
WBC # BLD AUTO: 7.47 THOUSAND/UL (ref 4.31–10.16)
WBC # BLD EST: ABNORMAL 10*3/UL

## 2020-01-30 PROCEDURE — 99285 EMERGENCY DEPT VISIT HI MDM: CPT | Performed by: EMERGENCY MEDICINE

## 2020-01-30 PROCEDURE — 83880 ASSAY OF NATRIURETIC PEPTIDE: CPT | Performed by: EMERGENCY MEDICINE

## 2020-01-30 PROCEDURE — 36415 COLL VENOUS BLD VENIPUNCTURE: CPT

## 2020-01-30 PROCEDURE — 81002 URINALYSIS NONAUTO W/O SCOPE: CPT | Performed by: EMERGENCY MEDICINE

## 2020-01-30 PROCEDURE — 71046 X-RAY EXAM CHEST 2 VIEWS: CPT

## 2020-01-30 PROCEDURE — 84484 ASSAY OF TROPONIN QUANT: CPT | Performed by: EMERGENCY MEDICINE

## 2020-01-30 PROCEDURE — 85025 COMPLETE CBC W/AUTO DIFF WBC: CPT

## 2020-01-30 PROCEDURE — 80053 COMPREHEN METABOLIC PANEL: CPT

## 2020-01-30 PROCEDURE — 83690 ASSAY OF LIPASE: CPT

## 2020-01-30 PROCEDURE — 99285 EMERGENCY DEPT VISIT HI MDM: CPT

## 2020-01-30 PROCEDURE — 70450 CT HEAD/BRAIN W/O DYE: CPT

## 2020-01-30 PROCEDURE — 93005 ELECTROCARDIOGRAM TRACING: CPT

## 2020-01-30 RX ORDER — FUROSEMIDE 10 MG/ML
20 INJECTION INTRAMUSCULAR; INTRAVENOUS ONCE
Status: COMPLETED | OUTPATIENT
Start: 2020-01-30 | End: 2020-01-30

## 2020-01-30 RX ADMIN — FUROSEMIDE 20 MG: 10 INJECTION, SOLUTION INTRAVENOUS at 22:19

## 2020-01-30 NOTE — TELEPHONE ENCOUNTER
Spoke with pts son and let him know we got the records form [facility]  Dr Esequiel Parker feels that pt should have blood transfusion  Pts HGB was 7 7 on 1/28/20  Son is actually taking pt to [facility] ER  Pts symptoms are getting worse, more SOB and fatigue  Dr Esequiel Parker stated that pt is to hold Revlimid until 2/18/20 appt and discuss further, son voiced understanding  Dr Esequiel Parker is aware of pt going to ER

## 2020-01-31 ENCOUNTER — APPOINTMENT (OUTPATIENT)
Dept: NON INVASIVE DIAGNOSTICS | Facility: HOSPITAL | Age: 85
DRG: 280 | End: 2020-01-31
Payer: COMMERCIAL

## 2020-01-31 LAB
ABO GROUP BLD: NORMAL
ANION GAP SERPL CALCULATED.3IONS-SCNC: 9 MMOL/L (ref 4–13)
ATRIAL RATE: 90 BPM
BLD GP AB SCN SERPL QL: NEGATIVE
BUN SERPL-MCNC: 31 MG/DL (ref 5–25)
CALCIUM SERPL-MCNC: 8.4 MG/DL (ref 8.3–10.1)
CHLORIDE SERPL-SCNC: 105 MMOL/L (ref 100–108)
CO2 SERPL-SCNC: 24 MMOL/L (ref 21–32)
CREAT SERPL-MCNC: 1.73 MG/DL (ref 0.6–1.3)
ERYTHROCYTE [DISTWIDTH] IN BLOOD BY AUTOMATED COUNT: 19 % (ref 11.6–15.1)
GFR SERPL CREATININE-BSD FRML MDRD: 34 ML/MIN/1.73SQ M
GLUCOSE P FAST SERPL-MCNC: 147 MG/DL (ref 65–99)
GLUCOSE SERPL-MCNC: 128 MG/DL (ref 65–140)
GLUCOSE SERPL-MCNC: 147 MG/DL (ref 65–140)
GLUCOSE SERPL-MCNC: 172 MG/DL (ref 65–140)
GLUCOSE SERPL-MCNC: 176 MG/DL (ref 65–140)
GLUCOSE SERPL-MCNC: 230 MG/DL (ref 65–140)
HCT VFR BLD AUTO: 25.2 % (ref 36.5–49.3)
HGB BLD-MCNC: 7.7 G/DL (ref 12–17)
MAGNESIUM SERPL-MCNC: 2.3 MG/DL (ref 1.6–2.6)
MCH RBC QN AUTO: 26.4 PG (ref 26.8–34.3)
MCHC RBC AUTO-ENTMCNC: 30.6 G/DL (ref 31.4–37.4)
MCV RBC AUTO: 86 FL (ref 82–98)
P AXIS: 81 DEGREES
PHOSPHATE SERPL-MCNC: 3.3 MG/DL (ref 2.3–4.1)
PLATELET # BLD AUTO: 356 THOUSANDS/UL (ref 149–390)
PMV BLD AUTO: 10.3 FL (ref 8.9–12.7)
POTASSIUM SERPL-SCNC: 4 MMOL/L (ref 3.5–5.3)
PR INTERVAL: 264 MS
QRS AXIS: 144 DEGREES
QRSD INTERVAL: 136 MS
QT INTERVAL: 406 MS
QTC INTERVAL: 496 MS
RBC # BLD AUTO: 2.92 MILLION/UL (ref 3.88–5.62)
RH BLD: NEGATIVE
SODIUM SERPL-SCNC: 138 MMOL/L (ref 136–145)
SPECIMEN EXPIRATION DATE: NORMAL
T WAVE AXIS: 99 DEGREES
VENTRICULAR RATE: 90 BPM
WBC # BLD AUTO: 6.25 THOUSAND/UL (ref 4.31–10.16)

## 2020-01-31 PROCEDURE — 87081 CULTURE SCREEN ONLY: CPT | Performed by: INTERNAL MEDICINE

## 2020-01-31 PROCEDURE — 85027 COMPLETE CBC AUTOMATED: CPT | Performed by: NURSE PRACTITIONER

## 2020-01-31 PROCEDURE — 93306 TTE W/DOPPLER COMPLETE: CPT | Performed by: INTERNAL MEDICINE

## 2020-01-31 PROCEDURE — 86923 COMPATIBILITY TEST ELECTRIC: CPT

## 2020-01-31 PROCEDURE — 86901 BLOOD TYPING SEROLOGIC RH(D): CPT | Performed by: INTERNAL MEDICINE

## 2020-01-31 PROCEDURE — 86850 RBC ANTIBODY SCREEN: CPT | Performed by: INTERNAL MEDICINE

## 2020-01-31 PROCEDURE — 80048 BASIC METABOLIC PNL TOTAL CA: CPT | Performed by: NURSE PRACTITIONER

## 2020-01-31 PROCEDURE — 86900 BLOOD TYPING SEROLOGIC ABO: CPT | Performed by: INTERNAL MEDICINE

## 2020-01-31 PROCEDURE — 82948 REAGENT STRIP/BLOOD GLUCOSE: CPT

## 2020-01-31 PROCEDURE — 84100 ASSAY OF PHOSPHORUS: CPT | Performed by: NURSE PRACTITIONER

## 2020-01-31 PROCEDURE — 30233N1 TRANSFUSION OF NONAUTOLOGOUS RED BLOOD CELLS INTO PERIPHERAL VEIN, PERCUTANEOUS APPROACH: ICD-10-PCS | Performed by: INTERNAL MEDICINE

## 2020-01-31 PROCEDURE — 93010 ELECTROCARDIOGRAM REPORT: CPT | Performed by: INTERNAL MEDICINE

## 2020-01-31 PROCEDURE — P9016 RBC LEUKOCYTES REDUCED: HCPCS

## 2020-01-31 PROCEDURE — 99223 1ST HOSP IP/OBS HIGH 75: CPT | Performed by: INTERNAL MEDICINE

## 2020-01-31 PROCEDURE — 94760 N-INVAS EAR/PLS OXIMETRY 1: CPT

## 2020-01-31 PROCEDURE — 93306 TTE W/DOPPLER COMPLETE: CPT

## 2020-01-31 PROCEDURE — 83735 ASSAY OF MAGNESIUM: CPT | Performed by: NURSE PRACTITIONER

## 2020-01-31 RX ORDER — FUROSEMIDE 10 MG/ML
40 INJECTION INTRAMUSCULAR; INTRAVENOUS
Status: DISCONTINUED | OUTPATIENT
Start: 2020-02-01 | End: 2020-02-01

## 2020-01-31 RX ORDER — ACETAMINOPHEN 325 MG/1
650 TABLET ORAL EVERY 6 HOURS PRN
Status: DISCONTINUED | OUTPATIENT
Start: 2020-01-31 | End: 2020-02-12 | Stop reason: HOSPADM

## 2020-01-31 RX ORDER — BUDESONIDE AND FORMOTEROL FUMARATE DIHYDRATE 160; 4.5 UG/1; UG/1
2 AEROSOL RESPIRATORY (INHALATION) 2 TIMES DAILY
Status: DISCONTINUED | OUTPATIENT
Start: 2020-01-31 | End: 2020-02-12 | Stop reason: HOSPADM

## 2020-01-31 RX ORDER — HEPARIN SODIUM 5000 [USP'U]/ML
5000 INJECTION, SOLUTION INTRAVENOUS; SUBCUTANEOUS EVERY 8 HOURS SCHEDULED
Status: DISCONTINUED | OUTPATIENT
Start: 2020-01-31 | End: 2020-02-05 | Stop reason: SDUPTHER

## 2020-01-31 RX ORDER — TIMOLOL MALEATE 5 MG/ML
1 SOLUTION/ DROPS OPHTHALMIC DAILY
Status: DISCONTINUED | OUTPATIENT
Start: 2020-01-31 | End: 2020-02-12 | Stop reason: HOSPADM

## 2020-01-31 RX ORDER — ISOSORBIDE MONONITRATE 30 MG/1
30 TABLET, EXTENDED RELEASE ORAL DAILY
Status: DISCONTINUED | OUTPATIENT
Start: 2020-01-31 | End: 2020-02-04

## 2020-01-31 RX ORDER — GUAIFENESIN 600 MG
1200 TABLET, EXTENDED RELEASE 12 HR ORAL 2 TIMES DAILY
Status: DISCONTINUED | OUTPATIENT
Start: 2020-01-31 | End: 2020-02-12 | Stop reason: HOSPADM

## 2020-01-31 RX ORDER — DOCUSATE SODIUM 100 MG/1
100 CAPSULE, LIQUID FILLED ORAL DAILY
Status: DISCONTINUED | OUTPATIENT
Start: 2020-01-31 | End: 2020-02-12 | Stop reason: HOSPADM

## 2020-01-31 RX ORDER — ASPIRIN 81 MG/1
81 TABLET, CHEWABLE ORAL DAILY
Status: DISCONTINUED | OUTPATIENT
Start: 2020-01-31 | End: 2020-02-12 | Stop reason: HOSPADM

## 2020-01-31 RX ORDER — FUROSEMIDE 10 MG/ML
20 INJECTION INTRAMUSCULAR; INTRAVENOUS
Status: DISCONTINUED | OUTPATIENT
Start: 2020-01-31 | End: 2020-01-31

## 2020-01-31 RX ORDER — LANOLIN ALCOHOL/MO/W.PET/CERES
6 CREAM (GRAM) TOPICAL
Status: DISCONTINUED | OUTPATIENT
Start: 2020-01-31 | End: 2020-02-12 | Stop reason: HOSPADM

## 2020-01-31 RX ORDER — PANTOPRAZOLE SODIUM 40 MG/1
40 TABLET, DELAYED RELEASE ORAL
Status: DISCONTINUED | OUTPATIENT
Start: 2020-01-31 | End: 2020-02-12 | Stop reason: HOSPADM

## 2020-01-31 RX ORDER — GLIMEPIRIDE 2 MG/1
2 TABLET ORAL DAILY
Status: DISCONTINUED | OUTPATIENT
Start: 2020-01-31 | End: 2020-02-05

## 2020-01-31 RX ORDER — NITROGLYCERIN 0.4 MG/1
0.4 TABLET SUBLINGUAL
Status: DISCONTINUED | OUTPATIENT
Start: 2020-01-31 | End: 2020-02-06

## 2020-01-31 RX ADMIN — HEPARIN SODIUM 5000 UNITS: 5000 INJECTION INTRAVENOUS; SUBCUTANEOUS at 16:42

## 2020-01-31 RX ADMIN — INSULIN LISPRO 1 UNITS: 100 INJECTION, SOLUTION INTRAVENOUS; SUBCUTANEOUS at 18:07

## 2020-01-31 RX ADMIN — DOCUSATE SODIUM 100 MG: 100 CAPSULE, LIQUID FILLED ORAL at 08:23

## 2020-01-31 RX ADMIN — INSULIN LISPRO 1 UNITS: 100 INJECTION, SOLUTION INTRAVENOUS; SUBCUTANEOUS at 21:35

## 2020-01-31 RX ADMIN — ASPIRIN 81 MG 81 MG: 81 TABLET ORAL at 08:24

## 2020-01-31 RX ADMIN — GUAIFENESIN 1200 MG: 600 TABLET ORAL at 18:06

## 2020-01-31 RX ADMIN — INSULIN LISPRO 3 UNITS: 100 INJECTION, SOLUTION INTRAVENOUS; SUBCUTANEOUS at 12:16

## 2020-01-31 RX ADMIN — BUDESONIDE AND FORMOTEROL FUMARATE DIHYDRATE 2 PUFF: 160; 4.5 AEROSOL RESPIRATORY (INHALATION) at 21:11

## 2020-01-31 RX ADMIN — BIMATOPROST 1 DROP: 0.1 SOLUTION/ DROPS OPHTHALMIC at 21:34

## 2020-01-31 RX ADMIN — TIMOLOL MALEATE 1 DROP: 5 SOLUTION/ DROPS OPHTHALMIC at 08:25

## 2020-01-31 RX ADMIN — MELATONIN 6 MG: at 21:35

## 2020-01-31 RX ADMIN — PANTOPRAZOLE SODIUM 40 MG: 40 TABLET, DELAYED RELEASE ORAL at 06:49

## 2020-01-31 RX ADMIN — GLIMEPIRIDE 2 MG: 2 TABLET ORAL at 08:24

## 2020-01-31 RX ADMIN — HEPARIN SODIUM 5000 UNITS: 5000 INJECTION INTRAVENOUS; SUBCUTANEOUS at 21:34

## 2020-01-31 RX ADMIN — GUAIFENESIN 1200 MG: 600 TABLET ORAL at 08:23

## 2020-01-31 RX ADMIN — CYANOCOBALAMIN TAB 500 MCG 1000 MCG: 500 TAB at 08:24

## 2020-01-31 RX ADMIN — HEPARIN SODIUM 5000 UNITS: 5000 INJECTION INTRAVENOUS; SUBCUTANEOUS at 06:49

## 2020-01-31 NOTE — ED PROVIDER NOTES
History  Chief Complaint   Patient presents with    Anemia     Pt sent by PCP for hgb 7 7  Son requesting blood transfusion  81 y/o male w/ PMhx of CHF w/ EF 20%, MM presents for evaluation of increased weakness, sob , orthopnea  Patient recently discharged from [facility] on Monday after being admitted to Pneumonia, at that time he was hypotensive, his lasix dose was lowered to 20 mg daily and he has gained 4 lbs since discharge  Overall he was more tired, severe SOB on exertion, orthopnea  No fevers, no productive cough, no sick contacts  Last night pt complained of sob while laying down  No CP  Patient does have chronic anemia at baseline, has required multiple transfusions in the past, most recent about a month ago  No melena, no hematochezia   Anemia is thought to be due to CKD and MM  Prior to Admission Medications   Prescriptions Last Dose Informant Patient Reported? Taking? Cyanocobalamin (B-12) 1000 MCG TABS  Child Yes No   Sig: Take by mouth   ONE TOUCH ULTRA TEST test strip  Child No No   Sig: The patient test once daily  ONETOUCH DELICA LANCETS 25T MISC  Child No No   Sig: by Does not apply route daily   REVLIMID CAPS   No No   Sig: TAKE ONE CAPSULE BY MOUTH DAILY FOR 7 DAYS, THEN 14 DAYS OFF   SYMBICORT 160-4 5 MCG/ACT inhaler  Child Yes No   Sig: INHALE 2 PUFFS PO Q 12 H   UNKNOWN TO PATIENT  Child Yes No   acetaminophen (TYLENOL) 325 mg tablet  Child No No   Sig: Take 1 - 2 tabs PO Q4 PRN pain   aspirin 81 MG tablet  Child Yes No   Sig: Take 81 mg by mouth daily     atorvastatin (LIPITOR) 40 mg tablet   No No   Sig: TAKE 1 TABLET AT BEDTIME   bimatoprost (LUMIGAN) 0 01 % ophthalmic drops  Child Yes No   Sig: Apply to eye   bisacodyl (DULCOLAX) 10 mg suppository  Child Yes No   Sig: Insert 10 mg into the rectum daily   fluticasone (FLONASE) 50 mcg/act nasal spray  Child Yes No   Sig: into each nostril as needed     furosemide (LASIX) 40 mg tablet  Child No No   Sig: Take 1 tablet (40 mg total) by mouth daily   Patient taking differently: Take 60 mg by mouth daily    glimepiride (AMARYL) 2 mg tablet  Child No No   Sig: TAKE 1 TABLET DAILY AS     DIRECTED   glucose blood (ACCU-CHEK CHANDRAKANT PLUS) test strip  Child Yes No   Sig: by In Vitro route   guaiFENesin (MUCINEX) 600 mg 12 hr tablet  Child No No   Sig: Take 1 tablet (600 mg total) by mouth 2 (two) times a day   Patient taking differently: Take 1,200 mg by mouth 2 (two) times a day    isosorbide dinitrate (ISORDIL) 5 mg tablet  Child No No   Sig: Take 1 tablet (5 mg total) by mouth 3 (three) times a day   Patient taking differently: Take 30 mg by mouth daily Half tablet daily   isosorbide mononitrate (IMDUR) 30 mg 24 hr tablet  Child Yes No   Sig: TK 1 T PO QAM   lenalidomide (REVLIMID) 5 MG CAPS   No No   Sig: Take one capsule by mouth daily for 7 days on 14 days off [facility]#[id number]   magnesium hydroxide (MILK OF MAGNESIA) 400 mg/5 mL oral suspension  Child Yes No   Sig: Take by mouth daily as needed for constipation   melatonin 1 mg  Child Yes No   Sig: Take 4 mg by mouth daily at bedtime   nitroglycerin (NITROSTAT) 0 4 mg SL tablet  Child Yes No   Sig: Place 1 tablet under the tongue every 5 (five) minutes as needed   omeprazole (PriLOSEC) 40 MG capsule  Child Yes No   Sig: daily   potassium chloride (KLOR-CON 10) 10 mEq tablet  Child No No   Sig: Take 1 tablet (10 mEq total) by mouth daily   timolol (TIMOPTIC) 0 5 % ophthalmic solution  Child Yes No      Facility-Administered Medications: None       Past Medical History:   Diagnosis Date    Angina pectoris (HCC)     Chronic kidney disease     Coronary artery disease     Diabetes mellitus (HCC)     Glaucoma     Hypertension     Multiple myeloma ([facility] [address] )     Occluded coronary artery stent     Left       Past Surgical History:   Procedure Laterality Date    BACK SURGERY      CARDIAC CATHETERIZATION  04/05/2004    COLONOSCOPY      CT BONE MARROW BIOPSY AND ASPIRATION  9/24/2018  CYSTOSCOPY      KNEE SURGERY      THROMBOENDARTERECTOMY Right     Cartoid       Family History   Problem Relation Age of Onset    Heart attack Father     Heart disease Mother     Diabetes Mother     Heart disease Sister     COPD Family      I have reviewed and agree with the history as documented  Social History     Tobacco Use    Smoking status: Former Smoker     Types: Cigarettes     Last attempt to quit: 1970     Years since quittin 1    Smokeless tobacco: Never Used    Tobacco comment: former smoker   Substance Use Topics    Alcohol use: Not Currently     Comment: socially; less than once a month    Drug use: No        Review of Systems   Constitutional: Positive for fatigue  Negative for appetite change, chills and fever  HENT: Negative for rhinorrhea and sore throat  Eyes: Negative for photophobia and visual disturbance  Respiratory: Positive for shortness of breath  Negative for cough  Cardiovascular: Negative for chest pain and palpitations  Gastrointestinal: Negative for abdominal pain and diarrhea  Genitourinary: Negative for dysuria, frequency and urgency  Skin: Negative for rash  Neurological: Positive for weakness  Negative for dizziness  All other systems reviewed and are negative  Physical Exam  Physical Exam   Constitutional: He is oriented to person, place, and time  He appears well-developed and well-nourished  HENT:   Head: Normocephalic and atraumatic  Right Ear: External ear normal    Left Ear: External ear normal    Mouth/Throat: Oropharynx is clear and moist    Eyes: Pupils are equal, round, and reactive to light  Conjunctivae and EOM are normal    Neck: Normal range of motion  Neck supple  No JVD present  No tracheal deviation present  Cardiovascular: Normal rate, regular rhythm and normal heart sounds  Exam reveals no gallop and no friction rub  No murmur heard  Pulmonary/Chest: Effort normal  No stridor  No respiratory distress   He has no wheezes  He has rales  Rales and crackles at bilateral bases , no LE edema   Abdominal: Soft  He exhibits no distension and no mass  There is no tenderness  There is no rebound and no guarding  Musculoskeletal: Normal range of motion  He exhibits no edema  Neurological: He is alert and oriented to person, place, and time  No cranial nerve deficit  Skin: Skin is warm and dry  No rash noted  No erythema  No pallor  Psychiatric: He has a normal mood and affect  Nursing note and vitals reviewed        Vital Signs  ED Triage Vitals [01/30/20 2009]   Temperature Pulse Respirations Blood Pressure SpO2   98 4 °F (36 9 °C) 95 16 106/55 99 %      Temp Source Heart Rate Source Patient Position - Orthostatic VS BP Location FiO2 (%)   Tympanic Monitor Sitting Left arm --      Pain Score       No Pain           Vitals:    01/30/20 2009 01/30/20 2100   BP: 106/55 117/74   Pulse: 95 92   Patient Position - Orthostatic VS: Sitting          Visual Acuity      ED Medications  Medications   furosemide (LASIX) injection 20 mg (20 mg Intravenous Given 1/30/20 2219)       Diagnostic Studies  Results Reviewed     Procedure Component Value Units Date/Time    POCT urinalysis dipstick [[id number]]  (Abnormal) Resulted:  01/30/20 2146    Lab Status:  Final result Updated:  01/30/20 2147     Color, UA YELLOW     Clarity, UA CLEAR     Glucose, UA (Ref: Negative) NEG     Bilirubin, UA (Ref: Negative) NEG     Ketones, UA (Ref: Negative) NEG     Spec Grav, UA (Ref:1 003-1 030) 1 025     Blood, UA (Ref: Negative) NEG     pH, UA (Ref: 4 5-8 0) 5 0     Protein, UA (Ref: Negative) 30+     Urobilinogen, UA (Ref: 0 2- 1 0) 0 2      Leukocytes, UA (Ref: Negative) SMALL     Nitrite, UA (Ref: Negative) NEG    NT-BNP PRO [[id number]]  (Abnormal) Collected:  01/30/20 2021    Lab Status:  Final result Specimen:  Blood from Arm, Right Updated:  01/30/20 2123     NT-proBNP 30,051 pg/mL     Troponin I [[id number]]  (Abnormal) Collected:  01/30/20 2040    Lab Status:  Final result Specimen:  Blood from Arm, Right Updated:  01/30/20 2107     Troponin I 0 08 ng/mL     Comprehensive metabolic panel [[id number]]  (Abnormal) Collected:  01/30/20 2021    Lab Status:  Final result Specimen:  Blood from Arm, Right Updated:  01/30/20 2047     Sodium 137 mmol/L      Potassium 4 5 mmol/L      Chloride 103 mmol/L      CO2 24 mmol/L      ANION GAP 10 mmol/L      BUN 31 mg/dL      Creatinine 1 83 mg/dL      Glucose 182 mg/dL      Calcium 8 7 mg/dL      AST 12 U/L      ALT 14 U/L      Alkaline Phosphatase 135 U/L      Total Protein 6 9 g/dL      Albumin 2 9 g/dL      Total Bilirubin 0 50 mg/dL      eGFR 32 ml/min/1 73sq m     Narrative:       National Kidney Disease Foundation guidelines for Chronic Kidney Disease (CKD):     Stage 1 with normal or high GFR (GFR > 90 mL/min/1 73 square meters)    Stage 2 Mild CKD (GFR = 60-89 mL/min/1 73 square meters)    Stage 3A Moderate CKD (GFR = 45-59 mL/min/1 73 square meters)    Stage 3B Moderate CKD (GFR = 30-44 mL/min/1 73 square meters)    Stage 4 Severe CKD (GFR = 15-29 mL/min/1 73 square meters)    Stage 5 End Stage CKD (GFR <15 mL/min/1 73 square meters)  Note: GFR calculation is accurate only with a steady state creatinine    Lipase [[id number]]  (Normal) Collected:  01/30/20 2021    Lab Status:  Final result Specimen:  Blood from Arm, Right Updated:  01/30/20 2047     Lipase 190 u/L     CBC and differential [[id number]]  (Abnormal) Collected:  01/30/20 2021    Lab Status:  Final result Specimen:  Blood from Arm, Right Updated:  01/30/20 2030     WBC 7 47 Thousand/uL      RBC 3 24 Million/uL      Hemoglobin 8 5 g/dL      Hematocrit 28 1 %      MCV 87 fL      MCH 26 2 pg      MCHC 30 2 g/dL      RDW 18 8 %      MPV 10 2 fL      Platelets 252 Thousands/uL      nRBC 0 /100 WBCs      Neutrophils Relative 76 %      Immat GRANS % 1 %      Lymphocytes Relative 15 %      Monocytes Relative 6 %      Eosinophils Relative 1 % Basophils Relative 1 %      Neutrophils Absolute 5 71 Thousands/µL      Immature Grans Absolute 0 04 Thousand/uL      Lymphocytes Absolute 1 09 Thousands/µL      Monocytes Absolute 0 44 Thousand/µL      Eosinophils Absolute 0 10 Thousand/µL      Basophils Absolute 0 09 Thousands/µL                  CT head without contrast   Final Result by Gloria Lopez MD (01/30 2130)      No acute intracranial abnormality  Microangiopathic changes  Workstation performed: [id number]         XR chest 2 views    (Results Pending)              Procedures  Procedures         ED Course  ED Course as of Jan 30 2224   Thu Jan 30, 2020 2111 Procedure Note: EKG  Date/Time: 01/30/20 9:11 PM   Performed by: Melinda Machado  Authorized by: Melinda Machado  Indications / Diagnosis: SOB  ECG reviewed by me, the ED Provider: yes   The EKG demonstrates:  Rhythm: 1st degree AV block  Intervals: normal intervals  Axis: normal axis  QRS/Blocks: normal QRS  ST Changes: No acute ST Changes, no STD/LUIS     Inverted T waves I aVL                                      MDM  Number of Diagnoses or Management Options  Diagnosis management comments: 80year old male with CHF and anemia, will check bloodwork, CXR , EKG and re-evaluate        Disposition  Final diagnoses:   Acute exacerbation of CHF (congestive heart failure) (HCC)   Elevated troponin   Anemia     Time reflects when diagnosis was documented in both MDM as applicable and the Disposition within this note     Time User Action Codes Description Comment    1/30/2020  9:46 PM [place] Add [I50 9] Acute exacerbation of CHF (congestive heart failure) ([facility] [address] )     1/30/2020  9:46 PM [place] Add [R79 89] Elevated troponin     1/30/2020  9:46 PM [place] Add [D64 9] Anemia       ED Disposition     ED Disposition Condition Date/Time Comment    Admit Stable Thu Jan 30, 2020  9:46 PM Case was discussed with RENY and the patient's admission status was agreed to be Admission Status: observation status to the service of Dr Jarvis Sexton   Follow-up Information    None         Patient's Medications   Discharge Prescriptions    No medications on file     No discharge procedures on file      ED Provider  Electronically Signed by           Alexx Amanda MD  01/30/20 5781

## 2020-01-31 NOTE — TELEPHONE ENCOUNTER
Spoke with son and let hi know that we will be getting in touch with Dr Brittnee Wiseman and we will update him once we hear  He voiced understanding

## 2020-01-31 NOTE — TELEPHONE ENCOUNTER
Spoke with pts son and let him know that there is no note I pts chart yet  We reviewed labs and Dr Serena Andre prefers to have pt get cbc, cmp weekly until he is seen in our office  He voiced understanding

## 2020-01-31 NOTE — TELEPHONE ENCOUNTER
Patient's son is calling in inquiring whether Tu NIETO has spoken to [facility] informing him on his father being discharged

## 2020-01-31 NOTE — UTILIZATION REVIEW
Initial Clinical Review    Admission: Date/Time/Statement: 1/30/2020 2147 Observation and changed 1/31/2020 1330 inpatient re:  Patient will need > 2 midnight stay due to need for  CHF exacerbation with need to balance between IV diuresis and hypotension  Start   Ordered   01/31/20 1331  Inpatient Admission Once     Transfer Service: Hospitalist       Question Answer Comment   Admitting Physician Effie Aguirre    Level of Care Med Surg    Estimated length of stay More than 2 Midnights    Certification I certify that inpatient services are medically necessary for this patient for a duration of greater than two midnights  See H&P and MD Progress Notes for additional information about the patient's course of treatment  01/31/20 1330       ED Arrival Information     Expected Arrival Acuity Means of Arrival Escorted By Service Admission Type    - 1/30/2020 20:02 Urgent Wheelchair Family Member General Medicine Urgent    Arrival Complaint    Abnormal Labs        Chief Complaint   Patient presents with    Anemia     Pt sent by PCP for hgb 7 7  Son requesting blood transfusion  Assessment/Plan: this is a 80year old male from home to ED per PCP admitted to observation due to acute on chronic combined systolic and diastolic heart failure, EF of 30%  Has CKD,  myeloma  Presented due to increased tiredness, severe shortness of breath on exertion, orthopnea  Was discharged from [facility] 1/17 for pneumonia and gained 4 lbs since that time  On exam has rales and crackles at bilateral bases  NT-proBNP 75126  Troponin 0 08  Bun 31  creatinine 1 83 which is baseline  H&H 8 5/28  1  Hematology consulted  Diuresis in progress with goal of -500 to 1 L over next 24 hours  Echo ordered  1/31/2020 changed to inpatient:  Patient remains short of breath, on exam has craclkes  Dry weight is usually 174 and now is 179  This morning hypotensive and can not effectively diurese  Hemoglobin is 7 7  To transfuse unit of PRBC  Cardiology consulted  ED Triage Vitals [01/30/20 2009]   Temperature Pulse Respirations Blood Pressure SpO2   98 4 °F (36 9 °C) 95 16 106/55 99 %      Temp Source Heart Rate Source Patient Position - Orthostatic VS BP Location FiO2 (%)   Tympanic Monitor Sitting Left arm --      Pain Score       No Pain        Wt Readings from Last 1 Encounters:   01/31/20 81 4 kg (179 lb 8 oz)     Additional Vital Signs:   01/31/20 07:31:36  98 °F (36 7 °C)  91  20  97/59  72  99 %       01/31/20 0519    86  18      99 %       01/31/20 04:07:40  98 7 °F (37 1 °C)  94  18  91/57  68  100 %       01/30/20 23:53:54  98 7 °F (37 1 °C)  94  18  107/64  78  100 %            01/30 0701  01/31 0700 01/31 0701  02/01 0700   Urine (mL/kg/hr) 950 300 (5 9)   Total Output 950 300   Net -950 -300     01/31/20 0600  81 4 kg (179 lb 8 oz)  Standing scale     01/30/20 2357  82 kg (180 lb 11 2 oz)  Standing scale  5' 7" (1 702 m)       Pertinent Labs/Diagnostic Test Results:   1/30/2020 CT head - No acute intracranial abnormality   Microangiopathic changes  1/30/2020 - Rhythm: 1st degree AV block  Intervals: normal intervals  Axis: normal axis  QRS/Blocks: normal QRS  ST Changes: No acute ST Changes, no STD/LUIS     Inverted T waves I aVL  Results from last 7 days   Lab Units 01/31/20 0457 01/30/20 2021   WBC Thousand/uL 6 25 7 47   HEMOGLOBIN g/dL 7 7* 8 5*   HEMATOCRIT % 25 2* 28 1*   PLATELETS Thousands/uL 356 402*   NEUTROS ABS Thousands/µL  --  5 71         Results from last 7 days   Lab Units 01/31/20 0457 01/30/20 2021   SODIUM mmol/L 138 137   POTASSIUM mmol/L 4 0 4 5   CHLORIDE mmol/L 105 103   CO2 mmol/L 24 24   ANION GAP mmol/L 9 10   BUN mg/dL 31* 31*   CREATININE mg/dL 1 73* 1 83*   EGFR ml/min/1 73sq m 34 32   CALCIUM mg/dL 8 4 8 7   MAGNESIUM mg/dL 2 3  --    PHOSPHORUS mg/dL 3 3  --      Results from last 7 days   Lab Units 01/30/20 2021   AST U/L 12   ALT U/L 14   ALK PHOS U/L 135* TOTAL PROTEIN g/dL 6 9   ALBUMIN g/dL 2 9*   TOTAL BILIRUBIN mg/dL 0 50     Results from last 7 days   Lab Units 01/31/20  0457 01/30/20 2021   GLUCOSE RANDOM mg/dL 147* 182*     Results from last 7 days   Lab Units 01/30/20  2040   TROPONIN I ng/mL 0 08*     Results from last 7 days   Lab Units 01/30/20  2021   NT-PRO BNP pg/mL 30,051*     Results from last 7 days   Lab Units 01/30/20 2021   LIPASE u/L 190     Results from last 7 days   Lab Units 01/30/20  2146   CLARITY UA  CLEAR   COLOR UA  YELLOW   SPEC GRAV US  1 025   PH UA  5 0   GLUCOSE UA  NEG   KETONES UA  NEG   BLOOD UA  NEG   PROTEIN UA  30+   NITRITE UA  NEG   BILIRUBIN, UA  NEG   UROBILINOGEN UA  0 2   LEUKOCYTES UA  SMALL     ED Treatment:   Medication Administration from 01/30/2020 2001 to 01/30/2020 2330       Date/Time Order Dose Route Action Comments     01/30/2020 2219 furosemide (LASIX) injection 20 mg 20 mg Intravenous Given         Past Medical History:   Diagnosis Date    [facility] pectoris ([facility])     Chronic kidney disease     Coronary artery disease     Diabetes mellitus ([place] [address] )     Glaucoma     Hypertension     Multiple myeloma ([place] [address] )     Occluded coronary artery stent     Left     Present on Admission:   Type 2 diabetes mellitus without complication, without long-term current use of insulin ([facility])   Stage 4 chronic kidney disease ([facility])   Mixed hyperlipidemia   Kappa light chain myeloma ([facility] )   Ischemic cardiomyopathy   Esophageal reflux   Acute on chronic combined systolic and diastolic heart failure ([facility])      Admitting Diagnosis: Anemia [D64 9]  Elevated troponin [R79 89]  Acute exacerbation of CHF (congestive heart failure) ([place] [address] ) [I50 9]  Age/Sex: 80 y o  male  Admission Orders: 1/30/2020 2147 Observation and changed 1/31/02020 1330 to inpatient   Scheduled Medications:  Medications:  aspirin 81 mg Oral Daily   bimatoprost 1 drop Both Eyes HS   budesonide-formoterol 2 puff Inhalation BID   cyanocobalamin 1,000 mcg Oral Daily docusate sodium 100 mg Oral Daily   furosemide 20 mg Intravenous BID (diuretic)   glimepiride 2 mg Oral Daily   guaiFENesin 1,200 mg Oral BID   heparin (porcine) 5,000 Units Subcutaneous Q8H [address]   isosorbide mononitrate 30 mg Oral Daily   melatonin 6 mg Oral HS   pantoprazole 40 mg Oral Early Morning   timolol 1 drop Both Eyes Daily     Continuous IV Infusions:     PRN Meds:  acetaminophen 650 mg Oral Q6H PRN   nitroglycerin 0 4 mg Sublingual Q5 Min PRN       IP CONSULT TO CARDIOLOGY  IP CONSULT TO HEMATOLOGY     Telemetry  Fluid restriction     Network Utilization Review Department  [email or web address]  ATTENTION: Please call with any questions or concerns to [phone] and carefully listen to the prompts so that you are directed to the right person  All voicemails are confidential   Sagar Richards all requests for admission clinical reviews, approved or denied determinations and any other requests to dedicated fax number below belonging to the campus where the patient is receiving treatment   List of dedicated fax numbers for the Facilities:  [address] DENIALS (Administrative/Medical Necessity) [phone]   [address] (Maternity/NICU/Pediatrics) [phone]   Silvina [place] [phone]   Josh Grooms [phone]   Marleny Camacho [phone]   Mertha Finger [phone]   [address] [phone]   [facility]  [phone]   [address] [facility] [address] [facility]  [address] [facility] [address] [facility] [phone]

## 2020-01-31 NOTE — ASSESSMENT & PLAN NOTE
Lab Results   Component Value Date    HGBA1C 7 0 09/11/2019       No results for input(s): POCGLU in the last 72 hours      Blood Sugar Average: Last 72 hrs:   continue glimepiride

## 2020-01-31 NOTE — ASSESSMENT & PLAN NOTE
Creatinine at baseline on admission:  1 83  Now being aggressively diurese  Close monitoring of I/O status and renal function

## 2020-01-31 NOTE — ASSESSMENT & PLAN NOTE
· Last echo March 2019 showing an LVEF of 30%, severe diffuse hypokinesis with distinct regional wall motion abnormalities, grade 2 diastolic dysfunction, mild AVR, trace TVR  · Continue home imdur and aspirin  · Recheck echocardiogram

## 2020-01-31 NOTE — PLAN OF CARE
Problem: Potential for Falls  Goal: Patient will remain free of falls  Description  INTERVENTIONS:  - Assess patient frequently for physical needs  -  Identify cognitive and physical deficits and behaviors that affect risk of falls    -  Hessel fall precautions as indicated by assessment   - Educate patient/family on patient safety including physical limitations  - Instruct patient to call for assistance with activity based on assessment  - Modify environment to reduce risk of injury  - Consider OT/PT consult to assist with strengthening/mobility  Outcome: Progressing     Problem: DISCHARGE PLANNING  Goal: Discharge to home or other facility with appropriate resources  Description  INTERVENTIONS:  - Identify barriers to discharge w/patient and caregiver  - Arrange for needed discharge resources and transportation as appropriate  - Identify discharge learning needs (meds, wound care, etc )  - Arrange for interpretive services to assist at discharge as needed  - Refer to Case Management Department for coordinating discharge planning if the patient needs post-hospital services based on physician/advanced practitioner order or complex needs related to functional status, cognitive ability, or social support system  Outcome: Progressing     Problem: RESPIRATORY - ADULT  Goal: Achieves optimal ventilation and oxygenation  Description  INTERVENTIONS:  - Assess for changes in respiratory status  - Assess for changes in mentation and behavior  - Position to facilitate oxygenation and minimize respiratory effort  - Oxygen administered by appropriate delivery if ordered  - Initiate smoking cessation education as indicated  - Encourage broncho-pulmonary hygiene including cough, deep breathe, Incentive Spirometry  - Assess the need for suctioning and aspirate as needed  - Assess and instruct to report SOB or any respiratory difficulty  - Respiratory Therapy support as indicated  Outcome: Progressing     Problem: HEMATOLOGIC - ADULT  Goal: Maintains hematologic stability  Description  INTERVENTIONS  - Assess for signs and symptoms of bleeding or hemorrhage  - Monitor labs  - Administer supportive blood products/factors as ordered and appropriate  Outcome: Progressing

## 2020-01-31 NOTE — TELEPHONE ENCOUNTER
Patient's son is calling in indicating that Dr Katie Ardon would like for someone to contact him regarding his dad, he can be reached at [phone]

## 2020-01-31 NOTE — H&P
H&P- Clark Penaloza 9/22/1930, 80 y o  male MRN: [id number]    Unit/Bed#: -01 Encounter: [id number]    Primary Care Provider: Jonathan Fu   Date and time admitted to hospital: 1/30/2020  8:11 PM        Kappa light chain myeloma ([facility] )  Assessment & Plan  Follows with Dr Jabier Irving, will consult Hematology as per request of family    Ischemic cardiomyopathy  Assessment & Plan  · Last echo March 2019 showing an LVEF of 30%, severe diffuse hypokinesis with distinct regional wall motion abnormalities, grade 2 diastolic dysfunction, mild AVR, trace TVR  · Continue home imdur and aspirin  · Recheck echocardiogram    Stage 4 chronic kidney disease ([place] [address] )  Assessment & Plan  Creatinine at baseline on admission:  1 83  Now being aggressively diurese  Close monitoring of I/O status and renal function    Esophageal reflux  Assessment & Plan  Continue home PPI    Mixed hyperlipidemia  Assessment & Plan  Patient not taking statin    Type 2 diabetes mellitus without complication, without long-term current use of insulin ([place] [address] )  Assessment & Plan  Lab Results   Component Value Date    HGBA1C 7 0 09/11/2019       No results for input(s): POCGLU in the last 72 hours  Blood Sugar Average: Last 72 hrs:   continue glimepiride    * Acute on chronic combined systolic and diastolic heart failure (HCC)  Assessment & Plan  Wt Readings from Last 3 Encounters:   01/30/20 82 kg (180 lb 11 2 oz)   12/17/19 82 6 kg (182 lb)   10/15/19 84 8 kg (187 lb)     · Patient presented with complaints of dyspnea on exertion, orthopnea, and a 5 lb weight gain over the last week  · ProBNP in on admission was 30,000  · Patient reports taking 20 mg of Lasix as needed at home according to his daily weights    Patient was taking his Lasix but continued to gain weight over this last week  · Given 20 mg Lasix in the ER  · Will continue to diurese patient with goal to be net -500 to 1 L the next 24 hours  · Obtain echocardiogram, last echocardiogram in March 2019 with LVEF of 30% and severe diffuse hypokinesis with distinct wall motion abnormalities  VTE Prophylaxis: Heparin  / sequential compression device   Code Status:  Level 1  POLST: POLST form is not discussed and not completed at this time  Discussion with family:  Discussed with family and patient plan of care, expressed understanding    Anticipated Length of Stay:  Patient will be admitted on an Observation basis with an anticipated length of stay of  less than 2 midnights  Justification for Hospital Stay:  Acute on chronic CHF    Total Time for Visit, including Counseling / Coordination of Care: 1 hour  Greater than 50% of this total time spent on direct patient counseling and coordination of care  Chief Complaint:   Dyspnea on exertion and orthopnea    History of Present Illness:    Arden Mckee is a 80 y o  male who presents with with complaints of increasing shortness of breath with exertion, and 1 day history of shortness of breath when lying flat to sleep  Patient with past medical history significant for combined systolic diastolic heart failure with an LVEF of 30%, multiple myeloma follows with Dr Maryanne Rodriguez, CAD, hypertension, glaucoma,, diabetes, CKD 4  Patient was recently admitted for possible pneumonia, treated with antibiotics and discharged 3 days prior to admission  Family reports that he gained 4 lb during that hospitalization and at home they gave his p r n  Lasix, but despite the Lasix he still gained another lb  In the ER patient was found to be short of breath with crackles [place]   [place] [address]  Checks x-ray with mild pulmonary vascular congestion  He was given 20 mg of Lasix IV  Review of Systems:    Review of Systems   Constitutional: Negative  HENT: Negative  Eyes: Negative  Respiratory: Positive for cough and shortness of breath  MARIE, Orthopnea    Cardiovascular: Positive for leg swelling     Gastrointestinal: Positive for constipation  Endocrine: Negative  Genitourinary: Negative  Allergic/Immunologic: Negative  Neurological: Positive for light-headedness  Hematological: Negative  Psychiatric/Behavioral: Negative  Past Medical and Surgical History:     Past Medical History:   Diagnosis Date    [facility]ina pectoris ([facility] )     Chronic kidney disease     Coronary artery disease     Diabetes mellitus ([facility] )     Glaucoma     Hypertension     Multiple myeloma ([place] [address] )     Occluded coronary artery stent     Left       Past Surgical History:   Procedure Laterality Date    BACK SURGERY      CARDIAC CATHETERIZATION  04/05/2004    COLONOSCOPY      CT BONE MARROW BIOPSY AND ASPIRATION  9/24/2018    CYSTOSCOPY      KNEE SURGERY      THROMBOENDARTERECTOMY Right     Cartoid       Meds/Allergies:    Prior to Admission medications    Medication Sig Start Date End Date Taking? Authorizing Provider   aspirin 81 MG tablet Take 81 mg by mouth daily     Yes Historical Provider, MD   bimatoprost (LUMIGAN) 0 01 % ophthalmic drops Apply to eye   Yes Historical Provider, MD   Cyanocobalamin (B-12) 1000 MCG TABS Take by mouth   Yes Historical Provider, MD   furosemide (LASIX) 40 mg tablet Take 1 tablet (40 mg total) by mouth daily  Patient taking differently: Take 20 mg by mouth as needed  3/20/19  Yes Alice Clemons PA-C   glimepiride (AMARYL) 2 mg tablet TAKE 1 TABLET DAILY AS     DIRECTED 11/11/19  Yes Dave Cloud MD   glucose blood (ACCU-CHEK CHANDRAKANT PLUS) test strip by In Vitro route 12/15/11  Yes Historical Provider, MD   guaiFENesin (MUCINEX) 600 mg 12 hr tablet Take 1 tablet (600 mg total) by mouth 2 (two) times a day  Patient taking differently: Take 1,200 mg by mouth 2 (two) times a day  7/9/18  Yes Dave Cloud MD   melatonin 1 mg Take 4 mg by mouth daily at bedtime   Yes Historical Provider, MD   omeprazole (PriLOSEC) 40 MG capsule daily 12/3/19  Yes Historical Provider, MD   potassium chloride (KLOR-CON 10) 10 mEq tablet Take 1 tablet (10 mEq total) by mouth daily 2/1/19  Yes Daniele Rome MD   SYMBICORT 160-4 5 MCG/ACT inhaler INHALE 2 PUFFS PO Q 12 H 9/18/19  Yes Historical Provider, MD   timolol (TIMOPTIC) 0 5 % ophthalmic solution  7/25/18  Yes Historical Provider, MD   acetaminophen (TYLENOL) 325 mg tablet Take 1 - 2 tabs PO Q4 PRN pain 4/7/16   Kya Lama PA-C   atorvastatin (LIPITOR) 40 mg tablet TAKE 1 TABLET AT BEDTIME  Patient not taking: Reported on 1/31/2020 1/18/20   Daniele Rome MD   bisacodyl (DULCOLAX) 10 mg suppository Insert 10 mg into the rectum daily    Historical Provider, MD   fluticasone (FLONASE) 50 mcg/act nasal spray into each nostril as needed      Historical Provider, MD   isosorbide dinitrate (ISORDIL) 5 mg tablet Take 1 tablet (5 mg total) by mouth 3 (three) times a day  Patient not taking: Reported on 1/31/2020 3/29/19   DON Pederson   isosorbide mononitrate (IMDUR) 30 mg 24 hr tablet TK 1 T PO QAM 9/18/19   Historical Provider, MD   lenalidomide (REVLIMID) 5 MG CAPS Take one capsule by mouth daily for 7 days on 14 days off [id number]  Patient not taking: Reported on 1/31/2020 1/14/20   Cinderella Listen, DO   magnesium hydroxide (MILK OF MAGNESIA) 400 mg/5 mL oral suspension Take by mouth daily as needed for constipation    Historical Provider, MD   nitroglycerin (NITROSTAT) 0 4 mg SL tablet Place 1 tablet under the tongue every 5 (five) minutes as needed 11/16/11   Historical Provider, MD   ONE TOUCH ULTRA TEST test strip The patient test once daily  1/16/19   Daniele Rome MD   [facility] M[facility] by Does not apply route daily 4/9/18   Daniele Rome MD   REVLIMID CAPS TAKE ONE CAPSULE BY MOUTH DAILY FOR 7 DAYS, THEN 14 DAYS OFF 12/30/19   Cinderella Listen, DO   UNKNOWN TO PATIENT     Historical Provider, MD     I have reviewed home medications with patient personally  Allergies: No Known Allergies    Social History:     Marital Status:     Occupation: Retired  Patient Pre-hospital Living Situation:  Lives with his son  Patient Pre-hospital Level of Mobility:  Ambulates without assistance  Patient Pre-hospital Diet Restrictions:  Low-sodium diet  Substance Use History:   Social History     Substance and Sexual Activity   Alcohol Use Not Currently    Comment: socially; less than once a month     Social History     Tobacco Use   Smoking Status Former Smoker    Types: Cigarettes    Last attempt to quit: 1970    Years since quittin 1   Smokeless Tobacco Never Used   Tobacco Comment    former smoker     Social History     Substance and Sexual Activity   Drug Use No       Family History:    non-contributory    Physical Exam:     Vitals:   Blood Pressure: 107/64 (20)  Pulse: 94 (20)  Temperature: 98 7 °F (37 1 °C) (20)  Temp Source: Tympanic (20)  Respirations: 18 (20)  Height: 5' 7" (170 2 cm) (20)  Weight - Scale: 82 kg (180 lb 11 2 oz) (20)  SpO2: 100 % (20)    Physical Exam   Constitutional: He is oriented to person, place, and time  He appears well-developed and well-nourished  No distress  HENT:   Head: Normocephalic and atraumatic  Eyes: Pupils are equal, round, and reactive to light  Conjunctivae and EOM are normal    Neck: Normal range of motion  Neck supple  Cardiovascular: Normal rate, regular rhythm and intact distal pulses  Exam reveals no gallop and no friction rub  Murmur heard  Pulmonary/Chest: Effort normal and breath sounds normal    On room air with SpO2 99%, sounds decreased at bases bilateral   Abdominal: Soft  Bowel sounds are normal  He exhibits no distension  There is no tenderness  Musculoskeletal: Normal range of motion  He exhibits edema  He exhibits no deformity  Neurological: He is alert and oriented to person, place, and time  No cranial nerve deficit  Patient with periods of confusion and short-term memory loss     Nursing note and vitals reviewed  Additional Data:     Lab Results: I have personally reviewed pertinent reports  Results from last 7 days   Lab Units 01/30/20 2021   WBC Thousand/uL 7 47   HEMOGLOBIN g/dL 8 5*   HEMATOCRIT % 28 1*   PLATELETS Thousands/uL 402*   NEUTROS PCT % 76*   LYMPHS PCT % 15   MONOS PCT % 6   EOS PCT % 1     Results from last 7 days   Lab Units 01/30/20 2021   SODIUM mmol/L 137   POTASSIUM mmol/L 4 5   CHLORIDE mmol/L 103   CO2 mmol/L 24   BUN mg/dL 31*   CREATININE mg/dL 1 83*   ANION GAP mmol/L 10   CALCIUM mg/dL 8 7   ALBUMIN g/dL 2 9*   TOTAL BILIRUBIN mg/dL 0 50   ALK PHOS U/L 135*   ALT U/L 14   AST U/L 12   GLUCOSE RANDOM mg/dL 182*                       Imaging: I have personally reviewed pertinent reports  CT head without contrast   Final Result by Preston Rowe MD (01/30 2130)      No acute intracranial abnormality  Microangiopathic changes  Workstation performed: [id number]         XR chest 2 views    (Results Pending)       EKG, Pathology, and Other Studies Reviewed on Admission:   · EKG:  First-degree AV block, no ST changes    Allscripts / Epic Records Reviewed: Yes     ** Please Note: This note has been constructed using a voice recognition system   **

## 2020-01-31 NOTE — CONSULTS
Consultation - Cardiology   Eileen Castro 80 y o  male MRN: [id number]  Unit/Bed#: -01 Encounter: [id number]      Assessment:  Principal Problem:    Acute on chronic combined systolic and diastolic heart failure ([facility] )  Active Problems:    Type 2 diabetes mellitus without complication, without long-term current use of insulin (HCC)    Mixed hyperlipidemia    Esophageal reflux    Stage 4 chronic kidney disease (HCC)    Ischemic cardiomyopathy    Kappa light chain myeloma ([facility] )      Plan:    1  Acute on chronic systolic CHF - Patient presented with increasing shortness of breath, orthopnea and weight gain after recent hospital admission for pneumonia  According to the chart he was also only taking Lasix as needed, as we had him on 40 mg daily earlier last year  I am going to increase his IV Lasix to 40 mg twice daily and we will continue to follow urine output, daily labs and weight  An updated echocardiogram was ordered and we will review this  He is also going to be getting packed red blood cells for his anemia, and suggest an extra IV dose of Lasix after transfusion  He also will require close outpatient follow-up with our CHF Clinic  History of Present Illness   Physician Requesting Consult: Hannah Nix MD  Reason for Consult / Principal Problem: CHF    HPI: Eileen Castro is a 80y o  year old male with a past medical history of chronic systolic HF, ischemic cardiomyopathy, with his prior echocardiogram from March of 2019 showing an ejection fraction of 30%  He has CAD, essential hypertension, mixed hyperlipidemia, type 2 diabetes mellitus, former smoker, CKD stage 4, kappa light chain myeloma- treated with Revlimid, anemia of chronic disease, and prior CVA  Patient routinely follows with Dr Primitivo Tomlinson with [facility] as an outpatient  When he was last seen in follow-up by our CHF clinic in March, he was on Lasix 40 mg daily    But reports now show that he is only taking this as needed  Patient presents with worsening symptoms of shortness of breath and congestive heart failure  He was also found to be anemic as well  How the patient does have significant memory deficits, he does not recall the events that brought him in  According the records, he was developing shortness of breath and orthopnea  He was recently in the hospital for pneumonia, and the family had reported a weight gain since then  Because of his worsening shortness of breath orthopnea was brought in for evaluation  He currently tells me he is not short of breath and he is comfortable sitting in a chair  He denies any pain  He denies any chest pain or any symptoms of angina  Inpatient consult to Cardiology  Consult performed by: Ronda Verma MD  Consult ordered by: DON Beltran          Review of Systems:  Please refer to the HPI for all cardiac and pulmonary review of systems  Other than fatigue all other 10 systems were reviewed and are negative        Historical Information   Past Medical History:   Diagnosis Date    Angina pectoris ([facility] )     Chronic kidney disease     Coronary artery disease     Diabetes mellitus ([facility] )     Glaucoma     Hypertension     Multiple myeloma ([facility] )     Occluded coronary artery stent     Left     Past Surgical History:   Procedure Laterality Date    BACK SURGERY      CARDIAC CATHETERIZATION  2004    COLONOSCOPY      CT BONE MARROW BIOPSY AND ASPIRATION  2018    CYSTOSCOPY      KNEE SURGERY      THROMBOENDARTERECTOMY Right     Cartoid     Social History     Substance and Sexual Activity   Alcohol Use Not Currently    Comment: socially; less than once a month     Social History     Substance and Sexual Activity   Drug Use No     Social History     Tobacco Use   Smoking Status Former Smoker    Types: Cigarettes    Last attempt to quit: 1970    Years since quittin 1   Smokeless Tobacco Never Used   Tobacco Comment    former smoker Family History: non-contributory    Meds/Allergies   all current active meds have been reviewed  No Known Allergies      Current Facility-Administered Medications:     acetaminophen (TYLENOL) tablet 650 mg, 650 mg, Oral, Q6H PRN, Loraine Fulling, CRNP    aspirin chewable tablet 81 mg, 81 mg, Oral, Daily, Loraine Fulling, CRNP, 81 mg at 01/31/20 0824    bimatoprost (LUMIGAN) 0 01 % ophthalmic solution 1 drop, 1 drop, Both Eyes, HS, Loraine Traning, CRNP    budesonide-formoterol (SYMBICORT) 160-4 5 mcg/act inhaler 2 puff, 2 puff, Inhalation, BID, Loraine Fulling, CRNP    cyanocobalamin (VITAMIN B-12) tablet 1,000 mcg, 1,000 mcg, Oral, Daily, Loraine Fulling, CRNP, 1,000 mcg at 01/31/20 3537    docusate sodium (COLACE) capsule 100 mg, 100 mg, Oral, Daily, Loraine Fulling, CRNP, 100 mg at 01/31/20 9079    furosemide (LASIX) injection 20 mg, 20 mg, Intravenous, BID (diuretic), Loraine Fulling, CRNP    glimepiride (AMARYL) tablet 2 mg, 2 mg, Oral, Daily, Loraine Fulling, CRNP, 2 mg at 01/31/20 1634    guaiFENesin (MUCINEX) 12 hr tablet 1,200 mg, 1,200 mg, Oral, BID, Loraine Fulling, CRNP, 1,200 mg at 01/31/20 1173    heparin (porcine) subcutaneous injection 5,000 Units, 5,000 Units, Subcutaneous, Q8H [address], Loraine Fulling, CRNP, 5,000 Units at 01/31/20 0649    insulin lispro (HumaLOG) 100 units/mL subcutaneous injection 1-5 Units, 1-5 Units, Subcutaneous, HS, Mariana Gomez MD    insulin lispro (HumaLOG) 100 units/mL subcutaneous injection 1-6 Units, 1-6 Units, Subcutaneous, TID AC, 3 Units at 01/31/20 1216 **AND** Fingerstick Glucose (POCT), , , TID ACMariana MD    isosorbide mononitrate (IMDUR) 24 hr tablet 30 mg, 30 mg, Oral, Daily, Loraine Fulling, CRNP    melatonin tablet 6 mg, 6 mg, Oral, HS, Loraine Fulling, CRNP    nitroglycerin (NITROSTAT) SL tablet 0 4 mg, 0 4 mg, Sublingual, Q5 Min PRN, Loraine Fulling, CRNP    pantoprazole (PROTONIX) EC tablet 40 mg, 40 mg, Oral, Early Morning, Loraine Fulling, CRNP, 40 mg at 01/31/20 0649    timolol (TIMOPTIC) 0 5 % ophthalmic solution 1 drop, 1 drop, Both Eyes, Daily, DON Martin, 1 drop at 01/31/20 0825    Objective   Vitals: Blood pressure 104/62, pulse 92, temperature 97 5 °F (36 4 °C), resp  rate 20, height 5' 7" (1 702 m), weight 81 4 kg (179 lb 8 oz), SpO2 100 %  , Body mass index is 28 11 kg/m² ,   Orthostatic Blood Pressures      Most Recent Value   Blood Pressure  104/62 filed at 01/31/2020 1536   Patient Position - Orthostatic VS  Sitting filed at 01/30/2020 2009            Intake/Output Summary (Last 24 hours) at 1/31/2020 1605  Last data filed at 1/31/2020 1214  Gross per 24 hour   Intake    Output 1450 ml   Net -1450 ml       Physical Exam:  GEN: Ariana Chavira appears well, alert and oriented with some memory deficit  Pleasant and cooperative  HEENT: pupils equal, round, and reactive to light; extraocular muscles intact  NECK: supple, no carotid bruits   Mild JVD  HEART: regular rhythm, distant S1 and S2, no murmurs, clicks, gallops or rubs   LUNGS:  Diminished bilaterally; no wheezes, rales, or rhonchi   ABDOMEN: normal bowel sounds, soft, no tenderness, no distention  EXTREMITIES: peripheral pulses normal; no clubbing, cyanosis, or significant edema  NEURO: no focal findings   SKIN: normal without suspicious lesions on exposed skin    Lab Results:   Admission on 01/30/2020   Component Date Value    WBC 01/30/2020 7 47     RBC 01/30/2020 3 24*    Hemoglobin 01/30/2020 8 5*    Hematocrit 01/30/2020 28 1*    MCV 01/30/2020 87     MCH 01/30/2020 26 2*    MCHC 01/30/2020 30 2*    RDW 01/30/2020 18 8*    MPV 01/30/2020 10 2     Platelets 65/68/1403 402*    nRBC 01/30/2020 0     Neutrophils Relative 01/30/2020 76*    Immat GRANS % 01/30/2020 1     Lymphocytes Relative 01/30/2020 15     Monocytes Relative 01/30/2020 6     Eosinophils Relative 01/30/2020 1     Basophils Relative 01/30/2020 1     Neutrophils Absolute 01/30/2020 5 71  Immature Grans Absolute 01/30/2020 0 04     Lymphocytes Absolute 01/30/2020 1 09     Monocytes Absolute 01/30/2020 0 44     Eosinophils Absolute 01/30/2020 0 10     Basophils Absolute 01/30/2020 0 09     Sodium 01/30/2020 137     Potassium 01/30/2020 4 5     Chloride 01/30/2020 103     CO2 01/30/2020 24     ANION GAP 01/30/2020 10     BUN 01/30/2020 31*    Creatinine 01/30/2020 1 83*    Glucose 01/30/2020 182*    Calcium 01/30/2020 8 7     AST 01/30/2020 12     ALT 01/30/2020 14     Alkaline Phosphatase 01/30/2020 135*    Total Protein 01/30/2020 6 9     Albumin 01/30/2020 2 9*    Total Bilirubin 01/30/2020 0 50     eGFR 01/30/2020 32     Lipase 01/30/2020 190     Troponin I 01/30/2020 0 08*    NT-proBNP 01/30/2020 30,051*    Color, UA 01/30/2020 YELLOW     Clarity, UA 01/30/2020 CLEAR     Glucose, UA (Ref: Negati* 01/30/2020 NEG     Bilirubin, UA (Ref: Nega* 01/30/2020 NEG     Ketones, UA (Ref: Negati* 01/30/2020 NEG     Spec Grav, UA (Ref:1 003* 01/30/2020 1 025     Blood, UA (Ref: Negative) 01/30/2020 NEG     pH, UA (Ref: 4 5-8 0) 01/30/2020 5 0     Protein, UA (Ref: Negati* 01/30/2020 30+     Urobilinogen, UA (Ref: 0* 01/30/2020 0 2      Leukocytes, UA (Ref: Ne* 01/30/2020 SMALL     Nitrite, UA (Ref: Negati* 01/30/2020 NEG     Sodium 01/31/2020 138     Potassium 01/31/2020 4 0     Chloride 01/31/2020 105     CO2 01/31/2020 24     ANION GAP 01/31/2020 9     BUN 01/31/2020 31*    Creatinine 01/31/2020 1 73*    Glucose 01/31/2020 147*    Glucose, Fasting 01/31/2020 147*    Calcium 01/31/2020 8 4     eGFR 01/31/2020 34     Magnesium 01/31/2020 2 3     Phosphorus 01/31/2020 3 3     WBC 01/31/2020 6 25     RBC 01/31/2020 2 92*    Hemoglobin 01/31/2020 7 7*    Hematocrit 01/31/2020 25 2*    MCV 01/31/2020 86     MCH 01/31/2020 26 4*    MCHC 01/31/2020 30 6*    RDW 01/31/2020 19 0*    Platelets 20/07/3221 356     MPV 01/31/2020 10 3     POC Glucose 01/31/2020 128     POC Glucose 01/31/2020 230*     Labs & Results:    Results from last 7 days   Lab Units 01/30/20  2040   TROPONIN I ng/mL 0 08*     Results from last 7 days   Lab Units 01/31/20 0457 01/30/20 2021   WBC Thousand/uL 6 25 7 47   HEMOGLOBIN g/dL 7 7* 8 5*   HEMATOCRIT % 25 2* 28 1*   PLATELETS Thousands/uL 356 402*         Results from last 7 days   Lab Units 01/31/20 0457 01/30/20 2021   POTASSIUM mmol/L 4 0 4 5   CHLORIDE mmol/L 105 103   CO2 mmol/L 24 24   BUN mg/dL 31* 31*   CREATININE mg/dL 1 73* 1 83*   CALCIUM mg/dL 8 4 8 7   ALK PHOS U/L  --  135*   ALT U/L  --  14   AST U/L  --  12         Results from last 7 days   Lab Units 01/31/20 0457   MAGNESIUM mg/dL 2 3         Imaging: I have personally reviewed pertinent reports  Xr Chest 2 Views    Result Date: 1/31/2020  Narrative: CHEST INDICATION:   sob  COMPARISON:  3/13/2019; 11/6/2018 EXAM PERFORMED/VIEWS:  XR CHEST PA & LATERAL FINDINGS: The cardiac silhouette is enlarged  There is no infiltrate present in the left lower lobe as well as left effusion  Small right effusion is also seen  Prominence of both akshat are unchanged probably related to mild pulmonary vascular redistribution and  similar to March 2019 although somewhat more pronounced than the study of 2018  There is no pneumothorax  Osseous structures appear within normal limits for patient age  Impression: New left lower lobe infiltrate effusions  This is suspicious for pneumonia  Cardiomegaly with hilar prominence and pulmonary vascular redistribution is similar to March 2019  Workstation performed: [id number]     Ct Head Without Contrast    Result Date: 1/30/2020  Narrative: CT BRAIN - WITHOUT CONTRAST INDICATION:   ams  COMPARISON:  3/13/2019  TECHNIQUE:  CT examination of the brain was performed  In addition to axial images, coronal 2D reformatted images were created and submitted for interpretation    Radiation dose length product (DLP) for this visit:  887 mGy-cm   This examination, like all CT scans performed in the [facility], was performed utilizing techniques to minimize radiation dose exposure, including the use of iterative reconstruction and automated exposure control  IMAGE QUALITY:  Diagnostic  FINDINGS: PARENCHYMA: Decreased attenuation is noted in periventricular and subcortical white matter demonstrating an appearance that is statistically most likely to represent advanced microangiopathic change; this appearance is similar when compared to most recent prior examination  No CT signs of acute infarction  No intracranial mass, mass effect or midline shift  No acute parenchymal hemorrhage  VENTRICLES AND EXTRA-AXIAL SPACES:  Normal for the patient's age  VISUALIZED ORBITS AND PARANASAL SINUSES:  Bilateral ocular lens replacements  CALVARIUM AND EXTRACRANIAL SOFT TISSUES:  Normal      Impression: No acute intracranial abnormality  Microangiopathic changes  Workstation performed: [id number]       EKG:  Sinus rhythm, first-degree AV block with PACs and PVCs, nonspecific IVCD and nonspecific T-wave changes  No significant arrhythmias seen on telemetry review  Counseling / Coordination of Care  Total floor / unit time spent today 40 minutes  Greater than 50% of total time was spent with the patient and / or family counseling and / or coordination of care

## 2020-01-31 NOTE — PLAN OF CARE
Problem: Potential for Falls  Goal: Patient will remain free of falls  Description  INTERVENTIONS:  - Assess patient frequently for physical needs  -  Identify cognitive and physical deficits and behaviors that affect risk of falls    -  [place] fall precautions as indicated by assessment   - Educate patient/family on patient safety including physical limitations  - Instruct patient to call for assistance with activity based on assessment  - Modify environment to reduce risk of injury  - Consider OT/PT consult to assist with strengthening/mobility  Outcome: Progressing     Problem: DISCHARGE PLANNING  Goal: Discharge to home or other facility with appropriate resources  Description  INTERVENTIONS:  - Identify barriers to discharge w/patient and caregiver  - Arrange for needed discharge resources and transportation as appropriate  - Identify discharge learning needs (meds, wound care, etc )  - Arrange for interpretive services to assist at discharge as needed  - Refer to Case Management Department for coordinating discharge planning if the patient needs post-hospital services based on physician/advanced practitioner order or complex needs related to functional status, cognitive ability, or social support system  Outcome: Progressing     Problem: RESPIRATORY - ADULT  Goal: Achieves optimal ventilation and oxygenation  Description  INTERVENTIONS:  - Assess for changes in respiratory status  - Assess for changes in mentation and behavior  - Position to facilitate oxygenation and minimize respiratory effort  - Oxygen administered by appropriate delivery if ordered  - Initiate smoking cessation education as indicated  - Encourage broncho-pulmonary hygiene including cough, deep breathe, Incentive Spirometry  - Assess the need for suctioning and aspirate as needed  - Assess and instruct to report SOB or any respiratory difficulty  - Respiratory Therapy support as indicated  Outcome: Progressing     Problem: HEMATOLOGIC - ADULT  Goal: Maintains hematologic stability  Description  INTERVENTIONS  - Assess for signs and symptoms of bleeding or hemorrhage  - Monitor labs  - Administer supportive blood products/factors as ordered and appropriate  Outcome: Progressing

## 2020-01-31 NOTE — ASSESSMENT & PLAN NOTE
Wt Readings from Last 3 Encounters:   01/30/20 82 kg (180 lb 11 2 oz)   12/17/19 82 6 kg (182 lb)   10/15/19 84 8 kg (187 lb)     · Patient presented with complaints of dyspnea on exertion, orthopnea, and a 5 lb weight gain over the last week  · ProBNP in on admission was 30,000  · Patient reports taking 20 mg of Lasix as needed at home according to his daily weights  Patient was taking his Lasix but continued to gain weight over this last week  · Given 20 mg Lasix in the ER  · Will continue to diurese patient with goal to be net -500 to 1 L the next 24 hours  · Obtain echocardiogram, last echocardiogram in March 2019 with LVEF of 30% and severe diffuse hypokinesis with distinct wall motion abnormalities

## 2020-01-31 NOTE — QUICK NOTE
I received a call from hematology, Dr Reena Mary and he recommended transfusion with 1unit of prbcs for patient, current Hb 7 7  They usually transfuse if Hb trends < 8 due to his age, malignancy and history of CHF      1unit of prbcs ordered, consent obtained

## 2020-02-01 PROBLEM — D64.9 ANEMIA: Status: ACTIVE | Noted: 2020-02-01

## 2020-02-01 LAB
ABO GROUP BLD BPU: NORMAL
ANION GAP SERPL CALCULATED.3IONS-SCNC: 11 MMOL/L (ref 4–13)
BPU ID: NORMAL
BUN SERPL-MCNC: 33 MG/DL (ref 5–25)
CALCIUM SERPL-MCNC: 9 MG/DL (ref 8.3–10.1)
CHLORIDE SERPL-SCNC: 103 MMOL/L (ref 100–108)
CO2 SERPL-SCNC: 26 MMOL/L (ref 21–32)
CREAT SERPL-MCNC: 1.93 MG/DL (ref 0.6–1.3)
CROSSMATCH: NORMAL
EST. AVERAGE GLUCOSE BLD GHB EST-MCNC: 128 MG/DL
GFR SERPL CREATININE-BSD FRML MDRD: 30 ML/MIN/1.73SQ M
GLUCOSE SERPL-MCNC: 121 MG/DL (ref 65–140)
GLUCOSE SERPL-MCNC: 180 MG/DL (ref 65–140)
GLUCOSE SERPL-MCNC: 180 MG/DL (ref 65–140)
GLUCOSE SERPL-MCNC: 182 MG/DL (ref 65–140)
GLUCOSE SERPL-MCNC: 195 MG/DL (ref 65–140)
HBA1C MFR BLD: 6.1 % (ref 4.2–6.3)
HCT VFR BLD AUTO: 31.3 % (ref 36.5–49.3)
HGB BLD-MCNC: 9.6 G/DL (ref 12–17)
MRSA NOSE QL CULT: NORMAL
POTASSIUM SERPL-SCNC: 3.6 MMOL/L (ref 3.5–5.3)
SODIUM SERPL-SCNC: 140 MMOL/L (ref 136–145)
UNIT DISPENSE STATUS: NORMAL
UNIT PRODUCT CODE: NORMAL
UNIT RH: NORMAL

## 2020-02-01 PROCEDURE — 85018 HEMOGLOBIN: CPT | Performed by: PHYSICIAN ASSISTANT

## 2020-02-01 PROCEDURE — 82948 REAGENT STRIP/BLOOD GLUCOSE: CPT

## 2020-02-01 PROCEDURE — 85014 HEMATOCRIT: CPT | Performed by: PHYSICIAN ASSISTANT

## 2020-02-01 PROCEDURE — 83036 HEMOGLOBIN GLYCOSYLATED A1C: CPT | Performed by: INTERNAL MEDICINE

## 2020-02-01 PROCEDURE — 80048 BASIC METABOLIC PNL TOTAL CA: CPT | Performed by: PHYSICIAN ASSISTANT

## 2020-02-01 PROCEDURE — 94640 AIRWAY INHALATION TREATMENT: CPT

## 2020-02-01 PROCEDURE — 99232 SBSQ HOSP IP/OBS MODERATE 35: CPT | Performed by: INTERNAL MEDICINE

## 2020-02-01 PROCEDURE — 94760 N-INVAS EAR/PLS OXIMETRY 1: CPT

## 2020-02-01 PROCEDURE — 97165 OT EVAL LOW COMPLEX 30 MIN: CPT

## 2020-02-01 PROCEDURE — 97163 PT EVAL HIGH COMPLEX 45 MIN: CPT

## 2020-02-01 RX ORDER — FUROSEMIDE 10 MG/ML
40 INJECTION INTRAMUSCULAR; INTRAVENOUS
Status: DISCONTINUED | OUTPATIENT
Start: 2020-02-01 | End: 2020-02-03

## 2020-02-01 RX ADMIN — FUROSEMIDE 40 MG: 10 INJECTION, SOLUTION INTRAVENOUS at 06:32

## 2020-02-01 RX ADMIN — TIMOLOL MALEATE 1 DROP: 5 SOLUTION/ DROPS OPHTHALMIC at 09:17

## 2020-02-01 RX ADMIN — PANTOPRAZOLE SODIUM 40 MG: 40 TABLET, DELAYED RELEASE ORAL at 05:42

## 2020-02-01 RX ADMIN — HEPARIN SODIUM 5000 UNITS: 5000 INJECTION INTRAVENOUS; SUBCUTANEOUS at 21:51

## 2020-02-01 RX ADMIN — ASPIRIN 81 MG 81 MG: 81 TABLET ORAL at 09:16

## 2020-02-01 RX ADMIN — GUAIFENESIN 1200 MG: 600 TABLET ORAL at 17:42

## 2020-02-01 RX ADMIN — BUDESONIDE AND FORMOTEROL FUMARATE DIHYDRATE 2 PUFF: 160; 4.5 AEROSOL RESPIRATORY (INHALATION) at 19:55

## 2020-02-01 RX ADMIN — CYANOCOBALAMIN TAB 500 MCG 1000 MCG: 500 TAB at 09:16

## 2020-02-01 RX ADMIN — INSULIN LISPRO 1 UNITS: 100 INJECTION, SOLUTION INTRAVENOUS; SUBCUTANEOUS at 14:09

## 2020-02-01 RX ADMIN — GUAIFENESIN 1200 MG: 600 TABLET ORAL at 09:16

## 2020-02-01 RX ADMIN — INSULIN LISPRO 1 UNITS: 100 INJECTION, SOLUTION INTRAVENOUS; SUBCUTANEOUS at 16:59

## 2020-02-01 RX ADMIN — INSULIN LISPRO 1 UNITS: 100 INJECTION, SOLUTION INTRAVENOUS; SUBCUTANEOUS at 21:50

## 2020-02-01 RX ADMIN — MELATONIN 6 MG: at 21:51

## 2020-02-01 RX ADMIN — HEPARIN SODIUM 5000 UNITS: 5000 INJECTION INTRAVENOUS; SUBCUTANEOUS at 14:10

## 2020-02-01 RX ADMIN — HEPARIN SODIUM 5000 UNITS: 5000 INJECTION INTRAVENOUS; SUBCUTANEOUS at 05:42

## 2020-02-01 RX ADMIN — BIMATOPROST 1 DROP: 0.1 SOLUTION/ DROPS OPHTHALMIC at 21:50

## 2020-02-01 RX ADMIN — GLIMEPIRIDE 2 MG: 2 TABLET ORAL at 09:16

## 2020-02-01 RX ADMIN — DOCUSATE SODIUM 100 MG: 100 CAPSULE, LIQUID FILLED ORAL at 09:16

## 2020-02-01 RX ADMIN — ISOSORBIDE MONONITRATE 30 MG: 30 TABLET, EXTENDED RELEASE ORAL at 09:16

## 2020-02-01 RX ADMIN — FUROSEMIDE 40 MG: 10 INJECTION, SOLUTION INTRAMUSCULAR; INTRAVENOUS at 09:17

## 2020-02-01 RX ADMIN — BUDESONIDE AND FORMOTEROL FUMARATE DIHYDRATE 2 PUFF: 160; 4.5 AEROSOL RESPIRATORY (INHALATION) at 08:54

## 2020-02-01 NOTE — PLAN OF CARE
Problem: Potential for Falls  Goal: Patient will remain free of falls  Description  INTERVENTIONS:  - Assess patient frequently for physical needs  -  Identify cognitive and physical deficits and behaviors that affect risk of falls    -  [place] fall precautions as indicated by assessment   - Educate patient/family on patient safety including physical limitations  - Instruct patient to call for assistance with activity based on assessment  - Modify environment to reduce risk of injury  - Consider OT/PT consult to assist with strengthening/mobility  Outcome: Progressing     Problem: DISCHARGE PLANNING  Goal: Discharge to home or other facility with appropriate resources  Description  INTERVENTIONS:  - Identify barriers to discharge w/patient and caregiver  - Arrange for needed discharge resources and transportation as appropriate  - Identify discharge learning needs (meds, wound care, etc )  - Arrange for interpretive services to assist at discharge as needed  - Refer to Case Management Department for coordinating discharge planning if the patient needs post-hospital services based on physician/advanced practitioner order or complex needs related to functional status, cognitive ability, or social support system  Outcome: Progressing     Problem: RESPIRATORY - ADULT  Goal: Achieves optimal ventilation and oxygenation  Description  INTERVENTIONS:  - Assess for changes in respiratory status  - Assess for changes in mentation and behavior  - Position to facilitate oxygenation and minimize respiratory effort  - Oxygen administered by appropriate delivery if ordered  - Initiate smoking cessation education as indicated  - Encourage broncho-pulmonary hygiene including cough, deep breathe, Incentive Spirometry  - Assess the need for suctioning and aspirate as needed  - Assess and instruct to report SOB or any respiratory difficulty  - Respiratory Therapy support as indicated  Outcome: Progressing     Problem: HEMATOLOGIC - ADULT  Goal: Maintains hematologic stability  Description  INTERVENTIONS  - Assess for signs and symptoms of bleeding or hemorrhage  - Monitor labs  - Administer supportive blood products/factors as ordered and appropriate  Outcome: Progressing

## 2020-02-01 NOTE — ASSESSMENT & PLAN NOTE
Lab Results   Component Value Date    HGBA1C 6 1 02/01/2020       Recent Labs     01/31/20  1734 01/31/20  2123 02/01/20  0752 02/01/20  1147   POCGLU 176* 172* 121 180*       Blood Sugar Average: Last 72 hrs:  (P) 317 [id number]   · Continue glimepiride  · SSI + accuchek  · Diabetic diet

## 2020-02-01 NOTE — PHYSICAL THERAPY NOTE
PHYSICAL THERAPY EVAL       02/01/20 1219   Note Type   Note type Eval only   Pain Assessment   Pain Assessment No/denies pain   Pain Score No Pain   Home Living   Type of [address] One level  (3-4STE)   Bathroom Shower/Tub Tub/shower unit   Home Equipment   (rollator)   Prior Function   Level of [place] Needs assistance with IADLs  (Mod I with mobility, Supervision/setup for ADL's)   Lives With Son   Receives Help From Family   ADL Assistance Needs assistance   IADLs Needs assistance   Falls in the last 6 months 1 to 4   Vocational Retired   Restrictions/Precautions   Other Precautions Hard of hearing; Fall Risk;Telemetry; Chair Alarm; Bed Alarm;Cognitive   General   Additional Pertinent History Recent stay at [facility] Present No   Cognition   Overall Cognitive Status Impaired   Arousal/Participation Alert   Orientation Level Oriented to person  (knew the year but stated end of January)   Following Commands Follows one step commands without difficulty   RLE Assessment   RLE Assessment WFL   LLE Assessment   LLE Assessment WFL   Coordination   Sensation WFL   Light Touch   RLE Light Touch Grossly intact   LLE Light Touch Grossly intact   Bed Mobility   Supine to Sit 5  Supervision   Additional Comments OOB in chair at end of session with chair alarm on and call bell in place  SCD's on    Transfers   Sit to Stand 5  Supervision   Additional items Armrests   Stand to Sit 5  Supervision   Additional items Armrests   Ambulation/Elevation   Gait pattern Foward flexed; Short stride;Decreased foot clearance   Gait Assistance 5  Supervision   Assistive Device Rolling walker   Distance 100ft   Stair Management Assistance Not tested   Balance   Static Sitting Good   Dynamic Sitting Good   Static Standing Fair +   Dynamic Standing Fair +   Ambulatory Fair +   Endurance Deficit   Endurance Deficit No Activity Tolerance   Activity Tolerance Patient tolerated treatment well   Medical Staff Made Aware OT wilber   Nurse Made Aware RN Viv   Assessment   Prognosis Good   Problem List Decreased endurance; Impaired balance;Decreased mobility; Decreased cognition;Decreased safety awareness; Impaired judgement; Impaired hearing   Assessment Patient is an 79y/o M who presented with SOB  Found to have an acute exacerbation of CHF  PMH significanr for kappa light chain myeloma, ischemic cardiomyopathy, CKD, HLD, DM, CAD, glaucoma, HTN  He resides with his son in a single story home with 4 steps to enter  He was at a mod I level with mobility and was using a rollator  Current medical status includes mossimo monitoring, SOB, urinary incontinence, decreased cognition, fall risk, decreased strength, balance, endurance and mobility  Patient tolerated session well and required supervision for bed mobility, transfers, and amb  He was mildly sob after ambulating  He was fatigued at end of session and required a rest break  He tolerated session well and does not seem too far from baseline  Plan for 1-2 more sessions for further ambulation and for stair training  Anticipate that he will return home with family support  Barriers to Discharge Inaccessible home environment   Goals   Patient Goals To go home   STG Expiration Date 02/15/20   Short Term Goal #1 1  Perform bed mobility at an independent level with HOB flat and without the use of bedrails 2  Perform sit<>stand transfers with use of UE's at a mod I level 3  Amb 200ft with RW at a mod I level 4  Ascend/descend 4 stairs with railing and at a supervision level   PT Treatment Day 0   Plan   Treatment/Interventions Functional transfer training;Elevations; Therapeutic exercise; Endurance training;Cognitive reorientation; Bed mobility;Gait training;Spoke to nursing;OT   PT Frequency Other (Comment)  (3-5x/wk)   Recommendation   Recommendation Home with family support   Equipment Recommended   (continued use of rollator)   PT - OK to Discharge No   Additional Comments when medically stable   Modified Bandera Scale   Modified Bandera Scale 3   Viv Lee, PT            Patient Name: Sruthi Hall  KYTBRESTELA Date: 2/1/2020

## 2020-02-01 NOTE — ASSESSMENT & PLAN NOTE
· Baseline creatinine 1 6-1 8  · Creatinine at baseline on admission:  1 83    Stable this morning at 1 73  · Continue to monitor in setting of diuresis  · Close monitoring of I/O status and renal function

## 2020-02-01 NOTE — PROGRESS NOTES
Progress Note - Arden Faster 9/22/1930, 80 y o  male MRN: [id number]    Unit/Bed#: -01 Encounter: [id number]    Primary Care Provider: Nisa Miller   Date and time admitted to hospital: 1/30/2020  8:11 PM    * Acute on chronic combined systolic and diastolic heart failure ([facility] )  Assessment & Plan  Wt Readings from Last 3 Encounters:   02/01/20 83 3 kg (183 lb 9 6 oz)   12/17/19 82 6 kg (182 lb)   10/15/19 84 8 kg (187 lb)     · Patient presented with complaints of dyspnea on exertion, orthopnea, and a 5 lb weight gain over the last week  · ProBNP in on admission was 30,000  · Patient reports taking 20 mg of Lasix as needed at home according to his daily weights  Patient was taking his Lasix but continued to gain weight over this last week  · Given 20 mg Lasix in the ER  · Continue IV lasix 40 mg BID  · Repeat echocardiogram completed yesterday showing EF 25% with severe diffuse hypokinesis with distinct regional wall motion abnormalities  Echocardiogram completed March 2019 showed EF 30%  · Strict I/O's, daily weights, low sodium diet  · Per son, patient dry weight has been around 178 since previous admission in [place]  · Cardiology following - appreciate recommendations    Anemia  Assessment & Plan  · Chronic likely in setting of malignancy, chronic disease  · No evidence of active bleeding  · Hgb noted to be 7 7 yesterday, received 1 unit PRBCs  · Continue to monitor  Transfuse for Hgb < 8  · Give lasix with any blood transfusions  · Will check H/H now    Kappa light chain myeloma ([place] [address] )  Assessment & Plan  · Follows with Dr Caro Carreon as an outpatient      Ischemic cardiomyopathy  Assessment & Plan  · Last echo March 2019 showing an LVEF of 30%, severe diffuse hypokinesis with distinct regional wall motion abnormalities, grade 2 diastolic dysfunction, mild AVR, trace TVR  · Continue home imdur and aspirin  · Recheck echocardiogram    Stage 4 chronic kidney disease ([facility] )  Assessment & Plan  · Baseline creatinine 1 6-1 8  · Creatinine at baseline on admission:  1 83  Repeat pending for today  · Continue to monitor in setting of diuresis  · Close monitoring of I/O status and renal function    Esophageal reflux  Assessment & Plan  · Continue home PPI    Mixed hyperlipidemia  Assessment & Plan  · Patient no longer taking statin  Type 2 diabetes mellitus without complication, without long-term current use of insulin [facility])  Assessment & Plan  Lab Results   Component Value Date    HGBA1C 6 1 2020       Recent Labs     20  1734 20  2123 20  0752 20  1147   POCGLU 176* 172* 121 180*       Blood Sugar Average: Last 72 hrs:  (P) 336 [id number]   · Continue glimepiride  · SSI + accuchek  · Diabetic diet    VTE Pharmacologic Prophylaxis:   Pharmacologic: Heparin  Mechanical VTE Prophylaxis in Place: Yes    Patient Centered Rounds: I have performed bedside rounds with nursing staff today  Discussions with Specialists or Other Care Team Provider: Nursing    Education and Discussions with Family / Patient:  Discussed with patient directly at bedside  Discussed with patient's son, Kasi Titus over the phone  Answered all questions and concerns to the best of my ability  Time Spent for Care: 30 minutes  More than 50% of total time spent on counseling and coordination of care as described above  Current Length of Stay: 1 day(s)    Current Patient Status: Inpatient   Certification Statement: The patient will continue to require additional inpatient hospital stay due to IV diuresis    Discharge Plan: Likely discharge next week pending improvement as noted above    Code Status: Level 1 - Full Code      Subjective:   "I'm tired "    Patient states that he did not sleep well last night  Feels that his breathing is at baseline  Denies chest pain/palpitations, nausea/vomiting, abdominal pain      Objective:     Vitals:   Temp (24hrs), Av 5 °F (36 4 °C), Min:97 3 °F (36 3 °C), Max:98 1 °F (36 7 °C)    Temp:  [97 3 °F (36 3 °C)-98 1 °F (36 7 °C)] 97 3 °F (36 3 °C)  HR:  [] 100  Resp:  [18-20] 18  BP: (104-128)/(62-82) 118/77  SpO2:  [97 %-100 %] 98 %  Body mass index is 28 76 kg/m²  Input and Output Summary (last 24 hours): Intake/Output Summary (Last 24 hours) at 2/1/2020 1455  Last data filed at 2/1/2020 1153  Gross per 24 hour   Intake 650 ml   Output 1675 ml   Net -1025 ml       Physical Exam:     Physical Exam   Constitutional: He is oriented to person, place, and time  Vital signs are normal  He appears well-developed  Appears comfortable, no acute distress   HENT:   Head: Normocephalic  Eyes: Pupils are equal, round, and reactive to light  Conjunctivae and EOM are normal  No scleral icterus  Neck: Normal range of motion  Cardiovascular: Normal rate, regular rhythm and normal heart sounds  No murmur heard  Pulmonary/Chest: Effort normal  No respiratory distress  He has no wheezes  He has no rhonchi  He has rales  Abdominal: Soft  Bowel sounds are normal  There is no tenderness  There is no rigidity, no rebound and no guarding  Musculoskeletal: He exhibits no edema, tenderness or deformity  Able to ambulate from chair to bed with assistance x1   Neurological: He is alert and oriented to person, place, and time  Skin: Skin is warm and dry  Psychiatric: He has a normal mood and affect  His speech is normal and behavior is normal    Nursing note and vitals reviewed          Additional Data:     Labs:    Results from last 7 days   Lab Units 02/01/20  1308 01/31/20  0457 01/30/20 2021   WBC Thousand/uL  --  6 25 7 47   HEMOGLOBIN g/dL 9 6* 7 7* 8 5*   HEMATOCRIT % 31 3* 25 2* 28 1*   PLATELETS Thousands/uL  --  356 402*   NEUTROS PCT %  --   --  76*   LYMPHS PCT %  --   --  15   MONOS PCT %  --   --  6   EOS PCT %  --   --  1     Results from last 7 days   Lab Units 02/01/20  1309  01/30/20 2021   SODIUM mmol/L 140   < > 137   POTASSIUM mmol/L 3 6   < > 4 5   CHLORIDE mmol/L 103   < > 103   CO2 mmol/L 26   < > 24   BUN mg/dL 33*   < > 31*   CREATININE mg/dL 1 93*   < > 1 83*   ANION GAP mmol/L 11   < > 10   CALCIUM mg/dL 9 0   < > 8 7   ALBUMIN g/dL  --   --  2 9*   TOTAL BILIRUBIN mg/dL  --   --  0 50   ALK PHOS U/L  --   --  135*   ALT U/L  --   --  14   AST U/L  --   --  12   GLUCOSE RANDOM mg/dL 182*   < > 182*    < > = values in this interval not displayed  Results from last 7 days   Lab Units 02/01/20  1147 02/01/20  0752 01/31/20  2123 01/31/20  1734 01/31/20  1134 01/31/20  0803   POC GLUCOSE mg/dl 180* 121 172* 176* 230* 128     Results from last 7 days   Lab Units 02/01/20  0451   HEMOGLOBIN A1C % 6 1               * I Have Reviewed All Lab Data Listed Above  * Additional Pertinent Lab Tests Reviewed:  [facility] Admission Reviewed    Imaging:    Imaging Reports Reviewed Today Include:  CXR, CT head  Imaging Personally Reviewed by Myself Includes:      Recent Cultures (last 7 days):           Last 24 Hours Medication List:     Current Facility-Administered Medications:  acetaminophen 650 mg Oral Q6H PRN Warren Adas, CRNP   aspirin 81 mg Oral Daily Warren Adas, CRNP   bimatoprost 1 drop Both Eyes HS Warren Adas, CRNP   budesonide-formoterol 2 puff Inhalation BID Warren Adas, CRNP   cyanocobalamin 1,000 mcg Oral Daily Warren Adas, CRNP   docusate sodium 100 mg Oral Daily Warren Adas, CRNP   furosemide 40 mg Intravenous BID (diuretic) Sb Bland MD   glimepiride 2 mg Oral Daily Warren Adas, DON   guaiFENesin 1,200 mg Oral BID Warren Adas, CRNP   heparin (porcine) 5,000 Units Subcutaneous Q8H [facility] DON Ferguson   insulin lispro 1-5 Units Subcutaneous HS Sb Bland MD   insulin lispro 1-6 Units Subcutaneous TID AC Sb Bland MD   isosorbide mononitrate 30 mg Oral Daily Warren Adas, CRNP   melatonin 6 mg Oral HS Warren Adas, CRNP   nitroglycerin 0 4 mg Sublingual Q5 Min PRN Pratibha [place], CRNP   pantoprazole 40 mg Oral Early Morning Pratibha [place], CRNP   timolol 1 drop Both Eyes Daily Pratibha [place], CRNP        Today, Patient Was Seen By: Garcia Randall PA-C    ** Please Note: Dictation voice to text software may have been used in the creation of this document   **

## 2020-02-01 NOTE — ASSESSMENT & PLAN NOTE
Lab Results   Component Value Date    HGBA1C 7 0 09/11/2019       Recent Labs     01/31/20  1734 01/31/20  2123 02/01/20  0752 02/01/20  1147   POCGLU 176* 172* 121 180*       Blood Sugar Average: Last 72 hrs:  (P) 686 [id number]   · Continue glimepiride  · SSI + accuchek  · Diabetic diet

## 2020-02-01 NOTE — ASSESSMENT & PLAN NOTE
· Baseline creatinine 1 6-1 9  · Creatinine overall stable, 1 93 this AM   · Continue to monitor in setting of diuresis  · Close monitoring of I/O status and renal function - good urine output since yesterday

## 2020-02-01 NOTE — ASSESSMENT & PLAN NOTE
· Chronic likely in setting of malignancy, chronic disease  · No evidence of active bleeding  · Hgb noted to be 7 7 yesterday, received 1 unit PRBCs  · Continue to monitor    Transfuse for Hgb < 8  · Give lasix with any blood transfusions  · Will check H/H now

## 2020-02-01 NOTE — ASSESSMENT & PLAN NOTE
Wt Readings from Last 3 Encounters:   02/01/20 83 3 kg (183 lb 9 6 oz)   12/17/19 82 6 kg (182 lb)   10/15/19 84 8 kg (187 lb)     · Patient presented with complaints of dyspnea on exertion, orthopnea, and a 5 lb weight gain over the last week  · ProBNP in on admission was 30,000  · Patient reports taking 20 mg of Lasix as needed at home according to his daily weights  Patient was taking his Lasix but continued to gain weight over this last week  · Given 20 mg Lasix in the ER  · Continue IV lasix 40 mg BID  · Repeat echocardiogram completed yesterday showing EF 25% with severe diffuse hypokinesis with distinct regional wall motion abnormalities  Echocardiogram completed March 2019 showed EF 30%    · Strict I/O's, daily weights, low sodium diet  · Per son, patient dry weight has been around 178 since previous admission in [place]  · Cardiology following - appreciate recommendations

## 2020-02-01 NOTE — ASSESSMENT & PLAN NOTE
· Chronic likely in setting of malignancy, chronic disease  · No evidence of active bleeding  · Hgb noted to be 7 7 yesterday, received 1 unit PRBCs  · Continue to monitor  Transfuse for Hgb < 8  · Give lasix with any blood transfusions    · Hgb stable at 9 0

## 2020-02-01 NOTE — PLAN OF CARE
Problem: PHYSICAL THERAPY ADULT  Goal: Performs mobility at highest level of function for planned discharge setting  See evaluation for individualized goals  Description  Treatment/Interventions: Functional transfer training, Elevations, Therapeutic exercise, Endurance training, Cognitive reorientation, Bed mobility, Gait training, Spoke to nursing, OT  Equipment Recommended: (continued use of rollator)       See flowsheet documentation for full assessment, interventions and recommendations  Note:   Prognosis: Good  Problem List: Decreased endurance, Impaired balance, Decreased mobility, Decreased cognition, Decreased safety awareness, Impaired judgement, Impaired hearing  Assessment: Patient is an 79y/o M who presented with SOB  Found to have an acute exacerbation of CHF  PMH significanr for kappa light chain myeloma, ischemic cardiomyopathy, CKD, HLD, DM, CAD, glaucoma, HTN  He resides with his son in a single story home with 4 steps to enter  He was at a mod I level with mobility and was using a rollator  Current medical status includes mossimo monitoring, SOB, urinary incontinence, decreased cognition, fall risk, decreased strength, balance, endurance and mobility  Patient tolerated session well and required supervision for bed mobility, transfers, and amb  He was mildly sob after ambulating  He was fatigued at end of session and required a rest break  He tolerated session well and does not seem too far from baseline  Plan for 1-2 more sessions for further ambulation and for stair training  Anticipate that he will return home with family support  Barriers to Discharge: Inaccessible home environment     Recommendation: Home with family support     PT - OK to Discharge: No    See flowsheet documentation for full assessment

## 2020-02-01 NOTE — ASSESSMENT & PLAN NOTE
Wt Readings from Last 3 Encounters:   02/01/20 83 3 kg (183 lb 9 6 oz)   12/17/19 82 6 kg (182 lb)   10/15/19 84 8 kg (187 lb)     · Patient presented with complaints of dyspnea on exertion, orthopnea, and a 5 lb weight gain over the last week  · ProBNP in on admission was 30,000  · Patient reports taking 20 mg of Lasix as needed at home according to his daily weights  Patient was taking his Lasix but continued to gain weight over this last week  · Given 20 mg Lasix in the ER  · Continue IV lasix 40 mg BID - unfortunately patient did not receive an evening dose of lasix yesterday due to borderline BP  Would recommend re-checking BP in the future an hour after the first if it is below the parameters  · Subsequently weight increased today to 185 lb  · Repeat echocardiogram completed yesterday showing EF 25% with severe diffuse hypokinesis with distinct regional wall motion abnormalities  Echocardiogram completed March 2019 showed EF 30%    · Strict I/O's, daily weights, low sodium diet  · Per son, patient dry weight has been around 178 since previous admission in [place]  · Cardiology following - appreciate recommendations

## 2020-02-01 NOTE — NURSING NOTE
Spoke with CRNP regarding pt weight increase of approximately 4 lb in 1 day and increased moist cough with coarse breath sounds and fine crackles following blood transfusion last evening  /77 with HR of 102 at this time  Verbal order received to give pt 0800 dose of IV Lasix at this time and reevaluate pt need for further diuretics later this morning

## 2020-02-02 LAB
ANION GAP SERPL CALCULATED.3IONS-SCNC: 12 MMOL/L (ref 4–13)
BUN SERPL-MCNC: 34 MG/DL (ref 5–25)
CALCIUM SERPL-MCNC: 8.8 MG/DL (ref 8.3–10.1)
CHLORIDE SERPL-SCNC: 106 MMOL/L (ref 100–108)
CO2 SERPL-SCNC: 24 MMOL/L (ref 21–32)
CREAT SERPL-MCNC: 1.93 MG/DL (ref 0.6–1.3)
ERYTHROCYTE [DISTWIDTH] IN BLOOD BY AUTOMATED COUNT: 19.9 % (ref 11.6–15.1)
GFR SERPL CREATININE-BSD FRML MDRD: 30 ML/MIN/1.73SQ M
GLUCOSE SERPL-MCNC: 108 MG/DL (ref 65–140)
GLUCOSE SERPL-MCNC: 110 MG/DL (ref 65–140)
GLUCOSE SERPL-MCNC: 152 MG/DL (ref 65–140)
GLUCOSE SERPL-MCNC: 177 MG/DL (ref 65–140)
GLUCOSE SERPL-MCNC: 202 MG/DL (ref 65–140)
HCT VFR BLD AUTO: 29.3 % (ref 36.5–49.3)
HGB BLD-MCNC: 9 G/DL (ref 12–17)
MAGNESIUM SERPL-MCNC: 2.4 MG/DL (ref 1.6–2.6)
MCH RBC QN AUTO: 26.8 PG (ref 26.8–34.3)
MCHC RBC AUTO-ENTMCNC: 30.7 G/DL (ref 31.4–37.4)
MCV RBC AUTO: 87 FL (ref 82–98)
PLATELET # BLD AUTO: 367 THOUSANDS/UL (ref 149–390)
PMV BLD AUTO: 10.1 FL (ref 8.9–12.7)
POTASSIUM SERPL-SCNC: 3.1 MMOL/L (ref 3.5–5.3)
RBC # BLD AUTO: 3.36 MILLION/UL (ref 3.88–5.62)
SODIUM SERPL-SCNC: 142 MMOL/L (ref 136–145)
WBC # BLD AUTO: 6.68 THOUSAND/UL (ref 4.31–10.16)

## 2020-02-02 PROCEDURE — 85027 COMPLETE CBC AUTOMATED: CPT | Performed by: INTERNAL MEDICINE

## 2020-02-02 PROCEDURE — 94640 AIRWAY INHALATION TREATMENT: CPT

## 2020-02-02 PROCEDURE — 94760 N-INVAS EAR/PLS OXIMETRY 1: CPT

## 2020-02-02 PROCEDURE — 80048 BASIC METABOLIC PNL TOTAL CA: CPT | Performed by: INTERNAL MEDICINE

## 2020-02-02 PROCEDURE — 94664 DEMO&/EVAL PT USE INHALER: CPT

## 2020-02-02 PROCEDURE — 83735 ASSAY OF MAGNESIUM: CPT | Performed by: PHYSICIAN ASSISTANT

## 2020-02-02 PROCEDURE — 82948 REAGENT STRIP/BLOOD GLUCOSE: CPT

## 2020-02-02 PROCEDURE — 99232 SBSQ HOSP IP/OBS MODERATE 35: CPT | Performed by: INTERNAL MEDICINE

## 2020-02-02 RX ORDER — POTASSIUM CHLORIDE 20 MEQ/1
40 TABLET, EXTENDED RELEASE ORAL ONCE
Status: COMPLETED | OUTPATIENT
Start: 2020-02-02 | End: 2020-02-02

## 2020-02-02 RX ADMIN — HEPARIN SODIUM 5000 UNITS: 5000 INJECTION INTRAVENOUS; SUBCUTANEOUS at 06:23

## 2020-02-02 RX ADMIN — BUDESONIDE AND FORMOTEROL FUMARATE DIHYDRATE 2 PUFF: 160; 4.5 AEROSOL RESPIRATORY (INHALATION) at 08:16

## 2020-02-02 RX ADMIN — GUAIFENESIN 1200 MG: 600 TABLET ORAL at 17:42

## 2020-02-02 RX ADMIN — FUROSEMIDE 40 MG: 10 INJECTION, SOLUTION INTRAMUSCULAR; INTRAVENOUS at 08:36

## 2020-02-02 RX ADMIN — GLIMEPIRIDE 2 MG: 2 TABLET ORAL at 08:37

## 2020-02-02 RX ADMIN — INSULIN LISPRO 1 UNITS: 100 INJECTION, SOLUTION INTRAVENOUS; SUBCUTANEOUS at 23:18

## 2020-02-02 RX ADMIN — INSULIN LISPRO 1 UNITS: 100 INJECTION, SOLUTION INTRAVENOUS; SUBCUTANEOUS at 17:43

## 2020-02-02 RX ADMIN — BIMATOPROST 1 DROP: 0.1 SOLUTION/ DROPS OPHTHALMIC at 23:19

## 2020-02-02 RX ADMIN — ASPIRIN 81 MG 81 MG: 81 TABLET ORAL at 08:37

## 2020-02-02 RX ADMIN — CYANOCOBALAMIN TAB 500 MCG 1000 MCG: 500 TAB at 08:37

## 2020-02-02 RX ADMIN — FUROSEMIDE 40 MG: 10 INJECTION, SOLUTION INTRAMUSCULAR; INTRAVENOUS at 17:43

## 2020-02-02 RX ADMIN — MELATONIN 6 MG: at 23:17

## 2020-02-02 RX ADMIN — PANTOPRAZOLE SODIUM 40 MG: 40 TABLET, DELAYED RELEASE ORAL at 06:23

## 2020-02-02 RX ADMIN — INSULIN LISPRO 2 UNITS: 100 INJECTION, SOLUTION INTRAVENOUS; SUBCUTANEOUS at 12:44

## 2020-02-02 RX ADMIN — BUDESONIDE AND FORMOTEROL FUMARATE DIHYDRATE 2 PUFF: 160; 4.5 AEROSOL RESPIRATORY (INHALATION) at 20:01

## 2020-02-02 RX ADMIN — DOCUSATE SODIUM 100 MG: 100 CAPSULE, LIQUID FILLED ORAL at 08:37

## 2020-02-02 RX ADMIN — HEPARIN SODIUM 5000 UNITS: 5000 INJECTION INTRAVENOUS; SUBCUTANEOUS at 23:17

## 2020-02-02 RX ADMIN — HEPARIN SODIUM 5000 UNITS: 5000 INJECTION INTRAVENOUS; SUBCUTANEOUS at 15:47

## 2020-02-02 RX ADMIN — GUAIFENESIN 1200 MG: 600 TABLET ORAL at 08:37

## 2020-02-02 RX ADMIN — TIMOLOL MALEATE 1 DROP: 5 SOLUTION/ DROPS OPHTHALMIC at 08:36

## 2020-02-02 RX ADMIN — POTASSIUM CHLORIDE 40 MEQ: 1500 TABLET, EXTENDED RELEASE ORAL at 08:37

## 2020-02-02 NOTE — PROGRESS NOTES
Progress Note - Nain Benitez 9/22/1930, 80 y o  male MRN: [id number]    Unit/Bed#: -01 Encounter: [id number]    Primary Care Provider: Jess Villar   Date and time admitted to hospital: 1/30/2020  8:11 PM    * Acute on chronic combined systolic and diastolic heart failure ([facility] )  Assessment & Plan  Wt Readings from Last 3 Encounters:   02/01/20 83 3 kg (183 lb 9 6 oz)   12/17/19 82 6 kg (182 lb)   10/15/19 84 8 kg (187 lb)     · Patient presented with complaints of dyspnea on exertion, orthopnea, and a 5 lb weight gain over the last week  · ProBNP in on admission was 30,000  · Patient reports taking 20 mg of Lasix as needed at home according to his daily weights  Patient was taking his Lasix but continued to gain weight over this last week  · Given 20 mg Lasix in the ER  · Continue IV lasix 40 mg BID - unfortunately patient did not receive an evening dose of lasix yesterday due to borderline BP  Would recommend re-checking BP in the future an hour after the first if it is below the parameters  · Subsequently weight increased today to 185 lb  · Repeat echocardiogram completed yesterday showing EF 25% with severe diffuse hypokinesis with distinct regional wall motion abnormalities  Echocardiogram completed March 2019 showed EF 30%  · Strict I/O's, daily weights, low sodium diet  · Per son, patient dry weight has been around 178 since previous admission in [place]  · Cardiology following - appreciate recommendations    Anemia  Assessment & Plan  · Chronic likely in setting of malignancy, chronic disease  · No evidence of active bleeding  · Hgb noted to be 7 7 yesterday, received 1 unit PRBCs  · Continue to monitor  Transfuse for Hgb < 8  · Give lasix with any blood transfusions    · Hgb stable at 9 0    Cutler light chain myeloma ([email or web address] )  Assessment & Plan  · Follows with Dr Jayda Burrows as an outpatient    Ischemic cardiomyopathy  Assessment & Plan  · Last echo March 2019 showing an LVEF of 30%, severe diffuse hypokinesis with distinct regional wall motion abnormalities, grade 2 diastolic dysfunction, mild AVR, trace TVR  · Continue home imdur and aspirin  · Recheck echocardiogram    Stage 4 chronic kidney disease (HCC)  Assessment & Plan  · Baseline creatinine 1 6-1 9  · Creatinine overall stable, 1 93 this AM   · Continue to monitor in setting of diuresis  · Close monitoring of I/O status and renal function - good urine output since yesterday    Esophageal reflux  Assessment & Plan  · Continue home PPI  Mixed hyperlipidemia  Assessment & Plan  · Patient no longer taking statin    Type 2 diabetes mellitus without complication, without long-term current use of insulin [facility])  Assessment & Plan  Lab Results   Component Value Date    HGBA1C 6 1 02/01/2020       Recent Labs     01/31/20  1734 01/31/20  2123 02/01/20  0752 02/01/20  1147   POCGLU 176* 172* 121 180*       Blood Sugar Average: Last 72 hrs:  (P) 570 [id number]   · Continue glimepiride  · SSI + accuchek  · Diabetic diet    VTE Pharmacologic Prophylaxis:   Pharmacologic: Heparin  Mechanical VTE Prophylaxis in Place: Yes    Patient Centered Rounds: I have performed bedside rounds with nursing staff today  Discussions with Specialists or Other Care Team Provider: Nursing, Attending    Education and Discussions with Family / Patient: Discussed with patient directly at bedside  Also discussed with patient's son, Preet Cee, over the phone  Time Spent for Care: 30 minutes  More than 50% of total time spent on counseling and coordination of care as described above      Current Length of Stay: 2 day(s)    Current Patient Status: Inpatient   Certification Statement: The patient will continue to require additional inpatient hospital stay due to IV diuresis    Discharge Plan: Likely discharge 24-48 hours pending improvement of volume status as noted above    Code Status: Level 1 - Full Code      Subjective:   "I feel fine "    Patient does report he still gets winded when moving from the bed to the chair  Otherwise denies chest pain/palpitations, shortness of breath, nausea/vomiting, abdominal pain  Objective:     Vitals:   Temp (24hrs), Av 7 °F (36 5 °C), Min:97 7 °F (36 5 °C), Max:97 8 °F (36 6 °C)    Temp:  [97 7 °F (36 5 °C)-97 8 °F (36 6 °C)] 97 7 °F (36 5 °C)  HR:  [] 103  Resp:  [18-20] 20  BP: ()/(60-68) 108/68  SpO2:  [94 %-99 %] 98 %  Body mass index is 29 07 kg/m²  Input and Output Summary (last 24 hours): Intake/Output Summary (Last 24 hours) at 2020 0944  Last data filed at 2020 0900  Gross per 24 hour   Intake 340 ml   Output 1675 ml   Net -1335 ml       Physical Exam:     Physical Exam   Constitutional: He is oriented to person, place, and time  Vital signs are normal  He appears well-developed  Seated in chair eating breakfast   Appears comfortable   HENT:   Head: Normocephalic  Eyes: Pupils are equal, round, and reactive to light  Conjunctivae and EOM are normal  No scleral icterus  Neck: Normal range of motion  Cardiovascular: Normal rate, regular rhythm and normal heart sounds  No murmur heard  Pulmonary/Chest: Effort normal  No respiratory distress  He has rales  Abdominal: Soft  Bowel sounds are normal  There is no tenderness  There is no rigidity, no rebound and no guarding  Musculoskeletal: He exhibits no edema, tenderness or deformity  Able to move upper and lower extremities bilaterally   Neurological: He is alert and oriented to person, place, and time  Skin: Skin is warm and dry  Psychiatric: He has a normal mood and affect  His speech is normal and behavior is normal    Nursing note and vitals reviewed          Additional Data:     Labs:    Results from last 7 days   Lab Units 20  0456  20   WBC Thousand/uL 6 68   < > 7 47   HEMOGLOBIN g/dL 9 0*   < > 8 5*   HEMATOCRIT % 29 3*   < > 28 1*   PLATELETS Thousands/uL 367   < > 402*   NEUTROS PCT %  --   --  76* LYMPHS PCT %  --   --  15   MONOS PCT %  --   --  6   EOS PCT %  --   --  1    < > = values in this interval not displayed  Results from last 7 days   Lab Units 02/02/20  0456  01/30/20 2021   SODIUM mmol/L 142   < > 137   POTASSIUM mmol/L 3 1*   < > 4 5   CHLORIDE mmol/L 106   < > 103   CO2 mmol/L 24   < > 24   BUN mg/dL 34*   < > 31*   CREATININE mg/dL 1 93*   < > 1 83*   ANION GAP mmol/L 12   < > 10   CALCIUM mg/dL 8 8   < > 8 7   ALBUMIN g/dL  --   --  2 9*   TOTAL BILIRUBIN mg/dL  --   --  0 50   ALK PHOS U/L  --   --  135*   ALT U/L  --   --  14   AST U/L  --   --  12   GLUCOSE RANDOM mg/dL 110   < > 182*    < > = values in this interval not displayed  Results from last 7 days   Lab Units 02/02/20  0819 02/01/20  2106 02/01/20  1628 02/01/20  1147 02/01/20  0752 01/31/20  2123 01/31/20  1734 01/31/20  1134 01/31/20  0803   POC GLUCOSE mg/dl 108 195* 180* 180* 121 172* 176* 230* 128     Results from last 7 days   Lab Units 02/01/20  0451   HEMOGLOBIN A1C % 6 1               * I Have Reviewed All Lab Data Listed Above  * Additional Pertinent Lab Tests Reviewed:  All Labs Within Last 24 Hours Reviewed    Imaging:    Imaging Reports Reviewed Today Include: CXR  Imaging Personally Reviewed by Myself Includes:  CXR    Recent Cultures (last 7 days):           Last 24 Hours Medication List:     Current Facility-Administered Medications:  acetaminophen 650 mg Oral Q6H PRN DON Chavez   aspirin 81 mg Oral Daily DON Chavez   bimatoprost 1 drop Both Eyes HS DON Chavez   budesonide-formoterol 2 puff Inhalation BID DON Chavez   cyanocobalamin 1,000 mcg Oral Daily DON Chavez   docusate sodium 100 mg Oral Daily DON Chavez   furosemide 40 mg Intravenous BID (diuretic) Vikash Lopez MD   glimepiride 2 mg Oral Daily DON Chavez   guaiFENesin 1,200 mg Oral BID DON Chavez   heparin (porcine) 5,000 Units Subcutaneous [facility] DON   insulin lispro 1-5 Units Subcutaneous HS Moni Zapata MD   insulin lispro 1-6 Units Subcutaneous TID AC Moni Zapata MD   isosorbide mononitrate 30 mg Oral Daily DON Freeman   melatonin 6 mg Oral HS DON Freeman   nitroglycerin 0 4 mg Sublingual Q5 Min PRN DON Freeman   pantoprazole 40 mg Oral Early Morning DON Freeman   timolol 1 drop Both Eyes Daily DON Freeman        Today, Patient Was Seen By: Erika Baugh PA-C    ** Please Note: Dictation voice to text software may have been used in the creation of this document   **

## 2020-02-02 NOTE — PLAN OF CARE
Problem: Potential for Falls  Goal: Patient will remain free of falls  Description  INTERVENTIONS:  - Assess patient frequently for physical needs  -  Identify cognitive and physical deficits and behaviors that affect risk of falls    -  [place] fall precautions as indicated by assessment   - Educate patient/family on patient safety including physical limitations  - Instruct patient to call for assistance with activity based on assessment  - Modify environment to reduce risk of injury  - Consider OT/PT consult to assist with strengthening/mobility  Outcome: Progressing     Problem: DISCHARGE PLANNING  Goal: Discharge to home or other facility with appropriate resources  Description  INTERVENTIONS:  - Identify barriers to discharge w/patient and caregiver  - Arrange for needed discharge resources and transportation as appropriate  - Identify discharge learning needs (meds, wound care, etc )  - Arrange for interpretive services to assist at discharge as needed  - Refer to Case Management Department for coordinating discharge planning if the patient needs post-hospital services based on physician/advanced practitioner order or complex needs related to functional status, cognitive ability, or social support system  Outcome: Progressing     Problem: RESPIRATORY - ADULT  Goal: Achieves optimal ventilation and oxygenation  Description  INTERVENTIONS:  - Assess for changes in respiratory status  - Assess for changes in mentation and behavior  - Position to facilitate oxygenation and minimize respiratory effort  - Oxygen administered by appropriate delivery if ordered  - Initiate smoking cessation education as indicated  - Encourage broncho-pulmonary hygiene including cough, deep breathe, Incentive Spirometry  - Assess the need for suctioning and aspirate as needed  - Assess and instruct to report SOB or any respiratory difficulty  - Respiratory Therapy support as indicated  Outcome: Progressing     Problem: HEMATOLOGIC - ADULT  Goal: Maintains hematologic stability  Description  INTERVENTIONS  - Assess for signs and symptoms of bleeding or hemorrhage  - Monitor labs  - Administer supportive blood products/factors as ordered and appropriate  Outcome: Progressing

## 2020-02-03 ENCOUNTER — APPOINTMENT (INPATIENT)
Dept: RADIOLOGY | Facility: HOSPITAL | Age: 85
DRG: 280 | End: 2020-02-03
Payer: COMMERCIAL

## 2020-02-03 PROBLEM — E87.6 HYPOKALEMIA: Status: ACTIVE | Noted: 2020-02-03

## 2020-02-03 LAB
ANION GAP SERPL CALCULATED.3IONS-SCNC: 13 MMOL/L (ref 4–13)
BUN SERPL-MCNC: 31 MG/DL (ref 5–25)
CALCIUM SERPL-MCNC: 8.7 MG/DL (ref 8.3–10.1)
CHLORIDE SERPL-SCNC: 107 MMOL/L (ref 100–108)
CO2 SERPL-SCNC: 23 MMOL/L (ref 21–32)
CREAT SERPL-MCNC: 1.74 MG/DL (ref 0.6–1.3)
ERYTHROCYTE [DISTWIDTH] IN BLOOD BY AUTOMATED COUNT: 20.1 % (ref 11.6–15.1)
GFR SERPL CREATININE-BSD FRML MDRD: 34 ML/MIN/1.73SQ M
GLUCOSE SERPL-MCNC: 100 MG/DL (ref 65–140)
GLUCOSE SERPL-MCNC: 172 MG/DL (ref 65–140)
GLUCOSE SERPL-MCNC: 216 MG/DL (ref 65–140)
GLUCOSE SERPL-MCNC: 234 MG/DL (ref 65–140)
GLUCOSE SERPL-MCNC: 88 MG/DL (ref 65–140)
HCT VFR BLD AUTO: 30.4 % (ref 36.5–49.3)
HGB BLD-MCNC: 9.4 G/DL (ref 12–17)
MAGNESIUM SERPL-MCNC: 2.3 MG/DL (ref 1.6–2.6)
MCH RBC QN AUTO: 27 PG (ref 26.8–34.3)
MCHC RBC AUTO-ENTMCNC: 30.9 G/DL (ref 31.4–37.4)
MCV RBC AUTO: 87 FL (ref 82–98)
PLATELET # BLD AUTO: 389 THOUSANDS/UL (ref 149–390)
PMV BLD AUTO: 10.5 FL (ref 8.9–12.7)
POTASSIUM SERPL-SCNC: 3 MMOL/L (ref 3.5–5.3)
RBC # BLD AUTO: 3.48 MILLION/UL (ref 3.88–5.62)
SODIUM SERPL-SCNC: 143 MMOL/L (ref 136–145)
WBC # BLD AUTO: 6.5 THOUSAND/UL (ref 4.31–10.16)

## 2020-02-03 PROCEDURE — 94640 AIRWAY INHALATION TREATMENT: CPT

## 2020-02-03 PROCEDURE — 99232 SBSQ HOSP IP/OBS MODERATE 35: CPT | Performed by: PHYSICIAN ASSISTANT

## 2020-02-03 PROCEDURE — 94760 N-INVAS EAR/PLS OXIMETRY 1: CPT

## 2020-02-03 PROCEDURE — 85027 COMPLETE CBC AUTOMATED: CPT | Performed by: INTERNAL MEDICINE

## 2020-02-03 PROCEDURE — 83735 ASSAY OF MAGNESIUM: CPT | Performed by: PHYSICIAN ASSISTANT

## 2020-02-03 PROCEDURE — 82948 REAGENT STRIP/BLOOD GLUCOSE: CPT

## 2020-02-03 PROCEDURE — 99232 SBSQ HOSP IP/OBS MODERATE 35: CPT | Performed by: INTERNAL MEDICINE

## 2020-02-03 PROCEDURE — 80048 BASIC METABOLIC PNL TOTAL CA: CPT | Performed by: INTERNAL MEDICINE

## 2020-02-03 PROCEDURE — 71045 X-RAY EXAM CHEST 1 VIEW: CPT

## 2020-02-03 RX ORDER — POTASSIUM CHLORIDE 20 MEQ/1
40 TABLET, EXTENDED RELEASE ORAL 2 TIMES DAILY
Status: COMPLETED | OUTPATIENT
Start: 2020-02-03 | End: 2020-02-03

## 2020-02-03 RX ORDER — FUROSEMIDE 10 MG/ML
40 INJECTION INTRAMUSCULAR; INTRAVENOUS
Status: DISCONTINUED | OUTPATIENT
Start: 2020-02-03 | End: 2020-02-06

## 2020-02-03 RX ADMIN — GUAIFENESIN 1200 MG: 600 TABLET ORAL at 17:36

## 2020-02-03 RX ADMIN — FUROSEMIDE 40 MG: 10 INJECTION, SOLUTION INTRAMUSCULAR; INTRAVENOUS at 11:10

## 2020-02-03 RX ADMIN — CYANOCOBALAMIN TAB 500 MCG 1000 MCG: 500 TAB at 09:05

## 2020-02-03 RX ADMIN — MELATONIN 6 MG: at 22:36

## 2020-02-03 RX ADMIN — INSULIN LISPRO 1 UNITS: 100 INJECTION, SOLUTION INTRAVENOUS; SUBCUTANEOUS at 13:37

## 2020-02-03 RX ADMIN — INSULIN LISPRO 2 UNITS: 100 INJECTION, SOLUTION INTRAVENOUS; SUBCUTANEOUS at 22:37

## 2020-02-03 RX ADMIN — TIMOLOL MALEATE 1 DROP: 5 SOLUTION/ DROPS OPHTHALMIC at 09:08

## 2020-02-03 RX ADMIN — INSULIN LISPRO 2 UNITS: 100 INJECTION, SOLUTION INTRAVENOUS; SUBCUTANEOUS at 17:37

## 2020-02-03 RX ADMIN — FUROSEMIDE 40 MG: 10 INJECTION, SOLUTION INTRAMUSCULAR; INTRAVENOUS at 17:37

## 2020-02-03 RX ADMIN — BUDESONIDE AND FORMOTEROL FUMARATE DIHYDRATE 2 PUFF: 160; 4.5 AEROSOL RESPIRATORY (INHALATION) at 20:47

## 2020-02-03 RX ADMIN — HEPARIN SODIUM 5000 UNITS: 5000 INJECTION INTRAVENOUS; SUBCUTANEOUS at 05:42

## 2020-02-03 RX ADMIN — GUAIFENESIN 1200 MG: 600 TABLET ORAL at 09:06

## 2020-02-03 RX ADMIN — DOCUSATE SODIUM 100 MG: 100 CAPSULE, LIQUID FILLED ORAL at 09:06

## 2020-02-03 RX ADMIN — BIMATOPROST 1 DROP: 0.1 SOLUTION/ DROPS OPHTHALMIC at 22:40

## 2020-02-03 RX ADMIN — ASPIRIN 81 MG 81 MG: 81 TABLET ORAL at 09:05

## 2020-02-03 RX ADMIN — BUDESONIDE AND FORMOTEROL FUMARATE DIHYDRATE 2 PUFF: 160; 4.5 AEROSOL RESPIRATORY (INHALATION) at 07:33

## 2020-02-03 RX ADMIN — POTASSIUM CHLORIDE 40 MEQ: 1500 TABLET, EXTENDED RELEASE ORAL at 09:07

## 2020-02-03 RX ADMIN — HEPARIN SODIUM 5000 UNITS: 5000 INJECTION INTRAVENOUS; SUBCUTANEOUS at 22:36

## 2020-02-03 RX ADMIN — POTASSIUM CHLORIDE 40 MEQ: 1500 TABLET, EXTENDED RELEASE ORAL at 17:35

## 2020-02-03 RX ADMIN — ISOSORBIDE MONONITRATE 30 MG: 30 TABLET, EXTENDED RELEASE ORAL at 09:07

## 2020-02-03 RX ADMIN — HEPARIN SODIUM 5000 UNITS: 5000 INJECTION INTRAVENOUS; SUBCUTANEOUS at 13:37

## 2020-02-03 RX ADMIN — PANTOPRAZOLE SODIUM 40 MG: 40 TABLET, DELAYED RELEASE ORAL at 05:42

## 2020-02-03 RX ADMIN — GLIMEPIRIDE 2 MG: 2 TABLET ORAL at 09:06

## 2020-02-03 NOTE — SOCIAL WORK
LOS: 1 day  PATIENT IS NOT A MEDICARE BUNDLE OR A 30 DAY READMISSION  Met with patient and called and spoke with patients bree Li  Patient lives with bree Li in a one story home with 3 LUIS  He is independent adl's, uses [facility] to ambulate  Son transports and provides meals  DME - [facility]  Past services - current with [facility] 3/2019  Patient denies history of MH or D&A  Patients bree Li has POA and he has an AD  Pharmacy of choice is [facility] in [facility]  Patient has a prescription plan and is able to afford his medication  If help is needed at home, his son would help  Patient prefers to return home at discharge and is agreeable to continue with [facility]  Referral via [facility] to [facility]  Son will transport patient home at discharge  CM reviewed d/c planning process including the following: identifying help at home, patient preference for d/c planning needs, availability of treatment team to discuss questions or concerns patient and/or family may have regarding understanding medications and recognizing signs and symptoms once discharged  CM also encouraged patient to follow up with all recommended appointments after discharge  Patient advised of importance for patient and family to participate in managing patients medical well being

## 2020-02-03 NOTE — ASSESSMENT & PLAN NOTE
Lab Results   Component Value Date    HGBA1C 6 1 02/01/2020       Recent Labs     02/02/20  1115 02/02/20  1704 02/02/20  2142 02/03/20  0721   POCGLU 202* 152* 177* 88       Blood Sugar Average: Last 72 hrs:  (P) 162 [id number]   · Continue glimepiride    · SSI + accuchek  · Diabetic diet

## 2020-02-03 NOTE — PLAN OF CARE
Problem: Potential for Falls  Goal: Patient will remain free of falls  Description  INTERVENTIONS:  - Assess patient frequently for physical needs  -  Identify cognitive and physical deficits and behaviors that affect risk of falls    -  [place] fall precautions as indicated by assessment   - Educate patient/family on patient safety including physical limitations  - Instruct patient to call for assistance with activity based on assessment  - Modify environment to reduce risk of injury  - Consider OT/PT consult to assist with strengthening/mobility  Outcome: Progressing     Problem: DISCHARGE PLANNING  Goal: Discharge to home or other facility with appropriate resources  Description  INTERVENTIONS:  - Identify barriers to discharge w/patient and caregiver  - Arrange for needed discharge resources and transportation as appropriate  - Identify discharge learning needs (meds, wound care, etc )  - Arrange for interpretive services to assist at discharge as needed  - Refer to Case Management Department for coordinating discharge planning if the patient needs post-hospital services based on physician/advanced practitioner order or complex needs related to functional status, cognitive ability, or social support system  Outcome: Progressing     Problem: RESPIRATORY - ADULT  Goal: Achieves optimal ventilation and oxygenation  Description  INTERVENTIONS:  - Assess for changes in respiratory status  - Assess for changes in mentation and behavior  - Position to facilitate oxygenation and minimize respiratory effort  - Oxygen administered by appropriate delivery if ordered  - Initiate smoking cessation education as indicated  - Encourage broncho-pulmonary hygiene including cough, deep breathe, Incentive Spirometry  - Assess the need for suctioning and aspirate as needed  - Assess and instruct to report SOB or any respiratory difficulty  - Respiratory Therapy support as indicated  Outcome: Progressing     Problem: HEMATOLOGIC - ADULT  Goal: Maintains hematologic stability  Description  INTERVENTIONS  - Assess for signs and symptoms of bleeding or hemorrhage  - Monitor labs  - Administer supportive blood products/factors as ordered and appropriate  Outcome: Progressing

## 2020-02-03 NOTE — ASSESSMENT & PLAN NOTE
· Potassium noted to be 3 0 in setting of lasix administration  · Will replete 40 mEq x 2 today  · Add on magnesium  · Repeat BMP in AM

## 2020-02-03 NOTE — ASSESSMENT & PLAN NOTE
Wt Readings from Last 3 Encounters:   02/03/20 85 6 kg (188 lb 11 4 oz)   12/17/19 82 6 kg (182 lb)   10/15/19 84 8 kg (187 lb)     · Patient presented with complaints of dyspnea on exertion, orthopnea, and a 5 lb weight gain over the last week  · ProBNP in on admission was 30,000  · Patient reports taking 20 mg of Lasix as needed at home according to his daily weights  Patient was taking his Lasix but continued to gain weight over this last week  · Given 20 mg Lasix in the ER  · Continue IV lasix 40 mg BID - unfortunately still with borderline BP limiting administration of lasix  Would recommend re-checking BP in the future an hour after the first if it is below the parameters  · Weight has increased today to 188 lbs  · Obtain STAT CXR - still with rales on exam, worse than yesterday  · SpO2 stable on RA, no respiratory distress  · Repeat echocardiogram completed yesterday showing EF 25% with severe diffuse hypokinesis with distinct regional wall motion abnormalities  Echocardiogram completed March 2019 showed EF 30%  · Strict I/O's, daily weights, low sodium diet  · Per son, patient dry weight has been around 178 since previous admission in [place]  · Cardiology following - appreciate recommendations  Will re-evaluate patient today

## 2020-02-03 NOTE — PROGRESS NOTES
Cardiology Progress Note - Ragini Waddell 80 y o  male MRN: [id number]    Unit/Bed#: -01 Encounter: [id number]      Assessment:  Principal Problem:    Acute on chronic combined systolic and diastolic heart failure (HCC)  Active Problems:    Type 2 diabetes mellitus without complication, without long-term current use of insulin (HCC)    Mixed hyperlipidemia    Esophageal reflux    Stage 4 chronic kidney disease (HCC)    Ischemic cardiomyopathy    Kappa light chain myeloma (HCC)    Anemia    Hypokalemia      Plan:    1  Acute on chronic systolic CHF - Patient has responded IV Lasix, but continues to have rales on exam and has not received all of his dosing  I am going to dose him 3 times today, with lower hold parameters  Continue to follow urine output, daily labs and weight  Follow hemodynamics closely  Echocardiogram not showing any significant change from prior echocardiograms, as he does have a significantly reduced ejection fraction and moderate valve disease  2  Cardiomyopathy - Ejection fraction 25% - Presumed to be ischemic given his history of CAD  However he was undergoing a workup for infiltrative disorders through our heart failure program   Regardless, treatment would not change for him given his multiple comorbidities  We will continue treat heart failure as above, and arrange close outpatient follow-up  Subjective:   Patient seen and examined  No significant events overnight  Patient tells me he still short of breath  He has had a decent response to IV diuretic therapy but he has not received all of his dosing due to some low blood pressures  Objective:     Vitals: Blood pressure 93/56, pulse 99, temperature 98 2 °F (36 8 °C), temperature source Oral, resp  rate 17, height 5' 7" (1 702 m), weight 85 6 kg (188 lb 11 4 oz), SpO2 97 %  , Body mass index is 29 56 kg/m² ,   Orthostatic Blood Pressures      Most Recent Value   Blood Pressure  93/56 filed at 02/03/2020 0720   Patient Position - Orthostatic VS  Sitting filed at 02/01/2020 0611            Intake/Output Summary (Last 24 hours) at 2/3/2020 1020  Last data filed at 2/3/2020 0406  Gross per 24 hour   Intake 500 ml   Output 1619 ml   Net -1119 ml       No significant arrhythmias seen on telemetry review  Physical Exam:    GEN: Bety Carrington appears well, alert and oriented x 3, pleasant and cooperative   HEENT: pupils equal, round, and reactive to light; extraocular muscles intact  NECK: supple, no carotid bruits   +JVD   HEART: regular rhythm, normal S1 and S2, no murmurs, clicks, gallops or rubs   LUNGS:  Diminished with Rales heard bilaterally; no wheezes or rhonchi   ABDOMEN: normal bowel sounds, soft, no tenderness, no distention  EXTREMITIES: peripheral pulses normal; no clubbing, cyanosis, or significant edema  NEURO: no focal findings   SKIN: normal without suspicious lesions on exposed skin    Medications:      Current Facility-Administered Medications:     acetaminophen (TYLENOL) tablet 650 mg, 650 mg, Oral, Q6H PRN, Rafael Fendt, CRNP    aspirin chewable tablet 81 mg, 81 mg, Oral, Daily, Rafael Fendt, CRNP, 81 mg at 02/03/20 0905    bimatoprost (LUMIGAN) 0 01 % ophthalmic solution 1 drop, 1 drop, Both Eyes, HS, Rafael Fendt, CRNP, 1 drop at 02/02/20 2319    budesonide-formoterol (SYMBICORT) 160-4 5 mcg/act inhaler 2 puff, 2 puff, Inhalation, BID, Rafael Fendt, CRNP, 2 puff at 02/03/20 5069    cyanocobalamin (VITAMIN B-12) tablet 1,000 mcg, 1,000 mcg, Oral, Daily, Rafael Fendt, CRNP, 1,000 mcg at 02/03/20 0905    docusate sodium (COLACE) capsule 100 mg, 100 mg, Oral, Daily, Rafael Fendt, CRNP, 100 mg at 02/03/20 0906    furosemide (LASIX) injection 40 mg, 40 mg, Intravenous, BID (diuretic), Ray Patel MD, 40 mg at 02/02/20 1743    glimepiride (AMARYL) tablet 2 mg, 2 mg, Oral, Daily, Rafael Fendt, CRNP, 2 mg at 02/03/20 0906    guaiFENesin (MUCINEX) 12 hr tablet 1,200 mg, 1,200 mg, Oral, BID, DON Nuñez, 1,200 mg at 02/03/20 0368    heparin (porcine) subcutaneous injection 5,000 Units, 5,000 Units, Subcutaneous, Q8H [address], DON Nuñez, 5,000 Units at 02/03/20 0542    insulin lispro (HumaLOG) 100 units/mL subcutaneous injection 1-5 Units, 1-5 Units, Subcutaneous, HS, Val Hodge MD, 1 Units at 02/02/20 2318    insulin lispro (HumaLOG) 100 units/mL subcutaneous injection 1-6 Units, 1-6 Units, Subcutaneous, TID AC, 1 Units at 02/02/20 1743 **AND** Fingerstick Glucose (POCT), , , TID AC, Val Hodge MD    isosorbide mononitrate (IMDUR) 24 hr tablet 30 mg, 30 mg, Oral, Daily, DON Nuñez, 30 mg at 02/03/20 0907    melatonin tablet 6 mg, 6 mg, Oral, HS, DON Nuñez, 6 mg at 02/02/20 2317    nitroglycerin (NITROSTAT) SL tablet 0 4 mg, 0 4 mg, Sublingual, Q5 Min PRN, DON Nuñez    pantoprazole (PROTONIX) EC tablet 40 mg, 40 mg, Oral, Early Morning, DON Nuñez, 40 mg at 02/03/20 0542    potassium chloride (K-DUR,KLOR-CON) CR tablet 40 mEq, 40 mEq, Oral, BID, Madyson Alonso PA-C, 40 mEq at 02/03/20 0907    timolol (TIMOPTIC) 0 5 % ophthalmic solution 1 drop, 1 drop, Both Eyes, Daily, DON Nuñez, 1 drop at 02/03/20 0908     Labs & Results:    Results from last 7 days   Lab Units 01/30/20  2040   TROPONIN I ng/mL 0 08*     Results from last 7 days   Lab Units 02/03/20  0515 02/02/20  0456 02/01/20  1308 01/31/20  0457   WBC Thousand/uL 6 50 6 68  --  6 25   HEMOGLOBIN g/dL 9 4* 9 0* 9 6* 7 7*   HEMATOCRIT % 30 4* 29 3* 31 3* 25 2*   PLATELETS Thousands/uL 389 367  --  356         Results from last 7 days   Lab Units 02/03/20  0515 02/02/20  0456 02/01/20  1309  01/30/20 2021   POTASSIUM mmol/L 3 0* 3 1* 3 6   < > 4 5   CHLORIDE mmol/L 107 106 103   < > 103   CO2 mmol/L 23 24 26   < > 24   BUN mg/dL 31* 34* 33*   < > 31*   CREATININE mg/dL 1 74* 1 93* 1 93*   < > 1 83*   CALCIUM mg/dL 8 7 8 8 9 0   < > 8 7   ALK PHOS U/L  -- --   --   --  135*   ALT U/L  --   --   --   --  14   AST U/L  --   --   --   --  12    < > = values in this interval not displayed  Results from last 7 days   Lab Units 02/02/20  0456 01/31/20  0457   MAGNESIUM mg/dL 2 4 2 3         EKG personally reviewed by Jen Malone MD  Sinus rhythm, first-degree AV block with PACs and PVCs, nonspecific IVCD and nonspecific T-wave changes  ECHO:  LEFT VENTRICLE:  The ventricle was mildly dilated  Systolic function was markedly reduced  Ejection fraction was estimated to be 25 %  There was severe diffuse hypokinesis with distinct regional wall motion abnormalities  There was akinesis of the mid-apical anterior, mid anteroseptal, apical septal, apical lateral, and apical wall(s)  Wall thickness was at the upper limits of normal   Doppler parameters were consistent with elevated ventricular end-diastolic filling pressure      RIGHT VENTRICLE:  The ventricle was dilated  Systolic function was moderately reduced      LEFT ATRIUM:  The atrium was moderately dilated      RIGHT ATRIUM:  The atrium was mildly dilated      MITRAL VALVE:  There was mild annular calcification  There was moderate diffuse thickening  There was mild to moderate regurgitation      AORTIC VALVE:  The valve was trileaflet  Leaflets exhibited normal thickness, moderate calcification, and moderately reduced cuspal separation  Transaortic velocity and gradient were increased due to stenosis, but lower than expected for the degree of stenosis due to concomitant decreased transaortic flow (decreased stroke volume)  There was moderate stenosis  There was mild regurgitation  Valve mean gradient was 6 mmHg  Estimated aortic valve area (by VTI) was 1 25 cmï¾²  Estimated aortic valve area (by Vmax) was 1 25 cmï¾²     TRICUSPID VALVE:  There was mild regurgitation      Counseling / Coordination of Care  Total floor / unit time spent today 25 minutes    Greater than 50% of total time was spent with the patient and / or family counseling and / or coordination of care

## 2020-02-03 NOTE — ASSESSMENT & PLAN NOTE
· Chronic likely in setting of malignancy, chronic disease  · No evidence of active bleeding  · Hgb noted to be 7 7 yesterday, received 1 unit PRBCs  · Continue to monitor  Transfuse for Hgb < 8  · Give lasix with any blood transfusions    · Hgb stable at 9 4

## 2020-02-03 NOTE — PROGRESS NOTES
Progress Note - Manolo Robertson 9/22/1930, 80 y o  male MRN: [id number]    Unit/Bed#: -01 Encounter: [id number]    Primary Care Provider: Alfredo Jorge   Date and time admitted to hospital: 1/30/2020  8:11 PM    * Acute on chronic combined systolic and diastolic heart failure ([address] )  Assessment & Plan  Wt Readings from Last 3 Encounters:   02/03/20 85 6 kg (188 lb 11 4 oz)   12/17/19 82 6 kg (182 lb)   10/15/19 84 8 kg (187 lb)     · Patient presented with complaints of dyspnea on exertion, orthopnea, and a 5 lb weight gain over the last week  · ProBNP in on admission was 30,000  · Patient reports taking 20 mg of Lasix as needed at home according to his daily weights  Patient was taking his Lasix but continued to gain weight over this last week  · Given 20 mg Lasix in the ER  · Continue IV lasix 40 mg BID - unfortunately still with borderline BP limiting administration of lasix  Would recommend re-checking BP in the future an hour after the first if it is below the parameters  · Weight has increased today to 188 lbs  · Obtain STAT CXR - still with rales on exam, worse than yesterday  · SpO2 stable on RA, no respiratory distress  · Repeat echocardiogram completed yesterday showing EF 25% with severe diffuse hypokinesis with distinct regional wall motion abnormalities  Echocardiogram completed March 2019 showed EF 30%  · Strict I/O's, daily weights, low sodium diet  · Per son, patient dry weight has been around 178 since previous admission in [place]  · Cardiology following - appreciate recommendations  Will re-evaluate patient today      Hypokalemia  Assessment & Plan  · Potassium noted to be 3 0 in setting of lasix administration  · Will replete 40 mEq x 2 today  · Add on magnesium  · Repeat BMP in AM    Anemia  Assessment & Plan  · Chronic likely in setting of malignancy, chronic disease  · No evidence of active bleeding  · Hgb noted to be 7 7 yesterday, received 1 unit PRBCs  · Continue to monitor  Transfuse for Hgb < 8  · Give lasix with any blood transfusions  · Hgb stable at 9 4    [place] light chain myeloma (HCC)  Assessment & Plan  · Follows with Dr Nathanael Richardson as an outpatient  Ischemic cardiomyopathy  Assessment & Plan  · Repeat echocardiogram as above  · Continue home imdur and aspirin    Stage 4 chronic kidney disease (HCC)  Assessment & Plan  · Baseline creatinine 1 6-1 9  · Creatinine overall stable, 1 7 this AM   · Continue to monitor in setting of diuresis  · Close monitoring of I/O status and renal function - good urine output since yesterday    Esophageal reflux  Assessment & Plan  · Continue home PPI    Mixed hyperlipidemia  Assessment & Plan  · Patient no longer taking statin  Type 2 diabetes mellitus without complication, without long-term current use of insulin [facility])  Assessment & Plan  Lab Results   Component Value Date    HGBA1C 6 1 02/01/2020       Recent Labs     02/02/20  1115 02/02/20  1704 02/02/20  2142 02/03/20  0721   POCGLU 202* 152* 177* 88       Blood Sugar Average: Last 72 hrs:  (P) 162 [id number]   · Continue glimepiride  · SSI + accuchek  · Diabetic diet    VTE Pharmacologic Prophylaxis:   Pharmacologic: Heparin  Mechanical VTE Prophylaxis in Place: Yes    Patient Centered Rounds: I have performed bedside rounds with nursing staff today  Discussions with Specialists or Other Care Team Provider: Nursing, Cardiology    Education and Discussions with Family / Patient: Discussed with patient directly at bedside  Discussed with patient's son, Yadi Mcmanus, over the phone  Answered all questions and concerns to the best of my ability  Time Spent for Care: 45 minutes  More than 50% of total time spent on counseling and coordination of care as described above      Current Length of Stay: 3 day(s)    Current Patient Status: Inpatient   Certification Statement: The patient will continue to require additional inpatient hospital stay due to diuresis, cardiology evaluation    Discharge Plan: Likely discharge 24-48 hours pending transition to oral diuretics    Code Status: Level 1 - Full Code      Subjective:   "I just don't understand!"    No acute events overnight  Patient verbalizes frustration regarding his hospitalization, does not understand why he is still short of breath  Objective:     Vitals:   Temp (24hrs), Av 2 °F (36 8 °C), Min:98 1 °F (36 7 °C), Max:98 2 °F (36 8 °C)    Temp:  [98 1 °F (36 7 °C)-98 2 °F (36 8 °C)] 98 2 °F (36 8 °C)  HR:  [] 99  Resp:  [17-20] 17  BP: ()/(56-69) 93/56  SpO2:  [95 %-98 %] 97 %  Body mass index is 29 56 kg/m²  Input and Output Summary (last 24 hours): Intake/Output Summary (Last 24 hours) at 2/3/2020 0937  Last data filed at 2/3/2020 9758  Gross per 24 hour   Intake 500 ml   Output 1619 ml   Net -1119 ml       Physical Exam:     Physical Exam   Constitutional: He is oriented to person, place, and time  Vital signs are normal  He appears well-developed  Seated at side of bed, appears comfortable, no distress   HENT:   Head: Normocephalic  Eyes: Pupils are equal, round, and reactive to light  Conjunctivae and EOM are normal  No scleral icterus  Neck: Normal range of motion  Cardiovascular: Normal rate, regular rhythm and normal heart sounds  No murmur heard  Pulmonary/Chest: Effort normal  No respiratory distress  He has rales  Abdominal: Soft  Bowel sounds are normal  There is no tenderness  There is no rigidity, no rebound and no guarding  Musculoskeletal: He exhibits no edema, tenderness or deformity  Able to ambulate from chair to bed  Neurological: He is alert and oriented to person, place, and time  Skin: Skin is warm and dry  Psychiatric: He is agitated  Nursing note and vitals reviewed          Additional Data:     Labs:    Results from last 7 days   Lab Units 20  0515  20   WBC Thousand/uL 6 50   < > 7 47   HEMOGLOBIN g/dL 9 4*   < > 8 5*   HEMATOCRIT % 30 4*   < > 28 1*   PLATELETS Thousands/uL 389   < > 402*   NEUTROS PCT %  --   --  76*   LYMPHS PCT %  --   --  15   MONOS PCT %  --   --  6   EOS PCT %  --   --  1    < > = values in this interval not displayed  Results from last 7 days   Lab Units 02/03/20  0515  01/30/20  2021   SODIUM mmol/L 143   < > 137   POTASSIUM mmol/L 3 0*   < > 4 5   CHLORIDE mmol/L 107   < > 103   CO2 mmol/L 23   < > 24   BUN mg/dL 31*   < > 31*   CREATININE mg/dL 1 74*   < > 1 83*   ANION GAP mmol/L 13   < > 10   CALCIUM mg/dL 8 7   < > 8 7   ALBUMIN g/dL  --   --  2 9*   TOTAL BILIRUBIN mg/dL  --   --  0 50   ALK PHOS U/L  --   --  135*   ALT U/L  --   --  14   AST U/L  --   --  12   GLUCOSE RANDOM mg/dL 100   < > 182*    < > = values in this interval not displayed  Results from last 7 days   Lab Units 02/03/20  0721 02/02/20  2142 02/02/20  1704 02/02/20  1115 02/02/20  0819 02/01/20  2106 02/01/20  1628 02/01/20  1147 02/01/20  0752 01/31/20  2123 01/31/20  1734 01/31/20  1134   POC GLUCOSE mg/dl 88 177* 152* 202* 108 195* 180* 180* 121 172* 176* 230*     Results from last 7 days   Lab Units 02/01/20  0451   HEMOGLOBIN A1C % 6 1               * I Have Reviewed All Lab Data Listed Above  * Additional Pertinent Lab Tests Reviewed:  All Labs Within Last 24 Hours Reviewed    Imaging:    Imaging Reports Reviewed Today Include: CXR  Imaging Personally Reviewed by Myself Includes:  CXR    Recent Cultures (last 7 days):           Last 24 Hours Medication List:     Current Facility-Administered Medications:  acetaminophen 650 mg Oral Q6H PRN Darleena DON Hansen   aspirin 81 mg Oral Daily DON Uribe   bimatoprost 1 drop Both Eyes HS EthDON Bonilla   budesonide-formoterol 2 puff Inhalation BID DON Uribe   cyanocobalamin 1,000 mcg Oral Daily DON Uribe   docusate sodium 100 mg Oral Daily DON Uribe   furosemide 40 mg Intravenous BID (diuretic) Teresa Beltran MD   glimepiride 2 mg Oral Daily Case Sings, CRNP   guaiFENesin 1,200 mg Oral BID Case Sings, CRNP   heparin (porcine) 5,000 Units Subcutaneous [facility] Case Sings, CRNP   insulin lispro 1-5 Units Subcutaneous HS Kate Ogden MD   insulin lispro 1-6 Units Subcutaneous TID AC Kate Ogden MD   isosorbide mononitrate 30 mg Oral Daily Case Sings, CRNP   melatonin 6 mg Oral HS Case Sings, CRNP   nitroglycerin 0 4 mg Sublingual Q5 Min PRN Case Sings, CRNP   pantoprazole 40 mg Oral Early Morning Case Sings, CRNP   potassium chloride 40 mEq Oral BID Nick Alonso PA-C   timolol 1 drop Both Eyes Daily Case Sings, CRNP        Today, Patient Was Seen By: Daren Dudley PA-C    ** Please Note: Dictation voice to text software may have been used in the creation of this document   **

## 2020-02-03 NOTE — ASSESSMENT & PLAN NOTE
· Baseline creatinine 1 6-1 9  · Creatinine overall stable, 1 7 this AM   · Continue to monitor in setting of diuresis  · Close monitoring of I/O status and renal function - good urine output since yesterday

## 2020-02-04 ENCOUNTER — APPOINTMENT (INPATIENT)
Dept: CT IMAGING | Facility: HOSPITAL | Age: 85
DRG: 280 | End: 2020-02-04
Payer: COMMERCIAL

## 2020-02-04 DIAGNOSIS — C90.00 MULTIPLE MYELOMA NOT HAVING ACHIEVED REMISSION (HCC): ICD-10-CM

## 2020-02-04 PROBLEM — J18.9 PNEUMONIA: Status: ACTIVE | Noted: 2020-02-04

## 2020-02-04 LAB
ANION GAP SERPL CALCULATED.3IONS-SCNC: 16 MMOL/L (ref 4–13)
BUN SERPL-MCNC: 35 MG/DL (ref 5–25)
CALCIUM SERPL-MCNC: 9.2 MG/DL (ref 8.3–10.1)
CHLORIDE SERPL-SCNC: 104 MMOL/L (ref 100–108)
CO2 SERPL-SCNC: 19 MMOL/L (ref 21–32)
CREAT SERPL-MCNC: 2.21 MG/DL (ref 0.6–1.3)
ERYTHROCYTE [DISTWIDTH] IN BLOOD BY AUTOMATED COUNT: 20 % (ref 11.6–15.1)
GFR SERPL CREATININE-BSD FRML MDRD: 25 ML/MIN/1.73SQ M
GLUCOSE SERPL-MCNC: 137 MG/DL (ref 65–140)
GLUCOSE SERPL-MCNC: 169 MG/DL (ref 65–140)
GLUCOSE SERPL-MCNC: 196 MG/DL (ref 65–140)
GLUCOSE SERPL-MCNC: 213 MG/DL (ref 65–140)
GLUCOSE SERPL-MCNC: 236 MG/DL (ref 65–140)
HCT VFR BLD AUTO: 30.6 % (ref 36.5–49.3)
HGB BLD-MCNC: 9.5 G/DL (ref 12–17)
L PNEUMO1 AG UR QL IA.RAPID: NEGATIVE
LACTATE SERPL-SCNC: 3.2 MMOL/L (ref 0.5–2)
MCH RBC QN AUTO: 27.2 PG (ref 26.8–34.3)
MCHC RBC AUTO-ENTMCNC: 31 G/DL (ref 31.4–37.4)
MCV RBC AUTO: 88 FL (ref 82–98)
PLATELET # BLD AUTO: 386 THOUSANDS/UL (ref 149–390)
PMV BLD AUTO: 10.2 FL (ref 8.9–12.7)
POTASSIUM SERPL-SCNC: 4.4 MMOL/L (ref 3.5–5.3)
PROCALCITONIN SERPL-MCNC: 0.1 NG/ML
RBC # BLD AUTO: 3.49 MILLION/UL (ref 3.88–5.62)
S PNEUM AG UR QL: NEGATIVE
SODIUM SERPL-SCNC: 139 MMOL/L (ref 136–145)
WBC # BLD AUTO: 8.72 THOUSAND/UL (ref 4.31–10.16)

## 2020-02-04 PROCEDURE — 99232 SBSQ HOSP IP/OBS MODERATE 35: CPT | Performed by: FAMILY MEDICINE

## 2020-02-04 PROCEDURE — 82948 REAGENT STRIP/BLOOD GLUCOSE: CPT

## 2020-02-04 PROCEDURE — 94640 AIRWAY INHALATION TREATMENT: CPT

## 2020-02-04 PROCEDURE — 83605 ASSAY OF LACTIC ACID: CPT | Performed by: INTERNAL MEDICINE

## 2020-02-04 PROCEDURE — 97530 THERAPEUTIC ACTIVITIES: CPT

## 2020-02-04 PROCEDURE — 99232 SBSQ HOSP IP/OBS MODERATE 35: CPT | Performed by: INTERNAL MEDICINE

## 2020-02-04 PROCEDURE — 80048 BASIC METABOLIC PNL TOTAL CA: CPT | Performed by: INTERNAL MEDICINE

## 2020-02-04 PROCEDURE — 94760 N-INVAS EAR/PLS OXIMETRY 1: CPT

## 2020-02-04 PROCEDURE — 94664 DEMO&/EVAL PT USE INHALER: CPT

## 2020-02-04 PROCEDURE — 99223 1ST HOSP IP/OBS HIGH 75: CPT | Performed by: INTERNAL MEDICINE

## 2020-02-04 PROCEDURE — 71250 CT THORAX DX C-: CPT

## 2020-02-04 PROCEDURE — 83880 ASSAY OF NATRIURETIC PEPTIDE: CPT | Performed by: PHYSICIAN ASSISTANT

## 2020-02-04 PROCEDURE — 84145 PROCALCITONIN (PCT): CPT | Performed by: PHYSICIAN ASSISTANT

## 2020-02-04 PROCEDURE — 85027 COMPLETE CBC AUTOMATED: CPT | Performed by: INTERNAL MEDICINE

## 2020-02-04 PROCEDURE — 87081 CULTURE SCREEN ONLY: CPT | Performed by: PHYSICIAN ASSISTANT

## 2020-02-04 PROCEDURE — 87449 NOS EACH ORGANISM AG IA: CPT | Performed by: PHYSICIAN ASSISTANT

## 2020-02-04 PROCEDURE — 94668 MNPJ CHEST WALL SBSQ: CPT

## 2020-02-04 PROCEDURE — 97116 GAIT TRAINING THERAPY: CPT

## 2020-02-04 RX ORDER — HALOPERIDOL 5 MG/ML
2 INJECTION INTRAMUSCULAR ONCE
Status: COMPLETED | OUTPATIENT
Start: 2020-02-04 | End: 2020-02-04

## 2020-02-04 RX ORDER — CEFEPIME HYDROCHLORIDE 2 G/50ML
2000 INJECTION, SOLUTION INTRAVENOUS ONCE
Status: DISCONTINUED | OUTPATIENT
Start: 2020-02-04 | End: 2020-02-04 | Stop reason: SDUPTHER

## 2020-02-04 RX ORDER — ISOSORBIDE MONONITRATE 30 MG/1
30 TABLET, EXTENDED RELEASE ORAL DAILY
Status: DISCONTINUED | OUTPATIENT
Start: 2020-02-05 | End: 2020-02-05

## 2020-02-04 RX ORDER — LEVALBUTEROL 1.25 MG/.5ML
1.25 SOLUTION, CONCENTRATE RESPIRATORY (INHALATION)
Status: DISCONTINUED | OUTPATIENT
Start: 2020-02-04 | End: 2020-02-06

## 2020-02-04 RX ORDER — CEFEPIME HYDROCHLORIDE 1 G/50ML
1000 INJECTION, SOLUTION INTRAVENOUS EVERY 12 HOURS
Status: DISCONTINUED | OUTPATIENT
Start: 2020-02-04 | End: 2020-02-05

## 2020-02-04 RX ORDER — AZITHROMYCIN 250 MG/1
500 TABLET, FILM COATED ORAL EVERY 24 HOURS
Status: DISCONTINUED | OUTPATIENT
Start: 2020-02-04 | End: 2020-02-05

## 2020-02-04 RX ORDER — OLANZAPINE 5 MG/1
2.5 TABLET ORAL
Status: DISCONTINUED | OUTPATIENT
Start: 2020-02-04 | End: 2020-02-06

## 2020-02-04 RX ORDER — OLANZAPINE 5 MG/1
5 TABLET ORAL
Status: DISCONTINUED | OUTPATIENT
Start: 2020-02-04 | End: 2020-02-04

## 2020-02-04 RX ADMIN — FUROSEMIDE 40 MG: 10 INJECTION, SOLUTION INTRAMUSCULAR; INTRAVENOUS at 17:11

## 2020-02-04 RX ADMIN — BUDESONIDE AND FORMOTEROL FUMARATE DIHYDRATE 2 PUFF: 160; 4.5 AEROSOL RESPIRATORY (INHALATION) at 08:23

## 2020-02-04 RX ADMIN — GLIMEPIRIDE 2 MG: 2 TABLET ORAL at 08:15

## 2020-02-04 RX ADMIN — VANCOMYCIN HYDROCHLORIDE 750 MG: 750 INJECTION, SOLUTION INTRAVENOUS at 14:16

## 2020-02-04 RX ADMIN — ISOSORBIDE MONONITRATE 30 MG: 30 TABLET, EXTENDED RELEASE ORAL at 08:26

## 2020-02-04 RX ADMIN — TIMOLOL MALEATE 1 DROP: 5 SOLUTION/ DROPS OPHTHALMIC at 08:15

## 2020-02-04 RX ADMIN — FUROSEMIDE 40 MG: 10 INJECTION, SOLUTION INTRAMUSCULAR; INTRAVENOUS at 05:52

## 2020-02-04 RX ADMIN — GUAIFENESIN 1200 MG: 600 TABLET ORAL at 08:28

## 2020-02-04 RX ADMIN — HEPARIN SODIUM 5000 UNITS: 5000 INJECTION INTRAVENOUS; SUBCUTANEOUS at 05:51

## 2020-02-04 RX ADMIN — DOCUSATE SODIUM 100 MG: 100 CAPSULE, LIQUID FILLED ORAL at 08:15

## 2020-02-04 RX ADMIN — HALOPERIDOL LACTATE 2 MG: 5 INJECTION, SOLUTION INTRAMUSCULAR at 00:21

## 2020-02-04 RX ADMIN — BUDESONIDE AND FORMOTEROL FUMARATE DIHYDRATE 2 PUFF: 160; 4.5 AEROSOL RESPIRATORY (INHALATION) at 21:10

## 2020-02-04 RX ADMIN — BIMATOPROST 1 DROP: 0.1 SOLUTION/ DROPS OPHTHALMIC at 22:32

## 2020-02-04 RX ADMIN — GUAIFENESIN 1200 MG: 600 TABLET ORAL at 17:11

## 2020-02-04 RX ADMIN — OLANZAPINE 2.5 MG: 5 TABLET, FILM COATED ORAL at 17:12

## 2020-02-04 RX ADMIN — CYANOCOBALAMIN TAB 500 MCG 1000 MCG: 500 TAB at 08:15

## 2020-02-04 RX ADMIN — AZITHROMYCIN 500 MG: 250 TABLET, FILM COATED ORAL at 13:50

## 2020-02-04 RX ADMIN — LEVALBUTEROL HYDROCHLORIDE 1.25 MG: 1.25 SOLUTION, CONCENTRATE RESPIRATORY (INHALATION) at 21:10

## 2020-02-04 RX ADMIN — ASPIRIN 81 MG 81 MG: 81 TABLET ORAL at 08:14

## 2020-02-04 RX ADMIN — INSULIN LISPRO 1 UNITS: 100 INJECTION, SOLUTION INTRAVENOUS; SUBCUTANEOUS at 13:48

## 2020-02-04 RX ADMIN — HEPARIN SODIUM 5000 UNITS: 5000 INJECTION INTRAVENOUS; SUBCUTANEOUS at 22:30

## 2020-02-04 RX ADMIN — CEFEPIME HYDROCHLORIDE 1000 MG: 1 INJECTION, SOLUTION INTRAVENOUS at 14:07

## 2020-02-04 RX ADMIN — HEPARIN SODIUM 5000 UNITS: 5000 INJECTION INTRAVENOUS; SUBCUTANEOUS at 13:53

## 2020-02-04 RX ADMIN — INSULIN LISPRO 2 UNITS: 100 INJECTION, SOLUTION INTRAVENOUS; SUBCUTANEOUS at 08:13

## 2020-02-04 RX ADMIN — PANTOPRAZOLE SODIUM 40 MG: 40 TABLET, DELAYED RELEASE ORAL at 05:55

## 2020-02-04 RX ADMIN — INSULIN LISPRO 2 UNITS: 100 INJECTION, SOLUTION INTRAVENOUS; SUBCUTANEOUS at 16:49

## 2020-02-04 RX ADMIN — FUROSEMIDE 40 MG: 10 INJECTION, SOLUTION INTRAMUSCULAR; INTRAVENOUS at 13:49

## 2020-02-04 NOTE — CONSULTS
Consultation - Pulmonary Medicine   Jose A Lama 80 y o  male MRN: [id number]  Unit/Bed#: -01 Encounter: [id number]      Assessment:  Acute hypoxic respiratory failure  Abnormal CXR history of pneumonia  End stage cardiomyopathy  Tobacco history  ELTON on CKD    Plan:   Patient does not appear volume overloaded anymore on exam to suggest decompensated heart failure, continue home diuretics  He must have been overloaded on admission with BNP 30,000 but responded to diuretics  I don't suspect untreated pneumonia given lack of fever or leukocytosis/left shift  If procalcitonin is negative suggest stopping antibiotics  He is likely slow to recover with advanced age, comorbidities and history of tobacco abuse  Suggest CT to deliniate process in LLL and nebs BID with flutter valve       History of Present Illness   Physician Requesting Consult: Payton Myers DO  Hx and PE limited by: confusion  HPI: Jose A Lama is a 80y o  year old male who presents with shortness of breath  Patient is a poor historian and has difficulty with history  He has had two recent courses of antibiotics for pneumonia and history of severe heart failure with EF 20% and CKD  He presents with hypoxia and found to have LLL infiltrate  He co cough but no shortness of breath    Inpatient consult to Pulmonology  Consult performed by: Sreedhar Banks DO  Consult ordered by: Kedar Wood PA-C          Review of Systems   Unable to perform ROS: Dementia   Constitutional: Negative  HENT: Negative  Eyes: Negative  Respiratory: Negative for shortness of breath  Cardiovascular: Negative  Gastrointestinal: Negative  Endocrine: Negative  Genitourinary: Negative  Allergic/Immunologic: Negative  Neurological: Negative  Hematological: Negative  Psychiatric/Behavioral: Negative          Historical Information   Past Medical History:   Diagnosis Date    [facility]     Chronic kidney disease     Coronary artery disease     Diabetes mellitus [facility]     Glaucoma     History of methicillin resistant staphylococcus aureus (MRSA) 2020    negative nasal swab     Hypertension     Multiple myeloma ([facility] )     Occluded coronary artery stent     Left     Past Surgical History:   Procedure Laterality Date    BACK SURGERY      CARDIAC CATHETERIZATION  2004    COLONOSCOPY      CT BONE MARROW BIOPSY AND ASPIRATION  2018    CYSTOSCOPY      KNEE SURGERY      THROMBOENDARTERECTOMY Right     Cartoid     Social History   Social History     Substance and Sexual Activity   Alcohol Use Not Currently    Comment: socially; less than once a month     Social History     Substance and Sexual Activity   Drug Use No     Social History     Tobacco Use   Smoking Status Former Smoker    Types: Cigarettes    Last attempt to quit: 1970    Years since quittin 1   Smokeless Tobacco Never Used   Tobacco Comment    former smoker     Occupational History: [employer]    Family History: non-contributory    Meds/Allergies   all current active meds have been reviewed    No Known Allergies    Objective   Vitals: Blood pressure 101/72, pulse 99, temperature 97 5 °F (36 4 °C), temperature source Oral, resp  rate 19, height 5' 7" (1 702 m), weight 79 8 kg (176 lb), SpO2 99 %  ,Body mass index is 27 57 kg/m²  Intake/Output Summary (Last 24 hours) at 2020 1313  Last data filed at 2020 0550  Gross per 24 hour   Intake    Output 400 ml   Net -400 ml     Invasive Devices     Peripheral Intravenous Line            Peripheral IV 20 Distal;Upper;Ventral (anterior); Right Antecubital 2 days                Physical Exam   Constitutional: He is oriented to person, place, and time  He appears well-developed and well-nourished  HENT:   Head: Normocephalic and atraumatic  Eyes: Pupils are equal, round, and reactive to light  EOM are normal    Neck: Normal range of motion  Neck supple     Cardiovascular: Normal rate and regular rhythm  No murmur heard  Pulmonary/Chest: Effort normal and breath sounds normal  No respiratory distress  He has no wheezes  He has no rales  Rhonchi left lower lobe   Abdominal: Soft  Musculoskeletal: He exhibits no edema  Neurological: He is alert and oriented to person, place, and time  Skin: Skin is warm and dry         Lab Results:   ABG: No results found for: PHART, KVU9FMA, PO2ART, UTJ3TGG, O7BRJDDC, BEART, SOURCE, BNP: No results found for: BNP, CBC:   Lab Results   Component Value Date    WBC 8 72 02/04/2020    HGB 9 5 (L) 02/04/2020    HCT 30 6 (L) 02/04/2020    MCV 88 02/04/2020     02/04/2020    MCH 27 2 02/04/2020    MCHC 31 0 (L) 02/04/2020    RDW 20 0 (H) 02/04/2020    MPV 10 2 02/04/2020   , CMP:   Lab Results   Component Value Date    SODIUM 139 02/04/2020    K 4 4 02/04/2020     02/04/2020    CO2 19 (L) 02/04/2020    BUN 35 (H) 02/04/2020    CREATININE 2 21 (H) 02/04/2020    CALCIUM 9 2 02/04/2020    EGFR 25 02/04/2020   , PT/INR: No results found for: PT, INR, Troponin: No results found for: TROPONINI  Imaging Studies: I have personally reviewed pertinent films in PACS  EKG, Pathology, and Other Studies: I have personally reviewed pertinent films in PACS  VTE Prophylaxis: Heparin    Code Status: Level 1 - Full Code  Advance Directive and Living Will:      Power of :    POLST:      None

## 2020-02-04 NOTE — PROGRESS NOTES
Cardiology Progress Note - Jose A Form 80 y o  male MRN: [id number]    Unit/Bed#: -01 Encounter: [id number]      Assessment:  Principal Problem:    Acute on chronic combined systolic and diastolic heart failure (HCC)  Active Problems:    Type 2 diabetes mellitus without complication, without long-term current use of insulin (HCC)    Mixed hyperlipidemia    Esophageal reflux    Stage 4 chronic kidney disease (HCC)    Ischemic cardiomyopathy    [place]appa light chain myeloma (HCC)    Anemia    Hypokalemia      Plan:    1  Acute hypoxic respiratory failure - On presentation this appeared to be associated with both community-acquired pneumonia and congestive heart failure  He appears to be more compensated from a congestive heart failure standpoint  Getting his IV Lasix yesterday, did causes creatinine rising therefore I will hold any other IV Lasix  Treatment of pneumonia per the primary team and Pulmonary  2   Acute on chronic systolic CHF - Patient has responded IV Lasix, but creatinine is starting to rise now after receiving a few doses yesterday  0 exam now seems to be more like pneumonia  We will hold any more IV Lasix and following labs and urine output  He was on as needed Lasix coming into this hospitalization, he will need a standing dose but we will determine this closer to discharge  3   Cardiomyopathy - Ejection fraction 25% - Presumed to be ischemic given his history of CAD  However he was undergoing a workup for infiltrative disorders through our heart failure program   Regardless, treatment would not change for him given his multiple comorbidities  We will continue treat heart failure as above, and arrange close outpatient follow-up  Subjective:   Patient seen and examined  No significant events overnight  Patient tells me he still short of breath  Chest x-ray showing significant left-sided pneumonia  He did receive IV Lasix, but creatinine jac      Objective:     Vitals: Blood pressure 101/72, pulse 99, temperature 97 5 °F (36 4 °C), temperature source Oral, resp  rate 19, height 5' 7" (1 702 m), weight 79 8 kg (176 lb), SpO2 99 %  , Body mass index is 27 57 kg/m² ,   Orthostatic Blood Pressures      Most Recent Value   Blood Pressure  101/72 filed at 02/04/2020 0827   Patient Position - Orthostatic VS  Sitting filed at 02/01/2020 1148            Intake/Output Summary (Last 24 hours) at 2/4/2020 1023  Last data filed at 2/4/2020 0550  Gross per 24 hour   Intake    Output 400 ml   Net -400 ml     Physical Exam:    GEN: Orlene Lean appears well, alert and oriented x 3, pleasant and cooperative   HEENT: pupils equal, round, and reactive to light; extraocular muscles intact  NECK: supple, no carotid bruits    Improved JVP   HEART: regular rhythm, normal S1 and S2, no murmurs, clicks, gallops or rubs   LUNGS:  Diminished with rales particularly on the left; no wheezes or rhonchi   ABDOMEN: normal bowel sounds, soft, no tenderness, no distention  EXTREMITIES: peripheral pulses normal; no clubbing, cyanosis, or significant edema  NEURO: no focal findings   SKIN: normal without suspicious lesions on exposed skin      Medications:      Current Facility-Administered Medications:     acetaminophen (TYLENOL) tablet 650 mg, 650 mg, Oral, Q6H PRN, [place] Odalis, CRNP    aspirin chewable tablet 81 mg, 81 mg, Oral, Daily, Rosa Odalis, CRNP, 81 mg at 02/04/20 0814    bimatoprost (LUMIGAN) 0 01 % ophthalmic solution 1 drop, 1 drop, Both Eyes, HS, [place] Odalis, CRNP, 1 drop at 02/03/20 2240    budesonide-formoterol (SYMBICORT) 160-4 5 mcg/act inhaler 2 puff, 2 puff, Inhalation, BID, Rosa Odalis, CRNP, 2 puff at 02/04/20 8721    cyanocobalamin (VITAMIN B-12) tablet 1,000 mcg, 1,000 mcg, Oral, Daily, Rosa Odalis, CRNP, 1,000 mcg at 02/04/20 0815    docusate sodium (COLACE) capsule 100 mg, 100 mg, Oral, Daily, Rosa Odalis, CRNP, 100 mg at 02/04/20 0815    furosemide (LASIX) injection 40 mg, 40 mg, Intravenous, TID (diuretic), Azra Robles MD, 40 mg at 02/04/20 9634    glimepiride (AMARYL) tablet 2 mg, 2 mg, Oral, Daily, VIVIENNE WallsNP, 2 mg at 02/04/20 0815    guaiFENesin (MUCINEX) 12 hr tablet 1,200 mg, 1,200 mg, Oral, BID, VIVIENNE WallsNP, 1,200 mg at 02/04/20 8800    heparin (porcine) subcutaneous injection 5,000 Units, 5,000 Units, Subcutaneous, Q8H [facility], VIVIENNE WallsNP, 5,000 Units at 02/04/20 0551    insulin lispro (HumaLOG) 100 units/mL subcutaneous injection 1-5 Units, 1-5 Units, Subcutaneous, HS, Jake Forrest MD, 2 Units at 02/03/20 2237    insulin lispro (HumaLOG) 100 units/mL subcutaneous injection 1-6 Units, 1-6 Units, Subcutaneous, TID AC, 2 Units at 02/04/20 0813 **AND** Fingerstick Glucose (POCT), , , TID AC, Jake Forrest MD    isosorbide mononitrate (IMDUR) 24 hr tablet 30 mg, 30 mg, Oral, Daily, VIVIENNE WallsNP, 30 mg at 02/04/20 6307    melatonin tablet 6 mg, 6 mg, Oral, HS, Rickyed Amend, CRNP, 6 mg at 02/03/20 2236    nitroglycerin (NITROSTAT) SL tablet 0 4 mg, 0 4 mg, Sublingual, Q5 Min PRN, Rachelle Odell, CRNP    pantoprazole (PROTONIX) EC tablet 40 mg, 40 mg, Oral, Early Morning, Rachelle Odell CRNP, 40 mg at 02/04/20 0555    timolol (TIMOPTIC) 0 5 % ophthalmic solution 1 drop, 1 drop, Both Eyes, Daily, Rachelle Odell CRNP, 1 drop at 02/04/20 0815     Labs & Results:    Results from last 7 days   Lab Units 01/30/20  2040   TROPONIN I ng/mL 0 08*     Results from last 7 days   Lab Units 02/04/20  0458 02/03/20  0515 02/02/20  0456   WBC Thousand/uL 8 72 6 50 6 68   HEMOGLOBIN g/dL 9 5* 9 4* 9 0*   HEMATOCRIT % 30 6* 30 4* 29 3*   PLATELETS Thousands/uL 386 389 367         Results from last 7 days   Lab Units 02/04/20  0458 02/03/20  0515 02/02/20  0456  01/30/20  2021   POTASSIUM mmol/L 4 4 3 0* 3 1*   < > 4 5   CHLORIDE mmol/L 104 107 106   < > 103   CO2 mmol/L 19* 23 24   < > 24   BUN mg/dL 35* 31* 34*   < > 31*   CREATININE mg/dL 2 21* 1 74* 1 93*   < > 1 83*   CALCIUM mg/dL 9 2 8 7 8 8   < > 8 7   ALK PHOS U/L  --   --   --   --  135*   ALT U/L  --   --   --   --  14   AST U/L  --   --   --   --  12    < > = values in this interval not displayed  Results from last 7 days   Lab Units 02/03/20  0515 02/02/20  0456 01/31/20  0457   MAGNESIUM mg/dL 2 3 2 4 2 3         EKG personally reviewed by Anjum Infante MD  Sinus rhythm, first-degree AV block with PACs and PVCs, nonspecific IVCD and nonspecific T-wave changes  ECHO:  LEFT VENTRICLE:  The ventricle was mildly dilated  Systolic function was markedly reduced  Ejection fraction was estimated to be 25 %  There was severe diffuse hypokinesis with distinct regional wall motion abnormalities  There was akinesis of the mid-apical anterior, mid anteroseptal, apical septal, apical lateral, and apical wall(s)  Wall thickness was at the upper limits of normal   Doppler parameters were consistent with elevated ventricular end-diastolic filling pressure      RIGHT VENTRICLE:  The ventricle was dilated  Systolic function was moderately reduced      LEFT ATRIUM:  The atrium was moderately dilated      RIGHT ATRIUM:  The atrium was mildly dilated      MITRAL VALVE:  There was mild annular calcification  There was moderate diffuse thickening  There was mild to moderate regurgitation      AORTIC VALVE:  The valve was trileaflet  Leaflets exhibited normal thickness, moderate calcification, and moderately reduced cuspal separation  Transaortic velocity and gradient were increased due to stenosis, but lower than expected for the degree of stenosis due to concomitant decreased transaortic flow (decreased stroke volume)  There was moderate stenosis  There was mild regurgitation  Valve mean gradient was 6 mmHg  Estimated aortic valve area (by VTI) was 1 25 cmï¾²  Estimated aortic valve area (by Vmax) was 1 25 cmï¾²       TRICUSPID VALVE:  There was mild regurgitation      Counseling / Coordination of Care  Total floor / unit time spent today 25 minutes  Greater than 50% of total time was spent with the patient and / or family counseling and / or coordination of care

## 2020-02-04 NOTE — PHYSICAL THERAPY NOTE
PT tx     02/04/20 1550   Pain Assessment   Pain Assessment No/denies pain   Pain Score No Pain   Restrictions/Precautions   Other Precautions O2;Fall Risk; Chair Alarm; Bed Alarm   General   Chart Reviewed Yes   Additional Pertinent History assessed by pulmonology no pneumonia on 02 2L   Cognition   Overall Cognitive Status WFL  (rec'd haldol last pm per nurse)   Arousal/Participation Alert   Attention Within functional limits   Orientation Level Oriented X4   Following Commands Follows one step commands without difficulty   Subjective   Subjective I'll get up   Transfers   Sit to Stand 5  Supervision   Additional items Assist x 1;Verbal cues   Stand to Sit 5  Supervision   Additional items Assist x 1;Verbal cues  (assist for lines)   Stand pivot 4  Minimal assistance   Additional items Assist x 1;Verbal cues;Armrests  (assist for lines)   Ambulation/Elevation   Gait pattern WNL   Gait Assistance 4  Minimal assist   Additional items Assist x 1;Verbal cues  (assist for lines)   Assistive Device Rolling walker   Distance 25'   Balance   Static Sitting Normal   Dynamic Sitting Good   Static Standing Good   Dynamic Standing Good   Ambulatory Good   Endurance Deficit   Endurance Deficit Yes   Endurance Deficit Description 02 sat no room air down to 85% returns to 93% on 2L02 with rest   Activity Tolerance   Activity Tolerance Patient limited by fatigue   Assessment   Prognosis Good   Problem List Decreased endurance   Assessment Pt able to amb with rw and on room air wiht no distress but 02 sat drops  02 reapplied  Should be able to return home st d/c  Will need to establish need for 02 or wean to room air  Will follow   Barriers to Discharge   (medical status)   Goals   Patient Goals go home   Plan   Treatment/Interventions Functional transfer training; Endurance training;Gait training;ADL retraining;Spoke to nursing   Progress Progressing toward goals   PT Frequency 2-3x/wk   Recommendation   Recommendation Home with family support   Equipment Recommended Silvia Santillan   PT - OK to Discharge No   Roro Feng PT

## 2020-02-04 NOTE — UTILIZATION REVIEW
Continued Stay Review    Date: 2/4/2020                       Current Patient Class: inpatient  Current Level of Care: med surg    HPI:89 y o  male initially admitted on 1/30/2020 inpatient due to acute on chronic combined systolic and diastolic heart failure, EF of 30%  Has CKD,  myeloma  Assessment/Plan:  2/4/2020 Patient continues not to feel well  Continues to require supplemental oxygen  CxR on 2/3 showed persistent pneumonia and IV antibiotics continue  Hemoglobin 9 4 status post unit PRBC on 2/3/2020  Creatinine increasing and today is 2 2 with baseline of 1 6 - 1 9  Continue diuresis and to monitor      2/4/2020 per pulmonary Patient does not appear volume overloaded anymore on exam to suggest decompensated heart failure, continue home diuretics  He must have been overloaded on admission with BNP 30,000 but responded to diuretics  I don't suspect untreated pneumonia given lack of fever or leukocytosis/left shift  If procalcitonin is negative suggest stopping antibiotics  He is likely slow to recover with advanced age, comorbidities and history of tobacco abuse  Suggest CT to deliniate process in LLL and nebs BID with flutter valve    Pertinent Labs/Diagnostic Results:   2/3/2020 CxR - Persistent left lower lobe infiltrate and effusion consistent with pneumonia     Stable cardiomegaly    1/31/2020 CxR - New left lower lobe infiltrate effusions   This is suspicious for pneumonia  Cardiomegaly with hilar prominence and pulmonary vascular redistribution is similar to March 2019 1/30/2020 CT head - No acute intracranial abnormality   Microangiopathic changes    Results from last 7 days   Lab Units 02/04/20  0458 02/03/20  0515 02/02/20  0456 02/01/20  1308 01/31/20  0457 01/30/20 2021   WBC Thousand/uL 8 72 6 50 6 68  --  6 25 7 47   HEMOGLOBIN g/dL 9 5* 9 4* 9 0* 9 6* 7 7* 8 5*   HEMATOCRIT % 30 6* 30 4* 29 3* 31 3* 25 2* 28 1*   PLATELETS Thousands/uL 386 389 367  --  356 402*   NEUTROS ABS Thousands/µL  --   --   --   --   --  5 71     Results from last 7 days   Lab Units 02/04/20  0458 02/03/20  0515 02/02/20  0456 02/01/20  1309 01/31/20  0457   SODIUM mmol/L 139 143 142 140 138   POTASSIUM mmol/L 4 4 3 0* 3 1* 3 6 4 0   CHLORIDE mmol/L 104 107 106 103 105   CO2 mmol/L 19* 23 24 26 24   ANION GAP mmol/L 16* 13 12 11 9   BUN mg/dL 35* 31* 34* 33* 31*   CREATININE mg/dL 2 21* 1 74* 1 93* 1 93* 1 73*   EGFR ml/min/1 73sq m 25 34 30 30 34   CALCIUM mg/dL 9 2 8 7 8 8 9 0 8 4   MAGNESIUM mg/dL  --  2 3 2 4  --  2 3   PHOSPHORUS mg/dL  --   --   --   --  3 3     Results from last 7 days   Lab Units 01/30/20 2021   AST U/L 12   ALT U/L 14   ALK PHOS U/L 135*   TOTAL PROTEIN g/dL 6 9   ALBUMIN g/dL 2 9*   TOTAL BILIRUBIN mg/dL 0 50     Results from last 7 days   Lab Units 02/04/20  1102 02/04/20  0724 02/03/20  2118 02/03/20  1539 02/03/20  1124 02/03/20  0721 02/02/20  2142 02/02/20  1704 02/02/20  1115 02/02/20  0819   POC GLUCOSE mg/dl 169* 196* 234* 216* 172* 88 177* 152* 202* 108     Results from last 7 days   Lab Units 02/04/20  0458 02/03/20  0515 02/02/20  0456 02/01/20  1309 01/31/20  0457 01/30/20 2021   GLUCOSE RANDOM mg/dL 236* 100 110 182* 147* 182*         Results from last 7 days   Lab Units 02/01/20  0451   HEMOGLOBIN A1C % 6 1   EAG mg/dl 128     Results from last 7 days   Lab Units 01/30/20 2040   TROPONIN I ng/mL 0 08*     Results from last 7 days   Lab Units 01/30/20 2021   NT-PRO BNP pg/mL 30,051*     Results from last 7 days   Lab Units 01/30/20 2021   LIPASE u/L 190      Results from last 7 days   Lab Units 01/30/20  2146   CLARITY UA  CLEAR   COLOR UA  YELLOW   SPEC GRAV US  1 025   PH UA  5 0   GLUCOSE UA  NEG   KETONES UA  NEG   BLOOD UA  NEG   PROTEIN UA  30+   NITRITE UA  NEG   BILIRUBIN, UA  NEG   UROBILINOGEN UA  0 2   LEUKOCYTES UA  SMALL       Vital Signs:   02/04/20 07:21:37  97 5 °F (36 4 °C)  107Abnormal   19  99/72  81  100 %     02/04/20 05:52:16    107Abnormal    123/84  97  100 %     02/03/20 22:32:02  97 6 °F (36 4 °C)  101    105/70  82  98 %        02/02 0701  02/03 0700 02/03 0701 02/04 0700   P  O  840    Total Intake(mL/kg) 840 (9 8)    Urine (mL/kg/hr) 1619 (0 8) 400 (0 2)   Total Output 1619 400   Net -504 -864     02/04/20 0505  79 8 kg (176 lb)  Standing scale     02/03/20 0600  85 6 kg (188 lb 11 4 oz)  Standing scale     02/02/20 0600  84 2 kg (185 lb 10 oz)  Standing scale     02/01/20 0600  83 3 kg (183 lb 9 6 oz)  Standing scale     01/31/20 0600  81 4 kg (179 lb 8 oz)  Standing scale     01/30/20 2357  82 kg (180 lb 11 2 oz)  Standing scale  5' 7" (1 702 m)   01/30/20 2009  82 4 kg (181 lb 10 5 oz)             Medications:   Scheduled Medications:  Medications:  aspirin 81 mg Oral Daily   azithromycin 500 mg Oral Q24H   bimatoprost 1 drop Both Eyes HS   budesonide-formoterol 2 puff Inhalation BID   cefepime 1,000 mg Intravenous Q12H   cyanocobalamin 1,000 mcg Oral Daily   docusate sodium 100 mg Oral Daily   furosemide 40 mg Intravenous TID (diuretic)   glimepiride 2 mg Oral Daily   guaiFENesin 1,200 mg Oral BID   heparin (porcine) 5,000 Units Subcutaneous Q8H [address]   insulin lispro 1-5 Units Subcutaneous HS   insulin lispro 1-6 Units Subcutaneous TID AC   isosorbide mononitrate 30 mg Oral Daily   levalbuterol 1 25 mg Nebulization BID   melatonin 6 mg Oral HS   pantoprazole 40 mg Oral Early Morning   timolol 1 drop Both Eyes Daily   vancomycin 10 mg/kg (Adjusted) Intravenous Q24H     Continuous IV Infusions: none     PRN Meds: not used   acetaminophen 650 mg Oral Q6H PRN   nitroglycerin 0 4 mg Sublingual Q5 Min PRN   OLANZapine 5 mg Oral HS PRN       Discharge Plan: To be determined  Network Utilization Review Department  [email or web address]  ATTENTION: Please call with any questions or concerns to [phone] and carefully listen to the prompts so that you are directed to the right person   All voicemails are confidential   Dierks Glass all requests for admission clinical reviews, approved or denied determinations and any other requests to dedicated fax number below belonging to the campus where the patient is receiving treatment   List of dedicated fax numbers for the Facilities:  [address] [place] [address] DENIALS (Administrative/Medical Necessity) [phone]   [address]  (Maternity/NICU/Pediatrics) [phone]   Khari Gregory [phone]   Phuong Comes [phone]   Honey Martinezer [phone]   [facility] [phone]   [address] [place] [address] [phone]   [facility]  [phone]   [address] [facility] [address]  [address] [facility] [address] [facility] [phone]

## 2020-02-04 NOTE — ASSESSMENT & PLAN NOTE
· Potassium noted to be 3 0 in setting of lasix administration 2/3  · Will replete 40 mEq x 2  · Now improved to 4 4  · Magnesium 2 3  · Repeat BMP in AM

## 2020-02-04 NOTE — PLAN OF CARE
Problem: PHYSICAL THERAPY ADULT  Goal: Performs mobility at highest level of function for planned discharge setting  See evaluation for individualized goals  Description  Treatment/Interventions: Functional transfer training, Elevations, Therapeutic exercise, Endurance training, Cognitive reorientation, Bed mobility, Gait training, Spoke to nursing, OT  Equipment Recommended: (continued use of rollator)       See flowsheet documentation for full assessment, interventions and recommendations  Outcome: Progressing  Note:   Prognosis: Good  Problem List: Decreased endurance  Assessment: Pt able to amb with rw and on room air wiht no distress but 02 sat drops  02 reapplied  Should be able to return home st d/c  Will need to establish need for 02 or wean to room air  Will follow  Barriers to Discharge: (medical status)     Recommendation: Home with family support     PT - OK to Discharge: No    See flowsheet documentation for full assessment

## 2020-02-04 NOTE — CONSULTS
Consultation - Nephrology   Jose A Lama 80 y o  male MRN: [id number]  Unit/Bed#: -01 Encounter: [id number]      ASSESSMENT & PLAN    1  Acute Kidney Injury with a baseline creatinine 1 6-1 9 mildly increased in the setting of diuresis now on hold  -urinalysis on January 30th shows positive proteinuria but otherwise bland urine  -renal ultrasound done in August of 2018 shows a right kidney that is 12 3 x 4 8 cm and 13 1 x 6 1 cm  - medications reviewed if creatinine worse may need to change dose of cefepime, blood pressure is borderline low, hold parameters placed on Imdur  -multiple upper respiratory sounds, remains on furosemide 40 mg IV t i d  some urine will monitor response and monitor CT scan result  2  Electrolytes:  Electrolytes currently stable  3  Acid/Base:  With a positive anion gap metabolic acidosis, will check a lactic acid, blood glucose is mildly elevated, repeat urinalysis  4  BP/HR:  Now blood pressure is borderline low with some tachycardia hold parameters on Imdur and monitor with diuresis  5  Volume overload difficulty to determine volume status, getting CT scan remains on diuretics  6  Anemia hemoglobin low but acceptable will monitor in the setting of critical illness  7  Health Maintanance/Risk Reduction moderate glycemic control currently on glimepiride and low threshold to hold if hypotensive  8   Confusion/delirium-elderly, monitor closely in the setting of treating close clinical illness an underlying cause    HISTORY OF PRESENT ILLNESS:  Requesting Physician: Payton Myers DO  Reason for Consult:  Acute kidney injury    Jose A Lama is a 80y o  year old male who was admitted to [facility] after presenting with shortness of breath a renal consultation is requested today for assistance in the management of renal failure  Patient is a poor historian and is confused so history obtained from the chart he is a patient systolic congestive heart failure with an EF 20% stage IIIB chronic kidney disease with a baseline creatinine 1 6-1 9, history present presumed diabetic nephropathy and kappa light chain myeloma he did receive Revlimid and Decadron was having several infections so steroids were discontinued he presented with complaints of increased shortness of breath with exertion and was found to have an infiltrate in the right lung    His review of systems was attempted he is confused was trying to get out of his chair      PAST MEDICAL HISTORY:  Past Medical History:   Diagnosis Date    [facility]ina p[facility]is [facility] )     Chronic kidney disease     Coronary artery disease     Diabetes mellitus ([facility] )     Glaucoma     History of methicillin resistant staphylococcus aureus (MRSA) 2020    negative nasal swab     Hypertension     Multiple myeloma ([facility] )     Occluded coronary artery stent     Left       PAST SURGICAL HISTORY:  Past Surgical History:   Procedure Laterality Date    BACK SURGERY      CARDIAC CATHETERIZATION  2004    COLONOSCOPY      CT BONE MARROW BIOPSY AND ASPIRATION  2018    CYSTOSCOPY      KNEE SURGERY      THROMBOENDARTERECTOMY Right     Cartoid       ALLERGIES:  No Known Allergies    SOCIAL HISTORY:  Social History     Substance and Sexual Activity   Alcohol Use Not Currently    Comment: socially; less than once a month     Social History     Substance and Sexual Activity   Drug Use No     Social History     Tobacco Use   Smoking Status Former Smoker    Types: Cigarettes    Last attempt to quit: 1970    Years since quittin 1   Smokeless Tobacco Never Used   Tobacco Comment    former smoker       FAMILY HISTORY:  Family History   Problem Relation Age of Onset    Heart attack Father     Heart disease Mother     Diabetes Mother     Heart disease Sister     COPD Family        MEDICATIONS:    Current Facility-Administered Medications:     acetaminophen (TYLENOL) tablet 650 mg, 650 mg, Oral, Q6H PRN, Selma Even, CRNP    aspirin chewable tablet 81 mg, 81 mg, Oral, Daily, Rafael Fendt, CRNP, 81 mg at 02/04/20 0814    azithromycin [facility]) tablet 500 mg, 500 mg, Oral, Q24H, Krissy MONTEIRO PA-C, 500 mg at 02/04/20 1350    bimatoprost (LUMIGAN) 0 01 % ophthalmic solution 1 drop, 1 drop, Both Eyes, HS, Rafael Fendt, CRNP, 1 drop at 02/03/20 2240    budesonide-formoterol (SYMBICORT) 160-4 5 mcg/act inhaler 2 puff, 2 puff, Inhalation, BID, Rafael Fendt, CRNP, 2 puff at 02/04/20 4386    cefepime (MAXIPIME) IVPB (premix) 1,000 mg, 1,000 mg, Intravenous, Q12H, Krissy MONTEIRO PA-C, Last Rate: 100 mL/hr at 02/04/20 1407, 1,000 mg at 02/04/20 1407    cyanocobalamin (VITAMIN B-12) tablet 1,000 mcg, 1,000 mcg, Oral, Daily, Rafael Fendt, CRNP, 1,000 mcg at 02/04/20 0815    docusate sodium (COLACE) capsule 100 mg, 100 mg, Oral, Daily, Rafael Fendt, CRNP, 100 mg at 02/04/20 0815    furosemide (LASIX) injection 40 mg, 40 mg, Intravenous, TID (diuretic), Valeria Lesch, MD, 40 mg at 02/04/20 1349    glimepiride (AMARYL) tablet 2 mg, 2 mg, Oral, Daily, Rafael Fendt, CRNP, 2 mg at 02/04/20 0815    guaiFENesin (MUCINEX) 12 hr tablet 1,200 mg, 1,200 mg, Oral, BID, Rafael Fendt, CRNP, 1,200 mg at 02/04/20 0094    heparin (porcine) subcutaneous injection 5,000 Units, 5,000 Units, Subcutaneous, Q8H [address], Rafael Fendt, CRNP, 5,000 Units at 02/04/20 1353    insulin lispro (HumaLOG) 100 units/mL subcutaneous injection 1-5 Units, 1-5 Units, Subcutaneous, HS, Ray Patel MD, 2 Units at 02/03/20 2237    insulin lispro (HumaLOG) 100 units/mL subcutaneous injection 1-6 Units, 1-6 Units, Subcutaneous, TID AC, 2 Units at 02/04/20 1649 **AND** Fingerstick Glucose (POCT), , , TID AC, Ray Patel MD    isosorbide mononitrate (IMDUR) 24 hr tablet 30 mg, 30 mg, Oral, Daily, DON Leger, 30 mg at 02/04/20 4107    levalbuterol [facility]) inhalation solution 1 25 mg, 1 25 mg, Nebulization, BID, Beverley Bills DO    melatonin tablet 6 mg, 6 mg, Oral, HS, Ally Starcher, CRNP, 6 mg at 02/03/20 2236    nitroglycerin (NITROSTAT) SL tablet 0 4 mg, 0 4 mg, Sublingual, Q5 Min PRN, Ally Starcher, CRNP    OLANZapine (ZyPREXA) tablet 2 5 mg, 2 5 mg, Oral, Q3H PRN, Krissy MONTEIRO PA-C    pantoprazole (PROTONIX) EC tablet 40 mg, 40 mg, Oral, Early Morning, Ally Starcher, CRNP, 40 mg at 02/04/20 0555    timolol (TIMOPTIC) 0 5 % ophthalmic solution 1 drop, 1 drop, Both Eyes, Daily, Ally Starcher, CRNP, 1 drop at 02/04/20 0815    vancomycin (VANCOCIN) IVPB (premix) 750 mg, 10 mg/kg (Adjusted), Intravenous, Q24H, Last Rate: 150 mL/hr at 02/04/20 1416, 750 mg at 02/04/20 1416 **AND** MRSA culture, , , Once, Wan MONTEIRO PA-C    REVIEW OF SYSTEMS:  Repeat obtained from chart patient confused  All the systems were reviewed and were negative except as documented on the HPI        PHYSICAL EXAM:  Current Weight: Weight - Scale: 79 8 kg (176 lb)  First Weight: Weight - Scale: 82 4 kg (181 lb 10 5 oz)  Vitals:    02/04/20 0721 02/04/20 0824 02/04/20 0827 02/04/20 1534   BP: 99/72  101/72 110/70   BP Location: Right arm      Pulse: (!) 107  99 89   Resp: 19      Temp: 97 5 °F (36 4 °C)   97 5 °F (36 4 °C)   TempSrc: Oral      SpO2: 100% 99% 99% 100%   Weight:       Height:           Intake/Output Summary (Last 24 hours) at 2/4/2020 1708  Last data filed at 2/4/2020 0550  Gross per 24 hour   Intake    Output 400 ml   Net -400 ml     General:  Frail elderly cachectic  Eyes:  Pallor  ENT: lips and mucous membranes moist  Neck: supple, no JVD  Chest:  Upper respiratory sounds and decreased breath sounds in the lower lobes bilaterally  CVS: normal rate, regular rhythm  Abdomen: soft, non-tender, non-distended, normoactive bowel sounds  Extremities: no edema of both legs  Skin: no rash  Neuro: awake, alert, not oriented to person place time    Lab Results:   Results from last 7 days   Lab Units 02/04/20  0458 02/03/20  0515 02/02/20  0456 02/01/20  1309 02/01/20  1308 01/31/20  0457 01/30/20  2146 01/30/20 2021   WBC Thousand/uL 8 72 6 50 6 68  --   --  6 25  --  7 47   HEMOGLOBIN g/dL 9 5* 9 4* 9 0*  --  9 6* 7 7*  --  8 5*   HEMATOCRIT % 30 6* 30 4* 29 3*  --  31 3* 25 2*  --  28 1*   PLATELETS Thousands/uL 386 389 367  --   --  356  --  402*   POTASSIUM mmol/L 4 4 3 0* 3 1* 3 6  --  4 0  --  4 5   CHLORIDE mmol/L 104 107 106 103  --  105  --  103   CO2 mmol/L 19* 23 24 26  --  24  --  24   BUN mg/dL 35* 31* 34* 33*  --  31*  --  31*   CREATININE mg/dL 2 21* 1 74* 1 93* 1 93*  --  1 73*  --  1 83*   CALCIUM mg/dL 9 2 8 7 8 8 9 0  --  8 4  --  8 7   MAGNESIUM mg/dL  --  2 3 2 4  --   --  2 3  --   --    PHOSPHORUS mg/dL  --   --   --   --   --  3 3  --   --    ALK PHOS U/L  --   --   --   --   --   --   --  135*   ALT U/L  --   --   --   --   --   --   --  14   AST U/L  --   --   --   --   --   --   --  12   NITRITE UA   --   --   --   --   --   --  NEG  --    BLOOD UA   --   --   --   --   --   --  NEG  --    LEUKOCYTES UA   --   --   --   --   --   --  SMALL  --        Other Studies:  Please see previous notes

## 2020-02-04 NOTE — PLAN OF CARE
Problem: Potential for Falls  Goal: Patient will remain free of falls  Description  INTERVENTIONS:  - Assess patient frequently for physical needs  -  Identify cognitive and physical deficits and behaviors that affect risk of falls    -  [place] fall precautions as indicated by assessment   - Educate patient/family on patient safety including physical limitations  - Instruct patient to call for assistance with activity based on assessment  - Modify environment to reduce risk of injury  - Consider OT/PT consult to assist with strengthening/mobility  Outcome: Progressing     Problem: DISCHARGE PLANNING  Goal: Discharge to home or other facility with appropriate resources  Description  INTERVENTIONS:  - Identify barriers to discharge w/patient and caregiver  - Arrange for needed discharge resources and transportation as appropriate  - Identify discharge learning needs (meds, wound care, etc )  - Arrange for interpretive services to assist at discharge as needed  - Refer to Case Management Department for coordinating discharge planning if the patient needs post-hospital services based on physician/advanced practitioner order or complex needs related to functional status, cognitive ability, or social support system  Outcome: Progressing     Problem: RESPIRATORY - ADULT  Goal: Achieves optimal ventilation and oxygenation  Description  INTERVENTIONS:  - Assess for changes in respiratory status  - Assess for changes in mentation and behavior  - Position to facilitate oxygenation and minimize respiratory effort  - Oxygen administered by appropriate delivery if ordered  - Initiate smoking cessation education as indicated  - Encourage broncho-pulmonary hygiene including cough, deep breathe, Incentive Spirometry  - Assess the need for suctioning and aspirate as needed  - Assess and instruct to report SOB or any respiratory difficulty  - Respiratory Therapy support as indicated  Outcome: Progressing     Problem: HEMATOLOGIC - ADULT  Goal: Maintains hematologic stability  Description  INTERVENTIONS  - Assess for signs and symptoms of bleeding or hemorrhage  - Monitor labs  - Administer supportive blood products/factors as ordered and appropriate  Outcome: Progressing

## 2020-02-04 NOTE — ASSESSMENT & PLAN NOTE
· Baseline creatinine 1 6-1 9  · Creatinine slightly elevated today, 2 2  · Continue to monitor in setting of diuresis  · Nephrology consulted given increasing Cr in setting of addition of antibiotics  · Close monitoring of I/O status and renal function - good urine output since yesterday

## 2020-02-04 NOTE — RESPIRATORY THERAPY NOTE
RT Protocol Note  Sondra Rodriguez 80 y o  male MRN: [id number]  Unit/Bed#: -01 Encounter: [id number]    Assessment    Principal Problem:    Acute on chronic combined systolic and diastolic heart failure (HCC)  Active Problems:    Type 2 diabetes mellitus without complication, without long-term current use of insulin (HCC)    Mixed hyperlipidemia    Esophageal reflux    Stage 4 chronic kidney disease (HCC)    Ischemic cardiomyopathy    [place] light chain myeloma ([facility])    Anemia    Hypokalemia    Pneumonia      Home Pulmonary Medications:symbicort       Past Medical History:   Diagnosis Date    Angina pectoris ([facility])     Chronic kidney disease     Coronary artery disease     Diabetes mellitus ([facility] [address] )     Glaucoma     History of methicillin resistant staphylococcus aureus (MRSA) 2020    negative nasal swab     Hypertension     Multiple myeloma ([facility] )     Occluded coronary artery stent     Left     Social History     Socioeconomic History    Marital status:       Spouse name: None    Number of children: None    Years of education: None    Highest education level: None   Occupational History    None   Social Needs    Financial resource strain: None    Food insecurity:     Worry: None     Inability: None    Transportation needs:     Medical: None     Non-medical: None   Tobacco Use    Smoking status: Former Smoker     Types: Cigarettes     Last attempt to quit: 1970     Years since quittin 1    Smokeless tobacco: Never Used    Tobacco comment: former smoker   Substance and Sexual Activity    Alcohol use: Not Currently     Comment: socially; less than once a month    Drug use: No    Sexual activity: None   Lifestyle    Physical activity:     Days per week: None     Minutes per session: None    Stress: None   Relationships    Social connections:     Talks on phone: None     Gets together: None     Attends Christian service: None     Active member of club or organization: None Attends meetings of clubs or organizations: None     Relationship status: None    Intimate partner violence:     Fear of current or ex partner: None     Emotionally abused: None     Physically abused: None     Forced sexual activity: None   Other Topics Concern    None   Social History Narrative    Daily caffeine consumption       Subjective         Objective    Physical Exam:   Assessment Type: (P) Assess only  General Appearance: (P) Awake, Alert  Respiratory Pattern: (P) Normal  Chest Assessment: (P) Chest expansion symmetrical  Bilateral Breath Sounds: (P) Diminished(uaw rhonchi)  Cough: (P) Non-productive, Congested, Moist  O2 Device: (P) NC    Vitals:  Blood pressure 101/72, pulse 99, temperature 97 5 °F (36 4 °C), temperature source Oral, resp  rate 19, height 5' 7" (1 702 m), weight 79 8 kg (176 lb), SpO2 99 %  Imaging and other studies: I have personally reviewed pertinent films in PACS    O2 Device: (P) NC     Plan    Respiratory Plan: (P) Mild Distress pathway  Airway Clearance Plan: (P) Flutter     Resp Comments: (P) as per resp   protocol neb tx's ordered BID/flutter valve added

## 2020-02-04 NOTE — NURSING NOTE
While checking on patient, he became agitated and confused  He stated "get out of my house or ill call the police"  Multiple attempts were done to reorient him to place and time without success  He moved himself to the edge of the bed and demanded to get "all the people out of his house"  He is at an increase fall risk by attempting to get up  NP was notified and 2mg of haldol IV were ordered and administered  Pt  Refused to wear tele monitor  Continued to observed patient   Mark Chowdary RN

## 2020-02-04 NOTE — ASSESSMENT & PLAN NOTE
Lab Results   Component Value Date    HGBA1C 6 1 02/01/2020       Recent Labs     02/03/20  1539 02/03/20  2118 02/04/20  0724 02/04/20  1102   POCGLU 216* 234* 196* 169*       Blood Sugar Average: Last 72 hrs:  (P) 170 [id number]   · Continue glimepiride    · SSI + accuchek  · Diabetic diet

## 2020-02-04 NOTE — PROGRESS NOTES
[facility]  Progress Note - Alec Endow 9/22/1930, 80 y o  male MRN: [id number]  Unit/Bed#: -01 Encounter: [id number]  Primary Care Provider: Karmen Blackman   Date and time admitted to hospital: 1/30/2020  8:11 PM    Pneumonia  Assessment & Plan  · Worsening respiratory status despite diuresis  · Requiring supplemental oxygen  · Repeat CXR again reveals left lower lobe infiltrate  · Will treat for HCAP, cefepime, vancomycin, azithromycin, renally dosed  · Nephrology to follow  · Recently completed course of Levaquin outpatient and cefazolin inpatient x5 days while at [facility] where he was discharged from 3 days prior to [facility]  · Pulmonology consulted   · follow-up procalcitonin    Hypokalemia  Assessment & Plan  · Potassium noted to be 3 0 in setting of lasix administration 2/3  · Will replete 40 mEq x 2  · Now improved to 4 4  · Magnesium 2 3  · Repeat BMP in AM    Anemia  Assessment & Plan  · Chronic likely in setting of malignancy, chronic disease  · No evidence of active bleeding  · Hgb noted to be 7 7 yesterday, received 1 unit PRBCs  · Continue to monitor  Transfuse for Hgb < 8  · Give lasix with any blood transfusions  · Hgb stable at 9 4    [place] light chain myeloma (HCC)  Assessment & Plan  · Follows with Dr Aniabl Correa as an outpatient  Ischemic cardiomyopathy  Assessment & Plan  · Repeat echocardiogram as above  · Continue home imdur and aspirin    Stage 4 chronic kidney disease (HCC)  Assessment & Plan  · Baseline creatinine 1 6-1 9  · Creatinine slightly elevated today, 2 2  · Continue to monitor in setting of diuresis  · Nephrology consulted given increasing Cr in setting of addition of antibiotics  · Close monitoring of I/O status and renal function - good urine output since yesterday    Esophageal reflux  Assessment & Plan  · Continue home PPI    Mixed hyperlipidemia  Assessment & Plan  · Patient no longer taking statin      Type 2 diabetes mellitus without complication, without long-term current use of insulin [facility])  Assessment & Plan  Lab Results   Component Value Date    HGBA1C 6 1 02/01/2020       Recent Labs     02/03/20  1539 02/03/20  2118 02/04/20  0724 02/04/20  1102   POCGLU 216* 234* 196* 169*       Blood Sugar Average: Last 72 hrs:  (P) 170 [id number]   · Continue glimepiride  · SSI + accuchek  · Diabetic diet    * Acute on chronic combined systolic and diastolic heart failure (HCC)  Assessment & Plan  Wt Readings from Last 3 Encounters:   02/04/20 79 8 kg (176 lb)   12/17/19 82 6 kg (182 lb)   10/15/19 84 8 kg (187 lb)     · Patient presented with complaints of dyspnea on exertion, orthopnea, and a 5 lb weight gain over the last week  · ProBNP in on admission was 30,000  · Patient reports taking 20 mg of Lasix as needed at home according to his daily weights  Patient was taking his Lasix but continued to gain weight over this last week  · Per records from recent [facility] hospitalization  Home dose of Lasix was discontinued due to hypotension  · Given 20 mg Lasix in the ER  · Continue IV lasix 40 mg BID - unfortunately still with borderline BP limiting administration of lasix, hold parameters lowered  · Weight has improving, now 176  · Obtain STAT CXR - worsening rales  Patient recently completed course of Levaquin and subsequent course of cefazolin x5 days for treatment of possible pneumonia  However, will now treat patient for HCAP with atypical coverage given CXR findings and worsening respiratory status despite diuresis  · Pulmonology consulted  · Now requiring supplemental oxygen to maintain saturations  · Repeat echocardiogram completed yesterday showing EF 25% with severe diffuse hypokinesis with distinct regional wall motion abnormalities  Echocardiogram completed March 2019 showed EF 30%    · Strict I/O's, daily weights, low sodium diet  · Per son, patient dry weight has been around 178 since previous admission in [place]  · Cardiology following - appreciate recommendations  Will re-evaluate patient today  VTE Pharmacologic Prophylaxis:   Pharmacologic: Heparin  Mechanical VTE Prophylaxis in Place: Yes    Patient Centered Rounds: I have performed bedside rounds with nursing staff today  Discussions with Specialists or Other Care Team Provider: Discussed with attending, Pulmonary, reviewed reason for consultation     Education and Discussions with Family / Patient: Discussed with patient at bedside  Discussed with patient's son over the phone, answered all questions to the best of my ability  Time Spent for Care: 45 minutes  More than 50% of total time spent on counseling and coordination of care as described above  Current Length of Stay: 4 day(s)    Current Patient Status: Inpatient   Certification Statement: The patient will continue to require additional inpatient hospital stay due to IV antibiotics    Discharge Plan: Patient comes from nursing home,     Code Status: Level 1 - Full Code      Subjective:   Patient reports he does not feel well today  Has been removing his oxygen per nursing  Objective:     Vitals:   Temp (24hrs), Av 6 °F (36 4 °C), Min:97 5 °F (36 4 °C), Max:97 7 °F (36 5 °C)    Temp:  [97 5 °F (36 4 °C)-97 7 °F (36 5 °C)] 97 5 °F (36 4 °C)  HR:  [] 99  Resp:  [19] 19  BP: ()/(70-84) 101/72  SpO2:  [98 %-100 %] 99 %  Body mass index is 27 57 kg/m²  Input and Output Summary (last 24 hours): Intake/Output Summary (Last 24 hours) at 2020 1250  Last data filed at 2020 0550  Gross per 24 hour   Intake    Output 400 ml   Net -400 ml       Physical Exam:     Physical Exam   Constitutional: He appears well-developed and well-nourished  No distress  HENT:   Head: Normocephalic and atraumatic  Eyes: Conjunctivae are normal  No scleral icterus  Cardiovascular: Normal rate and regular rhythm  No murmur heard    Pulmonary/Chest: Effort normal  No respiratory distress  He has decreased breath sounds in the right lower field  He has no wheezes  He has rhonchi in the left middle field and the left lower field  He has no rales  Abdominal: Soft  He exhibits no distension  There is no tenderness  Musculoskeletal: He exhibits no edema  Neurological: He is alert  Skin: Skin is warm and dry  No erythema  Psychiatric: He has a normal mood and affect  Nursing note and vitals reviewed  Additional Data:     Labs:    Results from last 7 days   Lab Units 02/04/20 0458 01/30/20 2021   WBC Thousand/uL 8 72   < > 7 47   HEMOGLOBIN g/dL 9 5*   < > 8 5*   HEMATOCRIT % 30 6*   < > 28 1*   PLATELETS Thousands/uL 386   < > 402*   NEUTROS PCT %  --   --  76*   LYMPHS PCT %  --   --  15   MONOS PCT %  --   --  6   EOS PCT %  --   --  1    < > = values in this interval not displayed  Results from last 7 days   Lab Units 02/04/20 0458 01/30/20 2021   SODIUM mmol/L 139   < > 137   POTASSIUM mmol/L 4 4   < > 4 5   CHLORIDE mmol/L 104   < > 103   CO2 mmol/L 19*   < > 24   BUN mg/dL 35*   < > 31*   CREATININE mg/dL 2 21*   < > 1 83*   ANION GAP mmol/L 16*   < > 10   CALCIUM mg/dL 9 2   < > 8 7   ALBUMIN g/dL  --   --  2 9*   TOTAL BILIRUBIN mg/dL  --   --  0 50   ALK PHOS U/L  --   --  135*   ALT U/L  --   --  14   AST U/L  --   --  12   GLUCOSE RANDOM mg/dL 236*   < > 182*    < > = values in this interval not displayed  Results from last 7 days   Lab Units 02/04/20  1102 02/04/20  0724 02/03/20  2118 02/03/20  1539 02/03/20  1124 02/03/20  0721 02/02/20  2142 02/02/20  1704 02/02/20  1115 02/02/20  0819 02/01/20  2106 02/01/20  1628   POC GLUCOSE mg/dl 169* 196* 234* 216* 172* 88 177* 152* 202* 108 195* 180*     Results from last 7 days   Lab Units 02/01/20  0451   HEMOGLOBIN A1C % 6 1               * I Have Reviewed All Lab Data Listed Above  * Additional Pertinent Lab Tests Reviewed:  All Labs Within Last 24 Hours Reviewed    Imaging:    Imaging Reports Reviewed Today Include:   · CXR 2/3: Persistent left lower lobe infiltrate and effusion consistent with pneumonia  Imaging Personally Reviewed by Myself Includes:    · CXR 2/3: Infiltrate in LLL, Cardiomegaly    Recent Cultures (last 7 days):           Last 24 Hours Medication List:     Current Facility-Administered Medications:  acetaminophen 650 mg Oral Q6H PRN Case Sings, CRNP   aspirin 81 mg Oral Daily Case Sings, CRNP   azithromycin 500 mg Oral Q24H Krissy MONTEIRO PA-C   bimatoprost 1 drop Both Eyes HS Case Sings, CRNP   budesonide-formoterol 2 puff Inhalation BID Case Sings, CRNP   cefepime 1,000 mg Intravenous Q12H Krissy MONTEIRO PA-C   cyanocobalamin 1,000 mcg Oral Daily Case Sings, CRNP   docusate sodium 100 mg Oral Daily Case Sings, CRNP   furosemide 40 mg Intravenous TID (diuretic) Bita Wilson MD   glimepiride 2 mg Oral Daily Case Sings, CRNP   guaiFENesin 1,200 mg Oral BID Case Sings, CRNP   heparin (porcine) 5,000 Units Subcutaneous [facility] Case Sings, CRNP   insulin lispro 1-5 Units Subcutaneous HS Kate Ogden MD   insulin lispro 1-6 Units Subcutaneous TID AC Kate Ogden MD   isosorbide mononitrate 30 mg Oral Daily Case Sings, CRNP   melatonin 6 mg Oral HS Case Sings, CRNP   nitroglycerin 0 4 mg Sublingual Q5 Min PRN Case Sings, CRNP   OLANZapine 5 mg Oral HS PRN Krissy MONTEIRO PA-C   pantoprazole 40 mg Oral Early Morning Case Sings, CRNP   timolol 1 drop Both Eyes Daily Case Sings, CRNP   vancomycin 10 mg/kg (Adjusted) Intravenous Q24H Senait Richard PA-C        Today, Patient Was Seen By: Sandro Wagoner PA-C    ** Please Note: Dictation voice to text software may have been used in the creation of this document   **

## 2020-02-04 NOTE — ASSESSMENT & PLAN NOTE
Wt Readings from Last 3 Encounters:   02/04/20 79 8 kg (176 lb)   12/17/19 82 6 kg (182 lb)   10/15/19 84 8 kg (187 lb)     · Patient presented with complaints of dyspnea on exertion, orthopnea, and a 5 lb weight gain over the last week  · ProBNP in on admission was 30,000  · Patient reports taking 20 mg of Lasix as needed at home according to his daily weights  Patient was taking his Lasix but continued to gain weight over this last week  · Per records from recent [facility] hospitalization  Home dose of Lasix was discontinued due to hypotension  · Given 20 mg Lasix in the ER  · Continue IV lasix 40 mg BID - unfortunately still with borderline BP limiting administration of lasix, hold parameters lowered  · Weight has improving, now 176  · Obtain STAT CXR - worsening rales  Patient recently completed course of Levaquin and subsequent course of cefazolin x5 days for treatment of possible pneumonia  However, will now treat patient for HCAP with atypical coverage given CXR findings and worsening respiratory status despite diuresis  · Pulmonology consulted  · Now requiring supplemental oxygen to maintain saturations  · Repeat echocardiogram completed yesterday showing EF 25% with severe diffuse hypokinesis with distinct regional wall motion abnormalities  Echocardiogram completed March 2019 showed EF 30%  · Strict I/O's, daily weights, low sodium diet  · Per son, patient dry weight has been around 178 since previous admission in [place]  · Cardiology following - appreciate recommendations  Will re-evaluate patient today

## 2020-02-04 NOTE — ASSESSMENT & PLAN NOTE
· Worsening respiratory status despite diuresis  · Requiring supplemental oxygen  · Repeat CXR again reveals left lower lobe infiltrate  · Will treat for HCAP, cefepime, vancomycin, azithromycin, renally dosed  · Nephrology to follow  · Recently completed course of Levaquin outpatient and cefazolin inpatient x5 days while at [facility] where he was discharged from 3 days prior to [facility]  · Pulmonology consulted   · follow-up procalcitonin

## 2020-02-04 NOTE — PROGRESS NOTES
Vancomycin Assessment    Gabbi Samuels is a 80 y o  male who is currently receiving vancomycin 750 mg q24h for Pneumonia     Relevant clinical data and objective history reviewed:  Creatinine   Date Value Ref Range Status   02/04/2020 2 21 (H) 0 60 - 1 30 mg/dL Final     Comment:     Standardized to IDMS reference method   02/03/2020 1 74 (H) 0 60 - 1 30 mg/dL Final     Comment:     Standardized to IDMS reference method   02/02/2020 1 93 (H) 0 60 - 1 30 mg/dL Final     Comment:     Standardized to IDMS reference method   10/05/2015 1 14 0 60 - 1 30 mg/dL Final     Comment:     Standardized to IDMS reference method   07/02/2015 0 94 0 60 - 1 30 mg/dL Final     Comment:     Standardized to IDMS reference method   07/01/2015 0 83 0 60 - 1 30 mg/dL Final     Comment:     Standardized to IDMS reference method     /72   Pulse 99   Temp 97 5 °F (36 4 °C) (Oral)   Resp 19   Ht 5' 7" (1 702 m)   Wt 79 8 kg (176 lb)   SpO2 99%   BMI 27 57 kg/m²   I/O last 3 completed shifts:  In: -   Out: 1650 [Urine:1650]  Lab Results   Component Value Date/Time    BUN 35 (H) 02/04/2020 04:58 AM    BUN 28 (H) 10/05/2015 11:42 AM    WBC 8 72 02/04/2020 04:58 AM    WBC 7 63 10/05/2015 11:42 AM    HGB 9 5 (L) 02/04/2020 04:58 AM    HGB 13 0 10/05/2015 11:42 AM    HCT 30 6 (L) 02/04/2020 04:58 AM    HCT 38 8 10/05/2015 11:42 AM    MCV 88 02/04/2020 04:58 AM    MCV 93 10/05/2015 11:42 AM     02/04/2020 04:58 AM     10/05/2015 11:42 AM     Temp Readings from Last 3 Encounters:   02/04/20 97 5 °F (36 4 °C) (Oral)   01/08/20 98 5 °F (36 9 °C) (Temporal)   12/17/19 98 3 °F (36 8 °C) (Tympanic)     Vancomycin Days of Therapy: 1    Assessment/Plan  The patient is currently on vancomycin utilizing scheduled dosing based on adjusted body weight (due to obesity)  Baseline risks associated with therapy include: pre-existing renal impairment, concomitant nephrotoxic medications and advanced age    The patient is currently receiving 750 mg q24h and is clinically appropriate and dose will be continued  Pharmacy will also follow closely for s/sx of nephrotoxicity, infusion reactions and appropriateness of therapy  BMP and CBC will be ordered per protocol  Plan for trough as patient approaches steady state, prior to the 4th  dose at approximately at 1330 on 02/07/2020  Due to infection severity, will target a trough of 15-20 (appropriate for most indications)   Pharmacy will continue to follow the patients culture results and clinical progress daily      Ana Hodges, Pharmacist

## 2020-02-05 ENCOUNTER — APPOINTMENT (INPATIENT)
Dept: RADIOLOGY | Facility: HOSPITAL | Age: 85
DRG: 280 | End: 2020-02-05
Payer: COMMERCIAL

## 2020-02-05 PROBLEM — R74.01 TRANSAMINITIS: Status: ACTIVE | Noted: 2020-02-05

## 2020-02-05 PROBLEM — J96.01 ACUTE RESPIRATORY FAILURE WITH HYPOXIA (HCC): Status: ACTIVE | Noted: 2020-02-05

## 2020-02-05 PROBLEM — E80.6 HYPERBILIRUBINEMIA: Status: ACTIVE | Noted: 2020-02-05

## 2020-02-05 PROBLEM — I13.10 CARDIORENAL SYNDROME WITH RENAL FAILURE: Status: ACTIVE | Noted: 2020-02-05

## 2020-02-05 PROBLEM — E87.2 INCREASED ANION GAP METABOLIC ACIDOSIS: Status: ACTIVE | Noted: 2020-02-05

## 2020-02-05 PROBLEM — E87.6 HYPOKALEMIA: Status: RESOLVED | Noted: 2020-02-03 | Resolved: 2020-02-05

## 2020-02-05 LAB
ALBUMIN SERPL BCP-MCNC: 3.4 G/DL (ref 3.5–5)
ALP SERPL-CCNC: 139 U/L (ref 46–116)
ALT SERPL W P-5'-P-CCNC: 663 U/L (ref 12–78)
ANION GAP SERPL CALCULATED.3IONS-SCNC: 15 MMOL/L (ref 4–13)
ANION GAP SERPL CALCULATED.3IONS-SCNC: 17 MMOL/L (ref 4–13)
APTT PPP: 131 SECONDS (ref 23–37)
APTT PPP: 27 SECONDS (ref 23–37)
ARTERIAL PATENCY WRIST A: ABNORMAL
AST SERPL W P-5'-P-CCNC: 1185 U/L (ref 5–45)
BACTERIA UR QL AUTO: ABNORMAL /HPF
BASE EXCESS BLDA CALC-SCNC: 0 MMOL/L (ref -2–3)
BASOPHILS # BLD AUTO: 0.04 THOUSANDS/ΜL (ref 0–0.1)
BASOPHILS # BLD AUTO: 0.05 THOUSANDS/ΜL (ref 0–0.1)
BASOPHILS NFR BLD AUTO: 1 % (ref 0–1)
BASOPHILS NFR BLD AUTO: 1 % (ref 0–1)
BILIRUB SERPL-MCNC: 1.5 MG/DL (ref 0.2–1)
BILIRUB UR QL STRIP: NEGATIVE
BUN SERPL-MCNC: 44 MG/DL (ref 5–25)
BUN SERPL-MCNC: 46 MG/DL (ref 5–25)
CA-I BLD-SCNC: 1.13 MMOL/L (ref 1.12–1.32)
CALCIUM SERPL-MCNC: 9 MG/DL (ref 8.3–10.1)
CALCIUM SERPL-MCNC: 9.4 MG/DL (ref 8.3–10.1)
CHLORIDE SERPL-SCNC: 102 MMOL/L (ref 100–108)
CHLORIDE SERPL-SCNC: 99 MMOL/L (ref 100–108)
CLARITY UR: CLEAR
CO2 SERPL-SCNC: 22 MMOL/L (ref 21–32)
CO2 SERPL-SCNC: 24 MMOL/L (ref 21–32)
COLOR UR: YELLOW
CREAT SERPL-MCNC: 2.68 MG/DL (ref 0.6–1.3)
CREAT SERPL-MCNC: 2.75 MG/DL (ref 0.6–1.3)
CREAT UR-MCNC: 21.2 MG/DL
EOSINOPHIL # BLD AUTO: 0.05 THOUSAND/ΜL (ref 0–0.61)
EOSINOPHIL # BLD AUTO: 0.06 THOUSAND/ΜL (ref 0–0.61)
EOSINOPHIL NFR BLD AUTO: 1 % (ref 0–6)
EOSINOPHIL NFR BLD AUTO: 1 % (ref 0–6)
ERYTHROCYTE [DISTWIDTH] IN BLOOD BY AUTOMATED COUNT: 19.8 % (ref 11.6–15.1)
ERYTHROCYTE [DISTWIDTH] IN BLOOD BY AUTOMATED COUNT: 20 % (ref 11.6–15.1)
ERYTHROCYTE [DISTWIDTH] IN BLOOD BY AUTOMATED COUNT: 20 % (ref 11.6–15.1)
FIO2 GAS DIL.REBREATH: 100 L
GFR SERPL CREATININE-BSD FRML MDRD: 20 ML/MIN/1.73SQ M
GFR SERPL CREATININE-BSD FRML MDRD: 20 ML/MIN/1.73SQ M
GLUCOSE SERPL-MCNC: 125 MG/DL (ref 65–140)
GLUCOSE SERPL-MCNC: 127 MG/DL (ref 65–140)
GLUCOSE SERPL-MCNC: 147 MG/DL (ref 65–140)
GLUCOSE SERPL-MCNC: 157 MG/DL (ref 65–140)
GLUCOSE SERPL-MCNC: 215 MG/DL (ref 65–140)
GLUCOSE SERPL-MCNC: 304 MG/DL (ref 65–140)
GLUCOSE UR STRIP-MCNC: NEGATIVE MG/DL
HCO3 BLDA-SCNC: 22.7 MMOL/L (ref 22–28)
HCT VFR BLD AUTO: 30.3 % (ref 36.5–49.3)
HCT VFR BLD AUTO: 31 % (ref 36.5–49.3)
HCT VFR BLD AUTO: 32.1 % (ref 36.5–49.3)
HGB BLD-MCNC: 10 G/DL (ref 12–17)
HGB BLD-MCNC: 9.3 G/DL (ref 12–17)
HGB BLD-MCNC: 9.6 G/DL (ref 12–17)
HGB UR QL STRIP.AUTO: ABNORMAL
IMM GRANULOCYTES # BLD AUTO: 0.03 THOUSAND/UL (ref 0–0.2)
IMM GRANULOCYTES # BLD AUTO: 0.04 THOUSAND/UL (ref 0–0.2)
IMM GRANULOCYTES NFR BLD AUTO: 0 % (ref 0–2)
IMM GRANULOCYTES NFR BLD AUTO: 1 % (ref 0–2)
INR PPP: 1.41 (ref 0.84–1.19)
KETONES UR STRIP-MCNC: NEGATIVE MG/DL
LACTATE SERPL-SCNC: 3.9 MMOL/L (ref 0.5–2)
LEUKOCYTE ESTERASE UR QL STRIP: NEGATIVE
LYMPHOCYTES # BLD AUTO: 1.6 THOUSANDS/ΜL (ref 0.6–4.47)
LYMPHOCYTES # BLD AUTO: 1.6 THOUSANDS/ΜL (ref 0.6–4.47)
LYMPHOCYTES NFR BLD AUTO: 21 % (ref 14–44)
LYMPHOCYTES NFR BLD AUTO: 22 % (ref 14–44)
MAGNESIUM SERPL-MCNC: 2.5 MG/DL (ref 1.6–2.6)
MCH RBC QN AUTO: 26.7 PG (ref 26.8–34.3)
MCH RBC QN AUTO: 26.9 PG (ref 26.8–34.3)
MCH RBC QN AUTO: 27 PG (ref 26.8–34.3)
MCHC RBC AUTO-ENTMCNC: 30.7 G/DL (ref 31.4–37.4)
MCHC RBC AUTO-ENTMCNC: 31 G/DL (ref 31.4–37.4)
MCHC RBC AUTO-ENTMCNC: 31.2 G/DL (ref 31.4–37.4)
MCV RBC AUTO: 86 FL (ref 82–98)
MCV RBC AUTO: 87 FL (ref 82–98)
MCV RBC AUTO: 88 FL (ref 82–98)
MONOCYTES # BLD AUTO: 0.46 THOUSAND/ΜL (ref 0.17–1.22)
MONOCYTES # BLD AUTO: 0.53 THOUSAND/ΜL (ref 0.17–1.22)
MONOCYTES NFR BLD AUTO: 6 % (ref 4–12)
MONOCYTES NFR BLD AUTO: 7 % (ref 4–12)
NEUTROPHILS # BLD AUTO: 5.09 THOUSANDS/ΜL (ref 1.85–7.62)
NEUTROPHILS # BLD AUTO: 5.51 THOUSANDS/ΜL (ref 1.85–7.62)
NEUTS SEG NFR BLD AUTO: 68 % (ref 43–75)
NEUTS SEG NFR BLD AUTO: 71 % (ref 43–75)
NITRITE UR QL STRIP: NEGATIVE
NON-SQ EPI CELLS URNS QL MICRO: ABNORMAL /HPF
NRBC BLD AUTO-RTO: 0 /100 WBCS
NRBC BLD AUTO-RTO: 0 /100 WBCS
NT-PROBNP SERPL-MCNC: ABNORMAL PG/ML
PCO2 BLD: 24 MMOL/L (ref 21–32)
PCO2 BLD: 31.1 MM HG (ref 36–44)
PH BLD: 7.47 [PH] (ref 7.35–7.45)
PH UR STRIP.AUTO: 5.5 [PH]
PLATELET # BLD AUTO: 323 THOUSANDS/UL (ref 149–390)
PLATELET # BLD AUTO: 324 THOUSANDS/UL (ref 149–390)
PLATELET # BLD AUTO: 346 THOUSANDS/UL (ref 149–390)
PMV BLD AUTO: 10.4 FL (ref 8.9–12.7)
PMV BLD AUTO: 10.4 FL (ref 8.9–12.7)
PMV BLD AUTO: 10.7 FL (ref 8.9–12.7)
PO2 BLD: >400 MM HG (ref 75–129)
POTASSIUM SERPL-SCNC: 4.3 MMOL/L (ref 3.5–5.3)
POTASSIUM SERPL-SCNC: 4.8 MMOL/L (ref 3.5–5.3)
PROCALCITONIN SERPL-MCNC: 0.23 NG/ML
PROT SERPL-MCNC: 7.3 G/DL (ref 6.4–8.2)
PROT UR STRIP-MCNC: NEGATIVE MG/DL
PROTHROMBIN TIME: 16.9 SECONDS (ref 11.6–14.5)
RBC # BLD AUTO: 3.46 MILLION/UL (ref 3.88–5.62)
RBC # BLD AUTO: 3.59 MILLION/UL (ref 3.88–5.62)
RBC # BLD AUTO: 3.71 MILLION/UL (ref 3.88–5.62)
RBC #/AREA URNS AUTO: ABNORMAL /HPF
SAMPLE SITE: ABNORMAL
SODIUM SERPL-SCNC: 138 MMOL/L (ref 136–145)
SODIUM SERPL-SCNC: 141 MMOL/L (ref 136–145)
SP GR UR STRIP.AUTO: 1.01 (ref 1–1.03)
SPECIMEN SOURCE: ABNORMAL
TROPONIN I SERPL-MCNC: 4.57 NG/ML
TROPONIN I SERPL-MCNC: 7.4 NG/ML
TROPONIN I SERPL-MCNC: 7.58 NG/ML
UROBILINOGEN UR QL STRIP.AUTO: 0.2 E.U./DL
UUN 24H UR-MCNC: 253 MG/DL
WBC # BLD AUTO: 7.36 THOUSAND/UL (ref 4.31–10.16)
WBC # BLD AUTO: 7.7 THOUSAND/UL (ref 4.31–10.16)
WBC # BLD AUTO: 8.01 THOUSAND/UL (ref 4.31–10.16)
WBC #/AREA URNS AUTO: ABNORMAL /HPF

## 2020-02-05 PROCEDURE — 93005 ELECTROCARDIOGRAM TRACING: CPT

## 2020-02-05 PROCEDURE — 84145 PROCALCITONIN (PCT): CPT | Performed by: PHYSICIAN ASSISTANT

## 2020-02-05 PROCEDURE — 36600 WITHDRAWAL OF ARTERIAL BLOOD: CPT

## 2020-02-05 PROCEDURE — 85027 COMPLETE CBC AUTOMATED: CPT | Performed by: PHYSICIAN ASSISTANT

## 2020-02-05 PROCEDURE — 99233 SBSQ HOSP IP/OBS HIGH 50: CPT | Performed by: INTERNAL MEDICINE

## 2020-02-05 PROCEDURE — 99232 SBSQ HOSP IP/OBS MODERATE 35: CPT | Performed by: INTERNAL MEDICINE

## 2020-02-05 PROCEDURE — 80048 BASIC METABOLIC PNL TOTAL CA: CPT | Performed by: PHYSICIAN ASSISTANT

## 2020-02-05 PROCEDURE — 94668 MNPJ CHEST WALL SBSQ: CPT

## 2020-02-05 PROCEDURE — 85730 THROMBOPLASTIN TIME PARTIAL: CPT | Performed by: PHYSICIAN ASSISTANT

## 2020-02-05 PROCEDURE — 82330 ASSAY OF CALCIUM: CPT | Performed by: PHYSICIAN ASSISTANT

## 2020-02-05 PROCEDURE — 94760 N-INVAS EAR/PLS OXIMETRY 1: CPT

## 2020-02-05 PROCEDURE — 83605 ASSAY OF LACTIC ACID: CPT | Performed by: PHYSICIAN ASSISTANT

## 2020-02-05 PROCEDURE — 99292 CRITICAL CARE ADDL 30 MIN: CPT | Performed by: INTERNAL MEDICINE

## 2020-02-05 PROCEDURE — 83735 ASSAY OF MAGNESIUM: CPT | Performed by: PHYSICIAN ASSISTANT

## 2020-02-05 PROCEDURE — 94640 AIRWAY INHALATION TREATMENT: CPT

## 2020-02-05 PROCEDURE — 84484 ASSAY OF TROPONIN QUANT: CPT | Performed by: PHYSICIAN ASSISTANT

## 2020-02-05 PROCEDURE — 82570 ASSAY OF URINE CREATININE: CPT | Performed by: NURSE PRACTITIONER

## 2020-02-05 PROCEDURE — 71045 X-RAY EXAM CHEST 1 VIEW: CPT

## 2020-02-05 PROCEDURE — 84540 ASSAY OF URINE/UREA-N: CPT | Performed by: NURSE PRACTITIONER

## 2020-02-05 PROCEDURE — 82948 REAGENT STRIP/BLOOD GLUCOSE: CPT

## 2020-02-05 PROCEDURE — 80053 COMPREHEN METABOLIC PANEL: CPT | Performed by: PHYSICIAN ASSISTANT

## 2020-02-05 PROCEDURE — 81001 URINALYSIS AUTO W/SCOPE: CPT | Performed by: NURSE PRACTITIONER

## 2020-02-05 PROCEDURE — 85025 COMPLETE CBC W/AUTO DIFF WBC: CPT | Performed by: PHYSICIAN ASSISTANT

## 2020-02-05 PROCEDURE — 82803 BLOOD GASES ANY COMBINATION: CPT

## 2020-02-05 PROCEDURE — 99291 CRITICAL CARE FIRST HOUR: CPT | Performed by: PHYSICIAN ASSISTANT

## 2020-02-05 PROCEDURE — 85610 PROTHROMBIN TIME: CPT | Performed by: PHYSICIAN ASSISTANT

## 2020-02-05 RX ORDER — ALBUMIN (HUMAN) 12.5 G/50ML
12.5 SOLUTION INTRAVENOUS ONCE
Status: COMPLETED | OUTPATIENT
Start: 2020-02-05 | End: 2020-02-05

## 2020-02-05 RX ORDER — LEVALBUTEROL 1.25 MG/.5ML
1.25 SOLUTION, CONCENTRATE RESPIRATORY (INHALATION) EVERY 8 HOURS PRN
Status: DISCONTINUED | OUTPATIENT
Start: 2020-02-05 | End: 2020-02-12 | Stop reason: HOSPADM

## 2020-02-05 RX ORDER — HEPARIN SODIUM 10000 [USP'U]/100ML
3-20 INJECTION, SOLUTION INTRAVENOUS
Status: DISCONTINUED | OUTPATIENT
Start: 2020-02-05 | End: 2020-02-05

## 2020-02-05 RX ORDER — HEPARIN SODIUM 1000 [USP'U]/ML
2000 INJECTION, SOLUTION INTRAVENOUS; SUBCUTANEOUS AS NEEDED
Status: DISCONTINUED | OUTPATIENT
Start: 2020-02-05 | End: 2020-02-05

## 2020-02-05 RX ORDER — CEFEPIME HYDROCHLORIDE 1 G/1
1000 INJECTION, POWDER, FOR SOLUTION INTRAMUSCULAR; INTRAVENOUS EVERY 12 HOURS
Status: DISCONTINUED | OUTPATIENT
Start: 2020-02-05 | End: 2020-02-05 | Stop reason: CLARIF

## 2020-02-05 RX ORDER — ALBUMIN, HUMAN INJ 5% 5 %
25 SOLUTION INTRAVENOUS ONCE
Status: COMPLETED | OUTPATIENT
Start: 2020-02-05 | End: 2020-02-05

## 2020-02-05 RX ORDER — HEPARIN SODIUM 1000 [USP'U]/ML
4000 INJECTION, SOLUTION INTRAVENOUS; SUBCUTANEOUS AS NEEDED
Status: DISCONTINUED | OUTPATIENT
Start: 2020-02-05 | End: 2020-02-05

## 2020-02-05 RX ORDER — CEFEPIME HYDROCHLORIDE 1 G/50ML
1000 INJECTION, SOLUTION INTRAVENOUS EVERY 12 HOURS
Status: DISCONTINUED | OUTPATIENT
Start: 2020-02-05 | End: 2020-02-06

## 2020-02-05 RX ORDER — HEPARIN SODIUM 1000 [USP'U]/ML
4000 INJECTION, SOLUTION INTRAVENOUS; SUBCUTANEOUS ONCE
Status: COMPLETED | OUTPATIENT
Start: 2020-02-05 | End: 2020-02-05

## 2020-02-05 RX ORDER — MILRINONE LACTATE 0.2 MG/ML
0.25 INJECTION, SOLUTION INTRAVENOUS CONTINUOUS
Status: DISCONTINUED | OUTPATIENT
Start: 2020-02-05 | End: 2020-02-07

## 2020-02-05 RX ORDER — LENALIDOMIDE 5 MG/1
CAPSULE ORAL
Qty: 30 CAPSULE | Refills: 0 | Status: SHIPPED | OUTPATIENT
Start: 2020-02-05 | End: 2020-02-19 | Stop reason: SDUPTHER

## 2020-02-05 RX ADMIN — GUAIFENESIN 1200 MG: 600 TABLET ORAL at 09:21

## 2020-02-05 RX ADMIN — NYSTATIN 500000 UNITS: 100000 SUSPENSION ORAL at 17:13

## 2020-02-05 RX ADMIN — CEFEPIME HYDROCHLORIDE 1000 MG: 1 INJECTION, SOLUTION INTRAVENOUS at 12:20

## 2020-02-05 RX ADMIN — GUAIFENESIN 1200 MG: 600 TABLET ORAL at 17:13

## 2020-02-05 RX ADMIN — AZITHROMYCIN 500 MG: 250 TABLET, FILM COATED ORAL at 12:20

## 2020-02-05 RX ADMIN — MELATONIN 6 MG: at 21:23

## 2020-02-05 RX ADMIN — INSULIN LISPRO 1 UNITS: 100 INJECTION, SOLUTION INTRAVENOUS; SUBCUTANEOUS at 13:00

## 2020-02-05 RX ADMIN — LEVALBUTEROL HYDROCHLORIDE 1.25 MG: 1.25 SOLUTION, CONCENTRATE RESPIRATORY (INHALATION) at 19:34

## 2020-02-05 RX ADMIN — CYANOCOBALAMIN TAB 500 MCG 1000 MCG: 500 TAB at 09:20

## 2020-02-05 RX ADMIN — ALBUMIN (HUMAN) 25 G: 12.5 INJECTION, SOLUTION INTRAVENOUS at 05:47

## 2020-02-05 RX ADMIN — BIMATOPROST 1 DROP: 0.1 SOLUTION/ DROPS OPHTHALMIC at 21:24

## 2020-02-05 RX ADMIN — INSULIN LISPRO 2 UNITS: 100 INJECTION, SOLUTION INTRAVENOUS; SUBCUTANEOUS at 21:28

## 2020-02-05 RX ADMIN — HEPARIN SODIUM 4000 UNITS: 1000 INJECTION INTRAVENOUS; SUBCUTANEOUS at 03:47

## 2020-02-05 RX ADMIN — FUROSEMIDE 40 MG: 10 INJECTION, SOLUTION INTRAMUSCULAR; INTRAVENOUS at 12:20

## 2020-02-05 RX ADMIN — NYSTATIN 500000 UNITS: 100000 SUSPENSION ORAL at 21:23

## 2020-02-05 RX ADMIN — DOCUSATE SODIUM 100 MG: 100 CAPSULE, LIQUID FILLED ORAL at 09:21

## 2020-02-05 RX ADMIN — MILRINONE LACTATE IN DEXTROSE 0.25 MCG/KG/MIN: 200 INJECTION, SOLUTION INTRAVENOUS at 10:40

## 2020-02-05 RX ADMIN — HEPARIN SODIUM AND DEXTROSE 12 UNITS/KG/HR: 10000; 5 INJECTION INTRAVENOUS at 03:48

## 2020-02-05 RX ADMIN — ALBUMIN (HUMAN) 12.5 G: 0.25 INJECTION, SOLUTION INTRAVENOUS at 02:32

## 2020-02-05 RX ADMIN — LEVALBUTEROL HYDROCHLORIDE 1.25 MG: 1.25 SOLUTION, CONCENTRATE RESPIRATORY (INHALATION) at 07:37

## 2020-02-05 RX ADMIN — NYSTATIN 500000 UNITS: 100000 SUSPENSION ORAL at 13:12

## 2020-02-05 RX ADMIN — CEFEPIME HYDROCHLORIDE 1000 MG: 1 INJECTION, SOLUTION INTRAVENOUS at 01:10

## 2020-02-05 RX ADMIN — ASPIRIN 81 MG 81 MG: 81 TABLET ORAL at 09:21

## 2020-02-05 RX ADMIN — FUROSEMIDE 40 MG: 10 INJECTION, SOLUTION INTRAMUSCULAR; INTRAVENOUS at 17:13

## 2020-02-05 RX ADMIN — FUROSEMIDE 40 MG: 10 INJECTION, SOLUTION INTRAMUSCULAR; INTRAVENOUS at 05:43

## 2020-02-05 RX ADMIN — PANTOPRAZOLE SODIUM 40 MG: 40 TABLET, DELAYED RELEASE ORAL at 05:43

## 2020-02-05 NOTE — ASSESSMENT & PLAN NOTE
Baseline creatinine is between 1 6-1 9  Continue to rise of creatinine since yesterday up to 2 6  Probably related to heart failure and cardiorenal syndrome  Nephrology is following

## 2020-02-05 NOTE — ASSESSMENT & PLAN NOTE
Wt Readings from Last 3 Encounters:   02/04/20 79 8 kg (176 lb)   12/17/19 82 6 kg (182 lb)   10/15/19 84 8 kg (187 lb)     ·Patient presented with complaints of dyspnea on exertion, orthopnea, and a 5 lb weight gain over the last week  ProBNP in on admission was 30,000 Patient reports taking 20 mg of Lasix as needed at home according to his daily weights  Patient was taking his Lasix but continued to gain weight over this last week  Per records from recent [facility] hospitalization  Home dose of Lasix was discontinued due to hypotension  Continue IV lasix 40 mg BID - unfortunately still with borderline BP limiting administration of lasix, hold parameters lowered  Weight has improving, now 176  Patient had worsening rales yesterday, despite diuresis and pulmonology was consulted for evaluation of PNA antibiotics was started for cefepime and azithromycin, does not appear to be pneumonia procalcitonin is negative white count is negative no left shift the patient does not have any fevers a low temperatures  This appears to be heart failure  Pulmonology is following  Repeat echocardiogram completed yesterday showing EF 25% with severe diffuse hypokinesis with distinct regional wall motion abnormalities  Echocardiogram completed March 2019 showed EF 30%    Strict I/O's, daily weights, low sodium diet  Per son, patient dry weight has been around 178 since previous admission in [place]  Cardiology is followed  Acute event overnight patient acutely dropped oxygenation mental status decreased and also poor perfusion and lower and upper extremities distally, repeat labs showed a troponin of 4 5 seconds with a picture of cardiorenal syndrome creatinine continues to rise and LFTs are rising, troponin bump could be demand versus an acute event started on heparin drip

## 2020-02-05 NOTE — ASSESSMENT & PLAN NOTE
Echocardiogram was performed showed an EF of 25% with moderate systolic reduction in the RV a moderate TR and moderate AS  Cardiology is following  Troponins up to 4 5 seconds  With signs of hepatic renal syndrome an elevated liver enzyme  Demand versus acute event  Patient examined like advanced heart failure bilateral lower and upper extremity cool extremities  Poor perfusion

## 2020-02-05 NOTE — ASSESSMENT & PLAN NOTE
Lab Results   Component Value Date    HGBA1C 6 1 02/01/2020       Recent Labs     02/04/20  0724 02/04/20  1102 02/04/20  1639 02/04/20  2133   POCGLU 196* 169* 213* 137       Blood Sugar Average: Last 72 hrs:  (P) 172   Continue insulin sliding scale  Keep glucose between 140-180

## 2020-02-05 NOTE — PROGRESS NOTES
0216-RN received critical results from LAB -results were reviewed with CIRILO GAY [facility] JERAMY

## 2020-02-05 NOTE — PROGRESS NOTES
Critical care transfer    Progress Note - Barbie Nearing 9/22/1930, 80 y o  male MRN: [id number]    Unit/Bed#: -01 Encounter: [id number]    Primary Care Provider: Ronda Arora   Date and time admitted to hospital: 1/30/2020  8:11 PM        Hyperbilirubinemia  Assessment & Plan  Continue to monitor    Increased anion gap metabolic acidosis  Assessment & Plan  Continue to monitor    Transaminitis  Assessment & Plan  Elevated liver enzymes due to cardiorenal syndrome probably congestive    Cardiorenal syndrome with renal failure  Assessment & Plan  High-flow nasal cannula see above    Acute respiratory failure with hypoxia (HCC)  Assessment & Plan  Started on high-flow nasal cannula  For respiratory support    Pneumonia  Assessment & Plan  Continue Xopenex and simple  Patient is on cefepime and vancomycin for hospital-acquired pneumonia at this point no white count no left shift procalcitonin is negative and CT of the chest shows bilateral effusion with no definite PNA considered to DC antibiotic      Anemia  Assessment & Plan  ·Chronic likely in setting of malignancy, chronic disease  Hgb noted to be 7 7 yesterday, received 1 unit PRBCs  Continue to monitor  Transfuse for Hgb < 8  Give lasix with any blood transfusions    Hgb stable at 10 0    [place] light chain myeloma (HCC)  Assessment [facility] Marchelle Kehr as an outpt    Ischemic cardiomyopathy  Assessment & Plan  Echocardiogram was performed showed an EF of 25% with moderate systolic reduction in the RV a moderate TR and moderate AS  Cardiology is following  Troponins up to 4 5 seconds  With signs of hepatic renal syndrome an elevated liver enzyme  Demand versus acute event  Patient examined like advanced heart failure bilateral lower and upper extremity cool extremities  Poor perfusion    Stage 4 chronic kidney disease ([facility] [address] )  Assessment & Plan  Baseline creatinine is between 1 6-1 9  Continue to rise of creatinine since yesterday up to 2 6  Probably related to heart failure and cardiorenal syndrome  Nephrology is following      Esophageal reflux  Assessment & Plan  Continue Protonix daily    Mixed hyperlipidemia  Assessment & Plan  Patient no longer takes a statin    Type 2 diabetes mellitus without complication, without long-term current use of insulin [facility])  Assessment & Plan  Lab Results   Component Value Date    HGBA1C 6 1 02/01/2020       Recent Labs     02/04/20  0724 02/04/20  1102 02/04/20  1639 02/04/20  2133   POCGLU 196* 169* 213* 137       Blood Sugar Average: Last 72 hrs:  (P) 172   Continue insulin sliding scale  Keep glucose between 140-180        * Acute on chronic combined systolic and diastolic heart failure (HCC)  Assessment & Plan  Wt Readings from Last 3 Encounters:   02/04/20 79 8 kg (176 lb)   12/17/19 82 6 kg (182 lb)   10/15/19 84 8 kg (187 lb)     ·Patient presented with complaints of dyspnea on exertion, orthopnea, and a 5 lb weight gain over the last week  ProBNP in on admission was 30,000 Patient reports taking 20 mg of Lasix as needed at home according to his daily weights  Patient was taking his Lasix but continued to gain weight over this last week  Per records from recent [facility] hospitalization  Home dose of Lasix was discontinued due to hypotension  Continue IV lasix 40 mg BID - unfortunately still with borderline BP limiting administration of lasix, hold parameters lowered  Weight has improving, now 176  Patient had worsening rales yesterday, despite diuresis and pulmonology was consulted for evaluation of PNA antibiotics was started for cefepime and azithromycin, does not appear to be pneumonia procalcitonin is negative white count is negative no left shift the patient does not have any fevers a low temperatures  This appears to be heart failure     Pulmonology is following  Repeat echocardiogram completed yesterday showing EF 25% with severe diffuse hypokinesis with distinct regional wall motion abnormalities  Echocardiogram completed March 2019 showed EF 30%  Strict I/O's, daily weights, low sodium diet  Per son, patient dry weight has been around 178 since previous admission in [place]  Cardiology is followed  Acute event overnight patient acutely dropped oxygenation mental status decreased and also poor perfusion and lower and upper extremities distally, repeat labs showed a troponin of 4 5 seconds with a picture of cardiorenal syndrome creatinine continues to rise and LFTs are rising, troponin bump could be demand versus an acute event started on heparin drip      ----------------------------------------------------------------------------------------  HPI/24hr events:  Patient overnight appear to be in respiratory distress, poor oxygenation on nasal cannula, bilateral lower and upper extremity distally were blue and cool to the touch, patient appears to be more somnolent but still arousable and answers on question  No fever, has a wet cough with rales throughout  Patient appears to be intravascularly dry, hemoglobin is up to 10 from 9 4,   Repeat labs showed a troponin bump to 4 57 and a heparin drip was started, last echocardiogram shows poor function of 25% with right-sided moderate reduced systolic function, creatinine is steady climbing liver enzymes are bumped appears to be cardiorenal syndrome due to poor output state  Patient's electrolytes are within normal range but he has frequent PVCs, EKG shows no STEMI changes but has frequent PVCs with first-degree AV block  Patient was placed on high-flow nasal cannula, and transferred to the ICU  Heparin drip was started  Called patient's son Nidia Mercado informed in does his father was transferred to the ICU, also had an length discussion about code status    His father has multiple comorbidities and his problem and at this time appears to be his heart combination with advanced stages of age, patient at that time was the code start this level 1 I discussed with his son the fertility of intubation and chest compressions  Due to his poor heart condition an EF of 25% and his advanced stages of age his son stated he would not want intubation or resuscitation  I made patient a level 3  At this point we will continue all lifesaving measures except intubation and resuscitation in case of a code  Further discussion need to be addressed  Disposition: Transfer to Critical Care   Code Status: Level 1 - Full Code  ---------------------------------------------------------------------------------------  SUBJECTIVE  Patient does not have any complaints    Review of Systems   Unable to perform ROS: Mental status change     Review of systems was unable to be performed secondary to MS  ---------------------------------------------------------------------------------------  OBJECTIVE    Vitals   Vitals:    20 2115 20 2258 20 0115 20 0212   BP:  118/64 112/62    BP Location:       Pulse:  94 98    Resp:  18 (!) 26    Temp:  98 1 °F (36 7 °C) 97 9 °F (36 6 °C)    TempSrc:   Axillary    SpO2: 99% 97% 95% 100%   Weight:       Height:         Temp (24hrs), Av 8 °F (36 6 °C), Min:97 5 °F (36 4 °C), Max:98 1 °F (36 7 °C)  Current: Temperature: 97 9 °F (36 6 °C)        SpO2: SpO2: 100 %, SpO2 Activity: SpO2 Activity: At Rest, SpO2 Device: O2 Device: High flow nasal cannula       Physical Exam   Constitutional: He is oriented to person, place, and time  He appears well-developed and well-nourished  HENT:   Head: Normocephalic and atraumatic  Eyes: Pupils are equal, round, and reactive to light  EOM are normal    Neck: Normal range of motion  Neck supple  JVD present  No tracheal deviation present  Cardiovascular: Normal rate and regular rhythm  Murmur heard  Poor distal pulses, fingertips and toes bluish discolored poor capillary referred   Pulmonary/Chest: Effort normal  He has rales     Patient has coarse breath sounds with rales and crackles throughout   Abdominal: Soft  Bowel sounds are normal  He exhibits no distension  There is no tenderness  There is no guarding  Genitourinary: Penis normal    Musculoskeletal: Normal range of motion  He exhibits edema  He exhibits no deformity  Neurological: He is alert and oriented to person, place, and time  Skin: Skin is dry  Capillary refill takes 2 to 3 seconds  There is pallor  Skin is cool to the touch   Nursing note and vitals reviewed        Invasive/non-invasive ventilation settings   Respiratory    Lab Data (Last 4 hours)    None         O2/Vent Data (Last 4 hours)      02/05 0212          Non-Invasive Ventilation Mode HFNC                   Laboratory and Diagnostics:  Results from last 7 days   Lab Units 02/05/20  0140 02/04/20  0458 02/03/20  0515 02/02/20  0456 02/01/20  1308 01/31/20  0457 01/30/20 2021   WBC Thousand/uL 7 36 8 72 6 50 6 68  --  6 25 7 47   HEMOGLOBIN g/dL 10 0* 9 5* 9 4* 9 0* 9 6* 7 7* 8 5*   HEMATOCRIT % 32 1* 30 6* 30 4* 29 3* 31 3* 25 2* 28 1*   PLATELETS Thousands/uL 346 386 389 367  --  356 402*   NEUTROS PCT % 68  --   --   --   --   --  76*   MONOS PCT % 7  --   --   --   --   --  6     Results from last 7 days   Lab Units 02/05/20  0208 02/05/20  0140 02/04/20  0458 02/03/20  0515 02/02/20  0456 02/01/20  1309 01/31/20  0457 01/30/20 2021   SODIUM mmol/L  --  141 139 143 142 140 138 137   POTASSIUM mmol/L  --  4 8 4 4 3 0* 3 1* 3 6 4 0 4 5   CHLORIDE mmol/L  --  102 104 107 106 103 105 103   CO2 mmol/L  --  24 19* 23 24 26 24 24   CO2, I-STAT mmol/L 24  --   --   --   --   --   --   --    ANION GAP mmol/L  --  15* 16* 13 12 11 9 10   BUN mg/dL  --  46* 35* 31* 34* 33* 31* 31*   CREATININE mg/dL  --  2 68* 2 21* 1 74* 1 93* 1 93* 1 73* 1 83*   CALCIUM mg/dL  --  9 4 9 2 8 7 8 8 9 0 8 4 8 7   GLUCOSE RANDOM mg/dL  --  125 236* 100 110 182* 147* 182*   ALT U/L  --  663*  --   --   --   --   --  14   AST U/L  --  1,185*  --   --   --   --   --  12 ALK PHOS U/L  --  139*  --   --   --   --   --  135*   ALBUMIN g/dL  --  3 4*  --   --   --   --   --  2 9*   TOTAL BILIRUBIN mg/dL  --  1 50*  --   --   --   --   --  0 50     Results from last 7 days   Lab Units 02/05/20  0140 02/03/20  0515 02/02/20  0456 01/31/20  0457   MAGNESIUM mg/dL 2 5 2 3 2 4 2 3   PHOSPHORUS mg/dL  --   --   --  3 3      Results from last 7 days   Lab Units 02/05/20  0140   INR  1 41*   PTT seconds 27      Results from last 7 days   Lab Units 02/05/20  0140 01/30/20  2040   TROPONIN I ng/mL 4 57* 0 08*     Results from last 7 days   Lab Units 02/05/20  0140 02/04/20  2224   LACTIC ACID mmol/L 3 9* 3 2*     ABG:    VBG:    Results from last 7 days   Lab Units 02/04/20  1532   PROCALCITONIN ng/ml 0 10       Micro  Results from last 7 days   Lab Units 02/04/20  1657 02/04/20  1448 01/31/20  0831   MRSA CULTURE ONLY   --   --  No Methicillin Resistant Staphlyococcus aureus (MRSA) isolated   LEGIONELLA URINARY ANTIGEN  Negative  --   --    STREP PNEUMONIAE ANTIGEN, URINE   --  Negative  --        EKG: NSR with first-degree AV block  Imaging: I have personally reviewed pertinent reports  Intake and Output  I/O       02/03 0701 - 02/04 0700 02/04 0701 - 02/05 0700    IV Piggyback  200    Total Intake(mL/kg)  200 (2 5)    Urine (mL/kg/hr) 400 (0 2)     Total Output 400     Net -400 +200          Unmeasured Urine Occurrence  1 x          Height and Weights   Height: 5' 7" (170 2 cm)  IBW: 66 1 kg  Body mass index is 27 57 kg/m²    Weight (last 2 days)     Date/Time   Weight    02/04/20 0505   79 8 (176)    02/03/20 0600   85 6 (188 71)                Nutrition       Diet Orders   (From admission, onward)             Start     Ordered    01/31/20 0246  Diet Cardiovascular; Cardiac; Consistent Carbohydrate Diet Level 3 (6 carb servings/90 grams CHO/meal), Sodium 2 GM, Fluid Restriction 1800 ML  Diet effective now     Question Answer Comment   Diet Type Cardiovascular    Cardiac Cardiac Other Restriction(s): Consistent Carbohydrate Diet Level 3 (6 carb servings/90 grams CHO/meal)    Other Restriction(s): Sodium 2 GM    Other Restriction(s): Fluid Restriction 1800 ML    RD to adjust diet per protocol?  Yes        01/31/20 0247                  Active Medications  Scheduled Meds:    Current Facility-Administered Medications:  acetaminophen 650 mg Oral Q6H PRN Venia Puller, CRNP    aspirin 81 mg Oral Daily Venia Puller, CRNP    azithromycin 500 mg Oral Q24H Krissy MONTEIRO PA-C    bimatoprost 1 drop Both Eyes HS Venia Puller, CRNP    budesonide-formoterol 2 puff Inhalation BID Venia Puller, CRNP    cefepime 1,000 mg Intravenous Q12H Krissy MONTEIRO PA-C Last Rate: Stopped (02/05/20 0252)   cyanocobalamin 1,000 mcg Oral Daily Venia Puller, CRNP    docusate sodium 100 mg Oral Daily Venia Puller, CRNP    furosemide 40 mg Intravenous TID (diuretic) Julian Lacey MD    guaiFENesin 1,200 mg Oral BID Venia Puller, CRNP    heparin (porcine) 3-20 Units/kg/hr (Order-Specific) Intravenous Titrated Shekhar Dubois PA-C    heparin (porcine) 2,000 Units Intravenous PRN Shekhar Dubois PA-C    heparin (porcine) 4,000 Units Intravenous Once Shekhar Dubois PA-C    heparin (porcine) 4,000 Units Intravenous PRN Shekhar Dubois PA-C    insulin lispro 1-5 Units Subcutaneous HS Idania Romero MD    insulin lispro 1-6 Units Subcutaneous TID AC Idania Romero MD    levalbuterol 1 25 mg Nebulization BID Yamile Maguire DO    levalbuterol 1 25 mg Nebulization Q8H PRN Yamile Maguire DO    melatonin 6 mg Oral HS Venia Namaner, CRNP    nitroglycerin 0 4 mg Sublingual Q5 Min PRN Venia Puller, CRNP    OLANZapine 2 5 mg Oral Q3H PRN Krissy MONTEIRO PA-C    pantoprazole 40 mg Oral Early Morning Venia Puller, CRNP    timolol 1 drop Both Eyes Daily Venia Puller, CRNP    vancomycin 10 mg/kg (Adjusted) Intravenous Q24H Krissy MONTEIRO PA-C Last Rate: 750 mg (02/04/20 1672) Continuous Infusions:    heparin (porcine) 3-20 Units/kg/hr (Order-Specific)     PRN Meds:     acetaminophen 650 mg Q6H PRN   heparin (porcine) 2,000 Units PRN   heparin (porcine) 4,000 Units PRN   levalbuterol 1 25 mg Q8H PRN   nitroglycerin 0 4 mg Q5 Min PRN   OLANZapine 2 5 mg Q3H PRN     ---------------------------------------------------------------------------------------  Advance Directive and Living Will:      Power of :    POLST:    ---------------------------------------------------------------------------------------  Counseling / Coordination of Care  Total Critical Care time spent 60 minutes excluding procedures, teaching and family updates  Patient was in acute respiratory distress, acute respiratory failure and acute decompensated heart failure needing my immediate attention including stat labs increasing airway support and medical management  Shekhar Dubois PA-C      Portions of the record may have been created with voice recognition software  Occasional wrong word or "sound a like" substitutions may have occurred due to the inherent limitations of voice recognition software    Read the chart carefully and recognize, using context, where substitutions have occurred

## 2020-02-05 NOTE — PLAN OF CARE
Problem: Potential for Falls  Goal: Patient will remain free of falls  Description  INTERVENTIONS:  - Assess patient frequently for physical needs  -  Identify cognitive and physical deficits and behaviors that affect risk of falls    -  [place] fall precautions as indicated by assessment   - Educate patient/family on patient safety including physical limitations  - Instruct patient to call for assistance with activity based on assessment  - Modify environment to reduce risk of injury  - Consider OT/PT consult to assist with strengthening/mobility  Outcome: Progressing     Problem: DISCHARGE PLANNING  Goal: Discharge to home or other facility with appropriate resources  Description  INTERVENTIONS:  - Identify barriers to discharge w/patient and caregiver  - Arrange for needed discharge resources and transportation as appropriate  - Identify discharge learning needs (meds, wound care, etc )  - Arrange for interpretive services to assist at discharge as needed  - Refer to Case Management Department for coordinating discharge planning if the patient needs post-hospital services based on physician/advanced practitioner order or complex needs related to functional status, cognitive ability, or social support system  Outcome: Progressing     Problem: RESPIRATORY - ADULT  Goal: Achieves optimal ventilation and oxygenation  Description  INTERVENTIONS:  - Assess for changes in respiratory status  - Assess for changes in mentation and behavior  - Position to facilitate oxygenation and minimize respiratory effort  - Oxygen administered by appropriate delivery if ordered  - Initiate smoking cessation education as indicated  - Encourage broncho-pulmonary hygiene including cough, deep breathe, Incentive Spirometry  - Assess the need for suctioning and aspirate as needed  - Assess and instruct to report SOB or any respiratory difficulty  - Respiratory Therapy support as indicated  Outcome: Progressing     Problem: HEMATOLOGIC - ADULT  Goal: Maintains hematologic stability  Description  INTERVENTIONS  - Assess for signs and symptoms of bleeding or hemorrhage  - Monitor labs  - Administer supportive blood products/factors as ordered and appropriate  Outcome: Progressing     Problem: Prexisting or High Potential for Compromised Skin Integrity  Goal: Skin integrity is maintained or improved  Description  INTERVENTIONS:  - Identify patients at risk for skin breakdown  - Assess and monitor skin integrity  - Assess and monitor nutrition and hydration status  - Monitor labs   - Assess for incontinence   - Turn and reposition patient  - Assist with mobility/ambulation  - Relieve pressure over bony prominences  - Avoid friction and shearing  - Provide appropriate hygiene as needed including keeping skin clean and dry  - Evaluate need for skin moisturizer/barrier cream  - Collaborate with interdisciplinary team   - Patient/family teaching  - Consider wound care consult   Outcome: Progressing     Problem: INFECTION - ADULT  Goal: Absence or prevention of progression during hospitalization  Description  INTERVENTIONS:  - Assess and monitor for signs and symptoms of infection  - Monitor lab/diagnostic results  - Monitor all insertion sites, i e  indwelling lines, tubes, and drains  - Monitor endotracheal if appropriate and nasal secretions for changes in amount and color  - [place] appropriate cooling/warming therapies per order  - Administer medications as ordered  - Instruct and encourage patient and family to use good hand hygiene technique  - Identify and instruct in appropriate isolation precautions for identified infection/condition  Outcome: Progressing     Problem: Knowledge Deficit  Goal: Patient/family/caregiver demonstrates understanding of disease process, treatment plan, medications, and discharge instructions  Description  Complete learning assessment and assess knowledge base    Interventions:  - Provide teaching at level of understanding  - Provide teaching via preferred learning methods  Outcome: Progressing     Problem: CARDIOVASCULAR - ADULT  Goal: Maintains optimal cardiac output and hemodynamic stability  Description  INTERVENTIONS:  - Monitor I/O, vital signs and rhythm  - Monitor for S/S and trends of decreased cardiac output  - Administer and titrate ordered vasoactive medications to optimize hemodynamic stability  - Assess quality of pulses, skin color and temperature  - Assess for signs of decreased coronary artery perfusion  - Instruct patient to report change in severity of symptoms  Outcome: Progressing  Goal: Absence of cardiac dysrhythmias or at baseline rhythm  Description  INTERVENTIONS:  - Continuous cardiac monitoring, vital signs, obtain 12 lead EKG if ordered  - Administer antiarrhythmic and heart rate control medications as ordered  - Monitor electrolytes and administer replacement therapy as ordered  Outcome: Progressing     Problem: METABOLIC, FLUID AND ELECTROLYTES - ADULT  Goal: Fluid balance maintained  Description  INTERVENTIONS:  - Monitor labs   - Monitor I/O and WT  - Instruct patient on fluid and nutrition as appropriate  - Assess for signs & symptoms of volume excess or deficit  Outcome: Progressing     Problem: SAFETY,RESTRAINT: NV/NON-SELF DESTRUCTIVE BEHAVIOR  Goal: Remains free of harm/injury (restraint for non violent/non self-detsructive behavior)  Description  INTERVENTIONS:  - Instruct patient/family regarding restraint use   - Assess and monitor physiologic and psychological status   - Provide interventions and comfort measures to meet assessed patient needs   - Identify and implement measures to help patient regain control  - Assess readiness for release of restraint   Outcome: Progressing  Goal: Returns to optimal restraint-free functioning  Description  INTERVENTIONS:  - Assess the patient's behavior and symptoms that indicate continued need for restraint  - Identify and implement measures to help patient regain control  - Assess readiness for release of restraint   Outcome: Progressing

## 2020-02-05 NOTE — ASSESSMENT & PLAN NOTE
·Chronic likely in setting of malignancy, chronic disease  Hgb noted to be 7 7 yesterday, received 1 unit PRBCs  Continue to monitor  Transfuse for Hgb < 8  Give lasix with any blood transfusions    Hgb stable at 10 0

## 2020-02-05 NOTE — PROGRESS NOTES
Cardiology Progress Note - William Wallace 80 y o  male MRN: [id number]    Unit/Bed#: -01 Encounter: [id number]      Assessment:  Principal Problem:    Acute on chronic combined systolic and diastolic heart failure (HCC)  Active Problems:    Type 2 diabetes mellitus without complication, without long-term current use of insulin (HCC)    Mixed hyperlipidemia    Esophageal reflux    Stage 4 chronic kidney disease (HCC)    Ischemic cardiomyopathy    Kappa light chain myeloma (HCC)    Anemia    Pneumonia    Acute respiratory failure with hypoxia (HCC)    Cardiorenal syndrome with renal failure    Transaminitis    Increased anion gap metabolic acidosis    Hyperbilirubinemia      Plan:    1  Acute hypoxic respiratory failure - On presentation this appeared to be associated with both community-acquired pneumonia and congestive heart failure  He did respond to IV diuretic therapy, but then his creatinine jac  This was held yesterday, but then he did worsen from this standpoint and from LFTs  Spoke with Critical Care, and we are treating CHF as stated below  2   Acute on chronic systolic CHF - Patient was not showing any signs of low output on exam, but he is showing more signs of cardiogenic shock on labs  His LFTs and creatinine are rising, along with lactate  Critical care discussed with family about trying Milrinone to see if this would help reverse these issues, and help diuresis  We are going to give it a try over the next 24-48 hours  We will follow telemetry and hemodynamic closely  Continue IV Lasix as ordered and follow urine output closely  Overall prognosis is poor  Updated family over the phone  3   Cardiomyopathy - Ejection fraction 25% - Presumed to be ischemic given his history of CAD  However he was undergoing a workup for infiltrative disorders through our heart failure program   Regardless, treatment would not change for him given his multiple comorbidities    We will continue treat heart failure as above, and arrange close outpatient follow-up  4  Type 2 MI - This is secondary to acute CHF, and underlying coronary artery disease  Treatment is just supportive at this point along with treatment for his CHF  I do not feel we need to continue IV heparin and I will discontinue it  Subjective:   Patient seen and examined  No significant events overnight  Patient tells me he still short of breath  Chest x-ray showing significant left-sided pneumonia  He did receive IV Lasix, but creatinine jac  Objective:     Vitals: Blood pressure 127/79, pulse 96, temperature 98 5 °F (36 9 °C), temperature source Axillary, resp  rate 19, height 5' 7" (1 702 m), weight 82 6 kg (182 lb 1 6 oz), SpO2 99 %  , Body mass index is 28 52 kg/m² ,   Orthostatic Blood Pressures      Most Recent Value   Blood Pressure  127/79 filed at 02/05/2020 1118   Patient Position - Orthostatic VS  Lying filed at 02/05/2020 1118            Intake/Output Summary (Last 24 hours) at 2/5/2020 1353  Last data filed at 2/5/2020 1319  Gross per 24 hour   Intake 773 52 ml   Output 1400 ml   Net -626 48 ml     Physical Exam:    GEN: Nidia Mercado appears well, alert and oriented x 3, pleasant and cooperative   HEENT: pupils equal, round, and reactive to light; extraocular muscles intact  NECK: supple, no carotid bruits  +JVD   HEART: regular rhythm, normal S1 and S2, +SEN, clicks, gallops or rubs   LUNGS:  Diminished with rales bilaterally; no wheezes or rhonchi   ABDOMEN: normal bowel sounds, soft, no tenderness, no distention  EXTREMITIES: peripheral pulses normal; no clubbing, cyanosis, or significant edema  Extremities warm    NEURO: no focal findings   SKIN: normal without suspicious lesions on exposed skin      Medications:      Current Facility-Administered Medications:     acetaminophen (TYLENOL) tablet 650 mg, 650 mg, Oral, Q6H PRN, DON Galicia    aspirin chewable tablet 81 mg, 81 mg, Oral, Daily, Tonnie Kaleyt, CRNP, 81 mg at 02/05/20 0921    bimatoprost (LUMIGAN) 0 01 % ophthalmic solution 1 drop, 1 drop, Both Eyes, HS, Tonanandae Kaleyt, CRNP, 1 drop at 02/04/20 2232    budesonide-formoterol (SYMBICORT) 160-4 5 mcg/act inhaler 2 puff, 2 puff, Inhalation, BID, Tonanandae Kaleyt, CRNP, 2 puff at 02/04/20 2110    cefepime (MAXIPIME) IVPB (premix) 1,000 mg, 1,000 mg, Intravenous, Q12H, Lorelle Resides, CRNP, Stopped at 02/05/20 1319    cyanocobalamin (VITAMIN B-12) tablet 1,000 mcg, 1,000 mcg, Oral, Daily, Tonnie Pelt, CRNP, 1,000 mcg at 02/05/20 0920    docusate sodium (COLACE) capsule 100 mg, 100 mg, Oral, Daily, Tonnie Pelt, CRNP, 100 mg at 02/05/20 2337    furosemide (LASIX) injection 40 mg, 40 mg, Intravenous, TID (diuretic), Orly Brannon MD, 40 mg at 02/05/20 1220    guaiFENesin (MUCINEX) 12 hr tablet 1,200 mg, 1,200 mg, Oral, BID, Tonnie Kaleyt, CRNP, 1,200 mg at 02/05/20 6321    heparin (porcine) 25,000 units in 250 mL infusion (premix), 3-20 Units/kg/hr (Order-Specific), Intravenous, Titrated, Shekhar Dubois PA-C, Last Rate: 7 2 mL/hr at 02/05/20 1144, 9 Units/kg/hr at 02/05/20 1144    heparin (porcine) injection 2,000 Units, 2,000 Units, Intravenous, PRN, Shekhar Dubois PA-C    heparin (porcine) injection 4,000 Units, 4,000 Units, Intravenous, PRN, Shekhar Dubois PA-C    insulin lispro (HumaLOG) 100 units/mL subcutaneous injection 1-5 Units, 1-5 Units, Subcutaneous, HS, Tulio Shane MD, 2 Units at 02/03/20 2237    insulin lispro (HumaLOG) 100 units/mL subcutaneous injection 1-6 Units, 1-6 Units, Subcutaneous, TID AC, 1 Units at 02/05/20 1300 **AND** Fingerstick Glucose (POCT), , , TID AC, Tulio Shane MD    levalbuterol Melven [place]) inhalation solution 1 25 mg, 1 25 mg, Nebulization, BID, Noel Maguire DO, 1 25 mg at 02/05/20 0737    levalbuterol (XOPENEX) inhalation solution 1 25 mg, 1 25 mg, Nebulization, Q8H PRN, Clifton Meraz DO   melatonin tablet 6 mg, 6 mg, Oral, HS, DON Martinez, 6 mg at 02/03/20 2236    milrinone (PRIMACOR) 20 mg in 100 mL infusion (premix), 0 25 mcg/kg/min, Intravenous, Continuous, DON Lopez, Last Rate: 6 2 mL/hr at 02/05/20 1040, 0 25 mcg/kg/min at 02/05/20 1040    nitroglycerin (NITROSTAT) SL tablet 0 4 mg, 0 4 mg, Sublingual, Q5 Min PRN, DON Martinez    nystatin (MYCOSTATIN) oral suspension 500,000 Units, 500,000 Units, Swish & Swallow, 4x Daily, DON Self, 500,000 Units at 02/05/20 1312    OLANZapine (ZyPREXA) tablet 2 5 mg, 2 5 mg, Oral, Q3H PRN, Shanda MONTEIRO PA-C, 2 5 mg at 02/04/20 1712    pantoprazole (PROTONIX) EC tablet 40 mg, 40 mg, Oral, Early Morning, DON Martinez, 40 mg at 02/05/20 0543    timolol (TIMOPTIC) 0 5 % ophthalmic solution 1 drop, 1 drop, Both Eyes, Daily, DON Martinez, 1 drop at 02/04/20 0815     Labs & Results:    Results from last 7 days   Lab Units 02/05/20  0945 02/05/20  0536 02/05/20  0140   TROPONIN I ng/mL 7 40* 7 58* 4 57*     Results from last 7 days   Lab Units 02/05/20  0454 02/05/20  0140 02/04/20  0458   WBC Thousand/uL 7 70  8 01 7 36 8 72   HEMOGLOBIN g/dL 9 3*  9 6* 10 0* 9 5*   HEMATOCRIT % 30 3*  31 0* 32 1* 30 6*   PLATELETS Thousands/uL 323  324 346 386         Results from last 7 days   Lab Units 02/05/20  0454 02/05/20  0208 02/05/20  0140 02/04/20  0458  01/30/20  2021   POTASSIUM mmol/L 4 3  --  4 8 4 4   < > 4 5   CHLORIDE mmol/L 99*  --  102 104   < > 103   CO2 mmol/L 22  --  24 19*   < > 24   CO2, I-STAT mmol/L  --  24  --   --   --   --    BUN mg/dL 44*  --  46* 35*   < > 31*   CREATININE mg/dL 2 75*  --  2 68* 2 21*   < > 1 83*   CALCIUM mg/dL 9 0  --  9 4 9 2   < > 8 7   ALK PHOS U/L  --   --  139*  --   --  135*   ALT U/L  --   --  663*  --   --  14   AST U/L  --   --  1,185*  --   --  12    < > = values in this interval not displayed       Results from last 7 days   Lab Units 02/05/20  0953 02/05/20  0140   INR   --  1 41*   PTT seconds 131* 27     Results from last 7 days   Lab Units 02/05/20  0140 02/03/20  0515 02/02/20  0456   MAGNESIUM mg/dL 2 5 2 3 2 4         EKG personally reviewed by Candice Stanton MD  Sinus rhythm, first-degree AV block with PACs and PVCs, nonspecific IVCD and nonspecific T-wave changes  ECHO:  LEFT VENTRICLE:  The ventricle was mildly dilated  Systolic function was markedly reduced  Ejection fraction was estimated to be 25 %  There was severe diffuse hypokinesis with distinct regional wall motion abnormalities  There was akinesis of the mid-apical anterior, mid anteroseptal, apical septal, apical lateral, and apical wall(s)  Wall thickness was at the upper limits of normal   Doppler parameters were consistent with elevated ventricular end-diastolic filling pressure      RIGHT VENTRICLE:  The ventricle was dilated  Systolic function was moderately reduced      LEFT ATRIUM:  The atrium was moderately dilated      RIGHT ATRIUM:  The atrium was mildly dilated      MITRAL VALVE:  There was mild annular calcification  There was moderate diffuse thickening  There was mild to moderate regurgitation      AORTIC VALVE:  The valve was trileaflet  Leaflets exhibited normal thickness, moderate calcification, and moderately reduced cuspal separation  Transaortic velocity and gradient were increased due to stenosis, but lower than expected for the degree of stenosis due to concomitant decreased transaortic flow (decreased stroke volume)  There was moderate stenosis  There was mild regurgitation  Valve mean gradient was 6 mmHg  Estimated aortic valve area (by VTI) was 1 25 cmï¾²  Estimated aortic valve area (by Vmax) was 1 25 cmï¾²     TRICUSPID VALVE:  There was mild regurgitation      Counseling / Coordination of Care  Total floor / unit time spent today 25 minutes    Greater than 50% of total time was spent with the patient and / or family counseling and / or coordination of care

## 2020-02-05 NOTE — ASSESSMENT & PLAN NOTE
Continue Xopenex and simple  Patient is on cefepime and vancomycin for hospital-acquired pneumonia at this point no white count no left shift procalcitonin is negative and CT of the chest shows bilateral effusion with no definite PNA considered to DC antibiotic

## 2020-02-05 NOTE — PROGRESS NOTES
NEPHROLOGY PROGRESS NOTE   Nathan Sheriff 80 y o  male MRN: [id number]  Unit/Bed#: -01 Encounter: [id number]      ASSESSMENT/PLAN:  Acute kidney injury (POA) on chronic kidney disease, stage IV:  - acute kidney injury suspect secondary to  poor oral intake + cardiorenal syndrome with severe cardiomyopathy + hemodynamic perturbations with fluctuations in blood pressure  - upon review of medical records, it appears that the patient has a baseline Creatinine of 1 2- 1 9 mg/dL  - patient was admitted with a creatinine of 1 83 mg/dL  - peak creatinine during this admission thus far was 2 75 mg/dL, today  - post IV Lasix and two doses of albumin overnight     - milrinone drip started today  - continues on Lasix 40 mg IV tid    - UA completed on 1/30 revealed positive protein, otherwise bland    - avoid NSAIDS, nephrotoxins, IV contrast if possible  - adjust medications to appropriate GFR   - acid base and lytes stable except positive anion gap metabolic acidosis  - avoid hypotension/ fluctuations in blood pressure to prevent decreased renal perfusion    - check BMP, magnesium, phosphorus in a m    - check repeat UA and urine studies  - check PVR with bladder scan  - implement urinary retention protocol    - await renal recovery  - monitor volume status with strict intake/output  - please perform daily weights  Blood pressure:  - blood pressure with fluctuations  - currently off antihypertensive medications  - optimize hemodynamics  - maintain MAP > 65mmHg  - avoid hypotension/ fluctuations in blood pressure  H/H:  - most recent hemoglobin at 9 6 grams/deciliter    - maintain hemoglobin greater than 8 grams/deciliter    - management per primary team      Volume status:  - clinically patient appears to be hypervolemic with intravascular volume depletion     - CT scan completed yesterday revealed moderate right and small left pleural effusions with adjacent compressive atelectasis  Cardiomegaly coronary artery calcifications  DM:  - most recent hemoglobin A1c: 6 1    - currently on insulin    - management as per Primary team     CHF: EF 33% with left systolic function markedly reduced and mildly dilated  - monitor volume status closely  - milrinone drip initiated  - currently on high-flow nasal cannula  - currently on: Lasix 40 mg IV three times daily  - management as per primary team      Kappa light chain myeloma:   - status post chemotherapy per heme/oncology  SUBJECTIVE:  Patient resting in bed with son at bedside  Patient with high-flow nasal cannula on, stating he is having some difficulty breathing  Patient denies chest pain, nausea, vomiting, diarrhea      OBJECTIVE:  Current Weight: Weight - Scale: 82 6 kg (182 lb 1 6 oz)  Vitals:    02/05/20 0748   BP: 110/55   Pulse: 97   Resp: (!) 26   Temp:    SpO2: 100%       Intake/Output Summary (Last 24 hours) at 2/5/2020 6137  Last data filed at 2/5/2020 7119  Gross per 24 hour   Intake 723 52 ml   Output 400 ml   Net 323 52 ml       General:  No acute distress resting in bed with high-flow nasal cannula present  Skin:  Warm, no rash  Eyes:  Sclerae anicteric  ENT:  Dry lips and mucous membranes  Neck:  Supple, JVD, trachea midline  Chest:  Crackles and rales throughout   CVS:  Irregular rate regular rhythm  Abdomen:  Soft, nontender, normoactive bowel sounds  Extremities:  No edema bilaterally   Neuro:  Alert oriented  Psych:  Appropriate affect      Medications:  Scheduled Meds:  Current Facility-Administered Medications:  acetaminophen 650 mg Oral Q6H PRN Gleda Autumn, CRNP    aspirin 81 mg Oral Daily Gleda Autumn, CRNP    azithromycin 500 mg Oral Q24H Krissy MONTEIRO PA-C    bimatoprost 1 drop Both Eyes HS Gleda Autumn, CRNP    budesonide-formoterol 2 puff Inhalation BID Gleda Autumn, CRNP    cyanocobalamin 1,000 mcg Oral Daily Gleda Autumn, CRNP    docusate sodium 100 mg Oral Daily Gleda Autumn, CRNP    furosemide 40 mg Intravenous TID (diuretic) Wilfrido Holloway MD    guaiFENesin 1,200 mg Oral BID Ethelda DON Hansen    heparin (porcine) 3-20 Units/kg/hr (Order-Specific) Intravenous Titrated Shekhar Dubois PA-C Last Rate: 12 Units/kg/hr (02/05/20 0348)   heparin (porcine) 2,000 Units Intravenous PRN Shekhar Dubois PA-C    heparin (porcine) 4,000 Units Intravenous PRN Shekhar Dubois PA-C    insulin lispro 1-5 Units Subcutaneous HS Teresademetra Beltran MD    insulin lispro 1-6 Units Subcutaneous TID AC Teresa MD Devin    levalbuterol 1 25 mg Nebulization BID Noel Maguire DO    levalbuterol 1 25 mg Nebulization Q8H PRN Asa Maguire DO    melatonin 6 mg Oral HS EthDON Bonilla    nitroglycerin 0 4 mg Sublingual Q5 Min PRN DON Uribe    OLANZapine 2 5 mg Oral Q3H PRN Krissy MONTEIRO PA-C    pantoprazole 40 mg Oral Early Morning Ethelda DON Hansen    timolol 1 drop Both Eyes Daily EthDON Bonilla        PRN Meds:   acetaminophen    heparin (porcine)    heparin (porcine)    levalbuterol    nitroglycerin    OLANZapine    Continuous Infusions:  heparin (porcine) 3-20 Units/kg/hr (Order-Specific) Last Rate: 12 Units/kg/hr (02/05/20 0348)       Laboratory Results:  Results from last 7 days   Lab Units 02/05/20  0454 02/05/20  0208 02/05/20  0140 02/04/20  0458 02/03/20  0515 02/02/20  0456 02/01/20  1309 02/01/20  1308 01/31/20  0457 01/30/20 2021   WBC Thousand/uL 7 70  8 01  --  7 36 8 72 6 50 6 68  --   --  6 25 7 47   HEMOGLOBIN g/dL 9 3*  9 6*  --  10 0* 9 5* 9 4* 9 0*  --  9 6* 7 7* 8 5*   HEMATOCRIT % 30 3*  31 0*  --  32 1* 30 6* 30 4* 29 3*  --  31 3* 25 2* 28 1*   PLATELETS Thousands/uL 323  324  --  346 386 389 367  --   --  356 402*   SODIUM mmol/L 138  --  141 139 143 142 140  --  138 137   POTASSIUM mmol/L 4 3  --  4 8 4 4 3 0* 3 1* 3 6  --  4 0 4 5   CHLORIDE mmol/L 99*  --  102 104 107 106 103  --  105 103   CO2 mmol/L 22  --  24 19* 23 24 26  --  24 24   CO2, I-STAT mmol/L  --  24  --   --   --   --   --   --   --   --    BUN mg/dL 44*  --  46* 35* 31* 34* 33*  --  31* 31*   CREATININE mg/dL 2 75*  --  2 68* 2 21* 1 74* 1 93* 1 93*  --  1 73* 1 83*   CALCIUM mg/dL 9 0  --  9 4 9 2 8 7 8 8 9 0  --  8 4 8 7   MAGNESIUM mg/dL  --   --  2 5  --  2 3 2 4  --   --  2 3  --    PHOSPHORUS mg/dL  --   --   --   --   --   --   --   --  3 3  --

## 2020-02-06 ENCOUNTER — APPOINTMENT (INPATIENT)
Dept: ULTRASOUND IMAGING | Facility: HOSPITAL | Age: 85
DRG: 280 | End: 2020-02-06
Payer: COMMERCIAL

## 2020-02-06 PROBLEM — C90.00 KAPPA LIGHT CHAIN MYELOMA (HCC): Chronic | Status: ACTIVE | Noted: 2018-10-08

## 2020-02-06 PROBLEM — D64.9 ANEMIA: Chronic | Status: ACTIVE | Noted: 2020-02-01

## 2020-02-06 PROBLEM — R77.8 ELEVATED TROPONIN: Status: ACTIVE | Noted: 2020-02-06

## 2020-02-06 PROBLEM — N18.4 ANEMIA IN STAGE 4 CHRONIC KIDNEY DISEASE (HCC): Chronic | Status: ACTIVE | Noted: 2018-08-14

## 2020-02-06 PROBLEM — R41.0 DELIRIUM: Status: ACTIVE | Noted: 2020-02-06

## 2020-02-06 PROBLEM — N17.9 AKI (ACUTE KIDNEY INJURY) (HCC): Status: ACTIVE | Noted: 2020-02-06

## 2020-02-06 PROBLEM — N39.0 UTI (URINARY TRACT INFECTION): Status: RESOLVED | Noted: 2018-10-23 | Resolved: 2020-02-06

## 2020-02-06 PROBLEM — R53.1 GENERALIZED WEAKNESS: Status: RESOLVED | Noted: 2019-03-14 | Resolved: 2020-02-06

## 2020-02-06 PROBLEM — D63.1 ANEMIA IN STAGE 4 CHRONIC KIDNEY DISEASE (HCC): Chronic | Status: ACTIVE | Noted: 2018-08-14

## 2020-02-06 PROBLEM — Z22.322 MRSA (METHICILLIN RESISTANT STAPH AUREUS) CULTURE POSITIVE: Chronic | Status: ACTIVE | Noted: 2018-12-20

## 2020-02-06 PROBLEM — N18.4 STAGE 4 CHRONIC KIDNEY DISEASE (HCC): Chronic | Status: ACTIVE | Noted: 2018-07-23

## 2020-02-06 PROBLEM — I21.4 NSTEMI (NON-ST ELEVATED MYOCARDIAL INFARCTION) (HCC): Status: RESOLVED | Noted: 2019-03-15 | Resolved: 2020-02-06

## 2020-02-06 PROBLEM — K59.00 CONSTIPATION: Status: RESOLVED | Noted: 2019-03-15 | Resolved: 2020-02-06

## 2020-02-06 LAB
ALBUMIN SERPL BCP-MCNC: 3.3 G/DL (ref 3.5–5)
ALP SERPL-CCNC: 122 U/L (ref 46–116)
ALT SERPL W P-5'-P-CCNC: 1205 U/L (ref 12–78)
ANION GAP SERPL CALCULATED.3IONS-SCNC: 15 MMOL/L (ref 4–13)
AST SERPL W P-5'-P-CCNC: 1213 U/L (ref 5–45)
ATRIAL RATE: 112 BPM
ATRIAL RATE: 98 BPM
BASOPHILS # BLD AUTO: 0.04 THOUSANDS/ΜL (ref 0–0.1)
BASOPHILS NFR BLD AUTO: 0 % (ref 0–1)
BILIRUB DIRECT SERPL-MCNC: 0.31 MG/DL (ref 0–0.2)
BILIRUB SERPL-MCNC: 1.1 MG/DL (ref 0.2–1)
BUN SERPL-MCNC: 50 MG/DL (ref 5–25)
CALCIUM SERPL-MCNC: 8.8 MG/DL (ref 8.3–10.1)
CHLORIDE SERPL-SCNC: 106 MMOL/L (ref 100–108)
CO2 SERPL-SCNC: 25 MMOL/L (ref 21–32)
CREAT SERPL-MCNC: 2.89 MG/DL (ref 0.6–1.3)
EOSINOPHIL # BLD AUTO: 0.04 THOUSAND/ΜL (ref 0–0.61)
EOSINOPHIL NFR BLD AUTO: 0 % (ref 0–6)
ERYTHROCYTE [DISTWIDTH] IN BLOOD BY AUTOMATED COUNT: 19.9 % (ref 11.6–15.1)
FERRITIN SERPL-MCNC: 87 NG/ML (ref 8–388)
FOLATE SERPL-MCNC: 16.4 NG/ML (ref 3.1–17.5)
GFR SERPL CREATININE-BSD FRML MDRD: 18 ML/MIN/1.73SQ M
GLUCOSE BLD-MCNC: 214 MG/DL (ref 70–99)
GLUCOSE SERPL-MCNC: 195 MG/DL (ref 65–140)
GLUCOSE SERPL-MCNC: 198 MG/DL (ref 65–140)
GLUCOSE SERPL-MCNC: 199 MG/DL (ref 65–140)
GLUCOSE SERPL-MCNC: 206 MG/DL (ref 65–140)
GLUCOSE SERPL-MCNC: 211 MG/DL (ref 65–140)
HAV IGM SER QL: NORMAL
HBV CORE IGM SER QL: NORMAL
HBV SURFACE AG SER QL: NORMAL
HCT VFR BLD AUTO: 28.3 % (ref 36.5–49.3)
HCV AB SER QL: NORMAL
HGB BLD-MCNC: 8.8 G/DL (ref 12–17)
IMM GRANULOCYTES # BLD AUTO: 0.06 THOUSAND/UL (ref 0–0.2)
IMM GRANULOCYTES NFR BLD AUTO: 1 % (ref 0–2)
IRON SATN MFR SERPL: 8 %
IRON SERPL-MCNC: 27 UG/DL (ref 65–175)
LACTATE SERPL-SCNC: 1.8 MMOL/L (ref 0.5–2)
LYMPHOCYTES # BLD AUTO: 1.05 THOUSANDS/ΜL (ref 0.6–4.47)
LYMPHOCYTES NFR BLD AUTO: 11 % (ref 14–44)
MAGNESIUM SERPL-MCNC: 2.5 MG/DL (ref 1.6–2.6)
MCH RBC QN AUTO: 26.9 PG (ref 26.8–34.3)
MCHC RBC AUTO-ENTMCNC: 31.1 G/DL (ref 31.4–37.4)
MCV RBC AUTO: 87 FL (ref 82–98)
MONOCYTES # BLD AUTO: 0.54 THOUSAND/ΜL (ref 0.17–1.22)
MONOCYTES NFR BLD AUTO: 5 % (ref 4–12)
MRSA NOSE QL CULT: NORMAL
NEUTROPHILS # BLD AUTO: 8.21 THOUSANDS/ΜL (ref 1.85–7.62)
NEUTS SEG NFR BLD AUTO: 83 % (ref 43–75)
NRBC BLD AUTO-RTO: 0 /100 WBCS
P AXIS: 71 DEGREES
PHOSPHATE SERPL-MCNC: 4.8 MG/DL (ref 2.3–4.1)
PLATELET # BLD AUTO: 245 THOUSANDS/UL (ref 149–390)
PMV BLD AUTO: 10 FL (ref 8.9–12.7)
POCT TEST: ABNORMAL
POTASSIUM SERPL-SCNC: 3.8 MMOL/L (ref 3.5–5.3)
PR INTERVAL: 216 MS
PROT SERPL-MCNC: 6.8 G/DL (ref 6.4–8.2)
QRS AXIS: 136 DEGREES
QRS AXIS: 212 DEGREES
QRSD INTERVAL: 132 MS
QRSD INTERVAL: 138 MS
QT INTERVAL: 342 MS
QT INTERVAL: 402 MS
QTC INTERVAL: 466 MS
QTC INTERVAL: 513 MS
RBC # BLD AUTO: 3.27 MILLION/UL (ref 3.88–5.62)
SODIUM SERPL-SCNC: 146 MMOL/L (ref 136–145)
T WAVE AXIS: 35 DEGREES
T WAVE AXIS: 79 DEGREES
TIBC SERPL-MCNC: 357 UG/DL (ref 250–450)
TSH SERPL DL<=0.05 MIU/L-ACNC: 1.35 UIU/ML (ref 0.36–3.74)
VENTRICULAR RATE: 112 BPM
VENTRICULAR RATE: 98 BPM
VIT B12 SERPL-MCNC: >6000 PG/ML (ref 100–900)
WBC # BLD AUTO: 9.94 THOUSAND/UL (ref 4.31–10.16)

## 2020-02-06 PROCEDURE — 84100 ASSAY OF PHOSPHORUS: CPT | Performed by: NURSE PRACTITIONER

## 2020-02-06 PROCEDURE — 83605 ASSAY OF LACTIC ACID: CPT | Performed by: PHYSICIAN ASSISTANT

## 2020-02-06 PROCEDURE — 85025 COMPLETE CBC W/AUTO DIFF WBC: CPT | Performed by: NURSE PRACTITIONER

## 2020-02-06 PROCEDURE — 83540 ASSAY OF IRON: CPT | Performed by: NURSE PRACTITIONER

## 2020-02-06 PROCEDURE — 82948 REAGENT STRIP/BLOOD GLUCOSE: CPT

## 2020-02-06 PROCEDURE — 84443 ASSAY THYROID STIM HORMONE: CPT | Performed by: NURSE PRACTITIONER

## 2020-02-06 PROCEDURE — 93010 ELECTROCARDIOGRAM REPORT: CPT | Performed by: INTERNAL MEDICINE

## 2020-02-06 PROCEDURE — 76705 ECHO EXAM OF ABDOMEN: CPT

## 2020-02-06 PROCEDURE — 82728 ASSAY OF FERRITIN: CPT | Performed by: NURSE PRACTITIONER

## 2020-02-06 PROCEDURE — 99291 CRITICAL CARE FIRST HOUR: CPT | Performed by: PHYSICIAN ASSISTANT

## 2020-02-06 PROCEDURE — 80076 HEPATIC FUNCTION PANEL: CPT | Performed by: NURSE PRACTITIONER

## 2020-02-06 PROCEDURE — 83735 ASSAY OF MAGNESIUM: CPT | Performed by: NURSE PRACTITIONER

## 2020-02-06 PROCEDURE — 94668 MNPJ CHEST WALL SBSQ: CPT

## 2020-02-06 PROCEDURE — 82746 ASSAY OF FOLIC ACID SERUM: CPT | Performed by: NURSE PRACTITIONER

## 2020-02-06 PROCEDURE — 99233 SBSQ HOSP IP/OBS HIGH 50: CPT | Performed by: INTERNAL MEDICINE

## 2020-02-06 PROCEDURE — 80074 ACUTE HEPATITIS PANEL: CPT | Performed by: NURSE PRACTITIONER

## 2020-02-06 PROCEDURE — 83550 IRON BINDING TEST: CPT | Performed by: NURSE PRACTITIONER

## 2020-02-06 PROCEDURE — 82607 VITAMIN B-12: CPT | Performed by: NURSE PRACTITIONER

## 2020-02-06 PROCEDURE — 99232 SBSQ HOSP IP/OBS MODERATE 35: CPT | Performed by: INTERNAL MEDICINE

## 2020-02-06 PROCEDURE — 80048 BASIC METABOLIC PNL TOTAL CA: CPT | Performed by: NURSE PRACTITIONER

## 2020-02-06 PROCEDURE — 94640 AIRWAY INHALATION TREATMENT: CPT

## 2020-02-06 PROCEDURE — 93005 ELECTROCARDIOGRAM TRACING: CPT

## 2020-02-06 PROCEDURE — NC001 PR NO CHARGE: Performed by: NURSE PRACTITIONER

## 2020-02-06 RX ORDER — POTASSIUM CHLORIDE 20 MEQ/1
40 TABLET, EXTENDED RELEASE ORAL ONCE
Status: COMPLETED | OUTPATIENT
Start: 2020-02-06 | End: 2020-02-06

## 2020-02-06 RX ADMIN — MELATONIN 6 MG: at 21:19

## 2020-02-06 RX ADMIN — GUAIFENESIN 1200 MG: 600 TABLET ORAL at 17:31

## 2020-02-06 RX ADMIN — FUROSEMIDE 40 MG: 10 INJECTION, SOLUTION INTRAMUSCULAR; INTRAVENOUS at 06:00

## 2020-02-06 RX ADMIN — MILRINONE LACTATE IN DEXTROSE 0.25 MCG/KG/MIN: 200 INJECTION, SOLUTION INTRAVENOUS at 00:04

## 2020-02-06 RX ADMIN — NYSTATIN 500000 UNITS: 100000 SUSPENSION ORAL at 08:09

## 2020-02-06 RX ADMIN — INSULIN LISPRO 2 UNITS: 100 INJECTION, SOLUTION INTRAVENOUS; SUBCUTANEOUS at 08:09

## 2020-02-06 RX ADMIN — TIMOLOL MALEATE 1 DROP: 5 SOLUTION/ DROPS OPHTHALMIC at 08:09

## 2020-02-06 RX ADMIN — MORPHINE SULFATE 2 MG: 2 INJECTION, SOLUTION INTRAMUSCULAR; INTRAVENOUS at 04:46

## 2020-02-06 RX ADMIN — CYANOCOBALAMIN TAB 500 MCG 1000 MCG: 500 TAB at 08:09

## 2020-02-06 RX ADMIN — ASPIRIN 81 MG 81 MG: 81 TABLET ORAL at 08:14

## 2020-02-06 RX ADMIN — DOCUSATE SODIUM 100 MG: 100 CAPSULE, LIQUID FILLED ORAL at 08:09

## 2020-02-06 RX ADMIN — INSULIN LISPRO 2 UNITS: 100 INJECTION, SOLUTION INTRAVENOUS; SUBCUTANEOUS at 17:31

## 2020-02-06 RX ADMIN — BIMATOPROST 1 DROP: 0.1 SOLUTION/ DROPS OPHTHALMIC at 21:17

## 2020-02-06 RX ADMIN — CEFEPIME HYDROCHLORIDE 1000 MG: 1 INJECTION, SOLUTION INTRAVENOUS at 00:09

## 2020-02-06 RX ADMIN — GUAIFENESIN 1200 MG: 600 TABLET ORAL at 08:08

## 2020-02-06 RX ADMIN — LEVALBUTEROL HYDROCHLORIDE 1.25 MG: 1.25 SOLUTION, CONCENTRATE RESPIRATORY (INHALATION) at 07:34

## 2020-02-06 RX ADMIN — MILRINONE LACTATE IN DEXTROSE 0.25 MCG/KG/MIN: 200 INJECTION, SOLUTION INTRAVENOUS at 14:27

## 2020-02-06 RX ADMIN — BUDESONIDE AND FORMOTEROL FUMARATE DIHYDRATE 2 PUFF: 160; 4.5 AEROSOL RESPIRATORY (INHALATION) at 19:56

## 2020-02-06 RX ADMIN — OLANZAPINE 2.5 MG: 5 TABLET, FILM COATED ORAL at 01:14

## 2020-02-06 RX ADMIN — POTASSIUM CHLORIDE 40 MEQ: 1500 TABLET, EXTENDED RELEASE ORAL at 08:08

## 2020-02-06 RX ADMIN — INSULIN LISPRO 2 UNITS: 100 INJECTION, SOLUTION INTRAVENOUS; SUBCUTANEOUS at 12:13

## 2020-02-06 RX ADMIN — INSULIN LISPRO 1 UNITS: 100 INJECTION, SOLUTION INTRAVENOUS; SUBCUTANEOUS at 21:17

## 2020-02-06 NOTE — UTILIZATION REVIEW
Continued Stay Review    Date:  2/6/2020                         Patient Class:    Admitted OBS on  1/30  @  2147,  medsur level of care         Changed to INPT status on  1/31 -   medsurg level of care                                       2/5  Transfer to CRITICAL  level of care (current)    HPI:89 y o  male initially admitted on  1/30/2020   For treatment of volume overload  2/2  Acute on chronic severe Systolic CHF  Presented w/hypoxia and LLL infiltrate  Treated w/IV Lasix 40 mg bid and IVAB    2/3  Increased IV lasix to 40 mg TID  2/4  Started po zithromax  - dc'ed on 2/5 2/4  Started IV Cefepime - dc'ed 2/6 2/5/2020     Transferred to ICU    Initiated continuous Milrinone gtt   Acute event overnight:  patient acutely dropped oxygenation,  mental status declined,  poor perfusion with lower and upper extremities cool distally,  elevated lactic acid,  troponin of 4 5 seconds with a picture of cardiorenal syndrome  Crt continues to rise ( baseline creatinine 1 6-1 9 )  and LFTs are rising, troponin bump could be demand versus an acute event started on heparin drip    2/5  @  1400:  Per Cardiology   1  Acute hypoxic respiratory failure - On presentation this appeared to be associated with both community-acquired pneumonia and congestive heart failure  He did respond to IV diuretic therapy, but then his creatinine jac  This was held yesterday, but then he did worsen from this standpoint and from LFTs  Spoke with Critical Care, and we are treating CHF as stated below        2  Acute on chronic systolic CHF - Patient was not showing any signs of low output on exam, but he is showing more signs of cardiogenic shock on labs  His LFTs and creatinine are rising, along with lactate  Critical care discussed with family about trying Milrinone to see if this would help reverse these issues, and help diuresis  We are going to give it a try over the next 24-48 hours    We will follow telemetry and hemodynamic closely  Continue IV Lasix as ordered and follow urine output closely  Overall prognosis is poor  Updated family over the phone  3   Cardiomyopathy - Repeat echocardiogram completed yesterday showing EF 25% with severe diffuse hypokinesis with distinct regional wall motion abnormalities  (3/2019 echo w/EF 30%)   Low EF - Presumed to be ischemic given his history of CAD  However he was undergoing a workup for infiltrative disorders through our heart failure program   Regardless, treatment would not change for him given his multiple comorbidities  We will continue treat heart failure as above, and arrange close outpatient follow-up      4  Type 2 MI - This is secondary to acute CHF, and underlying coronary artery disease  Treatment is just supportive at this point along with treatment for his CHF  I do not feel we need to continue IV heparin and I will discontinue it    2/6/2020  ABX  And Heparin gtt dc'ed yesterday  Seems to be slowly responding to IV Milrinone but crt and lfts still worsening  Continue IV Milrinone today, and likely stop this tomorrow  IV Lasix will be on hold since he is making good urine output on IV Milrinone and his creatinine jac  Overall prognosis is poor, but we will follow 1 more day with current regimen to see if his number start to improve        Pertinent Labs/Diagnostic Results:   Results from last 7 days   Lab Units 02/06/20  0559 02/05/20  0454 02/05/20  0140 02/04/20  0458 02/03/20  0515   WBC Thousand/uL 9 94 7 70  8 01 7 36 8 72 6 50   HEMOGLOBIN g/dL 8 8* 9 3*  9 6* 10 0* 9 5* 9 4*   HEMATOCRIT % 28 3* 30 3*  31 0* 32 1* 30 6* 30 4*   PLATELETS Thousands/uL 245 323  324 346 386 389   NEUTROS ABS Thousands/µL 8 21* 5 51 5 09  --   --          Results from last 7 days Lab Units 02/06/20  0559 02/05/20  5507 02/05/20  8286 02/05/20  0140 02/04/20  0458 02/03/20  0515 02/02/20  0456  01/31/20  0457   SODIUM mmol/L 146* 138  --  141 139 143 142   < > 138   POTASSIUM mmol/L 3 8 4 3  --  4 8 4 4 3 0* 3 1*   < > 4 0   CHLORIDE mmol/L 106 99*  --  102 104 107 106   < > 105   CO2 mmol/L 25 22  --  24 19* 23 24   < > 24   CO2, I-STAT mmol/L  --   --  24  --   --   --   --   --   --    ANION GAP mmol/L 15* 17*  --  15* 16* 13 12   < > 9   BUN mg/dL 50* 44*  --  46* 35* 31* 34*   < > 31*   CREATININE mg/dL 2 89* 2 75*  --  2 68* 2 21* 1 74* 1 93*   < > 1 73*   EGFR ml/min/1 73sq m 18 20  --  20 25 34 30   < > 34   CALCIUM mg/dL 8 8 9 0  --  9 4 9 2 8 7 8 8   < > 8 4   CALCIUM, IONIZED mmol/L  --   --   --  1 13  --   --   --   --   --    MAGNESIUM mg/dL 2 5  --   --  2 5  --  2 3 2 4  --  2 3   PHOSPHORUS mg/dL 4 8*  --   --   --   --   --   --   --  3 3    < > = values in this interval not displayed       Results from last 7 days   Lab Units 02/06/20  0559 02/05/20  0140 01/30/20  2021   AST U/L 1,213* 1,185* 12   ALT U/L 1,205* 663* 14   ALK PHOS U/L 122* 139* 135*   TOTAL PROTEIN g/dL 6 8 7 3 6 9   ALBUMIN g/dL 3 3* 3 4* 2 9*   TOTAL BILIRUBIN mg/dL 1 10* 1 50* 0 50   BILIRUBIN DIRECT mg/dL 0 31*  --   --      Results from last 7 days   Lab Units 02/06/20  1115 02/06/20  0704 02/05/20  2125 02/05/20  1639 02/05/20  1118 02/05/20  0756 02/04/20  2133 02/04/20  1639 02/04/20  1102 02/04/20  0724   POC GLUCOSE mg/dl 211* 195* 215* 147* 157* 127 137 213* 169* 196*     Results from last 7 days   Lab Units 02/06/20  0559 02/05/20  0454 02/05/20  0140 02/04/20  0458 02/03/20  0515 02/02/20  0456 02/01/20  1309 01/31/20  0457 01/30/20  2021   GLUCOSE RANDOM mg/dL 199* 304* 125 236* 100 110 182* 147* 182*         Results from last 7 days   Lab Units 02/05/20  0208   I STAT BASE EXC mmol/L 0   ISTAT PH ART  7 471*   I STAT ART PCO2 mm HG 31 1*   I STAT ART PO2 mm HG >400 0*   I STAT ART HCO3 mmol/L 22 7         Results from last 7 days   Lab Units 02/05/20  0945 02/05/20  0536 02/05/20  0140 01/30/20  2040   TROPONIN I ng/mL 7 40* 7 58* 4 57* 0 08*         Results from last 7 days   Lab Units 02/05/20  0945 02/05/20  0140   PROTIME seconds  --  16 9*   INR   --  1 41*   PTT seconds 131* 27     Results from last 7 days   Lab Units 02/06/20  0559   TSH 3RD GENERATON uIU/mL 1 354     Results from last 7 days   Lab Units 02/05/20  0454 02/04/20  1532   PROCALCITONIN ng/ml 0 23 0 10     Results from last 7 days   Lab Units 02/06/20  0559 02/05/20  0140 02/04/20  2224   LACTIC ACID mmol/L 1 8 3 9* 3 2*             Results from last 7 days   Lab Units 02/04/20  1532 01/30/20  2021   NT-PRO BNP pg/mL 34,469* 30,051*     Results from last 7 days   Lab Units 02/06/20  0559   FERRITIN ng/mL 87     Results from last 7 days   Lab Units 02/06/20  0921   HEP B S AG  Non-reactive   HEP C AB  Non-reactive   HEP B C IGM  Non-reactive     Results from last 7 days   Lab Units 01/30/20  2021   LIPASE u/L 190     Vital Signs:   02/05/20 1900  97 5 °F (36 4 °C)  106Abnormal   15  94/55  70  100 %  Nasal cannula  Lying   02/05/20 1800    103  15  89/53Abnormal   63  97 %       02/05/20 1700    109Abnormal   19  112/58  79  100 %       02/05/20 1600    115Abnormal   24Abnormal   114/63  83  100 %       02/05/20 1500  97 7 °F (36 5 °C)  115Abnormal   23Abnormal   117/74  88  100 %  Nasal cannula       02/06/20 1116  97 7 °F (36 5 °C)  109Abnormal   26Abnormal   98/53  69  99 %  None (Room air)  Lying   02/06/20 1000    107Abnormal   22  101/62  70  99 %       02/06/20 0900    109Abnormal   25Abnormal   98/54  70  99 %       02/06/20 0845    114Abnormal   18  89/54Abnormal   66  99 %       02/06/20 0815        80/59Abnormal   59         02/06/20 0735              None (Room air)     02/06/20 0700    121Abnormal   18  125/80  94  98 %  None (Room air)  Lying   02/06/20 0659  97 8 °F (36 6 °C) 120Abnormal   38Abnormal   103/74  83  98 %  Nasal            Medications:   Scheduled Medications:    Medications:  aspirin 81 mg Oral Daily   bimatoprost 1 drop Both Eyes HS   budesonide-formoterol 2 puff Inhalation BID   cyanocobalamin 1,000 mcg Oral Daily   docusate sodium 100 mg Oral Daily   guaiFENesin 1,200 mg Oral BID   insulin lispro 1-5 Units Subcutaneous HS   insulin lispro 1-6 Units Subcutaneous TID AC   melatonin 6 mg Oral HS   pantoprazole 40 mg Oral Early Morning   timolol 1 drop Both Eyes Daily     Continuous IV Infusions:    milrinone (PRIMACOR) infusion 0 25 mcg/kg/min Intravenous Continuous     PRN Meds:    acetaminophen 650 mg Oral Q6H PRN   levalbuterol 1 25 mg Nebulization Q8H PRN       Discharge Plan: tbd    Network Utilization Review Department  [email or web address]  ATTENTION: Please call with any questions or concerns to [phone] and carefully listen to the prompts so that you are directed to the right person  All voicemails are confidential   Mignon Moe all requests for admission clinical reviews, approved or denied determinations and any other requests to dedicated fax number below belonging to the campus where the patient is receiving treatment   List of dedicated fax numbers for the Facilities:  [address] DENIALS (Administrative/Medical Necessity) [phone]   [address] (Maternity/NICU/Pediatrics) [phone]   Misty Garner [phone]   [place] [place] [phone]   [facility] [phone]   Jose Ramesh [phone]   1205 [facility] [address] [facility] [phone]   [facility]  [phone]   [address] [facility] [address]  [address] [facility] [address] [facility] [phone]

## 2020-02-06 NOTE — PLAN OF CARE
Problem: Potential for Falls  Goal: Patient will remain free of falls  Description  INTERVENTIONS:  - Assess patient frequently for physical needs  -  Identify cognitive and physical deficits and behaviors that affect risk of falls    -  [place] fall precautions as indicated by assessment   - Educate patient/family on patient safety including physical limitations  - Instruct patient to call for assistance with activity based on assessment  - Modify environment to reduce risk of injury  - Consider OT/PT consult to assist with strengthening/mobility  Outcome: Progressing     Problem: DISCHARGE PLANNING  Goal: Discharge to home or other facility with appropriate resources  Description  INTERVENTIONS:  - Identify barriers to discharge w/patient and caregiver  - Arrange for needed discharge resources and transportation as appropriate  - Identify discharge learning needs (meds, wound care, etc )  - Arrange for interpretive services to assist at discharge as needed  - Refer to Case Management Department for coordinating discharge planning if the patient needs post-hospital services based on physician/advanced practitioner order or complex needs related to functional status, cognitive ability, or social support system  Outcome: Progressing     Problem: INFECTION - ADULT  Goal: Absence or prevention of progression during hospitalization  Description  INTERVENTIONS:  - Assess and monitor for signs and symptoms of infection  - Monitor lab/diagnostic results  - Monitor all insertion sites, i e  indwelling lines, tubes, and drains  - Monitor endotracheal if appropriate and nasal secretions for changes in amount and color  - [place] appropriate cooling/warming therapies per order  - Administer medications as ordered  - Instruct and encourage patient and family to use good hand hygiene technique  - Identify and instruct in appropriate isolation precautions for identified infection/condition  Outcome: Progressing     Problem: Knowledge Deficit  Goal: Patient/family/caregiver demonstrates understanding of disease process, treatment plan, medications, and discharge instructions  Description  Complete learning assessment and assess knowledge base    Interventions:  - Provide teaching at level of understanding  - Provide teaching via preferred learning methods  Outcome: Progressing     Problem: RESPIRATORY - ADULT  Goal: Achieves optimal ventilation and oxygenation  Description  INTERVENTIONS:  - Assess for changes in respiratory status  - Assess for changes in mentation and behavior  - Position to facilitate oxygenation and minimize respiratory effort  - Oxygen administered by appropriate delivery if ordered  - Initiate smoking cessation education as indicated  - Encourage broncho-pulmonary hygiene including cough, deep breathe, Incentive Spirometry  - Assess the need for suctioning and aspirate as needed  - Assess and instruct to report SOB or any respiratory difficulty  - Respiratory Therapy support as indicated  Outcome: Progressing     Problem: HEMATOLOGIC - ADULT  Goal: Maintains hematologic stability  Description  INTERVENTIONS  - Assess for signs and symptoms of bleeding or hemorrhage  - Monitor labs  - Administer supportive blood products/factors as ordered and appropriate  Outcome: Progressing     Problem: CARDIOVASCULAR - ADULT  Goal: Maintains optimal cardiac output and hemodynamic stability  Description  INTERVENTIONS:  - Monitor I/O, vital signs and rhythm  - Monitor for S/S and trends of decreased cardiac output  - Administer and titrate ordered vasoactive medications to optimize hemodynamic stability  - Assess quality of pulses, skin color and temperature  - Assess for signs of decreased coronary artery perfusion  - Instruct patient to report change in severity of symptoms  Outcome: Progressing  Goal: Absence of cardiac dysrhythmias or at baseline rhythm  Description  INTERVENTIONS:  - Continuous cardiac monitoring, vital signs, obtain 12 lead EKG if ordered  - Administer antiarrhythmic and heart rate control medications as ordered  - Monitor electrolytes and administer replacement therapy as ordered  Outcome: Progressing     Problem: METABOLIC, FLUID AND ELECTROLYTES - ADULT  Goal: Fluid balance maintained  Description  INTERVENTIONS:  - Monitor labs   - Monitor I/O and WT  - Instruct patient on fluid and nutrition as appropriate  - Assess for signs & symptoms of volume excess or deficit  Outcome: Progressing     Problem: Prexisting or High Potential for Compromised Skin Integrity  Goal: Skin integrity is maintained or improved  Description  INTERVENTIONS:  - Identify patients at risk for skin breakdown  - Assess and monitor skin integrity  - Assess and monitor nutrition and hydration status  - Monitor labs   - Assess for incontinence   - Turn and reposition patient  - Assist with mobility/ambulation  - Relieve pressure over bony prominences  - Avoid friction and shearing  - Provide appropriate hygiene as needed including keeping skin clean and dry  - Evaluate need for skin moisturizer/barrier cream  - Collaborate with interdisciplinary team   - Patient/family teaching  - Consider wound care consult   Outcome: Progressing     Problem: SAFETY,RESTRAINT: NV/NON-SELF DESTRUCTIVE BEHAVIOR  Goal: Remains free of harm/injury (restraint for non violent/non self-detsructive behavior)  Description  INTERVENTIONS:  - Instruct patient/family regarding restraint use   - Assess and monitor physiologic and psychological status   - Provide interventions and comfort measures to meet assessed patient needs   - Identify and implement measures to help patient regain control  - Assess readiness for release of restraint   Outcome: Progressing  Goal: Returns to optimal restraint-free functioning  Description  INTERVENTIONS:  - Assess the patient's behavior and symptoms that indicate continued need for restraint  - Identify and implement measures to help patient regain control  - Assess readiness for release of restraint   Outcome: Progressing

## 2020-02-06 NOTE — ASSESSMENT & PLAN NOTE
Baseline creatinine is between 1 6-1 9  Continue to rise of creatinine since yesterday up to 2 75  Probably related to heart failure and cardiorenal syndrome  Nephrology is following

## 2020-02-06 NOTE — PROGRESS NOTES
Cardiology Progress Note - Lul Jason 80 y o  male MRN: [id number]    Unit/Bed#: -01 Encounter: [id number]      Assessment:  Principal Problem:    Acute on chronic combined systolic and diastolic heart failure (HCC)  Active Problems:    Type 2 diabetes mellitus without complication, without long-term current use of insulin (HCC)    Mixed hyperlipidemia    Esophageal reflux    Kappa light chain myeloma (HCC)    Anemia    Pneumonia    Acute respiratory failure with hypoxia (HCC)    Cardiorenal syndrome with renal failure    Transaminitis    Increased anion gap metabolic acidosis    Hyperbilirubinemia    Delirium    ELTON (acute kidney injury) ([facility] [address] )    Elevated troponin      Plan:    1  Acute hypoxic respiratory failure - On presentation this appeared to be associated with both community-acquired pneumonia and congestive heart failure  Antibiotics have been stopped for pneumonia, and treatment of CHF as below  2   Acute on chronic systolic CHF - Patient was not showing any signs of low output on exam, but he is showing more signs of cardiogenic shock on labs  Also, he seems to be slowly responding to IV Milrinone but his creatinine and LFTs are still worsening  Continue IV Milrinone today, and likely stop this tomorrow  IV Lasix will be on hold since he is making good urine output on IV Milrinone and his creatinine jac  Overall prognosis is poor, but we will follow 1 more day with current regimen to see if his number start to improve  3   Cardiomyopathy - Ejection fraction 25% - Presumed to be ischemic given his history of CAD  However he was undergoing a workup for infiltrative disorders through our heart failure program   Regardless, treatment would not change for him given his multiple comorbidities  We will continue treat heart failure as above, and arrange close outpatient follow-up  4  Type 2 MI - This is secondary to acute CHF, and underlying coronary artery disease    Treatment is just supportive at this point along with treatment for his CHF  Heparin drip DC'd yesterday  Subjective:   Patient seen and examined  On IV Milrinone patient seems to be making better urine and is mentating better  However, creatinine and LFTs are a bit worse  Objective:     Vitals: Blood pressure 106/60, pulse (!) 108, temperature 97 7 °F (36 5 °C), temperature source Oral, resp  rate (!) 23, height 5' 7" (1 702 m), weight 76 9 kg (169 lb 8 oz), SpO2 99 %  , Body mass index is 26 55 kg/m² ,   Orthostatic Blood Pressures      Most Recent Value   Blood Pressure  106/60 filed at 02/06/2020 1300   Patient Position - Orthostatic VS  Lying filed at 02/06/2020 1116            Intake/Output Summary (Last 24 hours) at 2/6/2020 1333  Last data filed at 2/6/2020 1309  Gross per 24 hour   Intake 701 3 ml   Output 2779 ml   Net -2077 7 ml       Physical Exam:    GEN: Saturnino Dolan appears well, alert and oriented x 3, pleasant and cooperative   HEENT: pupils equal, round, and reactive to light; extraocular muscles intact  NECK: supple, no carotid bruits   +JVD - improved   HEART: irregular rhythm, distant S1 and S2, +SHAYY, no clicks, gallops or rubs   LUNGS:  Diminished with faint rales, improved bilaterally; no wheezes or rhonchi   ABDOMEN: normal bowel sounds, soft, no tenderness, no distention  EXTREMITIES: peripheral pulses normal; no clubbing, cyanosis, or significant edema  NEURO: no focal findings   SKIN: normal without suspicious lesions on exposed skin      Medications:      Current Facility-Administered Medications:     acetaminophen (TYLENOL) tablet 650 mg, 650 mg, Oral, Q6H PRN, DON Freeman    aspirin chewable tablet 81 mg, 81 mg, Oral, Daily, DON Freeman, 81 mg at 02/06/20 0814    bimatoprost (LUMIGAN) 0 01 % ophthalmic solution 1 drop, 1 drop, Both Eyes, HS, DON Freeman, 1 drop at 02/05/20 2124    budesonide-formoterol (SYMBICORT) 160-4 5 mcg/act inhaler 2 puff, 2 puff, Inhalation, BID, Arlean Talha, CRNP, 2 puff at 02/04/20 2110    cyanocobalamin (VITAMIN B-12) tablet 1,000 mcg, 1,000 mcg, Oral, Daily, Arlean Talha, CRNP, 1,000 mcg at 02/06/20 0809    docusate sodium (COLACE) capsule 100 mg, 100 mg, Oral, Daily, Arlean Talha, CRNP, 100 mg at 02/06/20 0809    guaiFENesin (MUCINEX) 12 hr tablet 1,200 mg, 1,200 mg, Oral, BID, Arlean Talha, CRNP, 1,200 mg at 02/06/20 0808    insulin lispro (HumaLOG) 100 units/mL subcutaneous injection 1-5 Units, 1-5 Units, Subcutaneous, HS, Luis Antonio Stahl MD, 2 Units at 02/05/20 2128    insulin lispro (HumaLOG) 100 units/mL subcutaneous injection 1-6 Units, 1-6 Units, Subcutaneous, TID AC, 2 Units at 02/06/20 1213 **AND** Fingerstick Glucose (POCT), , , TID AC, Luis Antonio Stahl MD    [facility]) inhalation solution 1 25 mg, 1 25 mg, Nebulization, Q8H PRN, Elsie Maguire DO    melatonin tablet 6 mg, 6 mg, Oral, HS, Arlean Talha, CRNP, 6 mg at 02/05/20 2123    milrinone (PRIMACOR) 20 mg in 100 mL infusion (premix), 0 25 mcg/kg/min, Intravenous, Continuous, DON Lopez, Last Rate: 6 2 mL/hr at 02/06/20 0004, 0 25 mcg/kg/min at 02/06/20 0004    pantoprazole (PROTONIX) EC tablet 40 mg, 40 mg, Oral, Early Morning, Arlean Talha, CRNP, 40 mg at 02/05/20 0543    timolol (TIMOPTIC) 0 5 % ophthalmic solution 1 drop, 1 drop, Both Eyes, Daily, Arlean Talha, CRNP, 1 drop at 02/06/20 0809     Labs & Results:    Results from last 7 days   Lab Units 02/05/20  0945 02/05/20  0536 02/05/20  0140   TROPONIN I ng/mL 7 40* 7 58* 4 57*     Results from last 7 days   Lab Units 02/06/20  0559 02/05/20  0454 02/05/20  0140   WBC Thousand/uL 9 94 7 70  8 01 7 36   HEMOGLOBIN g/dL 8 8* 9 3*  9 6* 10 0*   HEMATOCRIT % 28 3* 30 3*  31 0* 32 1*   PLATELETS Thousands/uL 245 323  324 346         Results from last 7 days   Lab Units 02/06/20  0559 02/05/20  0454 02/05/20  0208 02/05/20  0140  01/30/20  2021   POTASSIUM mmol/L 3 8 4 3  --  4 8   < > 4 5   CHLORIDE mmol/L 106 99*  --  102   < > 103   CO2 mmol/L 25 22  --  24   < > 24   CO2, I-STAT mmol/L  --   --  24  --   --   --    BUN mg/dL 50* 44*  --  46*   < > 31*   CREATININE mg/dL 2 89* 2 75*  --  2 68*   < > 1 83*   CALCIUM mg/dL 8 8 9 0  --  9 4   < > 8 7   ALK PHOS U/L 122*  --   --  139*  --  135*   ALT U/L 1,205*  --   --  663*  --  14   AST U/L 1,213*  --   --  1,185*  --  12    < > = values in this interval not displayed  Results from last 7 days   Lab Units 02/05/20  0945 02/05/20  0140   INR   --  1 41*   PTT seconds 131* 27     Results from last 7 days   Lab Units 02/06/20  0559 02/05/20  0140 02/03/20  0515   MAGNESIUM mg/dL 2 5 2 5 2 3         EKG personally reviewed by Dread Nam MD  Sinus rhythm, first-degree AV block with PACs and PVCs, nonspecific IVCD and nonspecific T-wave changes  ECHO:  LEFT VENTRICLE:  The ventricle was mildly dilated  Systolic function was markedly reduced  Ejection fraction was estimated to be 25 %  There was severe diffuse hypokinesis with distinct regional wall motion abnormalities  There was akinesis of the mid-apical anterior, mid anteroseptal, apical septal, apical lateral, and apical wall(s)  Wall thickness was at the upper limits of normal   Doppler parameters were consistent with elevated ventricular end-diastolic filling pressure      RIGHT VENTRICLE:  The ventricle was dilated  Systolic function was moderately reduced      LEFT ATRIUM:  The atrium was moderately dilated      RIGHT ATRIUM:  The atrium was mildly dilated      MITRAL VALVE:  There was mild annular calcification  There was moderate diffuse thickening  There was mild to moderate regurgitation      AORTIC VALVE:  The valve was trileaflet  Leaflets exhibited normal thickness, moderate calcification, and moderately reduced cuspal separation    Transaortic velocity and gradient were increased due to stenosis, but lower than expected for the degree of stenosis due to concomitant decreased transaortic flow (decreased stroke volume)  There was moderate stenosis  There was mild regurgitation  Valve mean gradient was 6 mmHg  Estimated aortic valve area (by VTI) was 1 25 cmï¾²  Estimated aortic valve area (by Vmax) was 1 25 cmï¾²     TRICUSPID VALVE:  There was mild regurgitation      Counseling / Coordination of Care  Total floor / unit time spent today 25 minutes  Greater than 50% of total time was spent with the patient and / or family counseling and / or coordination of care

## 2020-02-06 NOTE — ASSESSMENT & PLAN NOTE
Lab Results   Component Value Date    HGBA1C 6 1 02/01/2020       Recent Labs     02/05/20  0756 02/05/20  1118 02/05/20  1639 02/05/20 2125   POCGLU 127 157* 147* 215*       Blood Sugar Average: Last 72 hrs:  (P) 287 [id number]   · Holding home Amaryl likely will need to change in the setting of kidney disease  · Continue sliding scale insulin

## 2020-02-06 NOTE — PROGRESS NOTES
Progress Note - William Wallace 9/22/1930, 80 y o  male MRN: [id number]    Unit/Bed#: -01 Encounter: [id number]    Primary Care Provider: Rich Gregorio   Date and time admitted to hospital: 1/30/2020  8:11 PM        Hyperbilirubinemia  Assessment & Plan  Continue to monitor    Increased anion gap metabolic acidosis  Assessment & Plan  Continue to monitor    Transaminitis  Assessment & Plan  Elevated liver enzymes due to cardiorenal syndrome probably congestive    Cardiorenal syndrome with renal failure  Assessment & Plan  Continue to monitor creatinine liver enzymes    Acute respiratory failure with hypoxia (HCC)  Assessment & Plan  Nasal cannula as needed    Pneumonia  Assessment & Plan  Continue Xopenex and simple  Patient is on cefepime and vancomycin for hospital-acquired pneumonia at this point no white count no left shift procalcitonin is negative and CT of the chest shows bilateral effusion with no definite PNA considered to DC antibiotic      Anemia  Assessment & Plan  ·Chronic likely in setting of malignancy, chronic disease  Hgb noted to be 7 7 yesterday, received 1 unit PRBCs  Continue to monitor  Transfuse for Hgb < 8  Give lasix with any blood transfusions    Hgb stable at 10 0    [place] light chain myeloma (HCC)  Assessment & Plan  Pollicis Dr Glenda Still as an outpt    Stage 4 chronic kidney disease ([facility] [address] )  Assessment & Plan  Baseline creatinine is between 1 6-1 9  Continue to rise of creatinine since yesterday up to 2 75  Probably related to heart failure and cardiorenal syndrome  Nephrology is following      Esophageal reflux  Assessment & Plan  Continue Protonix daily    Mixed hyperlipidemia  Assessment & Plan  Patient no longer takes a statin    Type 2 diabetes mellitus without complication, without long-term current use of insulin [facility])  Assessment & Plan  Lab Results   Component Value Date    HGBA1C 6 1 02/01/2020       Recent Labs     02/05/20  0756 02/05/20  1118 02/05/20  1639 02/05/20 2125   POCGLU 127 157* 147* 215*       Blood Sugar Average: Last 72 hrs:  (P) 329 [id number]   Continue insulin sliding scale  Keep glucose between 140-180        * Acute on chronic combined systolic and diastolic heart failure (HCC)  Assessment & Plan  Wt Readings from Last 3 Encounters:   02/05/20 82 6 kg (182 lb 1 6 oz)   12/17/19 82 6 kg (182 lb)   10/15/19 84 8 kg (187 lb)     ·Patient presented with complaints of dyspnea on exertion, orthopnea, and a 5 lb weight gain over the last week  ProBNP in on admission was 30,000 Patient reports taking 20 mg of Lasix as needed at home according to his daily weights  Patient was taking his Lasix but continued to gain weight over this last week  Per records from recent [facility] hospitalization  Home dose of Lasix was discontinued due to hypotension  Continue IV lasix 40 mg BID - unfortunately still with borderline BP limiting administration of lasix, hold parameters lowered  Weight has improving, now 176  Patient had worsening rales yesterday, despite diuresis and pulmonology was consulted for evaluation of PNA antibiotics was started for cefepime and azithromycin, does not appear to be pneumonia procalcitonin is negative white count is negative no left shift the patient does not have any fevers a low temperatures  This appears to be heart failure  Pulmonology is following  Repeat echocardiogram completed yesterday showing EF 25% with severe diffuse hypokinesis with distinct regional wall motion abnormalities  Echocardiogram completed March 2019 showed EF 30%    Strict I/O's, daily weights, low sodium diet  Per son, patient dry weight has been around 178 since previous admission in [place]  Cardiology is followed  Acute event overnight patient acutely dropped oxygenation mental status decreased and also poor perfusion and lower and upper extremities distally, repeat labs showed a troponin of 4 5 seconds with a picture of cardiorenal syndrome creatinine continues to rise and LFTs are rising, troponin bump could be demand versus an acute event started on heparin drip  Heparin drip was DC yesterday by Cardiology  Troponins spiked at 7 4  Patient was started on Primacor 0 25 stephane/min  Effect of the Primacor was increased heart rate into the 120 with multiple PVCs, appeared more perfused but remains very confused        ----------------------------------------------------------------------------------------  HPI/24hr events:  After the acute decline the night before patient was started Primacor 0 25 mcs/min improved output remains confused, appears very weak  Heart rate is between 100 and 120 with more multifocal PVCs  Patient was on made a level 3 after his acute decline and conversation with the family  Patient received Zyprexa 2 5 mg for agitation and confusion, gave a low dose of morphine for discomfort 2 mg patient finally fell asleep appear to be resting         Disposition: Continue Critical Care   Code Status: Level 3 - DNAR and DNI  ---------------------------------------------------------------------------------------  SUBJECTIVE  Patient voices no complaints, very confused    Review of Systems   Unable to perform ROS: Mental status change     Review of systems was unable to be performed secondary to dementia  ---------------------------------------------------------------------------------------  OBJECTIVE    Vitals   Vitals:    20 0100 20 0200 20 0300 20 0400   BP: 96/64 96/71 103/54 94/58   Pulse: (!) 124 (!) 122 (!) 110 (!) 108   Resp: 22 (!) 39 17 15   Temp:       TempSrc:       SpO2: 99% 99% 100% 98%   Weight:       Height:         Temp (24hrs), Av 9 °F (36 6 °C), Min:97 5 °F (36 4 °C), Max:98 5 °F (36 9 °C)  Current: Temperature: 97 5 °F (36 4 °C)        SpO2: SpO2: 98 %, SpO2 Activity: SpO2 Activity: At Rest, SpO2 Device: O2 Device: Nasal cannula  O2 Flow Rate (L/min): 3 L/min    Physical Exam Constitutional:   Elderly very ill-appearing male   HENT:   Head: Normocephalic and atraumatic  Eyes: Pupils are equal, round, and reactive to light  EOM are normal    Neck: Normal range of motion  Neck supple  Cardiovascular: S1 normal and S2 normal  An irregular rhythm present  Frequent extrasystoles are present  Tachycardia present  PMI is displaced  Exam reveals decreased pulses  Murmur heard  Tachycardia   Pulmonary/Chest: Effort normal and breath sounds normal    Abdominal: Soft  Bowel sounds are normal  He exhibits no distension  There is no tenderness  There is no guarding  Genitourinary: Penis normal    Musculoskeletal: Normal range of motion  Neurological:   Patient is very confused and lethargic   Skin: Skin is warm  Capillary refill takes less than 2 seconds  There is pallor  Cool on the extremties     Nursing note and vitals reviewed        Invasive/non-invasive ventilation settings   Respiratory    Lab Data (Last 4 hours)    None         O2/Vent Data (Last 4 hours)    None                Laboratory and Diagnostics:  Results from last 7 days   Lab Units 02/05/20  0454 02/05/20  0140 02/04/20  0458 02/03/20  0515 02/02/20  0456 02/01/20  1308 01/31/20  0457 01/30/20 2021   WBC Thousand/uL 7 70  8 01 7 36 8 72 6 50 6 68  --  6 25 7 47   HEMOGLOBIN g/dL 9 3*  9 6* 10 0* 9 5* 9 4* 9 0* 9 6* 7 7* 8 5*   HEMATOCRIT % 30 3*  31 0* 32 1* 30 6* 30 4* 29 3* 31 3* 25 2* 28 1*   PLATELETS Thousands/uL 323  324 346 386 389 367  --  356 402*   NEUTROS PCT % 71 68  --   --   --   --   --  76*   MONOS PCT % 6 7  --   --   --   --   --  6     Results from last 7 days   Lab Units 02/05/20  0454 02/05/20  0208 02/05/20  0140 02/04/20  0458 02/03/20  0515 02/02/20  0456 02/01/20  1309 01/31/20  0457 01/30/20 2021   SODIUM mmol/L 138  --  141 139 143 142 140 138 137   POTASSIUM mmol/L 4 3  --  4 8 4 4 3 0* 3 1* 3 6 4 0 4 5   CHLORIDE mmol/L 99*  --  102 104 107 106 103 105 103   CO2 mmol/L 22  --  24 19* 23 24 26 24 24   CO2, I-STAT mmol/L  --  24  --   --   --   --   --   --   --    ANION GAP mmol/L 17*  --  15* 16* 13 12 11 9 10   BUN mg/dL 44*  --  46* 35* 31* 34* 33* 31* 31*   CREATININE mg/dL 2 75*  --  2 68* 2 21* 1 74* 1 93* 1 93* 1 73* 1 83*   CALCIUM mg/dL 9 0  --  9 4 9 2 8 7 8 8 9 0 8 4 8 7   GLUCOSE RANDOM mg/dL 304*  --  125 236* 100 110 182* 147* 182*   ALT U/L  --   --  663*  --   --   --   --   --  14   AST U/L  --   --  1,185*  --   --   --   --   --  12   ALK PHOS U/L  --   --  139*  --   --   --   --   --  135*   ALBUMIN g/dL  --   --  3 4*  --   --   --   --   --  2 9*   TOTAL BILIRUBIN mg/dL  --   --  1 50*  --   --   --   --   --  0 50     Results from last 7 days   Lab Units 02/05/20  0140 02/03/20  0515 02/02/20  0456 01/31/20  0457   MAGNESIUM mg/dL 2 5 2 3 2 4 2 3   PHOSPHORUS mg/dL  --   --   --  3 3      Results from last 7 days   Lab Units 02/05/20  0945 02/05/20  0140   INR   --  1 41*   PTT seconds 131* 27      Results from last 7 days   Lab Units 02/05/20  0945 02/05/20  0536 02/05/20  0140 01/30/20  2040   TROPONIN I ng/mL 7 40* 7 58* 4 57* 0 08*     Results from last 7 days   Lab Units 02/05/20  0140 02/04/20  2224   LACTIC ACID mmol/L 3 9* 3 2*     ABG:    VBG:    Results from last 7 days   Lab Units 02/05/20  0454 02/04/20  1532   PROCALCITONIN ng/ml 0 23 0 10       Micro  Results from last 7 days   Lab Units 02/04/20  1657 02/04/20  1448 01/31/20  0831   MRSA CULTURE ONLY   --   --  No Methicillin Resistant Staphlyococcus aureus (MRSA) isolated   LEGIONELLA URINARY ANTIGEN  Negative  --   --    STREP PNEUMONIAE ANTIGEN, URINE   --  Negative  --        EKG: tackcardia   Imaging: I have personally reviewed pertinent reports  Intake and Output  I/O       02/04 0701 - 02/05 0700 02/05 0701 - 02/06 0700    P  O   120    I V  (mL/kg) 23 5 (0 3) 146 5 (1 8)    IV Piggyback 700 100    Total Intake(mL/kg) 723 5 (8 8) 366 5 (4 4)    Urine (mL/kg/hr) 400 (0 2) 3096 (1 6)    Stool  0 Total Output 400 3096    Net +323 5 -9489 5          Unmeasured Urine Occurrence 2 x     Unmeasured Stool Occurrence  1 x          Height and Weights   Height: 5' 7" (170 2 cm)  IBW: 66 1 kg  Body mass index is 28 52 kg/m²  Weight (last 2 days)     Date/Time   Weight    02/05/20 0454   82 6 (182 1)    02/04/20 0505   79 8 (176)                Nutrition       Diet Orders   (From admission, onward)             Start     Ordered    01/31/20 0246  Diet Cardiovascular; Cardiac; Consistent Carbohydrate Diet Level 3 (6 carb servings/90 grams CHO/meal), Sodium 2 GM, Fluid Restriction 1800 ML  Diet effective now     Question Answer Comment   Diet Type Cardiovascular    Cardiac Cardiac    Other Restriction(s): Consistent Carbohydrate Diet Level 3 (6 carb servings/90 grams CHO/meal)    Other Restriction(s): Sodium 2 GM    Other Restriction(s): Fluid Restriction 1800 ML    RD to adjust diet per protocol?  Yes        01/31/20 0247                  Active Medications  Scheduled Meds:    Current Facility-Administered Medications:  acetaminophen 650 mg Oral Q6H PRN DON Dai    aspirin 81 mg Oral Daily DON Dai    bimatoprost 1 drop Both Eyes HS DON Dai    budesonide-formoterol 2 puff Inhalation BID DON Dai    cefepime 1,000 mg Intravenous Q12H DON Mckinnon Last Rate: Stopped (02/06/20 0052)   cyanocobalamin 1,000 mcg Oral Daily DON Dai    docusate sodium 100 mg Oral Daily DON Dai    furosemide 40 mg Intravenous TID (diuretic) Jaime Aparicio MD    guaiFENesin 1,200 mg Oral BID DON Dai    insulin lispro 1-5 Units Subcutaneous HS Terra Avila MD    insulin lispro 1-6 Units Subcutaneous TID AC Terra Avila MD    levalbuterol 1 25 mg Nebulization BID Matthieu Yisel Geishauser, DO    levalbuterol 1 25 mg Nebulization Q8H PRN Matthieu Yisel Geishauser, DO    melatonin 6 mg Oral HS DON Dai    milrinone [facility]) infusion 0 25 mcg/kg/min Intravenous Continuous [place] KalDON caal Last Rate: 0 25 mcg/kg/min (02/06/20 0004)   nitroglycerin 0 4 mg Sublingual Q5 Min PRN DON Singh    nystatin 500,000 Units Swish & Swallow 4x Daily DON Lopez    OLANZapine 2 5 mg Oral Q3H PRN Krissy MONTEIRO PA-C    pantoprazole 40 mg Oral Early Morning DON Singh    timolol 1 drop Both Eyes Daily DON Singh      Continuous Infusions:    milrinone (PRIMACOR) infusion 0 25 mcg/kg/min Last Rate: 0 25 mcg/kg/min (02/06/20 0004)     PRN Meds:     acetaminophen 650 mg Q6H PRN   levalbuterol 1 25 mg Q8H PRN   nitroglycerin 0 4 mg Q5 Min PRN   OLANZapine 2 5 mg Q3H PRN     ---------------------------------------------------------------------------------------  Advance Directive and Living Will:      Power of :    POLST:    ---------------------------------------------------------------------------------------  Counseling / Coordination of Care  Total Critical Care time spent 30 minutes excluding procedures, teaching and family updates  Shekhar Dubois PA-C      Portions of the record may have been created with voice recognition software  Occasional wrong word or "sound a like" substitutions may have occurred due to the inherent limitations of voice recognition software    Read the chart carefully and recognize, using context, where substitutions have occurred

## 2020-02-06 NOTE — PLAN OF CARE
Problem: Potential for Falls  Goal: Patient will remain free of falls  Description  INTERVENTIONS:  - Assess patient frequently for physical needs  -  Identify cognitive and physical deficits and behaviors that affect risk of falls    -  [place] fall precautions as indicated by assessment   - Educate patient/family on patient safety including physical limitations  - Instruct patient to call for assistance with activity based on assessment  - Modify environment to reduce risk of injury  - Consider OT/PT consult to assist with strengthening/mobility  Outcome: Progressing     Problem: DISCHARGE PLANNING  Goal: Discharge to home or other facility with appropriate resources  Description  INTERVENTIONS:  - Identify barriers to discharge w/patient and caregiver  - Arrange for needed discharge resources and transportation as appropriate  - Identify discharge learning needs (meds, wound care, etc )  - Arrange for interpretive services to assist at discharge as needed  - Refer to Case Management Department for coordinating discharge planning if the patient needs post-hospital services based on physician/advanced practitioner order or complex needs related to functional status, cognitive ability, or social support system  Outcome: Progressing     Problem: RESPIRATORY - ADULT  Goal: Achieves optimal ventilation and oxygenation  Description  INTERVENTIONS:  - Assess for changes in respiratory status  - Assess for changes in mentation and behavior  - Position to facilitate oxygenation and minimize respiratory effort  - Oxygen administered by appropriate delivery if ordered  - Initiate smoking cessation education as indicated  - Encourage broncho-pulmonary hygiene including cough, deep breathe, Incentive Spirometry  - Assess the need for suctioning and aspirate as needed  - Assess and instruct to report SOB or any respiratory difficulty  - Respiratory Therapy support as indicated  Outcome: Progressing     Problem: HEMATOLOGIC - ADULT  Goal: Maintains hematologic stability  Description  INTERVENTIONS  - Assess for signs and symptoms of bleeding or hemorrhage  - Monitor labs  - Administer supportive blood products/factors as ordered and appropriate  Outcome: Progressing     Problem: Prexisting or High Potential for Compromised Skin Integrity  Goal: Skin integrity is maintained or improved  Description  INTERVENTIONS:  - Identify patients at risk for skin breakdown  - Assess and monitor skin integrity  - Assess and monitor nutrition and hydration status  - Monitor labs   - Assess for incontinence   - Turn and reposition patient  - Assist with mobility/ambulation  - Relieve pressure over bony prominences  - Avoid friction and shearing  - Provide appropriate hygiene as needed including keeping skin clean and dry  - Evaluate need for skin moisturizer/barrier cream  - Collaborate with interdisciplinary team   - Patient/family teaching  - Consider wound care consult   Outcome: Progressing     Problem: INFECTION - ADULT  Goal: Absence or prevention of progression during hospitalization  Description  INTERVENTIONS:  - Assess and monitor for signs and symptoms of infection  - Monitor lab/diagnostic results  - Monitor all insertion sites, i e  indwelling lines, tubes, and drains  - Monitor endotracheal if appropriate and nasal secretions for changes in amount and color  - [place] appropriate cooling/warming therapies per order  - Administer medications as ordered  - Instruct and encourage patient and family to use good hand hygiene technique  - Identify and instruct in appropriate isolation precautions for identified infection/condition  Outcome: Progressing     Problem: Knowledge Deficit  Goal: Patient/family/caregiver demonstrates understanding of disease process, treatment plan, medications, and discharge instructions  Description  Complete learning assessment and assess knowledge base    Interventions:  - Provide teaching at level of understanding  - Provide teaching via preferred learning methods  Outcome: Progressing     Problem: CARDIOVASCULAR - ADULT  Goal: Maintains optimal cardiac output and hemodynamic stability  Description  INTERVENTIONS:  - Monitor I/O, vital signs and rhythm  - Monitor for S/S and trends of decreased cardiac output  - Administer and titrate ordered vasoactive medications to optimize hemodynamic stability  - Assess quality of pulses, skin color and temperature  - Assess for signs of decreased coronary artery perfusion  - Instruct patient to report change in severity of symptoms  Outcome: Progressing  Goal: Absence of cardiac dysrhythmias or at baseline rhythm  Description  INTERVENTIONS:  - Continuous cardiac monitoring, vital signs, obtain 12 lead EKG if ordered  - Administer antiarrhythmic and heart rate control medications as ordered  - Monitor electrolytes and administer replacement therapy as ordered  Outcome: Progressing     Problem: METABOLIC, FLUID AND ELECTROLYTES - ADULT  Goal: Fluid balance maintained  Description  INTERVENTIONS:  - Monitor labs   - Monitor I/O and WT  - Instruct patient on fluid and nutrition as appropriate  - Assess for signs & symptoms of volume excess or deficit  Outcome: Progressing

## 2020-02-06 NOTE — ASSESSMENT & PLAN NOTE
Lab Results   Component Value Date    HGBA1C 6 1 02/01/2020       Recent Labs     02/05/20  0756 02/05/20  1118 02/05/20  1639 02/05/20 2125   POCGLU 127 157* 147* 215*       Blood Sugar Average: Last 72 hrs:  (P) 023 [id number]   Continue insulin sliding scale  Keep glucose between 140-180

## 2020-02-06 NOTE — ASSESSMENT & PLAN NOTE
Wt Readings from Last 3 Encounters:   02/05/20 82 6 kg (182 lb 1 6 oz)   12/17/19 82 6 kg (182 lb)   10/15/19 84 8 kg (187 lb)     ·Patient presented with complaints of dyspnea on exertion, orthopnea, and a 5 lb weight gain over the last week  ProBNP in on admission was 30,000 Patient reports taking 20 mg of Lasix as needed at home according to his daily weights  Patient was taking his Lasix but continued to gain weight over this last week  Per records from recent [facility] hospitalization  Home dose of Lasix was discontinued due to hypotension  Continue IV lasix 40 mg BID - unfortunately still with borderline BP limiting administration of lasix, hold parameters lowered  Weight has improving, now 176  Patient had worsening rales yesterday, despite diuresis and pulmonology was consulted for evaluation of PNA antibiotics was started for cefepime and azithromycin, does not appear to be pneumonia procalcitonin is negative white count is negative no left shift the patient does not have any fevers a low temperatures  This appears to be heart failure  Pulmonology is following  Repeat echocardiogram completed yesterday showing EF 25% with severe diffuse hypokinesis with distinct regional wall motion abnormalities  Echocardiogram completed March 2019 showed EF 30%    Strict I/O's, daily weights, low sodium diet  Per son, patient dry weight has been around 178 since previous admission in [place]  Cardiology is followed  Acute event overnight patient acutely dropped oxygenation mental status decreased and also poor perfusion and lower and upper extremities distally, repeat labs showed a troponin of 4 5 seconds with a picture of cardiorenal syndrome creatinine continues to rise and LFTs are rising, troponin bump could be demand versus an acute event started on heparin drip  Heparin drip was DC yesterday by Cardiology  Troponins spiked at 7 4  Patient was started on Primacor 0 25 stephane/min  Effect of the Primacor was increased heart rate into the 120 with multiple PVCs, appeared more perfused but remains very confused

## 2020-02-07 PROBLEM — R77.8 ELEVATED TROPONIN: Status: RESOLVED | Noted: 2020-02-06 | Resolved: 2020-02-07

## 2020-02-07 PROBLEM — I13.10 CARDIORENAL SYNDROME WITH RENAL FAILURE: Status: RESOLVED | Noted: 2020-02-05 | Resolved: 2020-02-07

## 2020-02-07 PROBLEM — E87.2 INCREASED ANION GAP METABOLIC ACIDOSIS: Status: RESOLVED | Noted: 2020-02-05 | Resolved: 2020-02-07

## 2020-02-07 PROBLEM — E80.6 HYPERBILIRUBINEMIA: Status: RESOLVED | Noted: 2020-02-05 | Resolved: 2020-02-07

## 2020-02-07 PROBLEM — J96.01 ACUTE RESPIRATORY FAILURE WITH HYPOXIA (HCC): Status: RESOLVED | Noted: 2020-02-05 | Resolved: 2020-02-07

## 2020-02-07 PROBLEM — J18.9 PNEUMONIA: Status: RESOLVED | Noted: 2020-02-04 | Resolved: 2020-02-07

## 2020-02-07 PROBLEM — R41.0 DELIRIUM: Status: RESOLVED | Noted: 2020-02-06 | Resolved: 2020-02-07

## 2020-02-07 LAB
ALBUMIN SERPL BCP-MCNC: 3.1 G/DL (ref 3.5–5)
ALP SERPL-CCNC: 136 U/L (ref 46–116)
ALT SERPL W P-5'-P-CCNC: 925 U/L (ref 12–78)
ANION GAP SERPL CALCULATED.3IONS-SCNC: 14 MMOL/L (ref 4–13)
ANION GAP SERPL CALCULATED.3IONS-SCNC: 14 MMOL/L (ref 4–13)
AST SERPL W P-5'-P-CCNC: 462 U/L (ref 5–45)
BILIRUB SERPL-MCNC: 1 MG/DL (ref 0.2–1)
BUN SERPL-MCNC: 51 MG/DL (ref 5–25)
BUN SERPL-MCNC: 51 MG/DL (ref 5–25)
CALCIUM SERPL-MCNC: 8.5 MG/DL (ref 8.3–10.1)
CALCIUM SERPL-MCNC: 8.5 MG/DL (ref 8.3–10.1)
CHLORIDE SERPL-SCNC: 103 MMOL/L (ref 100–108)
CHLORIDE SERPL-SCNC: 103 MMOL/L (ref 100–108)
CO2 SERPL-SCNC: 24 MMOL/L (ref 21–32)
CO2 SERPL-SCNC: 24 MMOL/L (ref 21–32)
CREAT SERPL-MCNC: 2.74 MG/DL (ref 0.6–1.3)
CREAT SERPL-MCNC: 2.74 MG/DL (ref 0.6–1.3)
GFR SERPL CREATININE-BSD FRML MDRD: 20 ML/MIN/1.73SQ M
GFR SERPL CREATININE-BSD FRML MDRD: 20 ML/MIN/1.73SQ M
GLUCOSE SERPL-MCNC: 170 MG/DL (ref 65–140)
GLUCOSE SERPL-MCNC: 171 MG/DL (ref 65–140)
GLUCOSE SERPL-MCNC: 171 MG/DL (ref 65–140)
GLUCOSE SERPL-MCNC: 190 MG/DL (ref 65–140)
GLUCOSE SERPL-MCNC: 197 MG/DL (ref 65–140)
GLUCOSE SERPL-MCNC: 334 MG/DL (ref 65–140)
INR PPP: 1.38 (ref 0.84–1.19)
MAGNESIUM SERPL-MCNC: 2.4 MG/DL (ref 1.6–2.6)
POTASSIUM SERPL-SCNC: 3.6 MMOL/L (ref 3.5–5.3)
POTASSIUM SERPL-SCNC: 3.6 MMOL/L (ref 3.5–5.3)
PROT SERPL-MCNC: 6.5 G/DL (ref 6.4–8.2)
PROTHROMBIN TIME: 16.7 SECONDS (ref 11.6–14.5)
SODIUM SERPL-SCNC: 141 MMOL/L (ref 136–145)
SODIUM SERPL-SCNC: 141 MMOL/L (ref 136–145)

## 2020-02-07 PROCEDURE — 99291 CRITICAL CARE FIRST HOUR: CPT | Performed by: PHYSICIAN ASSISTANT

## 2020-02-07 PROCEDURE — 80053 COMPREHEN METABOLIC PANEL: CPT | Performed by: NURSE PRACTITIONER

## 2020-02-07 PROCEDURE — 85610 PROTHROMBIN TIME: CPT | Performed by: NURSE PRACTITIONER

## 2020-02-07 PROCEDURE — 82948 REAGENT STRIP/BLOOD GLUCOSE: CPT

## 2020-02-07 PROCEDURE — 80048 BASIC METABOLIC PNL TOTAL CA: CPT | Performed by: NURSE PRACTITIONER

## 2020-02-07 PROCEDURE — 99232 SBSQ HOSP IP/OBS MODERATE 35: CPT | Performed by: INTERNAL MEDICINE

## 2020-02-07 PROCEDURE — 94640 AIRWAY INHALATION TREATMENT: CPT

## 2020-02-07 PROCEDURE — 99231 SBSQ HOSP IP/OBS SF/LOW 25: CPT | Performed by: INTERNAL MEDICINE

## 2020-02-07 PROCEDURE — 83735 ASSAY OF MAGNESIUM: CPT | Performed by: NURSE PRACTITIONER

## 2020-02-07 PROCEDURE — 94664 DEMO&/EVAL PT USE INHALER: CPT

## 2020-02-07 RX ORDER — ISOSORBIDE MONONITRATE 30 MG/1
30 TABLET, EXTENDED RELEASE ORAL DAILY
Status: DISCONTINUED | OUTPATIENT
Start: 2020-02-07 | End: 2020-02-07

## 2020-02-07 RX ORDER — ATORVASTATIN CALCIUM 40 MG/1
40 TABLET, FILM COATED ORAL
Status: DISCONTINUED | OUTPATIENT
Start: 2020-02-07 | End: 2020-02-12 | Stop reason: HOSPADM

## 2020-02-07 RX ORDER — FUROSEMIDE 40 MG/1
40 TABLET ORAL DAILY
Status: DISCONTINUED | OUTPATIENT
Start: 2020-02-07 | End: 2020-02-07

## 2020-02-07 RX ORDER — POTASSIUM CHLORIDE 20 MEQ/1
40 TABLET, EXTENDED RELEASE ORAL ONCE
Status: COMPLETED | OUTPATIENT
Start: 2020-02-07 | End: 2020-02-07

## 2020-02-07 RX ORDER — MIDODRINE HYDROCHLORIDE 5 MG/1
5 TABLET ORAL
Status: DISCONTINUED | OUTPATIENT
Start: 2020-02-07 | End: 2020-02-12 | Stop reason: HOSPADM

## 2020-02-07 RX ORDER — FUROSEMIDE 40 MG/1
40 TABLET ORAL DAILY
Status: DISCONTINUED | OUTPATIENT
Start: 2020-02-08 | End: 2020-02-09

## 2020-02-07 RX ADMIN — GUAIFENESIN 1200 MG: 600 TABLET ORAL at 08:30

## 2020-02-07 RX ADMIN — INSULIN LISPRO 1 UNITS: 100 INJECTION, SOLUTION INTRAVENOUS; SUBCUTANEOUS at 21:30

## 2020-02-07 RX ADMIN — GUAIFENESIN 1200 MG: 600 TABLET ORAL at 18:04

## 2020-02-07 RX ADMIN — BUDESONIDE AND FORMOTEROL FUMARATE DIHYDRATE 2 PUFF: 160; 4.5 AEROSOL RESPIRATORY (INHALATION) at 07:53

## 2020-02-07 RX ADMIN — INSULIN LISPRO 2 UNITS: 100 INJECTION, SOLUTION INTRAVENOUS; SUBCUTANEOUS at 16:40

## 2020-02-07 RX ADMIN — MIDODRINE HYDROCHLORIDE 5 MG: 5 TABLET ORAL at 16:24

## 2020-02-07 RX ADMIN — PANTOPRAZOLE SODIUM 40 MG: 40 TABLET, DELAYED RELEASE ORAL at 06:39

## 2020-02-07 RX ADMIN — INSULIN LISPRO 5 UNITS: 100 INJECTION, SOLUTION INTRAVENOUS; SUBCUTANEOUS at 11:55

## 2020-02-07 RX ADMIN — BIMATOPROST 1 DROP: 0.1 SOLUTION/ DROPS OPHTHALMIC at 21:30

## 2020-02-07 RX ADMIN — MILRINONE LACTATE IN DEXTROSE 0.25 MCG/KG/MIN: 200 INJECTION, SOLUTION INTRAVENOUS at 07:08

## 2020-02-07 RX ADMIN — INSULIN LISPRO 1 UNITS: 100 INJECTION, SOLUTION INTRAVENOUS; SUBCUTANEOUS at 08:32

## 2020-02-07 RX ADMIN — CYANOCOBALAMIN TAB 500 MCG 1000 MCG: 500 TAB at 08:31

## 2020-02-07 RX ADMIN — TIMOLOL MALEATE 1 DROP: 5 SOLUTION/ DROPS OPHTHALMIC at 08:33

## 2020-02-07 RX ADMIN — ASPIRIN 81 MG 81 MG: 81 TABLET ORAL at 08:31

## 2020-02-07 RX ADMIN — MELATONIN 6 MG: at 21:30

## 2020-02-07 RX ADMIN — POTASSIUM CHLORIDE 40 MEQ: 1500 TABLET, EXTENDED RELEASE ORAL at 08:30

## 2020-02-07 RX ADMIN — DOCUSATE SODIUM 100 MG: 100 CAPSULE, LIQUID FILLED ORAL at 08:31

## 2020-02-07 RX ADMIN — BUDESONIDE AND FORMOTEROL FUMARATE DIHYDRATE 2 PUFF: 160; 4.5 AEROSOL RESPIRATORY (INHALATION) at 20:24

## 2020-02-07 RX ADMIN — ATORVASTATIN CALCIUM 40 MG: 40 TABLET, FILM COATED ORAL at 21:30

## 2020-02-07 NOTE — ASSESSMENT & PLAN NOTE
Creatine is up to 2 89 form a baseline of  1 5-1 9  Lasix was DC yesterday monitor fluid status closely will likely need to be restarted   At some point

## 2020-02-07 NOTE — ACP (ADVANCE CARE PLANNING)
Spoke with son at bedside along with patient regarding end-stage cardiac disease/systolic heart failure resulting in acute on chronic renal failure/liver congestion  Reviewed he is not a candidate for long-term Milrinone infusion per Cardiology  He responded well to the IV Milrinone/diuretics however creatinine and liver functions have not plateaued  We discussed overall plan of care about discontinue Milrinone and starting medications to decreased heart rate/blood pressure to better transition off Milrinone  However there is potential for patient to go back into cardiogenic shock and at this time would not restart Milrinone given no transition in would suggest comfort measures only  Family and patient were aware  Questions answered  If appropriate to discontinue Milrinone tomorrow will notify family prior in case of acute decompensation needing transition to comfort measures  Overall I did discuss if patient does well with transition off Milrinone he still will remain with underlying chronic CHF/moderate aortic stenosis which may result in clinical decline in further goals of care discussions should be continued      Total time 32 minutes

## 2020-02-07 NOTE — PLAN OF CARE
Problem: Potential for Falls  Goal: Patient will remain free of falls  Description  INTERVENTIONS:  - Assess patient frequently for physical needs  -  Identify cognitive and physical deficits and behaviors that affect risk of falls    -  [place] fall precautions as indicated by assessment   - Educate patient/family on patient safety including physical limitations  - Instruct patient to call for assistance with activity based on assessment  - Modify environment to reduce risk of injury  - Consider OT/PT consult to assist with strengthening/mobility  Outcome: Progressing     Problem: DISCHARGE PLANNING  Goal: Discharge to home or other facility with appropriate resources  Description  INTERVENTIONS:  - Identify barriers to discharge w/patient and caregiver  - Arrange for needed discharge resources and transportation as appropriate  - Identify discharge learning needs (meds, wound care, etc )  - Arrange for interpretive services to assist at discharge as needed  - Refer to Case Management Department for coordinating discharge planning if the patient needs post-hospital services based on physician/advanced practitioner order or complex needs related to functional status, cognitive ability, or social support system  Outcome: Progressing     Problem: INFECTION - ADULT  Goal: Absence or prevention of progression during hospitalization  Description  INTERVENTIONS:  - Assess and monitor for signs and symptoms of infection  - Monitor lab/diagnostic results  - Monitor all insertion sites, i e  indwelling lines, tubes, and drains  - Monitor endotracheal if appropriate and nasal secretions for changes in amount and color  - [place] appropriate cooling/warming therapies per order  - Administer medications as ordered  - Instruct and encourage patient and family to use good hand hygiene technique  - Identify and instruct in appropriate isolation precautions for identified infection/condition  Outcome: Progressing     Problem: Knowledge Deficit  Goal: Patient/family/caregiver demonstrates understanding of disease process, treatment plan, medications, and discharge instructions  Description  Complete learning assessment and assess knowledge base    Interventions:  - Provide teaching at level of understanding  - Provide teaching via preferred learning methods  Outcome: Progressing     Problem: RESPIRATORY - ADULT  Goal: Achieves optimal ventilation and oxygenation  Description  INTERVENTIONS:  - Assess for changes in respiratory status  - Assess for changes in mentation and behavior  - Position to facilitate oxygenation and minimize respiratory effort  - Oxygen administered by appropriate delivery if ordered  - Initiate smoking cessation education as indicated  - Encourage broncho-pulmonary hygiene including cough, deep breathe, Incentive Spirometry  - Assess the need for suctioning and aspirate as needed  - Assess and instruct to report SOB or any respiratory difficulty  - Respiratory Therapy support as indicated  Outcome: Progressing     Problem: HEMATOLOGIC - ADULT  Goal: Maintains hematologic stability  Description  INTERVENTIONS  - Assess for signs and symptoms of bleeding or hemorrhage  - Monitor labs  - Administer supportive blood products/factors as ordered and appropriate  Outcome: Progressing     Problem: CARDIOVASCULAR - ADULT  Goal: Maintains optimal cardiac output and hemodynamic stability  Description  INTERVENTIONS:  - Monitor I/O, vital signs and rhythm  - Monitor for S/S and trends of decreased cardiac output  - Administer and titrate ordered vasoactive medications to optimize hemodynamic stability  - Assess quality of pulses, skin color and temperature  - Assess for signs of decreased coronary artery perfusion  - Instruct patient to report change in severity of symptoms  Outcome: Progressing  Goal: Absence of cardiac dysrhythmias or at baseline rhythm  Description  INTERVENTIONS:  - Continuous cardiac monitoring, vital signs, obtain 12 lead EKG if ordered  - Administer antiarrhythmic and heart rate control medications as ordered  - Monitor electrolytes and administer replacement therapy as ordered  Outcome: Progressing     Problem: METABOLIC, FLUID AND ELECTROLYTES - ADULT  Goal: Fluid balance maintained  Description  INTERVENTIONS:  - Monitor labs   - Monitor I/O and WT  - Instruct patient on fluid and nutrition as appropriate  - Assess for signs & symptoms of volume excess or deficit  Outcome: Progressing     Problem: Prexisting or High Potential for Compromised Skin Integrity  Goal: Skin integrity is maintained or improved  Description  INTERVENTIONS:  - Identify patients at risk for skin breakdown  - Assess and monitor skin integrity  - Assess and monitor nutrition and hydration status  - Monitor labs   - Assess for incontinence   - Turn and reposition patient  - Assist with mobility/ambulation  - Relieve pressure over bony prominences  - Avoid friction and shearing  - Provide appropriate hygiene as needed including keeping skin clean and dry  - Evaluate need for skin moisturizer/barrier cream  - Collaborate with interdisciplinary team   - Patient/family teaching  - Consider wound care consult   Outcome: Progressing     Problem: SAFETY,RESTRAINT: NV/NON-SELF DESTRUCTIVE BEHAVIOR  Goal: Remains free of harm/injury (restraint for non violent/non self-detsructive behavior)  Description  INTERVENTIONS:  - Instruct patient/family regarding restraint use   - Assess and monitor physiologic and psychological status   - Provide interventions and comfort measures to meet assessed patient needs   - Identify and implement measures to help patient regain control  - Assess readiness for release of restraint   Outcome: Completed  Goal: Returns to optimal restraint-free functioning  Description  INTERVENTIONS:  - Assess the patient's behavior and symptoms that indicate continued need for restraint  - Identify and implement measures to help patient regain control  - Assess readiness for release of restraint   Outcome: Completed

## 2020-02-07 NOTE — PROGRESS NOTES
Progress Note - Patricia Portillo 9/22/1930, 80 y o  male MRN: [id number]    Unit/Bed#: -01 Encounter: [id number]    Primary Care Provider: Isamar Valencia   Date and time admitted to hospital: 1/30/2020  8:11 PM        * Acute on chronic combined systolic and diastolic heart failure ([facility] [address] )  Assessment & Plan  Wt Readings from Last 3 Encounters:   02/06/20 76 9 kg (169 lb 8 oz)   12/17/19 82 6 kg (182 lb)   10/15/19 84 8 kg (187 lb)     ·Patient presented with complaints of dyspnea on exertion, orthopnea, and a 5 lb weight gain over the last week  ProBNP in on admission was 30,000 Patient reports taking 20 mg of Lasix as needed at home according to his daily weights  Patient was taking his Lasix but continued to gain weight over this last week  Per records from recent [facility] hospitalization  Home dose of Lasix was discontinued due to hypotension  Continue IV lasix 40 mg BID - unfortunately still with borderline BP limiting administration of lasix, hold parameters lowered  Weight has improving, now 176  Patient had worsening rales yesterday, despite diuresis and pulmonology was consulted for evaluation of PNA antibiotics was started for cefepime and azithromycin, does not appear to be pneumonia procalcitonin is negative white count is negative no left shift the patient does not have any fevers a low temperatures  This appears to be heart failure  Pulmonology is following  Repeat echocardiogram completed yesterday showing EF 25% with severe diffuse hypokinesis with distinct regional wall motion abnormalities  Echocardiogram completed March 2019 showed EF 30%    Strict I/O's, daily weights, low sodium diet  Per son, patient dry weight has been around 178 since previous admission in [place]  Cardiology is followed  Acute event overnight patient acutely dropped oxygenation mental status decreased and also poor perfusion and lower and upper extremities distally, repeat labs showed a troponin of 4 5 seconds with a picture of cardiorenal syndrome creatinine continues to rise and LFTs are rising, troponin bump could be demand versus an acute event started on heparin drip  Heparin drip was DC yesterday by Cardiology  Troponins spiked at 7 4  Patient was started on Primacor 0 25 stephane/min  Pt appeared to be improved yesterday was out of bed, plan to DC primacor not the be restarted and see how he tolerates, during the night his BP are starting to run low between 60-65 MAP   Would wean primacor down in 4 hour stages        ELTON (acute kidney injury) ([facility] [address] )  Assessment & Plan  Creatine is up to 2 89 form a baseline of  1 5-1 9  Lasix was DC yesterday monitor fluid status closely will likely need to be restarted   At some point       Transaminitis  Assessment & Plan  Elevated liver enzymes due to cardiorenal syndrome probably congestive    Delirium  Assessment & Plan  Due to low output state    Acute respiratory failure with hypoxia (HCC)  Assessment & Plan  Nasal cannula as needed    Increased anion gap metabolic acidosis  Assessment & Plan  Continue to monitor    Anemia  Assessment & Plan  ·Chronic likely in setting of malignancy, chronic disease  Continue to monitor  Transfuse for Hgb < 8 ( received on unit during this stay)   Give lasix with any blood transfusions    Hgb stable at 8 8    Type 2 diabetes mellitus without complication, without long-term current use of insulin [facility])  Assessment & Plan  Lab Results   Component Value Date    HGBA1C 6 1 02/01/2020       Recent Labs     02/06/20  0704 02/06/20  1115 02/06/20  1610 02/06/20  2116   POCGLU 195* 211* 198* 206*       Blood Sugar Average: Last 72 hrs:  (P) 406 [id number]   Continue insulin sliding scale  Keep glucose between 140-180        Mixed hyperlipidemia  Assessment & Plan  Patient no longer takes a statin    Esophageal reflux  Assessment & Plan  Continue Protonix daily    Kappa light chain myeloma (HCC)  Assessment & Plan  Phongicis Dr Melida Zapata as an outpt    Hyperbilirubinemia  Assessment & Plan  Continue to monitor    Cardiorenal syndrome with renal failure  Assessment & Plan  Continue to monitor creatinine liver enzymes    Pneumonia  Assessment & Plan  Continue Xopenex and simple  Patient is on cefepime and vancomycin for hospital-acquired pneumonia at this point no white count no left shift procalcitonin is negative and CT of the chest shows bilateral effusion with no definite PNA considered to DC antibiotic      ----------------------------------------------------------------------------------------  HPI/24hr events:  Patient did very well yesterday, was sitting up and talking tolerating Primacor heart rate remains between 100 and 120, he does have multiple runs of V-tach during the night but remained asymptomatic with them  Patient had 2 low blood pressures where he dropped below map of 60, he recovered quickly without intervention       Disposition: Continue Critical Care   Code Status: Level 3 - DNAR and DNI  ---------------------------------------------------------------------------------------  SUBJECTIVE  Patient is awake arousable and confused    Review of Systems   Unable to perform ROS: Dementia     Review of systems was unable to be performed secondary to MS  ---------------------------------------------------------------------------------------  OBJECTIVE    Vitals   Vitals:    20 0104 20 0200 20 0300 20 0400   BP: 94/50 94/55 98/58 99/55   Pulse: 101 102 (!) 108 (!) 107   Resp:       Temp:    97 6 °F (36 4 °C)   TempSrc:    Axillary   SpO2: 99% 98% 100% 100%   Weight:       Height:         Temp (24hrs), Av 7 °F (36 5 °C), Min:97 5 °F (36 4 °C), Max:97 8 °F (36 6 °C)  Current: Temperature: 97 6 °F (36 4 °C)        SpO2: SpO2: 100 %, SpO2 Activity: SpO2 Activity: At Rest, SpO2 Device: O2 Device: Nasal cannula  O2 Flow Rate (L/min): 1 L/min    Physical Exam   Constitutional: He appears well-developed and well-nourished  Elderly sick appearing male   HENT:   Head: Normocephalic and atraumatic  Eyes: Pupils are equal, round, and reactive to light  EOM are normal    Neck: Normal range of motion  Neck supple  Cardiovascular: Normal rate and regular rhythm  Exam reveals no friction rub  Murmur heard  Distal pulses +1   Pulmonary/Chest: Effort normal  He has wheezes  Diminished throughout crackles in the bases   Abdominal: Soft  Bowel sounds are normal  He exhibits no distension  There is no tenderness  There is no guarding  Genitourinary: Penis normal    Musculoskeletal: Normal range of motion  Neurological: He is alert  Patient is arousable but very confused   Skin: Skin is warm and dry  Capillary refill takes less than 2 seconds  Cool to the touch   Psychiatric: He has a normal mood and affect  His behavior is normal    Nursing note and vitals reviewed        Invasive/non-invasive ventilation settings   Respiratory    Lab Data (Last 4 hours)    None         O2/Vent Data (Last 4 hours)    None                Laboratory and Diagnostics:  Results from last 7 days   Lab Units 02/06/20  0559 02/05/20  0454 02/05/20  0140 02/04/20  0458 02/03/20  0515 02/02/20  0456 02/01/20  1308   WBC Thousand/uL 9 94 7 70  8 01 7 36 8 72 6 50 6 68  --    HEMOGLOBIN g/dL 8 8* 9 3*  9 6* 10 0* 9 5* 9 4* 9 0* 9 6*   HEMATOCRIT % 28 3* 30 3*  31 0* 32 1* 30 6* 30 4* 29 3* 31 3*   PLATELETS Thousands/uL 245 323  324 346 386 389 367  --    NEUTROS PCT % 83* 71 68  --   --   --   --    MONOS PCT % 5 6 7  --   --   --   --      Results from last 7 days   Lab Units 02/07/20  0459 02/06/20  0559 02/05/20  0454 02/05/20  0208 02/05/20  0140 02/04/20  0458 02/03/20  0515 02/02/20  0456   SODIUM mmol/L 141  141 146* 138  --  141 139 143 142   POTASSIUM mmol/L 3 6  3 6 3 8 4 3  --  4 8 4 4 3 0* 3 1*   CHLORIDE mmol/L 103  103 106 99*  --  102 104 107 106   CO2 mmol/L 24  24 25 22  --  24 19* 23 24   CO2, I-STAT mmol/L  --   -- --  24  --   --   --   --    ANION GAP mmol/L 14*  14* 15* 17*  --  15* 16* 13 12   BUN mg/dL 51*  51* 50* 44*  --  46* 35* 31* 34*   CREATININE mg/dL 2 74*  2 74* 2 89* 2 75*  --  2 68* 2 21* 1 74* 1 93*   CALCIUM mg/dL 8 5  8 5 8 8 9 0  --  9 4 9 2 8 7 8 8   GLUCOSE RANDOM mg/dL 171*  171* 199* 304*  --  125 236* 100 110   ALT U/L 925* 1,205*  --   --  663*  --   --   --    AST U/L 462* 1,213*  --   --  1,185*  --   --   --    ALK PHOS U/L 136* 122*  --   --  139*  --   --   --    ALBUMIN g/dL 3 1* 3 3*  --   --  3 4*  --   --   --    TOTAL BILIRUBIN mg/dL 1 00 1 10*  --   --  1 50*  --   --   --      Results from last 7 days   Lab Units 02/07/20  0459 02/06/20  0559 02/05/20  0140 02/03/20  0515 02/02/20  0456   MAGNESIUM mg/dL 2 4 2 5 2 5 2 3 2 4   PHOSPHORUS mg/dL  --  4 8*  --   --   --       Results from last 7 days   Lab Units 02/07/20  0459 02/05/20  0945 02/05/20  0140   INR  1 38*  --  1 41*   PTT seconds  --  131* 27      Results from last 7 days   Lab Units 02/05/20  0945 02/05/20  0536 02/05/20  0140   TROPONIN I ng/mL 7 40* 7 58* 4 57*     Results from last 7 days   Lab Units 02/06/20  0559 02/05/20  0140 02/04/20  2224   LACTIC ACID mmol/L 1 8 3 9* 3 2*     ABG:    VBG:    Results from last 7 days   Lab Units 02/05/20  0454 02/04/20  1532   PROCALCITONIN ng/ml 0 23 0 10       Micro  Results from last 7 days   Lab Units 02/04/20  1657 02/04/20  1448 02/04/20  1422 01/31/20  0831   MRSA CULTURE ONLY   --   --  No Methicillin Resistant Staphlyococcus aureus (MRSA) isolated No Methicillin Resistant Staphlyococcus aureus (MRSA) isolated   LEGIONELLA URINARY ANTIGEN  Negative  --   --   --    STREP PNEUMONIAE ANTIGEN, URINE   --  Negative  --   --        EKG:  AFib in the rate of 100-120  Imaging: I have personally reviewed pertinent reports  Intake and Output  I/O       02/05 0701 - 02/06 0700 02/06 0701 - 02/07 0700    P  O  120 1200    I V  (mL/kg) 171 3 (2 1) 148 8 (1 9)    IV Piggyback 100 Total Intake(mL/kg) 391 3 (4 7) 1348 8 (17 5)    Urine (mL/kg/hr) 3096 (1 6) 923 (0 5)    Stool 0     Total Output 3096 923    Net -2704 7 +425 8          Unmeasured Stool Occurrence 1 x           Height and Weights   Height: 5' 7" (170 2 cm)  IBW: 66 1 kg  Body mass index is 26 55 kg/m²  Weight (last 2 days)     Date/Time   Weight    02/06/20 1201   76 9 (169 5)    02/05/20 0454   82 6 (182 1)                Nutrition       Diet Orders   (From admission, onward)             Start     Ordered    02/06/20 1639  Diet Cardiovascular; Sodium 2 GM; Fluid Restriction 1800 ML, Consistent Carbohydrate Diet Level 2 (5 carb servings/75 grams CHO/meal)  Diet effective now     Comments:  RUQ US today 2PM   Question Answer Comment   Diet Type Cardiovascular    Cardiac Sodium 2 GM    Other Restriction(s): Fluid Restriction 1800 ML    Other Restriction(s): Consistent Carbohydrate Diet Level 2 (5 carb servings/75 grams CHO/meal)    RD to adjust diet per protocol?  Yes        02/06/20 1638                  Active Medications  Scheduled Meds:    Current Facility-Administered Medications:  acetaminophen 650 mg Oral Q6H PRN Venia PullerDON    aspirin 81 mg Oral Daily Venia NamanerDON    bimatoprost 1 drop Both Eyes HS Venia PullerDON    budesonide-formoterol 2 puff Inhalation BID Venia Krishna, DON    cyanocobalamin 1,000 mcg Oral Daily Venia Puller, DON    docusate sodium 100 mg Oral Daily Venia PullerDON    guaiFENesin 1,200 mg Oral BID Venia DON Keller    insulin lispro 1-5 Units Subcutaneous HS Idania Romero MD    insulin lispro 1-6 Units Subcutaneous TID AC Idania Romero MD    levalbuterol 1 25 mg Nebulization Q8H PRN Yamile Maguire DO    melatonin 6 mg Oral HS DON Nails    milrinone [facility]) infusion 0 25 mcg/kg/min Intravenous Continuous DON Lopez Last Rate: 0 25 mcg/kg/min (02/06/20 1427)   pantoprazole 40 mg Oral Early Morning DON Nails    timolol 1 drop Both Eyes Daily DON Beltran      Continuous Infusions:    milrinone [facility]) infusion 0 25 mcg/kg/min Last Rate: 0 25 mcg/kg/min (02/06/20 2297)     PRN Meds:     acetaminophen 650 mg Q6H PRN   levalbuterol 1 25 mg Q8H PRN     ---------------------------------------------------------------------------------------  Advance Directive and Living Will:      Power of :    POLST:    ---------------------------------------------------------------------------------------  Counseling / Coordination of Care  Total Critical Care time spent 30 minutes excluding procedures, teaching and family updates  Shekhar Dubois PA-C      Portions of the record may have been created with voice recognition software  Occasional wrong word or "sound a like" substitutions may have occurred due to the inherent limitations of voice recognition software    Read the chart carefully and recognize, using context, where substitutions have occurred

## 2020-02-07 NOTE — ASSESSMENT & PLAN NOTE
Lab Results   Component Value Date    HGBA1C 6 1 02/01/2020       Recent Labs     02/06/20  0704 02/06/20  1115 02/06/20  1610 02/06/20  2116   POCGLU 195* 211* 198* 206*       Blood Sugar Average: Last 72 hrs:  (P) 752 [id number]   Continue insulin sliding scale  Keep glucose between 140-180

## 2020-02-07 NOTE — ASSESSMENT & PLAN NOTE
Wt Readings from Last 3 Encounters:   02/06/20 76 9 kg (169 lb 8 oz)   12/17/19 82 6 kg (182 lb)   10/15/19 84 8 kg (187 lb)     ·Patient presented with complaints of dyspnea on exertion, orthopnea, and a 5 lb weight gain over the last week  ProBNP in on admission was 30,000 Patient reports taking 20 mg of Lasix as needed at home according to his daily weights  Patient was taking his Lasix but continued to gain weight over this last week  Per records from recent [facility] hospitalization  Home dose of Lasix was discontinued due to hypotension  Continue IV lasix 40 mg BID - unfortunately still with borderline BP limiting administration of lasix, hold parameters lowered  Weight has improving, now 176  Patient had worsening rales yesterday, despite diuresis and pulmonology was consulted for evaluation of PNA antibiotics was started for cefepime and azithromycin, does not appear to be pneumonia procalcitonin is negative white count is negative no left shift the patient does not have any fevers a low temperatures  This appears to be heart failure  Pulmonology is following  Repeat echocardiogram completed yesterday showing EF 25% with severe diffuse hypokinesis with distinct regional wall motion abnormalities  Echocardiogram completed March 2019 showed EF 30%    Strict I/O's, daily weights, low sodium diet  Per son, patient dry weight has been around 178 since previous admission in [place]  Cardiology is followed  Acute event overnight patient acutely dropped oxygenation mental status decreased and also poor perfusion and lower and upper extremities distally, repeat labs showed a troponin of 4 5 seconds with a picture of cardiorenal syndrome creatinine continues to rise and LFTs are rising, troponin bump could be demand versus an acute event started on heparin drip  Heparin drip was DC yesterday by Cardiology  Troponins spiked at 7 4  Patient was started on Primacor 0 25 stephane/min  Pt appeared to be improved yesterday was out of bed, plan to DC primacor not the be restarted and see how he tolerates, during the night his BP are starting to run low between 60-65 MAP   Would wean primacor down in 4 hour stages

## 2020-02-07 NOTE — PLAN OF CARE
Problem: Potential for Falls  Goal: Patient will remain free of falls  Description  INTERVENTIONS:  - Assess patient frequently for physical needs  -  Identify cognitive and physical deficits and behaviors that affect risk of falls    -  [place] fall precautions as indicated by assessment   - Educate patient/family on patient safety including physical limitations  - Instruct patient to call for assistance with activity based on assessment  - Modify environment to reduce risk of injury  - Consider OT/PT consult to assist with strengthening/mobility  Outcome: Progressing

## 2020-02-07 NOTE — PROGRESS NOTES
Cardiology Progress Note - Gabbi Samuels 80 y o  male MRN: [id number]    Unit/Bed#: -01 Encounter: [id number]        Subjective:    Patient weaned off milrinone, and started on midodrine  Resting comfortably  Review of Systems   Constitution: Positive for malaise/fatigue  Cardiovascular: Negative for chest pain, leg swelling and palpitations  Respiratory: Negative for shortness of breath  Objective:   Vitals: Blood pressure (!) 89/55, pulse 101, temperature 97 5 °F (36 4 °C), temperature source Oral, resp  rate 19, height 5' 7" (1 702 m), weight 77 2 kg (170 lb 3 1 oz), SpO2 90 %  , Body mass index is 26 66 kg/m² , Orthostatic Blood Pressures      Most Recent Value   Blood Pressure  (!) 89/55 filed at 02/07/2020 1600   Patient Position - Orthostatic VS  Sitting filed at 02/07/2020 3845         Systolic (16BXC), SGE:44 , Min:69 , VGB:268     Diastolic (29ZJI), IYL:45, Min:45, Max:63      Intake/Output Summary (Last 24 hours) at 2/7/2020 1812  Last data filed at 2/7/2020 1301  Gross per 24 hour   Intake 1392 06 ml   Output 812 ml   Net 580 06 ml     Weight (last 2 days)     Date/Time   Weight    02/07/20 0600   77 2 (170 2)    02/06/20 1201   76 9 (169 5)    02/05/20 0454   82 6 (182 1)                  Telemetry Review: NSR    Physical Exam   Cardiovascular: Normal rate, regular rhythm and normal heart sounds  Exam reveals no gallop and no friction rub  No murmur heard  Pulmonary/Chest: Breath sounds normal  He has no wheezes  He has no rales  Musculoskeletal: He exhibits no edema           Laboratory Results:  Results from last 7 days   Lab Units 02/05/20  0945 02/05/20  0536 02/05/20  0140   TROPONIN I ng/mL 7 40* 7 58* 4 57*       CBC with diff: Results from last 7 days   Lab Units 02/06/20  0559 02/05/20  0454 02/05/20  0140 02/04/20  0458 02/03/20  0515 02/02/20  0456 02/01/20  1308   WBC Thousand/uL 9 94 7 70  8 01 7 36 8 72 6 50 6 68  --    HEMOGLOBIN g/dL 8 8* 9 3*  9 6* 10 0* 9  5* 9 4* 9 0* 9 6*   HEMATOCRIT % 28 3* 30 3*  31 0* 32 1* 30 6* 30 4* 29 3* 31 3*   MCV fL 87 88  86 87 88 87 87  --    PLATELETS Thousands/uL 245 323  324 346 386 389 367  --    MCH pg 26 9 26 9  26 7* 27 0 27 2 27 0 26 8  --    MCHC g/dL 31 1* 30 7*  31 0* 31 2* 31 0* 30 9* 30 7*  --    RDW % 19 9* 20 0*  19 8* 20 0* 20 0* 20 1* 19 9*  --    MPV fL 10 0 10 7  10 4 10 4 10 2 10 5 10 1  --    NRBC AUTO /100 WBCs 0 0 0  --   --   --   --          CMP:  Results from last 7 days   Lab Units 02/07/20 0459 02/06/20 0559 02/05/20  0454 02/05/20  0208 02/05/20  0140 02/04/20 0458 02/03/20  0515 02/02/20  0456   POTASSIUM mmol/L 3 6  3 6 3 8 4 3  --  4 8 4 4 3 0* 3 1*   CHLORIDE mmol/L 103  103 106 99*  --  102 104 107 106   CO2 mmol/L 24  24 25 22  --  24 19* 23 24   CO2, I-STAT mmol/L  --   --   --  24  --   --   --   --    BUN mg/dL 51*  51* 50* 44*  --  46* 35* 31* 34*   CREATININE mg/dL 2 74*  2 74* 2 89* 2 75*  --  2 68* 2 21* 1 74* 1 93*   CALCIUM mg/dL 8 5  8 5 8 8 9 0  --  9 4 9 2 8 7 8 8   AST U/L 462* 1,213*  --   --  1,185*  --   --   --    ALT U/L 925* 1,205*  --   --  663*  --   --   --    ALK PHOS U/L 136* 122*  --   --  139*  --   --   --    EGFR ml/min/1 73sq m 20  20 18 20  --  20 25 34 30         BMP:  Results from last 7 days   Lab Units 02/07/20 0459 02/06/20  0559 02/05/20  0454 02/05/20  0208 02/05/20  0140 02/04/20  0458 02/03/20 0515 02/02/20  0456   POTASSIUM mmol/L 3 6  3 6 3 8 4 3  --  4 8 4 4 3 0* 3 1*   CHLORIDE mmol/L 103  103 106 99*  --  102 104 107 106   CO2 mmol/L 24  24 25 22  --  24 19* 23 24   CO2, I-STAT mmol/L  --   --   --  24  --   --   --   --    BUN mg/dL 51*  51* 50* 44*  --  46* 35* 31* 34*   CREATININE mg/dL 2 74*  2 74* 2 89* 2 75*  --  2 68* 2 21* 1 74* 1 93*   CALCIUM mg/dL 8 5  8 5 8 8 9 0  --  9 4 9 2 8 7 8 8       BNP:     Magnesium:   Results from last 7 days   Lab Units 02/07/20  0459 02/06/20  0559 02/05/20  0140 02/03/20  0515 02/02/20  0456 MAGNESIUM mg/dL 2 4 2 5 2 5 2 3 2 4       Coags:   Results from last 7 days   Lab Units 20  0459 20  0945 20  0140   PTT seconds  --  131* 27   INR  1 38*  --  1 41*       Cardiac testing:   Results for orders placed during the hospital encounter of 20   Echo complete with contrast if indicated    Narrative [facility]  [facility] [place] [address] [facility]    Transthoracic Echocardiogram  2D, M-mode, Doppler, and Color Doppler    Study date:  2020    Patient: Zohra Hanson  MR number: [id number]  Account number: [de-identified]  : 22-Sep-1930  Age: 80 years  Gender: Male  Status: Inpatient  Location: [facility]  Height: 67 in  Weight: 178 6 lb  BP: 97/ 59 mmHg    Indications: Heart failure  Referring Physician:  Margarita Romero MD  Group:  [id number]  Interpreting Physician:  Azra Robles MD    SUMMARY    LEFT VENTRICLE:  The ventricle was mildly dilated  Systolic function was markedly reduced  Ejection fraction was estimated to be 25 %  There was severe diffuse hypokinesis with distinct regional wall motion abnormalities  There was akinesis of the mid-apical anterior, mid anteroseptal, apical septal, apical lateral, and apical wall(s)  Wall thickness was at the upper limits of normal   Doppler parameters were consistent with elevated ventricular end-diastolic filling pressure  RIGHT VENTRICLE:  The ventricle was dilated  Systolic function was moderately reduced  LEFT ATRIUM:  The atrium was moderately dilated  RIGHT ATRIUM:  The atrium was mildly dilated  MITRAL VALVE:  There was mild annular calcification  There was moderate diffuse thickening  There was mild to moderate regurgitation  AORTIC VALVE:  The valve was trileaflet  Leaflets exhibited normal thickness, moderate calcification, and moderately reduced cuspal separation    Transaortic velocity and gradient were increased due to stenosis, but lower than expected for the degree of stenosis due to concomitant decreased transaortic flow (decreased stroke volume)  There was moderate stenosis  There was mild regurgitation  Valve mean gradient was 6 mmHg  Estimated aortic valve area (by VTI) was 1 25 cmï¾²  Estimated aortic valve area (by Vmax) was 1 25 cmï¾²  TRICUSPID VALVE:  There was mild regurgitation  HISTORY: PRIOR HISTORY: Ischemic CM, mixed hyperlipidemia, CKD stage 4, CHF, ERD, anemia  Risk factors: diabetes  PROCEDURE: The study was performed in the [facility]  This was a routine study  The transthoracic approach was used  The study included complete 2D imaging, M-mode, complete spectral Doppler, and color Doppler  Images were  obtained from the parasternal, apical, subcostal, and suprasternal notch acoustic windows  Echocardiographic views were limited due to restricted patient mobility, poor acoustic window availability, decreased penetration, and lung  interference  This was a technically difficult study  LEFT VENTRICLE: The ventricle was mildly dilated  Systolic function was markedly reduced  Ejection fraction was estimated to be 25 %  There was severe diffuse hypokinesis with distinct regional wall motion abnormalities  There was akinesis  of the mid-apical anterior, mid anteroseptal, apical septal, apical lateral, and apical wall(s)  Wall thickness was at the upper limits of normal  DOPPLER: Left ventricular diastolic function parameters were abnormal  Doppler parameters  were consistent with elevated ventricular end-diastolic filling pressure  RIGHT VENTRICLE: The ventricle was dilated  Systolic function was moderately reduced  Wall thickness was normal     LEFT ATRIUM: The atrium was moderately dilated  RIGHT ATRIUM: The atrium was mildly dilated  MITRAL VALVE: There was mild annular calcification  There was moderate diffuse thickening  There was normal leaflet separation   DOPPLER: The transmitral velocity was within the normal range  There was no evidence for stenosis  There was  mild to moderate regurgitation  AORTIC VALVE: The valve was trileaflet  Leaflets exhibited normal thickness, moderate calcification, and moderately reduced cuspal separation  DOPPLER: Transaortic velocity and gradient were increased due to stenosis, but lower than  expected for the degree of stenosis due to concomitant decreased transaortic flow (decreased stroke volume)  There was moderate stenosis  There was mild regurgitation  TRICUSPID VALVE: The valve structure was normal  There was normal leaflet separation  DOPPLER: The transtricuspid velocity was within the normal range  There was no evidence for stenosis  There was mild regurgitation  PULMONIC VALVE: Not well visualized  DOPPLER: The transpulmonic velocity was within the normal range  There was no significant regurgitation  PERICARDIUM: There was no pericardial effusion  The pericardium was normal in appearance  AORTA: The root exhibited normal size  SYSTEMIC VEINS: IVC: The inferior vena cava was dilated  PULMONARY VEINS: DOPPLER: Doppler signals were not recordable in the pulmonary vein(s)      MEASUREMENT TABLES    2D MEASUREMENTS  LVOT   (Reference normals)  Diam   22 mm   (--)    DOPPLER MEASUREMENTS  LVOT   (Reference normals)  Peak edda   60 cm/s   (--)  VTI   12 cm   (--)  Stroke vol   45 62 ml   (--)  Aortic valve   (Reference normals)  Peak edda   180 cm/s   (--)  VTI   36 cm   (--)  Mean gradient   6 mmHg   (--)  Obstr index, VTI   0 33    (--)  Valve area, VTI   1 25 cmï¾²   (--)  Obstr index, Vmax   0 33    (--)  Valve area, Vmax   1 25 cmï¾²   (--)    SYSTEM MEASUREMENT TABLES    2D mode  AoR Diam (2D): 31 mm  AoR Diam; Mean (2D): 31 mm  Asc Aorta Diam (2D): 27 mm  Asc Aorta Diam; Mean (2D): 27 mm  Area; 2D mode;: 2380 mm2  Area; Mean; Mean value chosen; 2D mode;: 2380 mm2  LA Dimension (2D): 51 mm  LA Dimension; Mean (2D): 51 mm  LA/Ao (2D): 1 65  EDV (2D-Cubed): 803225 mm3  EDV (BP): 281968 mm3  EF (2D-Cubed): 38 3 %  ESV (2D-Cubed): 562811 mm3  ESV (BP): 038883 mm3  FS (2D-Cubed): 15 1 %  FS (2D-Teich): 15 1 %  IVS/LVPW (2D): 0 97  IVSd (2D): 10 mm  IVSd; Mean chosen (2D): 10 mm  LVIDd (2D): 55 1 mm  LVIDd; Mean (2D): 55 1 mm  LVIDs (2D): 46 8 mm  LVIDs; Mean (2D): 46 8 mm  LVOT Area (2D): 346 mm2  LVPWd (2D): 10 3 mm  LVPWd; Mean (2D): 10 3 mm  Left Ventricular Ejection Fraction; Method of Disks, Biplane; 2D mode;: 17 %  Left Ventricular Ejection Fraction; Teichholz; 2D mode;: 31 8 %  Left Ventricular End Diastolic Volume; Teichholz; 2D mode;: 974707 mm3  Left Ventricular End Systolic Volume; Teichholz; 2D mode;: 598246 mm3  SI (2D-Cubed): 33 2 ml/m2  SI (BP): 17 1 ml/m2  SV (2D-Cubed): 77420 mm3  SV (BP): 02161 mm3  Stroke Index; Teichholz; 2D mode;: 24 4 ml/m2  Stroke Volume; Teichholz; 2D mode;: 09094 mm3  RVIDd (2D): 46 2 mm  RVIDd; Mean (2D): 46 2 mm  Right and Left Ventricular End Diastolic Diameter Ratio; 2D mode;: 0 84    Apical four chamber  EDV (A4C): 483797 mm3  EF (A4C): 11 8 %  LV MOD Diam; End Diastole; (A4C): 18 2 mm  LV MOD Diam; Recent value; End Diastole (A4C): 19 2 mm  LV MOD Diam; Recent value; End Systole (A4C): 15 9 mm  LVAd (A4C): 3590 mm2  LVLd (A4C): 79 9 mm  Left Ventricle diastolic major axis; Most recent value chosen; Method of Disks, Single Plane; 2D mode; Apical four chamber;: 92 1 mm  Left Ventricle systolic major axis; Most recent value chosen; Method of Disks, Single Plane; 2D mode; Apical four chamber;: 92 9 mm  Left Ventricular Diastolic Area; Most recent value chosen; Method of Disks, Single Plane; 2D mode; Apical four chamber;: 4340 mm2  Left Ventricular End Diastolic Volume; Most recent value chosen; Method of Disks, Single Plane; 2D mode; Apical four chamber;: 334359 mm3  Left Ventricular End Systolic Volume; Most recent value chosen; Method of Disks, Single Plane; 2D mode;  Apical four chamber;: 506519 mm3  Left Ventricular Systolic Area; Most recent value chosen; Method of Disks, Single Plane; 2D mode; Apical four chamber;: 4050 mm2  SI (A4C): 10 4 ml/m2  SV (A4C): 61501 mm3  Right Atrium Systolic Area; End Systole; 2D mode; Apical four chamber;: 2250 mm2  Right Atrium Systolic Area; Mean; Mean value chosen; End Systole; 2D mode; Apical four chamber;: 2250 mm2    Apical two chamber  EF (A2C): 26 4 %  LV MOD Diam; Recent value; End Diastole (A2C): 20 5 mm  LV MOD Diam; Recent value; End Systole (A2C): 20 mm  Left Ventricle diastolic major axis; Most recent value chosen; Method of Disks, Single Plane; 2D mode; Apical two chamber;: 92 8 mm  Left Ventricle systolic major axis; Most recent value chosen; Method of Disks, Single Plane; 2D mode; Apical two chamber;: 86 5 mm  Left Ventricular Diastolic Area; Most recent value chosen; Method of Disks, Single Plane; 2D mode; Apical two chamber;: 4970 mm2  Left Ventricular End Diastolic Volume; Most recent value chosen; Method of Disks, Single Plane; 2D mode; Apical two chamber;: 063185 mm3  Left Ventricular End Systolic Volume; Most recent value chosen; Method of Disks, Single Plane; 2D mode; Apical two chamber;: 147368 mm3  Left Ventricular Systolic Area; Most recent value chosen; Method of Disks, Single Plane; 2D mode; Apical two chamber;: 4120 mm2  SI (A2C): 30 1 ml/m2  SV (A2C): 20822 mm3    M mode  Tricuspid Annular Plane Systolic Excursion; Mean; Mean value chosen; Tricuspid Annulus; M mode;: 12 7 mm  Tricuspid Annular Plane Systolic Excursion; Tricuspid Annulus; M mode;: 12 7 mm    Unspecified Scan Mode  Aortic Valve Area Indexed To BSA;: 0 7  Aortic Valve Velocity Ratio;: 0 45  CV Orifice Area; Cont Eq by VTI: 135 mm2  CV Orifice Area; Cont Eq by Peak Jason: 155 mm2  Mean Grad; Antegrade Flow: 5 mm[Hg]  Mean Grad; Mean value; Antegrade Flow: 7 mm[Hg]  Mean Jason; Antegrade Flow: 1040 mm/s  Mean Jason; Mean value;  Antegrade Flow: 1210 mm/s  Peak Grad; Mean; Antegrade Flow: 13 mm[Hg]  R Wave to Aortic Valve Closure Time; Most recent value chosen;: 324 ms  VTI; Antegrade Flow: 318 mm  VTI; Mean; Antegrade Flow: 363 mm  Vmax; Antegrade Flow: 1690 mm/s  Vmax; Mean;  Antegrade Flow: 1780 mm/s  LVOT CV Orifice Diam; Mean: 21 mm  LVOT Diam: 22 mm  LVOT Mean Grad: 2 mm[Hg]  LVOT Mean Grad; Mean value: 2 mm[Hg]  LVOT Mean Jason: 584 mm/s  LVOT Mean Jason; Mean value: 517 mm/s  LVOT Peak Grad; Mean: 3 mm[Hg]  LVOT VTI: 164 mm  LVOT VTI; Mean: 142 mm  LVOT Vmax: 986 mm/s  LVOT Vmax; Mean: 799 mm/s  SV (LVOT): 99870 mm3  Peak Grad; Mean; Regurgitant Flow: 28 mm[Hg]  Vmax; Mean; Regurgitant Flow: 2660 mm/s  Vmax; Regurgitant Flow: 2920 mm/s    [facility]    Prepared and electronically signed by    Angelica Jacobs MD  Signed 31-Jan-2020 17:43:54           Meds/Allergies     Current Facility-Administered Medications:  acetaminophen 650 mg Oral Q6H PRN Pratibha [place], CRNP   aspirin 81 mg Oral Daily Pratibha [place], CRNP   atorvastatin 40 mg Oral HS DON Adnrew   bimatoprost 1 drop Both Eyes HS Pratibha [place], CRNP   budesonide-formoterol 2 puff Inhalation BID Pratibha [place], CRNP   cyanocobalamin 1,000 mcg Oral Daily Pratibha [place], CRNP   docusate sodium 100 mg Oral Daily Pratibha [place], CRNP   furosemide 40 mg Oral Daily Kalina Rizzo, VIVIENNENP   guaiFENesin 1,200 mg Oral BID Pratibha [place], CRNP   insulin lispro 1-5 Units Subcutaneous HS Lamar Crum MD   insulin lispro 1-6 Units Subcutaneous TID AC Lamar Crum MD   isosorbide mononitrate 30 mg Oral Daily Sung Godoy, DON   levalbuterol 1 25 mg Nebulization Q8H PRN Margaux Else Geish[place], DO   melatonin 6 mg Oral HS Pratibha [place], CRNP   midodrine 5 mg Oral TID AC DON Andrew   pantoprazole 40 mg Oral Early Morning Pratibha [place], CRNP   timolol 1 drop Both Eyes Daily Pratibha [place], CRNP        Medications Prior to Admission   Medication    aspirin 81 MG tablet    bimatoprost (LUMIGAN) 0 01 % ophthalmic drops    Cyanocobalamin (B-12) 1000 MCG TABS    furosemide (LASIX) 40 mg tablet    glimepiride (AMARYL) 2 mg tablet    glucose blood (ACCU-CHEK CHANDRAKANT PLUS) test strip    guaiFENesin (MUCINEX) 600 mg 12 hr tablet    isosorbide mononitrate (IMDUR) 30 mg 24 hr tablet    melatonin 1 mg    omeprazole (PriLOSEC) 40 MG capsule    potassium chloride (KLOR-CON 10) 10 mEq tablet    SYMBICORT 160-4 5 MCG/ACT inhaler    timolol (TIMOPTIC) 0 5 % ophthalmic solution    acetaminophen (TYLENOL) 325 mg tablet    atorvastatin (LIPITOR) 40 mg tablet    bisacodyl (DULCOLAX) 10 mg suppository    fluticasone (FLONASE) 50 mcg/act nasal spray    isosorbide dinitrate (ISORDIL) 5 mg tablet    magnesium hydroxide (MILK OF MAGNESIA) 400 mg/5 mL oral suspension    nitroglycerin (NITROSTAT) 0 4 mg SL tablet    ONE TOUCH ULTRA TEST test strip    ONETOUCH DELICA LANCETS 75Y MISC        UNKNOWN TO PATIENT       Assessment:  Principal Problem:    Acute on chronic combined systolic and diastolic heart failure (HCC)  Active Problems:    Type 2 diabetes mellitus without complication, without long-term current use of insulin (HCC)    Mixed hyperlipidemia    Esophageal reflux    Kappa light chain myeloma (HCC)    Anemia    Pneumonia    Acute respiratory failure with hypoxia (HCC)    Cardiorenal syndrome with renal failure    Transaminitis    Increased anion gap metabolic acidosis    Hyperbilirubinemia    Delirium    ELTON (acute kidney injury) ([facility])      Impression:  1  Acute on chronic systolic heart failure - likely ischemic in etiology  Was weaned off milrinone, and started on midodrine  2  Acute hypoxic respiratory failure - likely due to CHF  3  Type 2 MI - due to   4  CKD - stable     Plan:  1  Main goal is gentle diuresis  Would have low BP hold parameters    Would also hold other heart failure medications to allow as much blood pressure room for diuresis  2  Continue midodrine  3  Watch renal function  4  Poor prognosis due to advanced heart failure and multiple comorbidities

## 2020-02-07 NOTE — PROGRESS NOTES
NEPHROLOGY PROGRESS NOTE   Sondra Rodriguez 80 y o  male MRN: [id number]  Unit/Bed#: -01 Encounter: [id number]      [facility]    1  Acute Kidney Injury with a baseline creatinine 1 6-1 9 now increased to 2 9,  now 2 7-overall his urine output remained stable his creatinine is now down to 2 7 after holding his diuretics  He is now back his furosemide given midodrine to help blood pressure    2  Electrolytes:  Mild hypernatremia now improved    3  Acid/Base:  Elevated lactic acidosis moved to the ICU appreciate their support, with treating his heart failure seems like anion gap improved 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 as CO2 now 25     4  BP/HR:  In the setting of congestive heart failure monitor heart rate may start midodrine     5  Volume overload-with acute congestive heart failure-as above regarding diuretics     6  Anemia in the setting of chronic kidney disease hemoglobin low but acceptable will monitor in the setting of critical illness     7  Health Maintanance/Risk Reduction moderate glycemic control     8   Confusion/delirium-improving mental status     Given his frailty the and age he would not be a good dialysis candidate as this would likely cause more harm, suffering this was briefly discussed with patient's son and patient at bedside yesterday evening, mental status is slightly improved today urine output is stable will continue to monitor     Overall care was discussed with ICU team today  Updated patient's family at bedside    SUBJECTIVE:    Patient was seen today he is feeling better sitting denies any chest pain or shortness of Breath    OBJECTIVE:  Current Weight: Weight - Scale: 77 2 kg (170 lb 3 1 oz)  Vitals:    02/07/20 1500   BP: 90/63   Pulse: 101   Resp: 19   Temp: 97 5 °F (36 4 °C)   SpO2: 96%       Intake/Output Summary (Last 24 hours) at 2/7/2020 1558  Last data filed at 2/7/2020 1301  Gross per 24 hour   Intake 1572 06 ml   Output 840 ml   Net 732 06 ml     Weight (last 2 days)     Date/Time   Weight    02/07/20 0600   77 2 (170 2)    02/06/20 1201   76 9 (169 5)    02/05/20 0454   82 6 (182 1)              General: conscious, cooperative, in not acute distress  Eyes: conjunctivae pink, anicteric sclerae  ENT: lips and mucous membranes moist  Neck: supple, no JVD  Chest: clear breath sounds bilateral, no crackles, ronchus or wheezings  CVS: distinct S1 & S2, normal rate, regular rhythm  Abdomen: soft, non-tender, non-distended, normoactive bowel sounds  Extremities: no edema of both legs  Skin: no rash  Neuro: awake, alert, oriented      Medications:    Current Facility-Administered Medications:     acetaminophen (TYLENOL) tablet 650 mg, 650 mg, Oral, Q6H PRN, DON Peterson    aspirin chewable tablet 81 mg, 81 mg, Oral, Daily, DON Peterson, 81 mg at 02/07/20 0831    atorvastatin (LIPITOR) tablet 40 mg, 40 mg, Oral, HS, DON Andrew    bimatoprost (LUMIGAN) 0 01 % ophthalmic solution 1 drop, 1 drop, Both Eyes, HS, DON Peterson, 1 drop at 02/06/20 2117    budesonide-formoterol (SYMBICORT) 160-4 5 mcg/act inhaler 2 puff, 2 puff, Inhalation, BID, DON Peterson, 2 puff at 02/07/20 0753    cyanocobalamin (VITAMIN B-12) tablet 1,000 mcg, 1,000 mcg, Oral, Daily, DON Peterson, 1,000 mcg at 02/07/20 0831    docusate sodium (COLACE) capsule 100 mg, 100 mg, Oral, Daily, DON Peterson, 100 mg at 02/07/20 0831    furosemide (LASIX) tablet 40 mg, 40 mg, Oral, Daily, DON Conn    guaiFENesin (MUCINEX) 12 hr tablet 1,200 mg, 1,200 mg, Oral, BID, DON Peterson, 1,200 mg at 02/07/20 0830    insulin lispro (HumaLOG) 100 units/mL subcutaneous injection 1-5 Units, 1-5 Units, Subcutaneous, HS, Eunice Freire MD, 1 Units at 02/06/20 2117    insulin lispro (HumaLOG) 100 units/mL subcutaneous injection 1-6 Units, 1-6 Units, Subcutaneous, TID AC, 5 Units at 02/07/20 1155 **AND** Fingerstick Glucose (POCT), , , TID AC, Mariana Gomez MD    isosorbide mononitrate (IMDUR) 24 hr tablet 30 mg, 30 mg, Oral, Daily, Jamarcus Rob, CRCAL    levalbuterol [facility]) inhalation solution 1 25 mg, 1 25 mg, Nebulization, Q8H PRN, Noel Maguire,     melatonin tablet 6 mg, 6 mg, Oral, HS, Loraine Fulling, CRNP, 6 mg at 02/06/20 2119    midodrine (PROAMATINE) tablet 5 mg, 5 mg, Oral, TID AC, Kalina Rizzo, CRNP    pantoprazole (PROTONIX) EC tablet 40 mg, 40 mg, Oral, Early Morning, Loraine Fulling, CRNP, 40 mg at 02/07/20 0085    timolol (TIMOPTIC) 0 5 % ophthalmic solution 1 drop, 1 drop, Both Eyes, Daily, Loraine Fulling, CRNP, 1 drop at 02/07/20 1690    Invasive Devices:      Lab Results:   Results from last 7 days   Lab Units 02/07/20  0459 02/06/20  0559 02/05/20  1105 02/05/20  0454 02/05/20  0208 02/05/20  0140 02/04/20  0458 02/03/20  0515 02/02/20  0456   WBC Thousand/uL  --  9 94  --  7 70  8 01  --  7 36 8 72 6 50 6 68   HEMOGLOBIN g/dL  --  8 8*  --  9 3*  9 6*  --  10 0* 9 5* 9 4* 9 0*   HEMATOCRIT %  --  28 3*  --  30 3*  31 0*  --  32 1* 30 6* 30 4* 29 3*   PLATELETS Thousands/uL  --  245  --  323  324  --  346 386 389 367   POTASSIUM mmol/L 3 6  3 6 3 8  --  4 3  --  4 8 4 4 3 0* 3 1*   CHLORIDE mmol/L 103  103 106  --  99*  --  102 104 107 106   CO2 mmol/L 24  24 25  --  22  --  24 19* 23 24   CO2, I-STAT mmol/L  --   --   --   --  24  --   --   --   --    BUN mg/dL 51*  51* 50*  --  44*  --  46* 35* 31* 34*   CREATININE mg/dL 2 74*  2 74* 2 89*  --  2 75*  --  2 68* 2 21* 1 74* 1 93*   CALCIUM mg/dL 8 5  8 5 8 8  --  9 0  --  9 4 9 2 8 7 8 8   MAGNESIUM mg/dL 2 4 2 5  --   --   --  2 5  --  2 3 2 4   PHOSPHORUS mg/dL  --  4 8*  --   --   --   --   --   --   --    ALK PHOS U/L 136* 122*  --   --   --  139*  --   --   --    ALT U/L 925* 1,205*  --   --   --  663*  --   --   --    AST U/L 462* 1,213*  --   --   --  1,185*  --   --   --    LEUKOCYTES UA   --   --  Negative  --   --   --   --   --   -- BLOOD UA   --   --  Large*  --   --   --   --   --   --        Previous work up:  Please see previous notes

## 2020-02-07 NOTE — PROGRESS NOTES
Pt with 5 beat run of vtach, asymptomatic, CCAP made aware, no new orders  Pt noted to have low bp's while sleeping, last bp 69/45, MAP of 53, RN aroused patient from sleep, repeat bp noted to be 94/50, map of 66  CCAP made aware no new orders  Patient otherwise appears comfortable in bed with no complaints  Call bell within reach  Bed alarm utilized

## 2020-02-07 NOTE — ASSESSMENT & PLAN NOTE
·Chronic likely in setting of malignancy, chronic disease  Continue to monitor  Transfuse for Hgb < 8 ( received on unit during this stay)   Give lasix with any blood transfusions    Hgb stable at 8 8

## 2020-02-08 PROBLEM — I47.2 NSVT (NONSUSTAINED VENTRICULAR TACHYCARDIA) (HCC): Status: ACTIVE | Noted: 2020-02-08

## 2020-02-08 LAB
ALBUMIN SERPL BCP-MCNC: 3 G/DL (ref 3.5–5)
ALP SERPL-CCNC: 126 U/L (ref 46–116)
ALT SERPL W P-5'-P-CCNC: 761 U/L (ref 12–78)
ANION GAP SERPL CALCULATED.3IONS-SCNC: 13 MMOL/L (ref 4–13)
AST SERPL W P-5'-P-CCNC: 275 U/L (ref 5–45)
BILIRUB SERPL-MCNC: 1 MG/DL (ref 0.2–1)
BUN SERPL-MCNC: 52 MG/DL (ref 5–25)
CALCIUM SERPL-MCNC: 8.7 MG/DL (ref 8.3–10.1)
CHLORIDE SERPL-SCNC: 102 MMOL/L (ref 100–108)
CO2 SERPL-SCNC: 24 MMOL/L (ref 21–32)
CREAT SERPL-MCNC: 2.51 MG/DL (ref 0.6–1.3)
ERYTHROCYTE [DISTWIDTH] IN BLOOD BY AUTOMATED COUNT: 19.7 % (ref 11.6–15.1)
GFR SERPL CREATININE-BSD FRML MDRD: 22 ML/MIN/1.73SQ M
GLUCOSE SERPL-MCNC: 112 MG/DL (ref 65–140)
GLUCOSE SERPL-MCNC: 169 MG/DL (ref 65–140)
GLUCOSE SERPL-MCNC: 181 MG/DL (ref 65–140)
GLUCOSE SERPL-MCNC: 203 MG/DL (ref 65–140)
GLUCOSE SERPL-MCNC: 300 MG/DL (ref 65–140)
HCT VFR BLD AUTO: 27.4 % (ref 36.5–49.3)
HGB BLD-MCNC: 8.5 G/DL (ref 12–17)
MAGNESIUM SERPL-MCNC: 2.4 MG/DL (ref 1.6–2.6)
MCH RBC QN AUTO: 26.7 PG (ref 26.8–34.3)
MCHC RBC AUTO-ENTMCNC: 31 G/DL (ref 31.4–37.4)
MCV RBC AUTO: 86 FL (ref 82–98)
PLATELET # BLD AUTO: 212 THOUSANDS/UL (ref 149–390)
PMV BLD AUTO: 11.1 FL (ref 8.9–12.7)
POTASSIUM SERPL-SCNC: 4 MMOL/L (ref 3.5–5.3)
PROT SERPL-MCNC: 6.2 G/DL (ref 6.4–8.2)
RBC # BLD AUTO: 3.18 MILLION/UL (ref 3.88–5.62)
SODIUM SERPL-SCNC: 139 MMOL/L (ref 136–145)
WBC # BLD AUTO: 7.27 THOUSAND/UL (ref 4.31–10.16)

## 2020-02-08 PROCEDURE — 94664 DEMO&/EVAL PT USE INHALER: CPT

## 2020-02-08 PROCEDURE — 82948 REAGENT STRIP/BLOOD GLUCOSE: CPT

## 2020-02-08 PROCEDURE — 94760 N-INVAS EAR/PLS OXIMETRY 1: CPT

## 2020-02-08 PROCEDURE — 80053 COMPREHEN METABOLIC PANEL: CPT | Performed by: NURSE PRACTITIONER

## 2020-02-08 PROCEDURE — 99233 SBSQ HOSP IP/OBS HIGH 50: CPT | Performed by: INTERNAL MEDICINE

## 2020-02-08 PROCEDURE — 85027 COMPLETE CBC AUTOMATED: CPT | Performed by: NURSE PRACTITIONER

## 2020-02-08 PROCEDURE — 94668 MNPJ CHEST WALL SBSQ: CPT

## 2020-02-08 PROCEDURE — 99231 SBSQ HOSP IP/OBS SF/LOW 25: CPT | Performed by: INTERNAL MEDICINE

## 2020-02-08 PROCEDURE — 94640 AIRWAY INHALATION TREATMENT: CPT

## 2020-02-08 PROCEDURE — 99497 ADVNCD CARE PLAN 30 MIN: CPT | Performed by: NURSE PRACTITIONER

## 2020-02-08 PROCEDURE — 83735 ASSAY OF MAGNESIUM: CPT | Performed by: NURSE PRACTITIONER

## 2020-02-08 PROCEDURE — 99233 SBSQ HOSP IP/OBS HIGH 50: CPT | Performed by: NURSE PRACTITIONER

## 2020-02-08 RX ADMIN — MIDODRINE HYDROCHLORIDE 5 MG: 5 TABLET ORAL at 17:12

## 2020-02-08 RX ADMIN — GUAIFENESIN 1200 MG: 600 TABLET ORAL at 08:29

## 2020-02-08 RX ADMIN — ATORVASTATIN CALCIUM 40 MG: 40 TABLET, FILM COATED ORAL at 21:37

## 2020-02-08 RX ADMIN — MIDODRINE HYDROCHLORIDE 5 MG: 5 TABLET ORAL at 06:13

## 2020-02-08 RX ADMIN — BIMATOPROST 1 DROP: 0.1 SOLUTION/ DROPS OPHTHALMIC at 21:37

## 2020-02-08 RX ADMIN — INSULIN LISPRO 1 UNITS: 100 INJECTION, SOLUTION INTRAVENOUS; SUBCUTANEOUS at 21:37

## 2020-02-08 RX ADMIN — INSULIN LISPRO 1 UNITS: 100 INJECTION, SOLUTION INTRAVENOUS; SUBCUTANEOUS at 07:47

## 2020-02-08 RX ADMIN — PANTOPRAZOLE SODIUM 40 MG: 40 TABLET, DELAYED RELEASE ORAL at 06:13

## 2020-02-08 RX ADMIN — DOCUSATE SODIUM 100 MG: 100 CAPSULE, LIQUID FILLED ORAL at 08:29

## 2020-02-08 RX ADMIN — BUDESONIDE AND FORMOTEROL FUMARATE DIHYDRATE 2 PUFF: 160; 4.5 AEROSOL RESPIRATORY (INHALATION) at 21:13

## 2020-02-08 RX ADMIN — BUDESONIDE AND FORMOTEROL FUMARATE DIHYDRATE 2 PUFF: 160; 4.5 AEROSOL RESPIRATORY (INHALATION) at 08:19

## 2020-02-08 RX ADMIN — INSULIN LISPRO 4 UNITS: 100 INJECTION, SOLUTION INTRAVENOUS; SUBCUTANEOUS at 12:27

## 2020-02-08 RX ADMIN — MELATONIN 6 MG: at 21:37

## 2020-02-08 RX ADMIN — GUAIFENESIN 1200 MG: 600 TABLET ORAL at 17:12

## 2020-02-08 RX ADMIN — MIDODRINE HYDROCHLORIDE 5 MG: 5 TABLET ORAL at 12:30

## 2020-02-08 RX ADMIN — TIMOLOL MALEATE 1 DROP: 5 SOLUTION/ DROPS OPHTHALMIC at 08:31

## 2020-02-08 RX ADMIN — CYANOCOBALAMIN TAB 500 MCG 1000 MCG: 500 TAB at 08:30

## 2020-02-08 RX ADMIN — FUROSEMIDE 40 MG: 40 TABLET ORAL at 08:29

## 2020-02-08 RX ADMIN — ASPIRIN 81 MG 81 MG: 81 TABLET ORAL at 08:29

## 2020-02-08 NOTE — PLAN OF CARE
Problem: Potential for Falls  Goal: Patient will remain free of falls  Description  INTERVENTIONS:  - Assess patient frequently for physical needs  -  Identify cognitive and physical deficits and behaviors that affect risk of falls    -  [place] fall precautions as indicated by assessment   - Educate patient/family on patient safety including physical limitations  - Instruct patient to call for assistance with activity based on assessment  - Modify environment to reduce risk of injury  - Consider OT/PT consult to assist with strengthening/mobility  Outcome: Progressing     Problem: DISCHARGE PLANNING  Goal: Discharge to home or other facility with appropriate resources  Description  INTERVENTIONS:  - Identify barriers to discharge w/patient and caregiver  - Arrange for needed discharge resources and transportation as appropriate  - Identify discharge learning needs (meds, wound care, etc )  - Arrange for interpretive services to assist at discharge as needed  - Refer to Case Management Department for coordinating discharge planning if the patient needs post-hospital services based on physician/advanced practitioner order or complex needs related to functional status, cognitive ability, or social support system  Outcome: Progressing     Problem: RESPIRATORY - ADULT  Goal: Achieves optimal ventilation and oxygenation  Description  INTERVENTIONS:  - Assess for changes in respiratory status  - Assess for changes in mentation and behavior  - Position to facilitate oxygenation and minimize respiratory effort  - Oxygen administered by appropriate delivery if ordered  - Initiate smoking cessation education as indicated  - Encourage broncho-pulmonary hygiene including cough, deep breathe, Incentive Spirometry  - Assess the need for suctioning and aspirate as needed  - Assess and instruct to report SOB or any respiratory difficulty  - Respiratory Therapy support as indicated  Outcome: Progressing     Problem: HEMATOLOGIC - ADULT  Goal: Maintains hematologic stability  Description  INTERVENTIONS  - Assess for signs and symptoms of bleeding or hemorrhage  - Monitor labs  - Administer supportive blood products/factors as ordered and appropriate  Outcome: Progressing     Problem: Prexisting or High Potential for Compromised Skin Integrity  Goal: Skin integrity is maintained or improved  Description  INTERVENTIONS:  - Identify patients at risk for skin breakdown  - Assess and monitor skin integrity  - Assess and monitor nutrition and hydration status  - Monitor labs   - Assess for incontinence   - Turn and reposition patient  - Assist with mobility/ambulation  - Relieve pressure over bony prominences  - Avoid friction and shearing  - Provide appropriate hygiene as needed including keeping skin clean and dry  - Evaluate need for skin moisturizer/barrier cream  - Collaborate with interdisciplinary team   - Patient/family teaching  - Consider wound care consult   Outcome: Progressing     Problem: INFECTION - ADULT  Goal: Absence or prevention of progression during hospitalization  Description  INTERVENTIONS:  - Assess and monitor for signs and symptoms of infection  - Monitor lab/diagnostic results  - Monitor all insertion sites, i e  indwelling lines, tubes, and drains  - Monitor endotracheal if appropriate and nasal secretions for changes in amount and color  - [place] appropriate cooling/warming therapies per order  - Administer medications as ordered  - Instruct and encourage patient and family to use good hand hygiene technique  - Identify and instruct in appropriate isolation precautions for identified infection/condition  Outcome: Progressing     Problem: Knowledge Deficit  Goal: Patient/family/caregiver demonstrates understanding of disease process, treatment plan, medications, and discharge instructions  Description  Complete learning assessment and assess knowledge base    Interventions:  - Provide teaching at level of understanding  - Provide teaching via preferred learning methods  Outcome: Progressing     Problem: CARDIOVASCULAR - ADULT  Goal: Maintains optimal cardiac output and hemodynamic stability  Description  INTERVENTIONS:  - Monitor I/O, vital signs and rhythm  - Monitor for S/S and trends of decreased cardiac output  - Administer and titrate ordered vasoactive medications to optimize hemodynamic stability  - Assess quality of pulses, skin color and temperature  - Assess for signs of decreased coronary artery perfusion  - Instruct patient to report change in severity of symptoms  Outcome: Progressing  Goal: Absence of cardiac dysrhythmias or at baseline rhythm  Description  INTERVENTIONS:  - Continuous cardiac monitoring, vital signs, obtain 12 lead EKG if ordered  - Administer antiarrhythmic and heart rate control medications as ordered  - Monitor electrolytes and administer replacement therapy as ordered  Outcome: Progressing     Problem: METABOLIC, FLUID AND ELECTROLYTES - ADULT  Goal: Fluid balance maintained  Description  INTERVENTIONS:  - Monitor labs   - Monitor I/O and WT  - Instruct patient on fluid and nutrition as appropriate  - Assess for signs & symptoms of volume excess or deficit  Outcome: Progressing

## 2020-02-08 NOTE — ASSESSMENT & PLAN NOTE
Wt Readings from Last 3 Encounters:   02/07/20 77 2 kg (170 lb 3 1 oz)   12/17/19 82 6 kg (182 lb)   10/15/19 84 8 kg (187 lb)     · Echo with an EF 25% with moderate decreased RV function an aortic valve area of 1 25 with mild to moderate MR unchanged from prior echocardiogram  · Weight down 5 kg from admission  · Clinically appears euvolemic  · Day balance -240 stay balance -6 6 L  · Oral Lasix ordered with hold for systolic blood pressure less than 100 may need to change to less than 90 to prevent reoccurrence of CHF  · Goal-directed therapies are on hold given hypotension  · Continue midodrine 5 mg t i d   · Milrinone discontinued on 01/07 in the a m    · Family updated at bedside in length regarding ongoing goals of care discussion with chronic heart failure/poor prognosis

## 2020-02-08 NOTE — PROGRESS NOTES
Progress Note - Cardiology   Cookie Hastings 80 y o  male MRN: [id number]  Unit/Bed#: -01 Encounter: [id number]  02/08/20  6:23 PM      Impression and Plan:    25-year-old with acute CHF, with signs of cardiogenic shock, treated with Milrinone and currently on midodrine for blood pressure  Also has moderate aortic stenosis and cardiorenal syndrome/acute kidney injury  Plan:    Cardiomyopathy:  Ejection fraction 25%, treated as a low output state recently  Also has moderate aortic stenosis  Creatinine at 2 5, prior to admission was less than 2  At this point he remains in sinus rhythm, blood pressures are  borderline-on midodrine  Creatinine of 2 5  Continue low-dose Lasix to facilitate volume removal   Cannot use beta-blockers and Ace inhibitors in the setting due to hypotension and [place] I  In the setting of advanced age, [place] I, moderate aortic stenosis-without good therapeutic options, cardiomyopathy, prognosis will be poor  Goals of care discussions are very appropriate       ===================================================================    Chief Complaint:   Chief Complaint   Patient presents with    Anemia     Pt sent by PCP for hgb 7 7  Son requesting blood transfusion            Subjective/Objective     Subjective:  Nonverbal, somnolent    Objective:  Appears frail, no distress acutely    Patient Active Problem List   Diagnosis    Parenchymal renal hypertension    Type 2 diabetes mellitus without complication, without long-term current use of insulin (HCC)    Mixed hyperlipidemia    Esophageal reflux    Cerebral infarction, watershed distribution, bilateral, acute (HCC)    Stage 4 chronic kidney disease (HCC)    Benign prostatic hyperplasia    Ischemic cardiomyopathy    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)    Other proteinuria    Kappa light chain myeloma (HCC)    MRSA (methicillin resistant staph aureus) culture positive    Acute on chronic combined systolic and diastolic heart failure (HCC)    Anemia    Transaminitis    ELTON (acute kidney injury) (HCC)    NSVT (nonsustained ventricular tachycardia) (HCC)       Vitals: /62   Pulse 85   Temp 97 6 °F (36 4 °C) (Oral)   Resp 20   Ht 5' 7" (1 702 m)   Wt 77 7 kg (171 lb 3 2 oz)   SpO2 100%   BMI 26 81 kg/m²     I/O this shift: In: 540 [P O :520; I V :20]  Out: 393 [Urine:393]  Wt Readings from Last 3 Encounters:   02/08/20 77 7 kg (171 lb 3 2 oz)   12/17/19 82 6 kg (182 lb)   10/15/19 84 8 kg (187 lb)       Intake/Output Summary (Last 24 hours) at 2/8/2020 1823  Last data filed at 2/8/2020 1600  Gross per 24 hour   Intake 600 ml   Output 1005 ml   Net -405 ml     I/O last 3 completed shifts:   In: 792 1 [P O :660; I V :132 1]  Out: 1424 [Urine:1424]    Invasive Devices     Peripheral Intravenous Line            Peripheral IV 02/06/20 Left;Ventral (anterior) Forearm 2 days                  Physical Exam:  GEN: Alec Shaw appears chronically, alert and oriented x 3, pleasant and cooperative , appears cachectic  HEENT: pupils equal, round, and reactive to light; extraocular muscles intact  NECK: supple, no carotid bruits or JVD  HEART: regular rhythm, normal S1 and S2, systolic aortic murmur, no clicks, gallops or rubs   LUNGS: clear to auscultation bilaterally; no wheezes or rhonchi, now rales  ABDOMEN/GI: normal bowel sounds, soft, no tenderness, no distention  EXTREMITIES/Musculoskeltal: peripheral pulses normal; no clubbing, cyanosis, no edema  NEURO: no focal motor findings   SKIN: normal without suspicious lesions on exposed skin              Lab Results:   Results from last 7 days   Lab Units 02/05/20  0945 02/05/20  0536 02/05/20  0140   TROPONIN I ng/mL 7 40* 7 58* 4 57*     Results from last 7 days   Lab Units 02/08/20  0514 02/06/20  0559 02/05/20  0454   WBC Thousand/uL 7 27 9 94 7 70  8 01   HEMOGLOBIN g/dL 8 5* 8 8* 9 3*  9 6*   HEMATOCRIT % 27 4* 28 3* 30 3*  31 0*   PLATELETS Thousands/uL 212 245 323  324         Results from last 7 days   Lab Units 02/08/20  0514 02/07/20  0459 02/06/20  0559   POTASSIUM mmol/L 4 0 3 6  3 6 3 8   CHLORIDE mmol/L 102 103  103 106   CO2 mmol/L 24 24  24 25   BUN mg/dL 52* 51*  51* 50*   CREATININE mg/dL 2 51* 2 74*  2 74* 2 89*   CALCIUM mg/dL 8 7 8 5  8 5 8 8   ALK PHOS U/L 126* 136* 122*   ALT U/L 761* 925* 1,205*   AST U/L 275* 462* 1,213*     Results from last 7 days   Lab Units 02/07/20  0459 02/05/20  0140   INR  1 38* 1 41*       Imaging: I have personally reviewed pertinent reports  EKG/Telemtry:  Sinus rhythm    Scheduled Meds:    Current Facility-Administered Medications:  acetaminophen 650 mg Oral Q6H PRN Izella Grain, CRNP   aspirin 81 mg Oral Daily Izella Grain, CRNP   atorvastatin 40 mg Oral HS Kalina Rizzo, DON   bimatoprost 1 drop Both Eyes HS Izella Grain, CRNP   budesonide-formoterol 2 puff Inhalation BID Izella Grain, CRNP   cyanocobalamin 1,000 mcg Oral Daily Izella Grain, CRNP   docusate sodium 100 mg Oral Daily Izella Grain, CRNP   furosemide 40 mg Oral Daily Seth Sales MD   guaiFENesin 1,200 mg Oral BID Izella Grain, CRNP   insulin lispro 1-5 Units Subcutaneous HS Nasim Tinoco MD   insulin lispro 1-6 Units Subcutaneous TID AC Nasim Tinoco MD   levalbuterol 1 25 mg Nebulization Q8H PRN Belen Maguire, DO   melatonin 6 mg Oral HS Izella Grain, CRNP   midodrine 5 mg Oral TID AC DON Andrew   pantoprazole 40 mg Oral Early Morning Izella Grain, CRNP   timolol 1 drop Both Eyes Daily Izella Grain, CRNP     Continuous Infusions:         Counseling / Coordination of Care  Total time spent 20 minutes including teaching and family updates  More than 50% was spent counseling pt and family

## 2020-02-08 NOTE — ASSESSMENT & PLAN NOTE
Lab Results   Component Value Date    HGBA1C 6 1 02/01/2020       Recent Labs     02/07/20  0727 02/07/20  1102 02/07/20  1633 02/07/20  2111   POCGLU 170* 334* 197* 190*       Blood Sugar Average: Last 72 hrs:  (P) 191 375   · Holding home Amaryl likely will need to change in the setting of kidney disease  · Continue sliding scale insulin

## 2020-02-08 NOTE — PROGRESS NOTES
Progress Note - Marvin Tirado 9/22/1930, 80 y o  male MRN: [id number]    Unit/Bed#: -01 Encounter: [id number]    Primary Care Provider: Farideh Stevens   Date and time admitted to hospital: 1/30/2020  8:11 PM        * [facility] on chronic combined systolic and [facility]  Assessment & Plan  Wt Readings from Last 3 Encounters:   02/07/20 77 2 kg (170 lb 3 1 oz)   12/17/19 82 6 kg (182 lb)   10/15/19 84 8 kg (187 lb)     · Echo with an EF 25% with moderate decreased RV function an aortic valve area of 1 25 with mild to moderate MR unchanged from prior echocardiogram  · Weight down 5 kg from admission  · Clinically appears euvolemic  · Day balance -240 stay balance -6 6 L  · Oral Lasix ordered with hold for systolic blood pressure less than 100 may need to change to less than 90 to prevent reoccurrence of CHF  · Goal-directed therapies are on hold given hypotension  · Continue midodrine 5 mg t i d   · Milrinone discontinued on 01/07 in the a   · Family updated at bedside in length regarding ongoing goals of care discussion with chronic heart failure/poor prognosis    ELTON (acute kidney injury) ([facility] )  Assessment & Plan  · On CKD with a baseline creatinine 1 6-1 9 secondary to cardiorenal syndrome  · UA with large blood  Consider renal ultrasound  · Currently slightly lower at 2 7  · Nephrology following  · I/O  · BMP in the a   Transaminitis  Assessment & Plan  · Secondary to hepatic congestion from cardiogenic shock  · Hepatitis panel negative/right upper quadrant ultrasound unremarkable  · Continue to trend LFTs daily down trending    NSVT (nonsustained ventricular tachycardia) (HCC)  Assessment & Plan  · Keep K>4 mag>2  · Unable to tolerate bb with hypotension if bp improves to start    Anemia  Assessment & Plan  · Chronic in the setting of myeloma  · CBC in the a       Type 2 diabetes mellitus without complication, without long-term current use of insulin (HCC)  Assessment & Plan  Lab Results   Component Value Date    HGBA1C 6 1 2020       Recent Labs     20  0727 20  1102 20  1633 20  2111   POCGLU 170* 334* 197* 190*       Blood Sugar Average: Last 72 hrs:  (P) 191 375   · Holding home Amaryl likely will need to change in the setting of kidney disease  · Continue sliding scale insulin        Mixed hyperlipidemia  Assessment & Plan  · Continue home statin    Esophageal reflux  Assessment & Plan  · Continue home PPI    Kappa light chain myeloma ([facility] )  Assessment & Plan  · Outpatient Oncology follow-up      ----------------------------------------------------------------------------------------  HPI/24hr events:  Transitioned off Milrinone  Blood pressure low in the 80s with started on midodrine  Diuretics were held  Disposition: Transfer to Med-Surg   Code Status: Level 3 - DNAR and DNI  ---------------------------------------------------------------------------------------  SUBJECTIVE  Patient denies complaints of shortness of breath/chest pain  Slept well  Review of Systems   All other systems reviewed and are negative  Review of systems was reviewed and negative unless stated above in HPI/24-hour events   ---------------------------------------------------------------------------------------  OBJECTIVE    Vitals   Vitals:    2020 0239   BP: 95/63  98/68 (!) 88/51   BP Location:   Right arm Right arm   Pulse: 97 97 95 90   Resp:  18 20 18   Temp:   98 °F (36 7 °C) 97 5 °F (36 4 °C)   TempSrc:   Oral Oral   SpO2: 96% 97% 98% 95%   Weight:       Height:         Temp (24hrs), Av 8 °F (36 6 °C), Min:97 5 °F (36 4 °C), Max:98 3 °F (36 8 °C)  Current: Temperature: 97 5 °F (36 4 °C)        SpO2: SpO2: 95 %  O2 Flow Rate (L/min): 1 L/min    Physical Exam   Constitutional: He is oriented to person, place, and time  No distress  HENT:   Head: Normocephalic and atraumatic     Mouth/Throat: Oropharynx is clear and moist    Eyes: Pupils are equal, round, and reactive to light  No scleral icterus  Neck: Neck supple  No JVD present  Cardiovascular: Normal rate, regular rhythm, normal heart sounds and intact distal pulses  Exam reveals no gallop and no friction rub  No murmur heard  Pulmonary/Chest: Effort normal and breath sounds normal  No respiratory distress  He has no wheezes  He has no rales  Abdominal: Soft  Bowel sounds are normal  He exhibits no distension  There is no tenderness  Musculoskeletal: Normal range of motion  He exhibits no edema  Neurological: He is alert and oriented to person, place, and time  No cranial nerve deficit  Skin: Skin is warm and dry  No rash noted  No erythema  No pallor         Invasive/non-invasive ventilation settings   Respiratory    Lab Data (Last 4 hours)    None         O2/Vent Data (Last 4 hours)    None                Laboratory and Diagnostics:  Results from last 7 days   Lab Units 02/06/20  0559 02/05/20  0454 02/05/20  0140 02/04/20  0458 02/03/20  0515 02/02/20  0456 02/01/20  1308   WBC Thousand/uL 9 94 7 70  8 01 7 36 8 72 6 50 6 68  --    HEMOGLOBIN g/dL 8 8* 9 3*  9 6* 10 0* 9 5* 9 4* 9 0* 9 6*   HEMATOCRIT % 28 3* 30 3*  31 0* 32 1* 30 6* 30 4* 29 3* 31 3*   PLATELETS Thousands/uL 245 323  324 346 386 389 367  --    NEUTROS PCT % 83* 71 68  --   --   --   --    MONOS PCT % 5 6 7  --   --   --   --      Results from last 7 days   Lab Units 02/07/20  0459 02/06/20  0559 02/05/20  0454 02/05/20  0208 02/05/20  0140 02/04/20  0458 02/03/20  0515 02/02/20  0456   SODIUM mmol/L 141  141 146* 138  --  141 139 143 142   POTASSIUM mmol/L 3 6  3 6 3 8 4 3  --  4 8 4 4 3 0* 3 1*   CHLORIDE mmol/L 103  103 106 99*  --  102 104 107 106   CO2 mmol/L 24  24 25 22  --  24 19* 23 24   CO2, I-STAT mmol/L  --   --   --  24  --   --   --   --    ANION GAP mmol/L 14*  14* 15* 17*  --  15* 16* 13 12   BUN mg/dL 51*  51* 50* 44*  --  46* 35* 31* 34*   CREATININE mg/dL 2 74*  2 74* 2 89* 2 75*  --  2 68* 2 21* 1 74* 1 93*   CALCIUM mg/dL 8 5  8 5 8 8 9 0  --  9 4 9 2 8 7 8 8   GLUCOSE RANDOM mg/dL 171*  171* 199* 304*  --  125 236* 100 110   ALT U/L 925* 1,205*  --   --  663*  --   --   --    AST U/L 462* 1,213*  --   --  1,185*  --   --   --    ALK PHOS U/L 136* 122*  --   --  139*  --   --   --    ALBUMIN g/dL 3 1* 3 3*  --   --  3 4*  --   --   --    TOTAL BILIRUBIN mg/dL 1 00 1 10*  --   --  1 50*  --   --   --      Results from last 7 days   Lab Units 02/07/20  0459 02/06/20  0559 02/05/20  0140 02/03/20  0515 02/02/20  0456   MAGNESIUM mg/dL 2 4 2 5 2 5 2 3 2 4   PHOSPHORUS mg/dL  --  4 8*  --   --   --       Results from last 7 days   Lab Units 02/07/20  0459 02/05/20  0945 02/05/20  0140   INR  1 38*  --  1 41*   PTT seconds  --  131* 27      Results from last 7 days   Lab Units 02/05/20  0945 02/05/20  0536 02/05/20  0140   TROPONIN I ng/mL 7 40* 7 58* 4 57*     Results from last 7 days   Lab Units 02/06/20  0559 02/05/20  0140 02/04/20  2224   LACTIC ACID mmol/L 1 8 3 9* 3 2*     ABG:    VBG:    Results from last 7 days   Lab Units 02/05/20  0454 02/04/20  1532   PROCALCITONIN ng/ml 0 23 0 10       Micro  Results from last 7 days   Lab Units 02/04/20  1657 02/04/20  1448 02/04/20  1422   MRSA CULTURE ONLY   --   --  No Methicillin Resistant Staphlyococcus aureus (MRSA) isolated   LEGIONELLA URINARY ANTIGEN  Negative  --   --    STREP PNEUMONIAE ANTIGEN, URINE   --  Negative  --        EKG:  Tele reviewed  Imaging: I have personally reviewed pertinent reports  and I have personally reviewed pertinent films in PACS    Intake and Output  I/O       02/06 0701 - 02/07 0700 02/07 0701 - 02/08 0700    P  O  1200 600    I V  (mL/kg) 161 2 (2 1) 35 7 (0 5)    IV Piggyback      Total Intake(mL/kg) 1361 2 (17 6) 635 7 (8 2)    Urine (mL/kg/hr) 923 (0 5) 875 (0 7)    Stool  0    Total Output 923 875    Net +438 2 -239 4          Unmeasured Stool Occurrence  1 x Height and Weights   Height: 5' 7" (170 2 cm)  IBW: 66 1 kg  Body mass index is 26 66 kg/m²  Weight (last 2 days)     Date/Time   Weight    02/07/20 0600   77 2 (170 2)    02/06/20 1201   76 9 (169 5)                Nutrition       Diet Orders   (From admission, onward)             Start     Ordered    02/06/20 1639  Diet Cardiovascular; Sodium 2 GM; Fluid Restriction 1800 ML, Consistent Carbohydrate Diet Level 2 (5 carb servings/75 grams CHO/meal)  Diet effective now     Comments:  RUQ US today 2PM   Question Answer Comment   Diet Type Cardiovascular    Cardiac Sodium 2 GM    Other Restriction(s): Fluid Restriction 1800 ML    Other Restriction(s): Consistent Carbohydrate Diet Level 2 (5 carb servings/75 grams CHO/meal)    RD to adjust diet per protocol?  Yes        02/06/20 1638                  Active Medications  Scheduled Meds:    Current Facility-Administered Medications:  acetaminophen 650 mg Oral Q6H PRN Tonnie Pelt, CRNP   aspirin 81 mg Oral Daily Tonnie Pelt, CRNP   atorvastatin 40 mg Oral HS DON Andrew   bimatoprost 1 drop Both Eyes HS Tonnie Pelt, CRNP   budesonide-formoterol 2 puff Inhalation BID Tonnie Pelt, CRNP   cyanocobalamin 1,000 mcg Oral Daily Tonnie Pelt, CRNP   docusate sodium 100 mg Oral Daily Tonnie Pelt, CRNP   furosemide 40 mg Oral Daily Rain Louise MD   guaiFENesin 1,200 mg Oral BID Tonnie Pelt, CRNP   insulin lispro 1-5 Units Subcutaneous HS Tulio Shane MD   insulin lispro 1-6 Units Subcutaneous TID AC Tulio Shane MD   levalbuterol 1 25 mg Nebulization Q8H PRN No Maguire, DO   melatonin 6 mg Oral HS Tonnie Pelt, DON   midodrine 5 mg Oral TID AC DON Andrew   pantoprazole 40 mg Oral Early Morning Tonnie Pelt, DON   timolol 1 drop Both Eyes Daily Tonnie Pelt, CRNP     Continuous Infusions:     PRN Meds:     acetaminophen 650 mg Q6H PRN   levalbuterol 1 25 mg Q8H PRN ---------------------------------------------------------------------------------------  Advance Directive and Living Will:      Power of :    POLST:    ---------------------------------------------------------------------------------------  Counseling / Coordination of Care  Total time spent today 30 minutes  Greater than 50% of total time was spent with the patient and / or family counseling and / or coordination of care  A description of the counseling / coordination of care: Plan of care    DON Moreland      Portions of the record may have been created with voice recognition software  Occasional wrong word or "sound a like" substitutions may have occurred due to the inherent limitations of voice recognition software    Read the chart carefully and recognize, using context, where substitutions have occurred

## 2020-02-08 NOTE — ASSESSMENT & PLAN NOTE
· Secondary to hepatic congestion from cardiogenic shock  · Hepatitis panel negative/right upper quadrant ultrasound unremarkable  · Continue to trend LFTs daily down trending

## 2020-02-08 NOTE — ACP (ADVANCE CARE PLANNING)
Met with patients son Reba Ponce) whom the patient lives with, to discuss goals of care  Presented options for discharge with ongoing diagnosis, one of which was home hospice  As per patients son, he doesn't think he could handle his father at home at this time  He lives by himself "on an Fish" and has no other family to come and help out  He would like his dad to go to some sort of rehab ([address]) or stay here to "fully recover"  Discussed with son current decompensated heart failure and the body systems affected by lack of flow  The patients son expressed understanding but feels that he would like his father to get the best chance at getting better and he'd like to see him get stronger before he would come home  Explained to patients son that the recovery may be short lived and if the patients fluid balance isn't monitored closely he could once again quickly decompensate and come right back to the hospital  His son understands and if this were to keep happening over and over again he would probably then think about hospice  Offered support and opportunities to ask questions  His son is interested in hearing about hospice and would like someone to come talk to him as he does have an aunt who is at a skilled nursing facility with hospice care there and seems to be doing well  He may want the same thing for his father  Hospice consult placed, CM will be made aware of ongoing discussion with son       Time spent: 43 mins

## 2020-02-08 NOTE — PROGRESS NOTES
NEPHROLOGY PROGRESS NOTE   Siri Soto 80 y o  male MRN: [id number]  Unit/Bed#: -01 Encounter: [id number]  Reason for Consult: Evgeny/CKD    ASSESSMENT AND PLAN:  EVGENY on CKD stage 3, baseline creatinine 1 6 to 1 9  -EVGENY suspected to be cardiorenal  -creatinine peak level 2 8 now slowly improving to 2 5  -urine output good  -currently remains on p o  Lasix and continue same    Acute systolic CHF exacerbation, EF 25%  -overall weight has reduced although over last two days weight is slowly increasing   -goal to keep negative balance with gentle diuresis  Continue current p o  Diuretics  -cardiology follow-up    Hypotension in the setting of cardiomyopathy  -currently on midodrine 5 mg p o  T i d   -if blood pressure continued to remain lower, consider increasing further    Transaminitis, slowly improving  -? Congestive hepatopathy    Anemia in CKD, closely monitor hemoglobin    Recommend overall goals of care discussion  Discussed above plan with ICU team     SUBJECTIVE:  Patient seen and examined at bedside  Overall poor historian  Currently remains on room air  No chest pain, shortness of breath, nausea, vomiting, abdominal pain       OBJECTIVE:  Current Weight: Weight - Scale: 77 7 kg (171 lb 3 2 oz)  Vitals:    02/08/20 1315   BP: 104/62   Pulse: 85   Resp:    Temp:    SpO2: 100%       Intake/Output Summary (Last 24 hours) at 2/8/2020 1618  Last data filed at 2/8/2020 1501  Gross per 24 hour   Intake 540 ml   Output 1005 ml   Net -465 ml     Wt Readings from Last 3 Encounters:   02/08/20 77 7 kg (171 lb 3 2 oz)   12/17/19 82 6 kg (182 lb)   10/15/19 84 8 kg (187 lb)     Temp Readings from Last 3 Encounters:   02/08/20 97 6 °F (36 4 °C) (Oral)   01/08/20 98 5 °F (36 9 °C) (Temporal)   12/17/19 98 3 °F (36 8 °C) (Tympanic)     BP Readings from Last 3 Encounters:   02/08/20 104/62   01/08/20 105/62   12/17/19 128/68     Pulse Readings from Last 3 Encounters:   02/08/20 85   01/08/20 77   12/17/19 98 Physical Examination:  General:  Lying in bed, no acute distress   Eyes:  Mild conjunctival pallor present  ENT:  External examination of ears and nose unremarkable  Neck:  No obvious lymphadenopathy appreciated  Respiratory:  Bilateral air entry present  CVS:  S1, S2 present  GI:  Soft, nontender, nondistended  CNS:  Active alert oriented x2  Extremities:  No significant edema in legs  Skin:  No new rash in legs    Medications:    Current Facility-Administered Medications:     acetaminophen (TYLENOL) tablet 650 mg, 650 mg, Oral, Q6H PRN, Daphne Plant, CRNP    aspirin chewable tablet 81 mg, 81 mg, Oral, Daily, Daphne Plant, CRNP, 81 mg at 02/08/20 0829    atorvastatin (LIPITOR) tablet 40 mg, 40 mg, Oral, HS, Kalina Rizzo, VIVIENNENP, 40 mg at 02/07/20 2130    bimatoprost (LUMIGAN) 0 01 % ophthalmic solution 1 drop, 1 drop, Both Eyes, HS, Daphne Plant, CRNP, 1 drop at 02/07/20 2130    budesonide-formoterol (SYMBICORT) 160-4 5 mcg/act inhaler 2 puff, 2 puff, Inhalation, BID, Daphne Plant, CRNP, 2 puff at 02/08/20 0817    cyanocobalamin (VITAMIN B-12) tablet 1,000 mcg, 1,000 mcg, Oral, Daily, Daphne Plant, CRNP, 1,000 mcg at 02/08/20 0830    docusate sodium (COLACE) capsule 100 mg, 100 mg, Oral, Daily, Daphne Plant, CRNP, 100 mg at 02/08/20 2659    furosemide (LASIX) tablet 40 mg, 40 mg, Oral, Daily, Ria Glover MD, 40 mg at 02/08/20 0829    guaiFENesin (MUCINEX) 12 hr tablet 1,200 mg, 1,200 mg, Oral, BID, Daphne Plant, CRNP, 1,200 mg at 02/08/20 0829    insulin lispro (HumaLOG) 100 units/mL subcutaneous injection 1-5 Units, 1-5 Units, Subcutaneous, HS, Catracho Diaz MD, 1 Units at 02/07/20 2130    insulin lispro (HumaLOG) 100 units/mL subcutaneous injection 1-6 Units, 1-6 Units, Subcutaneous, TID AC, 4 Units at 02/08/20 1227 **AND** Fingerstick Glucose (POCT), , , TID AC, Catracho Diaz MD    [facility]) inhalation solution 1 25 mg, 1 25 mg, Nebulization, Q8H ERASMO, Angélica Maguire,     melatonin tablet 6 mg, 6 mg, Oral, HS, Jossue Fraires, CRNP, 6 mg at 02/07/20 2130    midodrine (PROAMATINE) tablet 5 mg, 5 mg, Oral, TID AC, Kalina Rizzo, CRNP, 5 mg at 02/08/20 1230    pantoprazole (PROTONIX) EC tablet 40 mg, 40 mg, Oral, Early Morning, Jossue Dials, CRNP, 40 mg at 02/08/20 1560    timolol (TIMOPTIC) 0 5 % ophthalmic solution 1 drop, 1 drop, Both Eyes, Daily, Jossue Dials, CRNP, 1 drop at 02/08/20 0831    Laboratory Results:  Results from last 7 days   Lab Units 02/08/20  0514 02/07/20  0459 02/06/20  0559 02/05/20  0454 02/05/20  0208 02/05/20  0140 02/04/20  0458 02/03/20  0515 02/02/20  0456   WBC Thousand/uL 7 27  --  9 94 7 70  8 01  --  7 36 8 72 6 50 6 68   HEMOGLOBIN g/dL 8 5*  --  8 8* 9 3*  9 6*  --  10 0* 9 5* 9 4* 9 0*   HEMATOCRIT % 27 4*  --  28 3* 30 3*  31 0*  --  32 1* 30 6* 30 4* 29 3*   PLATELETS Thousands/uL 212  --  245 323  324  --  346 386 389 367   SODIUM mmol/L 139 141  141 146* 138  --  141 139 143 142   POTASSIUM mmol/L 4 0 3 6  3 6 3 8 4 3  --  4 8 4 4 3 0* 3 1*   CHLORIDE mmol/L 102 103  103 106 99*  --  102 104 107 106   CO2 mmol/L 24 24  24 25 22  --  24 19* 23 24   CO2, I-STAT mmol/L  --   --   --   --  24  --   --   --   --    BUN mg/dL 52* 51*  51* 50* 44*  --  46* 35* 31* 34*   CREATININE mg/dL 2 51* 2 74*  2 74* 2 89* 2 75*  --  2 68* 2 21* 1 74* 1 93*   CALCIUM mg/dL 8 7 8 5  8 5 8 8 9 0  --  9 4 9 2 8 7 8 8   MAGNESIUM mg/dL 2 4 2 4 2 5  --   --  2 5  --  2 3 2 4   PHOSPHORUS mg/dL  --   --  4 8*  --   --   --   --   --   --        US right upper quadrant   Final Result by Kate Stephenson MD (02/06 1170)      Unremarkable right upper quadrant ultrasound  Right pleural effusion                  Workstation performed: [id number]         XR chest portable   Final Result by Yissel Diallo MD (02/05 5056)      No significant interval change from previous examination and specifically cardiomegaly with left greater than right pleural effusions and overlying bibasilar airspace opacities  Workstation performed: [id number]         CT chest wo contrast   Final Result by Alma Chen MD (02/05 0202)      1  Motion limited examination  Moderate right and small left pleural effusions with adjacent compressive atelectasis  2   Cardiomegaly and coronary artery calcifications  Workstation performed: [id number]         XR chest portable   Final Result by Sofie Howard MD (93/74 1100)      Persistent left lower lobe infiltrate and effusion consistent with pneumonia  Continued follow-up to resolution is recommended  Stable cardiomegaly  Workstation performed: [id number]         XR chest 2 views   Final Result by Bimal Staples MD (10/94 2132)      New left lower lobe infiltrate effusions  This is suspicious for pneumonia  Cardiomegaly with hilar prominence and pulmonary vascular redistribution is similar to March 2019  Workstation performed: [id number]         CT head without contrast   Final Result by Bharathi Owusu MD (01/30 2130)      No acute intracranial abnormality  Microangiopathic changes  Workstation performed: [id number]             Portions of the record may have been created with voice recognition software  Occasional wrong word or "sound a like" substitutions may have occurred due to the inherent limitations of voice recognition software  Read the chart carefully and recognize, using context, where substitutions have occurred

## 2020-02-08 NOTE — ASSESSMENT & PLAN NOTE
· On CKD with a baseline creatinine 1 6-1 9 secondary to cardiorenal syndrome  · UA with large blood  Consider renal ultrasound  · Currently slightly lower at 2 7  · Nephrology following  · I/O  · BMP in the a m

## 2020-02-09 LAB
ALBUMIN SERPL BCP-MCNC: 3.2 G/DL (ref 3.5–5)
ALP SERPL-CCNC: 149 U/L (ref 46–116)
ALT SERPL W P-5'-P-CCNC: 653 U/L (ref 12–78)
ANION GAP SERPL CALCULATED.3IONS-SCNC: 14 MMOL/L (ref 4–13)
AST SERPL W P-5'-P-CCNC: 178 U/L (ref 5–45)
BILIRUB SERPL-MCNC: 1.6 MG/DL (ref 0.2–1)
BUN SERPL-MCNC: 56 MG/DL (ref 5–25)
CALCIUM SERPL-MCNC: 8.6 MG/DL (ref 8.3–10.1)
CHLORIDE SERPL-SCNC: 100 MMOL/L (ref 100–108)
CO2 SERPL-SCNC: 22 MMOL/L (ref 21–32)
CREAT SERPL-MCNC: 2.47 MG/DL (ref 0.6–1.3)
GFR SERPL CREATININE-BSD FRML MDRD: 22 ML/MIN/1.73SQ M
GLUCOSE SERPL-MCNC: 164 MG/DL (ref 65–140)
GLUCOSE SERPL-MCNC: 183 MG/DL (ref 65–140)
GLUCOSE SERPL-MCNC: 202 MG/DL (ref 65–140)
GLUCOSE SERPL-MCNC: 216 MG/DL (ref 65–140)
GLUCOSE SERPL-MCNC: 246 MG/DL (ref 65–140)
POTASSIUM SERPL-SCNC: 4.4 MMOL/L (ref 3.5–5.3)
PROT SERPL-MCNC: 6.9 G/DL (ref 6.4–8.2)
SODIUM SERPL-SCNC: 136 MMOL/L (ref 136–145)

## 2020-02-09 PROCEDURE — 94668 MNPJ CHEST WALL SBSQ: CPT

## 2020-02-09 PROCEDURE — 99233 SBSQ HOSP IP/OBS HIGH 50: CPT | Performed by: NURSE PRACTITIONER

## 2020-02-09 PROCEDURE — 99233 SBSQ HOSP IP/OBS HIGH 50: CPT | Performed by: INTERNAL MEDICINE

## 2020-02-09 PROCEDURE — 94640 AIRWAY INHALATION TREATMENT: CPT

## 2020-02-09 PROCEDURE — 82948 REAGENT STRIP/BLOOD GLUCOSE: CPT

## 2020-02-09 PROCEDURE — 80053 COMPREHEN METABOLIC PANEL: CPT | Performed by: NURSE PRACTITIONER

## 2020-02-09 PROCEDURE — NC001 PR NO CHARGE: Performed by: NURSE PRACTITIONER

## 2020-02-09 RX ORDER — FUROSEMIDE 40 MG/1
40 TABLET ORAL
Status: DISCONTINUED | OUTPATIENT
Start: 2020-02-10 | End: 2020-02-11

## 2020-02-09 RX ADMIN — MIDODRINE HYDROCHLORIDE 5 MG: 5 TABLET ORAL at 11:23

## 2020-02-09 RX ADMIN — CYANOCOBALAMIN TAB 500 MCG 1000 MCG: 500 TAB at 08:44

## 2020-02-09 RX ADMIN — MIDODRINE HYDROCHLORIDE 5 MG: 5 TABLET ORAL at 06:15

## 2020-02-09 RX ADMIN — GUAIFENESIN 1200 MG: 600 TABLET ORAL at 08:43

## 2020-02-09 RX ADMIN — FUROSEMIDE 40 MG: 40 TABLET ORAL at 08:43

## 2020-02-09 RX ADMIN — INSULIN LISPRO 2 UNITS: 100 INJECTION, SOLUTION INTRAVENOUS; SUBCUTANEOUS at 16:02

## 2020-02-09 RX ADMIN — ATORVASTATIN CALCIUM 40 MG: 40 TABLET, FILM COATED ORAL at 21:08

## 2020-02-09 RX ADMIN — ASPIRIN 81 MG 81 MG: 81 TABLET ORAL at 08:43

## 2020-02-09 RX ADMIN — INSULIN LISPRO 3 UNITS: 100 INJECTION, SOLUTION INTRAVENOUS; SUBCUTANEOUS at 11:23

## 2020-02-09 RX ADMIN — BUDESONIDE AND FORMOTEROL FUMARATE DIHYDRATE 2 PUFF: 160; 4.5 AEROSOL RESPIRATORY (INHALATION) at 20:21

## 2020-02-09 RX ADMIN — GUAIFENESIN 1200 MG: 600 TABLET ORAL at 17:18

## 2020-02-09 RX ADMIN — BUDESONIDE AND FORMOTEROL FUMARATE DIHYDRATE 2 PUFF: 160; 4.5 AEROSOL RESPIRATORY (INHALATION) at 07:39

## 2020-02-09 RX ADMIN — DOCUSATE SODIUM 100 MG: 100 CAPSULE, LIQUID FILLED ORAL at 08:43

## 2020-02-09 RX ADMIN — MIDODRINE HYDROCHLORIDE 5 MG: 5 TABLET ORAL at 16:02

## 2020-02-09 RX ADMIN — MELATONIN 6 MG: at 21:08

## 2020-02-09 RX ADMIN — INSULIN LISPRO 2 UNITS: 100 INJECTION, SOLUTION INTRAVENOUS; SUBCUTANEOUS at 08:44

## 2020-02-09 RX ADMIN — TIMOLOL MALEATE 1 DROP: 5 SOLUTION/ DROPS OPHTHALMIC at 08:44

## 2020-02-09 RX ADMIN — BIMATOPROST 1 DROP: 0.1 SOLUTION/ DROPS OPHTHALMIC at 21:08

## 2020-02-09 RX ADMIN — PANTOPRAZOLE SODIUM 40 MG: 40 TABLET, DELAYED RELEASE ORAL at 06:15

## 2020-02-09 RX ADMIN — INSULIN LISPRO 1 UNITS: 100 INJECTION, SOLUTION INTRAVENOUS; SUBCUTANEOUS at 21:08

## 2020-02-09 NOTE — ASSESSMENT & PLAN NOTE
· On CKD with a baseline creatinine 1 6-1 9 secondary to cardiorenal syndrome  · UA with large blood  · Cont to trend down to 2 5  · Nephrology following  · I/O  · BMP in the a m

## 2020-02-09 NOTE — PROGRESS NOTES
Transfer/Progress Note - Siri Soto 9/22/1930, 80 y o  male MRN: [id number]    Unit/Bed#: -01 Encounter: [id number]    Primary Care Provider: Courtney Somers   Date and time admitted to hospital: 1/30/2020  8:11 PM      * Acute on chronic combined systolic and diastolic [facility] ([facility] )  Assessment & Plan  Wt Readings from Last 3 Encounters:   02/09/20 78 kg (171 lb 15 3 oz)   12/17/19 82 6 kg (182 lb)   10/15/19 84 8 kg (187 lb)     · Echo with an EF 25% with moderate decreased RV function an aortic valve area of 1 25 with mild to moderate MR unchanged from prior echocardiogram  · Weight down 5 kg from admission; stable over last 3 days  · Clinically appears euvolemic  · Day balance -308 stay balance -7 5  · Oral Lasix ordered with hold for systolic blood pressure less than 85  · Goal-directed therapies are on hold given hypotension  · Continue midodrine 5 mg t i d  Increase as needed  · Milrinone discontinued on 02/07 in the a m  · Family updated at bedside in length regarding ongoing goals of care discussion with chronic heart failure/poor prognosis goal to try to go to rehab (see ACP note)    ELTON (acute kidney injury) ([facility] )  Assessment & Plan  · On CKD with a baseline creatinine 1 6-1 9 secondary to cardiorenal syndrome  · UA with large blood  · Cont to trend down to 2 47  · Nephrology following  · I/O  · BMP in the a m    · Continue lasix        Transaminitis  Assessment & Plan  · Secondary to hepatic congestion from cardiogenic shock  · Hepatitis panel negative/right upper quadrant ultrasound unremarkable  · Continue to trend improving    NSVT (nonsustained ventricular tachycardia) (HCC)  Assessment & Plan  · Keep K>4 mag>2  · Unable to tolerate bb with hypotension if bp continues to improve start BB     Anemia  Assessment & Plan  · Chronic in the setting of myeloma    Type 2 diabetes mellitus without complication, without long-term current use of insulin ([facility] )  Assessment & Plan  Lab Results   Component Value Date    HGBA1C 6 1 02/01/2020       Recent Labs     02/05/20  0756 02/05/20  1118 02/05/20  1639 02/05/20  2125   POCGLU 127 157* 147* 215*       Blood Sugar Average: Last 72 hrs:  (P) 873 [id number]   · Holding home Amaryl likely will need to change in the setting of kidney disease  · Continue sliding scale insulin    Mixed hyperlipidemia  Assessment & Plan  · Continue statin    Esophageal reflux  Assessment & Plan  · Continue home PPI    Kappa light chain myeloma ([facility] )  Assessment & Plan  · Follows with Dr Candice Alvarado as an outpatient  Code Status: Level 3 - DNAR and DNI  POA:    POLST:      Reason for ICU admission:   CHF     Active problems:   Principal Problem:    Acute on chronic combined systolic and diastolic heart failure ([place] [address] )  Active Problems:    ELTON (acute kidney injury) ([place] [address] )    Transaminitis    NSVT (nonsustained ventricular tachycardia) ([facility])    Anemia    Type 2 diabetes mellitus without complication, without long-term current use of insulin (HCC)    Mixed hyperlipidemia    Esophageal reflux    Kappa light chain myeloma ([facility] )  Resolved Problems:    Acute respiratory failure with hypoxia (HCC)    Delirium    Elevated troponin    Increased anion gap metabolic acidosis    Hypokalemia    Pneumonia    Cardiorenal syndrome with renal failure    Hyperbilirubinemia      Consultants:   Cardiology  Nephrology   Hospice     History of Present Illness:   As per Joni Lovell "Lawence Ovens is a 80 y o  male who presents with with complaints of increasing shortness of breath with exertion, and 1 day history of shortness of breath when lying flat to sleep  Patient with past medical history significant for combined systolic diastolic heart failure with an LVEF of 30%, multiple myeloma follows with Dr Ashlyn Mott, CAD, hypertension, glaucoma,, diabetes, CKD 4  Patient was recently admitted for possible pneumonia, treated with antibiotics and discharged 3 days prior to admission  Family reports that he gained 4 lb during that hospitalization and at home they gave his p r n  Lasix, but despite the Lasix he still gained another lb  In the ER patient was found to be short of breath with crackles [place]   [place] [address]  Checks x-ray with mild pulmonary vascular congestion  He was given 20 mg of Lasix IV "    Summary of clinical course: The patient was found to have acute decompensated HF with EF down to 25%, was trailed on a milrinone drip for 24 hrs, which was stopped due to improvement in all lab values Cr & LFTs and urine output on 2/7/20  He was unable to receive diureses due to hypotension and was started on midodrine which helped maintain his BP > 90 systolic  He has been on lasix and midodrine with lab values still improving and maintaining o2 saturation on room air  Ongoing discussions with regards to goals of care, given patients improvement his son who is the POA and who the patient lives with would like him to get stronger in rehab and then come home, please see ACP note for further details  Hospice was consulted as patients son would like more information and see if it there are options for hospice at a skilled nursing facility  Recent or scheduled procedures:     RUQ U/S 2/6  Unremarkable right upper quadrant ultrasound    Right pleural effusion    Outstanding/pending diagnostics:   N/a     Cultures:   MRSA negative x 2   Strep/legioneall negative        Mobilization Plan:   OOB as tolerated     Nutrition Plan:   Continue current diet     VTE Pharmacologic Prophylaxis: Heparin  VTE Mechanical Prophylaxis: sequential compression device    Discharge Plan:   Patient should be ready for discharge to rehab/hospice within the next 24-48 hrs       Initial Physical Therapy Recommendations: Pending   Initial /Plan: Pending     Home medications that are not reordered and reason why:   Imdur - HF   Amaryl - ELTON      Spoke with Dr [facility] regarding transfer  Please call [phone] with any questions or concerns  Portions of the record may have been created with voice recognition software  Occasional wrong word or "sound a like" substitutions may have occurred due to the inherent limitations of voice recognition software  Read the chart carefully and recognize, using context, where substitutions have occurred

## 2020-02-09 NOTE — PROGRESS NOTES
Progress Note - Nathan Sheriff 9/22/1930, 80 y o  male MRN: [id number]    Unit/Bed#: -01 Encounter: [id number]    Primary Care Provider: Gonzales Mackenzie   Date and time admitted to hospital: 1/30/2020  8:11 PM        * Acute on chronic combined systolic and diastolic heart failure ([address] )  Assessment & Plan  Wt Readings from Last 3 Encounters:   02/08/20 77 7 kg (171 lb 3 2 oz)   12/17/19 82 6 kg (182 lb)   10/15/19 84 8 kg (187 lb)     · Echo with an EF 25% with moderate decreased RV function an aortic valve area of 1 25 with mild to moderate MR unchanged from prior echocardiogram  · Weight down 5 kg from admission; stable over last 3 days  · Clinically appears euvolemic  · Day balance -308 stay balance -7 5  · Oral Lasix ordered with hold for systolic blood pressure less than 100 may need to change to less than 90 to prevent reoccurrence of CHF  · Goal-directed therapies are on hold given hypotension  · Continue midodrine 5 mg t i d  Increase as needed  · Milrinone discontinued on 01/07 in the a m  · Family updated at bedside in length regarding ongoing goals of care discussion with chronic heart failure/poor prognosis goal to try to go to rehab    ELTON (acute kidney injury) [facility])  Assessment & Plan  · On CKD with a baseline creatinine 1 6-1 9 secondary to cardiorenal syndrome  · UA with large blood  · Cont to trend down to 2 5  · Nephrology following  · I/O  · BMP in the a m        Transaminitis  Assessment & Plan  · Secondary to hepatic congestion from cardiogenic shock  · Hepatitis panel negative/right upper quadrant ultrasound unremarkable  · Continue to trend improving    NSVT (nonsustained ventricular tachycardia) (HCC)  Assessment & Plan  · Keep K>4 mag>2  · Unable to tolerate bb with hypotension if bp improves to start    Anemia  Assessment & Plan  · Chronic in the setting of myeloma    Type 2 diabetes mellitus without complication, without long-term current use of insulin (HCC)  Assessment & Plan  Lab Results   Component Value Date    HGBA1C 6 1 2020       Recent Labs     20  2111 20  0733 20  1222 20  1701   POCGLU 190* 181* 300* 112       Blood Sugar Average: Last 72 hrs:  (P) 196   · Holding home Amaryl likely will need to change in the setting of kidney disease  · Continue sliding scale insulin        Mixed hyperlipidemia  Assessment & Plan  · Continue home statin    Esophageal reflux  Assessment & Plan  · Continue home PPI    Kappa light chain myeloma ([facility] [address] )  Assessment & Plan  · Outpatient Oncology follow-up      ----------------------------------------------------------------------------------------  HPI/24hr events: bp stable 90's  [address] conversation goals is rehab    Disposition: Transfer to Med-Surg   Code Status: Level 3 - DNAR and DNI  ---------------------------------------------------------------------------------------  SUBJECTIVE  Denies c/o oriented yet trying to take clothes off  "tied up"    Review of Systems   Respiratory: Negative for cough and shortness of breath  Cardiovascular: Negative for chest pain, palpitations and leg swelling  All other systems reviewed and are negative  Review of systems was reviewed and negative unless stated above in HPI/24-hour events   ---------------------------------------------------------------------------------------  OBJECTIVE    Vitals   Vitals:    20 2100 20 2116 20 2313 20 0000   BP:   (!) 85/54 92/54   BP Location:   Right arm    Pulse:  85 84 84   Resp:  18 18    Temp:   97 5 °F (36 4 °C)    TempSrc:   Axillary    SpO2: 100% 100% 100% 99%   Weight:       Height:         Temp (24hrs), Av 7 °F (36 5 °C), Min:97 5 °F (36 4 °C), Max:97 9 °F (36 6 °C)  Current: Temperature: 97 5 °F (36 4 °C)        SpO2: SpO2: 99 %  O2 Flow Rate (L/min): 1 L/min    Physical Exam   Constitutional: He is oriented to person, place, and time  No distress  HENT:   Head: Normocephalic and atraumatic  Mouth/Throat: Oropharynx is clear and moist    Eyes: Pupils are equal, round, and reactive to light  No scleral icterus  Neck: Neck supple  No JVD present  Cardiovascular: Normal rate, regular rhythm, normal heart sounds and intact distal pulses  Exam reveals no gallop and no friction rub  No murmur heard  Pulmonary/Chest: Effort normal and breath sounds normal  No respiratory distress  He has no wheezes  He has no rales  Abdominal: Soft  Bowel sounds are normal  He exhibits no distension  There is no tenderness  Musculoskeletal: Normal range of motion  He exhibits no edema  Neurological: He is alert and oriented to person, place, and time  No cranial nerve deficit  Skin: Skin is warm and dry  No rash noted  No erythema  No pallor         Invasive/non-invasive ventilation settings   Respiratory    Lab Data (Last 4 hours)    None         O2/Vent Data (Last 4 hours)    None                Laboratory and Diagnostics:  Results from last 7 days   Lab Units 02/08/20 0514 02/06/20  0559 02/05/20  0454 02/05/20  0140 02/04/20  0458 02/03/20  0515 02/02/20  0456   WBC Thousand/uL 7 27 9 94 7 70  8 01 7 36 8 72 6 50 6 68   HEMOGLOBIN g/dL 8 5* 8 8* 9 3*  9 6* 10 0* 9 5* 9 4* 9 0*   HEMATOCRIT % 27 4* 28 3* 30 3*  31 0* 32 1* 30 6* 30 4* 29 3*   PLATELETS Thousands/uL 212 245 323  324 346 386 389 367   NEUTROS PCT %  --  83* 71 68  --   --   --    MONOS PCT %  --  5 6 7  --   --   --      Results from last 7 days   Lab Units 02/08/20 0514 02/07/20  0459 02/06/20  0559 02/05/20  0454 02/05/20  0208 02/05/20  0140 02/04/20  0458 02/03/20  0515   SODIUM mmol/L 139 141  141 146* 138  --  141 139 143   POTASSIUM mmol/L 4 0 3 6  3 6 3 8 4 3  --  4 8 4 4 3 0*   CHLORIDE mmol/L 102 103  103 106 99*  --  102 104 107   CO2 mmol/L 24 24  24 25 22  --  24 19* 23   CO2, I-STAT mmol/L  --   --   --   --  24  --   --   --    ANION GAP mmol/L 13 14*  14* 15* 17*  --  15* 16* 13   BUN mg/dL 52* 51*  51* 50* 44*  -- 46* 35* 31*   CREATININE mg/dL 2 51* 2 74*  2 74* 2 89* 2 75*  --  2 68* 2 21* 1 74*   CALCIUM mg/dL 8 7 8 5  8 5 8 8 9 0  --  9 4 9 2 8 7   GLUCOSE RANDOM mg/dL 169* 171*  171* 199* 304*  --  125 236* 100   ALT U/L 761* 925* 1,205*  --   --  663*  --   --    AST U/L 275* 462* 1,213*  --   --  1,185*  --   --    ALK PHOS U/L 126* 136* 122*  --   --  139*  --   --    ALBUMIN g/dL 3 0* 3 1* 3 3*  --   --  3 4*  --   --    TOTAL BILIRUBIN mg/dL 1 00 1 00 1 10*  --   --  1 50*  --   --      Results from last 7 days   Lab Units 02/08/20  0514 02/07/20  0459 02/06/20  0559 02/05/20  0140 02/03/20  0515 02/02/20  0456   MAGNESIUM mg/dL 2 4 2 4 2 5 2 5 2 3 2 4   PHOSPHORUS mg/dL  --   --  4 8*  --   --   --       Results from last 7 days   Lab Units 02/07/20  0459 02/05/20  0945 02/05/20  0140   INR  1 38*  --  1 41*   PTT seconds  --  131* 27      Results from last 7 days   Lab Units 02/05/20  0945 02/05/20  0536 02/05/20  0140   TROPONIN I ng/mL 7 40* 7 58* 4 57*     Results from last 7 days   Lab Units 02/06/20  0559 02/05/20  0140 02/04/20  2224   LACTIC ACID mmol/L 1 8 3 9* 3 2*     ABG:    VBG:    Results from last 7 days   Lab Units 02/05/20  0454 02/04/20  1532   PROCALCITONIN ng/ml 0 23 0 10       Micro  Results from last 7 days   Lab Units 02/04/20  1657 02/04/20  1448 02/04/20  1422   MRSA CULTURE ONLY   --   --  No Methicillin Resistant Staphlyococcus aureus (MRSA) isolated   LEGIONELLA URINARY ANTIGEN  Negative  --   --    STREP PNEUMONIAE ANTIGEN, URINE   --  Negative  --        EKG: tele reviewed  Imaging: I have personally reviewed pertinent reports  Intake and Output  I/O       02/07 0701 - 02/08 0700 02/08 0701 - 02/09 0700    P  O  660 520    I V  (mL/kg) 35 7 (0 5) 20 (0 3)    Total Intake(mL/kg) 695 7 (9) 540 (6 9)    Urine (mL/kg/hr) 1212 (0 6) 598 (0 5)    Stool 0     Total Output 1212 598    Net -516 4 -58          Unmeasured Urine Occurrence  1 x    Unmeasured Stool Occurrence 1 x Height and Weights   Height: 5' 7" (170 2 cm)  IBW: 66 1 kg  Body mass index is 26 81 kg/m²  Weight (last 2 days)     Date/Time   Weight    02/08/20 0600   77 7 (171 2)    02/07/20 0600   77 2 (170 2)                Nutrition       Diet Orders   (From admission, onward)             Start     Ordered    02/06/20 1639  Diet Cardiovascular; Sodium 2 GM; Fluid Restriction 1800 ML, Consistent Carbohydrate Diet Level 2 (5 carb servings/75 grams CHO/meal)  Diet effective now     Comments:  RUQ US today 2PM   Question Answer Comment   Diet Type Cardiovascular    Cardiac Sodium 2 GM    Other Restriction(s): Fluid Restriction 1800 ML    Other Restriction(s): Consistent Carbohydrate Diet Level 2 (5 carb servings/75 grams CHO/meal)    RD to adjust diet per protocol?  Yes        02/06/20 1638                  Active Medications  Scheduled Meds:    Current Facility-Administered Medications:  acetaminophen 650 mg Oral Q6H PRN Arlean Talha, CRNP   aspirin 81 mg Oral Daily Arlean Talha, CRNP   atorvastatin 40 mg Oral HS DON Andrew   bimatoprost 1 drop Both Eyes HS Arlean Talha, CRNP   budesonide-formoterol 2 puff Inhalation BID Arlean Talha, CRNP   cyanocobalamin 1,000 mcg Oral Daily Arlean Talha, CRNP   docusate sodium 100 mg Oral Daily Arlean Talha, CRNP   furosemide 40 mg Oral Daily Burke Brown MD   guaiFENesin 1,200 mg Oral BID Arlean Talha, CRNP   insulin lispro 1-5 Units Subcutaneous HS Luis Antonio Stahl MD   insulin lispro 1-6 Units Subcutaneous TID AC Luis Antonio Stahl MD   levalbuterol 1 25 mg Nebulization Q8H PRN Elsie Maguire, DO   melatonin 6 mg Oral HS Arlean Talha, CRNP   midodrine 5 mg Oral TID DON Purdy   pantoprazole 40 mg Oral Early Morning Arlean Talha, CRNP   timolol 1 drop Both Eyes Daily Arlean Talha, CRNP     Continuous Infusions:     PRN Meds:     acetaminophen 650 mg Q6H PRN   levalbuterol 1 25 mg Q8H PRN ---------------------------------------------------------------------------------------  Advance Directive and Living Will:      Power of :    POLST:    ---------------------------------------------------------------------------------------  Counseling / Coordination of Care  Total time spent today 30 minutes  Greater than 50% of total time was spent with the patient and / or family counseling and / or coordination of care  A description of the counseling / coordination of care: plan of care    DON Moore      Portions of the record may have been created with voice recognition software  Occasional wrong word or "sound a like" substitutions may have occurred due to the inherent limitations of voice recognition software    Read the chart carefully and recognize, using context, where substitutions have occurred

## 2020-02-09 NOTE — PROGRESS NOTES
23: 45-Blood pressures taken during vital were SBP was 87 and 86 -these were relayed to [facility]

## 2020-02-09 NOTE — PLAN OF CARE
Problem: Potential for Falls  Goal: Patient will remain free of falls  Description  INTERVENTIONS:  - Assess patient frequently for physical needs  -  Identify cognitive and physical deficits and behaviors that affect risk of falls    -  [place] fall precautions as indicated by assessment   - Educate patient/family on patient safety including physical limitations  - Instruct patient to call for assistance with activity based on assessment  - Modify environment to reduce risk of injury  - Consider OT/PT consult to assist with strengthening/mobility  Outcome: Progressing     Problem: DISCHARGE PLANNING  Goal: Discharge to home or other facility with appropriate resources  Description  INTERVENTIONS:  - Identify barriers to discharge w/patient and caregiver  - Arrange for needed discharge resources and transportation as appropriate  - Identify discharge learning needs (meds, wound care, etc )  - Arrange for interpretive services to assist at discharge as needed  - Refer to Case Management Department for coordinating discharge planning if the patient needs post-hospital services based on physician/advanced practitioner order or complex needs related to functional status, cognitive ability, or social support system  Outcome: Progressing     Problem: RESPIRATORY - ADULT  Goal: Achieves optimal ventilation and oxygenation  Description  INTERVENTIONS:  - Assess for changes in respiratory status  - Assess for changes in mentation and behavior  - Position to facilitate oxygenation and minimize respiratory effort  - Oxygen administered by appropriate delivery if ordered  - Initiate smoking cessation education as indicated  - Encourage broncho-pulmonary hygiene including cough, deep breathe, Incentive Spirometry  - Assess the need for suctioning and aspirate as needed  - Assess and instruct to report SOB or any respiratory difficulty  - Respiratory Therapy support as indicated  Outcome: Progressing     Problem: HEMATOLOGIC - ADULT  Goal: Maintains hematologic stability  Description  INTERVENTIONS  - Assess for signs and symptoms of bleeding or hemorrhage  - Monitor labs  - Administer supportive blood products/factors as ordered and appropriate  Outcome: Progressing     Problem: Prexisting or High Potential for Compromised Skin Integrity  Goal: Skin integrity is maintained or improved  Description  INTERVENTIONS:  - Identify patients at risk for skin breakdown  - Assess and monitor skin integrity  - Assess and monitor nutrition and hydration status  - Monitor labs   - Assess for incontinence   - Turn and reposition patient  - Assist with mobility/ambulation  - Relieve pressure over bony prominences  - Avoid friction and shearing  - Provide appropriate hygiene as needed including keeping skin clean and dry  - Evaluate need for skin moisturizer/barrier cream  - Collaborate with interdisciplinary team   - Patient/family teaching  - Consider wound care consult   Outcome: Progressing     Problem: INFECTION - ADULT  Goal: Absence or prevention of progression during hospitalization  Description  INTERVENTIONS:  - Assess and monitor for signs and symptoms of infection  - Monitor lab/diagnostic results  - Monitor all insertion sites, i e  indwelling lines, tubes, and drains  - Monitor endotracheal if appropriate and nasal secretions for changes in amount and color  - [place] appropriate cooling/warming therapies per order  - Administer medications as ordered  - Instruct and encourage patient and family to use good hand hygiene technique  - Identify and instruct in appropriate isolation precautions for identified infection/condition  Outcome: Progressing     Problem: Knowledge Deficit  Goal: Patient/family/caregiver demonstrates understanding of disease process, treatment plan, medications, and discharge instructions  Description  Complete learning assessment and assess knowledge base    Interventions:  - Provide teaching at level of understanding  - Provide teaching via preferred learning methods  Outcome: Progressing     Problem: CARDIOVASCULAR - ADULT  Goal: Maintains optimal cardiac output and hemodynamic stability  Description  INTERVENTIONS:  - Monitor I/O, vital signs and rhythm  - Monitor for S/S and trends of decreased cardiac output  - Administer and titrate ordered vasoactive medications to optimize hemodynamic stability  - Assess quality of pulses, skin color and temperature  - Assess for signs of decreased coronary artery perfusion  - Instruct patient to report change in severity of symptoms  Outcome: Progressing  Goal: Absence of cardiac dysrhythmias or at baseline rhythm  Description  INTERVENTIONS:  - Continuous cardiac monitoring, vital signs, obtain 12 lead EKG if ordered  - Administer antiarrhythmic and heart rate control medications as ordered  - Monitor electrolytes and administer replacement therapy as ordered  Outcome: Progressing     Problem: METABOLIC, FLUID AND ELECTROLYTES - ADULT  Goal: Fluid balance maintained  Description  INTERVENTIONS:  - Monitor labs   - Monitor I/O and WT  - Instruct patient on fluid and nutrition as appropriate  - Assess for signs & symptoms of volume excess or deficit  Outcome: Progressing

## 2020-02-09 NOTE — ASSESSMENT & PLAN NOTE
Wt Readings from Last 3 Encounters:   02/08/20 77 7 kg (171 lb 3 2 oz)   12/17/19 82 6 kg (182 lb)   10/15/19 84 8 kg (187 lb)     · Echo with an EF 25% with moderate decreased RV function an aortic valve area of 1 25 with mild to moderate MR unchanged from prior echocardiogram  · Weight down 5 kg from admission; stable over last 3 days  · Clinically appears euvolemic  · Day balance -308 stay balance -7 5  · Oral Lasix ordered with hold for systolic blood pressure less than 100 may need to change to less than 90 to prevent reoccurrence of CHF  · Goal-directed therapies are on hold given hypotension  · Continue midodrine 5 mg t i d  Increase as needed  · Milrinone discontinued on 01/07 in the a m    · Family updated at bedside in length regarding ongoing goals of care discussion with chronic heart failure/poor prognosis goal to try to go to rehab

## 2020-02-09 NOTE — PROGRESS NOTES
NEPHROLOGY PROGRESS NOTE   Mariela Fenton 80 y o  male MRN: [id number]  Unit/Bed#: -01 Encounter: [id number]  Reason for Consult: Evgeny/CKD    ASSESSMENT AND PLAN:  EVGENY on CKD stage 3, baseline creatinine 1 6 to 1 9  -EVGENY suspected to be cardiorenal  -creatinine peak level 2 8 now slowly improving to 2 4  -currently remains on p o  Lasix and continue same     Acute systolic CHF exacerbation, EF 25%  -overall weight has reduced although over last two days weight is slowly increasing   -goal to keep negative balance with gentle diuresis  Continue current p o  Diuretics  -cardiology follow-up     Hypotension in the setting of cardiomyopathy  -currently on midodrine 5 mg p o  T i d   -if blood pressure continued to remain lower, consider increasing further     Transaminitis, slowly improving  -? Congestive hepatopathy     Anemia in CKD, closely monitor hemoglobin     Discussed above plan with ICU team     SUBJECTIVE:  Patient seen and examined at bedside   No chest pain, shortness of breath, nausea, vomiting, abdominal pain    OBJECTIVE:  Current Weight: Weight - Scale: 78 kg (171 lb 15 3 oz)  Vitals:    02/09/20 1200   BP: 106/63   Pulse: 84   Resp: 20   Temp: 97 7 °F (36 5 °C)   SpO2: 99%       Intake/Output Summary (Last 24 hours) at 2/9/2020 1526  Last data filed at 2/9/2020 1406  Gross per 24 hour   Intake 470 ml   Output 860 ml   Net -390 ml     Wt Readings from Last 3 Encounters:   02/09/20 78 kg (171 lb 15 3 oz)   12/17/19 82 6 kg (182 lb)   10/15/19 84 8 kg (187 lb)     Temp Readings from Last 3 Encounters:   02/09/20 97 7 °F (36 5 °C) (Oral)   01/08/20 98 5 °F (36 9 °C) (Temporal)   12/17/19 98 3 °F (36 8 °C) (Tympanic)     BP Readings from Last 3 Encounters:   02/09/20 106/63   01/08/20 105/62   12/17/19 128/68     Pulse Readings from Last 3 Encounters:   02/09/20 84   01/08/20 77   12/17/19 98        Physical Examination:  General:  Lying in bed, no acute distress   Eyes:  Mild conjunctival pallor present  ENT:  External examination of ears and nose unremarkable  Neck:  No obvious lymphadenopathy appreciated  Respiratory:  Bilateral air entry present, occasional inspiratory basilar crackles  CVS:  S1, S2 present  GI:  Soft, nontender, nondistended  CNS:  Active alert oriented  Extremities:  No significant edema in legs  Skin:  No new rash in legs    Medications:    Current Facility-Administered Medications:     acetaminophen (TYLENOL) tablet 650 mg, 650 mg, Oral, Q6H PRN, VIVIENNE PereyraNP    aspirin chewable tablet 81 mg, 81 mg, Oral, Daily, aKlina Rizzo, CRNP, 81 mg at 02/09/20 0843    atorvastatin (LIPITOR) tablet 40 mg, 40 mg, Oral, HS, Kalina Rizzo, CRNP, 40 mg at 02/08/20 2137    bimatoprost (LUMIGAN) 0 01 % ophthalmic solution 1 drop, 1 drop, Both Eyes, HS, Kalina Monrealszajadiel, CRNP, 1 drop at 02/08/20 2137    budesonide-formoterol (SYMBICORT) 160-4 5 mcg/act inhaler 2 puff, 2 puff, Inhalation, BID, Ronal Sennicholasl, CRNP, 2 puff at 02/09/20 0739    cyanocobalamin (VITAMIN B-12) tablet 1,000 mcg, 1,000 mcg, Oral, Daily, Ronal Sennicholasl, CRNP, 1,000 mcg at 02/09/20 0844    docusate sodium (COLACE) capsule 100 mg, 100 mg, Oral, Daily, Kalina Fedcarmeloszajadiel, CRNP, 100 mg at 02/09/20 0843    furosemide (LASIX) tablet 40 mg, 40 mg, Oral, Daily, Kalina Fedcarmeloszajadiel, CRNP, 40 mg at 02/09/20 0843    guaiFENesin (MUCINEX) 12 hr tablet 1,200 mg, 1,200 mg, Oral, BID, Kalina Fedcarmeloszak, CRNP, 1,200 mg at 02/09/20 0843    insulin lispro (HumaLOG) 100 units/mL subcutaneous injection 1-5 Units, 1-5 Units, Subcutaneous, HS, Kalina Rizzo, CRNP, 1 Units at 02/08/20 2137    insulin lispro (HumaLOG) 100 units/mL subcutaneous injection 1-6 Units, 1-6 Units, Subcutaneous, TID AC, 3 Units at 02/09/20 1123 **AND** Fingerstick Glucose (POCT), , , TID AC, DON Andrew    levalbuterol (XOPENEX) inhalation solution 1 25 mg, 1 25 mg, Nebulization, Q8H PRN, Erminio Stain DON Rizzo    melatonin tablet 6 mg, 6 mg, Oral, HS, Kalina DON Rizzo, 6 mg at 02/08/20 2137    midodrine (PROAMATINE) tablet 5 mg, 5 mg, Oral, TID AC, Kalina VIVIENNE RizzoNP, 5 mg at 02/09/20 1123    pantoprazole (PROTONIX) EC tablet 40 mg, 40 mg, Oral, Early Morning, KalinaDON Fink, 40 mg at 02/09/20 0615    timolol (TIMOPTIC) 0 5 % ophthalmic solution 1 drop, 1 drop, Both Eyes, Daily, DON Medina, 1 drop at 02/09/20 0844    Laboratory Results:  Results from last 7 days   Lab Units 02/09/20  0511 02/08/20  4462 02/07/20  0459 02/06/20  0559 02/05/20  0454 02/05/20  0208 02/05/20  0140 02/04/20  0458 02/03/20  0515   WBC Thousand/uL  --  7 27  --  9 94 7 70  8 01  --  7 36 8 72 6 50   HEMOGLOBIN g/dL  --  8 5*  --  8 8* 9 3*  9 6*  --  10 0* 9 5* 9 4*   HEMATOCRIT %  --  27 4*  --  28 3* 30 3*  31 0*  --  32 1* 30 6* 30 4*   PLATELETS Thousands/uL  --  212  --  245 323  324  --  346 386 389   SODIUM mmol/L 136 139 141  141 146* 138  --  141 139 143   POTASSIUM mmol/L 4 4 4 0 3 6  3 6 3 8 4 3  --  4 8 4 4 3 0*   CHLORIDE mmol/L 100 102 103  103 106 99*  --  102 104 107   CO2 mmol/L 22 24 24  24 25 22  --  24 19* 23   CO2, I-STAT mmol/L  --   --   --   --   --  24  --   --   --    BUN mg/dL 56* 52* 51*  51* 50* 44*  --  46* 35* 31*   CREATININE mg/dL 2 47* 2 51* 2 74*  2 74* 2 89* 2 75*  --  2 68* 2 21* 1 74*   CALCIUM mg/dL 8 6 8 7 8 5  8 5 8 8 9 0  --  9 4 9 2 8 7   MAGNESIUM mg/dL  --  2 4 2 4 2 5  --   --  2 5  --  2 3   PHOSPHORUS mg/dL  --   --   --  4 8*  --   --   --   --   --        US right upper quadrant   Final Result by Yeny Deal MD (02/06 1960)      Unremarkable right upper quadrant ultrasound  Right pleural effusion                  Workstation performed: [id number]         XR chest portable   Final Result by Marco A Zurita MD (02/05 1164)      No significant interval change from previous examination and specifically cardiomegaly with left greater than right pleural effusions and overlying bibasilar airspace opacities  Workstation performed: [id number]         CT chest wo contrast   Final Result by Karyna Gutiérrez MD (02/05 0202)      1  Motion limited examination  Moderate right and small left pleural effusions with adjacent compressive atelectasis  2   Cardiomegaly and coronary artery calcifications  Workstation performed: [id number]         XR chest portable   Final Result by Janette Mayo MD (16/19 1915)      Persistent left lower lobe infiltrate and effusion consistent with pneumonia  Continued follow-up to resolution is recommended  Stable cardiomegaly  Workstation performed: [id number]         XR chest 2 views   Final Result by Palomo Rivers MD (22/18 3689)      New left lower lobe infiltrate effusions  This is suspicious for pneumonia  Cardiomegaly with hilar prominence and pulmonary vascular redistribution is similar to March 2019  Workstation performed: [id number]         CT head without contrast   Final Result by Chana Chavez MD (01/30 2130)      No acute intracranial abnormality  Microangiopathic changes  Workstation performed: [id number]             Portions of the record may have been created with voice recognition software  Occasional wrong word or "sound a like" substitutions may have occurred due to the inherent limitations of voice recognition software  Read the chart carefully and recognize, using context, where substitutions have occurred

## 2020-02-09 NOTE — ASSESSMENT & PLAN NOTE
· On CKD with a baseline creatinine 1 6-1 9 secondary to cardiorenal syndrome  · UA with large blood  · Cont to trend down to 2 47  · Nephrology following  · I/O  · BMP in the a m    · Continue lasix

## 2020-02-09 NOTE — PLAN OF CARE
Problem: Potential for Falls  Goal: Patient will remain free of falls  Description  INTERVENTIONS:  - Assess patient frequently for physical needs  -  Identify cognitive and physical deficits and behaviors that affect risk of falls    -  [place] fall precautions as indicated by assessment   - Educate patient/family on patient safety including physical limitations  - Instruct patient to call for assistance with activity based on assessment  - Modify environment to reduce risk of injury  - Consider OT/PT consult to assist with strengthening/mobility  Outcome: Progressing     Problem: DISCHARGE PLANNING  Goal: Discharge to home or other facility with appropriate resources  Description  INTERVENTIONS:  - Identify barriers to discharge w/patient and caregiver  - Arrange for needed discharge resources and transportation as appropriate  - Identify discharge learning needs (meds, wound care, etc )  - Arrange for interpretive services to assist at discharge as needed  - Refer to Case Management Department for coordinating discharge planning if the patient needs post-hospital services based on physician/advanced practitioner order or complex needs related to functional status, cognitive ability, or social support system  Outcome: Progressing     Problem: RESPIRATORY - ADULT  Goal: Achieves optimal ventilation and oxygenation  Description  INTERVENTIONS:  - Assess for changes in respiratory status  - Assess for changes in mentation and behavior  - Position to facilitate oxygenation and minimize respiratory effort  - Oxygen administered by appropriate delivery if ordered  - Initiate smoking cessation education as indicated  - Encourage broncho-pulmonary hygiene including cough, deep breathe, Incentive Spirometry  - Assess the need for suctioning and aspirate as needed  - Assess and instruct to report SOB or any respiratory difficulty  - Respiratory Therapy support as indicated  Outcome: Progressing     Problem: HEMATOLOGIC - ADULT  Goal: Maintains hematologic stability  Description  INTERVENTIONS  - Assess for signs and symptoms of bleeding or hemorrhage  - Monitor labs  - Administer supportive blood products/factors as ordered and appropriate  Outcome: Progressing     Problem: Prexisting or High Potential for Compromised Skin Integrity  Goal: Skin integrity is maintained or improved  Description  INTERVENTIONS:  - Identify patients at risk for skin breakdown  - Assess and monitor skin integrity  - Assess and monitor nutrition and hydration status  - Monitor labs   - Assess for incontinence   - Turn and reposition patient  - Assist with mobility/ambulation  - Relieve pressure over bony prominences  - Avoid friction and shearing  - Provide appropriate hygiene as needed including keeping skin clean and dry  - Evaluate need for skin moisturizer/barrier cream  - Collaborate with interdisciplinary team   - Patient/family teaching  - Consider wound care consult   Outcome: Progressing     Problem: INFECTION - ADULT  Goal: Absence or prevention of progression during hospitalization  Description  INTERVENTIONS:  - Assess and monitor for signs and symptoms of infection  - Monitor lab/diagnostic results  - Monitor all insertion sites, i e  indwelling lines, tubes, and drains  - Monitor endotracheal if appropriate and nasal secretions for changes in amount and color  - [place] appropriate cooling/warming therapies per order  - Administer medications as ordered  - Instruct and encourage patient and family to use good hand hygiene technique  - Identify and instruct in appropriate isolation precautions for identified infection/condition  Outcome: Progressing     Problem: Knowledge Deficit  Goal: Patient/family/caregiver demonstrates understanding of disease process, treatment plan, medications, and discharge instructions  Description  Complete learning assessment and assess knowledge base    Interventions:  - Provide teaching at level of understanding  - Provide teaching via preferred learning methods  Outcome: Progressing     Problem: CARDIOVASCULAR - ADULT  Goal: Maintains optimal cardiac output and hemodynamic stability  Description  INTERVENTIONS:  - Monitor I/O, vital signs and rhythm  - Monitor for S/S and trends of decreased cardiac output  - Administer and titrate ordered vasoactive medications to optimize hemodynamic stability  - Assess quality of pulses, skin color and temperature  - Assess for signs of decreased coronary artery perfusion  - Instruct patient to report change in severity of symptoms  Outcome: Progressing  Goal: Absence of cardiac dysrhythmias or at baseline rhythm  Description  INTERVENTIONS:  - Continuous cardiac monitoring, vital signs, obtain 12 lead EKG if ordered  - Administer antiarrhythmic and heart rate control medications as ordered  - Monitor electrolytes and administer replacement therapy as ordered  Outcome: Progressing     Problem: METABOLIC, FLUID AND ELECTROLYTES - ADULT  Goal: Fluid balance maintained  Description  INTERVENTIONS:  - Monitor labs   - Monitor I/O and WT  - Instruct patient on fluid and nutrition as appropriate  - Assess for signs & symptoms of volume excess or deficit  Outcome: Progressing

## 2020-02-09 NOTE — ASSESSMENT & PLAN NOTE
Lab Results   Component Value Date    HGBA1C 6 1 02/01/2020       Recent Labs     02/07/20  2111 02/08/20  0733 02/08/20  1222 02/08/20  1701   POCGLU 190* 181* 300* 112       Blood Sugar Average: Last 72 hrs:  (P) 196   · Holding home Amaryl likely will need to change in the setting of kidney disease  · Continue sliding scale insulin

## 2020-02-09 NOTE — ASSESSMENT & PLAN NOTE
· Secondary to hepatic congestion from cardiogenic shock  · Hepatitis panel negative/right upper quadrant ultrasound unremarkable  · Continue to trend improving

## 2020-02-10 LAB
ANION GAP SERPL CALCULATED.3IONS-SCNC: 17 MMOL/L (ref 4–13)
BUN SERPL-MCNC: 64 MG/DL (ref 5–25)
CALCIUM SERPL-MCNC: 9 MG/DL (ref 8.3–10.1)
CHLORIDE SERPL-SCNC: 100 MMOL/L (ref 100–108)
CO2 SERPL-SCNC: 18 MMOL/L (ref 21–32)
CREAT SERPL-MCNC: 2.57 MG/DL (ref 0.6–1.3)
ERYTHROCYTE [DISTWIDTH] IN BLOOD BY AUTOMATED COUNT: 20.5 % (ref 11.6–15.1)
GFR SERPL CREATININE-BSD FRML MDRD: 21 ML/MIN/1.73SQ M
GLUCOSE SERPL-MCNC: 146 MG/DL (ref 65–140)
GLUCOSE SERPL-MCNC: 157 MG/DL (ref 65–140)
GLUCOSE SERPL-MCNC: 261 MG/DL (ref 65–140)
GLUCOSE SERPL-MCNC: 277 MG/DL (ref 65–140)
GLUCOSE SERPL-MCNC: 309 MG/DL (ref 65–140)
HCT VFR BLD AUTO: 30.3 % (ref 36.5–49.3)
HGB BLD-MCNC: 9.5 G/DL (ref 12–17)
MAGNESIUM SERPL-MCNC: 2.8 MG/DL (ref 1.6–2.6)
MCH RBC QN AUTO: 26.9 PG (ref 26.8–34.3)
MCHC RBC AUTO-ENTMCNC: 31.4 G/DL (ref 31.4–37.4)
MCV RBC AUTO: 86 FL (ref 82–98)
PLATELET # BLD AUTO: 162 THOUSANDS/UL (ref 149–390)
PMV BLD AUTO: 11.6 FL (ref 8.9–12.7)
POTASSIUM SERPL-SCNC: 4.6 MMOL/L (ref 3.5–5.3)
RBC # BLD AUTO: 3.53 MILLION/UL (ref 3.88–5.62)
SODIUM SERPL-SCNC: 135 MMOL/L (ref 136–145)
WBC # BLD AUTO: 9.16 THOUSAND/UL (ref 4.31–10.16)

## 2020-02-10 PROCEDURE — 83735 ASSAY OF MAGNESIUM: CPT | Performed by: NURSE PRACTITIONER

## 2020-02-10 PROCEDURE — 85027 COMPLETE CBC AUTOMATED: CPT | Performed by: NURSE PRACTITIONER

## 2020-02-10 PROCEDURE — 99232 SBSQ HOSP IP/OBS MODERATE 35: CPT | Performed by: INTERNAL MEDICINE

## 2020-02-10 PROCEDURE — 99233 SBSQ HOSP IP/OBS HIGH 50: CPT | Performed by: INTERNAL MEDICINE

## 2020-02-10 PROCEDURE — 94668 MNPJ CHEST WALL SBSQ: CPT

## 2020-02-10 PROCEDURE — 82948 REAGENT STRIP/BLOOD GLUCOSE: CPT

## 2020-02-10 PROCEDURE — 94664 DEMO&/EVAL PT USE INHALER: CPT

## 2020-02-10 PROCEDURE — 97168 OT RE-EVAL EST PLAN CARE: CPT

## 2020-02-10 PROCEDURE — 97164 PT RE-EVAL EST PLAN CARE: CPT

## 2020-02-10 PROCEDURE — 80048 BASIC METABOLIC PNL TOTAL CA: CPT | Performed by: NURSE PRACTITIONER

## 2020-02-10 PROCEDURE — 94760 N-INVAS EAR/PLS OXIMETRY 1: CPT

## 2020-02-10 PROCEDURE — 94640 AIRWAY INHALATION TREATMENT: CPT

## 2020-02-10 RX ORDER — HEPARIN SODIUM 5000 [USP'U]/ML
5000 INJECTION, SOLUTION INTRAVENOUS; SUBCUTANEOUS EVERY 8 HOURS SCHEDULED
Status: DISCONTINUED | OUTPATIENT
Start: 2020-02-10 | End: 2020-02-12 | Stop reason: HOSPADM

## 2020-02-10 RX ADMIN — DOCUSATE SODIUM 100 MG: 100 CAPSULE, LIQUID FILLED ORAL at 10:19

## 2020-02-10 RX ADMIN — FUROSEMIDE 40 MG: 40 TABLET ORAL at 10:20

## 2020-02-10 RX ADMIN — FUROSEMIDE 40 MG: 40 TABLET ORAL at 16:43

## 2020-02-10 RX ADMIN — HEPARIN SODIUM 5000 UNITS: 5000 INJECTION INTRAVENOUS; SUBCUTANEOUS at 14:58

## 2020-02-10 RX ADMIN — HEPARIN SODIUM 5000 UNITS: 5000 INJECTION INTRAVENOUS; SUBCUTANEOUS at 21:05

## 2020-02-10 RX ADMIN — HEPARIN SODIUM 5000 UNITS: 5000 INJECTION INTRAVENOUS; SUBCUTANEOUS at 05:16

## 2020-02-10 RX ADMIN — INSULIN LISPRO 4 UNITS: 100 INJECTION, SOLUTION INTRAVENOUS; SUBCUTANEOUS at 16:54

## 2020-02-10 RX ADMIN — MIDODRINE HYDROCHLORIDE 5 MG: 5 TABLET ORAL at 11:41

## 2020-02-10 RX ADMIN — MIDODRINE HYDROCHLORIDE 5 MG: 5 TABLET ORAL at 16:43

## 2020-02-10 RX ADMIN — BUDESONIDE AND FORMOTEROL FUMARATE DIHYDRATE 2 PUFF: 160; 4.5 AEROSOL RESPIRATORY (INHALATION) at 08:38

## 2020-02-10 RX ADMIN — MELATONIN 6 MG: at 21:05

## 2020-02-10 RX ADMIN — PANTOPRAZOLE SODIUM 40 MG: 40 TABLET, DELAYED RELEASE ORAL at 05:16

## 2020-02-10 RX ADMIN — BIMATOPROST 1 DROP: 0.1 SOLUTION/ DROPS OPHTHALMIC at 21:05

## 2020-02-10 RX ADMIN — ASPIRIN 81 MG 81 MG: 81 TABLET ORAL at 10:20

## 2020-02-10 RX ADMIN — INSULIN LISPRO 3 UNITS: 100 INJECTION, SOLUTION INTRAVENOUS; SUBCUTANEOUS at 21:23

## 2020-02-10 RX ADMIN — TIMOLOL MALEATE 1 DROP: 5 SOLUTION/ DROPS OPHTHALMIC at 10:08

## 2020-02-10 RX ADMIN — ATORVASTATIN CALCIUM 40 MG: 40 TABLET, FILM COATED ORAL at 21:05

## 2020-02-10 RX ADMIN — MIDODRINE HYDROCHLORIDE 5 MG: 5 TABLET ORAL at 06:19

## 2020-02-10 RX ADMIN — GUAIFENESIN 1200 MG: 600 TABLET ORAL at 18:01

## 2020-02-10 RX ADMIN — CYANOCOBALAMIN TAB 500 MCG 1000 MCG: 500 TAB at 10:20

## 2020-02-10 RX ADMIN — BUDESONIDE AND FORMOTEROL FUMARATE DIHYDRATE 2 PUFF: 160; 4.5 AEROSOL RESPIRATORY (INHALATION) at 19:59

## 2020-02-10 RX ADMIN — GUAIFENESIN 1200 MG: 600 TABLET ORAL at 10:20

## 2020-02-10 NOTE — PROGRESS NOTES
Pastoral Care Progress Note    2/10/2020  Patient: Arden Mckee : 1930  Admission Date & Time: 2020  MRN: [id number] CSN: [id number]                     Chaplaincy Interventions Utilized:   Empowerment: Encouraged focus on present and Provided chaplaincy education    Exploration: Explored emotional needs & resources, Explored relational needs & resources, Explored spiritual needs & resources and Facilitated life review        Relationship Building: Cultivated a relationship of care and support, Listened empathically, Hospitality and Provided silent and supportive presence    Chaplaincy Outcomes Achieved:  Expressed gratitude, Expressed humor, Expressed intermediate hope, Expressed ultimate hope and Identified meaningful connections    Spiritual Coping Strategies Utilized:   Spiritual comfort and Spiritual gratitude       02/10/20 Charmaine 13   Spiritual Beliefs/Perceptions   Concept of God Accepting   Relationship with God Close   Spiritual Strengths   (Resilient spirit   Grateful for lifef's blessings)   Stress Factors   Patient Stress Factors None identified   Coping Responses   Patient Coping Accepting;Open/discussion   Plan of Care   Assessment Completed by: Unit visit

## 2020-02-10 NOTE — PLAN OF CARE
Problem: OCCUPATIONAL THERAPY ADULT  Goal: Performs self-care activities at highest level of function for planned discharge setting  See evaluation for individualized goals  Description  Treatment Interventions: ADL retraining, Functional transfer training, Endurance training, Cognitive reorientation, Patient/family training, Equipment evaluation/education, Compensatory technique education, Continued evaluation          See flowsheet documentation for full assessment, interventions and recommendations  Note:   Limitation: Decreased ADL status, Decreased endurance, Decreased self-care trans, Decreased high-level ADLs, Decreased cognition  Prognosis: Good  Assessment: Pt is a 80 y o  male seen for OT re-evaluation at [facility], admitted 1/30/2020 w/ Acute on chronic combined systolic and diastolic heart failure ([facility] [address] )  OT completed expanded review of pt's medical and social history  Comorbidities affecting pt's functional performance at time of assessment include: DM type 2, CKD, cardiomyopathy, anemia, ELTON, NSVT, hx MRSA, UTI, Hx MI, hx cerebral infarction  Prior to admission, pt was living in [facility] with son, 3-4STE and was Modified independent with rollator for functional mobility, supervised/setup for ADLs especially showers, assisted by son for IADLs  Upon evaluation, Pt requiring set-up to min A x 1 for UB ADLs and min A x 1 for LB ADLS  Pt presents to OT below baseline due to the following performance deficits: weakness, decreased ROM, decreased strength, decreased balance and decreased safety awareness  Pt to benefit from continued skilled OT tx while in the hospital to address deficits as defined above and maximize level of functional independence w ADL's and functional mobility  Occupational Performance areas to address include: bathing/shower, toilet hygiene, dressing, health maintenance, functional mobility and community mobility  Based on findings, pt is of moderate complexity   At this time, OT recommendations at time of discharge are short term rehab       OT Discharge Recommendation: Short Term Rehab

## 2020-02-10 NOTE — ASSESSMENT & PLAN NOTE
Lab Results   Component Value Date    HGBA1C 6 1 02/01/2020       Recent Labs     02/09/20  0842 02/09/20  1122 02/09/20  1600 02/09/20  2105   POCGLU 202* 246* 216* 183*       Blood Sugar Average: Last 72 hrs:  (P) 930 [id number]   · Holding home Amaryl likely will need to change in the setting of kidney disease  · Increase sliding scale insulin

## 2020-02-10 NOTE — SOCIAL WORK
Continue to follow  Call received from Pt's son re: discharge planning  Pt's son inquiring about hospice in nursing home as he does not think he can do hospice at home at this time  CM informed Pt's son that hospice in nursing home would be covered under Pt's insurance but room and board would be private pay  Pt's son reports there is limited funds and unsure if he could pay privately for room and board  CM informed Pt's son that PT is recommending SNF  Pt's son is choosing SNF upon discharge  Pt's son informed CM that his [employer] is at [facility] and is requesting referral to [facility]  Referral sent to [facility] via [facility]  Will follow

## 2020-02-10 NOTE — PROGRESS NOTES
NEPHROLOGY PROGRESS NOTE   Sondra Rodriguez 80 y o  male MRN: [id number]  Unit/Bed#: -01 Encounter: [id number]  Reason for Consult: ELTON      SUMMARY:    71-year-old male with a history of congestive heart failure, chronic kidney disease, multiple myeloma admitted to [facility] for shortness of breath and treated for acute exacerbation of congestive heart failure  Nephrology consult for acute kidney injury with underlying chronic kidney disease  ASSESSMENT and PLAN:    1 ) Acute Kidney Injury  -- Present on admission (POA) ; admission creatinine: 1 8 mg/dL  -- Urinalysis:  Specific gravity 1 01, negative ketones, large blood, negative protein, occasional bacteria, 10-20 WBC  -- Imaging:  No recent dedicated renal imaging  Did have a right upper quadrant ultrasound last week which showed the right kidney measuring 10 3 cm with no evidence of hydronephrosis  -- Serologies: n/a  -- Etiology:  Presumed to be secondary to cardiorenal syndrome in the setting of exacerbation of congestive heart failure with underlying chronic kidney disease  -- Status:  Nonoliguric, diuretic was increased which I have no objections, his creatinine overall has plateaued  May need to accept a higher baseline creatinine to keep him out of volume overload and congestive heart failure  His overall prognosis is poor    -- Plan:   · No objections to the increased Lasix dose  · Accept a higher baseline creatinine to keep out of congestive heart failure and to keep his breathing comfortable  · Avoid NSAIDs  · Maintain mean arterial pressure greater than 65, blood pressure drops can increase the midodrine further to 10 mg t i d   · Discussed with critical care team    2 ) Chronic Kidney Disease Stage IV  -- outpatient nephrologist: Dr Sushma Roberts  -- baseline creatinine: 1 6-2 mg/dL, based on his volume status  -- Etiology:  Presumed to be secondary to combination of diabetic nephropathy and cardiorenal syndrome  --given his advanced age and underlying congestive heart failure with overall poor prognosis he is a poor dialysis candidate   --recent acute kidney injury with underlying congestive heart failure  Anticipate a higher baseline creatinine to keep him out of congestive heart failure as well as poor renal reserve in the setting of his advanced age  --the creatinine will be a poor marker for reflection of his renal dysfunction  Given his poor muscle mass  I suspect his renal function to be worse than with the blood work is reporting  A Cystatin C level may be more helpful    3 ) Acute on chronic combined congestive heart failure  --ejection fraction 25%, low output state, with moderate aortic stenosis  --complicated by acute kidney injury  --cardiogenic shock initially treated with milrinone which he is now off  --currently on midodrine for labile blood pressure and episodes of hypotension  --oral Lasix increased to 40 mg twice a day, I have no objections  --ongoing goals of care discussion  Overall poor prognosis  --plan is for rehab with a plan for possible hospice as an outpatient    4 ) Low bicarbonate level  --has been trending down  --if he continues to remain low tomorrow I would recommend starting sodium bicarbonate 650 mg twice a day    5 ) Multiple myeloma  --oncologist Dr Pitt  --bone marrow biopsy  Kappa light chain restricted plasma cell neoplasm consistent with plasma cell myeloma, IgA kappa restricted  No evidence of B-cell or T-cell lymphoproliferative disorder  --chemotherapy regiment in the past included Revlimid  --FISH: 1  No evidence of IGH-MAF [translocation t(14;16)] gene rearrangement  2  No evidence of RB1 monosomy (13q14 deletion)  3  No evidence of CCND1-IGH [translocation t(11;14)] gene rearrangement, and no evidence for trisomy 11 or gain of 11q  4  No evidence of p53 (17p13) deletion or amplification    5  No evidence of FGFR3-IGH [translocation t(4;14)] gene rearrangement  6  Negative for 1q21/CKS1B gain      SUBJECTIVE / INTERVAL HISTORY:    Patient is delirious but reports he does not feel short of breath  But is diuretics was increased because of abnormal pulmonary exam     OBJECTIVE:  Current Weight: Weight - Scale: 77 7 kg (171 lb 4 8 oz)  Vitals:    02/10/20 0000 02/10/20 0600 02/10/20 0708 02/10/20 0845   BP: 97/71  127/60    BP Location:   Right arm    Pulse: 89  97    Resp:   20    Temp:   97 6 °F (36 4 °C)    TempSrc:   Oral    SpO2:    92%   Weight:  77 7 kg (171 lb 4 8 oz)     Height:           Intake/Output Summary (Last 24 hours) at 2/10/2020 1315  Last data filed at 2/10/2020 0947  Gross per 24 hour   Intake 425 ml   Output 1569 ml   Net -1144 ml       Review of Systems:    12 point ROS has been reviewed  Physical Exam   Constitutional: He is oriented to person, place, and time  He appears well-developed and well-nourished  No distress  HENT:   Head: Normocephalic and atraumatic  Eyes: Pupils are equal, round, and reactive to light  No scleral icterus  Neck: Normal range of motion  Neck supple  Cardiovascular: Normal rate, regular rhythm and normal heart sounds  Exam reveals no gallop and no friction rub  No murmur heard  Pulmonary/Chest: Effort normal  No respiratory distress  He has no wheezes  He has rales  He exhibits no tenderness  Abdominal: Soft  Bowel sounds are normal  He exhibits no distension  There is no tenderness  There is no rebound  Musculoskeletal: Normal range of motion  He exhibits no edema  Neurological: He is alert and oriented to person, place, and time  Skin: No rash noted  He is not diaphoretic  Psychiatric: He has a normal mood and affect  Nursing note and vitals reviewed        Medications:    Current Facility-Administered Medications:     acetaminophen (TYLENOL) tablet 650 mg, 650 mg, Oral, Q6H PRN, DON Pereyra    aspirin chewable tablet 81 mg, 81 mg, Oral, Daily, Ronal [place], DON, 81 mg at 02/10/20 1020    atorvastatin (LIPITOR) tablet 40 mg, 40 mg, Oral, HS, DON Andrew, 40 mg at 02/09/20 2108    bimatoprost (LUMIGAN) 0 01 % ophthalmic solution 1 drop, 1 drop, Both Eyes, HS, DON Andrew, 1 drop at 02/09/20 2108    budesonide-formoterol (SYMBICORT) 160-4 5 mcg/act inhaler 2 puff, 2 puff, Inhalation, BID, Dorethea VIVIENNE PopeNP, 2 puff at 02/10/20 5738    cyanocobalamin (VITAMIN B-12) tablet 1,000 mcg, 1,000 mcg, Oral, Daily, Dorethea SolDON amaro, 1,000 mcg at 02/10/20 1020    docusate sodium (COLACE) capsule 100 mg, 100 mg, Oral, Daily, DON Andrew, 100 mg at 02/10/20 1019    furosemide (LASIX) tablet 40 mg, 40 mg, Oral, BID (diuretic), DON Oakley, 40 mg at 02/10/20 1020    guaiFENesin (MUCINEX) 12 hr tablet 1,200 mg, 1,200 mg, Oral, BID, DON Andrew, 1,200 mg at 02/10/20 1020    heparin (porcine) subcutaneous injection 5,000 Units, 5,000 Units, Subcutaneous, Q8H [facility], DON Oakley, 5,000 Units at 02/10/20 0516    insulin lispro (HumaLOG) 100 units/mL subcutaneous injection 1-6 Units, 1-6 Units, Subcutaneous, HS, DON Oakley    levalbuterol (XOPENEX) inhalation solution 1 25 mg, 1 25 mg, Nebulization, Q8H PRN, DON Church    melatonin tablet 6 mg, 6 mg, Oral, HS, DON Andrew, 6 mg at 02/09/20 2108    midodrine (PROAMATINE) tablet 5 mg, 5 mg, Oral, TID AC, DON Andrew, 5 mg at 02/10/20 1141    pantoprazole (PROTONIX) EC tablet 40 mg, 40 mg, Oral, Early Morning, DON Andrew, 40 mg at 02/10/20 0516    timolol (TIMOPTIC) 0 5 % ophthalmic solution 1 drop, 1 drop, Both Eyes, Daily, DON Andrew, 1 drop at 02/10/20 1008    Laboratory Results:  Results from last 7 days   Lab Units 02/10/20  0547 02/10/20  0503 02/09/20  8273 02/08/20  1494 02/07/20  0459 02/06/20  0559 02/05/20  7929 02/05/20  6151 02/05/20  0140 02/04/20  0458   WBC Thousand/uL 9 16  --   --  7 27  --  9 94 7 70  8 01  --  7 36 8 72   HEMOGLOBIN g/dL 9 5*  --   --  8 5*  --  8 8* 9 3*  9 6*  --  10 0* 9 5*   HEMATOCRIT % 30 3*  --   --  27 4*  --  28 3* 30 3*  31 0*  --  32 1* 30 6*   PLATELETS Thousands/uL 162  --   --  212  --  245 323  324  --  346 386   POTASSIUM mmol/L  --  4 6 4 4 4 0 3 6  3 6 3 8 4 3  --  4 8 4 4   CHLORIDE mmol/L  --  100 100 102 103  103 106 99*  --  102 104   CO2 mmol/L  --  18* 22 24 24  24 25 22  --  24 19*   CO2, I-STAT mmol/L  --   --   --   --   --   --   --  24  --   --    BUN mg/dL  --  64* 56* 52* 51*  51* 50* 44*  --  46* 35*   CREATININE mg/dL  --  2 57* 2 47* 2 51* 2 74*  2 74* 2 89* 2 75*  --  2 68* 2 21*   CALCIUM mg/dL  --  9 0 8 6 8 7 8 5  8 5 8 8 9 0  --  9 4 9 2   MAGNESIUM mg/dL  --  2 8*  --  2 4 2 4 2 5  --   --  2 5  --    PHOSPHORUS mg/dL  --   --   --   --   --  4 8*  --   --   --   --

## 2020-02-10 NOTE — PROGRESS NOTES
Progress Note - Gabbi Samuels 9/22/1930, 80 y o  male MRN: [id number]    Unit/Bed#: -01 Encounter: [id number]    Primary Care Provider: Monik Lopez   Date and time admitted to hospital: 1/30/2020  8:11 PM        * Acute on chronic combined systolic and diastolic heart failure ([email or web address] 75 )  Assessment & Plan  Wt Readings from Last 3 Encounters:   02/09/20 78 kg (171 lb 15 3 oz)   12/17/19 82 6 kg (182 lb)   10/15/19 84 8 kg (187 lb)     · Echo with an EF 25% with moderate decreased RV function an aortic valve area of 1 25 with mild to moderate MR unchanged from prior echocardiogram  · Cardiology following  · Weight up 1 kg  · Now requiring 2lnc and bibasilar crackles  · Day balance -500 stay balance -8  · Increase po lasix to BID  · Goal-directed therapies are on hold given hypotension  · Continue midodrine 5 mg t i d    · Milrinone discontinued on 02/07 in the a m    · Family updated at bedside in length regarding ongoing goals of care discussion with chronic heart failure/poor prognosis goal to try to go to rehab (see ACP note)    ELTON (acute kidney injury) ([facility] [[email or web address] 75 )  Assessment & Plan  · On CKD with a baseline creatinine 1 6-1 9 secondary to cardiorenal syndrome  · Creat down trending  · Nephrology following  · I/O  · F/u am bmp       Transaminitis  Assessment & Plan  · Secondary to hepatic congestion from cardiogenic shock  · Hepatitis panel negative/right upper quadrant ultrasound unremarkable  · Continue to trend improving    Anemia  Assessment & Plan  · Chronic in the setting of myeloma    Type 2 diabetes mellitus without complication, without long-term current use of insulin [facility])  Assessment & Plan  Lab Results   Component Value Date    HGBA1C 6 1 02/01/2020       Recent Labs     02/09/20  0842 02/09/20  1122 02/09/20  1600 02/09/20  2105   POCGLU 202* 246* 216* 183*       Blood Sugar Average: Last 72 hrs:  (P) 566 [id number]   · Holding home Amaryl likely will need to change in the setting of kidney disease  · Increase sliding scale insulin    Mixed hyperlipidemia  Assessment & Plan  · Continue statin    Esophageal reflux  Assessment & Plan  · Continue home PPI    Kappa light chain myeloma ([address] )  Assessment & Plan  · Follows with Dr Shanae Ceja as an outpatient  VTE Pharmacologic Prophylaxis:   Pharmacologic: Heparin  Mechanical VTE Prophylaxis in Place: Yes    Patient Centered Rounds: I have performed bedside rounds with nursing staff today  Discussions with Specialists or Other Care Team Provider: day team to discuss    Education and Discussions with Family / Patient: patient  Spoke with son last pm    Time Spent for Care: 30 minutes  More than 50% of total time spent on counseling and coordination of care as described above  Current Length of Stay: 10 day(s)    Current Patient Status: Inpatient   Certification Statement: The patient will continue to require additional inpatient hospital stay due to chf mgmnt/snow    Discharge Plan: PT to reeval    Code Status: Level 3 - DNAR and DNI      Subjective:   Denies c/o  Moist cough    Objective:     Vitals:   Temp (24hrs), Av 7 °F (36 5 °C), Min:97 6 °F (36 4 °C), Max:97 9 °F (36 6 °C)    Temp:  [97 6 °F (36 4 °C)-97 9 °F (36 6 °C)] 97 8 °F (36 6 °C)  HR:  [83-91] 89  Resp:  [18-20] 18  BP: ()/(56-71) 97/71  SpO2:  [92 %-100 %] 92 %  Body mass index is 26 93 kg/m²  Input and Output Summary (last 24 hours): Intake/Output Summary (Last 24 hours) at 2/10/2020 0416  Last data filed at 2/10/2020 0101  Gross per 24 hour   Intake 835 ml   Output 1402 ml   Net -567 ml       Physical Exam:     Physical Exam   Constitutional: He is oriented to person, place, and time  He appears well-nourished  HENT:   Head: Normocephalic and atraumatic  Mouth/Throat: Oropharynx is clear and moist    Eyes: Pupils are equal, round, and reactive to light  Conjunctivae are normal  No scleral icterus  Neck: Neck supple  No tracheal deviation present  Cardiovascular: Normal rate, regular rhythm, normal heart sounds and intact distal pulses  Exam reveals no gallop and no friction rub  No murmur heard  Pulmonary/Chest: Effort normal  No respiratory distress  He has no wheezes  He has rales in the right lower field and the left lower field  Abdominal: Soft  Bowel sounds are normal  He exhibits no distension  There is no tenderness  Musculoskeletal: Normal range of motion  He exhibits no edema, tenderness or deformity  Neurological: He is alert and oriented to person, place, and time  No cranial nerve deficit  Skin: Skin is warm and dry  No rash noted  No erythema  No pallor  Psychiatric: He has a normal mood and affect   His behavior is normal          Additional Data:     Labs:    Results from last 7 days   Lab Units 02/08/20  0514 02/06/20  0559   WBC Thousand/uL 7 27 9 94   HEMOGLOBIN g/dL 8 5* 8 8*   HEMATOCRIT % 27 4* 28 3*   PLATELETS Thousands/uL 212 245   NEUTROS PCT %  --  83*   LYMPHS PCT %  --  11*   MONOS PCT %  --  5   EOS PCT %  --  0     Results from last 7 days   Lab Units 02/09/20  0511   SODIUM mmol/L 136   POTASSIUM mmol/L 4 4   CHLORIDE mmol/L 100   CO2 mmol/L 22   BUN mg/dL 56*   CREATININE mg/dL 2 47*   ANION GAP mmol/L 14*   CALCIUM mg/dL 8 6   ALBUMIN g/dL 3 2*   TOTAL BILIRUBIN mg/dL 1 60*   ALK PHOS U/L 149*   ALT U/L 653*   AST U/L 178*   GLUCOSE RANDOM mg/dL 164*     Results from last 7 days   Lab Units 02/07/20  0459   INR  1 38*     Results from last 7 days   Lab Units 02/09/20  2105 02/09/20  1600 02/09/20  1122 02/09/20  0842 02/08/20  2125 02/08/20  1701 02/08/20  1222 02/08/20  0733 02/07/20  2111 02/07/20  1633 02/07/20  1102 02/07/20  0727   POC GLUCOSE mg/dl 183* 216* 246* 202* 203* 112 300* 181* 190* 197* 334* 170*         Results from last 7 days   Lab Units 02/06/20  0559 02/05/20  0454 02/05/20  0140 02/04/20  2224 02/04/20  1532   LACTIC ACID mmol/L 1 8  --  3 9* 3 2*  --    PROCALCITONIN ng/ml  --  0 23  -- --  0 10           * I Have Reviewed All Lab Data Listed Above  * Additional Pertinent Lab Tests Reviewed: All Labs Within Last 24 Hours Reviewed    Imaging:    Imaging Reports Reviewed Today Include: cxr  Imaging Personally Reviewed by Myself Includes:  na    Recent Cultures (last 7 days):     Results from last 7 days   Lab Units 02/04/20  1657   LEGIONELLA URINARY ANTIGEN  Negative       Last 24 Hours Medication List:     Current Facility-Administered Medications:  acetaminophen 650 mg Oral Q6H PRN DON Conn   aspirin 81 mg Oral Daily Kalina FedcarmelosDON hansen   atorvastatin 40 mg Oral HS Kalina Fedcarmeloszajadiel, DON   bimatoprost 1 drop Both Eyes HS Kalina FedcarmeloszaDON duvall   budesonide-formoterol 2 puff Inhalation BID DON Conn   cyanocobalamin 1,000 mcg Oral Daily Kalina Rahsjade, DON   docusate sodium 100 mg Oral Daily Kalina Fedmoo, DON   furosemide 40 mg Oral BID (diuretic) DON Felder   guaiFENesin 1,200 mg Oral BID DON Conn   insulin lispro 1-6 Units Subcutaneous HS Nakita Ariza, DON   levalbuterol 1 25 mg Nebulization Q8H PRN DON Conn   melatonin 6 mg Oral HS KalinaDON Will   midodrine 5 mg Oral TID AC Kalina DON Rizzo   pantoprazole 40 mg Oral Early Morning Kalina RahszaDON duvall   timolol 1 drop Both Eyes Daily DON Conn        Today, Patient Was Seen By: DON Felder    ** Please Note: Dictation voice to text software may have been used in the creation of this document   **

## 2020-02-10 NOTE — PLAN OF CARE
Problem: PHYSICAL THERAPY ADULT  Goal: Performs mobility at highest level of function for planned discharge setting  See evaluation for individualized goals  Description  Treatment/Interventions: Functional transfer training, Elevations, Therapeutic exercise, Endurance training, Cognitive reorientation, Bed mobility, Gait training, Spoke to nursing, OT  Equipment Recommended: (continued use of rollator)       See flowsheet documentation for full assessment, interventions and recommendations  Outcome: Progressing  Note:   Prognosis: Fair  Problem List: Decreased endurance, Impaired balance, Decreased mobility  Assessment: Pt presents with impaired activity tolerance after transfer to ICU with decreased 02 sats  Able to mopbilize shor tdistances with rw adn 1 assist  Can benefit from skilledTP serivces ,in-pt rehab with graded program to improve fucntional mobility  WIll follow see goals  Barriers to Discharge: Inaccessible home environment, Decreased caregiver support     Recommendation: Short-term skilled PT     PT - OK to Discharge: Yes    See flowsheet documentation for full assessment

## 2020-02-10 NOTE — PLAN OF CARE
Problem: Potential for Falls  Goal: Patient will remain free of falls  Description  INTERVENTIONS:  - Assess patient frequently for physical needs  -  Identify cognitive and physical deficits and behaviors that affect risk of falls    -  [place] fall precautions as indicated by assessment   - Educate patient/family on patient safety including physical limitations  - Instruct patient to call for assistance with activity based on assessment  - Modify environment to reduce risk of injury  - Consider OT/PT consult to assist with strengthening/mobility  Outcome: Progressing     Problem: DISCHARGE PLANNING  Goal: Discharge to home or other facility with appropriate resources  Description  INTERVENTIONS:  - Identify barriers to discharge w/patient and caregiver  - Arrange for needed discharge resources and transportation as appropriate  - Identify discharge learning needs (meds, wound care, etc )  - Arrange for interpretive services to assist at discharge as needed  - Refer to Case Management Department for coordinating discharge planning if the patient needs post-hospital services based on physician/advanced practitioner order or complex needs related to functional status, cognitive ability, or social support system  Outcome: Progressing     Problem: RESPIRATORY - ADULT  Goal: Achieves optimal ventilation and oxygenation  Description  INTERVENTIONS:  - Assess for changes in respiratory status  - Assess for changes in mentation and behavior  - Position to facilitate oxygenation and minimize respiratory effort  - Oxygen administered by appropriate delivery if ordered  - Initiate smoking cessation education as indicated  - Encourage broncho-pulmonary hygiene including cough, deep breathe, Incentive Spirometry  - Assess the need for suctioning and aspirate as needed  - Assess and instruct to report SOB or any respiratory difficulty  - Respiratory Therapy support as indicated  Outcome: Progressing     Problem: HEMATOLOGIC - ADULT  Goal: Maintains hematologic stability  Description  INTERVENTIONS  - Assess for signs and symptoms of bleeding or hemorrhage  - Monitor labs  - Administer supportive blood products/factors as ordered and appropriate  Outcome: Progressing     Problem: Prexisting or High Potential for Compromised Skin Integrity  Goal: Skin integrity is maintained or improved  Description  INTERVENTIONS:  - Identify patients at risk for skin breakdown  - Assess and monitor skin integrity  - Assess and monitor nutrition and hydration status  - Monitor labs   - Assess for incontinence   - Turn and reposition patient  - Assist with mobility/ambulation  - Relieve pressure over bony prominences  - Avoid friction and shearing  - Provide appropriate hygiene as needed including keeping skin clean and dry  - Evaluate need for skin moisturizer/barrier cream  - Collaborate with interdisciplinary team   - Patient/family teaching  - Consider wound care consult   Outcome: Progressing     Problem: INFECTION - ADULT  Goal: Absence or prevention of progression during hospitalization  Description  INTERVENTIONS:  - Assess and monitor for signs and symptoms of infection  - Monitor lab/diagnostic results  - Monitor all insertion sites, i e  indwelling lines, tubes, and drains  - Monitor endotracheal if appropriate and nasal secretions for changes in amount and color  - [place] appropriate cooling/warming therapies per order  - Administer medications as ordered  - Instruct and encourage patient and family to use good hand hygiene technique  - Identify and instruct in appropriate isolation precautions for identified infection/condition  Outcome: Progressing     Problem: Knowledge Deficit  Goal: Patient/family/caregiver demonstrates understanding of disease process, treatment plan, medications, and discharge instructions  Description  Complete learning assessment and assess knowledge base    Interventions:  - Provide teaching at level of understanding  - Provide teaching via preferred learning methods  Outcome: Progressing     Problem: CARDIOVASCULAR - ADULT  Goal: Maintains optimal cardiac output and hemodynamic stability  Description  INTERVENTIONS:  - Monitor I/O, vital signs and rhythm  - Monitor for S/S and trends of decreased cardiac output  - Administer and titrate ordered vasoactive medications to optimize hemodynamic stability  - Assess quality of pulses, skin color and temperature  - Assess for signs of decreased coronary artery perfusion  - Instruct patient to report change in severity of symptoms  Outcome: Progressing  Goal: Absence of cardiac dysrhythmias or at baseline rhythm  Description  INTERVENTIONS:  - Continuous cardiac monitoring, vital signs, obtain 12 lead EKG if ordered  - Administer antiarrhythmic and heart rate control medications as ordered  - Monitor electrolytes and administer replacement therapy as ordered  Outcome: Progressing     Problem: METABOLIC, FLUID AND ELECTROLYTES - ADULT  Goal: Fluid balance maintained  Description  INTERVENTIONS:  - Monitor labs   - Monitor I/O and WT  - Instruct patient on fluid and nutrition as appropriate  - Assess for signs & symptoms of volume excess or deficit  Outcome: Progressing

## 2020-02-10 NOTE — PROGRESS NOTES
Cardiology Progress Note - Eileen Castro 80 y o  male MRN: [id number]    Unit/Bed#: -01 Encounter: [id number]      Assessment:  Principal Problem:    Acute on chronic combined systolic and diastolic heart failure (HCC)  Active Problems:    Type 2 diabetes mellitus without complication, without long-term current use of insulin (HCC)    Mixed hyperlipidemia    Esophageal reflux    Kappa light chain myeloma (HCC)    Anemia    Transaminitis    ELTON (acute kidney injury) ([facility] 75 )    NSVT (nonsustained ventricular tachycardia) ([facility] 75 )      Plan:    1  Acute on chronic systolic CHF - He was treated as low output last week, with a rising creatinine, lactic and LFTs  IV Milrinone had very little clinical effect  He remains hypotensive, now requiring midodrine  Continue same dose of  Lasix, and continue to follow labs/urine output  Overall prognosis is poor, and continuing to have discussions with family about goals of care  Palliative/hospice consultation is appropriate at this point  3   Cardiomyopathy - Ejection fraction 25% - Stage D - Presumed to be ischemic given his history of CAD  However he was undergoing a workup for infiltrative disorders through our heart failure program   Regardless, treatment would not change for him given his multiple comorbidities and end stage CHF  We will continue treat heart failure as above, and arrange close outpatient follow-up  4  Type 2 MI - This is secondary to acute CHF, and underlying coronary artery disease  Treatment is just supportive at this point along with treatment for his CHF  Subjective:   Patient seen and examined  Milrinone was stopped this past Friday, with very little clinical change  Creatinine still rising, LFTs improving  Patient appears lethargic  Denies worsening shortness of breath  Not on oxygen  Objective:     Vitals: Blood pressure 98/61, pulse 80, temperature 97 5 °F (36 4 °C), temperature source Axillary, resp   rate 17, height 5' 7" (1 702 m), weight 77 7 kg (171 lb 4 8 oz), SpO2 100 %  , Body mass index is 26 83 kg/m² ,   Orthostatic Blood Pressures      Most Recent Value   Blood Pressure  98/61 filed at 02/10/2020 1500   Patient Position - Orthostatic VS  Lying filed at 02/10/2020 1500            Intake/Output Summary (Last 24 hours) at 2/10/2020 1553  Last data filed at 2/10/2020 1500  Gross per 24 hour   Intake 1425 ml   Output 1619 ml   Net -194 ml       Physical Exam:    GEN: Nathan Sheriff appears well, alert and oriented but appears fatigued  HEENT: pupils equal, round, and reactive to light; extraocular muscles intact  NECK: supple, no carotid bruits   +JVD   HEART: regular rhythm, distant S1 and S2, no murmurs, clicks, gallops or rubs   LUNGS:  Diminished bilaterally; no wheezes, rales, or rhonchi   ABDOMEN: normal bowel sounds, soft, no tenderness, no distention  EXTREMITIES: peripheral pulses normal; no clubbing, cyanosis, or edema  NEURO: no focal findings   SKIN: normal without suspicious lesions on exposed skin        Medications:      Current Facility-Administered Medications:     acetaminophen (TYLENOL) tablet 650 mg, 650 mg, Oral, Q6H PRN, DON Knight    aspirin chewable tablet 81 mg, 81 mg, Oral, Daily, DON Andrew, 81 mg at 02/10/20 1020    atorvastatin (LIPITOR) tablet 40 mg, 40 mg, Oral, HS, DON Andrew, 40 mg at 02/09/20 2108    bimatoprost (LUMIGAN) 0 01 % ophthalmic solution 1 drop, 1 drop, Both Eyes, HS, DON Andrew, 1 drop at 02/09/20 2108    budesonide-formoterol (SYMBICORT) 160-4 5 mcg/act inhaler 2 puff, 2 puff, Inhalation, BID, VIVIENNE KnightNP, 2 puff at 02/10/20 0177    cyanocobalamin (VITAMIN B-12) tablet 1,000 mcg, 1,000 mcg, Oral, Daily, DON Andrew, 1,000 mcg at 02/10/20 1020    docusate sodium (COLACE) capsule 100 mg, 100 mg, Oral, Daily, DON Andrew, 100 mg at 02/10/20 1019    furosemide (LASIX) tablet 40 mg, 40 mg, Oral, BID (diuretic), Alfreida Rotunda, DON, 40 mg at 02/10/20 1020    guaiFENesin (MUCINEX) 12 hr tablet 1,200 mg, 1,200 mg, Oral, BID, DON Andrew, 1,200 mg at 02/10/20 1020    heparin (porcine) subcutaneous injection 5,000 Units, 5,000 Units, Subcutaneous, Q8H [facility], DON Oakley, 5,000 Units at 02/10/20 1458    insulin lispro (HumaLOG) 100 units/mL subcutaneous injection 1-6 Units, 1-6 Units, Subcutaneous, HS, DON Oakley    levalbuterol (XOPENEX) inhalation solution 1 25 mg, 1 25 mg, Nebulization, Q8H PRN, DON Jonas    melatonin tablet 6 mg, 6 mg, Oral, HS, DON Andrew, 6 mg at 02/09/20 2108    midodrine (PROAMATINE) tablet 5 mg, 5 mg, Oral, TID AC, DON Andrew, 5 mg at 02/10/20 1141    pantoprazole (PROTONIX) EC tablet 40 mg, 40 mg, Oral, Early Morning, DON Andrew, 40 mg at 02/10/20 0516    timolol (TIMOPTIC) 0 5 % ophthalmic solution 1 drop, 1 drop, Both Eyes, Daily, DON Andrew, 1 drop at 02/10/20 1008     Labs & Results:    Results from last 7 days   Lab Units 02/05/20  0945 02/05/20  0536 02/05/20  0140   TROPONIN I ng/mL 7 40* 7 58* 4 57*     Results from last 7 days   Lab Units 02/10/20  0547 02/08/20  0514 02/06/20  0559   WBC Thousand/uL 9 16 7 27 9 94   HEMOGLOBIN g/dL 9 5* 8 5* 8 8*   HEMATOCRIT % 30 3* 27 4* 28 3*   PLATELETS Thousands/uL 162 212 245         Results from last 7 days   Lab Units 02/10/20  0503 02/09/20  0511 02/08/20  0514 02/07/20  0459   POTASSIUM mmol/L 4 6 4 4 4 0 3 6  3 6   CHLORIDE mmol/L 100 100 102 103  103   CO2 mmol/L 18* 22 24 24  24   BUN mg/dL 64* 56* 52* 51*  51*   CREATININE mg/dL 2 57* 2 47* 2 51* 2 74*  2 74*   CALCIUM mg/dL 9 0 8 6 8 7 8 5  8 5   ALK PHOS U/L  --  149* 126* 136*   ALT U/L  --  653* 761* 925*   AST U/L  --  178* 275* 462*     Results from last 7 days   Lab Units 02/07/20  0459 02/05/20  0945 02/05/20  0140   INR  1 38*  --  1 41*   PTT seconds  --  131* 27     Results from last 7 days   Lab Units 02/10/20  0503 02/08/20  0514 02/07/20  0459   MAGNESIUM mg/dL 2 8* 2 4 2 4         EKG personally reviewed by Amanda Alonso MD  Sinus rhythm, first-degree AV block with PACs and PVCs, nonspecific IVCD and nonspecific T-wave changes  ECHO:  LEFT VENTRICLE:  The ventricle was mildly dilated  Systolic function was markedly reduced  Ejection fraction was estimated to be 25 %  There was severe diffuse hypokinesis with distinct regional wall motion abnormalities  There was akinesis of the mid-apical anterior, mid anteroseptal, apical septal, apical lateral, and apical wall(s)  Wall thickness was at the upper limits of normal   Doppler parameters were consistent with elevated ventricular end-diastolic filling pressure      RIGHT VENTRICLE:  The ventricle was dilated  Systolic function was moderately reduced      LEFT ATRIUM:  The atrium was moderately dilated      RIGHT ATRIUM:  The atrium was mildly dilated      MITRAL VALVE:  There was mild annular calcification  There was moderate diffuse thickening  There was mild to moderate regurgitation      AORTIC VALVE:  The valve was trileaflet  Leaflets exhibited normal thickness, moderate calcification, and moderately reduced cuspal separation  Transaortic velocity and gradient were increased due to stenosis, but lower than expected for the degree of stenosis due to concomitant decreased transaortic flow (decreased stroke volume)  There was moderate stenosis  There was mild regurgitation  Valve mean gradient was 6 mmHg  Estimated aortic valve area (by VTI) was 1 25 cmï¾²  Estimated aortic valve area (by Vmax) was 1 25 cmï¾²     TRICUSPID VALVE:  There was mild regurgitation      Counseling / Coordination of Care  Total floor / unit time spent today 25 minutes    Greater than 50% of total time was spent with the patient and / or family counseling and / or coordination of care

## 2020-02-10 NOTE — OCCUPATIONAL THERAPY NOTE
Occupational Therapy Re-Evaluation     Patient Name: Patricia Portillo  Today's Date: 2/10/2020  Problem List  Principal Problem:    Acute on chronic combined systolic and diastolic heart failure ([facility] )  Active Problems:    Type 2 diabetes mellitus without complication, without long-term current use of insulin (HCC)    Mixed hyperlipidemia    Esophageal reflux    Kappa light chain myeloma ([facility])    Anemia    Transaminitis    ELTON (acute kidney injury) ([facility] )    NSVT (nonsustained ventricular tachycardia) ([facility])    Past Medical History  Past Medical History:   Diagnosis Date    Angina pectoris ([facility])     Chronic kidney disease     Coronary artery disease     Diabetes mellitus ([facility] )     Glaucoma     History of methicillin resistant staphylococcus aureus (MRSA) 01/31/2020    negative nasal swab     Hypertension     Multiple myeloma ([facility] )     Occluded coronary artery stent     Left     Past Surgical History  Past Surgical History:   Procedure Laterality Date    BACK SURGERY      CARDIAC CATHETERIZATION  04/05/2004    COLONOSCOPY      CT BONE MARROW BIOPSY AND ASPIRATION  9/24/2018    CYSTOSCOPY      KNEE SURGERY      THROMBOENDARTERECTOMY Right     Cartoid         02/10/20 1210   Note Type   Note type Re-eval   Restrictions/Precautions   Weight Bearing Precautions Per Order No   Other Precautions Cardiac/sternal   Pain Assessment   Pain Assessment No/denies pain   Pain Score No Pain   Home Living   Type of [address] One level   Bathroom Shower/Tub Tub/shower unit   Bathroom Equipment Grab bars in [facility]   Prior Function   Level of [place] Independent with ADLs and functional mobility   Lives With Son   Receives Help From Family   ADL Assistance Independent   IADLs Needs assistance   Falls in the last 6 months 0   Vocational Retired   Psychosocial   Psychosocial (WDL) [address] [place] 450 7 Independent   Grooming Deficit Setup   UB Bathing Assistance 4  Minimal Assistance   LB Bathing Assistance 4  Minimal Assistance   UB Dressing Assistance 4  Minimal Assistance   LB Dressing Assistance 4  Minimal Assistance   Toileting Assistance  5  Supervision/Setup   Functional Assistance 4  Minimal Assistance   Bed Mobility   Additional Comments Pt received sitting in recliner   Transfers   Sit to Stand 4  Minimal assistance   Additional items Assist x 1;Verbal cues  (RW)   Stand to Sit 4  Minimal assistance   Additional items Assist x 1;Verbal cues  (RW)   Stand pivot 4  Minimal assistance   Additional items Assist x 1;Verbal cues  (RW)   Functional Mobility   Functional Mobility 4  Minimal assistance   Additional Comments X 1, RW   Balance   Static Sitting Good   Dynamic Sitting Good   Static Standing Fair +   Dynamic Standing Fair +   Activity Tolerance   Activity Tolerance Patient limited by fatigue   Medical Staff Made Aware PT Alejandra   RUE Assessment   RUE Assessment WFL   LUE Assessment   LUE Assessment WFL   Cognition   Overall Cognitive Status WFL   Arousal/Participation Alert   Attention Within functional limits   Orientation Level Oriented to person;Oriented to place; Disoriented to time;Disoriented to situation   Memory Decreased recall of recent events;Decreased short term memory   Following Commands Follows one step commands without difficulty   Assessment   Limitation Decreased ADL status; Decreased endurance;Decreased self-care trans;Decreased high-level ADLs; Decreased cognition   Prognosis Good   Assessment Pt is a 80 y o  male seen for OT re-evaluation at [facility], admitted 1/30/2020 w/ Acute on chronic combined systolic and diastolic heart failure ([facility] )  OT completed expanded review of pt's medical and social history   Comorbidities affecting pt's functional performance at time of assessment include: DM type 2, CKD, cardiomyopathy, anemia, ELTON, NSVT, hx MRSA, UTI, Hx MI, hx cerebral infarction  Prior to admission, pt was living in [facility] with son, 3-4STE and was Modified independent with rollator for functional mobility, supervised/setup for ADLs especially showers, assisted by son for IADLs  Upon evaluation, Pt requiring set-up to min A x 1 for UB ADLs and min A x 1 for LB ADLS  Pt presents to OT below baseline due to the following performance deficits: weakness, decreased ROM, decreased strength, decreased balance and decreased safety awareness  Pt to benefit from continued skilled OT tx while in the hospital to address deficits as defined above and maximize level of functional independence w ADL's and functional mobility  Occupational Performance areas to address include: bathing/shower, toilet hygiene, dressing, health maintenance, functional mobility and community mobility  Based on findings, pt is of moderate complexity  At this time, OT recommendations at time of discharge are short term rehab  Goals   Patient Goals Pt wishes to get better   Plan   Treatment Interventions ADL retraining;Functional transfer training; Endurance training;Cognitive reorientation;Patient/family training;Equipment evaluation/education; Compensatory technique education;Continued evaluation   Goal Expiration Date 02/20/20   OT Frequency 2-3x/wk   Recommendation   OT Discharge Recommendation Short Term Rehab         Pt will achieve the following goals within 10 days  *Pt will complete UB bathing and dressing with supervision  *Pt will complete LB bathing and dressing with supervision   *Pt will perform functional transfers with on/off all surfaces with supervision using DME as needed w/ G balance/safety  *Pt will increase standing tolerance to 5 minutes in order to complete sinkside ADL task  *Pt will demonstrate increased activity tolerance in order to complete ADL routine  *Pt will identify 3-5 fall risks during ADL routine to ensure home safety upon discharge        JESUS Pineda/CATRACHO

## 2020-02-10 NOTE — ASSESSMENT & PLAN NOTE
· On CKD with a baseline creatinine 1 6-1 9 secondary to cardiorenal syndrome  · Creat down trending  · Nephrology following  · I/O  · F/u am bmp

## 2020-02-10 NOTE — PROGRESS NOTES
Pleasant man  Very engaging  Lives with son  Very appreciative for life  Enjoys reminiscing about growing up in [place], playing football, working at [employer]  Coping well  No complaints or concerns  Receptive to   02/10/20 [place] 13   Spiritual Beliefs/Perceptions   Concept of God Accepting   Relationship with God Close   Spiritual Strengths   (Resilient spirit   Grateful for lifef's blessings)   Stress Factors   Patient Stress Factors None identified   Coping Responses   Patient Coping Accepting;Open/discussion   Plan of Care   Assessment Completed by: Unit visit

## 2020-02-10 NOTE — SOCIAL WORK
Continue to follow  Call placed to Pt's son(Mata: [phone]), left message re: Pt's status and discharge planning  Anticipate return call

## 2020-02-10 NOTE — PROGRESS NOTES
0500-Patient was adifficult stick -stuck multiple times -unsuccessful -as per [address] may keep IV

## 2020-02-10 NOTE — ASSESSMENT & PLAN NOTE
Wt Readings from Last 3 Encounters:   02/09/20 78 kg (171 lb 15 3 oz)   12/17/19 82 6 kg (182 lb)   10/15/19 84 8 kg (187 lb)     · Echo with an EF 25% with moderate decreased RV function an aortic valve area of 1 25 with mild to moderate MR unchanged from prior echocardiogram  · Cardiology following  · Weight up 1 kg  · Now requiring 2lnc and bibasilar crackles  · Day balance -500 stay balance -8  · Increase po lasix to BID  · Goal-directed therapies are on hold given hypotension  · Continue midodrine 5 mg t i d    · Milrinone discontinued on 02/07 in the a m    · Family updated at bedside in length regarding ongoing goals of care discussion with chronic heart failure/poor prognosis goal to try to go to rehab (see ACP note)

## 2020-02-10 NOTE — PHYSICAL THERAPY NOTE
PT re-eval     02/10/20 1205   Note Type   Note type Re-eval   Pain Assessment   Pain Assessment No/denies pain   Pain Score No Pain   Home Living   Type of [address]   Prior Function   Level of [place] Independent with ADLs and functional mobility   Lives With Son   Receives Help From Family   ADL Assistance Independent   IADLs Needs assistance   Falls in the last 6 months 0   Restrictions/Precautions   Weight Bearing Precautions Per Order No   Other Precautions Cardiac/sternal  (EF 25 % transferred to SDU/ ICU wiht decreased 02 sats)   General   Additional Pertinent History entertaining hospice but declines at present   Family/Caregiver Present No   Cognition   Overall Cognitive Status WFL   Arousal/Participation Alert   Orientation Level Oriented to person;Oriented to place;Oriented to situation   Following Commands Follows one step commands without difficulty   RUE Assessment   RUE Assessment WFL   LUE Assessment   LUE Assessment WFL   RLE Assessment   RLE Assessment WFL  (grossly 4-/5)   LLE Assessment   LLE Assessment WFL  (grossly 4-/5)   Coordination   Movements are Fluid and Coordinated 1   Proprioception   RLE Proprioception Grossly intact   LLE Proprioception Grossly Intact   Transfers   Sit to Stand 4  Minimal assistance   Additional items Assist x 1   Stand to Sit 4  Minimal assistance   Additional items Assist x 1   Stand pivot 4  Minimal assistance   Additional items Assist x 1; Increased time required;Armrests   Ambulation/Elevation   Gait pattern Forward Flexion; Shuffling; Inconsistent raffi; Short stride   Gait Assistance 4  Minimal assist   Additional items Assist x 1   Assistive Device Rolling walker   Distance 10'   Balance   Static Sitting Good   Dynamic Sitting Good   Static Standing Fair +   Dynamic Standing Fair +   Ambulatory Fair   Endurance Deficit   Endurance Deficit Yes   Endurance Deficit Description tires quickly needing seated rest VSS   Activity Tolerance   Activity Tolerance Patient limited by fatigue   Medical Staff Made Aware OT Kristina   Assessment   Prognosis Fair   Problem List Decreased endurance; Impaired balance;Decreased mobility   Assessment Pt presents with impaired activity tolerance after transfer to ICU with decreased 02 sats  Able to mopbilize shor tdistances with rw adn 1 assist  Can benefit from skilledTP serivces ,in-pt rehab with graded program to improve fucntional mobility  WIll follow see goals   Barriers to Discharge Inaccessible home environment;Decreased caregiver support   Goals   Patient Goals feel better   [facility] Expiration Date 02/20/20   Short Term Goal #1 1) safe ind transfers with rw 2) safe idn abm with rw 50' level no sob with appropriste pacing    Plan   Treatment/Interventions ADL retraining;Functional transfer training;LE strengthening/ROM; Therapeutic exercise; Endurance training;Patient/family training;Equipment eval/education;Gait training;Spoke to nursing;Spoke to case management;OT   PT Frequency 5x/wk   Recommendation   Recommendation Short-term skilled PT   Equipment Recommended Walker; Wheelchair   PT - OK to Discharge Yes   Additional Comments   (to STR with medical clearance)   Odilia Hodges, PT

## 2020-02-11 PROBLEM — Z71.89 GOALS OF CARE, COUNSELING/DISCUSSION: Status: ACTIVE | Noted: 2020-02-11

## 2020-02-11 LAB
ALBUMIN SERPL BCP-MCNC: 3.2 G/DL (ref 3.5–5)
ALP SERPL-CCNC: 201 U/L (ref 46–116)
ALT SERPL W P-5'-P-CCNC: 1118 U/L (ref 12–78)
ANION GAP SERPL CALCULATED.3IONS-SCNC: 17 MMOL/L (ref 4–13)
AST SERPL W P-5'-P-CCNC: 946 U/L (ref 5–45)
BILIRUB SERPL-MCNC: 1.7 MG/DL (ref 0.2–1)
BUN SERPL-MCNC: 72 MG/DL (ref 5–25)
CALCIUM SERPL-MCNC: 8.7 MG/DL (ref 8.3–10.1)
CHLORIDE SERPL-SCNC: 98 MMOL/L (ref 100–108)
CO2 SERPL-SCNC: 19 MMOL/L (ref 21–32)
CREAT SERPL-MCNC: 2.73 MG/DL (ref 0.6–1.3)
ERYTHROCYTE [DISTWIDTH] IN BLOOD BY AUTOMATED COUNT: 20.4 % (ref 11.6–15.1)
GFR SERPL CREATININE-BSD FRML MDRD: 20 ML/MIN/1.73SQ M
GLUCOSE SERPL-MCNC: 145 MG/DL (ref 65–140)
GLUCOSE SERPL-MCNC: 158 MG/DL (ref 65–140)
GLUCOSE SERPL-MCNC: 170 MG/DL (ref 65–140)
GLUCOSE SERPL-MCNC: 177 MG/DL (ref 65–140)
GLUCOSE SERPL-MCNC: 198 MG/DL (ref 65–140)
GLUCOSE SERPL-MCNC: 236 MG/DL (ref 65–140)
HCT VFR BLD AUTO: 30.6 % (ref 36.5–49.3)
HGB BLD-MCNC: 9.6 G/DL (ref 12–17)
MAGNESIUM SERPL-MCNC: 2.7 MG/DL (ref 1.6–2.6)
MCH RBC QN AUTO: 26.7 PG (ref 26.8–34.3)
MCHC RBC AUTO-ENTMCNC: 31.4 G/DL (ref 31.4–37.4)
MCV RBC AUTO: 85 FL (ref 82–98)
PLATELET # BLD AUTO: 147 THOUSANDS/UL (ref 149–390)
PMV BLD AUTO: 12.3 FL (ref 8.9–12.7)
POTASSIUM SERPL-SCNC: 4 MMOL/L (ref 3.5–5.3)
PROT SERPL-MCNC: 6.7 G/DL (ref 6.4–8.2)
RBC # BLD AUTO: 3.59 MILLION/UL (ref 3.88–5.62)
SODIUM SERPL-SCNC: 134 MMOL/L (ref 136–145)
WBC # BLD AUTO: 7.49 THOUSAND/UL (ref 4.31–10.16)

## 2020-02-11 PROCEDURE — 94668 MNPJ CHEST WALL SBSQ: CPT

## 2020-02-11 PROCEDURE — 94640 AIRWAY INHALATION TREATMENT: CPT

## 2020-02-11 PROCEDURE — 85027 COMPLETE CBC AUTOMATED: CPT | Performed by: NURSE PRACTITIONER

## 2020-02-11 PROCEDURE — 80053 COMPREHEN METABOLIC PANEL: CPT | Performed by: NURSE PRACTITIONER

## 2020-02-11 PROCEDURE — 83735 ASSAY OF MAGNESIUM: CPT | Performed by: NURSE PRACTITIONER

## 2020-02-11 PROCEDURE — 99232 SBSQ HOSP IP/OBS MODERATE 35: CPT | Performed by: INTERNAL MEDICINE

## 2020-02-11 PROCEDURE — 82948 REAGENT STRIP/BLOOD GLUCOSE: CPT

## 2020-02-11 PROCEDURE — 99233 SBSQ HOSP IP/OBS HIGH 50: CPT | Performed by: INTERNAL MEDICINE

## 2020-02-11 RX ORDER — SODIUM BICARBONATE 650 MG/1
650 TABLET ORAL
Status: DISCONTINUED | OUTPATIENT
Start: 2020-02-11 | End: 2020-02-12 | Stop reason: HOSPADM

## 2020-02-11 RX ORDER — FUROSEMIDE 20 MG/1
20 TABLET ORAL
Status: DISCONTINUED | OUTPATIENT
Start: 2020-02-11 | End: 2020-02-12 | Stop reason: HOSPADM

## 2020-02-11 RX ADMIN — TIMOLOL MALEATE 1 DROP: 5 SOLUTION/ DROPS OPHTHALMIC at 11:25

## 2020-02-11 RX ADMIN — DOCUSATE SODIUM 100 MG: 100 CAPSULE, LIQUID FILLED ORAL at 11:25

## 2020-02-11 RX ADMIN — GUAIFENESIN 1200 MG: 600 TABLET ORAL at 17:17

## 2020-02-11 RX ADMIN — CYANOCOBALAMIN TAB 500 MCG 1000 MCG: 500 TAB at 11:29

## 2020-02-11 RX ADMIN — SODIUM BICARBONATE 650 MG TABLET 650 MG: at 17:17

## 2020-02-11 RX ADMIN — GUAIFENESIN 1200 MG: 600 TABLET ORAL at 11:24

## 2020-02-11 RX ADMIN — MIDODRINE HYDROCHLORIDE 5 MG: 5 TABLET ORAL at 17:18

## 2020-02-11 RX ADMIN — BIMATOPROST 1 DROP: 0.1 SOLUTION/ DROPS OPHTHALMIC at 20:51

## 2020-02-11 RX ADMIN — SODIUM BICARBONATE 650 MG TABLET 650 MG: at 11:54

## 2020-02-11 RX ADMIN — INSULIN LISPRO 1 UNITS: 100 INJECTION, SOLUTION INTRAVENOUS; SUBCUTANEOUS at 11:57

## 2020-02-11 RX ADMIN — ATORVASTATIN CALCIUM 40 MG: 40 TABLET, FILM COATED ORAL at 20:50

## 2020-02-11 RX ADMIN — HEPARIN SODIUM 5000 UNITS: 5000 INJECTION INTRAVENOUS; SUBCUTANEOUS at 06:04

## 2020-02-11 RX ADMIN — BUDESONIDE AND FORMOTEROL FUMARATE DIHYDRATE 2 PUFF: 160; 4.5 AEROSOL RESPIRATORY (INHALATION) at 08:18

## 2020-02-11 RX ADMIN — PANTOPRAZOLE SODIUM 40 MG: 40 TABLET, DELAYED RELEASE ORAL at 06:04

## 2020-02-11 RX ADMIN — INSULIN LISPRO 1 UNITS: 100 INJECTION, SOLUTION INTRAVENOUS; SUBCUTANEOUS at 22:19

## 2020-02-11 RX ADMIN — ASPIRIN 81 MG 81 MG: 81 TABLET ORAL at 11:25

## 2020-02-11 RX ADMIN — INSULIN LISPRO 3 UNITS: 100 INJECTION, SOLUTION INTRAVENOUS; SUBCUTANEOUS at 17:18

## 2020-02-11 RX ADMIN — MIDODRINE HYDROCHLORIDE 5 MG: 5 TABLET ORAL at 06:04

## 2020-02-11 RX ADMIN — FUROSEMIDE 20 MG: 20 TABLET ORAL at 17:17

## 2020-02-11 RX ADMIN — BUDESONIDE AND FORMOTEROL FUMARATE DIHYDRATE 2 PUFF: 160; 4.5 AEROSOL RESPIRATORY (INHALATION) at 19:59

## 2020-02-11 RX ADMIN — MELATONIN 6 MG: at 20:49

## 2020-02-11 RX ADMIN — HEPARIN SODIUM 5000 UNITS: 5000 INJECTION INTRAVENOUS; SUBCUTANEOUS at 22:19

## 2020-02-11 RX ADMIN — HEPARIN SODIUM 5000 UNITS: 5000 INJECTION INTRAVENOUS; SUBCUTANEOUS at 17:17

## 2020-02-11 RX ADMIN — MIDODRINE HYDROCHLORIDE 5 MG: 5 TABLET ORAL at 11:25

## 2020-02-11 NOTE — ASSESSMENT & PLAN NOTE
· Chronic in the setting of myeloma  · Hemoglobin stable at 9 6  · Keep HGB greater than 8 per patient's hematologist

## 2020-02-11 NOTE — ASSESSMENT & PLAN NOTE
· Patient with advanced heart failure, EF 25%, very poor overall prognosis, with cold extremities, and blue toes  · Best plan of care would be hospice, however patient's son is reluctant to go down hospice route as he would be unable to afford room and board  · Patient is slated to have hospice care meeting at 2:00 p m  Today  · Patient's overall prognosis is worsening daily with increasing liver enzymes and increasing creatinine, I discussed this extensively with patient's son but he seems to have poor insight into health matters as my colleagues before I came on service have also discussed with the patient's son without much success  · I would try again today at 2:00 p m    · In the meantime patient is medically stable for discharge to rehab/SNF and transition to hospice from there

## 2020-02-11 NOTE — PLAN OF CARE
Problem: Potential for Falls  Goal: Patient will remain free of falls  Description  INTERVENTIONS:  - Assess patient frequently for physical needs  -  Identify cognitive and physical deficits and behaviors that affect risk of falls    -  [place] fall precautions as indicated by assessment   - Educate patient/family on patient safety including physical limitations  - Instruct patient to call for assistance with activity based on assessment  - Modify environment to reduce risk of injury  - Consider OT/PT consult to assist with strengthening/mobility  Outcome: Progressing     Problem: DISCHARGE PLANNING  Goal: Discharge to home or other facility with appropriate resources  Description  INTERVENTIONS:  - Identify barriers to discharge w/patient and caregiver  - Arrange for needed discharge resources and transportation as appropriate  - Identify discharge learning needs (meds, wound care, etc )  - Arrange for interpretive services to assist at discharge as needed  - Refer to Case Management Department for coordinating discharge planning if the patient needs post-hospital services based on physician/advanced practitioner order or complex needs related to functional status, cognitive ability, or social support system  Outcome: Progressing     Problem: INFECTION - ADULT  Goal: Absence or prevention of progression during hospitalization  Description  INTERVENTIONS:  - Assess and monitor for signs and symptoms of infection  - Monitor lab/diagnostic results  - Monitor all insertion sites, i e  indwelling lines, tubes, and drains  - Monitor endotracheal if appropriate and nasal secretions for changes in amount and color  - [place] appropriate cooling/warming therapies per order  - Administer medications as ordered  - Instruct and encourage patient and family to use good hand hygiene technique  - Identify and instruct in appropriate isolation precautions for identified infection/condition  Outcome: Progressing     Problem: Knowledge Deficit  Goal: Patient/family/caregiver demonstrates understanding of disease process, treatment plan, medications, and discharge instructions  Description  Complete learning assessment and assess knowledge base    Interventions:  - Provide teaching at level of understanding  - Provide teaching via preferred learning methods  Outcome: Progressing     Problem: RESPIRATORY - ADULT  Goal: Achieves optimal ventilation and oxygenation  Description  INTERVENTIONS:  - Assess for changes in respiratory status  - Assess for changes in mentation and behavior  - Position to facilitate oxygenation and minimize respiratory effort  - Oxygen administered by appropriate delivery if ordered  - Initiate smoking cessation education as indicated  - Encourage broncho-pulmonary hygiene including cough, deep breathe, Incentive Spirometry  - Assess the need for suctioning and aspirate as needed  - Assess and instruct to report SOB or any respiratory difficulty  - Respiratory Therapy support as indicated  Outcome: Progressing     Problem: HEMATOLOGIC - ADULT  Goal: Maintains hematologic stability  Description  INTERVENTIONS  - Assess for signs and symptoms of bleeding or hemorrhage  - Monitor labs  - Administer supportive blood products/factors as ordered and appropriate  Outcome: Progressing     Problem: CARDIOVASCULAR - ADULT  Goal: Maintains optimal cardiac output and hemodynamic stability  Description  INTERVENTIONS:  - Monitor I/O, vital signs and rhythm  - Monitor for S/S and trends of decreased cardiac output  - Administer and titrate ordered vasoactive medications to optimize hemodynamic stability  - Assess quality of pulses, skin color and temperature  - Assess for signs of decreased coronary artery perfusion  - Instruct patient to report change in severity of symptoms  Outcome: Progressing  Goal: Absence of cardiac dysrhythmias or at baseline rhythm  Description  INTERVENTIONS:  - Continuous cardiac monitoring, vital signs, obtain 12 lead EKG if ordered  - Administer antiarrhythmic and heart rate control medications as ordered  - Monitor electrolytes and administer replacement therapy as ordered  Outcome: Progressing     Problem: METABOLIC, FLUID AND ELECTROLYTES - ADULT  Goal: Fluid balance maintained  Description  INTERVENTIONS:  - Monitor labs   - Monitor I/O and WT  - Instruct patient on fluid and nutrition as appropriate  - Assess for signs & symptoms of volume excess or deficit  Outcome: Progressing     Problem: Prexisting or High Potential for Compromised Skin Integrity  Goal: Skin integrity is maintained or improved  Description  INTERVENTIONS:  - Identify patients at risk for skin breakdown  - Assess and monitor skin integrity  - Assess and monitor nutrition and hydration status  - Monitor labs   - Assess for incontinence   - Turn and reposition patient  - Assist with mobility/ambulation  - Relieve pressure over bony prominences  - Avoid friction and shearing  - Provide appropriate hygiene as needed including keeping skin clean and dry  - Evaluate need for skin moisturizer/barrier cream  - Collaborate with interdisciplinary team   - Patient/family teaching  - Consider wound care consult   Outcome: Progressing

## 2020-02-11 NOTE — PROGRESS NOTES
NEPHROLOGY PROGRESS NOTE   William Wallace 80 y o  male MRN: [id number]  Unit/Bed#: -01 Encounter: [id number]  Reason for Consult: ELTON (POA)    ASSESSMENT/PLAN:  ELTON (POA):  Likely secondary to cardiorenal syndrome in the setting of CHF exacerbation  -presented with creatinine 1 8   -sCr increased to 2 7, hemodynamics likely playing a role    -may need higher baseline for acceptable volume status   -continues with Lasix 20 mg b i d  Boby Pollock -urinalysis showed large blood, negative protein, negative ketones, occasional bacteria, 10-20 WBCs  -no recent imaging   -avoid nephrotoxins  -I/O   -maintain map greater than 65 mm Hg    -consider increasing midodrine to 10 mg t i d  For further hypotension  CKD stage IV:  Follows with Dr Tanner Alonzo  Likely secondary to diabetic nephropathy and cardiorenal syndrome as well as age-related nephron loss   -baseline creatinine previously 1 6-2 0   -of note, patient had recent acute kidney injury secondary to CHF exacerbation  -poor candidate for dialysis due to advanced underlying CHF with poor prognosis  Blood pressure: improving, previously low with SBP 's  - avoid further hypotension or high fluctuations in blood pressure    -maintain map greater than 65 mmHg    -continue monitor drain, may increase to 10 mg t i d  For further hypotension  Acute on chronic combined CHF/volume status:  With EF of 25% and moderate aortic stenosis  -initially on Mirinone, currently discontinued  -continue midodrine for blood pressure support   -continue Lasix 20 mg b i d  -cardiology following   -following outpatient with heart failure program   -check daily weights, I/O   -fluid restriction 1 8 L/day   -primary team to attempt goals of care discussion later today  Low bicarbonate level:  -continue to monitor   -will start sodium bicarbonate 650 mg p o  T I D     Anion gap acidosis:  -continue to monitor   -bicarbonate tablets started today      Multiple myeloma: Follows outpatient with [facility] with Oncology   -bone marrow biopsy showed kappa light chain restricted plasma cell neoplasm consistent with plasma cell myeloma  -previously receiving chemo with Revlimid  Other:  Transaminitis, anemia, mixed hyperlipidemia, diabetes  Disposition:  Planning on discharged to [facility] when medically cleared  SUBJECTIVE:  The patient is resting in bed  He denies any current complaints  He states that he is worried about going to rehab  He denies chest pain or shortness of breathe  Denies N/V/D or issues with urination       OBJECTIVE:  Current Weight: Weight - Scale: 75 6 kg (166 lb 10 7 oz)  Vitals:    02/11/20 0453 02/11/20 0600 02/11/20 0659 02/11/20 0819   BP:   127/80    BP Location:   Right arm    Pulse:   86    Resp:   20    Temp:   97 5 °F (36 4 °C)    TempSrc:   Oral    SpO2:    97%   Weight: 75 6 kg (166 lb 10 7 oz) 75 6 kg (166 lb 10 7 oz)     Height:           Intake/Output Summary (Last 24 hours) at 2/11/2020 1041  Last data filed at 2/11/2020 0022  Gross per 24 hour   Intake 640 ml   Output 587 ml   Net 53 ml     General: NAD  Skin: warm, dry, intact, no rash  HEENT: Moist mucous membranes, sclera anicteric, normocephalic, atraumatic  Neck: No apparent JVD appreciated  Chest:lung sounds clear B/L, on RA   CVS:Regular rate and rhythm, no murmer   Abdomen: Soft, round, non-tender, +BS  Extremities: No B/L LE edema present  Neuro: alert and oriented, [place]  Psych: appropriate mood and affect, anxious  Medications:    Current Facility-Administered Medications:     acetaminophen (TYLENOL) tablet 650 mg, 650 mg, Oral, Q6H PRN, DON Shore    aspirin chewable tablet 81 mg, 81 mg, Oral, Daily, DON Andrew, 81 mg at 02/10/20 1020    atorvastatin (LIPITOR) tablet 40 mg, 40 mg, Oral, HS, Kalina RahszaVIVIENNE duvallNP, 40 mg at 02/10/20 2105    bimatoprost (LUMIGAN) 0 01 % ophthalmic solution 1 drop, 1 drop, Both Eyes, HS, Mateo Child DON, 1 drop at 02/10/20 2105    budesonide-formoterol (SYMBICORT) 160-4 5 mcg/act inhaler 2 puff, 2 puff, Inhalation, BID, DON Tripathi, 2 puff at 02/11/20 0818    cyanocobalamin (VITAMIN B-12) tablet 1,000 mcg, 1,000 mcg, Oral, Daily, DON Andrew, 1,000 mcg at 02/10/20 1020    docusate sodium (COLACE) capsule 100 mg, 100 mg, Oral, Daily, DON Andrew, 100 mg at 02/10/20 1019    furosemide (LASIX) tablet 40 mg, 40 mg, Oral, BID (diuretic), DON Gallegos, 40 mg at 02/10/20 1643    guaiFENesin (MUCINEX) 12 hr tablet 1,200 mg, 1,200 mg, Oral, BID, DON Andrew, 1,200 mg at 02/10/20 1801    heparin (porcine) subcutaneous injection 5,000 Units, 5,000 Units, Subcutaneous, Q8H [address], DON Oakley, 5,000 Units at 02/11/20 0604    insulin lispro (HumaLOG) 100 units/mL subcutaneous injection 1-6 Units, 1-6 Units, Subcutaneous, HS, DON Oakley, 3 Units at 02/10/20 2123    insulin lispro (HumaLOG) 100 units/mL subcutaneous injection 1-6 Units, 1-6 Units, Subcutaneous, TID AC, 4 Units at 02/10/20 1654 **AND** Fingerstick Glucose (POCT), , , TID AC, DON Andrew    levalbuterol (XOPENEX) inhalation solution 1 25 mg, 1 25 mg, Nebulization, Q8H PRN, DON Tripathi    melatonin tablet 6 mg, 6 mg, Oral, HS, DON Andrew, 6 mg at 02/10/20 2105    midodrine (PROAMATINE) tablet 5 mg, 5 mg, Oral, TID AC, DNO Andrew, 5 mg at 02/11/20 0604    pantoprazole (PROTONIX) EC tablet 40 mg, 40 mg, Oral, Early Morning, DON Andrew, 40 mg at 02/11/20 0604    timolol (TIMOPTIC) 0 5 % ophthalmic solution 1 drop, 1 drop, Both Eyes, Daily, DON Andrew, 1 drop at 02/10/20 1008    Laboratory Results:  Results from last 7 days   Lab Units 02/11/20  0631 02/10/20  0547 02/10/20  0503 02/09/20  0511 02/08/20  0514 02/07/20  0459 02/06/20  0559 02/05/20  0454  02/05/20  0140   WBC Thousand/uL 7 49 9 16  --   --  7 27  --  9 94 7 70  8 01  --  7 36   HEMOGLOBIN g/dL 9 6* 9 5*  --   --  8 5*  --  8 8* 9 3*  9 6*  --  10 0*   HEMATOCRIT % 30 6* 30 3*  --   --  27 4*  --  28 3* 30 3*  31 0*  --  32 1*   PLATELETS Thousands/uL 147* 162  --   --  212  --  245 323  324  --  346   POTASSIUM mmol/L 4 0  --  4 6 4 4 4 0 3 6  3 6 3 8 4 3  --  4 8   CHLORIDE mmol/L 98*  --  100 100 102 103  103 106 99*  --  102   CO2 mmol/L 19*  --  18* 22 24 24  24 25 22  --  24   CO2, I-STAT   --   --   --   --   --   --   --   --    < >  --    BUN mg/dL 72*  --  64* 56* 52* 51*  51* 50* 44*  --  46*   CREATININE mg/dL 2 73*  --  2 57* 2 47* 2 51* 2 74*  2 74* 2 89* 2 75*  --  2 68*   CALCIUM mg/dL 8 7  --  9 0 8 6 8 7 8 5  8 5 8 8 9 0  --  9 4   MAGNESIUM mg/dL 2 7*  --  2 8*  --  2 4 2 4 2 5  --   --  2 5   PHOSPHORUS mg/dL  --   --   --   --   --   --  4 8*  --   --   --     < > = values in this interval not displayed

## 2020-02-11 NOTE — ASSESSMENT & PLAN NOTE
Lab Results   Component Value Date    HGBA1C 6 1 02/01/2020       Recent Labs     02/10/20  1107 02/10/20  1632 02/10/20  2057 02/11/20  0702   POCGLU 277* 309* 261* 158*       Blood Sugar Average: Last 72 hrs:  (P) 215 [id number]   · Holding home Amaryl likely will need to change in the setting of kidney disease  · Blood sugars currently uncontrolled  · Start 5iu lantus at bedtime  · Continue with sliding scale insulin and Accu-Cheks

## 2020-02-11 NOTE — SOCIAL WORK
Continue to follow  Sarah informed PRINCE if Pt is on Revlimid, facility cannot accept Pt because their pharmacy cannot supply the medication  Still waiting determination from [facility]  Spoke with Pt's son(Mata), Maudedemetra Das informed CM that Pt's Revlimid is currently on hold and Pt has appointment with Dr Vianey Darling on 2/18 to see if Pt will be restarted on Revlimid  CM also informed Pt's son to consider another SNF facility in case [facility] cannot [facility]  Call placed to [address] [place] [address] Rohit, spoke with Mohinder Sánchez in admissions, CM informed Mohinder Sánchez that Pt's Revlimid is currently on hold and Pt has follow up appointment with Dr Vianey Darling on 2/18  Mohinder Sánchez informed CM that he will speak again with  and inform CM of decision  Will follow up with Sarah on 2/12

## 2020-02-11 NOTE — SOCIAL WORK
Continue to follow  [facility] admissions informed CM that facility is still reviewing information and facility accepts Pt's PPO insurance out of network but Pt's family will have to check what the out of network benefit is  Call placed to Pt's son(Mata: [phone]), informed Pt's son of information that [facility] admissions provided to CM  Pt's son informed CM that he still wants STR for Pt and will need to consider where Pt will go for long term  Pt's son choosing [address] [place] [address] as backup facility since Pt was at [address] [place] [address] in past  Referral sent to [address] [place] [address] via [facility] [address]  Await determination from [address] [place] [address] and [address] [place] [address]

## 2020-02-11 NOTE — ASSESSMENT & PLAN NOTE
Wt Readings from Last 3 Encounters:   02/11/20 75 6 kg (166 lb 10 7 oz)   12/17/19 82 6 kg (182 lb)   10/15/19 84 8 kg (187 lb)     · Echo with an EF 25% with moderate decreased RV function an aortic valve area of 1 25 with mild to moderate MR unchanged from prior echocardiogram  · Patient with CHF, declining, overall poor prognosis with cold extremities and blue toes noted today  · Cardiology on board  · Weight 166lb today,   · Day balance +300/24hrs  · Currently on 40 mg Lasix twice daily  · Continue midodrine 5 mg t i d    · Milrinone discontinued on 02/07 in the a m    · Family updated at bedside in length regarding ongoing goals of care discussion with chronic heart failure/poor prognosis

## 2020-02-11 NOTE — PROGRESS NOTES
Progress Note - Siri Soto 9/22/1930, 80 y o  male MRN: [id number]    Unit/Bed#: -01 Encounter: [id number]    Primary Care Provider: Courtney Somers   Date and time admitted to hospital: 1/30/2020  8:11 PM        Goals of care, counseling/discussion  Assessment & Plan  · Patient with advanced heart failure, EF 25%, very poor overall prognosis, with cold extremities, and blue toes  · Best plan of care would be hospice, however patient's son is reluctant to go down hospice route as he would be unable to afford room and board  · Patient is slated to have hospice care meeting at 2:00 p m  Today  · Patient's overall prognosis is worsening daily with increasing liver enzymes and increasing creatinine, I discussed this extensively with patient's son but he seems to have poor insight into health matters as my colleagues before I came on service have also discussed with the patient's son without much success  · I would try again today at 2:00 p m    · In the meantime patient is medically stable for discharge to rehab/SNF and transition to hospice from there    NSVT (nonsustained ventricular tachycardia) (HCC)  Assessment & Plan  · Keep K>4 mag>2  · Unable to tolerate bb with hypotension if bp continues to improve start BB     ELTON (acute kidney injury) ([facility] [address] )  Assessment & Plan  · On CKD with a baseline creatinine 1 6-1 9 secondary to cardiorenal syndrome  · Creat 2 73 today, was 2 57 yesterday  · Decrease Lasix back to 20 mg twice daily  · Nephrology following  · I/O  · F/u am bmp       Transaminitis  Assessment & Plan  · Secondary to hepatic congestion from cardiogenic shock  · Hepatitis panel negative/right upper quadrant ultrasound unremarkable  · Notably elevated today, /ALT 1118/alkaline phosphatase 201    Anemia  Assessment & Plan  · Chronic in the setting of myeloma  · Hemoglobin stable at 9 6  · Keep HGB greater than 8 per patient's hematologist    [place] light chain myeloma ([facility] [address] )  Assessment & Plan  · Follows with Dr Oscar Downey as an outpatient  Esophageal reflux  Assessment & Plan  · Continue home PPI    Mixed hyperlipidemia  Assessment & Plan  · Continue statin    Type 2 diabetes mellitus without complication, without long-term current use of insulin [facility])  Assessment & Plan  Lab Results   Component Value Date    HGBA1C 6 1 02/01/2020       Recent Labs     02/10/20  1107 02/10/20  1632 02/10/20  2057 02/11/20  0702   POCGLU 277* 309* 261* 158*       Blood Sugar Average: Last 72 hrs:  (P) 215 [id number]   · Holding home Amaryl likely will need to change in the setting of kidney disease  · Blood sugars currently uncontrolled  · Start 5iu lantus at bedtime  · Continue with sliding scale insulin and Accu-Cheks    * Acute on chronic combined systolic and diastolic heart failure (HCC)  Assessment & Plan  Wt Readings from Last 3 Encounters:   02/11/20 75 6 kg (166 lb 10 7 oz)   12/17/19 82 6 kg (182 lb)   10/15/19 84 8 kg (187 lb)     · Echo with an EF 25% with moderate decreased RV function an aortic valve area of 1 25 with mild to moderate MR unchanged from prior echocardiogram  · Patient with CHF, declining, overall poor prognosis with cold extremities and blue toes noted today  · Cardiology on board  · Weight 166lb today,   · Day balance +300/24hrs  · Currently on 40 mg Lasix twice daily  · Continue midodrine 5 mg t i d    · Milrinone discontinued on 02/07 in the a m  · Family updated at bedside in length regarding ongoing goals of care discussion with chronic heart failure/poor prognosis      VTE Pharmacologic Prophylaxis:   Pharmacologic: Heparin  Mechanical VTE Prophylaxis in Place: Yes    Patient Centered Rounds: I have performed bedside rounds with nursing staff today  Discussions with Specialists or Other Care Team Provider: Cardiology    Education and Discussions with Family / Patient: Discussed with patient's son    Time Spent for Care: 1 hour    More than 50% of total time spent on counseling and coordination of care as described above  Current Length of Stay: 11 day(s)    Current Patient Status: Inpatient   Certification Statement: The patient will continue to require additional inpatient hospital stay due to CHF/ELTON    Discharge Plan: Unknown, pending placement    Code Status: Level 3 - DNAR and DNI      Subjective:   No overnight events  No complaints this morning, denies chest pain, shortness of breath, would like to leave the money    Objective:     Vitals:   Temp (24hrs), Av 9 °F (36 1 °C), Min:96 2 °F (35 7 °C), Max:97 5 °F (36 4 °C)    Temp:  [96 2 °F (35 7 °C)-97 5 °F (36 4 °C)] 97 5 °F (36 4 °C)  HR:  [80-86] 86  Resp:  [17-20] 20  BP: ()/(56-80) 127/80  SpO2:  [97 %-100 %] 97 %  Body mass index is 26 1 kg/m²  Input and Output Summary (last 24 hours): Intake/Output Summary (Last 24 hours) at 2020 1124  Last data filed at 2020 0022  Gross per 24 hour   Intake 640 ml   Output 587 ml   Net 53 ml       Physical Exam:     Physical Exam   Constitutional: No distress  Eyes: Right eye exhibits no discharge  Left eye exhibits no discharge  No scleral icterus  Neck: Neck supple  Cardiovascular: Normal rate and regular rhythm  Murmur heard  Pulmonary/Chest: Effort normal and breath sounds normal  No stridor  No respiratory distress  Abdominal: Soft  Bowel sounds are normal  He exhibits no distension  There is no tenderness  There is no guarding  Musculoskeletal: Normal range of motion  He exhibits no edema, tenderness or deformity  Neurological: He is alert  Skin: Capillary refill takes less than 2 seconds  He is not diaphoretic  There is cyanosis (peripheral cyanosis - Blue toes)  Cold   Psychiatric:   Irritable, would like to be discharged   Nursing note and vitals reviewed      Additional Data:     Labs:    Results from last 7 days   Lab Units 20  0631  20  0559   WBC Thousand/uL 7 49   < > 9 94   HEMOGLOBIN g/dL 9 6*   < > 8 8* HEMATOCRIT % 30 6*   < > 28 3*   PLATELETS Thousands/uL 147*   < > 245   NEUTROS PCT %  --   --  83*   LYMPHS PCT %  --   --  11*   MONOS PCT %  --   --  5   EOS PCT %  --   --  0    < > = values in this interval not displayed  Results from last 7 days   Lab Units 02/11/20  0631   SODIUM mmol/L 134*   POTASSIUM mmol/L 4 0   CHLORIDE mmol/L 98*   CO2 mmol/L 19*   BUN mg/dL 72*   CREATININE mg/dL 2 73*   ANION GAP mmol/L 17*   CALCIUM mg/dL 8 7   ALBUMIN g/dL 3 2*   TOTAL BILIRUBIN mg/dL 1 70*   ALK PHOS U/L 201*   ALT U/L 1,118*   AST U/L 946*   GLUCOSE RANDOM mg/dL 145*     Results from last 7 days   Lab Units 02/07/20  0459   INR  1 38*     Results from last 7 days   Lab Units 02/11/20  1108 02/11/20  0702 02/10/20  2057 02/10/20  1632 02/10/20  1107 02/10/20  0706 02/09/20  2105 02/09/20  1600 02/09/20  1122 02/09/20  0842 02/08/20  2125 02/08/20  1701   POC GLUCOSE mg/dl 177* 158* 261* 309* 277* 157* 183* 216* 246* 202* 203* 112         Results from last 7 days   Lab Units 02/06/20  0559 02/05/20  0454 02/05/20  0140 02/04/20  2224 02/04/20  1532   LACTIC ACID mmol/L 1 8  --  3 9* 3 2*  --    PROCALCITONIN ng/ml  --  0 23  --   --  0 10           * I Have Reviewed All Lab Data Listed Above  * Additional Pertinent Lab Tests Reviewed:  All Labs Within Last 24 Hours Reviewed    Imaging:    Imaging Reports Reviewed Today Include: None   Imaging Personally Reviewed by Myself Includes:  NONe    Recent Cultures (last 7 days):     Results from last 7 days   Lab Units 02/04/20  1657   LEGIONELLA URINARY ANTIGEN  Negative       Last 24 Hours Medication List:     Current Facility-Administered Medications:  acetaminophen 650 mg Oral Q6H PRN DON Su   aspirin 81 mg Oral Daily DON Andrew   atorvastatin 40 mg Oral HS DON Andrew   bimatoprost 1 drop Both Eyes HS DON Andrew   budesonide-formoterol 2 puff Inhalation BID DON Su   cyanocobalamin 1,000 mcg Oral Daily DON Tripathi   docusate sodium 100 mg Oral Daily DON Tripathi   furosemide 20 mg Oral BID (diuretic) Prbaha Roper MD   guaiFENesin 1,200 mg Oral BID DON Tripathi   heparin (porcine) 5,000 Units Subcutaneous Q8H [address] DON Oakley   insulin lispro 1-6 Units Subcutaneous HS DON Oakley   insulin lispro 1-6 Units Subcutaneous TID AC Kalina FedcarmelosDON hansen   levalbuterol 1 25 mg Nebulization Q8H PRN DON Tripathi   melatonin 6 mg Oral HS Kalina FedcarmelosDON hansen   midodrine 5 mg Oral TID AC Kalina FedcarmelosDON hansen   pantoprazole 40 mg Oral Early Morning Kalina DON Rizzo   sodium bicarbonate 650 mg Oral TID after meals DON Mayo   timolol 1 drop Both Eyes Daily DON Tripathi        Today, Patient Was Seen By: Prabha Roper MD    ** Please Note: Dictation voice to text software may have been used in the creation of this document   **

## 2020-02-11 NOTE — ASSESSMENT & PLAN NOTE
· Secondary to hepatic congestion from cardiogenic shock  · Hepatitis panel negative/right upper quadrant ultrasound unremarkable  · Notably elevated today, /ALT 1118/alkaline phosphatase 201

## 2020-02-11 NOTE — ASSESSMENT & PLAN NOTE
· On CKD with a baseline creatinine 1 6-1 9 secondary to cardiorenal syndrome  · Creat 2 73 today, was 2 57 yesterday  · Decrease Lasix back to 20 mg twice daily  · Nephrology following  · I/O  · F/u am bmp

## 2020-02-11 NOTE — UTILIZATION REVIEW
Continued Stay Review    Date: 2/11/2020                         Current Patient Class: inp    Current Level of Care: med surg    HPI:89 y o  male initially admitted on 1/31/2020     Assessment/Plan:   Acute on chronic combined systolic and diastolic heart failure ([facility] )  Assessment & Plan      Wt Readings from Last 3 Encounters:   02/11/20 75 6 kg (166 lb 10 7 oz)   12/17/19 82 6 kg (182 lb)   10/15/19 84 8 kg (187 lb)      · Echo with an EF 25% with moderate decreased RV function an aortic valve area of 1 25 with mild to moderate MR unchanged from prior echocardiogram  · Patient with CHF, declining, overall poor prognosis with cold extremities and blue toes noted today  · Cardiology on board  · Weight 166lb today,   · Day balance +300/24hrs  · Currently on 40 mg Lasix twice daily  · Continue midodrine 5 mg t i d    · Milrinone discontinued on 02/07 in the a m    · Family updated at bedside in length regarding ongoing goals of care discussion with chronic heart failure/poor prognosis  ELTON (acute kidney injury) ([facility] )  Assessment & Plan  · On CKD with a baseline creatinine 1 6-1 9 secondary to cardiorenal syndrome  · Creat 2 73 today, was 2 57 yesterday  · Decrease Lasix back to 20 mg twice daily  · Nephrology following  · I/O  · F/u am bmp  Cont with elevated LFTs, secondary to hepatic congestion fro cardiogenic shock     Patient with hospice meeting later today  Pertinent Labs/Diagnostic Results:   Results from last 7 days   Lab Units 02/11/20  0631 02/10/20  0547 02/08/20  0514 02/06/20  0559 02/05/20  0454 02/05/20  0140   WBC Thousand/uL 7 49 9 16 7 27 9 94 7 70  8 01 7 36   HEMOGLOBIN g/dL 9 6* 9 5* 8 5* 8 8* 9 3*  9 6* 10 0*   HEMATOCRIT % 30 6* 30 3* 27 4* 28 3* 30 3*  31 0* 32 1*   PLATELETS Thousands/uL 147* 162 212 245 323  324 346   NEUTROS ABS Thousands/µL  --   --   --  8 21* 5 51 5 09         Results from last 7 days   Lab Units 02/11/20  0631 02/10/20  0503 02/09/20  1712 02/08/20  9492 02/07/20  0459 02/06/20  0559  02/05/20  0140   SODIUM mmol/L 134* 135* 136 139 141  141 146*   < > 141   POTASSIUM mmol/L 4 0 4 6 4 4 4 0 3 6  3 6 3 8   < > 4 8   CHLORIDE mmol/L 98* 100 100 102 103  103 106   < > 102   CO2 mmol/L 19* 18* 22 24 24  24 25   < > 24   CO2, I-STAT   --   --   --   --   --   --    < >  --    ANION GAP mmol/L 17* 17* 14* 13 14*  14* 15*   < > 15*   BUN mg/dL 72* 64* 56* 52* 51*  51* 50*   < > 46*   CREATININE mg/dL 2 73* 2 57* 2 47* 2 51* 2 74*  2 74* 2 89*   < > 2 68*   EGFR ml/min/1 73sq m 20 21 22 22 20  20 18   < > 20   CALCIUM mg/dL 8 7 9 0 8 6 8 7 8 5  8 5 8 8   < > 9 4   CALCIUM, IONIZED mmol/L  --   --   --   --   --   --   --  1 13   MAGNESIUM mg/dL 2 7* 2 8*  --  2 4 2 4 2 5  --  2 5   PHOSPHORUS mg/dL  --   --   --   --   --  4 8*  --   --     < > = values in this interval not displayed       Results from last 7 days   Lab Units 02/11/20  0631 02/09/20  0511 02/08/20  0514 02/07/20  0459 02/06/20  0559   AST U/L 946* 178* 275* 462* 1,213*   ALT U/L 1,118* 653* 761* 925* 1,205*   ALK PHOS U/L 201* 149* 126* 136* 122*   TOTAL PROTEIN g/dL 6 7 6 9 6 2* 6 5 6 8   ALBUMIN g/dL 3 2* 3 2* 3 0* 3 1* 3 3*   TOTAL BILIRUBIN mg/dL 1 70* 1 60* 1 00 1 00 1 10*   BILIRUBIN DIRECT mg/dL  --   --   --   --  0 31*     Results from last 7 days   Lab Units 02/11/20  1538 02/11/20  1108 02/11/20  0702 02/10/20  2057 02/10/20  1632 02/10/20  1107 02/10/20  0706 02/09/20  2105 02/09/20  1600 02/09/20  1122   POC GLUCOSE mg/dl 236* 177* 158* 261* 309* 277* 157* 183* 216* 246*     Results from last 7 days   Lab Units 02/11/20  0631 02/10/20  0503 02/09/20  0511 02/08/20  0514 02/07/20  0459 02/06/20  0559 02/05/20  0454 02/05/20  0140   GLUCOSE RANDOM mg/dL 145* 146* 164* 169* 171*  171* 199* 304* 125             No results found for: BETA-HYDROXYBUTYRATE           Results from last 7 days   Lab Units 02/05/20  0208   I STAT BASE EXC mmol/L 0   ISTAT PH ART  7 471*   I STAT ART PCO2 mm HG 31 1*   I STAT ART PO2 mm HG >400 0*   I STAT ART HCO3 mmol/L 22 7         Results from last 7 days   Lab Units 02/05/20  0945 02/05/20  0536 02/05/20  0140   TROPONIN I ng/mL 7 40* 7 58* 4 57*         Results from last 7 days   Lab Units 02/07/20  0459 02/05/20  0945 02/05/20  0140   PROTIME seconds 16 7*  --  16 9*   INR  1 38*  --  1 41*   PTT seconds  --  131* 27     Results from last 7 days   Lab Units 02/06/20  0559   TSH 3RD GENERATON uIU/mL 1 354     Results from last 7 days   Lab Units 02/05/20  0454   PROCALCITONIN ng/ml 0 23     Results from last 7 days   Lab Units 02/06/20  0559 02/05/20  0140 02/04/20  2224   LACTIC ACID mmol/L 1 8 3 9* 3 2*                 Results from last 7 days   Lab Units 02/06/20  0559   FERRITIN ng/mL 87     Results from last 7 days   Lab Units 02/06/20  0921   HEP B S AG  Non-reactive   HEP C AB  Non-reactive   HEP B C IGM  Non-reactive                 Results from last 7 days   Lab Units 02/05/20  1105   CLARITY UA  Clear   COLOR UA  Yellow   SPEC GRAV UA  1 010   PH UA  5 5   GLUCOSE UA mg/dl Negative   KETONES UA mg/dl Negative   BLOOD UA  Large*   PROTEIN UA mg/dl Negative   NITRITE UA  Negative   BILIRUBIN UA  Negative   UROBILINOGEN UA E U /dl 0 2   LEUKOCYTES UA  Negative   WBC UA /hpf None Seen   RBC UA /hpf 10-20*   BACTERIA UA /hpf Occasional   EPITHELIAL CELLS WET PREP /hpf None Seen   CREATININE UR mg/dL 21 2     Results from last 7 days   Lab Units 02/04/20  1657   LEGIONELLA URINARY ANTIGEN  Negative     Vital Signs:   02/11/20 1530  97 1 °F (36 2 °C)Abnormal   84  18  97/55  71  100 %  None (Room air)  Lyin         Medications:   Scheduled Medications:    Medications:  aspirin 81 mg Oral Daily   atorvastatin 40 mg Oral HS   bimatoprost 1 drop Both Eyes HS   budesonide-formoterol 2 puff Inhalation BID   cyanocobalamin 1,000 mcg Oral Daily   docusate sodium 100 mg Oral Daily   furosemide 20 mg Oral BID (diuretic)   guaiFENesin 1,200 mg Oral BID   heparin (porcine) 5,000 Units Subcutaneous Q8H [facility]   insulin lispro 1-6 Units Subcutaneous HS   insulin lispro 1-6 Units Subcutaneous TID AC   melatonin 6 mg Oral HS   midodrine 5 mg Oral TID AC   pantoprazole 40 mg Oral Early Morning   sodium bicarbonate 650 mg Oral TID after meals   timolol 1 drop Both Eyes Daily     Continuous IV Infusions:     PRN Meds:    acetaminophen 650 mg Oral Q6H PRN   levalbuterol 1 25 mg Nebulization Q8H PRN       Discharge Plan: tbd  Network Utilization Review Department  [email or web address]  ATTENTION: Please call with any questions or concerns to [phone] and carefully listen to the prompts so that you are directed to the right person  All voicemails are confidential   Eve Núñez all requests for admission clinical reviews, approved or denied determinations and any other requests to dedicated fax number below belonging to the campus where the patient is receiving treatment   List of dedicated fax numbers for the Facilities:  [address] [place] [address] DENIALS (Administrative/Medical Necessity) [phone]   [address] [place] [address] (Maternity/NICU/Pediatrics) [phone]   Germania Amanda [phone]     [address] [phone]   [facility] [phone]   Virgil Nasuti [phone]   [facility] [address] [place] [address] [phone]   [facility]  [phone]   [facility] [facility]  [facility] [address] [facility] [phone]

## 2020-02-12 VITALS
HEART RATE: 86 BPM | BODY MASS INDEX: 26.37 KG/M2 | HEIGHT: 67 IN | TEMPERATURE: 97.7 F | DIASTOLIC BLOOD PRESSURE: 74 MMHG | RESPIRATION RATE: 18 BRPM | SYSTOLIC BLOOD PRESSURE: 109 MMHG | OXYGEN SATURATION: 96 % | WEIGHT: 167.99 LBS

## 2020-02-12 LAB
GLUCOSE SERPL-MCNC: 120 MG/DL (ref 65–140)
GLUCOSE SERPL-MCNC: 251 MG/DL (ref 65–140)
GLUCOSE SERPL-MCNC: 258 MG/DL (ref 65–140)

## 2020-02-12 PROCEDURE — 82948 REAGENT STRIP/BLOOD GLUCOSE: CPT

## 2020-02-12 PROCEDURE — 94640 AIRWAY INHALATION TREATMENT: CPT

## 2020-02-12 PROCEDURE — 94760 N-INVAS EAR/PLS OXIMETRY 1: CPT

## 2020-02-12 PROCEDURE — NC001 PR NO CHARGE: Performed by: INTERNAL MEDICINE

## 2020-02-12 PROCEDURE — 94668 MNPJ CHEST WALL SBSQ: CPT

## 2020-02-12 PROCEDURE — 97116 GAIT TRAINING THERAPY: CPT

## 2020-02-12 PROCEDURE — 99232 SBSQ HOSP IP/OBS MODERATE 35: CPT | Performed by: INTERNAL MEDICINE

## 2020-02-12 PROCEDURE — 97530 THERAPEUTIC ACTIVITIES: CPT

## 2020-02-12 PROCEDURE — 99239 HOSP IP/OBS DSCHRG MGMT >30: CPT | Performed by: INTERNAL MEDICINE

## 2020-02-12 RX ORDER — MIDODRINE HYDROCHLORIDE 5 MG/1
5 TABLET ORAL
Refills: 0
Start: 2020-02-12 | End: 2020-03-13

## 2020-02-12 RX ORDER — SODIUM BICARBONATE 650 MG/1
650 TABLET ORAL
Qty: 90 TABLET | Refills: 0 | Status: SHIPPED | OUTPATIENT
Start: 2020-02-12 | End: 2020-03-13

## 2020-02-12 RX ORDER — INSULIN GLARGINE 100 [IU]/ML
5 INJECTION, SOLUTION SUBCUTANEOUS
Status: DISCONTINUED | OUTPATIENT
Start: 2020-02-12 | End: 2020-02-12 | Stop reason: HOSPADM

## 2020-02-12 RX ORDER — INSULIN GLARGINE 100 [IU]/ML
5 INJECTION, SOLUTION SUBCUTANEOUS
Refills: 0
Start: 2020-02-12 | End: 2020-03-13

## 2020-02-12 RX ADMIN — SODIUM BICARBONATE 650 MG TABLET 650 MG: at 17:11

## 2020-02-12 RX ADMIN — INSULIN LISPRO 3 UNITS: 100 INJECTION, SOLUTION INTRAVENOUS; SUBCUTANEOUS at 17:13

## 2020-02-12 RX ADMIN — CYANOCOBALAMIN TAB 500 MCG 1000 MCG: 500 TAB at 08:23

## 2020-02-12 RX ADMIN — ASPIRIN 81 MG 81 MG: 81 TABLET ORAL at 08:22

## 2020-02-12 RX ADMIN — HEPARIN SODIUM 5000 UNITS: 5000 INJECTION INTRAVENOUS; SUBCUTANEOUS at 14:00

## 2020-02-12 RX ADMIN — SODIUM BICARBONATE 650 MG TABLET 650 MG: at 08:22

## 2020-02-12 RX ADMIN — GUAIFENESIN 1200 MG: 600 TABLET ORAL at 17:11

## 2020-02-12 RX ADMIN — BUDESONIDE AND FORMOTEROL FUMARATE DIHYDRATE 2 PUFF: 160; 4.5 AEROSOL RESPIRATORY (INHALATION) at 07:24

## 2020-02-12 RX ADMIN — MIDODRINE HYDROCHLORIDE 5 MG: 5 TABLET ORAL at 06:17

## 2020-02-12 RX ADMIN — INSULIN LISPRO 3 UNITS: 100 INJECTION, SOLUTION INTRAVENOUS; SUBCUTANEOUS at 11:29

## 2020-02-12 RX ADMIN — PANTOPRAZOLE SODIUM 40 MG: 40 TABLET, DELAYED RELEASE ORAL at 06:14

## 2020-02-12 RX ADMIN — GUAIFENESIN 1200 MG: 600 TABLET ORAL at 08:22

## 2020-02-12 RX ADMIN — FUROSEMIDE 20 MG: 20 TABLET ORAL at 15:38

## 2020-02-12 RX ADMIN — HEPARIN SODIUM 5000 UNITS: 5000 INJECTION INTRAVENOUS; SUBCUTANEOUS at 06:13

## 2020-02-12 RX ADMIN — SODIUM BICARBONATE 650 MG TABLET 650 MG: at 15:39

## 2020-02-12 RX ADMIN — MIDODRINE HYDROCHLORIDE 5 MG: 5 TABLET ORAL at 15:38

## 2020-02-12 RX ADMIN — FUROSEMIDE 20 MG: 20 TABLET ORAL at 08:22

## 2020-02-12 RX ADMIN — TIMOLOL MALEATE 1 DROP: 5 SOLUTION/ DROPS OPHTHALMIC at 08:23

## 2020-02-12 RX ADMIN — DOCUSATE SODIUM 100 MG: 100 CAPSULE, LIQUID FILLED ORAL at 08:22

## 2020-02-12 RX ADMIN — MIDODRINE HYDROCHLORIDE 5 MG: 5 TABLET ORAL at 17:13

## 2020-02-12 NOTE — SOCIAL WORK
Continue to follow  Received call from Alem Mcleod in admissions at [facility], facility can accept Pt today  Call placed to Pt's insurance([phone]), spoke with Lloyd Jessica, obtained pending reference # as [id number]  Faxed requested clinical information to Edyta at [phone] and [phone]  Await determination  Call placed to Pt's son(Mata: [phone]), informed Pt's son that [facility] can accept Pt for SNF and awaiting insurance approval for SNF  Pt's son in agreement  CM to follow

## 2020-02-12 NOTE — ASSESSMENT & PLAN NOTE
Lab Results   Component Value Date    HGBA1C 6 1 02/01/2020       Recent Labs     02/11/20  1909 02/11/20  2153 02/12/20  0700 02/12/20  1108   POCGLU 198* 170* 120 251*       Blood Sugar Average: Last 72 hrs:  (P) 876 [id number]   · Holding home Amaryl likely will need to change in the setting of kidney disease  · Blood sugars currently uncontrolled  · Start 5iu lantus at bedtime  · Continue with sliding scale insulin and Accu-Cheks

## 2020-02-12 NOTE — ASSESSMENT & PLAN NOTE
Wt Readings from Last 3 Encounters:   02/12/20 76 2 kg (167 lb 15 9 oz)   12/17/19 82 6 kg (182 lb)   10/15/19 84 8 kg (187 lb)     · Echo with an EF 25% with moderate decreased RV function an aortic valve area of 1 25 with mild to moderate MR unchanged from prior echocardiogram  · Patient with CHF, declining, overall poor prognosis with cold extremities and blue toes noted today  · Cardiology on board  · Weight 167lb today, up 1lb/24hrs  · Day balance -553/24hrs  · Reduced to 20 mg Lasix twice daily given increase in creatinine  · Check Cr today  · Continue midodrine 5 mg t i d    · Milrinone discontinued on 02/07 in the a m   · Overall poor prognosis  · Ideal care would be hospice but family reports being unable to finance room and bed   PT recommends short term skilled PT, awaiting placement  · Medically stable, probably the best he'll ever be given cardiogenic shock

## 2020-02-12 NOTE — ASSESSMENT & PLAN NOTE
· Chronic in the setting of myeloma  · Hemoglobin stable  · Keep HGB greater than 8 per patient's hematologist

## 2020-02-12 NOTE — PROGRESS NOTES
NEPHROLOGY PROGRESS NOTE   Alec Shaw 80 y o  male MRN: [id number]  Unit/Bed#: -01 Encounter: [id number]  Reason for Consult: ELTON      SUMMARY:     80-year-old male with a history of congestive heart failure, chronic kidney disease, multiple myeloma admitted to [facility] for shortness of breath and treated for acute exacerbation of congestive heart failure  Nephrology consult for acute kidney injury with underlying chronic kidney disease      ASSESSMENT and PLAN:     1 ) Acute Kidney Injury  -- Present on admission (POA) ; admission creatinine: 1 8 mg/dL  -- Urinalysis:  Specific gravity 1 01, negative ketones, large blood, negative protein, occasional bacteria, 10-20 WBC  -- Imaging:  No recent dedicated renal imaging  Did have a right upper quadrant ultrasound last week which showed the right kidney measuring 10 3 cm with no evidence of hydronephrosis  -- Serologies: n/a  -- Etiology:  Presumed to be secondary to cardiorenal syndrome in the setting of exacerbation of congestive heart failure with underlying chronic kidney disease  -- Status:  Nonoliguric,  creatinine yesterday was 2 7  No labs today to comment on   -- Plan:   · No blood work today to comment on  Will check a BMP tomorrow morning    · Accept a higher baseline creatinine to keep out of congestive heart failure and to keep his breathing comfortable  · Avoid NSAIDs  · Maintain mean arterial pressure greater than 65, blood pressure drops can increase the midodrine further to 10 mg t i d      2 ) Chronic Kidney Disease Stage IV  -- outpatient nephrologist: Dr Arcelia Cee  -- baseline creatinine: 1 6-2 mg/dL, based on his volume status  -- Etiology:  Presumed to be secondary to combination of diabetic nephropathy and cardiorenal syndrome  --given his advanced age and underlying congestive heart failure with overall poor prognosis he is a poor dialysis candidate   --recent acute kidney injury with underlying congestive heart failure  Anticipate a higher baseline creatinine to keep him out of congestive heart failure as well as poor renal reserve in the setting of his advanced age  --the creatinine will be a poor marker for reflection of his renal dysfunction  Given his poor muscle mass  I suspect his renal function to be worse than with the blood work is reporting  A Cystatin C level may be more helpful     3 ) Acute on chronic combined congestive heart failure  --ejection fraction 25%, low output state, with moderate aortic stenosis  --complicated by acute kidney injury  --cardiogenic shock initially treated with milrinone which he is now off  --currently on midodrine for labile blood pressure and episodes of hypotension  --Lasix 20 mg twice a day, no objections to increasing it  --goals of care discussions have been addressed  Patient awaiting his transfer to rehab  Plan is for short-term rehab with a plan of possible transition to hospice      4 ) Low bicarbonate level  --no labs today  --sodium bicarbonate 650 mg t i d   --check labs tomorrow     5 ) Multiple myeloma  --oncologist Dr Pitt  --bone marrow biopsy  Kappa light chain restricted plasma cell neoplasm consistent with plasma cell myeloma, IgA kappa restricted  No evidence of B-cell or T-cell lymphoproliferative disorder  --chemotherapy regiment in the past included Revlimid  --FISH: 1  No evidence of IGH-MAF [translocation t(14;16)] gene rearrangement  2  No evidence of RB1 monosomy (13q14 deletion)  3  No evidence of CCND1-IGH [translocation t(11;14)] gene rearrangement, and no evidence for trisomy 11 or gain of 11q  4  No evidence of p53 (17p13) deletion or amplification  5  No evidence of FGFR3-IGH [translocation t(4;14)] gene rearrangement  6  Negative for 1q21/CKS1B gain    Discussed with the son at bedside    SUBJECTIVE / INTERVAL HISTORY:    No overnight events      OBJECTIVE:  Current Weight: Weight - Scale: 76 2 kg (167 lb 15 9 oz)  Vitals:    02/12/20 0330 02/12/20 0600 02/12/20 0657 02/12/20 0726   BP: 101/61  111/56    BP Location:   Right arm    Pulse:   87    Resp:   18    Temp:   97 5 °F (36 4 °C)    TempSrc:   Oral    SpO2:   98% 95%   Weight:  76 2 kg (167 lb 15 9 oz)     Height:           Intake/Output Summary (Last 24 hours) at 2/12/2020 1239  Last data filed at 2/12/2020 7613  Gross per 24 hour   Intake 120 ml   Output 803 ml   Net -683 ml       Review of Systems:    12 point ROS has been reviewed  Physical Exam   Constitutional: He is oriented to person, place, and time  He appears well-developed and well-nourished  No distress  HENT:   Head: Normocephalic and atraumatic  Eyes: Pupils are equal, round, and reactive to light  No scleral icterus  Neck: Normal range of motion  Neck supple  Cardiovascular: Normal rate, regular rhythm and normal heart sounds  Exam reveals no gallop and no friction rub  No murmur heard  Pulmonary/Chest: Effort normal and breath sounds normal  No respiratory distress  He has no wheezes  He has no rales  He exhibits no tenderness  Abdominal: Soft  Bowel sounds are normal  He exhibits no distension  There is no tenderness  There is no rebound  Musculoskeletal: Normal range of motion  He exhibits no edema  Neurological: He is alert and oriented to person, place, and time  Skin: No rash noted  He is not diaphoretic  Psychiatric: He has a normal mood and affect  Nursing note and vitals reviewed        Medications:    Current Facility-Administered Medications:     acetaminophen (TYLENOL) tablet 650 mg, 650 mg, Oral, Q6H PRN, DON Duran    aspirin chewable tablet 81 mg, 81 mg, Oral, Daily, DON Andrew, 81 mg at 02/12/20 3008    atorvastatin (LIPITOR) tablet 40 mg, 40 mg, Oral, HS, DON Andrew, 40 mg at 02/11/20 2050    bimatoprost (LUMIGAN) 0 01 % ophthalmic solution 1 drop, 1 drop, Both Eyes, HS, DON Andrew, 1 drop at 02/11/20 2051    budesonide-formoterol (SYMBICORT) 160-4 5 mcg/act inhaler 2 puff, 2 puff, Inhalation, BID, Destiny Pastor CRNP, 2 puff at 02/12/20 0724    cyanocobalamin (VITAMIN B-12) tablet 1,000 mcg, 1,000 mcg, Oral, Daily, Destiny , CRNP, 1,000 mcg at 02/12/20 7970    docusate sodium (COLACE) capsule 100 mg, 100 mg, Oral, Daily, Destiny , CRNP, 100 mg at 02/12/20 0533    furosemide (LASIX) tablet 20 mg, 20 mg, Oral, BID (diuretic), Nasim Tinoco MD, 20 mg at 02/12/20 1887    guaiFENesin (MUCINEX) 12 hr tablet 1,200 mg, 1,200 mg, Oral, BID, Destiny , CRNP, 1,200 mg at 02/12/20 8231    heparin (porcine) subcutaneous injection 5,000 Units, 5,000 Units, Subcutaneous, Q8H [address], DON Oakley, 5,000 Units at 02/12/20 8395    insulin glargine (LANTUS) subcutaneous injection 5 Units 0 05 mL, 5 Units, Subcutaneous, HS, Nasim Tinoco MD    insulin lispro (HumaLOG) 100 units/mL subcutaneous injection 1-6 Units, 1-6 Units, Subcutaneous, HS, DON Oakley, 1 Units at 02/11/20 2219    insulin lispro (HumaLOG) 100 units/mL subcutaneous injection 1-6 Units, 1-6 Units, Subcutaneous, TID AC, 3 Units at 02/12/20 1129 **AND** Fingerstick Glucose (POCT), , , TID AC, DON Andrew    levalbuterol (XOPENEX) inhalation solution 1 25 mg, 1 25 mg, Nebulization, Q8H PRN, DON Garza    melatonin tablet 6 mg, 6 mg, Oral, HS, DON Andrew, 6 mg at 02/11/20 2049    midodrine (PROAMATINE) tablet 5 mg, 5 mg, Oral, TID AC, DON Andrew, 5 mg at 02/12/20 0617    pantoprazole (PROTONIX) EC tablet 40 mg, 40 mg, Oral, Early Morning, DON Andrew, 40 mg at 02/12/20 9040    sodium bicarbonate tablet 650 mg, 650 mg, Oral, TID after meals, Alice Sarah, DON, 650 mg at 02/12/20 8815    timolol (TIMOPTIC) 0 5 % ophthalmic solution 1 drop, 1 drop, Both Eyes, Daily, DON Andrew, 1 drop at 02/12/20 4228    Laboratory Results:  Results from last 7 days   Lab Units 02/11/20  0631 02/10/20  0547 02/10/20  0503 02/09/20  0511 02/08/20  0514 02/07/20  0459 02/06/20  0559   WBC Thousand/uL 7 49 9 16  --   --  7 27  --  9 94   HEMOGLOBIN g/dL 9 6* 9 5*  --   --  8 5*  --  8 8*   HEMATOCRIT % 30 6* 30 3*  --   --  27 4*  --  28 3*   PLATELETS Thousands/uL 147* 162  --   --  212  --  245   POTASSIUM mmol/L 4 0  --  4 6 4 4 4 0 3 6  3 6 3 8   CHLORIDE mmol/L 98*  --  100 100 102 103  103 106   CO2 mmol/L 19*  --  18* 22 24 24  24 25   BUN mg/dL 72*  --  64* 56* 52* 51*  51* 50*   CREATININE mg/dL 2 73*  --  2 57* 2 47* 2 51* 2 74*  2 74* 2 89*   CALCIUM mg/dL 8 7  --  9 0 8 6 8 7 8 5  8 5 8 8   MAGNESIUM mg/dL 2 7*  --  2 8*  --  2 4 2 4 2 5   PHOSPHORUS mg/dL  --   --   --   --   --   --  4 8*

## 2020-02-12 NOTE — PLAN OF CARE
Problem: Potential for Falls  Goal: Patient will remain free of falls  Description  INTERVENTIONS:  - Assess patient frequently for physical needs  -  Identify cognitive and physical deficits and behaviors that affect risk of falls    -  [place] fall precautions as indicated by assessment   - Educate patient/family on patient safety including physical limitations  - Instruct patient to call for assistance with activity based on assessment  - Modify environment to reduce risk of injury  - Consider OT/PT consult to assist with strengthening/mobility  Outcome: Progressing     Problem: DISCHARGE PLANNING  Goal: Discharge to home or other facility with appropriate resources  Description  INTERVENTIONS:  - Identify barriers to discharge w/patient and caregiver  - Arrange for needed discharge resources and transportation as appropriate  - Identify discharge learning needs (meds, wound care, etc )  - Arrange for interpretive services to assist at discharge as needed  - Refer to Case Management Department for coordinating discharge planning if the patient needs post-hospital services based on physician/advanced practitioner order or complex needs related to functional status, cognitive ability, or social support system  Outcome: Progressing     Problem: INFECTION - ADULT  Goal: Absence or prevention of progression during hospitalization  Description  INTERVENTIONS:  - Assess and monitor for signs and symptoms of infection  - Monitor lab/diagnostic results  - Monitor all insertion sites, i e  indwelling lines, tubes, and drains  - Monitor endotracheal if appropriate and nasal secretions for changes in amount and color  - [place] appropriate cooling/warming therapies per order  - Administer medications as ordered  - Instruct and encourage patient and family to use good hand hygiene technique  - Identify and instruct in appropriate isolation precautions for identified infection/condition  Outcome: Progressing     Problem: Knowledge Deficit  Goal: Patient/family/caregiver demonstrates understanding of disease process, treatment plan, medications, and discharge instructions  Description  Complete learning assessment and assess knowledge base    Interventions:  - Provide teaching at level of understanding  - Provide teaching via preferred learning methods  Outcome: Progressing     Problem: RESPIRATORY - ADULT  Goal: Achieves optimal ventilation and oxygenation  Description  INTERVENTIONS:  - Assess for changes in respiratory status  - Assess for changes in mentation and behavior  - Position to facilitate oxygenation and minimize respiratory effort  - Oxygen administered by appropriate delivery if ordered  - Initiate smoking cessation education as indicated  - Encourage broncho-pulmonary hygiene including cough, deep breathe, Incentive Spirometry  - Assess the need for suctioning and aspirate as needed  - Assess and instruct to report SOB or any respiratory difficulty  - Respiratory Therapy support as indicated  Outcome: Progressing     Problem: HEMATOLOGIC - ADULT  Goal: Maintains hematologic stability  Description  INTERVENTIONS  - Assess for signs and symptoms of bleeding or hemorrhage  - Monitor labs  - Administer supportive blood products/factors as ordered and appropriate  Outcome: Progressing     Problem: CARDIOVASCULAR - ADULT  Goal: Maintains optimal cardiac output and hemodynamic stability  Description  INTERVENTIONS:  - Monitor I/O, vital signs and rhythm  - Monitor for S/S and trends of decreased cardiac output  - Administer and titrate ordered vasoactive medications to optimize hemodynamic stability  - Assess quality of pulses, skin color and temperature  - Assess for signs of decreased coronary artery perfusion  - Instruct patient to report change in severity of symptoms  Outcome: Progressing  Goal: Absence of cardiac dysrhythmias or at baseline rhythm  Description  INTERVENTIONS:  - Continuous cardiac monitoring, vital signs, obtain 12 lead EKG if ordered  - Administer antiarrhythmic and heart rate control medications as ordered  - Monitor electrolytes and administer replacement therapy as ordered  Outcome: Progressing     Problem: METABOLIC, FLUID AND ELECTROLYTES - ADULT  Goal: Fluid balance maintained  Description  INTERVENTIONS:  - Monitor labs   - Monitor I/O and WT  - Instruct patient on fluid and nutrition as appropriate  - Assess for signs & symptoms of volume excess or deficit  Outcome: Progressing     Problem: Prexisting or High Potential for Compromised Skin Integrity  Goal: Skin integrity is maintained or improved  Description  INTERVENTIONS:  - Identify patients at risk for skin breakdown  - Assess and monitor skin integrity  - Assess and monitor nutrition and hydration status  - Monitor labs   - Assess for incontinence   - Turn and reposition patient  - Assist with mobility/ambulation  - Relieve pressure over bony prominences  - Avoid friction and shearing  - Provide appropriate hygiene as needed including keeping skin clean and dry  - Evaluate need for skin moisturizer/barrier cream  - Collaborate with interdisciplinary team   - Patient/family teaching  - Consider wound care consult   Outcome: Progressing     Problem: Nutrition/Hydration-ADULT  Goal: Nutrient/Hydration intake appropriate for improving, restoring or maintaining nutritional needs  Description  Monitor and assess patient's nutrition/hydration status for malnutrition  Collaborate with interdisciplinary team and initiate plan and interventions as ordered  Monitor patient's weight and dietary intake as ordered or per policy  Utilize nutrition screening tool and intervene as necessary  Determine patient's food preferences and provide high-protein, high-caloric foods as appropriate       INTERVENTIONS:  - Monitor oral intake, urinary output, labs, and treatment plans  - Assess nutrition and hydration status and recommend course of action  - Evaluate amount of meals eaten  - Assist patient with eating if necessary   - Allow adequate time for meals  - Recommend/ encourage appropriate diets, oral nutritional supplements, and vitamin/mineral supplements  - Order, calculate, and assess calorie counts as needed  - Recommend, monitor, and adjust tube feedings and TPN/PPN based on assessed needs  - Assess need for intravenous fluids  - Provide specific nutrition/hydration education as appropriate  - Include patient/family/caregiver in decisions related to nutrition  Outcome: Progressing

## 2020-02-12 NOTE — ASSESSMENT & PLAN NOTE
· Follows with Dr Anibal Correa as an outpatient  · Patient usually takes Revlimid outpatient this is on hold per hematologist as well as patient's son    Patient's son also states that given his failing heart he does not think his dad would go back on that medication

## 2020-02-12 NOTE — ASSESSMENT & PLAN NOTE
Lab Results   Component Value Date    HGBA1C 6 1 02/01/2020       Recent Labs     02/11/20  1108 02/11/20  1538 02/11/20  1909 02/11/20  2153   POCGLU 177* 236* 198* 170*       Blood Sugar Average: Last 72 hrs:  (P) 58 [id number]   · Holding home Amaryl likely will need to change in the setting of kidney disease  · Blood sugars currently uncontrolled  · Start 5iu lantus at bedtime  · Continue with sliding scale insulin and Accu-Cheks

## 2020-02-12 NOTE — DISCHARGE SUMMARY
Discharge- Jf Mcginnis 9/22/1930, 80 y o  male MRN: [id number]    Unit/Bed#: -01 Encounter: [id number]    Primary Care Provider: Mikel Chow   Date and time admitted to hospital: 1/30/2020  8:11 PM        * Acute on chronic combined systolic and diastolic heart failure ([facility] [address] )  Assessment & Plan  Wt Readings from Last 3 Encounters:   02/12/20 76 2 kg (167 lb 15 9 oz)   12/17/19 82 6 kg (182 lb)   10/15/19 84 8 kg (187 lb)     · Echo with an EF 25% with moderate decreased RV function an aortic valve area of 1 25 with mild to moderate MR unchanged from prior echocardiogram  · Patient with CHF, declining, overall poor prognosis with cold extremities and blue toes noted today  · Cardiology on board  · Weight 167lb today, up 1lb/24hrs  · Day balance -553/24hrs  · Reduced to 20 mg Lasix twice daily given increase in creatinine  · Check Cr today  · Continue midodrine 5 mg t i d    · Milrinone discontinued on 02/07 in the a m   · Overall poor prognosis  · Ideal care would be hospice but family reports being unable to finance room and bed  PT recommends short term skilled PT, awaiting placement  · Medically stable, probably the best he'll ever be given cardiogenic shock    Goals of care, counseling/discussion  Assessment & Plan  · Patient with advanced heart failure, EF 25%, very poor overall prognosis, with cold extremities, and blue toes  · Best plan of care would be hospice, however patient's son is reluctant to go down hospice route as he would be unable to afford room and board  · Patient is slated to have hospice care meeting at 2:00 p m  Today  · Patient's overall prognosis is worsening daily with increasing liver enzymes and increasing creatinine, I discussed this extensively with patient's son but he seems to have poor insight into health matters as my colleagues before I came on service have also discussed with the patient's son without much success  · I would try again today at 2:00 p m    · In the meantime patient is medically stable for discharge to rehab/SNF and transition to hospice from there    NSVT (nonsustained ventricular tachycardia) (HCC)  Assessment & Plan  · Keep K>4 mag>2  · Unable to tolerate bb with hypotension if bp continues to improve start BB     ELTON (acute kidney injury) ([facility] )  Assessment & Plan  · On CKD with a baseline creatinine 1 6-1 9 secondary to cardiorenal syndrome  · Creat 2 73 today, was 2 57 yesterday  · Decrease Lasix back to 20 mg twice daily  · Nephrology following  · F/u am bmp       Transaminitis  Assessment & Plan  · Secondary to hepatic congestion from cardiogenic shock  · Hepatitis panel negative/right upper quadrant ultrasound unremarkable  · Notably elevated, /ALT 1118/alkaline phosphatase 201  · Check CMP today      Anemia  Assessment & Plan  · Chronic in the setting of myeloma  · Hemoglobin stable  · Keep HGB greater than 8 per patient's hematologist    Type 2 MI (myocardial infarction) ([facility] )  Assessment & Plan  Type 2 MI secondary to acute CHF, as evidenced by elevated troponin 7 58, treatment with IV heparin and cardiology consult  · Troponin trended down to 7 4  · Per cardiology 'Type 2 MI secondary to acute CHF and underlying coronary artery disease, treatment is just supportive with treatment for CHF'    Kappa light chain myeloma ([facility] )  Assessment & Plan  · Follows with Dr Gladys Lerma as an outpatient  · Patient usually takes Revlimid outpatient this is on hold per hematologist as well as patient's son    Patient's son also states that given his failing heart he does not think his dad would go back on that medication    Esophageal reflux  Assessment & Plan  · Continue home PPI    Mixed hyperlipidemia  Assessment & Plan  · Continue statin    Type 2 diabetes mellitus without complication, without long-term current use of insulin [facility])  Assessment & Plan  Lab Results   Component Value Date    HGBA1C 6 1 02/01/2020       No results for input(s): POCGLU in the last 72 hours  Blood Sugar Average: Last 72 hrs:     · Holding home Amaryl likely will need to change in the setting of kidney disease  · Blood sugars currently uncontrolled  · Start 5iu lantus at bedtime  · Continue with sliding scale insulin and Accu-Cheks      Discharging Physician / Practitioner: Alicia Guzman MD  PCP: Machelle Abebe  Admission Date:   Admission Orders (From admission, onward)     Ordered        01/31/20 1330  Inpatient Admission  Once         01/30/20 2147  Place in Observation (expected length of stay for this patient is less than two midnights)  Once                   Discharge Date: 02/20/20    Resolved Problems  Date Reviewed: 2/18/2020          Resolved    Hypokalemia 2/5/2020     Resolved by  Shekhar Dubois PA-C    Pneumonia 2/7/2020     Resolved by  Lilianapeggy Correa, CRNP    Acute respiratory failure with hypoxia ([facility] [address] ) 2/7/2020     Resolved by  Liliana Bang, CRNP    Cardiorenal syndrome with renal failure 2/7/2020     Resolved by  Liliana Bang, CRNP    Increased anion gap metabolic acidosis 3/0/3461     Resolved by  Liliana Bang, CRNP    Hyperbilirubinemia 2/7/2020     Resolved by  Liliana Bang, CRNP    Delirium 2/7/2020     Resolved by  Liliana Correa, CRNP    Elevated troponin 2/7/2020     Resolved by  Christin Rosen PA-C          Consultations During Hospital Stay:  · Cardiology  · Palliative care      Procedures Performed:   · None    Significant Findings / Test Results:   · CXR        FINDINGS:     Heart shadow is enlarged but unchanged from prior exam      There are small left greater than right pleural effusions, similar when compared to previous examination    Overlying bibasilar airspace opacity is also unchanged from prior exam   No pneumothorax      Osseous structures appear within normal limits for patient age      IMPRESSION:     No significant interval change from previous examination and specifically cardiomegaly with left greater than right pleural effusions and overlying bibasilar airspace opacities           Incidental Findings:   · None     Test Results Pending at Discharge (will require follow up): · None     Outpatient Tests Requested:  · None    Complications:  None    Reason for Admission: SOB    Hospital Course:     Marla Self is a 80 y o  male patient who originally presented to the hospital on 1/30/2020 due to shortness of breath and was found to be in acute CHF exacerbation  Initial attempts to diurese patient was unsuccessful due to hypotension, and patient gradually worsened with shortness of breath, EF 25% and was upgraded to ICU level of care for trial of Milrinone which he failed  Patient was also found to have elevated LFT secondary to cardiogenic shock and hospice care was discussed with family  Plan is to discharge to snf and transition to hospice care from there      Please see above list of diagnoses and related plan for additional information  Condition at Discharge: stable     Discharge Day Visit / Exam:     * Please refer to separate progress note for these details *    Discussion with Family:  Discussed with patient's son at bedside    Discharge instructions/Information to patient and family:   See after visit summary for information provided to patient and family  Provisions for Follow-Up Care:  See after visit summary for information related to follow-up care and any pertinent home health orders  Disposition:     Other [place] at [employer] to [facility] SNF:   · [place] - Not Applicable to this Patient    Planned Readmission:  No     Discharge Statement:  I spent 40 minutes discharging the patient  This time was spent on the day of discharge  I had direct contact with the patient on the day of discharge   Greater than 50% of the total time was spent examining patient, answering all patient questions, arranging and discussing plan of care with patient as well as directly providing post-discharge instructions  Additional time then spent on discharge activities  Discharge Medications:  See after visit summary for reconciled discharge medications provided to patient and family        ** Please Note: This note has been constructed using a voice recognition system **

## 2020-02-12 NOTE — PLAN OF CARE
Problem: PHYSICAL THERAPY ADULT  Goal: Performs mobility at highest level of function for planned discharge setting  See evaluation for individualized goals  Description  Treatment/Interventions: Functional transfer training, Elevations, Therapeutic exercise, Endurance training, Cognitive reorientation, Bed mobility, Gait training, Spoke to nursing, OT  Equipment Recommended: (continued use of rollator)       See flowsheet documentation for full assessment, interventions and recommendations  Outcome: Progressing  Note:   Prognosis: Good  Problem List: Decreased endurance, Impaired balance, Decreased mobility, Decreased coordination, Decreased safety awareness  Assessment: Pt able to mobilize short distance with rw adn assist  Needs cues for safety and pacing  Tires quickly but no sob  Oob in chair with les elevated  Needs in reach  continue to recommend shor tterm in-pt rehab with graded program to maximize safe functional mobility  Barriers to Discharge: Inaccessible home environment, Decreased caregiver support  Barriers to Discharge Comments: (to STR with medical clearance)  Recommendation: Short-term skilled PT     PT - OK to Discharge: Yes    See flowsheet documentation for full assessment

## 2020-02-12 NOTE — PHYSICAL THERAPY NOTE
PT nitoal     02/12/20 0916   Pain Assessment   Pain Assessment No/denies pain   Pain Score No Pain   Restrictions/Precautions   Other Precautions Chair Alarm; Bed Alarm;Cognitive; Fall Risk   General   Chart Reviewed Yes   Cognition   Overall Cognitive Status Impaired   Arousal/Participation Alert   Attention Attends with cues to redirect   Orientation Level Oriented to person;Oriented to place   Memory Decreased recall of precautions;Decreased recall of recent events;Decreased short term memory   Following Commands Follows one step commands with increased time or repetition   Subjective   Subjective I'm mixed up  I thought I was going to a wedding  I need to talk to my son  Bed Mobility   Additional Comments oob in chair   Transfers   Sit to Stand 4  Minimal assistance   Additional items Assist x 1   Stand to Sit 4  Minimal assistance   Additional items Assist x 1;Verbal cues;Armrests; Increased time required   Stand pivot 4  Minimal assistance   Additional items Assist x 1;Verbal cues; Increased time required;Armrests  (rw)   Ambulation/Elevation   Gait pattern Forward Flexion; Inconsistent raffi; Short stride   Gait Assistance 4  Minimal assist   Additional items Assist x 1   Assistive Device Rolling walker   Distance 6'x2   Balance   Static Sitting Good   Dynamic Sitting Fair +   Static Standing Fair   Dynamic Standing Fair   Ambulatory Fair   Endurance Deficit   Endurance Deficit Yes   Endurance Deficit Description tires quickly but no sob   Activity Tolerance   Activity Tolerance Patient limited by fatigue   Medical Staff Made Aware OT Kristina   Nurse Made Aware RN Sushma   Assessment   Prognosis Good   Problem List Decreased endurance; Impaired balance;Decreased mobility; Decreased coordination;Decreased safety awareness   Assessment Pt able to mobilize short distance with rw adn assist  Needs cues for safety and pacing  Tires quickly but no sob  Oob in chair with les elevated  Needs in reach   continue to recommend shor tterm in-pt rehab with graded program to maximize safe functional mobility  Barriers to Discharge Comments   (to STR with medical clearance)   Goals   Patient Goals talk to my son   Plan   Treatment/Interventions ADL retraining;Functional transfer training;LE strengthening/ROM; Therapeutic exercise; Endurance training;Cognitive reorientation;Patient/family training;Equipment eval/education;Gait training;Spoke to nursing;Spoke to case management;OT   Progress Progressing toward goals   PT Frequency 5x/wk   Recommendation   Recommendation Short-term skilled PT   Equipment Recommended Walker   PT - OK to Discharge Yes   Additional Comments   (to STR with medical clearance)   Genesis Marsh, PT

## 2020-02-12 NOTE — SOCIAL WORK
Continue to follow  Call received from Pt's insurance([employer]), auth # is [id number] subacute level 1 update 2/19 to [facility] at [phone] and fax# is [phone]  Pt's son insurance approved SNF and is choosing wheelchair [facility] transport and informed of wheelchair [employer]  Call placed to [facility] wheelchair [facility] to [facility] is 6:30pm  Sara Lee in admissions at [facility] informed of insurance auth and pickup time  Pt's son and Pt's nurse informed of transport time to [facility]  All in agreement

## 2020-02-12 NOTE — PLAN OF CARE
Problem: OCCUPATIONAL THERAPY ADULT  Goal: Performs self-care activities at highest level of function for planned discharge setting  See evaluation for individualized goals  Description  Treatment Interventions: ADL retraining, Functional transfer training, Endurance training, Cognitive reorientation, Patient/family training, Equipment evaluation/education, Compensatory technique education, Continued evaluation          See flowsheet documentation for full assessment, interventions and recommendations  Outcome: Progressing  Note:   Limitation: Decreased ADL status, Decreased endurance, Decreased self-care trans, Decreased high-level ADLs, Decreased cognition  Prognosis: Good  Assessment: Patient participated in Skilled OT session this date with interventions consisting of ADL re training with the use of correct body mechnaics, safety awareness and fall prevention techniques,  therapeutic activities to: increase activity tolerance, increase dynamic sit/ stand balance during functional activity , increase postural control, increase trunk control and increase OOB/ sitting tolerance   Patient agreeable to OT treatment session, upon arrival patient was found seated OOB to Recliner  Treatment session as follows: sit>stand with min A x 1 with RW, functional mobility with min A x 1 with RW, stand >sit transfer with min A x 1, grooming with supervision  Pt remains confused  Patient continues to be functioning below baseline level, occupational performance remains limited secondary to factors listed above, and pt at increased risk for falls and injury  From OT standpoint, recommendation at time of d/c would be Short Term Rehab  Patient to benefit from continued Occupational Therapy treatment while in the hospital to address deficits as defined above and maximize level of functional independence with ADLs and functional mobility   Pt left with call bell in reach, tray table in reach, needs met, chair alarm activated, SCDs on  RN aware        OT Discharge Recommendation: Short Term Rehab

## 2020-02-12 NOTE — ASSESSMENT & PLAN NOTE
· On CKD with a baseline creatinine 1 6-1 9 secondary to cardiorenal syndrome  · Creat 2 73 today, was 2 57 yesterday  · Decrease Lasix back to 20 mg twice daily  · Nephrology following  · F/u am bmp

## 2020-02-12 NOTE — ASSESSMENT & PLAN NOTE
· Secondary to hepatic congestion from cardiogenic shock  · Hepatitis panel negative/right upper quadrant ultrasound unremarkable  · Notably elevated, /ALT 1118/alkaline phosphatase 201  · Check CMP today

## 2020-02-12 NOTE — PLAN OF CARE
Problem: Potential for Falls  Goal: Patient will remain free of falls  Description  INTERVENTIONS:  - Assess patient frequently for physical needs  -  Identify cognitive and physical deficits and behaviors that affect risk of falls    -  [place] fall precautions as indicated by assessment   - Educate patient/family on patient safety including physical limitations  - Instruct patient to call for assistance with activity based on assessment  - Modify environment to reduce risk of injury  - Consider OT/PT consult to assist with strengthening/mobility  Outcome: Progressing

## 2020-02-12 NOTE — OCCUPATIONAL THERAPY NOTE
Occupational Therapy Therapy Note     Patient Name: Sondra Rodriguez  Today's Date: 2/12/2020  Problem List  Principal Problem:    Acute on chronic combined systolic and diastolic heart failure ([facility] )  Active Problems:    Type 2 diabetes mellitus without complication, without long-term current use of insulin (HCC)    Mixed hyperlipidemia    Esophageal reflux    Kappa light chain myeloma (HCC)    Anemia    Transaminitis    ELTON (acute kidney injury) ([facility] )    NSVT (nonsustained ventricular tachycardia) ([facility] )    Goals of care, counseling/discussion            02/12/20 5804   Restrictions/Precautions   Weight Bearing Precautions Per Order No   Other Precautions Cognitive; Chair Alarm; Fall Risk   Pain Assessment   Pain Assessment No/denies pain   Pain Score No Pain   ADL   Grooming Assistance 5  Supervision/Setup   Grooming Deficit Setup;Verbal cueing   LB Dressing Assistance 4  Minimal Assistance   LB Dressing Deficit Don/doff R sock; Don/doff L sock; Verbal cueing   Bed Mobility   Additional Comments Pt received sitting in recliner   Transfers   Sit to Stand 4  Minimal assistance   Additional items Assist x 1;Verbal cues  (RW)   Stand to Sit 4  Minimal assistance   Additional items Assist x 1;Verbal cues  (RW)   Stand pivot 4  Minimal assistance   Additional items Assist x 1;Verbal cues  (RW)   Functional Mobility   Functional Mobility 4  Minimal assistance   Additional Comments x 1, RW   Cognition   Overall Cognitive Status Impaired   Arousal/Participation Alert   Attention Attends with cues to redirect   Orientation Level Oriented to person;Oriented to place; Disoriented to time;Disoriented to situation   Memory Decreased recall of recent events;Decreased short term memory   Following Commands Follows one step commands with increased time or repetition   Activity Tolerance   Activity Tolerance Patient tolerated treatment well   Medical Staff Made Aware PT Emmanuel Viveros RN Sushma   Assessment   Assessment Patient participated in Skilled OT session this date with interventions consisting of ADL re training with the use of correct body mechnaics, safety awareness and fall prevention techniques,  therapeutic activities to: increase activity tolerance, increase dynamic sit/ stand balance during functional activity , increase postural control, increase trunk control and increase OOB/ sitting tolerance   Patient agreeable to OT treatment session, upon arrival patient was found seated OOB to Recliner  Treatment session as follows: sit>stand with min A x 1 with RW, functional mobility with min A x 1 with RW, stand >sit transfer with min A x 1, grooming with supervision  Pt remains confused  Patient continues to be functioning below baseline level, occupational performance remains limited secondary to factors listed above, and pt at increased risk for falls and injury  From OT standpoint, recommendation at time of d/c would be Short Term Rehab  Patient to benefit from continued Occupational Therapy treatment while in the hospital to address deficits as defined above and maximize level of functional independence with ADLs and functional mobility  Pt left with call bell in reach, tray table in reach, needs met, chair alarm activated, SCDs on  RN aware  Plan   Treatment Interventions ADL retraining;Functional transfer training; Endurance training;Cognitive reorientation;Patient/family training; Compensatory technique education;Continued evaluation;Equipment evaluation/education   Goal Expiration Date 02/20/20   OT Frequency 2-3x/wk   Recommendation   OT Discharge Recommendation Short Term Rehab       JESUS Armas/L

## 2020-02-13 ENCOUNTER — TELEPHONE (OUTPATIENT)
Dept: INFUSION CENTER | Facility: HOSPITAL | Age: 85
End: 2020-02-13

## 2020-02-13 NOTE — UTILIZATION REVIEW
Notification of Discharge  This is a Notification of Discharge from our facility [address]  Please be advised that this patient has been discharge from our facility  Below you will find the admission and discharge date and time including the patients disposition  PRESENTATION DATE: 1/30/2020  8:11 PM  OBS ADMISSION DATE:   IP ADMISSION DATE: 1/31/20 1330   DISCHARGE DATE: 2/12/2020  7:44 PM  DISPOSITION: Non [facility]   Admission Orders listed below:  Admission Orders (From admission, onward)     Ordered        01/31/20 1330  Inpatient Admission  Once         01/30/20 2147  Place in Observation (expected length of stay for this patient is less than two midnights)  Once                   Please contact the UR Department if additional information is required to close this patient's authorization/case  [facility] Utilization Review Department  Main: [phone] x carefully listen to the prompts  All voicemails are confidential   [email or web address]  Send all requests for admission clinical reviews, approved or denied determinations and any other requests to dedicated fax number below belonging to the campus where the patient is receiving treatment   List of dedicated fax numbers:  [address] DENIALS (Administrative/Medical Necessity) [phone]   [address] (Maternity/NICU/Pediatrics) [phone]   Kerline Gonzales [phone]   Pura Lanier [phone]   [facility] [phone]   [place] [address] [phone]   [facility]  [phone]   [address] [place], [place] [address] [facility] [address] [facility] [phone]

## 2020-02-13 NOTE — TELEPHONE ENCOUNTER
Elizabeth AYALA from [employer], reviewed plan of care and upcoming appt with Dr Nano Baca

## 2020-02-13 NOTE — NURSING NOTE
19: 40-[facility]-Patient verified and place to transport to [place] was verified  Son Gwenette Jeans  Present and took all belonging for his dad as he will meet his father over at [facility]  given paperwork for discharge

## 2020-02-14 ENCOUNTER — TELEPHONE (OUTPATIENT)
Dept: CARDIOLOGY CLINIC | Facility: CLINIC | Age: 85
End: 2020-02-14

## 2020-02-14 NOTE — TELEPHONE ENCOUNTER
Called to schedule Hospital follow up  Family advised that patient is in a facility at this time and they will call us to follow up when they know what is going on with where he will be living  I gave them our phone number

## 2020-02-18 ENCOUNTER — OFFICE VISIT (OUTPATIENT)
Dept: HEMATOLOGY ONCOLOGY | Facility: CLINIC | Age: 85
End: 2020-02-18
Payer: COMMERCIAL

## 2020-02-18 VITALS
BODY MASS INDEX: 26.31 KG/M2 | RESPIRATION RATE: 16 BRPM | SYSTOLIC BLOOD PRESSURE: 106 MMHG | HEART RATE: 97 BPM | OXYGEN SATURATION: 97 % | HEIGHT: 67 IN | TEMPERATURE: 96.9 F | DIASTOLIC BLOOD PRESSURE: 80 MMHG

## 2020-02-18 DIAGNOSIS — C90.00 MULTIPLE MYELOMA NOT HAVING ACHIEVED REMISSION (HCC): Primary | ICD-10-CM

## 2020-02-18 PROCEDURE — 3044F HG A1C LEVEL LT 7.0%: CPT | Performed by: INTERNAL MEDICINE

## 2020-02-18 PROCEDURE — 3074F SYST BP LT 130 MM HG: CPT | Performed by: INTERNAL MEDICINE

## 2020-02-18 PROCEDURE — 1036F TOBACCO NON-USER: CPT | Performed by: INTERNAL MEDICINE

## 2020-02-18 PROCEDURE — 1111F DSCHRG MED/CURRENT MED MERGE: CPT | Performed by: INTERNAL MEDICINE

## 2020-02-18 PROCEDURE — 4040F PNEUMOC VAC/ADMIN/RCVD: CPT | Performed by: INTERNAL MEDICINE

## 2020-02-18 PROCEDURE — 1160F RVW MEDS BY RX/DR IN RCRD: CPT | Performed by: INTERNAL MEDICINE

## 2020-02-18 PROCEDURE — 3079F DIAST BP 80-89 MM HG: CPT | Performed by: INTERNAL MEDICINE

## 2020-02-18 PROCEDURE — 3066F NEPHROPATHY DOC TX: CPT | Performed by: INTERNAL MEDICINE

## 2020-02-18 PROCEDURE — 99215 OFFICE O/P EST HI 40 MIN: CPT | Performed by: INTERNAL MEDICINE

## 2020-02-18 NOTE — PROGRESS NOTES
[facility]  [address] [facility] [address]  [address]  [address] [address] [phone]  [address] [place] [address], [phone]  02/18/20    Discussion:   In summary, this is an 60-year-old male history of multiple myeloma as outlined  He was recently hospitalized for shortness of breath and found to have heart failure, ejection fraction 25%  Additionally he had significant hepatic enzyme abnormalities and creatinine in the 2 7 range, both at least partly attributed to heart failure  December 10, 2019 free kappa 180 9, free lambda 27 6, ratio 6 55  Quantitative immunoglobulins are normal   SPEP showed IgA kappa, 0 16 grams/deciliter  WBC 7 4, hemoglobin 9 6, MCV 85, platelet count 359  His myeloma actually seems to be under fairly good control on current Revlimid/dexamethasone  Today he complains of mouth soreness for the past few weeks  There is minimal lingual erythema  He was started on nystatin yesterday  I suggested he continue this for the next day or 2 to look for efficacy  I discussed the above with the patient  The patient  voiced understanding and agreement   ______________________________________________________________________    Chief Complaint   Patient presents with    Follow-up       HPI:     Kappa light chain myeloma ([facility] )    9/8/2018 - 10/8/2018 Cancer Staged     Cancer Staging  Dargan light chain myeloma ([facility] )  Staging form: Plasma Cell Myeloma and Disorders, AJCC 8th Edition  - Clinical stage from 10/8/2018: RISS Stage II (Beta-2-microglobulin (mg/L): 4 2, Albumin (g/dL): 3 7, ISS: Stage II, High-risk cytogenetics: Absent, LDH: Normal) - Signed by Kristy Briggs PA-C on 10/8/2018        9/8/2018 Initial Diagnosis     Patient had an increase in creatinine  Patient followed up with Nephrology who completed a comprehensive workup    This demonstrated positive free light chain ratio elevation in addition to a 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          9/24/2018 Biopsy     Final Diagnosis   A -C  Bone marrow,  left iliac crest,  biopsy and aspirate:  -  Kappa light chain restricted plasma cell neoplasm, consistent with plasma cell myeloma (see note)  -  Maturing trilineage hematopoiesis without distinct features of dysplasia or increased myeloblasts  -  Decreased stainable storage iron  -  Mildly increased reticulin fibers without fibrosis  -  Negative for collagen fibrosis, granulomata, vasculitis, necrosis  Flow cytometry ([facility]# [id number], evaluated by NATALIE Rodriguez )       * Interpretation:    1  Plasma cell neoplasm, IgA kappa-restricted  See Comment  2  No evidence of B-cell or T-cell lymphoproliferative disorder  *  Comment:  Due to potential dilutional/lysis effects associated with flow cytometry, the reported plasma cell count (2 4%) is an underestimate  Therefore, correlation with a comprehensive bone marrow examination including morphologic plasma cell enumeration will be necessary for further and definitive characterization  Interpretation FISH Myeloma Panel:  1  No evidence of IGH-MAF [translocation t(14;16)] gene rearrangement  2  No evidence of RB1 monosomy (13q14 deletion)  3  No evidence of CCND1-IGH [translocation t(11;14)] gene rearrangement, and no evidence for trisomy 11 or gain of 11q  4  No evidence of p53 (17p13) deletion or amplification  5  No evidence of FGFR3-IGH [translocation t(4;14)] gene rearrangement  6  Negative for 1q21/CKS1B gain      10/19/2018 - 12/20/2018 Chemotherapy     Dexamethasone 20 mg Days 1, 8, 15  Revlimid 5 mg Days 1-14  Cycle length = 21 days    Completed 3 cycles of the above  12/20/2018 Adverse Reaction     Multiple folliculitis/abscess development  Uncontrolled diabetes, previously controlled prior to dexamethasone therapy        12/20/2018 - 4/16/2019 Chemotherapy     Revlimid 5 mg Days 1-14  Cycle length = 21 days      4/16/2019 -  Chemotherapy     Lenolidimide 5 mg PO daily days 1-7  Cycle length =  21 days         Interval History:  Clinically stable  ECOG-  3 - Symptomatic, >50% confined to bed    Review of Systems   Constitutional: Negative for chills and fever  HENT: Negative for nosebleeds  Eyes: Negative for discharge  Respiratory: Negative for cough and shortness of breath  Cardiovascular: Negative for chest pain  Gastrointestinal: Negative for abdominal pain, constipation and diarrhea  Endocrine: Negative for polydipsia  Genitourinary: Negative for hematuria  Musculoskeletal: Negative for arthralgias  Skin: Negative for color change  Allergic/Immunologic: Negative for immunocompromised state  Neurological: Negative for dizziness and headaches  Hematological: Negative for adenopathy  Psychiatric/Behavioral: Negative for agitation         Past Medical History:   Diagnosis Date    [facility] pectoris ([facility])     Chronic kidney disease     Coronary artery disease     Diabetes mellitus ([facility] )     Glaucoma     History of methicillin resistant staphylococcus aureus (MRSA) 01/31/2020    negative nasal swab     Hypertension     Multiple myeloma ([facility] )     Occluded coronary artery stent     Left     Patient Active Problem List   Diagnosis    Parenchymal renal hypertension    Type 2 diabetes mellitus without complication, without long-term current use of insulin (HCC)    Mixed hyperlipidemia    Esophageal reflux    Cerebral infarction, watershed distribution, bilateral, acute ([facility] )    Stage 4 chronic kidney disease (HCC)    Benign prostatic hyperplasia    Ischemic cardiomyopathy    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)    Other proteinuria    Kappa light chain myeloma (HCC)    MRSA (methicillin resistant staph aureus) culture positive    Acute on chronic combined systolic and diastolic heart failure (HCC)    Anemia    Transaminitis    ELTON (acute kidney injury) ([facility] )    NSVT (nonsustained ventricular tachycardia) (HCC)    Goals of care, counseling/discussion       Current Outpatient Medications:     acetaminophen (TYLENOL) 325 mg tablet, Take 1 - 2 tabs PO Q4 PRN pain, Disp: 30 tablet, Rfl: 0    aspirin 81 MG tablet, Take 81 mg by mouth daily  , Disp: , Rfl:     atorvastatin (LIPITOR) 40 mg tablet, TAKE 1 TABLET AT BEDTIME, Disp: 90 tablet, Rfl: 3    bimatoprost (LUMIGAN) 0 01 % ophthalmic drops, Apply to eye, Disp: , Rfl:     bisacodyl (DULCOLAX) 10 mg suppository, Insert 10 mg into the rectum daily, Disp: , Rfl:     Cyanocobalamin (B-12) 1000 MCG TABS, Take by mouth, Disp: , Rfl:     fluticasone (FLONASE) 50 mcg/act nasal spray, into each nostril as needed  , Disp: , Rfl:     furosemide (LASIX) 40 mg tablet, Take 1 tablet (40 mg total) by mouth daily (Patient taking differently: Take 20 mg by mouth as needed ), Disp: , Rfl: 0    glucose blood (ACCU-CHEK CHANDRAKANT PLUS) test strip, by In Vitro route, Disp: , Rfl:     insulin glargine (LANTUS) 100 units/mL subcutaneous injection, Inject 5 Units under the skin daily at bedtime, Disp: , Rfl: 0    insulin lispro (HumaLOG) 100 units/mL injection, Inject 1-6 Units under the skin 3 (three) times a day before meals, Disp: 5 4 mL, Rfl: 0    isosorbide mononitrate (IMDUR) 30 mg 24 hr tablet, TK 1 T PO QAM, Disp: , Rfl: 0    lenalidomide (REVLIMID) 5 MG CAPS, TAKE 1 CAPSULE BY MOUTH EVERY DAY FOR 7 DAYS THEN 14 DAYS OFF, Disp: 30 capsule, Rfl: 0    magnesium hydroxide (MILK OF MAGNESIA) 400 mg/5 mL oral suspension, Take by mouth daily as needed for constipation, Disp: , Rfl:     melatonin 1 mg, Take 4 mg by mouth daily at bedtime, Disp: , Rfl:     nitroglycerin (NITROSTAT) 0 4 mg SL tablet, Place 1 tablet under the tongue every 5 (five) minutes as needed, Disp: , Rfl:     omeprazole (PriLOSEC) 40 MG capsule, daily, Disp: , Rfl: 2    ONE TOUCH ULTRA TEST test strip, The patient test once daily  , Disp: 100 each, Rfl: [address] [facility] 33G MISC, by Does not apply route daily, Disp: 100 each, Rfl: 3    potassium chloride (KLOR-CON 10) 10 mEq tablet, Take 1 tablet (10 mEq total) by mouth daily, Disp: 90 tablet, Rfl: 3    sodium bicarbonate 650 mg tablet, Take 1 tablet (650 mg total) by mouth 3 (three) times daily after meals, Disp: 90 tablet, Rfl: 0    SYMBICORT 160-4 5 MCG/ACT inhaler, INHALE 2 PUFFS PO Q 12 H, Disp: , Rfl: 0    guaiFENesin (MUCINEX) 600 mg 12 hr tablet, Take 1 tablet (600 mg total) by mouth 2 (two) times a day (Patient not taking: Reported on 2/18/2020), Disp: 60 tablet, Rfl: 0    isosorbide dinitrate (ISORDIL) 5 mg tablet, Take 1 tablet (5 mg total) by mouth 3 (three) times a day (Patient not taking: Reported on 1/31/2020), Disp: 90 tablet, Rfl: 1    midodrine (PROAMATINE) 5 mg tablet, Take 1 tablet (5 mg total) by mouth 3 (three) times a day before meals (Patient not taking: Reported on 2/18/2020), Disp: , Rfl: 0    TAKE ONE CAPSULE BY MOUTH DAILY FOR 7 DAYS, THEN 14 DAYS OFF (Patient not taking: Reported on 2/18/2020), Disp: 7 capsule, Rfl: 0    timolol (TIMOPTIC) 0 5 % ophthalmic solution, , Disp: , Rfl:     UNKNOWN TO PATIENT, , Disp: , Rfl:   No Known Allergies  Past Surgical History:   Procedure Laterality Date    BACK SURGERY      CARDIAC CATHETERIZATION  04/05/2004    COLONOSCOPY      CT BONE MARROW BIOPSY AND ASPIRATION  9/24/2018    CYSTOSCOPY      KNEE SURGERY      THROMBOENDARTERECTOMY Right     Cartoid     Social History     Objective:  Vitals:    02/18/20 1605   BP: 106/80   BP Location: Left arm   Patient Position: Sitting   Cuff Size: Adult   Pulse: 97   Resp: 16   Temp: (!) 96 9 °F (36 1 °C)   SpO2: 97%   Height: 5' 7" (1 702 m)     Physical Exam   Constitutional: He is oriented to person, place, and time  He appears well-developed  HENT:   Head: Normocephalic  Eyes: Pupils are equal, round, and reactive to light  Neck: Neck supple  Cardiovascular: Normal rate and regular rhythm     No murmur heard  Pulmonary/Chest: Breath sounds normal  He has no wheezes  He has no rales  Abdominal: Soft  There is no tenderness  Musculoskeletal: Normal range of motion  He exhibits no edema or tenderness  Lymphadenopathy:     He has no cervical adenopathy  Neurological: He is alert and oriented to person, place, and time  He has normal reflexes  No cranial nerve deficit  Skin: No rash noted  No erythema  Psychiatric: He has a normal mood and affect  His behavior is normal          Labs: I personally reviewed the labs and imaging pertinent to this patient care

## 2020-02-19 ENCOUNTER — TELEPHONE (OUTPATIENT)
Dept: HEMATOLOGY ONCOLOGY | Facility: CLINIC | Age: 85
End: 2020-02-19

## 2020-02-19 DIAGNOSIS — C90.00 MULTIPLE MYELOMA NOT HAVING ACHIEVED REMISSION (HCC): ICD-10-CM

## 2020-02-19 RX ORDER — LENALIDOMIDE 5 MG/1
CAPSULE ORAL
Qty: 7 CAPSULE | Refills: 0 | Status: SHIPPED | OUTPATIENT
Start: 2020-02-19

## 2020-02-19 NOTE — TELEPHONE ENCOUNTER
Olga Bender called  From [facility] requesting a [employer] authorization number  Olga Bender can be reached at [phone]   Emailed Finance Department

## 2020-02-20 PROBLEM — I21.A1 TYPE 2 MI (MYOCARDIAL INFARCTION) (HCC): Status: ACTIVE | Noted: 2019-03-15

## 2020-02-20 NOTE — ASSESSMENT & PLAN NOTE
Lab Results   Component Value Date    HGBA1C 6 1 02/01/2020       No results for input(s): POCGLU in the last 72 hours      Blood Sugar Average: Last 72 hrs:     · Holding home Amaryl likely will need to change in the setting of kidney disease  · Blood sugars currently uncontrolled  · Start 5iu lantus at bedtime  · Continue with sliding scale insulin and Accu-Cheks

## 2020-02-20 NOTE — ASSESSMENT & PLAN NOTE
Type 2 MI secondary to acute CHF, as evidenced by elevated troponin 7 58, treatment with IV heparin and cardiology consult  · Troponin trended down to 7 4  · Per cardiology 'Type 2 MI secondary to acute CHF and underlying coronary artery disease, treatment is just supportive with treatment for CHF'

## 2020-02-20 NOTE — ASSESSMENT & PLAN NOTE
· Follows with Dr Shante Pro as an outpatient  · Patient usually takes Revlimid outpatient this is on hold per hematologist as well as patient's son    Patient's son also states that given his failing heart he does not think his dad would go back on that medication

## 2020-02-27 NOTE — UTILIZATION REVIEW
Notification of Discharge  This is a Notification of Discharge from our facility [address]  Please be advised that this patient has been discharge from our facility  Below you will find the admission and discharge date and time including the patients disposition  PRESENTATION DATE: 1/30/2020  8:11 PM  OBS ADMISSION DATE:   IP ADMISSION DATE: 1/31/20 1330   DISCHARGE DATE: 2/12/2020  7:44 PM  DISPOSITION: Non [facility]   Admission Orders listed below:  Admission Orders (From admission, onward)     Ordered        01/31/20 1330  Inpatient Admission  Once         01/30/20 2147  Place in Observation (expected length of stay for this patient is less than two midnights)  Once                   Please contact the UR Department if additional information is required to close this patient's authorization/case  Ryan Godinez  Network Utilization Review Department  Main: [phone] x carefully listen to the prompts  All voicemails are confidential   [email or web address]  Send all requests for admission clinical reviews, approved or denied determinations and any other requests to dedicated fax number below belonging to the campus where the patient is receiving treatment   List of dedicated fax numbers:  [address] DENIALS (Administrative/Medical Necessity) [phone]   [address] (Maternity/NICU/Pediatrics) [phone]   Windy Alexander [phone]   Sadaf Montesinos [phone]   Venus Lozano [phone]   [facility] [employer] [address] [place] [address] [phone]   [facility]  [phone]   [address] [place] [address]  [address] [facility] [address] [facility] [phone]

## 2020-03-12 ENCOUNTER — TELEPHONE (OUTPATIENT)
Dept: HEMATOLOGY ONCOLOGY | Facility: CLINIC | Age: 85
End: 2020-03-12

## 2020-03-12 NOTE — TELEPHONE ENCOUNTER
Left vm for pt w/ new appt of 3 31 20 at 2pm with NATALIE Roe    Asked to call back if this does not work for him

## 2020-03-30 ENCOUNTER — TELEPHONE (OUTPATIENT)
Dept: HEMATOLOGY ONCOLOGY | Facility: CLINIC | Age: 85
End: 2020-03-30

## 2020-03-30 NOTE — TELEPHONE ENCOUNTER
Called and left  for patient regarding his appt on 3/31 needing to be r/s since he hasn't gotten his labs done  I r/s his appt with Francheska Caldwell to 4/14 at 1:00pm with Francheska Caldwell and stated if this did not work for him to call us back at [phone]

## 2020-04-09 ENCOUNTER — TELEPHONE (OUTPATIENT)
Dept: HEMATOLOGY ONCOLOGY | Facility: CLINIC | Age: 85
End: 2020-04-09

## 2020-04-13 ENCOUNTER — TELEPHONE (OUTPATIENT)
Dept: HEMATOLOGY ONCOLOGY | Facility: CLINIC | Age: 85
End: 2020-04-13

## 2020-04-14 ENCOUNTER — TELEPHONE (OUTPATIENT)
Dept: HEMATOLOGY ONCOLOGY | Facility: CLINIC | Age: 85
End: 2020-04-14

## 2021-02-12 DIAGNOSIS — Z23 ENCOUNTER FOR IMMUNIZATION: ICD-10-CM

## 2021-08-04 NOTE — OCCUPATIONAL THERAPY NOTE
Occupational Therapy Evaluation      Cookie Hastings    2/1/2020    Principal Problem:    Acute on chronic combined systolic and diastolic heart failure (HCC)  Active Problems:    Type 2 diabetes mellitus without complication, without long-term current use of insulin (HCC)    Mixed hyperlipidemia    Esophageal reflux    Stage 4 chronic kidney disease (HCC)    Ischemic cardiomyopathy    Kappa light chain myeloma (HCC)    Anemia      Past Medical History:   Diagnosis Date    Angina pectoris (HCC)     Chronic kidney disease     Coronary artery disease     [facility] mellitus [facility]     Glaucoma     Hypertension     Multiple myeloma ([facility]     Occluded coronary artery stent     Left       Past Surgical History:   Procedure Laterality Date    BACK SURGERY      CARDIAC CATHETERIZATION  04/05/2004    COLONOSCOPY      CT BONE MARROW BIOPSY AND ASPIRATION  9/24/2018    CYSTOSCOPY      KNEE SURGERY      THROMBOENDARTERECTOMY Right     Cartoid        02/01/20 1220   Note Type   Note type Eval only   Restrictions/Precautions   Weight Bearing Precautions Per Order No   Other Precautions Cognitive; Chair Alarm; Bed Alarm; Fall Risk;Hard of hearing   Pain Assessment   Pain Assessment No/denies pain   Home Living   Type of [address] [place] [address] One level  (3-4STE)   Bathroom Shower/Tub Tub/shower unit   [employer] Grab bars in shower; Shower chair;Grab bars around toilet   Home Equipment   (rollator)   Prior Function   Level of [place] Independent with ADLs and functional mobility; Needs assistance with IADLs   Lives With Son   Receives Help From Family   ADL Assistance Independent  (supervision/setup from son, edinson for showers)   IADLs Needs assistance   Falls in the last 6 months 1 to 4   Vocational Retired   Comments Son home with him all the time   Psychosocial   Psychosocial (WDL) WDL   ADL   Eating Assistance 7  Independent   Grooming Assistance 5  [address] [address] Supervision/Setup   LB Bathing Assistance 5  Supervision/Setup   UB Dressing Assistance 5  Supervision/Setup   LB Dressing Assistance 5  Supervision/Setup   Toileting Assistance  6  Modified independent   Bed Mobility   Supine to Sit 5  Supervision   Sit to Supine 5  Supervision   Transfers   Sit to Stand 5  Supervision   Stand to Sit 5  Supervision   Stand pivot 5  Supervision  (RW)   Functional Mobility   Functional Mobility 5  Supervision   Additional Comments In room and hallway   Additional items Rolling walker   Activity Tolerance   Activity Tolerance Patient tolerated treatment well   Medical Staff Made Aware PT Blanca   Nurse Made Aware JAMIE Daigle and Josias Linder   RUE Assessment   RUE Assessment WFL   LUE Assessment   LUE Assessment WFL   Cognition   Overall Cognitive Status Impaired   Arousal/Participation Alert; Cooperative   Attention Attends with cues to redirect   Orientation Level Oriented to person;Disoriented to place; Disoriented to time;Disoriented to situation  (knew the year, thought it was late January)   Memory Decreased recall of precautions;Decreased recall of recent events;Decreased short term memory   Following Commands Follows one step commands without difficulty   Comments Pleasantly confused; son does IADLs including medication mgmt   Assessment   Prognosis Good   Assessment Pt is a 80 y o  male seen for OT evaluation at [address] [place] [address], admitted 1/30/2020 w/ Acute on chronic combined systolic and diastolic heart failure ([facility] )  OT completed brief review of pt's medical and social history  Comorbidities affecting pt's functional performance at time of assessment include: DM type 2, CKD, cardiomyopathy, anemia, hx MRSA, UTI, Hx MI, hx cerebral infarction  Prior to admission, pt was living in [facility] with son, 3-4STE and was Modified independent with rollator for functional mobility, supervised/setup for ADLs especially showers, assisted by son for IADLs   Upon evaluation, pt presents to OT at functional baseline  Pt with cognitive deficits, but in a supportive situation with son  Based on findings, pt is of low complexity  At this time, OT recommendations at time of discharge are home with 24hr family support (once cleared medically and cleared by PT for LUIS)  No further acute OT needs indicated at this time - Recommend pt continue to be OOB for meals, ambulation to/from BR, perform self care tasks, and mobility in hallway with nursing  D/C from OT caseload with above recommendations     Plan   OT Frequency Eval only   Recommendation   OT Discharge Recommendation Home with family support   OT - OK to Discharge Yes  (with 24hr support, once cleared medically and by PT (stairs))     rest[place], MS, OTR/L Consent Type: Consent 1 (Standard)

## 2021-08-20 NOTE — PLAN OF CARE
Addended by: DEEPTI OJEDA on: 8/20/2021 03:56 PM     Modules accepted: Orders     Problem: Potential for Falls  Goal: Patient will remain free of falls  Description  INTERVENTIONS:  - Assess patient frequently for physical needs  -  Identify cognitive and physical deficits and behaviors that affect risk of falls    -  [place] fall precautions as indicated by assessment   - Educate patient/family on patient safety including physical limitations  - Instruct patient to call for assistance with activity based on assessment  - Modify environment to reduce risk of injury  - Consider OT/PT consult to assist with strengthening/mobility  Outcome: Progressing     Problem: DISCHARGE PLANNING  Goal: Discharge to home or other facility with appropriate resources  Description  INTERVENTIONS:  - Identify barriers to discharge w/patient and caregiver  - Arrange for needed discharge resources and transportation as appropriate  - Identify discharge learning needs (meds, wound care, etc )  - Arrange for interpretive services to assist at discharge as needed  - Refer to Case Management Department for coordinating discharge planning if the patient needs post-hospital services based on physician/advanced practitioner order or complex needs related to functional status, cognitive ability, or social support system  Outcome: Progressing     Problem: RESPIRATORY - ADULT  Goal: Achieves optimal ventilation and oxygenation  Description  INTERVENTIONS:  - Assess for changes in respiratory status  - Assess for changes in mentation and behavior  - Position to facilitate oxygenation and minimize respiratory effort  - Oxygen administered by appropriate delivery if ordered  - Initiate smoking cessation education as indicated  - Encourage broncho-pulmonary hygiene including cough, deep breathe, Incentive Spirometry  - Assess the need for suctioning and aspirate as needed  - Assess and instruct to report SOB or any respiratory difficulty  - Respiratory Therapy support as indicated  Outcome: Progressing     Problem: HEMATOLOGIC - ADULT  Goal: Maintains hematologic stability  Description  INTERVENTIONS  - Assess for signs and symptoms of bleeding or hemorrhage  - Monitor labs  - Administer supportive blood products/factors as ordered and appropriate  Outcome: Progressing

## 2022-07-20 NOTE — PROGRESS NOTES
Impression: Presence of intraocular lens: Z96.1.  Bilateral. Plan: See plan #1 NEPHROLOGY PROGRESS NOTE   Eileen Castro 80 y o  male MRN: [id number]  Unit/Bed#: -01 Encounter: [id number]      [facility]    1  Acute Kidney Injury with a baseline creatinine 1 6-1 9 now increased to 2 9, on Milrinone for low output heart failure and furosemide 40 mg t i d , he has a net negative fluid balance, his breathing has improved still with some JVD, upper respiratory breath sounds improved, discussed with ICU team will hold furosemide for now continue milrinone and monitor urine output  His medications were reviewed would avoid morphine this may have contributed to his mental status alteration, he did have a repeat urinalysis that showed large blood will monitor    2  Electrolytes:  Mild hypernatremia with a sodium of 146, will monitor now that he is off diuretics    3  Acid/Base:  Elevated lactic acidosis moved to the ICU appreciate their support, with treating his heart failure seems like anion gap improved 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 as CO2 now 25    4  BP/HR:  In the setting of congestive heart failure on inotrope    5  Volume overload-with acute congestive heart failure-as above regarding diuretics    6  Anemia in the setting of chronic kidney disease hemoglobin low but acceptable will monitor in the setting of critical illness     7  Health Maintanance/Risk Reduction moderate glycemic control    8   Confusion/delirium-improving mental status    Given his frailty the and age he would not be a good dialysis candidate as this would likely cause more harm, suffering this was briefly discussed with patient's son and patient at bedside yesterday evening, mental status is slightly improved today urine output is stable will continue to monitor    Overall care was discussed with ICU team    SUBJECTIVE:    Patient appears better from a mental status standpoint now is off oxygen was reading the newspaper no Nava catheter urinating on his own denies any chest pain or shortness of breath    OBJECTIVE:  Current Weight: Weight - Scale: 82 6 kg (182 lb 1 6 oz)  Vitals:    02/06/20 1116   BP: 98/53   Pulse: (!) 109   Resp: (!) 26   Temp: 97 7 °F (36 5 °C)   SpO2: 99%       Intake/Output Summary (Last 24 hours) at 2/6/2020 1143  Last data filed at 2/6/2020 0901  Gross per 24 hour   Intake 751 3 ml   Output 2752 ml   Net -2000 7 ml     Weight (last 2 days)     Date/Time   Weight    02/05/20 0454   82 6 (182 1)    02/04/20 0505   79 8 (176)              General: conscious, cooperative, in not acute distress  Eyes: conjunctivae pink, anicteric sclerae  ENT: lips and mucous membranes moist  Neck: supple, JVD  Chest: clear breath sounds bilateral, no crackles, ronchus or wheezings  CVS: distinct S1 & S2, normal rate, regular rhythm  Abdomen: soft, non-tender, non-distended, normoactive bowel sounds  Extremities: no edema of both legs  Skin: no rash  Neuro: awake, alert, oriented      Medications:    Current Facility-Administered Medications:     acetaminophen (TYLENOL) tablet 650 mg, 650 mg, Oral, Q6H PRN, Ally Starcher, CRNP    aspirin chewable tablet 81 mg, 81 mg, Oral, Daily, Ally Starcher, CRNP, 81 mg at 02/06/20 0814    bimatoprost (LUMIGAN) 0 01 % ophthalmic solution 1 drop, 1 drop, Both Eyes, HS, Ally Starcher, CRNP, 1 drop at 02/05/20 2124    budesonide-formoterol (SYMBICORT) 160-4 5 mcg/act inhaler 2 puff, 2 puff, Inhalation, BID, Ally Starcher, CRNP, 2 puff at 02/04/20 2110    cyanocobalamin (VITAMIN B-12) tablet 1,000 mcg, 1,000 mcg, Oral, Daily, Ally Starcher, CRNP, 1,000 mcg at 02/06/20 0809    docusate sodium (COLACE) capsule 100 mg, 100 mg, Oral, Daily, Ally Starcher, CRNP, 100 mg at 02/06/20 0809    guaiFENesin (MUCINEX) 12 hr tablet 1,200 mg, 1,200 mg, Oral, BID, DON Nicolas, 1,200 mg at 02/06/20 0808    insulin lispro (HumaLOG) 100 units/mL subcutaneous injection 1-5 Units, 1-5 Units, Subcutaneous, HS, Sagar Tony MD, 2 Units at 02/05/20 2128    insulin lispro (HumaLOG) 100 units/mL subcutaneous injection 1-6 Units, 1-6 Units, Subcutaneous, TID AC, 2 Units at 02/06/20 0809 **AND** Fingerstick Glucose (POCT), , , TID AC, Eddi Franklin MD    [facility]) inhalation solution 1 25 mg, 1 25 mg, Nebulization, Q8H PRN, Donna Maguire,     melatonin tablet 6 mg, 6 mg, Oral, HS, Shane Lick, CRNP, 6 mg at 02/05/20 2123    milrinone (PRIMACOR) 20 mg in 100 mL infusion (premix), 0 25 mcg/kg/min, Intravenous, Continuous, DON Lopez, Last Rate: 6 2 mL/hr at 02/06/20 0004, 0 25 mcg/kg/min at 02/06/20 0004    pantoprazole (PROTONIX) EC tablet 40 mg, 40 mg, Oral, Early Morning, Shane Lick, CRNP, 40 mg at 02/05/20 0543    timolol (TIMOPTIC) 0 5 % ophthalmic solution 1 drop, 1 drop, Both Eyes, Daily, Shane Lick, CRNP, 1 drop at 02/06/20 0809    Invasive Devices:      Lab Results:   Results from last 7 days   Lab Units 02/06/20  0559 02/05/20  1105 02/05/20  0454 02/05/20  0208 02/05/20  0140 02/04/20  0458 02/03/20  0515 02/02/20  0456  01/31/20  0457 01/30/20  2146 01/30/20  2021   WBC Thousand/uL 9 94  --  7 70  8 01  --  7 36 8 72 6 50 6 68  --  6 25  --  7 47   HEMOGLOBIN g/dL 8 8*  --  9 3*  9 6*  --  10 0* 9 5* 9 4* 9 0*   < > 7 7*  --  8 5*   HEMATOCRIT % 28 3*  --  30 3*  31 0*  --  32 1* 30 6* 30 4* 29 3*   < > 25 2*  --  28 1*   PLATELETS Thousands/uL 245  --  323  324  --  346 386 389 367  --  356  --  402*   POTASSIUM mmol/L 3 8  --  4 3  --  4 8 4 4 3 0* 3 1*   < > 4 0  --  4 5   CHLORIDE mmol/L 106  --  99*  --  102 104 107 106   < > 105  --  103   CO2 mmol/L 25  --  22  --  24 19* 23 24   < > 24  --  24   CO2, I-STAT mmol/L  --   --   --  24  --   --   --   --   --   --   --   --    BUN mg/dL 50*  --  44*  --  46* 35* 31* 34*   < > 31*  --  31*   CREATININE mg/dL 2 89*  --  2 75*  --  2 68* 2 21* 1 74* 1 93*   < > 1 73*  --  1 83*   CALCIUM mg/dL 8 8  --  9 0  --  9 4 9 2 8 7 8 8   < > 8 4  --  8 7   MAGNESIUM mg/dL 2 5  --   --   --  2 5  --  2 3 2 4  --  2 3  --   --    PHOSPHORUS mg/dL 4 8*  --   --   --   --   --   --   --   --  3 3  --   --    ALK PHOS U/L 122*  --   --   --  139*  --   --   --   --   --   --  135*   ALT U/L 1,205*  --   --   --  663*  --   --   --   --   --   --  14   AST U/L 1,213*  --   --   --  1,185*  --   --   --   --   --   --  12   LEUKOCYTES UA   --  Negative  --   --   --   --   --   --   --   --  SMALL  --    BLOOD UA   --  Large*  --   --   --   --   --   --   --   --  NEG  --     < > = values in this interval not displayed         Previous work up:  Please see previous notes

## 2022-08-18 NOTE — TELEPHONE ENCOUNTER
Patient son is calling for a refill for his Revlimid 5mg send to [facility]  Pt woke up and came immediately to the TS when he saw the previous nurse that he was directing racial slurs towards earlier and began doing the same again. This time another pt was awake and took offense to his comments and pt began calling her by the same slurs. Attempted to redirect pt several times and he persisted and began yelling again disturbing other pts. Security was called, pt was advised that he would receive a once IM of thorazine. Pt became upset, explained he was getting this medication because of his disruptive behavior. IM was given and pt began spitting at all staff who was present. Pt still awake at this time. Continuing to monitor.

## 2022-09-13 NOTE — ASSESSMENT & PLAN NOTE
· Continue statin Azithromycin Pregnancy And Lactation Text: This medication is considered safe during pregnancy and is also secreted in breast milk.

## 2023-01-01 NOTE — DISCHARGE SUMMARY
Discharge Summary - [address] [facility]    Patient Information: Edmund Robb 80 y o  male MRN: [id number]  Unit/Bed#: -01 Encounter: [id number]    Discharging Physician / Practitioner: Enrique Alfaro MD  PCP: Edenilson Giordano MD  Admission Date: 10/22/2018  Discharge Date: 10/23/18    Disposition:     Home    Reason for Admission: syncope     Discharge Diagnoses:     Principal Problem:    Syncope  Active Problems:    Essential hypertension    Type 2 diabetes mellitus without complication, without long-term current use of insulin ([facility])    Stage 4 chronic kidney disease ([facility] )    Chronic systolic congestive heart failure ([facility])    Kappa light chain myeloma ([facility] )    Pustule    UTI (urinary tract infection)  Resolved Problems:    * No resolved hospital problems  *      Consultations During Hospital Stay:  · none    Procedures Performed:     · none    Significant Findings / Test Results:     · UA: nitrites and bacteria     Incidental Findings:   · None      Test Results Pending at Discharge (will require follow up):   · Ucx: pending      Outpatient Tests Requested:  · None     Complications:  None     Hospital Course:     Edmund Robb is a 80 y o  male patient who originally presented to the hospital on 10/22/2018 due to an episode of syncope while visiting friends in a nursing home  On further history patient did endorse increased urinary frequency and some dysuria  Also with mild confusion per family  Patient was started on IV abx and hydrated overnight with improvement in generalized weakness, malaise and confusion  He did have a fever of 102 while being observed supporting diagnosis of UTI  No urine culture available at time of discharge however patient felt symptomatically improved on Ancef  Patient was switched to Omnicef 300 mg twice daily for an additional 10 days to complete a course for complicated UTI  Did discuss reasons to return to care with both patient and son    At this time urine culture is pending however patient improved so feel comfortable discharging without finalized urine culture specimen  If patient were to develop fevers, chills, nausea, vomiting or inability to tolerate po, patient was instructed to contact PCP or return to care in the ED  Condition at Discharge: fair     Discharge Day Visit / Exam:     Subjective:  No complaints  No dysuria  No   Vitals: Blood Pressure: 100/50 (10/23/18 0739)  Pulse: 66 (10/23/18 0739)  Temperature: 98 9 °F (37 2 °C) (10/23/18 0739)  Temp Source: Oral (10/23/18 0739)  Respirations: 18 (10/23/18 0739)  Height: 5' 8" (172 7 cm) (10/22/18 1825)  Weight - Scale: 92 3 kg (203 lb 7 8 oz) (10/22/18 1825)  SpO2: 100 % (10/23/18 0739)  Exam:   Physical Exam   Constitutional: He is oriented to person, place, and time  No distress  HENT:   Head: Normocephalic and atraumatic  Cardiovascular: Normal rate and regular rhythm  Exam reveals no friction rub  No murmur heard  Pulmonary/Chest: Effort normal and breath sounds normal  No respiratory distress  He has no wheezes  He has no rales  Abdominal: Soft  Bowel sounds are normal    Musculoskeletal: He exhibits no edema or tenderness  Neurological: He is alert and oriented to person, place, and time  Skin: Skin is warm and dry  He is not diaphoretic  Psychiatric: He has a normal mood and affect  His behavior is normal    Nursing note and vitals reviewed  Discussion with Family: son, Landen Mendez     Discharge instructions/Information to patient and family:   See after visit summary for information provided to patient and family  Provisions for Follow-Up Care:  See after visit summary for information related to follow-up care and any pertinent home health orders  Planned Readmission: none     Discharge Statement:  I spent 40 minutes discharging the patient  This time was spent on the day of discharge  I had direct contact with the patient on the day of discharge   Greater than 50% of the total time was spent examining patient, answering all patient questions, arranging and discussing plan of care with patient as well as directly providing post-discharge instructions  Additional time then spent on discharge activities  Discharge Medications:  See after visit summary for reconciled discharge medications provided to patient and family        ** Please Note: This note has been constructed using a voice recognition system ** Based on # of Babies in Utero = <1> (2023 22:15:46)  Extramural Delivery = *  Gestational Age of Birth = <39w3d> (2023 04:49:20)  Number of Prenatal Care Visits = <16> (2023 22:15:46)  EFW = <3400> (2023 20:38:52)  Birthweight = *    * if criteria is not previously documented

## 2023-01-18 NOTE — TELEPHONE ENCOUNTER
Benign appearing, medrol dose pack prescribed. RTC in 3 weeks if ulcer persists.   PT'S SON CALLED FOR REVLIMID  REFILL

## 2023-03-07 NOTE — TELEPHONE ENCOUNTER
Spoke with patient's son and [facility] [place]  Revlimid is to be on hold until f/u with Dr Paige Carranza on 2/18/2020  Will update Dr Shira Myles  DONALD